# Patient Record
Sex: FEMALE | Race: BLACK OR AFRICAN AMERICAN | NOT HISPANIC OR LATINO | URBAN - METROPOLITAN AREA
[De-identification: names, ages, dates, MRNs, and addresses within clinical notes are randomized per-mention and may not be internally consistent; named-entity substitution may affect disease eponyms.]

---

## 2019-04-29 ENCOUNTER — INPATIENT HOSPITAL (OUTPATIENT)
Dept: URBAN - METROPOLITAN AREA HOSPITAL 107 | Facility: HOSPITAL | Age: 69
End: 2019-04-29
Payer: COMMERCIAL

## 2019-04-29 DIAGNOSIS — N18.6 END STAGE RENAL DISEASE: ICD-10-CM

## 2019-04-29 DIAGNOSIS — D50.0 IRON DEFICIENCY ANEMIA SECONDARY TO BLOOD LOSS (CHRONIC): ICD-10-CM

## 2019-04-29 DIAGNOSIS — K92.2 GASTROINTESTINAL HEMORRHAGE, UNSPECIFIED: ICD-10-CM

## 2019-04-29 PROCEDURE — 99223 1ST HOSP IP/OBS HIGH 75: CPT

## 2019-04-30 ENCOUNTER — INPATIENT HOSPITAL (OUTPATIENT)
Dept: URBAN - METROPOLITAN AREA HOSPITAL 107 | Facility: HOSPITAL | Age: 69
End: 2019-04-30
Payer: COMMERCIAL

## 2019-04-30 DIAGNOSIS — K92.1 MELENA: ICD-10-CM

## 2019-04-30 DIAGNOSIS — K92.2 GASTROINTESTINAL HEMORRHAGE, UNSPECIFIED: ICD-10-CM

## 2019-04-30 PROCEDURE — 99232 SBSQ HOSP IP/OBS MODERATE 35: CPT | Performed by: PHYSICIAN ASSISTANT

## 2019-05-02 ENCOUNTER — INPATIENT HOSPITAL (OUTPATIENT)
Dept: URBAN - METROPOLITAN AREA HOSPITAL 107 | Facility: HOSPITAL | Age: 69
End: 2019-05-02
Payer: COMMERCIAL

## 2019-05-02 DIAGNOSIS — K57.30 DIVERTICULOSIS OF LARGE INTESTINE WITHOUT PERFORATION OR ABS: ICD-10-CM

## 2019-05-02 DIAGNOSIS — D50.0 IRON DEFICIENCY ANEMIA SECONDARY TO BLOOD LOSS (CHRONIC): ICD-10-CM

## 2019-05-02 DIAGNOSIS — K92.1 MELENA: ICD-10-CM

## 2019-05-02 PROCEDURE — 45378 DIAGNOSTIC COLONOSCOPY: CPT | Mod: 52

## 2019-05-03 ENCOUNTER — INPATIENT HOSPITAL (OUTPATIENT)
Dept: URBAN - METROPOLITAN AREA HOSPITAL 107 | Facility: HOSPITAL | Age: 69
End: 2019-05-03
Payer: COMMERCIAL

## 2019-05-03 DIAGNOSIS — K92.2 GASTROINTESTINAL HEMORRHAGE, UNSPECIFIED: ICD-10-CM

## 2019-05-03 PROCEDURE — 99232 SBSQ HOSP IP/OBS MODERATE 35: CPT | Performed by: INTERNAL MEDICINE

## 2019-05-04 ENCOUNTER — INPATIENT HOSPITAL (OUTPATIENT)
Dept: URBAN - METROPOLITAN AREA HOSPITAL 107 | Facility: HOSPITAL | Age: 69
End: 2019-05-04
Payer: COMMERCIAL

## 2019-05-04 DIAGNOSIS — D50.0 IRON DEFICIENCY ANEMIA SECONDARY TO BLOOD LOSS (CHRONIC): ICD-10-CM

## 2019-05-04 DIAGNOSIS — K92.2 GASTROINTESTINAL HEMORRHAGE, UNSPECIFIED: ICD-10-CM

## 2019-05-04 DIAGNOSIS — K92.1 MELENA: ICD-10-CM

## 2019-05-04 DIAGNOSIS — N18.6 END STAGE RENAL DISEASE: ICD-10-CM

## 2019-05-04 DIAGNOSIS — K57.30 DIVERTICULOSIS OF LARGE INTESTINE WITHOUT PERFORATION OR ABS: ICD-10-CM

## 2019-05-04 PROCEDURE — 99232 SBSQ HOSP IP/OBS MODERATE 35: CPT | Performed by: INTERNAL MEDICINE

## 2019-05-05 ENCOUNTER — INPATIENT HOSPITAL (OUTPATIENT)
Dept: URBAN - METROPOLITAN AREA HOSPITAL 107 | Facility: HOSPITAL | Age: 69
End: 2019-05-05
Payer: COMMERCIAL

## 2019-05-05 DIAGNOSIS — K92.2 GASTROINTESTINAL HEMORRHAGE, UNSPECIFIED: ICD-10-CM

## 2019-05-05 PROCEDURE — 99232 SBSQ HOSP IP/OBS MODERATE 35: CPT | Performed by: INTERNAL MEDICINE

## 2019-07-18 ENCOUNTER — OFFICE VISIT (OUTPATIENT)
Dept: SURGERY | Facility: CLINIC | Age: 69
End: 2019-07-18

## 2019-07-18 VITALS — HEIGHT: 60 IN | BODY MASS INDEX: 36.71 KG/M2 | HEART RATE: 66 BPM | OXYGEN SATURATION: 93 % | WEIGHT: 187 LBS

## 2019-07-18 DIAGNOSIS — R10.13 EPIGASTRIC PAIN: Primary | ICD-10-CM

## 2019-07-18 PROCEDURE — 99203 OFFICE O/P NEW LOW 30 MIN: CPT | Performed by: SURGERY

## 2019-07-18 RX ORDER — ACETAMINOPHEN AND CODEINE PHOSPHATE 300; 30 MG/1; MG/1
1 TABLET ORAL DAILY PRN
Status: ON HOLD | COMMUNITY
End: 2023-01-16

## 2019-07-18 RX ORDER — PANTOPRAZOLE SODIUM 40 MG/1
40 TABLET, DELAYED RELEASE ORAL 2 TIMES DAILY
COMMUNITY
End: 2021-12-13

## 2019-07-18 RX ORDER — CARVEDILOL 25 MG/1
25 TABLET ORAL 2 TIMES DAILY WITH MEALS
COMMUNITY
End: 2021-12-13

## 2019-07-18 RX ORDER — AMLODIPINE BESYLATE 10 MG/1
10 TABLET ORAL DAILY
COMMUNITY
End: 2021-12-13

## 2019-07-18 RX ORDER — ISOSORBIDE DINITRATE 30 MG/1
30 TABLET ORAL DAILY
COMMUNITY

## 2019-07-18 RX ORDER — HYDRALAZINE HYDROCHLORIDE 25 MG/1
100 TABLET, FILM COATED ORAL 3 TIMES DAILY
COMMUNITY

## 2019-07-18 NOTE — PROGRESS NOTES
SUMMARY (A/P):    69-year-old lady went to the [facility] emergency room last week due to severe midepigastric pain.  Symptoms have now basically resolved and there are no findings on physical examination of concern.  I suspect abdominal wall strain that has basically resolved.  The epigastric fatty herniation noted on CT scan is not evident on physical exam today.  No further work-up or treatment is warranted unless symptoms were to recur.      CC:    Abdominal pain    HPI:    69-year-old lady went to [facility] emergency room last week with sudden onset of severe midepigastric abdominal pain associated with bulge.  She indicates that the pain has resolved and she is unable to palpate a bulge at this point.    PSH:    Epigastric hernia 2017  Left upper extremity dialysis shunt    PMH:    Arthritis  Chronic kidney disease, on hemodialysis  Coronary artery disease  Hypertension    FAMILY HISTORY:    Negative for colon cancer    SOCIAL HISTORY:   Denies tobacco use  Denies alcohol use    ALLERGIES: reviewed, in Epic    MEDICATIONS: reviewed, in Epic    ROS:  No chest pain or shortness of air.  All other systems reviewed and negative other than presenting complaints.    RADIOLOGY/ENDOSCOPY:    CT abdomen pelvis at [facility] demonstrated mild circumferential wall thickening of the cecum (she had an unremarkable colonoscopy in May 2019).  Small fat-containing ventral hernia is described in the lower epigastrium.    PHYSICAL EXAM:   Constitutional: Well-developed well-nourished, no acute distress  Vital signs: HR 66, weight 187 pounds, height 60 inches, BMI 36.5  Discussed with patient increased perioperative risks associated with obesity including increased risks of DVT, infection, seromas, poor wound healing and hernias (with abdominal surgery).  Eyes: Conjunctiva normal, sclera nonicteric  ENMT: Hearing grossly normal, oral mucosa moist  Neck: Supple, no palpable mass, trachea midline  Respiratory: Clear to  auscultation, normal inspiratory effort  Cardiovascular: Regular rate, no murmur, no carotid bruit, no peripheral edema, no jugular venous distention  Gastrointestinal: Soft, nontender, no palpable mass, no hepatosplenomegaly, well-healed supraumbilical incision from previous epigastric hernia repair with no palpable or visible evidence of recurrence, no hernia elsewhere on the abdominal wall that is palpable  Lymphatics (palpable nodes):  cervical-negative, axillary-negative  Skin:  Warm, dry, no rash on visualized skin surfaces  Musculoskeletal: Symmetric strength, normal gait  Psychiatric: Alert and oriented ×3, normal affect     ALBERT GOMEZ M.D.

## 2021-12-13 ENCOUNTER — APPOINTMENT (OUTPATIENT)
Dept: CT IMAGING | Facility: HOSPITAL | Age: 71
End: 2021-12-13

## 2021-12-13 ENCOUNTER — HOSPITAL ENCOUNTER (OUTPATIENT)
Facility: HOSPITAL | Age: 71
Setting detail: OBSERVATION
LOS: 1 days | Discharge: HOME | End: 2021-12-14
Attending: EMERGENCY MEDICINE | Admitting: STUDENT IN AN ORGANIZED HEALTH CARE EDUCATION/TRAINING PROGRAM

## 2021-12-13 ENCOUNTER — APPOINTMENT (OUTPATIENT)
Dept: GENERAL RADIOLOGY | Facility: HOSPITAL | Age: 71
End: 2021-12-13

## 2021-12-13 DIAGNOSIS — R10.84 GENERALIZED ABDOMINAL PAIN: Primary | ICD-10-CM

## 2021-12-13 DIAGNOSIS — E87.70 HYPERVOLEMIA, UNSPECIFIED HYPERVOLEMIA TYPE: ICD-10-CM

## 2021-12-13 DIAGNOSIS — J81.0 ACUTE PULMONARY EDEMA (HCC): ICD-10-CM

## 2021-12-13 DIAGNOSIS — G57.93 NEUROPATHY INVOLVING BOTH LOWER EXTREMITIES: ICD-10-CM

## 2021-12-13 DIAGNOSIS — N18.6 ESRD (END STAGE RENAL DISEASE) ON DIALYSIS (HCC): ICD-10-CM

## 2021-12-13 DIAGNOSIS — Z99.2 ESRD (END STAGE RENAL DISEASE) ON DIALYSIS (HCC): ICD-10-CM

## 2021-12-13 PROBLEM — D63.8 ANEMIA, CHRONIC DISEASE: Status: ACTIVE | Noted: 2021-12-13

## 2021-12-13 PROBLEM — N17.9 AKI (ACUTE KIDNEY INJURY) (HCC): Status: ACTIVE | Noted: 2021-12-13

## 2021-12-13 PROBLEM — R07.89 CHEST PAIN, ATYPICAL: Status: ACTIVE | Noted: 2021-12-13

## 2021-12-13 PROBLEM — I10 HTN (HYPERTENSION): Status: ACTIVE | Noted: 2021-12-13

## 2021-12-13 PROBLEM — E87.29 METABOLIC ACIDOSIS, INCREASED ANION GAP: Status: ACTIVE | Noted: 2021-12-13

## 2021-12-13 PROBLEM — E66.9 OBESITY (BMI 30-39.9): Status: ACTIVE | Noted: 2021-12-13

## 2021-12-13 LAB
ALBUMIN SERPL-MCNC: 4.1 G/DL (ref 3.5–5.2)
ALBUMIN/GLOB SERPL: 1.6 G/DL
ALP SERPL-CCNC: 101 U/L (ref 39–117)
ALT SERPL W P-5'-P-CCNC: 6 U/L (ref 1–33)
ANION GAP SERPL CALCULATED.3IONS-SCNC: 10.9 MMOL/L (ref 5–15)
ANION GAP SERPL CALCULATED.3IONS-SCNC: 17.4 MMOL/L (ref 5–15)
APTT PPP: 31.4 SECONDS (ref 22.7–35.4)
AST SERPL-CCNC: 17 U/L (ref 1–32)
BASOPHILS # BLD AUTO: 0.03 10*3/MM3 (ref 0–0.2)
BASOPHILS NFR BLD AUTO: 0.9 % (ref 0–1.5)
BILIRUB SERPL-MCNC: 0.3 MG/DL (ref 0–1.2)
BUN SERPL-MCNC: 54 MG/DL (ref 8–23)
BUN SERPL-MCNC: 55 MG/DL (ref 8–23)
BUN/CREAT SERPL: 6.5 (ref 7–25)
BUN/CREAT SERPL: 6.8 (ref 7–25)
CALCIUM SPEC-SCNC: 8.6 MG/DL (ref 8.6–10.5)
CALCIUM SPEC-SCNC: 9.3 MG/DL (ref 8.6–10.5)
CHLORIDE SERPL-SCNC: 100 MMOL/L (ref 98–107)
CHLORIDE SERPL-SCNC: 98 MMOL/L (ref 98–107)
CO2 SERPL-SCNC: 25.6 MMOL/L (ref 22–29)
CO2 SERPL-SCNC: 30.1 MMOL/L (ref 22–29)
CREAT SERPL-MCNC: 8.11 MG/DL (ref 0.57–1)
CREAT SERPL-MCNC: 8.32 MG/DL (ref 0.57–1)
DEPRECATED RDW RBC AUTO: 45.7 FL (ref 37–54)
DEPRECATED RDW RBC AUTO: 47.3 FL (ref 37–54)
EOSINOPHIL # BLD AUTO: 0.32 10*3/MM3 (ref 0–0.4)
EOSINOPHIL NFR BLD AUTO: 9.5 % (ref 0.3–6.2)
ERYTHROCYTE [DISTWIDTH] IN BLOOD BY AUTOMATED COUNT: 14.3 % (ref 12.3–15.4)
ERYTHROCYTE [DISTWIDTH] IN BLOOD BY AUTOMATED COUNT: 14.7 % (ref 12.3–15.4)
GFR SERPL CREATININE-BSD FRML MDRD: 6 ML/MIN/1.73
GFR SERPL CREATININE-BSD FRML MDRD: 6 ML/MIN/1.73
GFR SERPL CREATININE-BSD FRML MDRD: ABNORMAL ML/MIN/{1.73_M2}
GFR SERPL CREATININE-BSD FRML MDRD: ABNORMAL ML/MIN/{1.73_M2}
GLOBULIN UR ELPH-MCNC: 2.6 GM/DL
GLUCOSE SERPL-MCNC: 72 MG/DL (ref 65–99)
GLUCOSE SERPL-MCNC: 79 MG/DL (ref 65–99)
HCT VFR BLD AUTO: 32.6 % (ref 34–46.6)
HCT VFR BLD AUTO: 36.5 % (ref 34–46.6)
HGB BLD-MCNC: 10.3 G/DL (ref 12–15.9)
HGB BLD-MCNC: 11.6 G/DL (ref 12–15.9)
IMM GRANULOCYTES # BLD AUTO: 0 10*3/MM3 (ref 0–0.05)
IMM GRANULOCYTES NFR BLD AUTO: 0 % (ref 0–0.5)
INR PPP: 1.09 (ref 0.9–1.1)
LYMPHOCYTES # BLD AUTO: 0.93 10*3/MM3 (ref 0.7–3.1)
LYMPHOCYTES NFR BLD AUTO: 27.6 % (ref 19.6–45.3)
MCH RBC QN AUTO: 28 PG (ref 26.6–33)
MCH RBC QN AUTO: 28.3 PG (ref 26.6–33)
MCHC RBC AUTO-ENTMCNC: 31.6 G/DL (ref 31.5–35.7)
MCHC RBC AUTO-ENTMCNC: 31.8 G/DL (ref 31.5–35.7)
MCV RBC AUTO: 88.2 FL (ref 79–97)
MCV RBC AUTO: 89.6 FL (ref 79–97)
MONOCYTES # BLD AUTO: 0.34 10*3/MM3 (ref 0.1–0.9)
MONOCYTES NFR BLD AUTO: 10.1 % (ref 5–12)
NEUTROPHILS NFR BLD AUTO: 1.75 10*3/MM3 (ref 1.7–7)
NEUTROPHILS NFR BLD AUTO: 51.9 % (ref 42.7–76)
NRBC BLD AUTO-RTO: 0 /100 WBC (ref 0–0.2)
NT-PROBNP SERPL-MCNC: ABNORMAL PG/ML (ref 0–900)
PLATELET # BLD AUTO: 191 10*3/MM3 (ref 140–450)
PLATELET # BLD AUTO: 225 10*3/MM3 (ref 140–450)
PMV BLD AUTO: 9.4 FL (ref 6–12)
PMV BLD AUTO: 9.7 FL (ref 6–12)
POTASSIUM SERPL-SCNC: 4.4 MMOL/L (ref 3.5–5.2)
POTASSIUM SERPL-SCNC: 4.8 MMOL/L (ref 3.5–5.2)
PROT SERPL-MCNC: 6.7 G/DL (ref 6–8.5)
PROTHROMBIN TIME: 13.9 SECONDS (ref 11.7–14.2)
QT INTERVAL: 473 MS
RBC # BLD AUTO: 3.64 10*6/MM3 (ref 3.77–5.28)
RBC # BLD AUTO: 4.14 10*6/MM3 (ref 3.77–5.28)
SARS-COV-2 RNA PNL SPEC NAA+PROBE: NOT DETECTED
SODIUM SERPL-SCNC: 141 MMOL/L (ref 136–145)
SODIUM SERPL-SCNC: 141 MMOL/L (ref 136–145)
TROPONIN T SERPL-MCNC: 0.03 NG/ML (ref 0–0.03)
TROPONIN T SERPL-MCNC: 0.04 NG/ML (ref 0–0.03)
WBC NRBC COR # BLD: 2.94 10*3/MM3 (ref 3.4–10.8)
WBC NRBC COR # BLD: 3.37 10*3/MM3 (ref 3.4–10.8)

## 2021-12-13 PROCEDURE — 74176 CT ABD & PELVIS W/O CONTRAST: CPT

## 2021-12-13 PROCEDURE — 25010000002 ONDANSETRON PER 1 MG: Performed by: NURSE PRACTITIONER

## 2021-12-13 PROCEDURE — 87635 SARS-COV-2 COVID-19 AMP PRB: CPT | Performed by: EMERGENCY MEDICINE

## 2021-12-13 PROCEDURE — 85025 COMPLETE CBC W/AUTO DIFF WBC: CPT | Performed by: EMERGENCY MEDICINE

## 2021-12-13 PROCEDURE — 71045 X-RAY EXAM CHEST 1 VIEW: CPT

## 2021-12-13 PROCEDURE — 85027 COMPLETE CBC AUTOMATED: CPT | Performed by: NURSE PRACTITIONER

## 2021-12-13 PROCEDURE — G0257 UNSCHED DIALYSIS ESRD PT HOS: HCPCS

## 2021-12-13 PROCEDURE — 83880 ASSAY OF NATRIURETIC PEPTIDE: CPT | Performed by: EMERGENCY MEDICINE

## 2021-12-13 PROCEDURE — 25010000002 MORPHINE PER 10 MG: Performed by: EMERGENCY MEDICINE

## 2021-12-13 PROCEDURE — 84484 ASSAY OF TROPONIN QUANT: CPT | Performed by: EMERGENCY MEDICINE

## 2021-12-13 PROCEDURE — C9803 HOPD COVID-19 SPEC COLLECT: HCPCS

## 2021-12-13 PROCEDURE — 80053 COMPREHEN METABOLIC PANEL: CPT | Performed by: EMERGENCY MEDICINE

## 2021-12-13 PROCEDURE — 93010 ELECTROCARDIOGRAM REPORT: CPT | Performed by: INTERNAL MEDICINE

## 2021-12-13 PROCEDURE — 96376 TX/PRO/DX INJ SAME DRUG ADON: CPT

## 2021-12-13 PROCEDURE — 99284 EMERGENCY DEPT VISIT MOD MDM: CPT

## 2021-12-13 PROCEDURE — 96375 TX/PRO/DX INJ NEW DRUG ADDON: CPT

## 2021-12-13 PROCEDURE — 36415 COLL VENOUS BLD VENIPUNCTURE: CPT | Performed by: NURSE PRACTITIONER

## 2021-12-13 PROCEDURE — 84484 ASSAY OF TROPONIN QUANT: CPT | Performed by: NURSE PRACTITIONER

## 2021-12-13 PROCEDURE — 96374 THER/PROPH/DIAG INJ IV PUSH: CPT

## 2021-12-13 PROCEDURE — 25010000002 ONDANSETRON PER 1 MG: Performed by: EMERGENCY MEDICINE

## 2021-12-13 PROCEDURE — 85730 THROMBOPLASTIN TIME PARTIAL: CPT | Performed by: EMERGENCY MEDICINE

## 2021-12-13 PROCEDURE — 85610 PROTHROMBIN TIME: CPT | Performed by: EMERGENCY MEDICINE

## 2021-12-13 PROCEDURE — 93005 ELECTROCARDIOGRAM TRACING: CPT | Performed by: EMERGENCY MEDICINE

## 2021-12-13 RX ORDER — ONDANSETRON 4 MG/1
4 TABLET, FILM COATED ORAL EVERY 6 HOURS PRN
Status: DISCONTINUED | OUTPATIENT
Start: 2021-12-13 | End: 2021-12-14 | Stop reason: HOSPADM

## 2021-12-13 RX ORDER — SODIUM CHLORIDE 0.9 % (FLUSH) 0.9 %
10 SYRINGE (ML) INJECTION AS NEEDED
Status: DISCONTINUED | OUTPATIENT
Start: 2021-12-13 | End: 2021-12-14 | Stop reason: HOSPADM

## 2021-12-13 RX ORDER — SODIUM CHLORIDE 0.9 % (FLUSH) 0.9 %
10 SYRINGE (ML) INJECTION EVERY 12 HOURS SCHEDULED
Status: DISCONTINUED | OUTPATIENT
Start: 2021-12-13 | End: 2021-12-14 | Stop reason: HOSPADM

## 2021-12-13 RX ORDER — ONDANSETRON 2 MG/ML
4 INJECTION INTRAMUSCULAR; INTRAVENOUS EVERY 6 HOURS PRN
Status: DISCONTINUED | OUTPATIENT
Start: 2021-12-13 | End: 2021-12-14 | Stop reason: HOSPADM

## 2021-12-13 RX ORDER — LOSARTAN POTASSIUM 50 MG/1
50 TABLET ORAL DAILY
Status: DISCONTINUED | OUTPATIENT
Start: 2021-12-13 | End: 2021-12-14 | Stop reason: HOSPADM

## 2021-12-13 RX ORDER — ATORVASTATIN CALCIUM 40 MG/1
40 TABLET, FILM COATED ORAL DAILY
COMMUNITY

## 2021-12-13 RX ORDER — SIMETHICONE 80 MG
80 TABLET,CHEWABLE ORAL 4 TIMES DAILY PRN
Status: DISCONTINUED | OUTPATIENT
Start: 2021-12-13 | End: 2021-12-14 | Stop reason: HOSPADM

## 2021-12-13 RX ORDER — NIFEDIPINE 90 MG/1
90 TABLET, EXTENDED RELEASE ORAL DAILY
Status: ON HOLD | COMMUNITY
End: 2023-01-16

## 2021-12-13 RX ORDER — ATORVASTATIN CALCIUM 20 MG/1
40 TABLET, FILM COATED ORAL NIGHTLY
Status: DISCONTINUED | OUTPATIENT
Start: 2021-12-13 | End: 2021-12-14 | Stop reason: HOSPADM

## 2021-12-13 RX ORDER — ONDANSETRON 2 MG/ML
4 INJECTION INTRAMUSCULAR; INTRAVENOUS ONCE
Status: COMPLETED | OUTPATIENT
Start: 2021-12-13 | End: 2021-12-13

## 2021-12-13 RX ORDER — ACETAMINOPHEN 325 MG/1
650 TABLET ORAL EVERY 4 HOURS PRN
Status: DISCONTINUED | OUTPATIENT
Start: 2021-12-13 | End: 2021-12-14 | Stop reason: HOSPADM

## 2021-12-13 RX ORDER — ACETAMINOPHEN 160 MG/5ML
650 SOLUTION ORAL EVERY 4 HOURS PRN
Status: DISCONTINUED | OUTPATIENT
Start: 2021-12-13 | End: 2021-12-14 | Stop reason: HOSPADM

## 2021-12-13 RX ORDER — ACETAMINOPHEN 650 MG/1
650 SUPPOSITORY RECTAL EVERY 4 HOURS PRN
Status: DISCONTINUED | OUTPATIENT
Start: 2021-12-13 | End: 2021-12-14 | Stop reason: HOSPADM

## 2021-12-13 RX ORDER — CALCIUM CARBONATE 200(500)MG
2 TABLET,CHEWABLE ORAL 2 TIMES DAILY PRN
Status: DISCONTINUED | OUTPATIENT
Start: 2021-12-13 | End: 2021-12-14 | Stop reason: HOSPADM

## 2021-12-13 RX ORDER — HYDRALAZINE HYDROCHLORIDE 50 MG/1
100 TABLET, FILM COATED ORAL 2 TIMES DAILY
Status: DISCONTINUED | OUTPATIENT
Start: 2021-12-13 | End: 2021-12-13

## 2021-12-13 RX ORDER — MORPHINE SULFATE 2 MG/ML
4 INJECTION, SOLUTION INTRAMUSCULAR; INTRAVENOUS ONCE
Status: COMPLETED | OUTPATIENT
Start: 2021-12-13 | End: 2021-12-13

## 2021-12-13 RX ORDER — HYDRALAZINE HYDROCHLORIDE 50 MG/1
100 TABLET, FILM COATED ORAL 3 TIMES DAILY
Status: DISCONTINUED | OUTPATIENT
Start: 2021-12-14 | End: 2021-12-14 | Stop reason: HOSPADM

## 2021-12-13 RX ORDER — POLYETHYLENE GLYCOL 3350 17 G/17G
17 POWDER, FOR SOLUTION ORAL DAILY
Status: DISCONTINUED | OUTPATIENT
Start: 2021-12-13 | End: 2021-12-14 | Stop reason: HOSPADM

## 2021-12-13 RX ORDER — NITROGLYCERIN 0.4 MG/1
0.4 TABLET SUBLINGUAL
Status: DISCONTINUED | OUTPATIENT
Start: 2021-12-13 | End: 2021-12-14 | Stop reason: HOSPADM

## 2021-12-13 RX ORDER — LOSARTAN POTASSIUM 50 MG/1
50 TABLET ORAL DAILY
COMMUNITY
End: 2023-01-19 | Stop reason: HOSPADM

## 2021-12-13 RX ORDER — NIFEDIPINE 30 MG/1
90 TABLET, EXTENDED RELEASE ORAL DAILY
Status: DISCONTINUED | OUTPATIENT
Start: 2021-12-13 | End: 2021-12-14 | Stop reason: HOSPADM

## 2021-12-13 RX ORDER — NIFEDIPINE 90 MG/1
90 TABLET, EXTENDED RELEASE ORAL DAILY
Status: DISCONTINUED | OUTPATIENT
Start: 2021-12-13 | End: 2021-12-13 | Stop reason: CLARIF

## 2021-12-13 RX ORDER — ALBUMIN (HUMAN) 12.5 G/50ML
12.5 SOLUTION INTRAVENOUS AS NEEDED
Status: CANCELLED | OUTPATIENT
Start: 2021-12-13 | End: 2021-12-14

## 2021-12-13 RX ORDER — AMOXICILLIN 250 MG
2 CAPSULE ORAL 2 TIMES DAILY PRN
Status: DISCONTINUED | OUTPATIENT
Start: 2021-12-13 | End: 2021-12-14 | Stop reason: HOSPADM

## 2021-12-13 RX ADMIN — ATORVASTATIN CALCIUM 40 MG: 20 TABLET, FILM COATED ORAL at 21:02

## 2021-12-13 RX ADMIN — HYDRALAZINE HYDROCHLORIDE 100 MG: 50 TABLET, FILM COATED ORAL at 18:38

## 2021-12-13 RX ADMIN — ACETAMINOPHEN 650 MG: 325 TABLET, FILM COATED ORAL at 20:16

## 2021-12-13 RX ADMIN — SIMETHICONE 80 MG: 80 TABLET, CHEWABLE ORAL at 18:38

## 2021-12-13 RX ADMIN — SODIUM CHLORIDE, PRESERVATIVE FREE 10 ML: 5 INJECTION INTRAVENOUS at 00:35

## 2021-12-13 RX ADMIN — NIFEDIPINE 90 MG: 30 TABLET, FILM COATED, EXTENDED RELEASE ORAL at 21:02

## 2021-12-13 RX ADMIN — ONDANSETRON 4 MG: 2 INJECTION INTRAMUSCULAR; INTRAVENOUS at 00:34

## 2021-12-13 RX ADMIN — ONDANSETRON 4 MG: 2 INJECTION INTRAMUSCULAR; INTRAVENOUS at 11:52

## 2021-12-13 RX ADMIN — ONDANSETRON 4 MG: 2 INJECTION INTRAMUSCULAR; INTRAVENOUS at 20:16

## 2021-12-13 RX ADMIN — LOSARTAN POTASSIUM 50 MG: 50 TABLET, FILM COATED ORAL at 21:02

## 2021-12-13 RX ADMIN — SODIUM CHLORIDE, PRESERVATIVE FREE 10 ML: 5 INJECTION INTRAVENOUS at 08:35

## 2021-12-13 RX ADMIN — POLYETHYLENE GLYCOL 3350 17 G: 17 POWDER, FOR SOLUTION ORAL at 18:37

## 2021-12-13 RX ADMIN — SODIUM CHLORIDE, PRESERVATIVE FREE 10 ML: 5 INJECTION INTRAVENOUS at 20:00

## 2021-12-13 RX ADMIN — MORPHINE SULFATE 4 MG: 2 INJECTION, SOLUTION INTRAMUSCULAR; INTRAVENOUS at 00:35

## 2021-12-13 RX ADMIN — ISOSORBIDE DINITRATE 30 MG: 20 TABLET ORAL at 18:39

## 2021-12-13 NOTE — H&P
"    Patient Name:  Sonia Acharya  YOB: 1950  MRN:  [id number]  Admit Date:  12/13/2021  Patient Care Team:  Rudolph Granados MD as PCP - General (Internal Medicine)      Subjective   History Present Illness     Chief Complaint   Patient presents with   • Shortness of Breath   • Abdominal Pain       Ms. Acharya is a 71 y.o. never smoker with a history of HTN, CKD, heart disease, ESRD (MWF) who presents to [facility] ER with chief complaint of shortness of breath and abdominal pain and admitted for ODALIS.    Reports intermittent left lower & left upper quadrant abdominal pain x1 month aggravated by large meals and unable to identify alleviating factors radiating to left chest without associated lightheadedness, dizziness, headache, nausea, vomiting, diarrhea, or urinary complaints.  Evaluated nonautomated few days prior with unremarkable serum lipase 12.  Previous cardiac work-up at [facility] early November (findings listed below).  Missed hemodialysis last Friday due to abdominal pain and hospital admission.  Still with intermittent complaints of abdominal pain; therefore, sought [facility] ER evaluation.    Serum creatinine 8.3 increased from 5.3 (5/2020); therefore, admitted with nephrology consultation and anticipated hemodialysis.      Recommendations pending hospital course.  Details below in assessment and plan.    History of Present Illness  Review of Systems   Constitutional: Negative for chills, diaphoresis and fever.   HENT: Negative for congestion and rhinorrhea.    Respiratory: Negative for cough and shortness of breath.    Cardiovascular: Positive for chest pain (left \"pressure\"). Negative for palpitations and leg swelling.   Gastrointestinal: Positive for abdominal pain. Negative for constipation, diarrhea, nausea and vomiting.   Endocrine: Negative for polydipsia, polyphagia and polyuria.   Genitourinary: Negative for difficulty urinating and dysuria.   Musculoskeletal: Positive for " gait problem (2/2 generalized weakness). Negative for myalgias.   Skin: Negative for rash and wound.   Neurological: Positive for weakness (generalized). Negative for dizziness and headaches.   Psychiatric/Behavioral: Negative for confusion and hallucinations.        Personal History     Past Medical History:   Diagnosis Date   • Arthritis    • Chronic kidney disease    • Heart disease    • Hypertension      Past Surgical History:   Procedure Laterality Date   • BREAST SURGERY     • DIALYSIS FISTULA CREATION     • EYE SURGERY     • HERNIA REPAIR  2017    Dr. Sung     Family History   Problem Relation Age of Onset   • Heart disease Father    • Breast cancer Sister      Social History     Tobacco Use   • Smoking status: Never Smoker   • Smokeless tobacco: Never Used   Substance Use Topics   • Alcohol use: No   • Drug use: No     No current facility-administered medications on file prior to encounter.     Current Outpatient Medications on File Prior to Encounter   Medication Sig Dispense Refill   • acetaminophen-codeine (TYLENOL #3) 300-30 MG per tablet Take 1 tablet by mouth Daily As Needed for Moderate Pain .     • hydrALAZINE (APRESOLINE) 25 MG tablet Take 100 mg by mouth 2 (Two) Times a Day.     • isosorbide dinitrate (ISORDIL) 30 MG tablet Take 30 mg by mouth Daily.     • [DISCONTINUED] pantoprazole (PROTONIX) 40 MG EC tablet Take 40 mg by mouth 2 (Two) Times a Day.     • [DISCONTINUED] amLODIPine (NORVASC) 10 MG tablet Take 10 mg by mouth Daily.     • [DISCONTINUED] carvedilol (COREG) 25 MG tablet Take 25 mg by mouth 2 (Two) Times a Day With Meals.       No Known Allergies    Objective    Objective     Vital Signs  Temp:  [97.9 °F (36.6 °C)-98.1 °F (36.7 °C)] 98.1 °F (36.7 °C)  Heart Rate:  [56-65] 56  Resp:  [16-18] 16  BP: (140-165)/(58-79) 147/61  SpO2:  [97 %-100 %] 100 %  on  Flow (L/min):  [1.5-2] 2;   Device (Oxygen Therapy): nasal cannula  Body mass index is 24.54 kg/m².    Physical  Exam  Constitutional:       General: She is not in acute distress.     Appearance: She is ill-appearing (Chronic). She is not toxic-appearing.      Comments: Generally weak, elderly   HENT:      Head: Normocephalic and atraumatic.   Eyes:      Extraocular Movements: Extraocular movements intact.      Conjunctiva/sclera: Conjunctivae normal.   Cardiovascular:      Rate and Rhythm: Bradycardia present.      Heart sounds: Normal heart sounds.   Pulmonary:      Effort: Pulmonary effort is normal.      Comments: Diminished on expiration throughout lung fields; shallow inspiratory effort  Abdominal:      General: There is no distension.      Palpations: Abdomen is soft.      Tenderness: There is abdominal tenderness (LUQ). There is no guarding.      Comments: Hypoactive   Musculoskeletal:      Cervical back: Normal range of motion and neck supple.      Right lower leg: No edema.      Left lower leg: No edema.   Skin:     General: Skin is warm and dry.   Neurological:      Mental Status: She is alert and oriented to person, place, and time.      Cranial Nerves: No cranial nerve deficit.      Motor: Weakness (generalized) present.   Psychiatric:         Behavior: Behavior normal.         Thought Content: Thought content normal.         Results Review:  I reviewed the patient's new clinical results.  I reviewed the patient's new imaging results and agree with the interpretation.  I reviewed the patient's other test results and agree with the interpretation  I personally viewed and interpreted the patient's EKG/Telemetry data  Discussed with ED provider.    Lab Results (last 24 hours)     Procedure Component Value Units Date/Time    CBC & Differential [[id number]]  (Abnormal) Collected: 12/13/21 0024    Specimen: Blood Updated: 12/13/21 0040    Narrative:      The following orders were created for panel order CBC & Differential.  Procedure                               Abnormality         Status                     ---------                                -----------         ------                     CBC Auto Differential[[id number]]        Abnormal            Final result                 Please view results for these tests on the individual orders.    Comprehensive Metabolic Panel [[id number]]  (Abnormal) Collected: 12/13/21 0024    Specimen: Blood Updated: 12/13/21 0102     Glucose 72 mg/dL      BUN 54 mg/dL      Creatinine 8.32 mg/dL      Sodium 141 mmol/L      Potassium 4.8 mmol/L      Comment: Slight hemolysis detected by analyzer. Results may be affected.        Chloride 98 mmol/L      CO2 25.6 mmol/L      Calcium 9.3 mg/dL      Total Protein 6.7 g/dL      Albumin 4.10 g/dL      ALT (SGPT) 6 U/L      AST (SGOT) 17 U/L      Comment: Slight hemolysis detected by analyzer. Results may be affected.        Alkaline Phosphatase 101 U/L      Total Bilirubin 0.3 mg/dL      eGFR Non  Amer --     Comment: <15 Indicative of kidney failure.        eGFR  African Amer 6 mL/min/1.73      Comment: <15 Indicative of kidney failure.        Globulin 2.6 gm/dL      A/G Ratio 1.6 g/dL      BUN/Creatinine Ratio 6.5     Anion Gap 17.4 mmol/L     Narrative:      GFR Normal >60  Chronic Kidney Disease <60  Kidney Failure <15      Protime-INR [[id number]]  (Normal) Collected: 12/13/21 0024    Specimen: Blood Updated: 12/13/21 0055     Protime 13.9 Seconds      INR 1.09    aPTT [[id number]]  (Normal) Collected: 12/13/21 0024    Specimen: Blood Updated: 12/13/21 0055     PTT 31.4 seconds     BNP [[id number]]  (Abnormal) Collected: 12/13/21 0024    Specimen: Blood Updated: 12/13/21 0056     proBNP 11,813.0 pg/mL     Narrative:      Among patients with dyspnea, NT-proBNP is highly sensitive for the detection of acute congestive heart failure. In addition NT-proBNP of <300 pg/ml effectively rules out acute congestive heart failure with 99% negative predictive value.    Results may be falsely decreased if patient taking Biotin.      Troponin [[id number]]   (Abnormal) Collected: 12/13/21 0024    Specimen: Blood Updated: 12/13/21 0103     Troponin T 0.045 ng/mL     Narrative:      Troponin T Reference Range:  <= 0.03 ng/mL-   Negative for AMI  >0.03 ng/mL-     Abnormal for myocardial necrosis.  Clinicians would have to utilize clinical acumen, EKG, Troponin and serial changes to determine if it is an Acute Myocardial Infarction or myocardial injury due to an underlying chronic condition.       Results may be falsely decreased if patient taking Biotin.      CBC Auto Differential [[id number]]  (Abnormal) Collected: 12/13/21 0024    Specimen: Blood Updated: 12/13/21 0040     WBC 3.37 10*3/mm3      RBC 4.14 10*6/mm3      Hemoglobin 11.6 g/dL      Hematocrit 36.5 %      MCV 88.2 fL      MCH 28.0 pg      MCHC 31.8 g/dL      RDW 14.7 %      RDW-SD 47.3 fl      MPV 9.7 fL      Platelets 225 10*3/mm3      Neutrophil % 51.9 %      Lymphocyte % 27.6 %      Monocyte % 10.1 %      Eosinophil % 9.5 %      Basophil % 0.9 %      Immature Grans % 0.0 %      Neutrophils, Absolute 1.75 10*3/mm3      Lymphocytes, Absolute 0.93 10*3/mm3      Monocytes, Absolute 0.34 10*3/mm3      Eosinophils, Absolute 0.32 10*3/mm3      Basophils, Absolute 0.03 10*3/mm3      Immature Grans, Absolute 0.00 10*3/mm3      nRBC 0.0 /100 WBC     COVID PRE-OP / PRE-PROCEDURE SCREENING ORDER (NO ISOLATION) - Swab, Nasopharynx [[id number]]  (Normal) Collected: 12/13/21 0228    Specimen: Swab from Nasopharynx Updated: 12/13/21 0259    Narrative:      The following orders were created for panel order COVID PRE-OP / PRE-PROCEDURE SCREENING ORDER (NO ISOLATION) - Swab, Nasopharynx.  Procedure                               Abnormality         Status                     ---------                               -----------         ------                     ELLEN PERKINS IN-HOUSE...[[id number]]  Normal              Final result                 Please view results for these tests on the individual orders.    COVID-19,[facility]  IN-HOUSE CEPHEID/NICOLE NP SWAB IN TRANSPORT MEDIA 8-12 HR TAT - Swab, Nasopharynx [[id number]]  (Normal) Collected: 12/13/21 0228    Specimen: Swab from Nasopharynx Updated: 12/13/21 0259     COVID19 Not Detected    Narrative:      Fact sheet for providers: https://www.fda.gov/media/395134/download    Fact sheet for patients: https://www.fda.gov/media/492803/download    Test performed by PCR.    Basic Metabolic Panel [[id number]]  (Abnormal) Collected: 12/13/21 1044    Specimen: Blood Updated: 12/13/21 1234     Glucose 79 mg/dL      BUN 55 mg/dL      Creatinine 8.11 mg/dL      Sodium 141 mmol/L      Potassium 4.4 mmol/L      Chloride 100 mmol/L      CO2 30.1 mmol/L      Calcium 8.6 mg/dL      eGFR  African Amer 6 mL/min/1.73      Comment: <15 Indicative of kidney failure.        eGFR Non  Amer --     Comment: <15 Indicative of kidney failure.        BUN/Creatinine Ratio 6.8     Anion Gap 10.9 mmol/L     Narrative:      GFR Normal >60  Chronic Kidney Disease <60  Kidney Failure <15      CBC (No Diff) [[id number]]  (Abnormal) Collected: 12/13/21 1044    Specimen: Blood Updated: 12/13/21 1139     WBC 2.94 10*3/mm3      RBC 3.64 10*6/mm3      Hemoglobin 10.3 g/dL      Hematocrit 32.6 %      MCV 89.6 fL      MCH 28.3 pg      MCHC 31.6 g/dL      RDW 14.3 %      RDW-SD 45.7 fl      MPV 9.4 fL      Platelets 191 10*3/mm3     Troponin [[id number]]  (Normal) Collected: 12/13/21 1044    Specimen: Blood Updated: 12/13/21 1224     Troponin T 0.030 ng/mL     Narrative:      Troponin T Reference Range:  <= 0.03 ng/mL-   Negative for AMI  >0.03 ng/mL-     Abnormal for myocardial necrosis.  Clinicians would have to utilize clinical acumen, EKG, Troponin and serial changes to determine if it is an Acute Myocardial Infarction or myocardial injury due to an underlying chronic condition.       Results may be falsely decreased if patient taking Biotin.            Imaging Results (Last 24 Hours)     Procedure Component Value Units  Date/Time    CT Abdomen Pelvis Without Contrast [[id number]] Collected: 12/13/21 0413     Updated: 12/13/21 0423    Narrative:      CT OF THE ABDOMEN AND PELVIS WITHOUT CONTRAST     HISTORY: Abdominal discomfort     COMPARISON: None available.     TECHNIQUE: Axial CT imaging was obtained through the abdomen and pelvis.  No IV contrast was administered.     FINDINGS:  Cardiomegaly is present. There is interlobular septal thickening,  concerning for vascular congestion. There is nonspecific bibasilar  consolidation. This may represent atelectasis and/or scarring. The  patient has a small pericardial effusion. There are extensive coronary  artery calcifications. No focal hepatic lesions are seen. Calcified  granulomata are seen within the spleen. The stomach and duodenum are  unremarkable, as are the adrenal glands. Pancreas appears mildly  atrophic. Gallbladder is distended. Stones and sludge are suspected  within the gallbladder. This could be better assessed with gallbladder  ultrasound. I don't see any definite gallbladder wall thickening or  pericholecystic fluid. Kidneys are atrophic. There is no hydronephrosis.  There are bilateral renal cysts. Dense vascular calcifications are  present. Urinary bladder appears unremarkable. Somewhat heterogeneous  appearance to the uterus may reflect fibroids. No suspicious adnexal  masses are seen. There is no evidence of bowel obstruction. There is  diverticulosis. There is no diverticulitis. The appendix is normal.  Extensive anterior osteophytes are noted involving the thoracolumbar  spine. No acute osseous abnormalities are seen.       Impression:         1. Cardiomegaly with interlobular septal thickening, suggesting  congestive heart failure.  2. Cholelithiasis. Gallbladder appears distended, although I don't see  any definite gallbladder wall thickening or pericholecystic fluid.  Ultrasound would be more sensitive for assessment.     Radiation dose reduction techniques  were utilized, including automated  exposure control and exposure modulation based on body size.     This report was finalized on 12/13/2021 4:20 AM by Dr. Evonne Obando M.D.       XR Chest 1 View [[id number]] Collected: 12/13/21 0050     Updated: 12/13/21 0054    Narrative:      SINGLE VIEW OF THE CHEST     HISTORY: Shortness of air     COMPARISON: None available.     FINDINGS:  Heart and megalies present. There is vascular congestion. Lung volumes  are diminished. No pneumothorax or pleural effusion is seen. The patient  has a left axillary vascular stent.       Impression:      Cardiomegaly with suspected vascular congestion.     This report was finalized on 12/13/2021 12:51 AM by Dr. Evonne Obando M.D.                 ECG 12 Lead   Final Result   HEART RATE= 61  bpm   RR Interval= 980  ms   SC Interval= 160  ms   P Horizontal Axis= -32  deg   P Front Axis= 64  deg   QRSD Interval= 143  ms   QT Interval= 473  ms   QRS Axis= -58  deg   T Wave Axis= 25  deg   - ABNORMAL ECG -   Sinus rhythm   Probable left atrial enlargement   Nonspecific IVCD with LAD   Left ventricular hypertrophy   Anterolateral infarct, old   NO PRIOR TRACING AVAILABLE FOR COMPARISON   Electronically Signed By: Cleveland Dickens ([facility]) 13-Dec-2021 10:49:29   Date and Time of Study: 2021-12-13 00:16:56           Assessment/Plan     Active Hospital Problems    Diagnosis  POA   • **Generalized abdominal pain [R10.84]  Yes   • ODALIS (acute kidney injury) (HCC) [N17.9]  Yes   • Anemia, chronic disease [D63.8]  Yes   • Metabolic acidosis, increased anion gap [E87.2]  Yes   • HTN (hypertension) [I10]  Yes   • Chest pain, atypical [R07.89]  Yes      Resolved Hospital Problems   No resolved problems to display.       Ms. Acharya is a 71 y.o. never smoker with a history of HTN, CKD, heart disease who presents to [facility] ER with chief complaint of shortness of breath and abdominal pain and admitted for ODALIS.      Generalized abdominal pain  Describes LUQ  pain as 'gas-like' rated by large meals  Afebrile  Unremarkable serum lipase 12 just 3 days prior  CT abdomen pelvis without contrast no evidence of bowel obstruction, adnexal masses, or diverticulitis  Ordering simethicone four times daily PRN  Consult PT to mobilize  Provide bowel regimen      Atypical chest pain  [facility] pain described as sharp sharp to pressure-like during dialysis while at rest.  Cardiac cath 11/4/2021 30-40% mid-LAD lesion, 70% ostial diagonal 1 lesion, 80-90% ostial RCA lesion which is nondominant vessel  EKG sinus rhythm, anterolateral infarct old  Serial troponin trend:  0.045-->0.030  Chest x-ray suspected vascular congestion likely contributing to symptoms vs missed hemodialysis vs both vs other   Doubtful need for cardiology consultation given proximity of recent cardiac workup & no evidence of acute coronary syndrome      ODALIS (acute kidney injury) (HCC) /   Metabolic acidosis, increased anion gap  Acute on chronic ESRD 2/2 missed HD on 12/10/2021 due to c/o abdominal pain  Nephrology following--plan HD on 12/13      Anemia, chronic disease  Serum Hgb stable with trend  No overt signs of active bleeding  A.m. labs      HTN (hypertension)  BP acceptable, hemodynamically stable  Continue hydralazine    I discussed the patient's findings and my recommendations with Dr. Rodriguez.    VTE Prophylaxis - SCD  Code Status - CPR       KANDIS Mendoza  [facility]  12/13/21  12:45 EST

## 2021-12-13 NOTE — PLAN OF CARE
Goal Outcome Evaluation:   Pt admitted tonight with c/o abdomen pain; morphine/zofran given with positive results; Fistula in the Left upper arm; Alert and oriented; Renal to see patient; Will continue to montior

## 2021-12-13 NOTE — ED PROVIDER NOTES
EMERGENCY DEPARTMENT ENCOUNTER    CHIEF COMPLAINT  Chief Complaint: Abdominal pain/shortness of breath  History given by: Patient  History limited by: None  Room Number: 16/16  PMD: Rudolph Granados MD      HPI:  Pt is a 71 y.o. female who presents complaining of intermittent episodes of abdominal discomfort that has been present for the past several months.  She states that she was just admitted to the hospital 3 days ago for similar symptoms and discharge.  She states that when this abdominal discomfort occurs that she also becomes short of breath.  She describes the abdominal discomfort as a pressure type sensation that is diffuse throughout her abdomen but worse in the left side of her abdomen.  She is a end-stage renal disease patient currently on dialysis Monday, Wednesday, and Friday.  She states that she did not go to her last dialysis as scheduled on Friday.  She currently denies chest pain, cough, back pain, or fevers and chills.    Duration:  Several months  Onset: gradual  Location: diffuse abdomen/pulmonary  Radiation: none  Quality: pressure sensation  Intensity/Severity: moderate  Progression: worsening  Aggravating Factors: none  Alleviating Factors: none  Previous Episodes: yes, recently hospitalized for same complaints  Treatment before arrival: none    PAST MEDICAL HISTORY  Active Ambulatory Problems     Diagnosis Date Noted   • No Active Ambulatory Problems     Resolved Ambulatory Problems     Diagnosis Date Noted   • No Resolved Ambulatory Problems     Past Medical History:   Diagnosis Date   • Arthritis    • Chronic kidney disease    • Heart disease    • Hypertension        PAST SURGICAL HISTORY  Past Surgical History:   Procedure Laterality Date   • BREAST SURGERY     • DIALYSIS FISTULA CREATION     • EYE SURGERY     • HERNIA REPAIR  2017    Dr. Sung       FAMILY HISTORY  Family History   Problem Relation Age of Onset   • Heart disease Father    • Breast cancer Sister        SOCIAL  HISTORY  Social History     Socioeconomic History   • Marital status: Single   Tobacco Use   • Smoking status: Never Smoker   • Smokeless tobacco: Never Used   Substance and Sexual Activity   • Alcohol use: No   • Drug use: No   • Sexual activity: Defer       ALLERGIES  Patient has no known allergies.    REVIEW OF SYSTEMS  Review of Systems   Constitutional: Negative for fever.   HENT: Negative for sore throat.    Eyes: Negative.    Respiratory: Positive for shortness of breath. Negative for cough.    Cardiovascular: Negative for chest pain.   Gastrointestinal: Positive for abdominal pain. Negative for diarrhea and vomiting.   Genitourinary: Negative for dysuria.   Musculoskeletal: Negative for neck pain.   Skin: Negative for rash.   Allergic/Immunologic: Negative.    Neurological: Negative for weakness, numbness and headaches.   Hematological: Negative.    Psychiatric/Behavioral: Negative.    All other systems reviewed and are negative.      PHYSICAL EXAM  ED Triage Vitals   Temp Heart Rate Resp BP SpO2   12/13/21 0007 12/13/21 0007 12/13/21 0009 12/13/21 0007 12/13/21 0007   98 °F (36.7 °C) 65 18 163/79 98 %      Temp src Heart Rate Source Patient Position BP Location FiO2 (%)   -- -- -- -- --              Physical Exam  Vitals and nursing note reviewed.   Constitutional:       General: She is not in acute distress.  HENT:      Head: Normocephalic and atraumatic.   Eyes:      Pupils: Pupils are equal, round, and reactive to light.   Cardiovascular:      Rate and Rhythm: Normal rate and regular rhythm.      Heart sounds: Normal heart sounds.   Pulmonary:      Effort: Pulmonary effort is normal. No respiratory distress.      Breath sounds: Normal breath sounds.   Abdominal:      Palpations: Abdomen is soft.      Tenderness: There is generalized abdominal tenderness. There is no guarding or rebound.   Musculoskeletal:         General: Normal range of motion.      Cervical back: Normal range of motion and neck supple.    Skin:     General: Skin is warm and dry.      Findings: No rash.   Neurological:      Mental Status: She is alert and oriented to person, place, and time.      Sensory: Sensation is intact.   Psychiatric:         Mood and Affect: Mood and affect normal.         LAB RESULTS  Lab Results (last 24 hours)     Procedure Component Value Units Date/Time    CBC & Differential [[id number]]  (Abnormal) Collected: 12/13/21 0024    Specimen: Blood Updated: 12/13/21 0040    Narrative:      The following orders were created for panel order CBC & Differential.  Procedure                               Abnormality         Status                     ---------                               -----------         ------                     CBC Auto Differential[[id number]]        Abnormal            Final result                 Please view results for these tests on the individual orders.    Comprehensive Metabolic Panel [[id number]]  (Abnormal) Collected: 12/13/21 0024    Specimen: Blood Updated: 12/13/21 0102     Glucose 72 mg/dL      BUN 54 mg/dL      Creatinine 8.32 mg/dL      Sodium 141 mmol/L      Potassium 4.8 mmol/L      Comment: Slight hemolysis detected by analyzer. Results may be affected.        Chloride 98 mmol/L      CO2 25.6 mmol/L      Calcium 9.3 mg/dL      Total Protein 6.7 g/dL      Albumin 4.10 g/dL      ALT (SGPT) 6 U/L      AST (SGOT) 17 U/L      Comment: Slight hemolysis detected by analyzer. Results may be affected.        Alkaline Phosphatase 101 U/L      Total Bilirubin 0.3 mg/dL      eGFR Non  Amer --     Comment: <15 Indicative of kidney failure.        eGFR  African Amer 6 mL/min/1.73      Comment: <15 Indicative of kidney failure.        Globulin 2.6 gm/dL      A/G Ratio 1.6 g/dL      BUN/Creatinine Ratio 6.5     Anion Gap 17.4 mmol/L     Narrative:      GFR Normal >60  Chronic Kidney Disease <60  Kidney Failure <15      Protime-INR [[id number]]  (Normal) Collected: 12/13/21 0024    Specimen: Blood  Updated: 12/13/21 0055     Protime 13.9 Seconds      INR 1.09    aPTT [[id number]]  (Normal) Collected: 12/13/21 0024    Specimen: Blood Updated: 12/13/21 0055     PTT 31.4 seconds     BNP [[id number]]  (Abnormal) Collected: 12/13/21 0024    Specimen: Blood Updated: 12/13/21 0056     proBNP 11,813.0 pg/mL     Narrative:      Among patients with dyspnea, NT-proBNP is highly sensitive for the detection of acute congestive heart failure. In addition NT-proBNP of <300 pg/ml effectively rules out acute congestive heart failure with 99% negative predictive value.    Results may be falsely decreased if patient taking Biotin.      Troponin [[id number]]  (Abnormal) Collected: 12/13/21 0024    Specimen: Blood Updated: 12/13/21 0103     Troponin T 0.045 ng/mL     Narrative:      Troponin T Reference Range:  <= 0.03 ng/mL-   Negative for AMI  >0.03 ng/mL-     Abnormal for myocardial necrosis.  Clinicians would have to utilize clinical acumen, EKG, Troponin and serial changes to determine if it is an Acute Myocardial Infarction or myocardial injury due to an underlying chronic condition.       Results may be falsely decreased if patient taking Biotin.      CBC Auto Differential [[id number]]  (Abnormal) Collected: 12/13/21 0024    Specimen: Blood Updated: 12/13/21 0040     WBC 3.37 10*3/mm3      RBC 4.14 10*6/mm3      Hemoglobin 11.6 g/dL      Hematocrit 36.5 %      MCV 88.2 fL      MCH 28.0 pg      MCHC 31.8 g/dL      RDW 14.7 %      RDW-SD 47.3 fl      MPV 9.7 fL      Platelets 225 10*3/mm3      Neutrophil % 51.9 %      Lymphocyte % 27.6 %      Monocyte % 10.1 %      Eosinophil % 9.5 %      Basophil % 0.9 %      Immature Grans % 0.0 %      Neutrophils, Absolute 1.75 10*3/mm3      Lymphocytes, Absolute 0.93 10*3/mm3      Monocytes, Absolute 0.34 10*3/mm3      Eosinophils, Absolute 0.32 10*3/mm3      Basophils, Absolute 0.03 10*3/mm3      Immature Grans, Absolute 0.00 10*3/mm3      nRBC 0.0 /100 WBC     COVID PRE-OP / PRE-PROCEDURE  SCREENING ORDER (NO ISOLATION) - Swab, Nasopharynx [[id number]] Collected: 12/13/21 0228    Specimen: Swab from Nasopharynx Updated: 12/13/21 0232    Narrative:      The following orders were created for panel order COVID PRE-OP / PRE-PROCEDURE SCREENING ORDER (NO ISOLATION) - Swab, Nasopharynx.  Procedure                               Abnormality         Status                     ---------                               -----------         ------                     COVID-19,[facility]...[[id number]]                      In process                   Please view results for these tests on the individual orders.    COVID-19,[facility] CEPHEID/NICOLE NP SWAB IN TRANSPORT MEDIA 8-12 HR TAT - Swab, Nasopharynx [[id number]] Collected: 12/13/21 0228    Specimen: Swab from Nasopharynx Updated: 12/13/21 0232          I ordered the above labs and reviewed the results    RADIOLOGY  XR Chest 1 View   Final Result   Cardiomegaly with suspected vascular congestion.       This report was finalized on 12/13/2021 12:51 AM by Dr. Evonne Obando M.D.               I ordered the above noted radiological studies. Interpreted by radiologist.  Reviewed by me in PACS.       PROCEDURES  Procedures  EKG          EKG time: 0016  Rhythm/Rate: NSR, 61  P waves and TN: nml  QRS, axis: nml, LAD   ST and T waves: nml     Interpreted Contemporaneously by me, independently viewed  No previous EKG available for comparison      PROGRESS AND CONSULTS     The patient was wearing a facemask upon entrance into the room and remained in such throughout their visit.  I was wearing PPE including a facemask, eye protection, as well as gloves at any point entering the room and throughout the visit    0200  On reevaluation, the patient states that her pain has significantly improved and her vital signs are currently stable.  I did inform her that her labs appear as expected for someone with end-stage renal disease with some edema seen on her chest  x-ray.  We will admit her to the hospital today secondary to the volume overload with her missing her last dialysis treatment.  The patient is in agreement with the plan and all questions have been answered.    0235  Case discussed with KANDIS Giles for [facility], who agrees to admit the patient to the hospital for Dr. Middleton    MEDICAL DECISION MAKING  Results were reviewed/discussed with the patient and they were also made aware of online access. Pt also made aware that some labs, such as cultures, will not be resulted during ER visit and follow up with PMD is necessary.     MDM  Number of Diagnoses or Management Options     Amount and/or Complexity of Data Reviewed  Clinical lab tests: ordered and reviewed  Tests in the radiology section of CPT®: ordered and reviewed  Tests in the medicine section of CPT®: ordered and reviewed  Review and summarize past medical records: yes (Upon medical records review, the patient was last seen and evaluated on 12/10/2021 secondary to chest pain)  Discuss the patient with other providers: yes (KANDIS Giles [facility], who will admit the patient for Dr. Middleton)  Independent visualization of images, tracings, or specimens: yes (Chest x-ray showing pulmonary vascular congestion)           DIAGNOSIS  Final diagnoses:   Generalized abdominal pain   Hypervolemia, unspecified hypervolemia type   Acute pulmonary edema (HCC)   ESRD (end stage renal disease) on dialysis (HCC)       DISPOSITION  ADMISSION    Discussed treatment plan and reason for admission with pt/family and admitting physician.  Pt/family voiced understanding of the plan for admission for further testing/treatment as needed.         Latest Documented Vital Signs:  As of 02:34 EST  BP- 148/60 HR- 62 Temp- 98 °F (36.7 °C) O2 sat- 98%         James Keenan MD  12/13/21 0237

## 2021-12-13 NOTE — CONSULTS
"  Referring Provider: GAVIOTA Arauz NP   Reason for Consultation: ESRD     Subjective     Chief complaint   Chief Complaint   Patient presents with   • Shortness of Breath   • Abdominal Pain       History of present illness:  72 yo AAF with h/o ESRD on HD MWF (AUD unit, Dr Moira lee) who presents with LUQ abd pain x 24h.  Radiates up to left chest, worse with eating.  Hospitalized for same at [facility] last week; CTA chest, abd/pelvis NEG then.  CT abd/pelvis here shows some vascular congestion and gallstones but no e/o acute cholecystitis.  No RUQ pain or n/v.  Reports mild dyspnea.  Due for HD today.  Access is LUE AVF.  BP a bit high 160s systolic.  No constipation or melena or dysuria.      Past Medical History:   Diagnosis Date   • Arthritis    • Chronic kidney disease    • Heart disease    • Hypertension      Past Surgical History:   Procedure Laterality Date   • BREAST SURGERY     • DIALYSIS FISTULA CREATION     • EYE SURGERY     • HERNIA REPAIR  2017    Dr. Sung     Family History   Problem Relation Age of Onset   • Heart disease Father    • Breast cancer Sister        Social History     Tobacco Use   • Smoking status: Never Smoker   • Smokeless tobacco: Never Used   Substance Use Topics   • Alcohol use: No   • Drug use: No     (Not in a hospital admission)    Allergies:  Patient has no known allergies.    Review of Systems  14 points review of system was performed and it was negative other than what noted above in the HPI    Objective     Vital Signs  Temp:  [97.9 °F (36.6 °C)-98.2 °F (36.8 °C)] 98.2 °F (36.8 °C)  Heart Rate:  [55-65] 55  Resp:  [16-18] 16  BP: (140-165)/(58-79) 156/60    Flowsheet Rows      First Filed Value   Admission Height 152.4 cm (60\") Documented at 12/13/2021 0017   Admission Weight 59 kg (130 lb) Documented at 12/13/2021 0017           No intake/output data recorded.  No intake/output data recorded.  No intake or output data in the 24 hours ending 12/13/21 1503    Physical " Exam:  General Appearance: pleasant AAF in no acute distress, comfortable, alert  Skin: warm and dry  HEENT: pupils round and reactive to light, oral mucosa normal, nonicteric sclera  Neck: supple, no JVD, trachea midline  Lungs: CTA, unlabored breathing effort  Heart: RRR, normal S1 and S2, no S3, no rub  Abdomen: minimal LLQ abd pain, no R/B, +BS  : no palpable bladder,  Extremities: no edema, cyanosis or clubbing; LUE AVF +T/B   Neuro: normal speech and mental status    Results Review:  Results for orders placed or performed during the hospital encounter of 12/13/21   COVID-19,[facility] CEPHEID/NICOLE NP SWAB IN TRANSPORT MEDIA 8-12 HR TAT - Swab, Nasopharynx    Specimen: Nasopharynx; Swab   Result Value Ref Range    COVID19 Not Detected Not Detected - Ref. Range   Comprehensive Metabolic Panel    Specimen: Blood   Result Value Ref Range    Glucose 72 65 - 99 mg/dL    BUN 54 (H) 8 - 23 mg/dL    Creatinine 8.32 (H) 0.57 - 1.00 mg/dL    Sodium 141 136 - 145 mmol/L    Potassium 4.8 3.5 - 5.2 mmol/L    Chloride 98 98 - 107 mmol/L    CO2 25.6 22.0 - 29.0 mmol/L    Calcium 9.3 8.6 - 10.5 mg/dL    Total Protein 6.7 6.0 - 8.5 g/dL    Albumin 4.10 3.50 - 5.20 g/dL    ALT (SGPT) 6 1 - 33 U/L    AST (SGOT) 17 1 - 32 U/L    Alkaline Phosphatase 101 39 - 117 U/L    Total Bilirubin 0.3 0.0 - 1.2 mg/dL    eGFR Non  Amer      eGFR  African Amer 6 (L) >60 mL/min/1.73    Globulin 2.6 gm/dL    A/G Ratio 1.6 g/dL    BUN/Creatinine Ratio 6.5 (L) 7.0 - 25.0    Anion Gap 17.4 (H) 5.0 - 15.0 mmol/L   Protime-INR    Specimen: Blood   Result Value Ref Range    Protime 13.9 11.7 - 14.2 Seconds    INR 1.09 0.90 - 1.10   aPTT    Specimen: Blood   Result Value Ref Range    PTT 31.4 22.7 - 35.4 seconds   BNP    Specimen: Blood   Result Value Ref Range    proBNP 11,813.0 (H) 0.0 - 900.0 pg/mL   Troponin    Specimen: Blood   Result Value Ref Range    Troponin T 0.045 (C) 0.000 - 0.030 ng/mL   CBC Auto Differential    Specimen: Blood    Result Value Ref Range    WBC 3.37 (L) 3.40 - 10.80 10*3/mm3    RBC 4.14 3.77 - 5.28 10*6/mm3    Hemoglobin 11.6 (L) 12.0 - 15.9 g/dL    Hematocrit 36.5 34.0 - 46.6 %    MCV 88.2 79.0 - 97.0 fL    MCH 28.0 26.6 - 33.0 pg    MCHC 31.8 31.5 - 35.7 g/dL    RDW 14.7 12.3 - 15.4 %    RDW-SD 47.3 37.0 - 54.0 fl    MPV 9.7 6.0 - 12.0 fL    Platelets 225 140 - 450 10*3/mm3    Neutrophil % 51.9 42.7 - 76.0 %    Lymphocyte % 27.6 19.6 - 45.3 %    Monocyte % 10.1 5.0 - 12.0 %    Eosinophil % 9.5 (H) 0.3 - 6.2 %    Basophil % 0.9 0.0 - 1.5 %    Immature Grans % 0.0 0.0 - 0.5 %    Neutrophils, Absolute 1.75 1.70 - 7.00 10*3/mm3    Lymphocytes, Absolute 0.93 0.70 - 3.10 10*3/mm3    Monocytes, Absolute 0.34 0.10 - 0.90 10*3/mm3    Eosinophils, Absolute 0.32 0.00 - 0.40 10*3/mm3    Basophils, Absolute 0.03 0.00 - 0.20 10*3/mm3    Immature Grans, Absolute 0.00 0.00 - 0.05 10*3/mm3    nRBC 0.0 0.0 - 0.2 /100 WBC   Basic Metabolic Panel    Specimen: Blood   Result Value Ref Range    Glucose 79 65 - 99 mg/dL    BUN 55 (H) 8 - 23 mg/dL    Creatinine 8.11 (H) 0.57 - 1.00 mg/dL    Sodium 141 136 - 145 mmol/L    Potassium 4.4 3.5 - 5.2 mmol/L    Chloride 100 98 - 107 mmol/L    CO2 30.1 (H) 22.0 - 29.0 mmol/L    Calcium 8.6 8.6 - 10.5 mg/dL    eGFR  African Amer 6 (L) >60 mL/min/1.73    eGFR Non African Amer      BUN/Creatinine Ratio 6.8 (L) 7.0 - 25.0    Anion Gap 10.9 5.0 - 15.0 mmol/L   CBC (No Diff)    Specimen: Blood   Result Value Ref Range    WBC 2.94 (L) 3.40 - 10.80 10*3/mm3    RBC 3.64 (L) 3.77 - 5.28 10*6/mm3    Hemoglobin 10.3 (L) 12.0 - 15.9 g/dL    Hematocrit 32.6 (L) 34.0 - 46.6 %    MCV 89.6 79.0 - 97.0 fL    MCH 28.3 26.6 - 33.0 pg    MCHC 31.6 31.5 - 35.7 g/dL    RDW 14.3 12.3 - 15.4 %    RDW-SD 45.7 37.0 - 54.0 fl    MPV 9.4 6.0 - 12.0 fL    Platelets 191 140 - 450 10*3/mm3   Troponin    Specimen: Blood   Result Value Ref Range    Troponin T 0.030 0.000 - 0.030 ng/mL   ECG 12 Lead   Result Value Ref Range    QT Interval  473 ms     Imaging Results (Last 72 Hours)     Procedure Component Value Units Date/Time    CT Abdomen Pelvis Without Contrast [[id number]] Collected: 12/13/21 0413     Updated: 12/13/21 0423    Narrative:      CT OF THE ABDOMEN AND PELVIS WITHOUT CONTRAST     HISTORY: Abdominal discomfort     COMPARISON: None available.     TECHNIQUE: Axial CT imaging was obtained through the abdomen and pelvis.  No IV contrast was administered.     FINDINGS:  Cardiomegaly is present. There is interlobular septal thickening,  concerning for vascular congestion. There is nonspecific bibasilar  consolidation. This may represent atelectasis and/or scarring. The  patient has a small pericardial effusion. There are extensive coronary  artery calcifications. No focal hepatic lesions are seen. Calcified  granulomata are seen within the spleen. The stomach and duodenum are  unremarkable, as are the adrenal glands. Pancreas appears mildly  atrophic. Gallbladder is distended. Stones and sludge are suspected  within the gallbladder. This could be better assessed with gallbladder  ultrasound. I don't see any definite gallbladder wall thickening or  pericholecystic fluid. Kidneys are atrophic. There is no hydronephrosis.  There are bilateral renal cysts. Dense vascular calcifications are  present. Urinary bladder appears unremarkable. Somewhat heterogeneous  appearance to the uterus may reflect fibroids. No suspicious adnexal  masses are seen. There is no evidence of bowel obstruction. There is  diverticulosis. There is no diverticulitis. The appendix is normal.  Extensive anterior osteophytes are noted involving the thoracolumbar  spine. No acute osseous abnormalities are seen.       Impression:         1. Cardiomegaly with interlobular septal thickening, suggesting  congestive heart failure.  2. Cholelithiasis. Gallbladder appears distended, although I don't see  any definite gallbladder wall thickening or pericholecystic fluid.  Ultrasound  would be more sensitive for assessment.     Radiation dose reduction techniques were utilized, including automated  exposure control and exposure modulation based on body size.     This report was finalized on 12/13/2021 4:20 AM by Dr. Evonne Obando M.D.       XR Chest 1 View [[id number]] Collected: 12/13/21 0050     Updated: 12/13/21 0054    Narrative:      SINGLE VIEW OF THE CHEST     HISTORY: Shortness of air     COMPARISON: None available.     FINDINGS:  Heart and megalies present. There is vascular congestion. Lung volumes  are diminished. No pneumothorax or pleural effusion is seen. The patient  has a left axillary vascular stent.       Impression:      Cardiomegaly with suspected vascular congestion.     This report was finalized on 12/13/2021 12:51 AM by Dr. Evonne Obando M.D.               hydrALAZINE, 100 mg, Oral, BID  isosorbide dinitrate, 30 mg, Oral, Daily  polyethylene glycol, 17 g, Oral, Daily  sodium chloride, 10 mL, Intravenous, Q12H           Assessment/Plan   1. ESRD - HD MWF, due for dialysis.  K/HCo3 normal.  Mild vol excess present with vascular slime on CT and mild dyspnea.  Has low ceiling UFG tolerance before cramps (rarely pulls > 2kg).    2. HTN - BP a bit high, should improve with ultrafiltration; hydralazine monotherapy unusual, can address this at dialysis unit (also on nitrate)   3. Abd pain - etiol unclear, CT abd unremarkable (gallstones incidental finding and has no RUQ pain); note plan for bowel regimen, simethicone; no suggestion of mesenteric ischemia   4. Chest pain - above radiates to chest; note recent cath 11/4/21 performed  5. Anemia of CKD - hgb stable 11.6    Plan  - HD today, remove 2.5L as tolerated  - note plan for possible discharge tomorrow     Thank you for involving me in pt's care.        Generalized abdominal pain    ODALIS (acute kidney injury) (HCC)    Anemia, chronic disease    Metabolic acidosis, increased anion gap    HTN (hypertension)    Chest pain,  atypical        I discussed the patient's findings and my recommendations with patient    Arjun Ramirez MD  12/13/21  15:03 EST      Much of this encounter note is an electronic transcription/translation of spoken language to printed text. The electronic translation of spoken language may permit erroneous, or at times, nonsensical words or phrases to be inadvertently transcribed; Although I have reviewed the note for such errors, some may still exist

## 2021-12-13 NOTE — PLAN OF CARE
"Pt. Hypertensive s/p HD, daily PO meds given, VS otherwise wnl.  Pt. C/o abd. Discomfort, states \"gas\" pain at times, relieved by PO gas meds, nausea at times, Zofran given, nausea relieved.  Pt. Received HD today, 1.4L removed.  Pt. Had CT abd. See results.  Pt. Resting comfortably at present, will continue to monitor closely.      Problem: Adult Inpatient Plan of Care  Goal: Plan of Care Review  Outcome: Ongoing, Progressing  Flowsheets (Taken 12/13/2021 3427)  Progress: no change  Plan of Care Reviewed With: patient     "

## 2021-12-14 VITALS
HEART RATE: 57 BPM | OXYGEN SATURATION: 100 % | TEMPERATURE: 98.6 F | BODY MASS INDEX: 24.02 KG/M2 | SYSTOLIC BLOOD PRESSURE: 167 MMHG | WEIGHT: 122.36 LBS | HEIGHT: 60 IN | RESPIRATION RATE: 16 BRPM | DIASTOLIC BLOOD PRESSURE: 66 MMHG

## 2021-12-14 LAB
ANION GAP SERPL CALCULATED.3IONS-SCNC: 10 MMOL/L (ref 5–15)
BUN SERPL-MCNC: 22 MG/DL (ref 8–23)
BUN/CREAT SERPL: 4.9 (ref 7–25)
CALCIUM SPEC-SCNC: 8.8 MG/DL (ref 8.6–10.5)
CHLORIDE SERPL-SCNC: 103 MMOL/L (ref 98–107)
CO2 SERPL-SCNC: 24 MMOL/L (ref 22–29)
CREAT SERPL-MCNC: 4.51 MG/DL (ref 0.57–1)
DEPRECATED RDW RBC AUTO: 43.3 FL (ref 37–54)
ERYTHROCYTE [DISTWIDTH] IN BLOOD BY AUTOMATED COUNT: 13.9 % (ref 12.3–15.4)
GFR SERPL CREATININE-BSD FRML MDRD: 12 ML/MIN/1.73
GFR SERPL CREATININE-BSD FRML MDRD: ABNORMAL ML/MIN/{1.73_M2}
GLUCOSE SERPL-MCNC: 70 MG/DL (ref 65–99)
HCT VFR BLD AUTO: 33.1 % (ref 34–46.6)
HGB BLD-MCNC: 10.6 G/DL (ref 12–15.9)
MCH RBC QN AUTO: 27.8 PG (ref 26.6–33)
MCHC RBC AUTO-ENTMCNC: 32 G/DL (ref 31.5–35.7)
MCV RBC AUTO: 86.9 FL (ref 79–97)
PLATELET # BLD AUTO: 174 10*3/MM3 (ref 140–450)
PMV BLD AUTO: 9.8 FL (ref 6–12)
POTASSIUM SERPL-SCNC: 4.5 MMOL/L (ref 3.5–5.2)
RBC # BLD AUTO: 3.81 10*6/MM3 (ref 3.77–5.28)
SODIUM SERPL-SCNC: 137 MMOL/L (ref 136–145)
WBC NRBC COR # BLD: 2.46 10*3/MM3 (ref 3.4–10.8)

## 2021-12-14 PROCEDURE — G0378 HOSPITAL OBSERVATION PER HR: HCPCS

## 2021-12-14 PROCEDURE — 85027 COMPLETE CBC AUTOMATED: CPT | Performed by: NURSE PRACTITIONER

## 2021-12-14 PROCEDURE — 80048 BASIC METABOLIC PNL TOTAL CA: CPT | Performed by: NURSE PRACTITIONER

## 2021-12-14 RX ORDER — GABAPENTIN 100 MG/1
100 CAPSULE ORAL 3 TIMES DAILY
Qty: 9 CAPSULE | Refills: 0 | Status: SHIPPED | OUTPATIENT
Start: 2021-12-14 | End: 2021-12-14 | Stop reason: SDUPTHER

## 2021-12-14 RX ORDER — POLYETHYLENE GLYCOL 3350 17 G/17G
17 POWDER, FOR SOLUTION ORAL DAILY
Qty: 10 EACH | Refills: 0 | Status: SHIPPED | OUTPATIENT
Start: 2021-12-15 | End: 2023-01-19 | Stop reason: HOSPADM

## 2021-12-14 RX ORDER — ONDANSETRON 4 MG/1
4 TABLET, FILM COATED ORAL EVERY 6 HOURS PRN
Qty: 12 TABLET | Refills: 0 | Status: SHIPPED | OUTPATIENT
Start: 2021-12-14 | End: 2021-12-17

## 2021-12-14 RX ORDER — SIMETHICONE 80 MG
80 TABLET,CHEWABLE ORAL 4 TIMES DAILY PRN
Qty: 40 TABLET | Refills: 0 | Status: SHIPPED | OUTPATIENT
Start: 2021-12-14 | End: 2021-12-24

## 2021-12-14 RX ORDER — GABAPENTIN 100 MG/1
100 CAPSULE ORAL 3 TIMES DAILY
Qty: 9 CAPSULE | Refills: 0
Start: 2021-12-14 | End: 2021-12-14 | Stop reason: SDUPTHER

## 2021-12-14 RX ORDER — GABAPENTIN 100 MG/1
100 CAPSULE ORAL 3 TIMES DAILY
Qty: 9 CAPSULE | Refills: 0 | Status: ON HOLD | OUTPATIENT
Start: 2021-12-14 | End: 2023-01-19

## 2021-12-14 RX ADMIN — NIFEDIPINE 90 MG: 30 TABLET, FILM COATED, EXTENDED RELEASE ORAL at 08:24

## 2021-12-14 RX ADMIN — HYDRALAZINE HYDROCHLORIDE 100 MG: 50 TABLET, FILM COATED ORAL at 08:25

## 2021-12-14 RX ADMIN — LOSARTAN POTASSIUM 50 MG: 50 TABLET, FILM COATED ORAL at 08:24

## 2021-12-14 RX ADMIN — POLYETHYLENE GLYCOL 3350 17 G: 17 POWDER, FOR SOLUTION ORAL at 08:24

## 2021-12-14 RX ADMIN — SODIUM CHLORIDE, PRESERVATIVE FREE 10 ML: 5 INJECTION INTRAVENOUS at 08:26

## 2021-12-14 RX ADMIN — ISOSORBIDE DINITRATE 30 MG: 20 TABLET ORAL at 10:36

## 2021-12-14 NOTE — PLAN OF CARE
Goal Outcome Evaluation:  Plan of Care Reviewed With: patient        Progress: no change  Outcome Summary: vitals stable.  pt expressed pain and nausea.  meds given per orders.  pt received dialysis yesterday.  will continue to monitor.

## 2021-12-14 NOTE — CASE MANAGEMENT/SOCIAL WORK
Discharge Planning Assessment  [facility]     Patient Name: Sonia Acharya  MRN: [id number]  Today's Date: 12/13/2021    Admit Date: 12/13/2021     Discharge Needs Assessment     Row Name 12/13/21 1914       Living Environment    Lives With child(giuseppe), adult; grandchild(giuseppe)    Name(s) of Who Lives With Patient Naty Acharya [phone]    Current Living Arrangements home/apartment/condo    Primary Care Provided by self    Provides Primary Care For no one    Family Caregiver if Needed child(giuseppe), adult    Family Caregiver Names Naty Acharya    Quality of Family Relationships helpful; involved; supportive    Able to Return to Prior Arrangements yes       Resource/Environmental Concerns    Resource/Environmental Concerns none    Transportation Concerns car, none       Transition Planning    Patient/Family Anticipates Transition to home    Patient/Family Anticipated Services at Transition none    Transportation Anticipated family or friend will provide       Discharge Needs Assessment    Readmission Within the Last 30 Days no previous admission in last 30 days    Equipment Currently Used at Home cane, straight; walker, rolling; oxygen; other (see comments)  Does not use the cane or walker, [employer] provides home o2    Concerns to be Addressed no discharge needs identified; denies needs/concerns at this time    Anticipated Changes Related to Illness none    Equipment Needed After Discharge none    Provided Post Acute Provider List? N/A    Provided Post Acute Provider Quality & Resource List? N/A               Discharge Plan     Row Name 12/13/21 1917       Plan    Plan Pt intends to return home upon discharge    Provided Post Acute Provider List? N/A    Provided Post Acute Provider Quality & Resource List? N/A    Plan Comments Face sheet verified, role of CCP explained. Pt is independent with ADL's and does not use assistive devices. Pt denies the need for any DME or home health, states her family can assist with any  care needs. Pt denies any difficulty paying for medicaiton              Continued Care and Services - Admitted Since 12/13/2021    Coordination has not been started for this encounter.          Demographic Summary    No documentation.                Functional Status    No documentation.                Psychosocial    No documentation.                Abuse/Neglect    No documentation.                Legal    No documentation.                Substance Abuse    No documentation.                Patient Forms    No documentation.                   Lucero Ramírez RN

## 2021-12-14 NOTE — DISCHARGE SUMMARY
Patient Name: Sonia Acharya  : 1950  MRN: [id number]    Date of Admission: 2021  Date of Discharge:  2021  Primary Care Physician: Marcy Paez PA      Chief Complaint:   Shortness of Breath and Abdominal Pain      Discharge Diagnoses     Active Hospital Problems    Diagnosis  POA   • **Generalized abdominal pain [R10.84]  Yes   • ODALIS (acute kidney injury) (HCC) [N17.9]  Yes   • Anemia, chronic disease [D63.8]  Yes   • Metabolic acidosis, increased anion gap [E87.2]  Yes   • HTN (hypertension) [I10]  Yes   • Chest pain, atypical [R07.89]  Yes      Resolved Hospital Problems   No resolved problems to display.        Hospital Course     Ms. Acharya is a 71 y.o. female with a history of ESRD on hemodialysis (MWF), hypertension, CAD who presented to [facility] initially complaining of generalized abdominal pain.  Please see the admitting history and physical for further details.  She was found to have left lower quadrant abdominal pain radiating to left chest aggravated by eating large meals most likely secondary to indigestion given description of 'gas-like pain' and also with acute on chronic ESRD given recent missed hemodialysis and was admitted to the hospital for further evaluation and treatment.      Reports intermittent abdominal pain for approximately 1 month and recent hospital evaluation with cardiac work-up at [facility] with noted findings chronic myocardial injury in the setting of ESRD and cardiac catheterization on 2021 with 30-40% mid-LAD lesion, 70% ostial diagonal 1 lesion, 80-90% ostial RCA lesion which is nondominant vessel.  Minimally elevated serial troponin improved with trend and likely affected by ESRD with recent missed hemodialysis on 12/10/2021 for complaints of abdominal pain.  Serum creatinine 8.3 increased from 5.3.  Nephrology evaluated patient and provided hemodialysis management with subsequent serum creatinine of 4.5.    Recommend  "patient avoid large meals, provide simethicone and bowel regimen at discharge given improved symptoms and dietary tolerance with the use of both agents during hospital stay, & follow-up with gastroenterology in outpatient setting.    Also with complaints of bilateral lower extremity \"numbness and pain\" without injury or concerns for DVT; therefore, provide low-dose gabapentin 100 mg p.o. 3 times daily and recommend follow-up primary care provider for continued monitoring of new medication & symptoms.    Day of Discharge     Subjective:  Patient appears chronically ill, generally weak, relatively comfortable, no apparent distress.  Tolerating p.o. and minimal physical activity.  Voices no new concerns.  Agrees to discharge home on 12/14/2021 & follow-up with providers as previously discussed.    Physical Exam:  Temp:  [97.7 °F (36.5 °C)-98.6 °F (37 °C)] 98.6 °F (37 °C)  Heart Rate:  [50-60] 57  Resp:  [16] 16  BP: (143-198)/(60-78) 167/66  Body mass index is 23.9 kg/m².     Physical Exam   Constitutional:       General: She is not in acute distress.     Appearance: She is ill-appearing (Chronic). She is not toxic-appearing.      Comments: Generally weak, elderly   HENT:      Head: Normocephalic and atraumatic.   Eyes:      Extraocular Movements: Extraocular movements intact.      Conjunctiva/sclera: Conjunctivae normal.   Cardiovascular:      Rate and Rhythm: Bradycardia present.      Heart sounds: Normal heart sounds.   Pulmonary:      Effort: Pulmonary effort is normal.      Comments: Diminished on expiration throughout lung fields; shallow inspiratory effort  Abdominal:      General: There is no distension.      Palpations: Abdomen is soft.      Tenderness: There is abdominal tenderness (LUQ). There is no guarding.      Comments: Hypoactive   Musculoskeletal:      Cervical back: Normal range of motion and neck supple.      Right lower leg: No edema.      Left lower leg: No edema.   Skin:     General: Skin is warm " and dry.   Neurological:      Mental Status: She is alert and oriented to person, place, and time.      Cranial Nerves: No cranial nerve deficit.      Motor: Weakness (generalized) present.   Psychiatric:         Behavior: Behavior normal.         Thought Content: Thought content normal.     Consultants     Consult Orders (all) (From admission, onward)     Start     Ordered    12/13/21 0240  Inpatient Nephrology Consult  Once        Specialty:  Nephrology  Provider:  Maira Pizarro MD    12/13/21 0241 12/13/21 0219  [facility] (on-call MD unless specified) Details  Once        Specialty:  Hospitalist  Provider:  (Not yet assigned)    12/13/21 0218              Procedures     * Surgery not found *      Imaging Results (All)     Procedure Component Value Units Date/Time    CT Abdomen Pelvis Without Contrast [[id number]] Collected: 12/13/21 0413     Updated: 12/13/21 0423    Narrative:      CT OF THE ABDOMEN AND PELVIS WITHOUT CONTRAST     HISTORY: Abdominal discomfort     COMPARISON: None available.     TECHNIQUE: Axial CT imaging was obtained through the abdomen and pelvis.  No IV contrast was administered.     FINDINGS:  Cardiomegaly is present. There is interlobular septal thickening,  concerning for vascular congestion. There is nonspecific bibasilar  consolidation. This may represent atelectasis and/or scarring. The  patient has a small pericardial effusion. There are extensive coronary  artery calcifications. No focal hepatic lesions are seen. Calcified  granulomata are seen within the spleen. The stomach and duodenum are  unremarkable, as are the adrenal glands. Pancreas appears mildly  atrophic. Gallbladder is distended. Stones and sludge are suspected  within the gallbladder. This could be better assessed with gallbladder  ultrasound. I don't see any definite gallbladder wall thickening or  pericholecystic fluid. Kidneys are atrophic. There is no hydronephrosis.  There are bilateral renal cysts. Dense  vascular calcifications are  present. Urinary bladder appears unremarkable. Somewhat heterogeneous  appearance to the uterus may reflect fibroids. No suspicious adnexal  masses are seen. There is no evidence of bowel obstruction. There is  diverticulosis. There is no diverticulitis. The appendix is normal.  Extensive anterior osteophytes are noted involving the thoracolumbar  spine. No acute osseous abnormalities are seen.       Impression:         1. Cardiomegaly with interlobular septal thickening, suggesting  congestive heart failure.  2. Cholelithiasis. Gallbladder appears distended, although I don't see  any definite gallbladder wall thickening or pericholecystic fluid.  Ultrasound would be more sensitive for assessment.     Radiation dose reduction techniques were utilized, including automated  exposure control and exposure modulation based on body size.     This report was finalized on 12/13/2021 4:20 AM by Dr. Evonne Obando M.D.       XR Chest 1 View [[id number]] Collected: 12/13/21 0050     Updated: 12/13/21 0054    Narrative:      SINGLE VIEW OF THE CHEST     HISTORY: Shortness of air     COMPARISON: None available.     FINDINGS:  Heart and megalies present. There is vascular congestion. Lung volumes  are diminished. No pneumothorax or pleural effusion is seen. The patient  has a left axillary vascular stent.       Impression:      Cardiomegaly with suspected vascular congestion.     This report was finalized on 12/13/2021 12:51 AM by Dr. Evonne Obando M.D.               Pertinent Labs     Results from last 7 days   Lab Units 12/14/21  0517 12/13/21  1044 12/13/21  0024 12/09/21  2328   WBC 10*3/mm3 2.46* 2.94* 3.37* 3.54*   HEMOGLOBIN g/dL 10.6* 10.3* 11.6* 10.7*   PLATELETS 10*3/mm3 174 191 225 198     Results from last 7 days   Lab Units 12/14/21  0517 12/13/21  1044 12/13/21  0024   SODIUM mmol/L 137 141 141   POTASSIUM mmol/L 4.5 4.4 4.8   CHLORIDE mmol/L 103 100 98   CO2 mmol/L 24.0 30.1*  25.6   BUN mg/dL 22 55* 54*   CREATININE mg/dL 4.51* 8.11* 8.32*   GLUCOSE mg/dL 70 79 72   EGFR IF AFRICN AM mL/min/1.73 12* 6* 6*     Results from last 7 days   Lab Units 12/13/21  0024   ALBUMIN g/dL 4.10   BILIRUBIN mg/dL 0.3   ALK PHOS U/L 101   AST (SGOT) U/L 17   ALT (SGPT) U/L 6     Results from last 7 days   Lab Units 12/14/21  0517 12/13/21  1044 12/13/21  0024 12/09/21  2328   CALCIUM mg/dL 8.8 8.6 9.3  --    ALBUMIN g/dL  --   --  4.10  --    MAGNESIUM mg/dL  --   --   --  2.2     Results from last 7 days   Lab Units 12/10/21  0050   LIPASE U/L 12     Results from last 7 days   Lab Units 12/13/21  1044 12/13/21  0024 12/10/21  0903 12/10/21  0128 12/09/21  2328   TROPONIN I ng/mL  --   --  0.035* 0.030 0.031   TROPONIN T ng/mL 0.030 0.045*  --   --   --    PROBNP pg/mL  --  11,813.0*  --   --   --            Invalid input(s): LDLCALC      Results from last 7 days   Lab Units 12/13/21  0228   COVID19  Not Detected       Test Results Pending at Discharge       Discharge Details        Discharge Medications      New Medications      Instructions Start Date   gabapentin 100 MG capsule  Commonly known as: NEURONTIN   100 mg, Oral, 3 Times Daily      ondansetron 4 MG tablet  Commonly known as: ZOFRAN   4 mg, Oral, Every 6 Hours PRN      polyethylene glycol 17 g packet  Commonly known as: MIRALAX   17 g, Oral, Daily   Start Date: December 15, 2021     simethicone 80 MG chewable tablet  Commonly known as: MYLICON   80 mg, Oral, 4 Times Daily PRN         Continue These Medications      Instructions Start Date   acetaminophen-codeine 300-30 MG per tablet  Commonly known as: TYLENOL #3   1 tablet, Oral, Daily PRN      atorvastatin 40 MG tablet  Commonly known as: LIPITOR   40 mg, Oral, Daily      hydrALAZINE 25 MG tablet  Commonly known as: APRESOLINE   100 mg, Oral, 3 Times Daily      isosorbide dinitrate 30 MG tablet  Commonly known as: ISORDIL   30 mg, Oral, Daily      losartan 50 MG tablet  Commonly known as:  COZAAR   50 mg, Oral, Daily, Take 1 and a half pills daily.       NIFEdipine XL 90 MG 24 hr tablet  Commonly known as: PROCARDIA XL   90 mg, Oral, Daily             No Known Allergies    Discharge Disposition:  Home or Self Care      Discharge Diet:  Diet Order   Procedures   • Diet Regular; Renal       Discharge Activity:   Activity Instructions     Activity as Tolerated     Additional Activity Instructions:    Activity as tolerated.           CODE STATUS:    Code Status and Medical Interventions:   Ordered at: 12/13/21 0242     Code Status (Patient has no pulse and is not breathing):    CPR (Attempt to Resuscitate)     Medical Interventions (Patient has pulse or is breathing):    Full Support       No future appointments.  Additional Instructions for the Follow-ups that You Need to Schedule     Discharge Follow-up with PCP   As directed       Currently Documented PCP:    Marcy Paez PA    PCP Phone Number:    [phone]     Follow Up Details: Please call to schedule a one week or earliest available follow-up appointment PCP; BP monitoring; new medication gabapentin         Discharge Follow-up with Specialty: nephrology   As directed      Specialty: nephrology    Follow Up Details: Please call to schedule a one week or earliest available follow-up with nephrology as previously discussed            Follow-up Information     Marcy Paez PA .    Specialty: Physician Assistant  Why: Please call to schedule a one week or earliest available follow-up appointment PCP; BP monitoring; new medication gabapentin  Contact information:  [address]  [address]  [place] [address]  [phone]                         Additional Instructions for the Follow-ups that You Need to Schedule     Discharge Follow-up with PCP   As directed       Currently Documented PCP:    Marcy Paez PA    PCP Phone Number:    [phone]     Follow Up Details: Please call to schedule a one week or earliest available follow-up  appointment PCP; BP monitoring; new medication gabapentin         Discharge Follow-up with Specialty: nephrology   As directed      Specialty: nephrology    Follow Up Details: Please call to schedule a one week or earliest available follow-up with nephrology as previously discussed           Time Spent on Discharge:  Greater than 30 minutes      KANDIS Mendoza  [facility]  12/14/21  12:20 EST

## 2022-03-13 ENCOUNTER — HOSPITAL ENCOUNTER (EMERGENCY)
Facility: HOSPITAL | Age: 72
Discharge: HOME | End: 2022-03-13
Attending: EMERGENCY MEDICINE | Admitting: EMERGENCY MEDICINE

## 2022-03-13 VITALS
RESPIRATION RATE: 18 BRPM | OXYGEN SATURATION: 99 % | TEMPERATURE: 97.1 F | HEART RATE: 59 BPM | DIASTOLIC BLOOD PRESSURE: 78 MMHG | SYSTOLIC BLOOD PRESSURE: 185 MMHG

## 2022-03-13 DIAGNOSIS — R14.0 ABDOMINAL BLOATING: Primary | ICD-10-CM

## 2022-03-13 DIAGNOSIS — N18.6 ESRD (END STAGE RENAL DISEASE) ON DIALYSIS: ICD-10-CM

## 2022-03-13 DIAGNOSIS — Z99.2 ESRD (END STAGE RENAL DISEASE) ON DIALYSIS: ICD-10-CM

## 2022-03-13 LAB
ALBUMIN SERPL-MCNC: 3.9 G/DL (ref 3.5–5.2)
ALBUMIN/GLOB SERPL: 1.4 G/DL
ALP SERPL-CCNC: 134 U/L (ref 39–117)
ALT SERPL W P-5'-P-CCNC: <5 U/L (ref 1–33)
ANION GAP SERPL CALCULATED.3IONS-SCNC: 14.2 MMOL/L (ref 5–15)
AST SERPL-CCNC: 13 U/L (ref 1–32)
BASOPHILS # BLD AUTO: 0.05 10*3/MM3 (ref 0–0.2)
BASOPHILS NFR BLD AUTO: 1.5 % (ref 0–1.5)
BILIRUB SERPL-MCNC: 0.3 MG/DL (ref 0–1.2)
BUN SERPL-MCNC: 38 MG/DL (ref 8–23)
BUN/CREAT SERPL: 4.9 (ref 7–25)
CALCIUM SPEC-SCNC: 9.2 MG/DL (ref 8.6–10.5)
CHLORIDE SERPL-SCNC: 97 MMOL/L (ref 98–107)
CO2 SERPL-SCNC: 25.8 MMOL/L (ref 22–29)
CREAT SERPL-MCNC: 7.82 MG/DL (ref 0.57–1)
DEPRECATED RDW RBC AUTO: 45.4 FL (ref 37–54)
EGFRCR SERPLBLD CKD-EPI 2021: 5.1 ML/MIN/1.73
EOSINOPHIL # BLD AUTO: 0.35 10*3/MM3 (ref 0–0.4)
EOSINOPHIL NFR BLD AUTO: 10.5 % (ref 0.3–6.2)
ERYTHROCYTE [DISTWIDTH] IN BLOOD BY AUTOMATED COUNT: 13.7 % (ref 12.3–15.4)
GLOBULIN UR ELPH-MCNC: 2.7 GM/DL
GLUCOSE SERPL-MCNC: 88 MG/DL (ref 65–99)
HCT VFR BLD AUTO: 35.5 % (ref 34–46.6)
HGB BLD-MCNC: 11.7 G/DL (ref 12–15.9)
IMM GRANULOCYTES # BLD AUTO: 0.01 10*3/MM3 (ref 0–0.05)
IMM GRANULOCYTES NFR BLD AUTO: 0.3 % (ref 0–0.5)
LYMPHOCYTES # BLD AUTO: 0.8 10*3/MM3 (ref 0.7–3.1)
LYMPHOCYTES NFR BLD AUTO: 24 % (ref 19.6–45.3)
MCH RBC QN AUTO: 29.8 PG (ref 26.6–33)
MCHC RBC AUTO-ENTMCNC: 33 G/DL (ref 31.5–35.7)
MCV RBC AUTO: 90.6 FL (ref 79–97)
MONOCYTES # BLD AUTO: 0.54 10*3/MM3 (ref 0.1–0.9)
MONOCYTES NFR BLD AUTO: 16.2 % (ref 5–12)
NEUTROPHILS NFR BLD AUTO: 1.58 10*3/MM3 (ref 1.7–7)
NEUTROPHILS NFR BLD AUTO: 47.5 % (ref 42.7–76)
NRBC BLD AUTO-RTO: 0 /100 WBC (ref 0–0.2)
PLATELET # BLD AUTO: 187 10*3/MM3 (ref 140–450)
PMV BLD AUTO: 9.7 FL (ref 6–12)
POTASSIUM SERPL-SCNC: 4.8 MMOL/L (ref 3.5–5.2)
PROT SERPL-MCNC: 6.6 G/DL (ref 6–8.5)
RBC # BLD AUTO: 3.92 10*6/MM3 (ref 3.77–5.28)
SODIUM SERPL-SCNC: 137 MMOL/L (ref 136–145)
WBC NRBC COR # BLD: 3.33 10*3/MM3 (ref 3.4–10.8)

## 2022-03-13 PROCEDURE — 36415 COLL VENOUS BLD VENIPUNCTURE: CPT

## 2022-03-13 PROCEDURE — 99282 EMERGENCY DEPT VISIT SF MDM: CPT

## 2022-03-13 PROCEDURE — 80053 COMPREHEN METABOLIC PANEL: CPT | Performed by: EMERGENCY MEDICINE

## 2022-03-13 PROCEDURE — 85025 COMPLETE CBC W/AUTO DIFF WBC: CPT | Performed by: EMERGENCY MEDICINE

## 2022-03-13 RX ORDER — ONDANSETRON 2 MG/ML
4 INJECTION INTRAMUSCULAR; INTRAVENOUS ONCE
Status: DISCONTINUED | OUTPATIENT
Start: 2022-03-13 | End: 2022-03-13

## 2022-03-13 NOTE — ED PROVIDER NOTES
" EMERGENCY DEPARTMENT ENCOUNTER    Room Number:  24/24  Date of encounter:  3/13/2022  PCP: Marcy Paez PA  Historian: Patient      HPI:  Chief Complaint: Abdominal bloating, diarrhea, flatulence  A complete HPI/ROS/PMH/PSH/SH/FH are unobtainable due to: N/A    Context: Sonia Acharya is a 71 y.o. female who presents to the ED c/o chronic left-sided abdominal \"bloating\".  Symptoms have been present for quite some time and she has had multiple prior work-ups.  She denies pain, however she states she feels bloated.  Most of the bloating is in the left upper abdomen.  Tonight she took her Linzess as prescribed and developed some nausea and flatulence.  No vomiting.  No blood in her stool.  No fevers or chills.  She missed dialysis yesterday-she usually goes Monday Wednesday Friday.  Last full dialysis was on Wednesday.    Last admission here was in December 2021 for generalized abdominal pain.  Work-up was unremarkable.      The patient was placed in a mask in triage, hand hygiene was performed before and after my interaction with the patient.  I wore a mask, safety glasses and gloves during my entire interaction with the patient.    PAST MEDICAL HISTORY  Active Ambulatory Problems     Diagnosis Date Noted   • Generalized abdominal pain 12/13/2021   • ODALIS (acute kidney injury) (HCC) 12/13/2021   • Anemia, chronic disease 12/13/2021   • Metabolic acidosis, increased anion gap 12/13/2021   • Obesity (BMI 30-39.9) 12/13/2021   • HTN (hypertension) 12/13/2021   • Chest pain, atypical 12/13/2021     Resolved Ambulatory Problems     Diagnosis Date Noted   • No Resolved Ambulatory Problems     Past Medical History:   Diagnosis Date   • Arthritis    • Chronic kidney disease    • Heart disease    • Hypertension          PAST SURGICAL HISTORY  Past Surgical History:   Procedure Laterality Date   • BREAST SURGERY     • DIALYSIS FISTULA CREATION     • EYE SURGERY     • HERNIA REPAIR  2017    Dr. Sung         FAMILY " HISTORY  Family History   Problem Relation Age of Onset   • Heart disease Father    • Breast cancer Sister          SOCIAL HISTORY  Social History     Socioeconomic History   • Marital status: Single   Tobacco Use   • Smoking status: Never Smoker   • Smokeless tobacco: Never Used   Substance and Sexual Activity   • Alcohol use: No   • Drug use: No   • Sexual activity: Defer         ALLERGIES  Patient has no known allergies.        REVIEW OF SYSTEMS  Review of Systems   Constitutional: Negative for chills and fever.   Respiratory: Negative for chest tightness and shortness of breath.    Cardiovascular: Negative for chest pain.   Gastrointestinal: Positive for abdominal distention and diarrhea.        All systems reviewed and negative except for those discussed in HPI.       PHYSICAL EXAM    I have reviewed the triage vital signs and nursing notes.    ED Triage Vitals [03/13/22 0007]   Temp Heart Rate Resp BP SpO2   97.1 °F (36.2 °C) 69 18 -- 99 %      Temp src Heart Rate Source Patient Position BP Location FiO2 (%)   Skin Monitor -- -- --       Physical Exam   Constitutional: Pt. is oriented to person, place, and time and well-developed, well-nourished, and in no distress.   HENT: Normocephalic and atraumatic.  Neck: Normal range of motion. Neck supple. No JVD present.   Cardiovascular: Normal rate, regular rhythm and normal heart sounds. Exam reveals no gallop and no friction rub.   No murmur heard.  Pulmonary/Chest: Effort normal and breath sounds normal. No stridor. No respiratory distress. No wheezes, no rales.   Abdominal: Soft. Bowel sounds are normal. No distension. There is no tenderness. There is no rebound and no guarding.   Musculoskeletal: Normal range of motion. No edema, tenderness or deformity.   Neurological: Pt. is alert and oriented to person, place, and time.  She has no focal neurologic deficits  Skin: Skin is warm and dry. No rash noted. Pt. is not diaphoretic. No erythema.   Psychiatric: Mood,  affect and judgment normal.  She is pleasant and cooperative.  Nursing note and vitals reviewed.        LAB RESULTS  Recent Results (from the past 24 hour(s))   Comprehensive Metabolic Panel    Collection Time: 03/13/22 12:28 AM    Specimen: Blood   Result Value Ref Range    Glucose 88 65 - 99 mg/dL    BUN 38 (H) 8 - 23 mg/dL    Creatinine 7.82 (H) 0.57 - 1.00 mg/dL    Sodium 137 136 - 145 mmol/L    Potassium 4.8 3.5 - 5.2 mmol/L    Chloride 97 (L) 98 - 107 mmol/L    CO2 25.8 22.0 - 29.0 mmol/L    Calcium 9.2 8.6 - 10.5 mg/dL    Total Protein 6.6 6.0 - 8.5 g/dL    Albumin 3.90 3.50 - 5.20 g/dL    ALT (SGPT) <5 1 - 33 U/L    AST (SGOT) 13 1 - 32 U/L    Alkaline Phosphatase 134 (H) 39 - 117 U/L    Total Bilirubin 0.3 0.0 - 1.2 mg/dL    Globulin 2.7 gm/dL    A/G Ratio 1.4 g/dL    BUN/Creatinine Ratio 4.9 (L) 7.0 - 25.0    Anion Gap 14.2 5.0 - 15.0 mmol/L    eGFR 5.1 (L) >60.0 mL/min/1.73   CBC Auto Differential    Collection Time: 03/13/22 12:28 AM    Specimen: Blood   Result Value Ref Range    WBC 3.33 (L) 3.40 - 10.80 10*3/mm3    RBC 3.92 3.77 - 5.28 10*6/mm3    Hemoglobin 11.7 (L) 12.0 - 15.9 g/dL    Hematocrit 35.5 34.0 - 46.6 %    MCV 90.6 79.0 - 97.0 fL    MCH 29.8 26.6 - 33.0 pg    MCHC 33.0 31.5 - 35.7 g/dL    RDW 13.7 12.3 - 15.4 %    RDW-SD 45.4 37.0 - 54.0 fl    MPV 9.7 6.0 - 12.0 fL    Platelets 187 140 - 450 10*3/mm3    Neutrophil % 47.5 42.7 - 76.0 %    Lymphocyte % 24.0 19.6 - 45.3 %    Monocyte % 16.2 (H) 5.0 - 12.0 %    Eosinophil % 10.5 (H) 0.3 - 6.2 %    Basophil % 1.5 0.0 - 1.5 %    Immature Grans % 0.3 0.0 - 0.5 %    Neutrophils, Absolute 1.58 (L) 1.70 - 7.00 10*3/mm3    Lymphocytes, Absolute 0.80 0.70 - 3.10 10*3/mm3    Monocytes, Absolute 0.54 0.10 - 0.90 10*3/mm3    Eosinophils, Absolute 0.35 0.00 - 0.40 10*3/mm3    Basophils, Absolute 0.05 0.00 - 0.20 10*3/mm3    Immature Grans, Absolute 0.01 0.00 - 0.05 10*3/mm3    nRBC 0.0 0.0 - 0.2 /100 WBC       Ordered the above labs and independently  reviewed the results.        RADIOLOGY  No Radiology Exams Resulted Within Past 24 Hours    I ordered the above noted radiological studies. Reviewed by me and discussed with radiologist.  See dictation for official radiology interpretation.      PROCEDURES    Procedures      MEDICATIONS GIVEN IN ER    Medications   ondansetron (ZOFRAN) injection 4 mg (has no administration in time range)         PROGRESS, DATA ANALYSIS, CONSULTS, AND MEDICAL DECISION MAKING    Any/all labs have been independently reviewed by me.  Any/all radiology studies have been reviewed by me and discussed with radiologist dictating the report.   EKG's independently viewed and interpreted by me.  Discussion below represents my analysis of pertinent findings related to patient's condition, differential diagnosis, treatment plan and final disposition.    Number of Diagnoses or Management Options     Amount and/or Complexity of Data Reviewed  Clinical lab tests:  Yes  Tests in the radiology section of CPT®:  No  Tests in the medicine section of CPT®:  Yes  Review and summarize past medical records: yes (see below)  Independent visualization of images, tracings, or specimens:  N/A      ED Course as of 03/13/22 0057   Sun Mar 13, 2022   0054 BUN(!): 38 [WC]   0054 Creatinine(!): 7.82 [WC]   0054 Potassium: 4.8 [WC]   0054 Discussed all lab and/or imaging with the patient.  The patient's abdominal exam has improved.  I explained that the patient should return to the emergency department should the pain recur or for any new symptoms (fever, blood in emesis/stool).  I also advised the patient to follow up with their PMD for further evaluation.  There is nothing on workup to suggest appendicitis, diverticulitis, cholecystitis, pancreatitis, peritonitis, mesenteric ischemia, ovarian/testicular torsion, ureteral stones or any other emergent intra-abdominal process.  My clinical suspicion for serious intra-abdominal pathology is low, and the patient can be  safely discharged home for outpatient follow up.   [WC]   0054 Potassium is normal, she is not hypertensive nor short of breath.    She is safe for discharge with outpatient follow-up. [WC]      ED Course User Index  [WC] Otto Farris MD       AS OF 00:57 EST VITALS:    BP -    HR - 69  TEMP - 97.1 °F (36.2 °C) (Skin)  02 SATS - 99%        DIAGNOSIS  Final diagnoses:   Abdominal bloating   ESRD (end stage renal disease) on dialysis (HCC)         DISPOSITION  Discharged           Otto Farris MD  03/13/22 0057

## 2022-03-13 NOTE — ED NOTES
"Pt reports gas and diarrhea starting today. Pt took linzest and then experienced these symptoms. Pt reports feeling \"bloated\" on the left side. Pt denies n/v. Pt reports she has no control of  diarrhea and it leaks down her leg.     .This RN in PPE for all patient interactions including face mask and goggles.    "

## 2023-01-16 ENCOUNTER — HOSPITAL ENCOUNTER (OUTPATIENT)
Facility: HOSPITAL | Age: 73
Setting detail: OBSERVATION
LOS: 1 days | Discharge: HOME | End: 2023-01-19
Attending: EMERGENCY MEDICINE | Admitting: HOSPITALIST
Payer: MEDICARE

## 2023-01-16 ENCOUNTER — APPOINTMENT (OUTPATIENT)
Dept: CARDIOLOGY | Facility: HOSPITAL | Age: 73
End: 2023-01-16
Payer: MEDICARE

## 2023-01-16 ENCOUNTER — APPOINTMENT (OUTPATIENT)
Dept: CT IMAGING | Facility: HOSPITAL | Age: 73
End: 2023-01-16
Payer: MEDICARE

## 2023-01-16 DIAGNOSIS — D64.9 CHRONIC ANEMIA: ICD-10-CM

## 2023-01-16 DIAGNOSIS — N18.6 ESRD (END STAGE RENAL DISEASE) ON DIALYSIS: ICD-10-CM

## 2023-01-16 DIAGNOSIS — R00.1 SINUS BRADYCARDIA: ICD-10-CM

## 2023-01-16 DIAGNOSIS — M79.601 RIGHT ARM PAIN: ICD-10-CM

## 2023-01-16 DIAGNOSIS — G57.93 NEUROPATHY INVOLVING BOTH LOWER EXTREMITIES: ICD-10-CM

## 2023-01-16 DIAGNOSIS — Z09 FOLLOW-UP EXAM: ICD-10-CM

## 2023-01-16 DIAGNOSIS — M54.12 CERVICAL RADICULOPATHY: Primary | ICD-10-CM

## 2023-01-16 DIAGNOSIS — Z99.2 ESRD (END STAGE RENAL DISEASE) ON DIALYSIS: ICD-10-CM

## 2023-01-16 PROBLEM — I50.32 CHRONIC DIASTOLIC CHF (CONGESTIVE HEART FAILURE) (HCC): Status: ACTIVE | Noted: 2023-01-16

## 2023-01-16 PROBLEM — G47.33 OSA (OBSTRUCTIVE SLEEP APNEA): Status: ACTIVE | Noted: 2023-01-16

## 2023-01-16 PROBLEM — I35.0 AORTIC STENOSIS: Status: ACTIVE | Noted: 2023-01-16

## 2023-01-16 LAB
ALBUMIN SERPL-MCNC: 3.9 G/DL (ref 3.5–5.2)
ALBUMIN/GLOB SERPL: 1.7 G/DL
ALP SERPL-CCNC: 125 U/L (ref 39–117)
ALT SERPL W P-5'-P-CCNC: <5 U/L (ref 1–33)
ANION GAP SERPL CALCULATED.3IONS-SCNC: 11.8 MMOL/L (ref 5–15)
ANION GAP SERPL CALCULATED.3IONS-SCNC: 9.8 MMOL/L (ref 5–15)
AORTIC DIMENSIONLESS INDEX: 0.3 (DI)
APTT PPP: 31.7 SECONDS (ref 22.7–35.4)
AST SERPL-CCNC: 13 U/L (ref 1–32)
BASOPHILS # BLD AUTO: 0.03 10*3/MM3 (ref 0–0.2)
BASOPHILS NFR BLD AUTO: 1.5 % (ref 0–1.5)
BH CV ECHO LEFT VENTRICLE GLOBAL LONGITUDINAL STRAIN: -15.5 %
BH CV ECHO MEAS - ACS: 1.34 CM
BH CV ECHO MEAS - AO MAX PG: 54.8 MMHG
BH CV ECHO MEAS - AO MEAN PG: 27.2 MMHG
BH CV ECHO MEAS - AO ROOT DIAM: 2.34 CM
BH CV ECHO MEAS - AO V2 MAX: 370.2 CM/SEC
BH CV ECHO MEAS - AO V2 VTI: 89.1 CM
BH CV ECHO MEAS - AVA(I,D): 1.02 CM2
BH CV ECHO MEAS - EDV(CUBED): 109.8 ML
BH CV ECHO MEAS - EDV(MOD-SP2): 87 ML
BH CV ECHO MEAS - EDV(MOD-SP4): 75 ML
BH CV ECHO MEAS - EF(MOD-BP): 64.2 %
BH CV ECHO MEAS - EF(MOD-SP2): 59.8 %
BH CV ECHO MEAS - EF(MOD-SP4): 66.7 %
BH CV ECHO MEAS - ESV(CUBED): 38.8 ML
BH CV ECHO MEAS - ESV(MOD-SP2): 35 ML
BH CV ECHO MEAS - ESV(MOD-SP4): 25 ML
BH CV ECHO MEAS - FS: 29.3 %
BH CV ECHO MEAS - IVS/LVPW: 0.68 CM
BH CV ECHO MEAS - IVSD: 1.3 CM
BH CV ECHO MEAS - LA DIMENSION: 4.8 CM
BH CV ECHO MEAS - LAT PEAK E' VEL: 7.5 CM/SEC
BH CV ECHO MEAS - LV DIASTOLIC VOL/BSA (35-75): 51.4 CM2
BH CV ECHO MEAS - LV MASS(C)D: 337.4 GRAMS
BH CV ECHO MEAS - LV MAX PG: 4.9 MMHG
BH CV ECHO MEAS - LV MEAN PG: 2.7 MMHG
BH CV ECHO MEAS - LV SYSTOLIC VOL/BSA (12-30): 17.1 CM2
BH CV ECHO MEAS - LV V1 MAX: 110.5 CM/SEC
BH CV ECHO MEAS - LV V1 VTI: 29.3 CM
BH CV ECHO MEAS - LVIDD: 4.8 CM
BH CV ECHO MEAS - LVIDS: 3.4 CM
BH CV ECHO MEAS - LVOT AREA: 3.1 CM2
BH CV ECHO MEAS - LVOT DIAM: 1.99 CM
BH CV ECHO MEAS - LVPWD: 1.93 CM
BH CV ECHO MEAS - MED PEAK E' VEL: 4.9 CM/SEC
BH CV ECHO MEAS - MR MAX PG: 112.6 MMHG
BH CV ECHO MEAS - MR MAX VEL: 530.6 CM/SEC
BH CV ECHO MEAS - MV A MAX VEL: 78.2 CM/SEC
BH CV ECHO MEAS - MV DEC SLOPE: 704.6 CM/SEC2
BH CV ECHO MEAS - MV DEC TIME: 0.2 MSEC
BH CV ECHO MEAS - MV E MAX VEL: 123 CM/SEC
BH CV ECHO MEAS - MV E/A: 1.57
BH CV ECHO MEAS - MV MAX PG: 9.7 MMHG
BH CV ECHO MEAS - MV MEAN PG: 2.9 MMHG
BH CV ECHO MEAS - MV P1/2T: 63.6 MSEC
BH CV ECHO MEAS - MV V2 VTI: 53 CM
BH CV ECHO MEAS - MVA(P1/2T): 3.5 CM2
BH CV ECHO MEAS - MVA(VTI): 1.72 CM2
BH CV ECHO MEAS - PA ACC TIME: 0.16 SEC
BH CV ECHO MEAS - PA PR(ACCEL): 8.9 MMHG
BH CV ECHO MEAS - PA V2 MAX: 162.9 CM/SEC
BH CV ECHO MEAS - PI END-D VEL: 56.9 CM/SEC
BH CV ECHO MEAS - QP/QS: 1.53
BH CV ECHO MEAS - RAP SYSTOLE: 3 MMHG
BH CV ECHO MEAS - RV MAX PG: 3.1 MMHG
BH CV ECHO MEAS - RV V1 MAX: 87.4 CM/SEC
BH CV ECHO MEAS - RV V1 VTI: 26.9 CM
BH CV ECHO MEAS - RVDD: 2.22 CM
BH CV ECHO MEAS - RVOT DIAM: 2.6 CM
BH CV ECHO MEAS - RVSP: 44.8 MMHG
BH CV ECHO MEAS - SI(MOD-SP2): 35.6 ML/M2
BH CV ECHO MEAS - SI(MOD-SP4): 34.3 ML/M2
BH CV ECHO MEAS - SV(LVOT): 91.2 ML
BH CV ECHO MEAS - SV(MOD-SP2): 52 ML
BH CV ECHO MEAS - SV(MOD-SP4): 50 ML
BH CV ECHO MEAS - SV(RVOT): 139.6 ML
BH CV ECHO MEAS - TAPSE (>1.6): 2.07 CM
BH CV ECHO MEAS - TR MAX PG: 41.8 MMHG
BH CV ECHO MEAS - TR MAX VEL: 323.3 CM/SEC
BH CV ECHO MEASUREMENTS AVERAGE E/E' RATIO: 19.84
BH CV XLRA - RV BASE: 3.4 CM
BH CV XLRA - RV LENGTH: 7.3 CM
BH CV XLRA - RV MID: 2.6 CM
BH CV XLRA - TDI S': 11.4 CM/SEC
BILIRUB SERPL-MCNC: 0.2 MG/DL (ref 0–1.2)
BUN SERPL-MCNC: 48 MG/DL (ref 8–23)
BUN SERPL-MCNC: 50 MG/DL (ref 8–23)
BUN/CREAT SERPL: 7.5 (ref 7–25)
BUN/CREAT SERPL: 7.9 (ref 7–25)
CALCIUM SPEC-SCNC: 8.6 MG/DL (ref 8.6–10.5)
CALCIUM SPEC-SCNC: 9 MG/DL (ref 8.6–10.5)
CHLORIDE SERPL-SCNC: 100 MMOL/L (ref 98–107)
CHLORIDE SERPL-SCNC: 100 MMOL/L (ref 98–107)
CO2 SERPL-SCNC: 25.2 MMOL/L (ref 22–29)
CO2 SERPL-SCNC: 25.2 MMOL/L (ref 22–29)
CREAT SERPL-MCNC: 6.35 MG/DL (ref 0.57–1)
CREAT SERPL-MCNC: 6.36 MG/DL (ref 0.57–1)
D-LACTATE SERPL-SCNC: 0.8 MMOL/L (ref 0.5–2)
DEPRECATED RDW RBC AUTO: 43.6 FL (ref 37–54)
DEPRECATED RDW RBC AUTO: 47.8 FL (ref 37–54)
EGFRCR SERPLBLD CKD-EPI 2021: 6.5 ML/MIN/1.73
EGFRCR SERPLBLD CKD-EPI 2021: 6.5 ML/MIN/1.73
EOSINOPHIL # BLD AUTO: 0.12 10*3/MM3 (ref 0–0.4)
EOSINOPHIL NFR BLD AUTO: 5.9 % (ref 0.3–6.2)
ERYTHROCYTE [DISTWIDTH] IN BLOOD BY AUTOMATED COUNT: 13.9 % (ref 12.3–15.4)
ERYTHROCYTE [DISTWIDTH] IN BLOOD BY AUTOMATED COUNT: 14.3 % (ref 12.3–15.4)
GLOBULIN UR ELPH-MCNC: 2.3 GM/DL
GLUCOSE SERPL-MCNC: 110 MG/DL (ref 65–99)
GLUCOSE SERPL-MCNC: 73 MG/DL (ref 65–99)
HBV SURFACE AG SERPL QL IA: NORMAL
HCT VFR BLD AUTO: 31.3 % (ref 34–46.6)
HCT VFR BLD AUTO: 31.8 % (ref 34–46.6)
HGB BLD-MCNC: 10.2 G/DL (ref 12–15.9)
HGB BLD-MCNC: 10.5 G/DL (ref 12–15.9)
IMM GRANULOCYTES # BLD AUTO: 0 10*3/MM3 (ref 0–0.05)
IMM GRANULOCYTES NFR BLD AUTO: 0 % (ref 0–0.5)
INR PPP: 1.1 (ref 0.9–1.1)
IRON 24H UR-MRATE: 73 MCG/DL (ref 37–145)
IRON SATN MFR SERPL: 47 % (ref 20–50)
LEFT ATRIUM VOLUME INDEX: 68.2 ML/M2
LYMPHOCYTES # BLD AUTO: 0.77 10*3/MM3 (ref 0.7–3.1)
LYMPHOCYTES NFR BLD AUTO: 37.9 % (ref 19.6–45.3)
MAXIMAL PREDICTED HEART RATE: 147 BPM
MCH RBC QN AUTO: 29.2 PG (ref 26.6–33)
MCH RBC QN AUTO: 29.6 PG (ref 26.6–33)
MCHC RBC AUTO-ENTMCNC: 32.1 G/DL (ref 31.5–35.7)
MCHC RBC AUTO-ENTMCNC: 33.5 G/DL (ref 31.5–35.7)
MCV RBC AUTO: 86.9 FL (ref 79–97)
MCV RBC AUTO: 92.2 FL (ref 79–97)
MONOCYTES # BLD AUTO: 0.26 10*3/MM3 (ref 0.1–0.9)
MONOCYTES NFR BLD AUTO: 12.8 % (ref 5–12)
NEUTROPHILS NFR BLD AUTO: 0.85 10*3/MM3 (ref 1.7–7)
NEUTROPHILS NFR BLD AUTO: 41.9 % (ref 42.7–76)
NRBC BLD AUTO-RTO: 0 /100 WBC (ref 0–0.2)
PLATELET # BLD AUTO: 139 10*3/MM3 (ref 140–450)
PLATELET # BLD AUTO: 144 10*3/MM3 (ref 140–450)
PMV BLD AUTO: 9.7 FL (ref 6–12)
PMV BLD AUTO: 9.8 FL (ref 6–12)
POTASSIUM SERPL-SCNC: 4.9 MMOL/L (ref 3.5–5.2)
POTASSIUM SERPL-SCNC: 5.5 MMOL/L (ref 3.5–5.2)
PROT SERPL-MCNC: 6.2 G/DL (ref 6–8.5)
PROTHROMBIN TIME: 14.3 SECONDS (ref 11.7–14.2)
QT INTERVAL: 550 MS
QT INTERVAL: 561 MS
RBC # BLD AUTO: 3.45 10*6/MM3 (ref 3.77–5.28)
RBC # BLD AUTO: 3.6 10*6/MM3 (ref 3.77–5.28)
SINUS: 2.28 CM
SODIUM SERPL-SCNC: 135 MMOL/L (ref 136–145)
SODIUM SERPL-SCNC: 137 MMOL/L (ref 136–145)
STJ: 2.19 CM
STRESS TARGET HR: 125 BPM
TIBC SERPL-MCNC: 155 MCG/DL (ref 298–536)
TRANSFERRIN SERPL-MCNC: 104 MG/DL (ref 200–360)
WBC NRBC COR # BLD: 2.03 10*3/MM3 (ref 3.4–10.8)
WBC NRBC COR # BLD: 2.03 10*3/MM3 (ref 3.4–10.8)

## 2023-01-16 PROCEDURE — 25010000002 HEPARIN (PORCINE) PER 1000 UNITS: Performed by: INTERNAL MEDICINE

## 2023-01-16 PROCEDURE — 93010 ELECTROCARDIOGRAM REPORT: CPT | Performed by: INTERNAL MEDICINE

## 2023-01-16 PROCEDURE — 93005 ELECTROCARDIOGRAM TRACING: CPT | Performed by: NURSE PRACTITIONER

## 2023-01-16 PROCEDURE — 70450 CT HEAD/BRAIN W/O DYE: CPT

## 2023-01-16 PROCEDURE — 99204 OFFICE O/P NEW MOD 45 MIN: CPT | Performed by: STUDENT IN AN ORGANIZED HEALTH CARE EDUCATION/TRAINING PROGRAM

## 2023-01-16 PROCEDURE — 93356 MYOCRD STRAIN IMG SPCKL TRCK: CPT | Performed by: INTERNAL MEDICINE

## 2023-01-16 PROCEDURE — 84466 ASSAY OF TRANSFERRIN: CPT | Performed by: NURSE PRACTITIONER

## 2023-01-16 PROCEDURE — G0378 HOSPITAL OBSERVATION PER HR: HCPCS

## 2023-01-16 PROCEDURE — 25010000002 KETOROLAC TROMETHAMINE PER 15 MG: Performed by: EMERGENCY MEDICINE

## 2023-01-16 PROCEDURE — 85025 COMPLETE CBC W/AUTO DIFF WBC: CPT | Performed by: EMERGENCY MEDICINE

## 2023-01-16 PROCEDURE — 85610 PROTHROMBIN TIME: CPT | Performed by: EMERGENCY MEDICINE

## 2023-01-16 PROCEDURE — 85730 THROMBOPLASTIN TIME PARTIAL: CPT | Performed by: EMERGENCY MEDICINE

## 2023-01-16 PROCEDURE — 93306 TTE W/DOPPLER COMPLETE: CPT | Performed by: INTERNAL MEDICINE

## 2023-01-16 PROCEDURE — 72125 CT NECK SPINE W/O DYE: CPT

## 2023-01-16 PROCEDURE — 93306 TTE W/DOPPLER COMPLETE: CPT

## 2023-01-16 PROCEDURE — 96375 TX/PRO/DX INJ NEW DRUG ADDON: CPT

## 2023-01-16 PROCEDURE — 80053 COMPREHEN METABOLIC PANEL: CPT | Performed by: EMERGENCY MEDICINE

## 2023-01-16 PROCEDURE — 99284 EMERGENCY DEPT VISIT MOD MDM: CPT

## 2023-01-16 PROCEDURE — 96374 THER/PROPH/DIAG INJ IV PUSH: CPT

## 2023-01-16 PROCEDURE — 85027 COMPLETE CBC AUTOMATED: CPT | Performed by: NURSE PRACTITIONER

## 2023-01-16 PROCEDURE — 99203 OFFICE O/P NEW LOW 30 MIN: CPT | Performed by: PHYSICIAN ASSISTANT

## 2023-01-16 PROCEDURE — 25010000002 ONDANSETRON PER 1 MG: Performed by: EMERGENCY MEDICINE

## 2023-01-16 PROCEDURE — 25010000002 HYDROMORPHONE PER 4 MG: Performed by: EMERGENCY MEDICINE

## 2023-01-16 PROCEDURE — G0257 UNSCHED DIALYSIS ESRD PT HOS: HCPCS

## 2023-01-16 PROCEDURE — 93005 ELECTROCARDIOGRAM TRACING: CPT

## 2023-01-16 PROCEDURE — 83540 ASSAY OF IRON: CPT | Performed by: NURSE PRACTITIONER

## 2023-01-16 PROCEDURE — 83605 ASSAY OF LACTIC ACID: CPT | Performed by: EMERGENCY MEDICINE

## 2023-01-16 PROCEDURE — 93356 MYOCRD STRAIN IMG SPCKL TRCK: CPT

## 2023-01-16 PROCEDURE — 87340 HEPATITIS B SURFACE AG IA: CPT | Performed by: INTERNAL MEDICINE

## 2023-01-16 RX ORDER — HYDROMORPHONE HYDROCHLORIDE 1 MG/ML
0.5 INJECTION, SOLUTION INTRAMUSCULAR; INTRAVENOUS; SUBCUTANEOUS ONCE
Status: COMPLETED | OUTPATIENT
Start: 2023-01-16 | End: 2023-01-16

## 2023-01-16 RX ORDER — CALCIUM CARBONATE 200(500)MG
2 TABLET,CHEWABLE ORAL 2 TIMES DAILY PRN
Status: DISCONTINUED | OUTPATIENT
Start: 2023-01-16 | End: 2023-01-19 | Stop reason: HOSPADM

## 2023-01-16 RX ORDER — SODIUM CHLORIDE 9 MG/ML
40 INJECTION, SOLUTION INTRAVENOUS AS NEEDED
Status: DISCONTINUED | OUTPATIENT
Start: 2023-01-16 | End: 2023-01-19 | Stop reason: HOSPADM

## 2023-01-16 RX ORDER — ONDANSETRON 4 MG/1
4 TABLET, FILM COATED ORAL EVERY 6 HOURS PRN
Status: DISCONTINUED | OUTPATIENT
Start: 2023-01-16 | End: 2023-01-19 | Stop reason: HOSPADM

## 2023-01-16 RX ORDER — AMLODIPINE BESYLATE 5 MG/1
5 TABLET ORAL
Status: DISCONTINUED | OUTPATIENT
Start: 2023-01-16 | End: 2023-01-19 | Stop reason: HOSPADM

## 2023-01-16 RX ORDER — ONDANSETRON 2 MG/ML
4 INJECTION INTRAMUSCULAR; INTRAVENOUS ONCE
Status: COMPLETED | OUTPATIENT
Start: 2023-01-16 | End: 2023-01-16

## 2023-01-16 RX ORDER — CLOPIDOGREL BISULFATE 75 MG/1
75 TABLET ORAL DAILY
Status: DISCONTINUED | OUTPATIENT
Start: 2023-01-16 | End: 2023-01-19 | Stop reason: HOSPADM

## 2023-01-16 RX ORDER — ONDANSETRON 2 MG/ML
4 INJECTION INTRAMUSCULAR; INTRAVENOUS EVERY 6 HOURS PRN
Status: DISCONTINUED | OUTPATIENT
Start: 2023-01-16 | End: 2023-01-19 | Stop reason: HOSPADM

## 2023-01-16 RX ORDER — ACETAMINOPHEN 650 MG/1
650 SUPPOSITORY RECTAL EVERY 4 HOURS PRN
Status: DISCONTINUED | OUTPATIENT
Start: 2023-01-16 | End: 2023-01-19 | Stop reason: HOSPADM

## 2023-01-16 RX ORDER — HYDROCODONE BITARTRATE AND ACETAMINOPHEN 5; 325 MG/1; MG/1
1 TABLET ORAL EVERY 6 HOURS PRN
Status: DISCONTINUED | OUTPATIENT
Start: 2023-01-16 | End: 2023-01-19 | Stop reason: HOSPADM

## 2023-01-16 RX ORDER — ATORVASTATIN CALCIUM 20 MG/1
40 TABLET, FILM COATED ORAL DAILY
Status: DISCONTINUED | OUTPATIENT
Start: 2023-01-16 | End: 2023-01-19 | Stop reason: HOSPADM

## 2023-01-16 RX ORDER — LOSARTAN POTASSIUM 50 MG/1
50 TABLET ORAL DAILY
Status: DISCONTINUED | OUTPATIENT
Start: 2023-01-16 | End: 2023-01-18

## 2023-01-16 RX ORDER — POLYETHYLENE GLYCOL 3350 17 G/17G
17 POWDER, FOR SOLUTION ORAL DAILY
Status: DISCONTINUED | OUTPATIENT
Start: 2023-01-16 | End: 2023-01-17

## 2023-01-16 RX ORDER — SODIUM CHLORIDE 0.9 % (FLUSH) 0.9 %
10 SYRINGE (ML) INJECTION AS NEEDED
Status: DISCONTINUED | OUTPATIENT
Start: 2023-01-16 | End: 2023-01-19 | Stop reason: HOSPADM

## 2023-01-16 RX ORDER — GABAPENTIN 100 MG/1
100 CAPSULE ORAL 3 TIMES DAILY PRN
Status: DISCONTINUED | OUTPATIENT
Start: 2023-01-16 | End: 2023-01-19 | Stop reason: HOSPADM

## 2023-01-16 RX ORDER — AMLODIPINE BESYLATE 5 MG/1
5 TABLET ORAL DAILY
COMMUNITY

## 2023-01-16 RX ORDER — CLONIDINE HYDROCHLORIDE 0.1 MG/1
0.1 TABLET ORAL 2 TIMES DAILY
COMMUNITY

## 2023-01-16 RX ORDER — CLOPIDOGREL BISULFATE 75 MG/1
75 TABLET ORAL DAILY
COMMUNITY

## 2023-01-16 RX ORDER — PANTOPRAZOLE SODIUM 40 MG/1
40 TABLET, DELAYED RELEASE ORAL DAILY
COMMUNITY

## 2023-01-16 RX ORDER — HYDRALAZINE HYDROCHLORIDE 50 MG/1
100 TABLET, FILM COATED ORAL 3 TIMES DAILY
Status: DISCONTINUED | OUTPATIENT
Start: 2023-01-16 | End: 2023-01-19 | Stop reason: HOSPADM

## 2023-01-16 RX ORDER — NITROGLYCERIN 0.4 MG/1
0.4 TABLET SUBLINGUAL
Status: DISCONTINUED | OUTPATIENT
Start: 2023-01-16 | End: 2023-01-19 | Stop reason: HOSPADM

## 2023-01-16 RX ORDER — ACETAMINOPHEN 160 MG/5ML
650 SOLUTION ORAL EVERY 4 HOURS PRN
Status: DISCONTINUED | OUTPATIENT
Start: 2023-01-16 | End: 2023-01-19 | Stop reason: HOSPADM

## 2023-01-16 RX ORDER — KETOROLAC TROMETHAMINE 15 MG/ML
15 INJECTION, SOLUTION INTRAMUSCULAR; INTRAVENOUS ONCE
Status: COMPLETED | OUTPATIENT
Start: 2023-01-16 | End: 2023-01-16

## 2023-01-16 RX ORDER — HEPARIN SODIUM 1000 [USP'U]/ML
4000 INJECTION, SOLUTION INTRAVENOUS; SUBCUTANEOUS ONCE
Status: COMPLETED | OUTPATIENT
Start: 2023-01-16 | End: 2023-01-16

## 2023-01-16 RX ORDER — ACETAMINOPHEN 325 MG/1
650 TABLET ORAL EVERY 4 HOURS PRN
Status: DISCONTINUED | OUTPATIENT
Start: 2023-01-16 | End: 2023-01-19 | Stop reason: HOSPADM

## 2023-01-16 RX ORDER — CLONIDINE HYDROCHLORIDE 0.1 MG/1
0.1 TABLET ORAL EVERY 12 HOURS SCHEDULED
Status: DISCONTINUED | OUTPATIENT
Start: 2023-01-16 | End: 2023-01-19 | Stop reason: HOSPADM

## 2023-01-16 RX ORDER — PANTOPRAZOLE SODIUM 40 MG/1
40 TABLET, DELAYED RELEASE ORAL
Status: DISCONTINUED | OUTPATIENT
Start: 2023-01-16 | End: 2023-01-19 | Stop reason: HOSPADM

## 2023-01-16 RX ORDER — SODIUM CHLORIDE 0.9 % (FLUSH) 0.9 %
10 SYRINGE (ML) INJECTION EVERY 12 HOURS SCHEDULED
Status: DISCONTINUED | OUTPATIENT
Start: 2023-01-16 | End: 2023-01-19 | Stop reason: HOSPADM

## 2023-01-16 RX ADMIN — CLOPIDOGREL 75 MG: 75 TABLET, FILM COATED ORAL at 16:31

## 2023-01-16 RX ADMIN — AMLODIPINE BESYLATE 5 MG: 5 TABLET ORAL at 10:34

## 2023-01-16 RX ADMIN — HYDROMORPHONE HYDROCHLORIDE 0.5 MG: 1 INJECTION, SOLUTION INTRAMUSCULAR; INTRAVENOUS; SUBCUTANEOUS at 01:07

## 2023-01-16 RX ADMIN — Medication 10 ML: at 21:45

## 2023-01-16 RX ADMIN — ISOSORBIDE DINITRATE 30 MG: 20 TABLET ORAL at 10:34

## 2023-01-16 RX ADMIN — HYDRALAZINE HYDROCHLORIDE 100 MG: 50 TABLET, FILM COATED ORAL at 16:31

## 2023-01-16 RX ADMIN — CLONIDINE HYDROCHLORIDE 0.1 MG: 0.1 TABLET ORAL at 16:31

## 2023-01-16 RX ADMIN — HYDRALAZINE HYDROCHLORIDE 100 MG: 50 TABLET, FILM COATED ORAL at 10:34

## 2023-01-16 RX ADMIN — HYDROCODONE BITARTRATE AND ACETAMINOPHEN 1 TABLET: 5; 325 TABLET ORAL at 16:31

## 2023-01-16 RX ADMIN — LOSARTAN POTASSIUM 50 MG: 50 TABLET, FILM COATED ORAL at 10:34

## 2023-01-16 RX ADMIN — GABAPENTIN 100 MG: 100 CAPSULE ORAL at 08:52

## 2023-01-16 RX ADMIN — ONDANSETRON 4 MG: 2 INJECTION INTRAMUSCULAR; INTRAVENOUS at 01:02

## 2023-01-16 RX ADMIN — GABAPENTIN 100 MG: 100 CAPSULE ORAL at 16:31

## 2023-01-16 RX ADMIN — HYDRALAZINE HYDROCHLORIDE 100 MG: 50 TABLET, FILM COATED ORAL at 21:44

## 2023-01-16 RX ADMIN — KETOROLAC TROMETHAMINE 15 MG: 15 INJECTION, SOLUTION INTRAMUSCULAR; INTRAVENOUS at 01:05

## 2023-01-16 RX ADMIN — ATORVASTATIN CALCIUM 40 MG: 20 TABLET, FILM COATED ORAL at 08:52

## 2023-01-16 RX ADMIN — HEPARIN SODIUM 4000 UNITS: 1000 INJECTION INTRAVENOUS; SUBCUTANEOUS at 15:10

## 2023-01-16 RX ADMIN — HYDROCODONE BITARTRATE AND ACETAMINOPHEN 1 TABLET: 5; 325 TABLET ORAL at 08:52

## 2023-01-16 NOTE — ED NOTES
"Nursing report ED to floor  Sonia Acharya  72 y.o.  female    HPI :   Chief Complaint   Patient presents with    Arm Pain       Admitting doctor:   Ashok De Los Santos MD    Admitting diagnosis:   The primary encounter diagnosis was Cervical radiculopathy. Diagnoses of Right arm pain, ESRD (end stage renal disease) on dialysis (HCC), Chronic anemia, and Sinus bradycardia were also pertinent to this visit.    Code status:   Current Code Status       Date Active Code Status Order ID Comments User Context       Prior            Allergies:   Patient has no known allergies.    Isolation:   No active isolations    Intake and Output  No intake or output data in the 24 hours ending 01/16/23 0308    Weight:   There were no vitals filed for this visit.    Most recent vitals:   Vitals:    01/16/23 0006 01/16/23 0101 01/16/23 0231   BP: 173/69 (!) 191/69 169/63   Pulse: (!) 40 (!) 42 (!) 42   Resp: 16  16   Temp: 97.4 °F (36.3 °C)     SpO2: 100% 95% 92%   Height: 152.4 cm (60\")         Active LDAs/IV Access:   Lines, Drains & Airways       Active LDAs       Name Placement date Placement time Site Days    Peripheral IV 01/16/23 0058 Left Antecubital 01/16/23  0058  Antecubital  less than 1                    Labs (abnormal labs have a star):   Labs Reviewed   COMPREHENSIVE METABOLIC PANEL - Abnormal; Notable for the following components:       Result Value    Glucose 110 (*)     BUN 48 (*)     Creatinine 6.36 (*)     Alkaline Phosphatase 125 (*)     eGFR 6.5 (*)     All other components within normal limits    Narrative:     GFR Normal >60  Chronic Kidney Disease <60  Kidney Failure <15    The GFR formula is only valid for adults with stable renal function between ages 18 and 70.   PROTIME-INR - Abnormal; Notable for the following components:    Protime 14.3 (*)     All other components within normal limits   CBC WITH AUTO DIFFERENTIAL - Abnormal; Notable for the following components:    WBC 2.03 (*)     RBC 3.45 (*)     " Hemoglobin 10.2 (*)     Hematocrit 31.8 (*)     Platelets 139 (*)     Neutrophil % 41.9 (*)     Monocyte % 12.8 (*)     Neutrophils, Absolute 0.85 (*)     All other components within normal limits   APTT - Normal   LACTIC ACID, PLASMA - Normal   CBC AND DIFFERENTIAL    Narrative:     The following orders were created for panel order CBC & Differential.  Procedure                               Abnormality         Status                     ---------                               -----------         ------                     CBC Auto Differential[[id number]]        Abnormal            Final result                 Please view results for these tests on the individual orders.       EKG:   ECG 12 Lead Bradycardia   Preliminary Result   HEART RATE= 42  bpm   RR Interval= 1429  ms   NH Interval= 176  ms   P Horizontal Axis=   deg   P Front Axis= 46  deg   QRSD Interval= 141  ms   QT Interval= 550  ms   QRS Axis= -57  deg   T Wave Axis= 53  deg   - ABNORMAL ECG -   Sinus bradycardia   Probable left atrial enlargement   Nonspecific IVCD with LAD   Left ventricular hypertrophy   Anterolateral infarct, age indeterminate   Electronically Signed By:    Date and Time of Study: 2023-01-16 00:19:48          Meds given in ED:   Medications   sodium chloride 0.9 % flush 10 mL (has no administration in time range)   ketorolac (TORADOL) injection 15 mg (15 mg Intravenous Given 1/16/23 0105)   HYDROmorphone (DILAUDID) injection 0.5 mg (0.5 mg Intravenous Given 1/16/23 0107)   ondansetron (ZOFRAN) injection 4 mg (4 mg Intravenous Given 1/16/23 0102)       Imaging results:  CT Head Without Contrast    Result Date: 1/16/2023  1. Negative head CT. 2. Extensive degenerative change in the cervical spine with moderate to severe multilevel spinal canal stenosis as discussed level by level above.  This report was finalized on 1/16/2023 1:44 AM by Dr. Shaan Rivera M.D.      CT Cervical Spine Without Contrast    Result Date: 1/16/2023  1.  Negative head CT. 2. Extensive degenerative change in the cervical spine with moderate to severe multilevel spinal canal stenosis as discussed level by level above.  This report was finalized on 1/16/2023 1:44 AM by Dr. Shaan Rivera M.D.       Ambulatory status:   - bed rest    Social issues:   Social History     Socioeconomic History    Marital status: Single   Tobacco Use    Smoking status: Never    Smokeless tobacco: Never   Substance and Sexual Activity    Alcohol use: No    Drug use: No    Sexual activity: Defer       NIH Stroke Scale:         Yesi Hyatt RN  01/16/23 03:08 EST

## 2023-01-16 NOTE — ED NOTES
Patient presents to ED from home with report of shooting pain in right side of neck radiating into her head and right arm. She reports that she has a new fistula in her right arm that was placed 3-4 months ago and the pain started when the fistula was placed.    Patient placed in mask upon arrival, triage staff wearing appropriate PPE.

## 2023-01-16 NOTE — CONSULTS
Patient Name: Sonia Acharya Account #: [id number]    MRN: [id number] Admission Date: 2023      Consulting Service: Vascular Surgery Date of Evaluation: 2023    Requesting Provider: KANDIS Giles      BILLIN      CHIEF COMPLAINT: Right basilic vein transposition AV fistula with arm pain and neck pain  HPI: Sonia Acharya is a 72 y.o. female is being seen for a consultation and evaluation/management of right basilic vein transposition AV fistula which is placed as a tube stage fistula with the second stage and October by Dr. Jaimes at [place].  He is cared for her over 8 years with their group and is done a great job maintaining dialysis access.  Unfortunately the patient has been having pain and numbness in her forearm radiating up past the mid arm upper arm and into the shoulder and indeed into the jaw and up the back of the head.  This is really not typical pain from any type of steal syndrome or any type of neuropathy because locally from surgery.  I really do not have an answer for why this pain would be related to her current access and neither does Dr. Jaimes.  She has had testing at [facility] that shows good flow in the fistula and they have started using it.  She does not have any evidence of steal with easily palpable +2 pulse at the wrist.    PAST MEDICAL HISTORY:   Past Medical History:   Diagnosis Date   • Arthritis    • Chronic kidney disease    • Heart disease    • Hypertension       PAST SURGICAL HISTORY:   Past Surgical History:   Procedure Laterality Date   • BREAST SURGERY     • DIALYSIS FISTULA CREATION     • EYE SURGERY     • HERNIA REPAIR      Dr. Sung      FAMILY HISTORY:   Family History   Problem Relation Age of Onset   • Heart disease Father    • Breast cancer Sister       SOCIAL HISTORY:   Social History     Tobacco Use   • Smoking status: Never   • Smokeless tobacco: Never   Substance Use Topics   • Alcohol use: No   • Drug use: No      MEDICATIONS:    No current facility-administered medications on file prior to encounter.     Current Outpatient Medications on File Prior to Encounter   Medication Sig Dispense Refill   • atorvastatin (LIPITOR) 40 MG tablet Take 40 mg by mouth Daily.     • gabapentin (NEURONTIN) 100 MG capsule Take 1 capsule by mouth 3 (Three) Times a Day for 3 days. (Patient taking differently: Take 100 mg by mouth 3 (Three) Times a Day As Needed.) 9 capsule 0   • hydrALAZINE (APRESOLINE) 25 MG tablet Take 100 mg by mouth 3 (Three) Times a Day.     • isosorbide dinitrate (ISORDIL) 30 MG tablet Take 30 mg by mouth Daily.     • losartan (COZAAR) 50 MG tablet Take 50 mg by mouth Daily. Take 1 and a half pills daily.     • polyethylene glycol (polyethylene glycol) 17 g packet Take 17 g by mouth Daily. 10 each 0   • amLODIPine (NORVASC) 5 MG tablet Take 5 mg by mouth Daily. Take if SBP >160     • cloNIDine (CATAPRES) 0.1 MG tablet Take 0.1 mg by mouth 2 (Two) Times a Day. Do not take if SBP <130     • clopidogrel (PLAVIX) 75 MG tablet Take 75 mg by mouth Daily.     • pantoprazole (PROTONIX) 40 MG EC tablet Take 40 mg by mouth Daily.     • [DISCONTINUED] acetaminophen-codeine (TYLENOL #3) 300-30 MG per tablet Take 1 tablet by mouth Daily As Needed for Moderate Pain .     • [DISCONTINUED] NIFEdipine XL (PROCARDIA XL) 90 MG 24 hr tablet Take 90 mg by mouth Daily.               ALLERGIES: Patient has no known allergies.   COMPLETE REVIEW OF SYSTEMS:     ENT ROS: negative  Cardiovascular ROS: no chest pain or dyspnea on exertion  Respiratory ROS: no cough, shortness of breath, or wheezing  Gastrointestinal ROS: no abdominal pain, change in bowel habits, or black or bloody stools  Neurological ROS: no TIA or stroke symptoms  Genito-Urinary ROS: no dysuria, or hematuria  Musculoskeletal ROS: positive for -arm pain and numbness right side more than left but both sides are symptomatic  Dermatological ROS: negative  Psychological ROS: negative      PHYSICAL  "EXAM:   Patient Vitals for the past 24 hrs:   BP Temp Temp src Pulse Resp SpO2 Height Weight   01/16/23 0900 (!) 191/71 -- -- (!) 40 16 97 % -- --   01/16/23 0415 (!) 183/68 97.8 °F (36.6 °C) Oral 60 16 92 % 152.4 cm (60\") 52.6 kg (115 lb 15.4 oz)   01/16/23 0335 -- -- -- -- -- 93 % -- --   01/16/23 0331 175/67 -- -- (!) 42 -- 92 % -- --   01/16/23 0231 169/63 -- -- (!) 42 16 92 % -- --   01/16/23 0101 (!) 191/69 -- -- (!) 42 -- 95 % -- --   01/16/23 0006 173/69 97.4 °F (36.3 °C) -- (!) 40 16 100 % 152.4 cm (60\") --        General appearance: oriented to person, place, and time, anxious, in mild to moderate distress and chronically ill appearing.  Neurological exam reveals alert, oriented, normal speech, no focal findings or movement disorder noted.  ENT exam reveals - ENT exam normal, no neck nodes or sinus tenderness.  CVS exam: normal rate, regular rhythm, normal S1, S2, no murmurs, rubs, clicks or gallops.  Chest: clear to auscultation, no wheezes, rales or rhonchi, symmetric air entry.  Abdominal exam: soft, nontender, nondistended, no masses or organomegaly.  Examination of the feet reveals warm, good capillary refill   Left arm viable with +2 pulses clotted AV access noted  Right arm with functional AV fistula with no evidence of steal.  +2 radial pulse.  Neurologically nonfocal to my exam.      LABS:      Results Review:       I reviewed the patient's new clinical results.  Results from last 7 days   Lab Units 01/16/23  0758 01/16/23  0042   WBC 10*3/mm3 2.03* 2.03*   HEMOGLOBIN g/dL 10.5* 10.2*   PLATELETS 10*3/mm3 144 139*     Results from last 7 days   Lab Units 01/16/23  0758 01/16/23  0042   SODIUM mmol/L 135* 137   POTASSIUM mmol/L 5.5* 4.9   CHLORIDE mmol/L 100 100   CO2 mmol/L 25.2 25.2   BUN mg/dL 50* 48*   CREATININE mg/dL 6.35* 6.36*   GLUCOSE mg/dL 73 110*   Estimated Creatinine Clearance: 6.6 mL/min (A) (by C-G formula based on SCr of 6.35 mg/dL (H)).  Results from last 7 days   Lab Units " 01/16/23  0758 01/16/23  0042   CALCIUM mg/dL 8.6 9.0   ALBUMIN g/dL  --  3.9     Results from last 7 days   Lab Units 01/16/23  0042   PROTIME Seconds 14.3*   INR  1.10       The following radiologic or non-invasive studies have been reviewed by me: I reviewed the records with DrChetan From Dr. Jaimes's office including her prior fistula duplex    Active Hospital Problems    Diagnosis  POA   • **Right arm pain [M79.601]  Unknown   • Cervical radiculopathy [M54.12]  Yes   • ESRD on dialysis (HCC) [N18.6, Z99.2]  Not Applicable   • Bradycardia [R00.1]  Unknown   • ANGIE (obstructive sleep apnea) [G47.33]  Unknown   • Chronic diastolic CHF (congestive heart failure) (HCC) [I50.32]  Unknown   • Aortic stenosis [I35.0]  Unknown   • HTN (hypertension) [I10]  Yes   • Anemia, chronic disease [D63.8]  Yes      Resolved Hospital Problems   No resolved problems to display.         ASSESSMENT/PLAN: 72 y.o. female with no evidence of AV fistula access site issues.  At this point in time I would recommend continuing to try to use this in an effort to get her catheter out.  I would recommend follow-up with Dr. Jaimes her current dialysis access provider who she has a good relationship with.  I do not believe that AV fistula is related to her current pain syndrome and I suspect cervical myelopathy.  Given these findings we will sign off.  Please call for issues.      I discussed the plan with the patient and she is agreeable to the plan of care at this point. Thank you for this consult.     Jorje Seth MD   01/16/23

## 2023-01-16 NOTE — PLAN OF CARE
Problem: Adult Inpatient Plan of Care  Goal: Plan of Care Review  Outcome: Ongoing, Progressing  Flowsheets (Taken 1/16/2023 1824)  Progress: improving  Plan of Care Reviewed With: patient  Goal: Patient-Specific Goal (Individualized)  Outcome: Ongoing, Progressing  Goal: Absence of Hospital-Acquired Illness or Injury  Outcome: Ongoing, Progressing  Intervention: Identify and Manage Fall Risk  Recent Flowsheet Documentation  Taken 1/16/2023 1800 by Jose Sahu RN  Safety Promotion/Fall Prevention:   activity supervised   assistive device/personal items within reach   clutter free environment maintained   room organization consistent   safety round/check completed   toileting scheduled   nonskid shoes/slippers when out of bed   muscle strengthening facilitated   fall prevention program maintained  Taken 1/16/2023 1650 by Jose Sahu RN  Safety Promotion/Fall Prevention:   activity supervised   assistive device/personal items within reach   clutter free environment maintained   toileting scheduled   safety round/check completed   room organization consistent   nonskid shoes/slippers when out of bed   muscle strengthening facilitated   fall prevention program maintained  Taken 1/16/2023 1400 by Jose Sahu RN  Safety Promotion/Fall Prevention: patient off unit  Taken 1/16/2023 1200 by Jose Sahu RN  Safety Promotion/Fall Prevention: patient off unit  Taken 1/16/2023 1000 by Jose Sahu RN  Safety Promotion/Fall Prevention:   assistive device/personal items within reach   activity supervised   clutter free environment maintained   toileting scheduled   safety round/check completed   room organization consistent   nonskid shoes/slippers when out of bed   muscle strengthening facilitated   fall prevention program maintained  Taken 1/16/2023 0809 by Jose Sahu RN  Safety Promotion/Fall Prevention:   activity supervised   assistive device/personal items within reach   clutter free  environment maintained   toileting scheduled   safety round/check completed   room organization consistent   nonskid shoes/slippers when out of bed   muscle strengthening facilitated   fall prevention program maintained  Intervention: Prevent Skin Injury  Recent Flowsheet Documentation  Taken 1/16/2023 1800 by Jose Sahu RN  Body Position:   position changed independently   sitting up in bed  Taken 1/16/2023 1650 by Jose Sahu RN  Body Position:   position changed independently   sitting up in bed  Taken 1/16/2023 1000 by Jose Sahu RN  Body Position:   position changed independently   sitting up in bed  Taken 1/16/2023 0809 by Jose Sahu RN  Body Position:   position changed independently   sitting up in bed  Intervention: Prevent and Manage VTE (Venous Thromboembolism) Risk  Recent Flowsheet Documentation  Taken 1/16/2023 0809 by Jose Sahu RN  VTE Prevention/Management:   bilateral   sequential compression devices on  Intervention: Prevent Infection  Recent Flowsheet Documentation  Taken 1/16/2023 1800 by Jose Sahu RN  Infection Prevention:   hand hygiene promoted   rest/sleep promoted  Taken 1/16/2023 1650 by Jose Sahu RN  Infection Prevention:   hand hygiene promoted   rest/sleep promoted  Taken 1/16/2023 1000 by Jose Sahu RN  Infection Prevention:   hand hygiene promoted   rest/sleep promoted  Goal: Optimal Comfort and Wellbeing  Outcome: Ongoing, Progressing  Goal: Readiness for Transition of Care  Outcome: Ongoing, Progressing     Problem: Fall Injury Risk  Goal: Absence of Fall and Fall-Related Injury  Outcome: Ongoing, Progressing  Intervention: Identify and Manage Contributors  Recent Flowsheet Documentation  Taken 1/16/2023 1800 by Jose Sahu RN  Medication Review/Management: medications reviewed  Taken 1/16/2023 1650 by Jose Sahu RN  Medication Review/Management: medications reviewed  Taken 1/16/2023 1000 by Jose Sahu RN  Medication  Review/Management: medications reviewed  Taken 1/16/2023 0809 by Jose Sahu RN  Medication Review/Management: medications reviewed  Intervention: Promote Injury-Free Environment  Recent Flowsheet Documentation  Taken 1/16/2023 1800 by Jose Sahu RN  Safety Promotion/Fall Prevention:   activity supervised   assistive device/personal items within reach   clutter free environment maintained   room organization consistent   safety round/check completed   toileting scheduled   nonskid shoes/slippers when out of bed   muscle strengthening facilitated   fall prevention program maintained  Taken 1/16/2023 1650 by Jose Sahu RN  Safety Promotion/Fall Prevention:   activity supervised   assistive device/personal items within reach   clutter free environment maintained   toileting scheduled   safety round/check completed   room organization consistent   nonskid shoes/slippers when out of bed   muscle strengthening facilitated   fall prevention program maintained  Taken 1/16/2023 1400 by Jose Sahu RN  Safety Promotion/Fall Prevention: patient off unit  Taken 1/16/2023 1200 by Jose Sahu RN  Safety Promotion/Fall Prevention: patient off unit  Taken 1/16/2023 1000 by Jose Sahu RN  Safety Promotion/Fall Prevention:   assistive device/personal items within reach   activity supervised   clutter free environment maintained   toileting scheduled   safety round/check completed   room organization consistent   nonskid shoes/slippers when out of bed   muscle strengthening facilitated   fall prevention program maintained  Taken 1/16/2023 0809 by Jose Sahu RN  Safety Promotion/Fall Prevention:   activity supervised   assistive device/personal items within reach   clutter free environment maintained   toileting scheduled   safety round/check completed   room organization consistent   nonskid shoes/slippers when out of bed   muscle strengthening facilitated   fall prevention program maintained      Problem: Hypertension Comorbidity  Goal: Blood Pressure in Desired Range  Outcome: Ongoing, Progressing  Intervention: Maintain Blood Pressure Management  Recent Flowsheet Documentation  Taken 1/16/2023 1800 by Jose Sahu, RN  Medication Review/Management: medications reviewed  Taken 1/16/2023 1650 by Jose Sahu, RN  Medication Review/Management: medications reviewed  Taken 1/16/2023 1000 by Jose Sahu, RN  Medication Review/Management: medications reviewed  Taken 1/16/2023 0809 by Jose Sahu, RN  Medication Review/Management: medications reviewed   Goal Outcome Evaluation:  Plan of Care Reviewed With: patient        Progress: improving

## 2023-01-16 NOTE — CONSULTS
Patient Name: Sonia Acharya  :1950  72 y.o.    Date of Admission: 2023  Date of Consultation:  23  Encounter Provider: Kobe Escalante MD  Place of Service: [facility] CARDIOLOGY  Referring Provider: Ashok De Los Santos MD  Patient Care Team:  Marcy Paez PA as PCP - General (Physician Assistant)      Chief complaint: arm pain     History of Present Illness:Sonia Acharya is a 72 year old pt of [facility] with a history of ESRD on dialysis, chronic diastolic CHF, ANGIE, HTN and CAD s/p PCI,and DM. A stress test on 20 showed evidence of infarction, no ischemia. Echo from 2022 revealed EF 58%, moderate to severe LVH, impaired relaxation, mild to moderate AS, and trivial pericardial effusion. She underwent cardiac catheterization on 2021 with PCI to RCA. Holter monitor from 3/29/2022 revealed underlying sinus rhythm with min 50/max 118/AVG 60 bpm, rare PACs/PVCs, and 6 runs of paroxysmal atrial tachycardia.    Pt was seen in 2022 in office by Dr. Emil Odom and no changes were made.     Pt was last seen by [facility] on 22 for follow up. Pt was ok for a cardiovascular standpoint. Pt was reported she had been recently diagnosed with cancer. Pt also recently diagnosed with DVT in her left arm. Pt was started on Eliquis. No changes were made.     Pt presented to ER on 23 with complaints of right arm pain that has gotten progressively worse over the past few days. Pt reported the pain started when  she had an arteriovenous fistula placed in her right upper extremity for dialysis in 2022.  She states the fistula was used for the first time last week and the pain has occurred intermittently since that time.  She reported the pain starts in her right hand and radiates up into the right side of her neck and head. Pt also reported numbness and tingling in her right hand. Pt also noticed her right hand is darker in color at times. She denied any chest  pain, shortness of breath, weakness, fever, chills, dizziness or diaphoresis. A CT of head and cervical spine showed extensive degenerative change in the cervical spine with moderate to severe multilevel spinal canal stenosis.  On arrival to ER , pt HR was in 40s and EKG showed SB.     I have been asked to see for sinus bradycardia.  Patient reports that recently she had a clonidine patch that was turned to p.o. clonidine.  She takes it 0.1 twice daily.  She states that she takes this due to refractory hypertension.  Otherwise, she denies lightheadedness or syncopal episodes.  She is largely asymptomatic from the low heart rate.    ECHO 7/12/22    Physician Conclusions   Any valve disease noted in the report is non-rheumatic unless otherwise specifically noted.   Summary:                 The ejection fraction biplane was calculated at 58%.                            Overall left ventricular function is normal.                            Left ventricular size is mildly enlarged.                            Moderate to severe concentric left ventricular hypertrophy.                            No regional wall motion abnormalities noted.                            Abnormal left ventricular diastolic filling consistent with impaired relaxation.                            The right ventricular chamber size and systolic function are within normal limits.                            Mild-to-moderate aortic stenosis.                            There is inadequate tricuspid insufficiency to calculate accurate right ventricular systolic pressure.                            There is a small (trivial) pericardial effusion.                            No echo findings consistent with cardiac tamponade.   Findings   Left Ventricle:           The ejection fraction biplane was calculated at 58%.                             Overall left ventricular function is normal.                             Left ventricular size is mildly  enlarged.                             Moderate to severe concentric left ventricular hypertrophy.                             No regional wall motion abnormalities noted.                             Abnormal left ventricular diastolic filling consistent with impaired relaxation.           Stress test 7/11/22   Summary of LV Function    Gated SPECT analysis demonstrates a post stress ejection fraction of 68 %.    Global LV systolic function is normal.    Mildly decreased wall thickening in the mid inferolateral, mid anterolateral    wall segments without hypokinesis. Otherwise, gated wall motion shows normal    left ventricular wall motion and systolic wall thickening in all the    remaining left ventricular segments.      Risk Stratification    This is an intermediate risk study.                   Past Medical History:   Diagnosis Date   • Arthritis    • Chronic kidney disease    • Heart disease    • Hypertension        Past Surgical History:   Procedure Laterality Date   • BREAST SURGERY     • DIALYSIS FISTULA CREATION     • EYE SURGERY     • HERNIA REPAIR  2017    Dr. Sung         Prior to Admission medications    Medication Sig Start Date End Date Taking? Authorizing Provider   atorvastatin (LIPITOR) 40 MG tablet Take 40 mg by mouth Daily.   Yes Manish Tracy MD   gabapentin (NEURONTIN) 100 MG capsule Take 1 capsule by mouth 3 (Three) Times a Day for 3 days.  Patient taking differently: Take 100 mg by mouth 3 (Three) Times a Day As Needed. 12/14/21 1/16/23 Yes Rene Beatty APRN   hydrALAZINE (APRESOLINE) 25 MG tablet Take 100 mg by mouth 3 (Three) Times a Day.   Yes Manish Tracy MD   isosorbide dinitrate (ISORDIL) 30 MG tablet Take 30 mg by mouth Daily.   Yes Manish Tracy MD   losartan (COZAAR) 50 MG tablet Take 50 mg by mouth Daily. Take 1 and a half pills daily.   Yes Manish Tracy MD   polyethylene glycol (polyethylene glycol) 17 g packet Take 17 g by mouth Daily.  "12/15/21  Yes Rene Beatty APRN   acetaminophen-codeine (TYLENOL #3) 300-30 MG per tablet Take 1 tablet by mouth Daily As Needed for Moderate Pain .  1/16/23  Provider, MD Manish   NIFEdipine XL (PROCARDIA XL) 90 MG 24 hr tablet Take 90 mg by mouth Daily.  1/16/23  Provider, MD Manish       No Known Allergies    Social History     Socioeconomic History   • Marital status: Single   Tobacco Use   • Smoking status: Never   • Smokeless tobacco: Never   Substance and Sexual Activity   • Alcohol use: No   • Drug use: No   • Sexual activity: Defer       Family History   Problem Relation Age of Onset   • Heart disease Father    • Breast cancer Sister        REVIEW OF SYSTEMS:   All systems reviewed.  Pertinent positives identified in HPI.  All other systems are negative.      Objective:     Vitals:    01/16/23 0331 01/16/23 0335 01/16/23 0415 01/16/23 0900   BP: 175/67  (!) 183/68 (!) 191/71   BP Location:   Left arm Left arm   Patient Position:   Lying Lying   Pulse: (!) 42  60 (!) 40   Resp:   16 16   Temp:   97.8 °F (36.6 °C)    TempSrc:   Oral    SpO2: 92% 93% 92% 97%   Weight:   52.6 kg (115 lb 15.4 oz)    Height:   152.4 cm (60\")      Body mass index is 22.65 kg/m².    General Appearance:    Alert, cooperative, in no acute distress, resting in dialysis chair   Head:    Normocephalic, without obvious abnormality, atraumatic   Eyes:            Lids and lashes normal, conjunctivae and sclerae normal, no icterus, no pallor, corneas clear, PERRLA   Ears:    Ears appear intact with no abnormalities noted   Throat:   No oral lesions, no thrush, oral mucosa moist   Neck:   No adenopathy, supple, trachea midline, no thyromegaly, no carotid bruit, no JVD   Back:     No kyphosis present, no scoliosis present, no skin lesions, erythema or scars, no tenderness to percussion or palpation, range of motion normal   Lungs:     Clear to auscultation, respirations regular, even and unlabored    Heart:    Regular rhythm and " normal rate, normal S1 and S2, faint systolic murmur at left upper sternal border, no gallop, no rub, no click   Chest Wall:    No abnormalities observed   Abdomen:     Normal bowel sounds, no masses, no organomegaly, soft, nontender, nondistended, no guarding, no rebound  tenderness   Extremities:   Moves all extremities well, no edema, no cyanosis, no redness   Pulses:   Pulses palpable and equal bilaterally. Normal radial, carotid, femoral, dorsalis pedis and posterior tibial pulses bilaterally. Normal abdominal aorta   Skin:  Psychiatric:   No bleeding, bruising or rash    Alert and oriented x 3, normal mood and affect   Lab Review:     Results from last 7 days   Lab Units 01/16/23  0758 01/16/23  0042   SODIUM mmol/L 135* 137   POTASSIUM mmol/L 5.5* 4.9   CHLORIDE mmol/L 100 100   CO2 mmol/L 25.2 25.2   BUN mg/dL 50* 48*   CREATININE mg/dL 6.35* 6.36*   CALCIUM mg/dL 8.6 9.0   BILIRUBIN mg/dL  --  0.2   ALK PHOS U/L  --  125*   ALT (SGPT) U/L  --  <5   AST (SGOT) U/L  --  13   GLUCOSE mg/dL 73 110*         Results from last 7 days   Lab Units 01/16/23  0758   WBC 10*3/mm3 2.03*   HEMOGLOBIN g/dL 10.5*   HEMATOCRIT % 31.3*   PLATELETS 10*3/mm3 144     Results from last 7 days   Lab Units 01/16/23  0042   INR  1.10   APTT seconds 31.7                                     I personally viewed and interpreted the patient's EKG/Telemetry data.    Reviewed her prior cardiac testing from July 2022 at [place].  Lexiscan SPECT July 11, 2022:  LVEF 68%  Normal LV systolic function  Mildly decreased wall thickening in the mid inferolateral, mid anterolateral wall segments without hypokinesis.  This is likely due to her interventricular conduction delay.   There is a medium sized, mild severity defect on the basal anterolateral, mid inferolateral, mid anterolateral wall consistent with infarction.  This may also be consistent with her conduction delay, borderline left bundle   Otherwise no ischemia.    TTE July 12,  2022:  LVEF 58%.  Moderate to severe concentric LVH  Mild to moderate aortic stenosis, mean gradient 17  Trivial pericardial effusion      Assessment and Plan:       Patient Active Problem List   Diagnosis   • Generalized abdominal pain   • ODALIS (acute kidney injury) ([facility])   • Anemia, chronic disease   • Metabolic acidosis, increased anion gap   • Obesity (BMI 30-39.9)   • HTN (hypertension)   • Chest pain, atypical   • Cervical radiculopathy   • ESRD on dialysis ([facility])   • Bradycardia   • Right arm pain   • ANGIE (obstructive sleep apnea)   • Chronic diastolic CHF (congestive heart failure) ([facility])   • Aortic stenosis     1.  Bradycardia, asymptomatic  - She was recently changed to oral clonidine twice daily for hypertension.  This may be contributing, however per report it seems that she needs the clonidine for blood pressure management.  - She has not needed eating while inpatient.  I would recommend holding off on it.  Should she have escalating hypertension, it can be cautiously resumed, but noting that it might worsen bradycardia.  - She is giving no symptoms of symptomatic bradycardia.  Pressures controlled, she has no unexplained syncopal spells.  No further work-up required.  Should she develop symptoms of lightheadedness, or syncope, a Holter can be pursued.  Otherwise she can follow-up with her primary cardiologist regarding this.    2.  History of coronary artery disease status post PCI to RCA  - Recent SPECT obtained July 2022, no ischemia.  Did have decreased counts in the septal region which may be consistent with her intraventricular conduction delay.  But no ischemia.  -Continue Plavix 75  -Continue her home cardiac medications aside from clonidine above.  - She does have murmur on exam, her last echo was 6 months ago, but if she is being considered for cervical spinal surgery with her numbness, repeating echo is reasonable.  -If she does require surgery, no further cardiac work-up is required prior to  it.  She had a recent SPECT without ischemia, and would continue her home cardiac medications as above.    Thank you for the consult.  Her bradycardia has remained asymptomatic.  Should she develop any symptoms of significant bradycardia including lightheadedness, presyncope, please reach out to cardiology, however clinically she does not give any history of this.  She can follow-up with her outpatient cardiologist for further evaluation in the next few weeks.  Cardiology will follow TTE results peripherally.    Kobe Escalante MD  01/16/23  13:19 EST

## 2023-01-16 NOTE — CONSULTS
[facility] Consult Note      Patient Name: Sonia Acharya  : 1950  MRN: [id number]  Primary Care Physician:  Marcy Paez PA  Referring Physician: Ashok De Los Santos MD  Date of admission: 2023    Subjective     Reason for Consult: End-stage renal disease    HPI:   Sonia Acharya is a 72 y.o. female was admitted on 2023 early a.m., because of severe right arm pain radiating from the elbow all the way to the neck  She had recent AV fistula creation in the right upper arm she had tunneled dialysis catheter.  She has end-stage renal disease and on maintenance hemodialysis for the past 9 years  She has coronary artery disease, hypertension and degenerative joint disease.,  History of CHF which is diastolic dysfunction, obstructive sleep apnea  She normally dialyzes every Monday, Wednesday and Friday.  Under the care of Dr. Maira Pizarro.  She has the right arm pain from the right elbow radiating to the neck, no chest pain, no nausea or vomiting, no abdominal pain, no significant urine output.  And patient is weak and her appetite is borderline      Review of Systems:   14 point review of systems is otherwise negative except for mentioned above on HPI    Personal History     Past Medical History:   Diagnosis Date   • Arthritis    • Chronic kidney disease    • Heart disease    • Hypertension        Past Surgical History:   Procedure Laterality Date   • BREAST SURGERY     • DIALYSIS FISTULA CREATION     • EYE SURGERY     • HERNIA REPAIR      Dr. Sung       Family History: family history includes Breast cancer in her sister; Heart disease in her father.    Social History:  reports that she has never smoked. She has never used smokeless tobacco. She reports that she does not drink alcohol and does not use drugs.    Home Medications:  Prior to Admission medications    Medication Sig Start Date End Date Taking? Authorizing Provider   atorvastatin (LIPITOR) 40 MG  tablet Take 40 mg by mouth Daily.   Yes Manish Tracy MD   gabapentin (NEURONTIN) 100 MG capsule Take 1 capsule by mouth 3 (Three) Times a Day for 3 days.  Patient taking differently: Take 100 mg by mouth 3 (Three) Times a Day As Needed. 12/14/21 1/16/23 Yes Rene Beatty APRN   hydrALAZINE (APRESOLINE) 25 MG tablet Take 100 mg by mouth 3 (Three) Times a Day.   Yes Manish Tracy MD   isosorbide dinitrate (ISORDIL) 30 MG tablet Take 30 mg by mouth Daily.   Yes Manish Tracy MD   losartan (COZAAR) 50 MG tablet Take 50 mg by mouth Daily. Take 1 and a half pills daily.   Yes Manish Tracy MD   polyethylene glycol (polyethylene glycol) 17 g packet Take 17 g by mouth Daily. 12/15/21  Yes Rene Beatty APRN   acetaminophen-codeine (TYLENOL #3) 300-30 MG per tablet Take 1 tablet by mouth Daily As Needed for Moderate Pain .  1/16/23  Manish Tracy MD   NIFEdipine XL (PROCARDIA XL) 90 MG 24 hr tablet Take 90 mg by mouth Daily.  1/16/23  Manish Tracy MD       Allergies:  No Known Allergies    Objective     Vitals:   Temp:  [97.4 °F (36.3 °C)-97.8 °F (36.6 °C)] 97.8 °F (36.6 °C)  Heart Rate:  [40-60] 40  Resp:  [16] 16  BP: (169-191)/(63-71) 191/71  No intake or output data in the 24 hours ending 01/16/23 0909    Physical Exam:   Constitutional: Awake, alert, no acute distress.  Chronically ill and  HEENT: Sclera anicteric, no conjunctival injection  Neck: Supple, no thyromegaly, no lymphadenopathy, trachea at midline, no JVD, tunneled dialysis catheter in the right IJ with exit site below the right clavicle  Respiratory: Clear to auscultation bilaterally, nonlabored respiration  Cardiovascular: RRR, no murmurs, no rubs or gallops, no carotid bruit  Gastrointestinal: Positive bowel sounds, abdomen is soft, nontender and nondistended  : No palpable bladder  Musculoskeletal: No edema, no clubbing or cyanosis, she has functional AV fistula in the right upper arm with good  thrill and bruit  Psychiatric: Appropriate affect, cooperative  Neurologic: Oriented x3, moving all extremities, normal speech and mental status  Skin: Warm and dry       Scheduled Meds:     atorvastatin, 40 mg, Oral, Daily  hydrALAZINE, 100 mg, Oral, TID  isosorbide dinitrate, 30 mg, Oral, Daily  losartan, 50 mg, Oral, Daily  polyethylene glycol, 17 g, Oral, Daily  sodium chloride, 10 mL, Intravenous, Q12H      IV Meds:        Results Reviewed:   I have personally reviewed the results from the time of this admission to 1/16/2023 09:09 EST     Lab Results   Component Value Date    GLUCOSE 73 01/16/2023    CALCIUM 8.6 01/16/2023     (L) 01/16/2023    K 5.5 (H) 01/16/2023    CO2 25.2 01/16/2023     01/16/2023    BUN 50 (H) 01/16/2023    CREATININE 6.35 (H) 01/16/2023    EGFRIFAFRI 12 (L) 12/14/2021    EGFRIFNONA  12/14/2021      Comment:      <15 Indicative of kidney failure.    BCR 7.9 01/16/2023    ANIONGAP 9.8 01/16/2023      Lab Results   Component Value Date    MG 2.0 12/09/2022    ALBUMIN 3.9 01/16/2023           Assessment / Plan     ASSESSMENT:  -End-stage renal disease on maintenance hemodialysis every Monday, Wednesday and Friday under the care of Dr. Maira Pizarro, appears euvolemic, potassium today 5.5  -Anemia of chronic kidney disease, hemoglobin 10.5, treated with long-acting ZULAY  -Severe right upper extremity pain could be related to radiculopathy and spinal stenosis.  -Diastolic dysfunction but no dorothy evidence of CHF  -Coronary artery disease    PLAN:  -Hemodialysis today  -Will be evaluated by neurosurgery and vascular surgery  -Surveillance labs    Thank you for involving us in the care of Sonia Acharya.  Please feel free to call with any questions.    Nathan Adkins MD  01/16/23  09:09 EST    [facility]  [phone]      Please note that portions of this note were completed with a voice recognition program.

## 2023-01-16 NOTE — H&P
Patient Name:  Sonia Acharya  YOB: 1950  MRN:  [id number]  Admit Date:  1/16/2023  Patient Care Team:  Marcy Paez PA as PCP - General (Physician Assistant)      Subjective   History Present Illness     Chief Complaint   Patient presents with   • Arm Pain       Ms. Acharya is a 72 y.o. non-smoker with a history of ESRD on dialysis, chronic diastolic CHF, ANGIE, hypertension, and CAD that presents to [facility] complaining of right arm pain.  She reports right arm pain that has been progressively worsening over the past few days.  She states the pain began when she had an arteriovenous fistula placed in her right upper extremity for dialysis in October 2022.  She states the fistula was used for the first time last week and the pain has occurred intermittently since that time.  She states the pain initiates in the right hand and radiates up into the right side of her neck and head .  She reports numbness and tingling in the right hand.  She also states she notices her right hand becomes darker in color at times.  She denies chest pain, shortness of breath, weakness, fever, chills, dizziness, and diaphoresis.  A CT of the head and cervical spine performed in the ED revealed extensive degenerative change in the cervical spine with moderate to severe multilevel spinal canal stenosis.  On arrival to the ED, she was also found to have a heart rate in the low 40s.  An EKG showed sinus bradycardia.  She is being admitted for further evaluation.      History of Present Illness  Review of Systems   Constitutional: Negative for chills, diaphoresis and fever.   HENT: Negative for congestion and sore throat.    Eyes: Negative for photophobia and visual disturbance.   Respiratory: Negative for cough, chest tightness and shortness of breath.    Cardiovascular: Negative for chest pain, palpitations and leg swelling.   Gastrointestinal: Negative for abdominal pain, nausea and vomiting.    Musculoskeletal: Positive for arthralgias, myalgias and neck pain.   Skin: Negative for rash and wound.   Neurological: Positive for numbness and headaches. Negative for dizziness, weakness and light-headedness.        Personal History     Past Medical History:   Diagnosis Date   • Arthritis    • Chronic kidney disease    • Heart disease    • Hypertension      Past Surgical History:   Procedure Laterality Date   • BREAST SURGERY     • DIALYSIS FISTULA CREATION     • EYE SURGERY     • HERNIA REPAIR  2017    Dr. Sung     Family History   Problem Relation Age of Onset   • Heart disease Father    • Breast cancer Sister      Social History     Tobacco Use   • Smoking status: Never   • Smokeless tobacco: Never   Substance Use Topics   • Alcohol use: No   • Drug use: No     Medications Prior to Admission   Medication Sig Dispense Refill Last Dose   • atorvastatin (LIPITOR) 40 MG tablet Take 40 mg by mouth Daily.      • gabapentin (NEURONTIN) 100 MG capsule Take 1 capsule by mouth 3 (Three) Times a Day for 3 days. (Patient taking differently: Take 100 mg by mouth 3 (Three) Times a Day As Needed.) 9 capsule 0    • hydrALAZINE (APRESOLINE) 25 MG tablet Take 100 mg by mouth 3 (Three) Times a Day.      • isosorbide dinitrate (ISORDIL) 30 MG tablet Take 30 mg by mouth Daily.      • losartan (COZAAR) 50 MG tablet Take 50 mg by mouth Daily. Take 1 and a half pills daily.      • polyethylene glycol (polyethylene glycol) 17 g packet Take 17 g by mouth Daily. 10 each 0      Allergies:  No Known Allergies    Objective    Objective     Vital Signs  Temp:  [97.4 °F (36.3 °C)-97.8 °F (36.6 °C)] 97.8 °F (36.6 °C)  Heart Rate:  [40-60] 60  Resp:  [16] 16  BP: (169-191)/(63-69) 183/68  SpO2:  [92 %-100 %] 92 %  on   ;   Device (Oxygen Therapy): room air  Body mass index is 22.65 kg/m².    Physical Exam  Vitals and nursing note reviewed.   Constitutional:       Appearance: Normal appearance.   HENT:      Head: Normocephalic and  atraumatic.      Nose: Nose normal.      Mouth/Throat:      Mouth: Mucous membranes are moist.      Pharynx: Oropharynx is clear.   Eyes:      Extraocular Movements: Extraocular movements intact.      Conjunctiva/sclera: Conjunctivae normal.   Cardiovascular:      Rate and Rhythm: Bradycardia present. Rhythm irregular.      Pulses: Normal pulses.           Radial pulses are 2+ on the right side and 2+ on the left side.      Heart sounds: Murmur heard.      Arteriovenous access: right arteriovenous access is present.     Comments: Fistula to RUE; positive thrill, positive bruit  Pulmonary:      Effort: Pulmonary effort is normal.      Breath sounds: Normal breath sounds.      Comments: Dialysis catheter to right upper chest  Abdominal:      General: Bowel sounds are normal.      Palpations: Abdomen is soft.   Musculoskeletal:         General: No swelling or tenderness.      Cervical back: Normal range of motion and neck supple.   Skin:     General: Skin is warm and dry.      Capillary Refill: Capillary refill takes less than 2 seconds.   Neurological:      General: No focal deficit present.      Mental Status: She is alert and oriented to person, place, and time.   Psychiatric:         Mood and Affect: Mood normal.         Behavior: Behavior normal.         Results Review:  I reviewed the patient's new clinical results.  I reviewed the patient's new imaging results and agree with the interpretation.  I reviewed the patient's other test results and agree with the interpretation  I personally viewed and interpreted the patient's EKG/Telemetry data  Discussed with ED provider.    Lab Results (last 24 hours)     Procedure Component Value Units Date/Time    CBC & Differential [[id number]]  (Abnormal) Collected: 01/16/23 0042    Specimen: Blood Updated: 01/16/23 0057    Narrative:      The following orders were created for panel order CBC & Differential.  Procedure                               Abnormality         Status                      ---------                               -----------         ------                     CBC Auto Differential[[id number]]        Abnormal            Final result                 Please view results for these tests on the individual orders.    Comprehensive Metabolic Panel [[id number]]  (Abnormal) Collected: 01/16/23 0042    Specimen: Blood Updated: 01/16/23 0117     Glucose 110 mg/dL      BUN 48 mg/dL      Creatinine 6.36 mg/dL      Sodium 137 mmol/L      Potassium 4.9 mmol/L      Chloride 100 mmol/L      CO2 25.2 mmol/L      Calcium 9.0 mg/dL      Total Protein 6.2 g/dL      Albumin 3.9 g/dL      ALT (SGPT) <5 U/L      AST (SGOT) 13 U/L      Alkaline Phosphatase 125 U/L      Total Bilirubin 0.2 mg/dL      Globulin 2.3 gm/dL      A/G Ratio 1.7 g/dL      BUN/Creatinine Ratio 7.5     Anion Gap 11.8 mmol/L      eGFR 6.5 mL/min/1.73      Comment: <15 Indicative of kidney failure       Narrative:      GFR Normal >60  Chronic Kidney Disease <60  Kidney Failure <15    The GFR formula is only valid for adults with stable renal function between ages 18 and 70.    Protime-INR [[id number]]  (Abnormal) Collected: 01/16/23 0042    Specimen: Blood Updated: 01/16/23 0113     Protime 14.3 Seconds      INR 1.10    aPTT [[id number]]  (Normal) Collected: 01/16/23 0042    Specimen: Blood Updated: 01/16/23 0113     PTT 31.7 seconds     CBC Auto Differential [[id number]]  (Abnormal) Collected: 01/16/23 0042    Specimen: Blood Updated: 01/16/23 0057     WBC 2.03 10*3/mm3      RBC 3.45 10*6/mm3      Hemoglobin 10.2 g/dL      Hematocrit 31.8 %      MCV 92.2 fL      MCH 29.6 pg      MCHC 32.1 g/dL      RDW 14.3 %      RDW-SD 47.8 fl      MPV 9.8 fL      Platelets 139 10*3/mm3      Neutrophil % 41.9 %      Lymphocyte % 37.9 %      Monocyte % 12.8 %      Eosinophil % 5.9 %      Basophil % 1.5 %      Immature Grans % 0.0 %      Neutrophils, Absolute 0.85 10*3/mm3      Lymphocytes, Absolute 0.77 10*3/mm3      Monocytes, Absolute 0.26  10*3/mm3      Eosinophils, Absolute 0.12 10*3/mm3      Basophils, Absolute 0.03 10*3/mm3      Immature Grans, Absolute 0.00 10*3/mm3      nRBC 0.0 /100 WBC     Lactic Acid, Plasma [[id number]]  (Normal) Collected: 01/16/23 0042    Specimen: Blood Updated: 01/16/23 0116     Lactate 0.8 mmol/L           Imaging Results (Last 24 Hours)     Procedure Component Value Units Date/Time    CT Head Without Contrast [[id number]] Collected: 01/16/23 0116     Updated: 01/16/23 0148    Narrative:      CT HEAD AND CERVICAL SPINE     HISTORY: Head neck and arm pain. Pain started following installation of  the new dialysis fistula in the right arm three or four months ago.     TECHNIQUE: Noncontrast CT scans of the head and cervical spine with  multiplanar reformatted images are provided. No previous imaging of  these areas for correlation.     Radiation dose reduction techniques were utilized, including automated  exposure control and exposure modulation based on body size.     FINDINGS: Ventricles normal in caliber. Brain parenchyma, extra axial  spaces, and bones of the skull appear normal. Paranasal sinuses are  clear. Orbital structures appear normal.     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 Mild kyphosis  across the cervical spine. Nonsurgical osseous ankylosis from T2 through  T4, the caudad edge of the field-of-view. Tubal body height and  alignment are normal.     At C3-4, there is loss of disc height with facet arthropathy and  posterior disc bulge which indents the thecal sac and creates mild canal  stenosis. Mild osseous foraminal stenosis on the right.     At C4-5, there is loss of disc height with a posterior midline disc  protrusion indenting the thecal sac and causing moderate to severe  spinal canal stenosis. Mild to moderate osseous foraminal stenosis  bilaterally.     At C5-6, there is advanced degenerative change with bulky  enthesophyte  anteriorly. Posterior midline disc protrusion protrusion indents the  thecal sac and causes severe spinal canal stenosis. Moderate to severe  right osseous foraminal stenosis. Mild left osseous foraminal stenosis.     At C6-7, there is disc and osteophyte mildly indenting the thecal sac  and causing mild canal stenosis. The foramina are mildly stenotic on the  left and moderately stenotic on the right due to osteophyte.     At C7-T1, the disc is narrowed. The canal and foramina are patent.     At T1-2, there is disc narrowing but the canal and foramina are patent.     Visualized upper thoracic canal and foramina are patent.     Visualized lung apices are clear. No mass lesion or adenopathy  identified in the neck.       Impression:      1. Negative head CT.  2. Extensive degenerative change in the cervical spine with moderate to  severe multilevel spinal canal stenosis as discussed level by level  above.     This report was finalized on 1/16/2023 1:44 AM by Dr. Shaan Rivera M.D.       CT Cervical Spine Without Contrast [[id number]] Collected: 01/16/23 0116     Updated: 01/16/23 0148    Narrative:      CT HEAD AND CERVICAL SPINE     HISTORY: Head neck and arm pain. Pain started following installation of  the new dialysis fistula in the right arm three or four months ago.     TECHNIQUE: Noncontrast CT scans of the head and cervical spine with  multiplanar reformatted images are provided. No previous imaging of  these areas for correlation.     Radiation dose reduction techniques were utilized, including automated  exposure control and exposure modulation based on body size.     FINDINGS: Ventricles normal in caliber. Brain parenchyma, extra axial  spaces, and bones of the skull appear normal. Paranasal sinuses are  clear. Orbital structures appear normal.     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 Mild kyphosis  across  the cervical spine. Nonsurgical osseous ankylosis from T2 through  T4, the caudad edge of the field-of-view. Tubal body height and  alignment are normal.     At C3-4, there is loss of disc height with facet arthropathy and  posterior disc bulge which indents the thecal sac and creates mild canal  stenosis. Mild osseous foraminal stenosis on the right.     At C4-5, there is loss of disc height with a posterior midline disc  protrusion indenting the thecal sac and causing moderate to severe  spinal canal stenosis. Mild to moderate osseous foraminal stenosis  bilaterally.     At C5-6, there is advanced degenerative change with bulky enthesophyte  anteriorly. Posterior midline disc protrusion protrusion indents the  thecal sac and causes severe spinal canal stenosis. Moderate to severe  right osseous foraminal stenosis. Mild left osseous foraminal stenosis.     At C6-7, there is disc and osteophyte mildly indenting the thecal sac  and causing mild canal stenosis. The foramina are mildly stenotic on the  left and moderately stenotic on the right due to osteophyte.     At C7-T1, the disc is narrowed. The canal and foramina are patent.     At T1-2, there is disc narrowing but the canal and foramina are patent.     Visualized upper thoracic canal and foramina are patent.     Visualized lung apices are clear. No mass lesion or adenopathy  identified in the neck.       Impression:      1. Negative head CT.  2. Extensive degenerative change in the cervical spine with moderate to  severe multilevel spinal canal stenosis as discussed level by level  above.     This report was finalized on 1/16/2023 1:44 AM by Dr. Shaan Rivera M.D.                 ECG 12 Lead Bradycardia   Preliminary Result   HEART RATE= 42  bpm   RR Interval= 1429  ms   DE Interval= 178  ms   P Horizontal Axis= 39  deg   P Front Axis= 56  deg   QRSD Interval= 141  ms   QT Interval= 561  ms   QRS Axis= -64  deg   T Wave Axis= 50  deg   - ABNORMAL ECG -    Sinus bradycardia   Nonspecific IVCD with LAD   Left ventricular hypertrophy   Anterolateral infarct, age indeterminate   Electronically Signed By:    Date and Time of Study: 2023-01-16 05:02:55      ECG 12 Lead Bradycardia   Preliminary Result   HEART RATE= 42  bpm   RR Interval= 1429  ms   MS Interval= 176  ms   P Horizontal Axis=   deg   P Front Axis= 46  deg   QRSD Interval= 141  ms   QT Interval= 550  ms   QRS Axis= -57  deg   T Wave Axis= 53  deg   - ABNORMAL ECG -   Sinus bradycardia   Probable left atrial enlargement   Nonspecific IVCD with LAD   Left ventricular hypertrophy   Anterolateral infarct, age indeterminate   Electronically Signed By:    Date and Time of Study: 2023-01-16 00:19:48           Assessment/Plan     Active Hospital Problems    Diagnosis  POA   • **Right arm pain [M79.601]  Unknown   • Cervical radiculopathy [M54.12]  Yes   • ESRD on dialysis ([facility]) [N18.6, Z99.2]  Not Applicable   • Bradycardia [R00.1]  Unknown   • ANGIE (obstructive sleep apnea) [G47.33]  Unknown   • Chronic diastolic CHF (congestive heart failure) ([facility]) [I50.32]  Unknown   • Aortic stenosis [I35.0]  Unknown   • HTN (hypertension) [I10]  Yes   • Anemia, chronic disease [D63.8]  Yes       Right Arm Pain  -Arm pain related to cervical radiculopathy versus vascular cause  -Moderate to severe cervical canal stenosis on CT  -Neurosurgery consult  -S/p fistula placement, consult vascular surgery  -PRN analgesia  -PT consult    Bradycardia  -Asymptomatic  -Cardiology consult  -Repeat EKG in AM    ESRD on Dialysis  -Dialysis M, W, F  -Creatinine at baseline, around 6  -Nephrology consult  -Monitor renal function daily    Chronic Diastolic CHF  -She does not appear volume overloaded on exam  -Continue home diuretic  -Daily weightS  -Strict I&O  -2g sodium diet    Hypertension  -She has been hypertensive, most likely secondary to pain. Continue home regimen  -Monitor    Anemia  -Most likely secondary to CKD  -Hgb lower than  baseline, around 11  -Check iron panel  -Repeat CBC in AM    ANGIE  -She did not bring a home CPAP  -Continuous pulse oximetry  -Supplemental oxygen as needed to keep sats greater than or equal to 92%    -I discussed the patients findings and my recommendations with patient.    VTE Prophylaxis - SCDs.  Code Status - Full code.       KANDIS Alanis  [facility]  01/16/23  05:13 EST

## 2023-01-16 NOTE — CONSULTS
[facility], operated by [facility] NEUROSURGERY CONSULT NOTE    Patient Name: Sonia Acharya  Referring Provider: KANDIS Giles  Reason for Consultation: pain in right upper extremity    Patient Care Team:  Marcy Paez PA as PCP - General (Physician Assistant)    Chief Complaint: pain in right arm    History of Present Illness:  Patient is a 72 y.o. right handed female with PMH of ESRD on HD and HTN who presented to the emergency department complaining of right arm pain.  The history is being obtained by the patient and by review of the medical chart.  The patient states that this arm pain began after she had a right AV fistula placed in October 2022.  She denies any history of neck pain or right upper extremity pain prior to having her fistula placed.  She states that she has had intermittent pain and numbness in her arm since that time.  The numbness begins at the site of the fistula and radiates into the right upper extremity into the dorsal aspect of the right hand.  It affects all fingers except for the little digit.  She also complains of pain in the right trapezius muscle that radiates to the base of her skull.  She denies any midline cervical pain or pain/numbness in the left upper extremity.  Her symptoms are increased after her AV fistula was used.    Medical Record Review:  Reviewed in EPIC.     Review of Systems:  14 point review of systems is negative except for those listed in the HPI    Past Medical History:  Past Medical History:   Diagnosis Date   • Arthritis    • Chronic kidney disease    • Heart disease    • Hypertension        Past Surgical History:  Past Surgical History:   Procedure Laterality Date   • BREAST SURGERY     • DIALYSIS FISTULA CREATION     • EYE SURGERY     • HERNIA REPAIR  2017    Dr. Sung     Family History:  Family History   Problem Relation Age of Onset   • Heart disease Father    • Breast cancer Sister      Social History:  Social History     Tobacco Use   • Smoking status: Never   •  Smokeless tobacco: Never   Substance Use Topics   • Alcohol use: No   • Drug use: No     Allergies:  Patient has no known allergies.    Home Medications:  Prior to Admission medications    Medication Sig Start Date End Date Taking? Authorizing Provider   atorvastatin (LIPITOR) 40 MG tablet Take 40 mg by mouth Daily.   Yes Manish Tracy MD   gabapentin (NEURONTIN) 100 MG capsule Take 1 capsule by mouth 3 (Three) Times a Day for 3 days.  Patient taking differently: Take 100 mg by mouth 3 (Three) Times a Day As Needed. 12/14/21 1/16/23 Yes Rene Beatty APRN   hydrALAZINE (APRESOLINE) 25 MG tablet Take 100 mg by mouth 3 (Three) Times a Day.   Yes Manish Tracy MD   isosorbide dinitrate (ISORDIL) 30 MG tablet Take 30 mg by mouth Daily.   Yes Manish Tracy MD   losartan (COZAAR) 50 MG tablet Take 50 mg by mouth Daily. Take 1 and a half pills daily.   Yes Manish Tracy MD   polyethylene glycol (polyethylene glycol) 17 g packet Take 17 g by mouth Daily. 12/15/21  Yes Rene Beatty APRN   acetaminophen-codeine (TYLENOL #3) 300-30 MG per tablet Take 1 tablet by mouth Daily As Needed for Moderate Pain .  1/16/23  Manish Tracy MD   NIFEdipine XL (PROCARDIA XL) 90 MG 24 hr tablet Take 90 mg by mouth Daily.  1/16/23  Manish Tracy MD       Results Review:  Labs:  Recent Results (from the past 24 hour(s))   ECG 12 Lead Bradycardia    Collection Time: 01/16/23 12:19 AM   Result Value Ref Range    QT Interval 550 ms   Comprehensive Metabolic Panel    Collection Time: 01/16/23 12:42 AM    Specimen: Blood   Result Value Ref Range    Glucose 110 (H) 65 - 99 mg/dL    BUN 48 (H) 8 - 23 mg/dL    Creatinine 6.36 (H) 0.57 - 1.00 mg/dL    Sodium 137 136 - 145 mmol/L    Potassium 4.9 3.5 - 5.2 mmol/L    Chloride 100 98 - 107 mmol/L    CO2 25.2 22.0 - 29.0 mmol/L    Calcium 9.0 8.6 - 10.5 mg/dL    Total Protein 6.2 6.0 - 8.5 g/dL    Albumin 3.9 3.5 - 5.2 g/dL    ALT (SGPT) <5 1 - 33 U/L     AST (SGOT) 13 1 - 32 U/L    Alkaline Phosphatase 125 (H) 39 - 117 U/L    Total Bilirubin 0.2 0.0 - 1.2 mg/dL    Globulin 2.3 gm/dL    A/G Ratio 1.7 g/dL    BUN/Creatinine Ratio 7.5 7.0 - 25.0    Anion Gap 11.8 5.0 - 15.0 mmol/L    eGFR 6.5 (L) >60.0 mL/min/1.73   Protime-INR    Collection Time: 01/16/23 12:42 AM    Specimen: Blood   Result Value Ref Range    Protime 14.3 (H) 11.7 - 14.2 Seconds    INR 1.10 0.90 - 1.10   aPTT    Collection Time: 01/16/23 12:42 AM    Specimen: Blood   Result Value Ref Range    PTT 31.7 22.7 - 35.4 seconds   CBC Auto Differential    Collection Time: 01/16/23 12:42 AM    Specimen: Blood   Result Value Ref Range    WBC 2.03 (L) 3.40 - 10.80 10*3/mm3    RBC 3.45 (L) 3.77 - 5.28 10*6/mm3    Hemoglobin 10.2 (L) 12.0 - 15.9 g/dL    Hematocrit 31.8 (L) 34.0 - 46.6 %    MCV 92.2 79.0 - 97.0 fL    MCH 29.6 26.6 - 33.0 pg    MCHC 32.1 31.5 - 35.7 g/dL    RDW 14.3 12.3 - 15.4 %    RDW-SD 47.8 37.0 - 54.0 fl    MPV 9.8 6.0 - 12.0 fL    Platelets 139 (L) 140 - 450 10*3/mm3    Neutrophil % 41.9 (L) 42.7 - 76.0 %    Lymphocyte % 37.9 19.6 - 45.3 %    Monocyte % 12.8 (H) 5.0 - 12.0 %    Eosinophil % 5.9 0.3 - 6.2 %    Basophil % 1.5 0.0 - 1.5 %    Immature Grans % 0.0 0.0 - 0.5 %    Neutrophils, Absolute 0.85 (L) 1.70 - 7.00 10*3/mm3    Lymphocytes, Absolute 0.77 0.70 - 3.10 10*3/mm3    Monocytes, Absolute 0.26 0.10 - 0.90 10*3/mm3    Eosinophils, Absolute 0.12 0.00 - 0.40 10*3/mm3    Basophils, Absolute 0.03 0.00 - 0.20 10*3/mm3    Immature Grans, Absolute 0.00 0.00 - 0.05 10*3/mm3    nRBC 0.0 0.0 - 0.2 /100 WBC   Lactic Acid, Plasma    Collection Time: 01/16/23 12:42 AM    Specimen: Blood   Result Value Ref Range    Lactate 0.8 0.5 - 2.0 mmol/L   ECG 12 Lead Bradycardia    Collection Time: 01/16/23  5:02 AM   Result Value Ref Range    QT Interval 561 ms   Basic Metabolic Panel    Collection Time: 01/16/23  7:58 AM    Specimen: Blood   Result Value Ref Range    Glucose 73 65 - 99 mg/dL    BUN 50  (H) 8 - 23 mg/dL    Creatinine 6.35 (H) 0.57 - 1.00 mg/dL    Sodium 135 (L) 136 - 145 mmol/L    Potassium 5.5 (H) 3.5 - 5.2 mmol/L    Chloride 100 98 - 107 mmol/L    CO2 25.2 22.0 - 29.0 mmol/L    Calcium 8.6 8.6 - 10.5 mg/dL    BUN/Creatinine Ratio 7.9 7.0 - 25.0    Anion Gap 9.8 5.0 - 15.0 mmol/L    eGFR 6.5 (L) >60.0 mL/min/1.73   CBC (No Diff)    Collection Time: 01/16/23  7:58 AM    Specimen: Blood   Result Value Ref Range    WBC 2.03 (L) 3.40 - 10.80 10*3/mm3    RBC 3.60 (L) 3.77 - 5.28 10*6/mm3    Hemoglobin 10.5 (L) 12.0 - 15.9 g/dL    Hematocrit 31.3 (L) 34.0 - 46.6 %    MCV 86.9 79.0 - 97.0 fL    MCH 29.2 26.6 - 33.0 pg    MCHC 33.5 31.5 - 35.7 g/dL    RDW 13.9 12.3 - 15.4 %    RDW-SD 43.6 37.0 - 54.0 fl    MPV 9.7 6.0 - 12.0 fL    Platelets 144 140 - 450 10*3/mm3   Iron Profile    Collection Time: 01/16/23  7:58 AM    Specimen: Blood   Result Value Ref Range    Iron 73 37 - 145 mcg/dL    Iron Saturation 47 20 - 50 %    Transferrin 104 (L) 200 - 360 mg/dL    TIBC 155 (L) 298 - 536 mcg/dL       Radiology:  CT Head Without Contrast    Result Date: 1/16/2023  1. Negative head CT. 2. Extensive degenerative change in the cervical spine with moderate to severe multilevel spinal canal stenosis as discussed level by level above.  This report was finalized on 1/16/2023 1:44 AM by Dr. Shaan Rivera M.D.      CT Cervical Spine Without Contrast    Result Date: 1/16/2023  1. Negative head CT. 2. Extensive degenerative change in the cervical spine with moderate to severe multilevel spinal canal stenosis as discussed level by level above.  This report was finalized on 1/16/2023 1:44 AM by Dr. Shaan Rivera M.D.      Results Review:   I reviewed the patient's new clinical results.  I personally viewed and interpreted the patient's cervical CT.    Vital Signs:   Temp:  [97.4 °F (36.3 °C)-97.8 °F (36.6 °C)] 97.8 °F (36.6 °C)  Heart Rate:  [40-60] 40  Resp:  [16] 16  BP: (169-191)/(63-71) 191/71    Physical Exam:  Mental  Status: The patient is awake, alert and oriented x 3. Recent and remote memory functions are normal. The patient is attentive with normal concentration. Language is fluent. Speech is clear. The speech is nondysarthric. Fund of knowledge is normal.  Cranial Nerve I: Not tested.  Cranial Nerve II: The pupils are 3 mm, equally round and reactive to light.   Cranial Nerves III, IV, VI: The extraocular movements are full in all directions of gaze without nystagmus.  Cranial Nerve VII: Facial movements are symmetric  Motor: Tone is normal in all four extremities without fasciculations, atrophy, or myoclonus. There are no involuntary movements.   Muscle Strength: 5/5 muscle strength in bilateral upper and bilateral lower extremities.   Sensory: The sensory examination is normal for light touch bilaterally and symmetrically.  Reflexes: 2+left and 1+ right biceps DTRs. 1+ bilateral brachioradialis DTRs.  Negative beth's.  No clonus.      Right arm pain    Anemia, chronic disease    HTN (hypertension)    Cervical radiculopathy    ESRD on dialysis ([facility])    Bradycardia    ANGIE (obstructive sleep apnea)    Chronic diastolic CHF (congestive heart failure) ([facility])    Aortic stenosis    The patient is a 72-year-old female complaining of pain and numbness in the right arm, both of which started after having an AV fistula placed for hemodialysis.  The patient describes the numbness in the right upper extremity which is in the radial nerve distribution.  She has no weakness from the radial nerve on examination.  She has significant muscle spasms throughout the right trapezius muscle, which I believe is the main source of her neck pain.  She does not have any pain or numbness in a cervical dermatomal distribution, which would be consistent with cervical radiculopathy.  She has no midline cervical pain.    PLAN:   -No neurosurgical intervention is recommended  -Recommend trying muscle relaxer for the pain throughout the trapezius  "muscle  -If the patient's right upper extremity numbness persists, then she should be considered for an outpatient nerve conduction velocity study and neurology follow-up    I discussed the patient's findings and my recommendations with patient and KELLY Gandara  01/16/23  09:06 EST    \"Dictated utilizing Dragon dictation\".      "

## 2023-01-16 NOTE — ED PROVIDER NOTES
EMERGENCY DEPARTMENT ENCOUNTER    Room Number:  P576/1  Date seen:  1/16/2023  PCP: Marcy Paez PA  Historian: Patient      HPI:  Chief Complaint: Right arm pain  A complete HPI/ROS/PMH/PSH/SH/FH are unobtainable due to: None  Context: Sonia Acharya is a 72 y.o. female who presents to the ED c/o right arm pain that was gradual in onset but has essentially been present for the past 4 to 5 months.  She reports no real changes tonight but she does not feel like she can tolerate the pain any longer.  She has seen her vascular surgeon who put in a fistula of the right upper extremity.  She reports that they think that it is neuropathic pain.  She does take gabapentin which does somewhat ease the pain but only briefly.  She describes the pain as a sharp pain radiating from the right shoulder down her right arm.  She denies fever/chills, chest pain, shortness of breath, heart palpitations, or nausea and vomiting.  She does report that the pain is made significantly worse by movement of that right upper extremity.          PAST MEDICAL HISTORY  Active Ambulatory Problems     Diagnosis Date Noted   • Generalized abdominal pain 12/13/2021   • ODALIS (acute kidney injury) (HCC) 12/13/2021   • Anemia, chronic disease 12/13/2021   • Metabolic acidosis, increased anion gap 12/13/2021   • Obesity (BMI 30-39.9) 12/13/2021   • HTN (hypertension) 12/13/2021   • Chest pain, atypical 12/13/2021     Resolved Ambulatory Problems     Diagnosis Date Noted   • No Resolved Ambulatory Problems     Past Medical History:   Diagnosis Date   • Arthritis    • Chronic kidney disease    • Heart disease    • Hypertension          PAST SURGICAL HISTORY  Past Surgical History:   Procedure Laterality Date   • BREAST SURGERY     • DIALYSIS FISTULA CREATION     • EYE SURGERY     • HERNIA REPAIR  2017    Dr. Snug         FAMILY HISTORY  Family History   Problem Relation Age of Onset   • Heart disease Father    • Breast cancer Sister          SOCIAL  HISTORY  Social History     Socioeconomic History   • Marital status: Single   Tobacco Use   • Smoking status: Never   • Smokeless tobacco: Never   Substance and Sexual Activity   • Alcohol use: No   • Drug use: No   • Sexual activity: Defer         ALLERGIES  Patient has no known allergies.        REVIEW OF SYSTEMS  Review of Systems   Constitutional: Negative for fever.   HENT: Negative for sore throat.    Eyes: Negative.    Respiratory: Negative for cough and shortness of breath.    Cardiovascular: Negative for chest pain.   Gastrointestinal: Negative for abdominal pain, diarrhea and vomiting.   Genitourinary: Negative for dysuria.   Musculoskeletal: Negative for neck pain.        Right arm pain   Skin: Negative for rash.   Allergic/Immunologic: Negative.    Neurological: Negative for weakness, numbness and headaches.   Hematological: Negative.    Psychiatric/Behavioral: Negative.    All other systems reviewed and are negative.         PHYSICAL EXAM  ED Triage Vitals [01/16/23 0006]   Temp Heart Rate Resp BP SpO2   97.4 °F (36.3 °C) (!) 40 16 173/69 100 %      Temp src Heart Rate Source Patient Position BP Location FiO2 (%)   -- -- -- -- --       Physical Exam  Vitals and nursing note reviewed.   Constitutional:       General: She is not in acute distress.  HENT:      Head: Normocephalic and atraumatic.   Eyes:      Pupils: Pupils are equal, round, and reactive to light.   Cardiovascular:      Rate and Rhythm: Normal rate and regular rhythm.      Heart sounds: Normal heart sounds.   Pulmonary:      Effort: Pulmonary effort is normal. No respiratory distress.      Breath sounds: Normal breath sounds.   Abdominal:      Palpations: Abdomen is soft.      Tenderness: There is no abdominal tenderness. There is no guarding or rebound.   Musculoskeletal:         General: Tenderness present. No swelling, deformity or signs of injury.      Cervical back: Normal range of motion and neck supple.      Comments: Increased pain  with abduction of the right upper extremity.  Compartments soft and pulses palpable   Skin:     General: Skin is warm and dry.      Findings: No rash.   Neurological:      Mental Status: She is alert and oriented to person, place, and time.      Sensory: Sensation is intact.   Psychiatric:         Mood and Affect: Mood and affect normal.         Vital signs and nursing notes reviewed.          LAB RESULTS  Recent Results (from the past 24 hour(s))   ECG 12 Lead Bradycardia    Collection Time: 01/16/23 12:19 AM   Result Value Ref Range    QT Interval 550 ms   Comprehensive Metabolic Panel    Collection Time: 01/16/23 12:42 AM    Specimen: Blood   Result Value Ref Range    Glucose 110 (H) 65 - 99 mg/dL    BUN 48 (H) 8 - 23 mg/dL    Creatinine 6.36 (H) 0.57 - 1.00 mg/dL    Sodium 137 136 - 145 mmol/L    Potassium 4.9 3.5 - 5.2 mmol/L    Chloride 100 98 - 107 mmol/L    CO2 25.2 22.0 - 29.0 mmol/L    Calcium 9.0 8.6 - 10.5 mg/dL    Total Protein 6.2 6.0 - 8.5 g/dL    Albumin 3.9 3.5 - 5.2 g/dL    ALT (SGPT) <5 1 - 33 U/L    AST (SGOT) 13 1 - 32 U/L    Alkaline Phosphatase 125 (H) 39 - 117 U/L    Total Bilirubin 0.2 0.0 - 1.2 mg/dL    Globulin 2.3 gm/dL    A/G Ratio 1.7 g/dL    BUN/Creatinine Ratio 7.5 7.0 - 25.0    Anion Gap 11.8 5.0 - 15.0 mmol/L    eGFR 6.5 (L) >60.0 mL/min/1.73   Protime-INR    Collection Time: 01/16/23 12:42 AM    Specimen: Blood   Result Value Ref Range    Protime 14.3 (H) 11.7 - 14.2 Seconds    INR 1.10 0.90 - 1.10   aPTT    Collection Time: 01/16/23 12:42 AM    Specimen: Blood   Result Value Ref Range    PTT 31.7 22.7 - 35.4 seconds   CBC Auto Differential    Collection Time: 01/16/23 12:42 AM    Specimen: Blood   Result Value Ref Range    WBC 2.03 (L) 3.40 - 10.80 10*3/mm3    RBC 3.45 (L) 3.77 - 5.28 10*6/mm3    Hemoglobin 10.2 (L) 12.0 - 15.9 g/dL    Hematocrit 31.8 (L) 34.0 - 46.6 %    MCV 92.2 79.0 - 97.0 fL    MCH 29.6 26.6 - 33.0 pg    MCHC 32.1 31.5 - 35.7 g/dL    RDW 14.3 12.3 - 15.4 %     RDW-SD 47.8 37.0 - 54.0 fl    MPV 9.8 6.0 - 12.0 fL    Platelets 139 (L) 140 - 450 10*3/mm3    Neutrophil % 41.9 (L) 42.7 - 76.0 %    Lymphocyte % 37.9 19.6 - 45.3 %    Monocyte % 12.8 (H) 5.0 - 12.0 %    Eosinophil % 5.9 0.3 - 6.2 %    Basophil % 1.5 0.0 - 1.5 %    Immature Grans % 0.0 0.0 - 0.5 %    Neutrophils, Absolute 0.85 (L) 1.70 - 7.00 10*3/mm3    Lymphocytes, Absolute 0.77 0.70 - 3.10 10*3/mm3    Monocytes, Absolute 0.26 0.10 - 0.90 10*3/mm3    Eosinophils, Absolute 0.12 0.00 - 0.40 10*3/mm3    Basophils, Absolute 0.03 0.00 - 0.20 10*3/mm3    Immature Grans, Absolute 0.00 0.00 - 0.05 10*3/mm3    nRBC 0.0 0.0 - 0.2 /100 WBC   Lactic Acid, Plasma    Collection Time: 01/16/23 12:42 AM    Specimen: Blood   Result Value Ref Range    Lactate 0.8 0.5 - 2.0 mmol/L   ECG 12 Lead Bradycardia    Collection Time: 01/16/23  5:02 AM   Result Value Ref Range    QT Interval 561 ms       Ordered the above labs and reviewed the results.        RADIOLOGY  CT Head Without Contrast, CT Cervical Spine Without Contrast    Result Date: 1/16/2023  CT HEAD AND CERVICAL SPINE  HISTORY: Head neck and arm pain. Pain started following installation of the new dialysis fistula in the right arm three or four months ago.  TECHNIQUE: Noncontrast CT scans of the head and cervical spine with multiplanar reformatted images are provided. No previous imaging of these areas for correlation.  Radiation dose reduction techniques were utilized, including automated exposure control and exposure modulation based on body size.  FINDINGS: Ventricles normal in caliber. Brain parenchyma, extra axial spaces, and bones of the skull appear normal. Paranasal sinuses are clear. Orbital structures appear normal.  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 Mild kyphosis across the cervical spine. Nonsurgical osseous ankylosis from T2 through T4, the caudad edge of the field-of-view.  Tubal body height and alignment are normal.  At C3-4, there is loss of disc height with facet arthropathy and posterior disc bulge which indents the thecal sac and creates mild canal stenosis. Mild osseous foraminal stenosis on the right.  At C4-5, there is loss of disc height with a posterior midline disc protrusion indenting the thecal sac and causing moderate to severe spinal canal stenosis. Mild to moderate osseous foraminal stenosis bilaterally.  At C5-6, there is advanced degenerative change with bulky enthesophyte anteriorly. Posterior midline disc protrusion protrusion indents the thecal sac and causes severe spinal canal stenosis. Moderate to severe right osseous foraminal stenosis. Mild left osseous foraminal stenosis.  At C6-7, there is disc and osteophyte mildly indenting the thecal sac and causing mild canal stenosis. The foramina are mildly stenotic on the left and moderately stenotic on the right due to osteophyte.  At C7-T1, the disc is narrowed. The canal and foramina are patent.  At T1-2, there is disc narrowing but the canal and foramina are patent.  Visualized upper thoracic canal and foramina are patent.  Visualized lung apices are clear. No mass lesion or adenopathy identified in the neck.      1. Negative head CT. 2. Extensive degenerative change in the cervical spine with moderate to severe multilevel spinal canal stenosis as discussed level by level above.  This report was finalized on 1/16/2023 1:44 AM by Dr. Shaan Rivera M.D.        Ordered the above noted radiological studies. Reviewed by me in PACS.            PROCEDURES  Procedures  EKG          EKG time: 0019  Rhythm/Rate: sinus bradycardia, 42  P waves and NJ: nml  QRS, axis: widened, LAD   ST and T waves: Normal ST segments, nonspecific T wave abnormalities    Interpreted Contemporaneously by me, independently viewed  changed compared to prior 12/13/21            MEDICATIONS GIVEN IN ER  Medications   sodium chloride 0.9 % flush  10 mL (has no administration in time range)   sodium chloride 0.9 % flush 10 mL (has no administration in time range)   sodium chloride 0.9 % flush 10 mL (has no administration in time range)   sodium chloride 0.9 % infusion 40 mL (has no administration in time range)   nitroglycerin (NITROSTAT) SL tablet 0.4 mg (has no administration in time range)   acetaminophen (TYLENOL) tablet 650 mg (has no administration in time range)     Or   acetaminophen (TYLENOL) 160 MG/5ML solution 650 mg (has no administration in time range)     Or   acetaminophen (TYLENOL) suppository 650 mg (has no administration in time range)   ondansetron (ZOFRAN) tablet 4 mg (has no administration in time range)     Or   ondansetron (ZOFRAN) injection 4 mg (has no administration in time range)   calcium carbonate (TUMS) chewable tablet 500 mg (200 mg elemental) (has no administration in time range)   HYDROcodone-acetaminophen (NORCO) 5-325 MG per tablet 1 tablet (has no administration in time range)   atorvastatin (LIPITOR) tablet 40 mg (has no administration in time range)   gabapentin (NEURONTIN) capsule 100 mg (has no administration in time range)   hydrALAZINE (APRESOLINE) tablet 100 mg (has no administration in time range)   isosorbide dinitrate (ISORDIL) tablet 30 mg (has no administration in time range)   losartan (COZAAR) tablet 50 mg (has no administration in time range)   polyethylene glycol (MIRALAX) packet 17 g (has no administration in time range)   ketorolac (TORADOL) injection 15 mg (15 mg Intravenous Given 1/16/23 0105)   HYDROmorphone (DILAUDID) injection 0.5 mg (0.5 mg Intravenous Given 1/16/23 0107)   ondansetron (ZOFRAN) injection 4 mg (4 mg Intravenous Given 1/16/23 0102)                 MEDICAL DECISION MAKING, PROGRESS, and CONSULTS    All labs have been independently reviewed by me.  All radiology studies have been reviewed by me and I have also reviewed the radiology report.   EKG's independently viewed and interpreted by  me.  Discussion below represents my analysis of pertinent findings related to patient's condition, differential diagnosis, treatment plan and final disposition.      Additional sources:  - Discussed/ obtained information from independent historians: Patient only at bedside    - External (non-ED) record review: Upon medical records review, the patient was last seen and evaluated by her vascular surgeon on 1/9/2023 where she did complain of right arm and shoulder pain that they felt was most likely neuropathic in nature.    - Chronic or social conditions impacting care: Dialysis dependent end-stage renal disease    - Shared decision making: Decision for admission based on conversations between myself as well as the patient at bedside.  Case was discussed with KANDIS Giles for [facility], who does agree to admit the patient to the hospital for Dr. De Los Santos.      Orders placed during this visit:  Orders Placed This Encounter   Procedures   • CT Head Without Contrast   • CT Cervical Spine Without Contrast   • Comprehensive Metabolic Panel   • Protime-INR   • aPTT   • CBC Auto Differential   • Lactic Acid, Plasma   • Potassium   • Magnesium   • Troponin   • Blood Gas, Arterial -   • Basic Metabolic Panel   • CBC (No Diff)   • Iron Profile   • Diet: Renal Diets; Low Sodium (2-3g), Low Potassium, Low Phosphorus; Texture: Regular Texture (IDDSI 7); Fluid Consistency: Thin (IDDSI 0)   • Vital Signs   • Intake & Output   • Weigh Patient   • Oral Care   • Place Sequential Compression Device   • Maintain Sequential Compression Device   • Telemetry - Maintain IV Access   • Continuous Cardiac Monitoring   • Telemetry - Pulse Oximetry   • May Be Off Telemetry for Tests   • Notify Provider if ACLS Protocol Activated   • Daily Weights   • Strict Intake & Output   • Code Status and Medical Interventions:   • [facility] (on-call MD unless specified) Details   • Inpatient Neurosurgery Consult   • Inpatient Cardiology Consult   • Inpatient  Nephrology Consult   • Inpatient Vascular Surgery Consult   • Oxygen Therapy- Nasal Cannula; Titrate for SPO2: 90% - 95%   • Oxygen Therapy- Nasal Cannula; Titrate for SPO2: 90% - 95%   • ECG 12 Lead Bradycardia   • ECG 12 Lead Other; Acute Chest Pain or Dysrhythmia   • ECG 12 Lead Bradycardia   • Insert Peripheral IV   • Insert Peripheral IV   • Inpatient Admission   • CBC & Differential           Differential diagnosis:    Differential diagnosis includes but is not limited to cervical radiculopathy, neuropathic type pain, musculoskeletal right arm pain, sepsis, shunt thrombosis or infection, or pulseless limb.      Independent interpretation of labs, radiology studies, and discussions with consultants:    CT scan of the head/cervical spine both independently viewed by myself.  CT scan of the head shows no acute findings.  CT scan of the cervical spine does show spinal stenosis with multilevel degenerative changes.  EKG also viewed independently by myself showing a sinus bradycardia.      ED Course as of 01/16/23 0557   Mon Jan 16, 2023   0155 On reevaluation, the patient does report that her pain has significantly improved however her heart rate remains significantly low at 42.  Informed her that we would admit her to the hospital for monitoring and management of her bradycardia as well as treatment plan for her spinal stenosis seen on CT scan.  The patient is in agreement with the plan and all questions have been answered. [BM]   0221 The patient's presentation, ED work-up findings, as well as treatment plan were discussed with KANDIS Giles for [facility], who agrees to admit the patient to the hospital today for Dr. De Los Santos. [BM]      ED Course User Index  [BM] James Keenan MD         The patient was wearing a facemask upon entrance into the room and remained in such throughout their visit.  I was wearing PPE including a facemask, eye protection, as well as gloves at any point entering the room and  throughout the visit      DIAGNOSIS  Final diagnoses:   Cervical radiculopathy   Right arm pain   ESRD (end stage renal disease) on dialysis (HCC)   Chronic anemia   Sinus bradycardia         DISPOSITION  ADMISSION    Discussed treatment plan and reason for admission with pt/family and admitting physician.  Pt/family voiced understanding of the plan for admission for further testing/treatment as needed.                 Latest Documented Vital Signs:  As of 05:57 EST  BP- (!) 183/68 HR- 60 Temp- 97.8 °F (36.6 °C) (Oral) O2 sat- 92%              --    Please note that portions of this were completed with a voice recognition program.       Note Disclaimer: At [facility], we believe that sharing information builds trust and better relationships. You are receiving this note because you are receiving care at [facility] or recently visited. It is possible you will see health information before a provider has talked with you about it. This kind of information can be easy to misunderstand. To help you fully understand what it means for your health, we urge you to discuss this note with your provider.           James Keenan MD  01/16/23 9101

## 2023-01-16 NOTE — PLAN OF CARE
Goal Outcome Evaluation:  Plan of Care Reviewed With: patient        Progress: no change  Outcome Evaluation: New admit from ER with pain in Right arm since AVF placed, HR 40s, cardio/nephro/neurosurg/vascular consulted, Norco PRN, right AVF, left tunnel cath, head CT neg, dayshift RN to call pharmacy to verify home meds, will continue to monitor

## 2023-01-17 ENCOUNTER — APPOINTMENT (OUTPATIENT)
Dept: OTHER | Facility: HOSPITAL | Age: 73
End: 2023-01-17
Payer: MEDICARE

## 2023-01-17 ENCOUNTER — APPOINTMENT (OUTPATIENT)
Dept: MRI IMAGING | Facility: HOSPITAL | Age: 73
End: 2023-01-17
Payer: MEDICARE

## 2023-01-17 LAB
ALBUMIN SERPL-MCNC: 3.8 G/DL (ref 3.5–5.2)
ANION GAP SERPL CALCULATED.3IONS-SCNC: 8.1 MMOL/L (ref 5–15)
BASOPHILS # BLD AUTO: 0.02 10*3/MM3 (ref 0–0.2)
BASOPHILS NFR BLD AUTO: 1 % (ref 0–1.5)
BUN SERPL-MCNC: 29 MG/DL (ref 8–23)
BUN/CREAT SERPL: 7.3 (ref 7–25)
CALCIUM SPEC-SCNC: 8.8 MG/DL (ref 8.6–10.5)
CHLORIDE SERPL-SCNC: 98 MMOL/L (ref 98–107)
CO2 SERPL-SCNC: 27.9 MMOL/L (ref 22–29)
CREAT SERPL-MCNC: 3.97 MG/DL (ref 0.57–1)
DEPRECATED RDW RBC AUTO: 44.9 FL (ref 37–54)
EGFRCR SERPLBLD CKD-EPI 2021: 11.4 ML/MIN/1.73
EOSINOPHIL # BLD AUTO: 0.13 10*3/MM3 (ref 0–0.4)
EOSINOPHIL NFR BLD AUTO: 6.4 % (ref 0.3–6.2)
ERYTHROCYTE [DISTWIDTH] IN BLOOD BY AUTOMATED COUNT: 13.5 % (ref 12.3–15.4)
GLUCOSE BLDC GLUCOMTR-MCNC: 117 MG/DL (ref 70–130)
GLUCOSE SERPL-MCNC: 112 MG/DL (ref 65–99)
HCT VFR BLD AUTO: 33.8 % (ref 34–46.6)
HGB BLD-MCNC: 10.7 G/DL (ref 12–15.9)
IMM GRANULOCYTES # BLD AUTO: 0 10*3/MM3 (ref 0–0.05)
IMM GRANULOCYTES NFR BLD AUTO: 0 % (ref 0–0.5)
LYMPHOCYTES # BLD AUTO: 0.8 10*3/MM3 (ref 0.7–3.1)
LYMPHOCYTES NFR BLD AUTO: 39.4 % (ref 19.6–45.3)
MCH RBC QN AUTO: 29 PG (ref 26.6–33)
MCHC RBC AUTO-ENTMCNC: 31.7 G/DL (ref 31.5–35.7)
MCV RBC AUTO: 91.6 FL (ref 79–97)
MONOCYTES # BLD AUTO: 0.31 10*3/MM3 (ref 0.1–0.9)
MONOCYTES NFR BLD AUTO: 15.3 % (ref 5–12)
NEUTROPHILS NFR BLD AUTO: 0.77 10*3/MM3 (ref 1.7–7)
NEUTROPHILS NFR BLD AUTO: 37.9 % (ref 42.7–76)
NRBC BLD AUTO-RTO: 0 /100 WBC (ref 0–0.2)
PHOSPHATE SERPL-MCNC: 4.2 MG/DL (ref 2.5–4.5)
PLATELET # BLD AUTO: 123 10*3/MM3 (ref 140–450)
PMV BLD AUTO: 10.1 FL (ref 6–12)
POTASSIUM SERPL-SCNC: 4.5 MMOL/L (ref 3.5–5.2)
RBC # BLD AUTO: 3.69 10*6/MM3 (ref 3.77–5.28)
SODIUM SERPL-SCNC: 134 MMOL/L (ref 136–145)
WBC NRBC COR # BLD: 2.03 10*3/MM3 (ref 3.4–10.8)

## 2023-01-17 PROCEDURE — 85025 COMPLETE CBC W/AUTO DIFF WBC: CPT | Performed by: INTERNAL MEDICINE

## 2023-01-17 PROCEDURE — 99214 OFFICE O/P EST MOD 30 MIN: CPT | Performed by: STUDENT IN AN ORGANIZED HEALTH CARE EDUCATION/TRAINING PROGRAM

## 2023-01-17 PROCEDURE — 99204 OFFICE O/P NEW MOD 45 MIN: CPT | Performed by: PSYCHIATRY & NEUROLOGY

## 2023-01-17 PROCEDURE — 80069 RENAL FUNCTION PANEL: CPT | Performed by: STUDENT IN AN ORGANIZED HEALTH CARE EDUCATION/TRAINING PROGRAM

## 2023-01-17 PROCEDURE — 82962 GLUCOSE BLOOD TEST: CPT

## 2023-01-17 PROCEDURE — G0378 HOSPITAL OBSERVATION PER HR: HCPCS

## 2023-01-17 PROCEDURE — 72141 MRI NECK SPINE W/O DYE: CPT

## 2023-01-17 RX ORDER — LACTULOSE 10 G/15ML
10 SOLUTION ORAL DAILY
Status: DISCONTINUED | OUTPATIENT
Start: 2023-01-17 | End: 2023-01-19 | Stop reason: HOSPADM

## 2023-01-17 RX ADMIN — LACTULOSE 10 G: 10 SOLUTION ORAL at 17:33

## 2023-01-17 RX ADMIN — Medication 10 ML: at 21:54

## 2023-01-17 RX ADMIN — CLONIDINE HYDROCHLORIDE 0.1 MG: 0.1 TABLET ORAL at 09:22

## 2023-01-17 RX ADMIN — HYDRALAZINE HYDROCHLORIDE 100 MG: 50 TABLET, FILM COATED ORAL at 17:33

## 2023-01-17 RX ADMIN — HYDRALAZINE HYDROCHLORIDE 100 MG: 50 TABLET, FILM COATED ORAL at 09:21

## 2023-01-17 RX ADMIN — Medication 10 ML: at 10:48

## 2023-01-17 RX ADMIN — HYDROCODONE BITARTRATE AND ACETAMINOPHEN 1 TABLET: 5; 325 TABLET ORAL at 09:22

## 2023-01-17 RX ADMIN — CLOPIDOGREL 75 MG: 75 TABLET, FILM COATED ORAL at 09:22

## 2023-01-17 RX ADMIN — LOSARTAN POTASSIUM 50 MG: 50 TABLET, FILM COATED ORAL at 09:22

## 2023-01-17 RX ADMIN — ISOSORBIDE DINITRATE 30 MG: 20 TABLET ORAL at 09:22

## 2023-01-17 RX ADMIN — AMLODIPINE BESYLATE 5 MG: 5 TABLET ORAL at 09:22

## 2023-01-17 RX ADMIN — PANTOPRAZOLE SODIUM 40 MG: 40 TABLET, DELAYED RELEASE ORAL at 07:00

## 2023-01-17 RX ADMIN — HYDRALAZINE HYDROCHLORIDE 100 MG: 50 TABLET, FILM COATED ORAL at 21:54

## 2023-01-17 RX ADMIN — CLONIDINE HYDROCHLORIDE 0.1 MG: 0.1 TABLET ORAL at 21:56

## 2023-01-17 RX ADMIN — ATORVASTATIN CALCIUM 40 MG: 20 TABLET, FILM COATED ORAL at 09:22

## 2023-01-17 NOTE — PLAN OF CARE
Problem: Adult Inpatient Plan of Care  Goal: Plan of Care Review  Outcome: Ongoing, Progressing  Flowsheets (Taken 1/17/2023 1716)  Progress: improving  Plan of Care Reviewed With: patient  Goal: Patient-Specific Goal (Individualized)  Outcome: Ongoing, Progressing  Goal: Absence of Hospital-Acquired Illness or Injury  Outcome: Ongoing, Progressing  Intervention: Identify and Manage Fall Risk  Recent Flowsheet Documentation  Taken 1/17/2023 1600 by Jose Sahu RN  Safety Promotion/Fall Prevention: patient off unit  Taken 1/17/2023 1402 by Jose Sahu RN  Safety Promotion/Fall Prevention:   activity supervised   assistive device/personal items within reach   clutter free environment maintained   toileting scheduled   safety round/check completed   room organization consistent   nonskid shoes/slippers when out of bed   muscle strengthening facilitated   fall prevention program maintained  Taken 1/17/2023 1224 by Jose Sahu RN  Safety Promotion/Fall Prevention:   activity supervised   assistive device/personal items within reach   clutter free environment maintained   safety round/check completed   toileting scheduled   room organization consistent   nonskid shoes/slippers when out of bed   fall prevention program maintained  Taken 1/17/2023 1025 by Jose Sahu RN  Safety Promotion/Fall Prevention:   activity supervised   assistive device/personal items within reach   clutter free environment maintained   toileting scheduled   room organization consistent   safety round/check completed   nonskid shoes/slippers when out of bed   muscle strengthening facilitated   fall prevention program maintained  Taken 1/17/2023 0811 by Jose Sahu RN  Safety Promotion/Fall Prevention:   activity supervised   assistive device/personal items within reach   clutter free environment maintained   toileting scheduled   safety round/check completed   room organization consistent   nonskid shoes/slippers when out  of bed   muscle strengthening facilitated   fall prevention program maintained  Intervention: Prevent Skin Injury  Recent Flowsheet Documentation  Taken 1/17/2023 1402 by Jose Sahu RN  Body Position:   position changed independently   sitting up in bed  Taken 1/17/2023 1224 by Jose Sahu RN  Body Position:   position changed independently   sitting up in bed  Taken 1/17/2023 1025 by Jose Sahu RN  Body Position:   position changed independently   sitting up in bed  Taken 1/17/2023 0811 by Jose Sahu RN  Body Position:   position changed independently   sitting up in bed  Intervention: Prevent and Manage VTE (Venous Thromboembolism) Risk  Recent Flowsheet Documentation  Taken 1/17/2023 0811 by Jose Sahu RN  VTE Prevention/Management:   bilateral   sequential compression devices on  Intervention: Prevent Infection  Recent Flowsheet Documentation  Taken 1/17/2023 1402 by Jose Sahu RN  Infection Prevention:   hand hygiene promoted   rest/sleep promoted  Taken 1/17/2023 1224 by Jose Sahu RN  Infection Prevention:   hand hygiene promoted   rest/sleep promoted  Taken 1/17/2023 1025 by Jose Sahu RN  Infection Prevention:   hand hygiene promoted   rest/sleep promoted  Taken 1/17/2023 0811 by Jose Sahu RN  Infection Prevention:   hand hygiene promoted   rest/sleep promoted  Goal: Optimal Comfort and Wellbeing  Outcome: Ongoing, Progressing  Goal: Readiness for Transition of Care  Outcome: Ongoing, Progressing     Problem: Fall Injury Risk  Goal: Absence of Fall and Fall-Related Injury  Outcome: Ongoing, Progressing  Intervention: Identify and Manage Contributors  Recent Flowsheet Documentation  Taken 1/17/2023 1402 by Jose Sahu RN  Medication Review/Management: medications reviewed  Taken 1/17/2023 1224 by Jose Sahu RN  Medication Review/Management: medications reviewed  Taken 1/17/2023 1025 by Jose Sahu RN  Medication Review/Management:  medications reviewed  Taken 1/17/2023 0811 by Jose Sahu RN  Medication Review/Management: medications reviewed  Intervention: Promote Injury-Free Environment  Recent Flowsheet Documentation  Taken 1/17/2023 1600 by Jose Sahu RN  Safety Promotion/Fall Prevention: patient off unit  Taken 1/17/2023 1402 by Jose Sahu RN  Safety Promotion/Fall Prevention:   activity supervised   assistive device/personal items within reach   clutter free environment maintained   toileting scheduled   safety round/check completed   room organization consistent   nonskid shoes/slippers when out of bed   muscle strengthening facilitated   fall prevention program maintained  Taken 1/17/2023 1224 by Jose Sahu RN  Safety Promotion/Fall Prevention:   activity supervised   assistive device/personal items within reach   clutter free environment maintained   safety round/check completed   toileting scheduled   room organization consistent   nonskid shoes/slippers when out of bed   fall prevention program maintained  Taken 1/17/2023 1025 by Jose Sahu RN  Safety Promotion/Fall Prevention:   activity supervised   assistive device/personal items within reach   clutter free environment maintained   toileting scheduled   room organization consistent   safety round/check completed   nonskid shoes/slippers when out of bed   muscle strengthening facilitated   fall prevention program maintained  Taken 1/17/2023 0811 by Jose Sahu RN  Safety Promotion/Fall Prevention:   activity supervised   assistive device/personal items within reach   clutter free environment maintained   toileting scheduled   safety round/check completed   room organization consistent   nonskid shoes/slippers when out of bed   muscle strengthening facilitated   fall prevention program maintained     Problem: Hypertension Comorbidity  Goal: Blood Pressure in Desired Range  Outcome: Ongoing, Progressing  Intervention: Maintain Blood Pressure  Management  Recent Flowsheet Documentation  Taken 1/17/2023 1402 by Jose Sahu, RN  Medication Review/Management: medications reviewed  Taken 1/17/2023 1224 by Jose Sahu, RN  Medication Review/Management: medications reviewed  Taken 1/17/2023 1025 by Jose Sahu, RN  Medication Review/Management: medications reviewed  Taken 1/17/2023 0811 by Jose Sahu, RN  Medication Review/Management: medications reviewed   Goal Outcome Evaluation:  Plan of Care Reviewed With: patient        Progress: improving

## 2023-01-17 NOTE — PROGRESS NOTES
[facility] Progress Note      Patient Name: Sonia Acharya  : 1950  MRN: [id number]  Primary Care Physician:  Marcy Paez PA  Date of admission: 2023    Subjective     Interval History:   Follow up ESRD.  Had dialysis yesterday via [facility]. She tells me they used AVF last Monday for dialysis with 2 needles, but only one needle Wednesday with return through [facility] due to bleeding from AVF when stuck. Numbness from right hand to elbow, pain from elbow to shoulder.  ZENA eval noted with rec for NCV studies .    Review of Systems:   As noted above    Objective     Vitals:   Temp:  [98 °F (36.7 °C)-99.2 °F (37.3 °C)] 98.2 °F (36.8 °C)  Heart Rate:  [45-53] 45  Resp:  [14-20] 20  BP: (144-207)/(59-71) 184/67  Flow (L/min):  [2] 2    Intake/Output Summary (Last 24 hours) at 2023 1029  Last data filed at 2023 0600  Gross per 24 hour   Intake 240 ml   Output 1100 ml   Net -860 ml       Physical Exam:    General Appearance: alert, oriented x 3, no acute distress   Skin: warm and dry  HEENT: oral mucosa normal, nonicteric sclera  Neck: supple, no JVD  Lungs: Clear to auscultation .  Heart: RRR, normal S1 and S2  Abdomen: soft, nontender, nondistended. + bs  Extremities: no edema. RUE AVF thrill, bruit.  Discoloration along scar. Not new per Pt.   Neuro: normal speech and mental status     Scheduled Meds:     amLODIPine, 5 mg, Oral, Q24H  atorvastatin, 40 mg, Oral, Daily  cloNIDine, 0.1 mg, Oral, Q12H  clopidogrel, 75 mg, Oral, Daily  hydrALAZINE, 100 mg, Oral, TID  isosorbide dinitrate, 30 mg, Oral, Daily  losartan, 50 mg, Oral, Daily  pantoprazole, 40 mg, Oral, Q AM  polyethylene glycol, 17 g, Oral, Daily  sodium chloride, 10 mL, Intravenous, Q12H      IV Meds:        Results Reviewed:   I have personally reviewed the results from the time of this admission to 2023 10:29 EST     Results from last 7 days   Lab Units 23  0532 23  0758 23  0042   SODIUM  mmol/L 134* 135* 137   POTASSIUM mmol/L 4.5 5.5* 4.9   CHLORIDE mmol/L 98 100 100   CO2 mmol/L 27.9 25.2 25.2   BUN mg/dL 29* 50* 48*   CREATININE mg/dL 3.97* 6.35* 6.36*   CALCIUM mg/dL 8.8 8.6 9.0   BILIRUBIN mg/dL  --   --  0.2   ALK PHOS U/L  --   --  125*   ALT (SGPT) U/L  --   --  <5   AST (SGOT) U/L  --   --  13   GLUCOSE mg/dL 112* 73 110*       Estimated Creatinine Clearance: 9.8 mL/min (A) (by C-G formula based on SCr of 3.97 mg/dL (H)).    Results from last 7 days   Lab Units 01/17/23  0532   PHOSPHORUS mg/dL 4.2             Results from last 7 days   Lab Units 01/17/23  0532 01/16/23  0758 01/16/23  0042   WBC 10*3/mm3 2.03* 2.03* 2.03*   HEMOGLOBIN g/dL 10.7* 10.5* 10.2*   PLATELETS 10*3/mm3 123* 144 139*       Results from last 7 days   Lab Units 01/16/23  0042   INR  1.10       Assessment / Plan     ASSESSMENT:  1. ESRD.  Dialysis tomorrow. Use AVF tomorrow to make sure we can use it as outpt.    2. Right arm pain, numbness. ZENA gaxiola noted. Ask neuro if she can get NCV while here or outpt and if they think any further testing needed.  3. Anemia CKD  4. Pancytopenia .    PLAN:  1. Neurology consult.  2. Use AVF tomorrow for dialysis .  Nidhi Turner MD  01/17/23  10:29 EST    [facility]  [phone]

## 2023-01-17 NOTE — SIGNIFICANT NOTE
01/17/23 1525   OTHER   Discipline physical therapist   Therapy Assessment/Plan (PT)   Criteria for Skilled Interventions Met (PT) no problems identified which require skilled intervention  (Patient up standing in room upon PT arrival and reported she has been up ad david. Denies any mobility issues or concerns. Not appropriate for PT evaluation- will sign off.)

## 2023-01-17 NOTE — PROGRESS NOTES
LOS: 1 day   Patient Care Team:  Marcy Paez PA as PCP - General (Physician Assistant)    Chief Complaint:   Abnormal echo, bradycardia     Interval History:     She was previously evaluated for monoclonal gammopathy in August 10, 2022 at .  There is no amyloid deposition on the bone marrow biopsy    She has had sinus bradycardia 7 occasions, including on a cardiology evaluation inpatient on August 16, 2022.  At that time she had sinus bradycardia with a rate of 46.    Objective   Vital Signs  Temp:  [98 °F (36.7 °C)-99.2 °F (37.3 °C)] 98.2 °F (36.8 °C)  Heart Rate:  [45-53] 45  Resp:  [14-20] 20  BP: (144-207)/(59-71) 184/67    Intake/Output Summary (Last 24 hours) at 1/17/2023 1011  Last data filed at 1/17/2023 0600  Gross per 24 hour   Intake 240 ml   Output 1100 ml   Net -860 ml       Comfortable NAD, resting in bed  PERRL, conjunctivae clear  Neck supple, no JVD or thyromegaly appreciated  S1/S2 regular, bradycardic, 3-6 systolic murmur at apex  Lungs CTA B, normal effort  Abdomen S/NT/ND (+) BS, no HSM appreciated  Extremities warm, no clubbing, cyanosis, lower extremity edema, trace  Normal gait  No visible or palpable skin lesions  A/Ox4, mood and affect appropriate    Results Review:      Results from last 7 days   Lab Units 01/17/23  0532 01/16/23  0758 01/16/23 0042   SODIUM mmol/L 134* 135* 137   POTASSIUM mmol/L 4.5 5.5* 4.9   CHLORIDE mmol/L 98 100 100   CO2 mmol/L 27.9 25.2 25.2   BUN mg/dL 29* 50* 48*   CREATININE mg/dL 3.97* 6.35* 6.36*   GLUCOSE mg/dL 112* 73 110*   CALCIUM mg/dL 8.8 8.6 9.0         Results from last 7 days   Lab Units 01/17/23  0532 01/16/23  0758 01/16/23 0042   WBC 10*3/mm3 2.03* 2.03* 2.03*   HEMOGLOBIN g/dL 10.7* 10.5* 10.2*   HEMATOCRIT % 33.8* 31.3* 31.8*   PLATELETS 10*3/mm3 123* 144 139*     Results from last 7 days   Lab Units 01/16/23  0042   INR  1.10   APTT seconds 31.7                   I reviewed the patient's new clinical results.  I  personally viewed and interpreted the patient's EKG/Telemetry data    Telemetry review shows continued asymptomatic sinus bradycardia        Medication Review:   amLODIPine, 5 mg, Oral, Q24H  atorvastatin, 40 mg, Oral, Daily  cloNIDine, 0.1 mg, Oral, Q12H  clopidogrel, 75 mg, Oral, Daily  hydrALAZINE, 100 mg, Oral, TID  isosorbide dinitrate, 30 mg, Oral, Daily  losartan, 50 mg, Oral, Daily  pantoprazole, 40 mg, Oral, Q AM  polyethylene glycol, 17 g, Oral, Daily  sodium chloride, 10 mL, Intravenous, Q12H      TTE 1/16/23       Interpretation Summary       •  Left ventricular systolic function is normal. Calculated left ventricular EF = 64.2% Abnormal global longitudinal LV strain (GLS) = -15.5%. Left ventricle strain data was reviewed by the physician and found to be accurate. Normal left ventricular cavity size noted. Left ventricular wall thickness is consistent with moderate to severe concentric hypertrophy. All left ventricular wall segments contract normally. 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 There is an echo bright appearance of the left and right ventricular myocardium suggestive of amyloid heart disease Left ventricular diastolic function is consistent with (grade II w/high LAP) pseudonormalization.  •  Right ventricular wall thickness is consistent with moderate hypertrophy.  •  Left atrial volume is severely increased.  The right atrial cavity is moderate to severely dilated.  •  There is moderate calcification of the aortic valve. The aortic valve appears trileaflet. Trace aortic valve regurgitation is present. Moderate aortic valve stenosis is present. Aortic valve area is 1.02 cm2. Aortic valve mean pressure gradient is 27.2 mmHg. Aortic valve dimensionless index is 0.30 .  •  Mild mitral annular calcification is present. Mild to moderate mitral valve regurgitation is present. No significant mitral valve stenosis is present.  •  Mild tricuspid valve regurgitation is present.  Estimated right ventricular systolic pressure from tricuspid regurgitation is moderately elevated (45-55 mmHg).  •  There is a moderate (1-2cm) pericardial effusion adjacent to the right atrium. There is no evidence of cardiac tamponade.            Assessment & Plan       Right arm pain    Anemia, chronic disease    HTN (hypertension)    Cervical radiculopathy    ESRD on dialysis (HCC)    Bradycardia    ANGIE (obstructive sleep apnea)    Chronic diastolic CHF (congestive heart failure) ([facility])    Aortic stenosis    1.  Bradycardia, asymptomatic  - Would hold off on clonidine unless absolutely necessary per last note.  -Remains asymptomatic, requires no further work-up for now.  Has been noted chronically, dating back to a previous evaluation at [facility] in August 2022 where she had a similar heart rate without complaint.     2.  History of coronary artery disease status post PCI to RCA  - Recent SPECT obtained July 2022, no ischemia.  Did have decreased counts in the septal region which may be consistent with her intraventricular conduction delay.  But no ischemia.  -Continue Plavix 75  -Continue her home cardiac medications aside from clonidine above.  - She does have murmur on exam, her last echo was 6 months ago, but if she is being considered for cervical spinal surgery with her numbness, repeating echo is reasonable.  -If she does require surgery, no further cardiac work-up is required prior to it.  She had a recent SPECT without ischemia, and would continue her home cardiac medications as above.    3.  Enlarged LV myocardium with concerns for infiltrative cardiomyopathy like amyloidosis  -LV strain pattern not definitively showing amyloidosis, but there is marked diastolic dysfunction and LV myocardial thickness with echo prior pattern.  In the right side and this might reflect amyloidosis.  I reviewed her chart extensively including prior bone marrow biopsy that was done at [facility] in August 2022.  At that time  there was no overt signs of myeloma or [place] red staining positivity.  -She is well-established with [facility] cardiology Dr. Wali Odom.  There are no acute indications for inpatient work-up of this at this time.  She can follow-up with her primary cardiologist for consideration of further testing, comparison of her prior echocardiograms to determine the amyloidosis on the differential.  Chronic, uncontrolled hypertension and dialysis dependence could also explain some of the findings on the echo.    4.  Pericardial effusion.  No signs of tamponade  -Has been noted on prior echocardiograms dating back to December 2021.  It was mild at that sign, trivial previously.  Volume removal with dialysis per nephrology.    She continues to not have any symptoms of symptomatic bradycardia.  I does not require further monitoring or testing of this at this time.  Follow-up with her outpatient cardiologist per above.    Thank you for the consult, cardiology will sign off at this time.  Please reach cardiology with any questions or concerns.  Would recommend close outpatient follow-up with her primary cardiologist per above    Kobe Escalante MD  01/17/23  10:11 EST      **Ana Disclaimer:   Much of this encounter note is an electronic transcription/translation of spoken language to printed text. The electronic translation of spoken language may permit erroneous, or at times, nonsensical words or phrases to be inadvertently transcribed. Although I have reviewed the note for such errors, some may still exist.

## 2023-01-17 NOTE — PROGRESS NOTES
Discharge Planning Assessment  [facility]     Patient Name: Sonia Acharya  MRN: [id number]  Today's Date: 1/17/2023    Admit Date: 1/16/2023    Plan: Home with daughter   Discharge Needs Assessment     Row Name 01/17/23 1537       Living Environment    People in Home alone    Unique Family Situation Pt has her own home (address on file) but is currently staying at her daughter's home    Current Living Arrangements home    Provides Primary Care For no one    Family Caregiver if Needed child(giuseppe), adult    Family Caregiver Names Dtr Naty [phone]    Quality of Family Relationships helpful;involved;supportive    Able to Return to Prior Arrangements yes       Resource/Environmental Concerns    Resource/Environmental Concerns none       Transition Planning    Patient/Family Anticipates Transition to home with family    Patient/Family Anticipated Services at Transition none    Transportation Anticipated family or friend will provide       Discharge Needs Assessment    Current Outpatient/Agency/Support Group outpatient hemodialysis    Equipment Currently Used at Home oxygen;walker, rolling    Concerns to be Addressed no discharge needs identified;denies needs/concerns at this time    Outpatient/Agency/Support Group Needs outpatient hemodialysis    Discharge Coordination/Progress Home               Discharge Plan     Row Name 01/17/23 7125       Plan    Plan Home with daughter    Patient/Family in Agreement with Plan yes    Plan Comments CCP met with pt at bedside to discuss d/c planning. Pt resides in her own home (address on file) but has been staying with her daughter where she plans to return at d/c. Pt uses walker outside the home and nocturnal O2 via [employer]. Pt denies past HH/SNF and denies need at this time, PT has signed off. [facility] Pt's daughter transports pt to HD at [facility], and [facility] transports her home. Pt denies needs at this time. Ailyn Joe,  MELANIE              Continued Care and Services - Admitted Since 1/16/2023     Dialysis/Infusion Coordination complete.    Service Provider Request Status Selected Services Address Phone Fax Patient Preferred    [facility]  Selected Dialysis [address], [place], operated by [facility], [address] [address] [phone] -- --              Expected Discharge Date and Time     Expected Discharge Date Expected Discharge Time    Jan 18, 2023          Demographic Summary     Row Name 01/17/23 1537       General Information    Admission Type inpatient    Arrived From home    Required Notices Provided Important Message from Medicare    Referral Source admission list    Reason for Consult discharge planning    Preferred Language English               Functional Status     Row Name 01/17/23 1537       Functional Status    Usual Activity Tolerance good    Current Activity Tolerance good       Functional Status, IADL    Medications independent    Meal Preparation independent    Housekeeping independent    Laundry independent    Shopping independent       Mental Status Summary    Recent Changes in Mental Status/Cognitive Functioning no changes               Psychosocial    No documentation.                Abuse/Neglect    No documentation.                Legal    No documentation.                Substance Abuse    No documentation.                Patient Forms    No documentation.                   Bharati Joe LCSW

## 2023-01-17 NOTE — PROGRESS NOTES
Name: Sonia Acharya ADMIT: 2023   : 1950  PCP: Marcy Paez PA    MRN: [id number] LOS: 1 days   AGE/SEX: 72 y.o. female  ROOM: [facility]     Subjective   Subjective   Patient seen and examined this morning. Hospital day 1. At time of my examination, patient is awake, alert, resting comfortably in bed.  No evidence of acute distress.  Discussed with nursing, no reported acute events overnight, however, patient continues to be hypertensive.    Review of Systems   Constitutional: Positive for fatigue. Negative for chills and fever.   Respiratory: Negative for shortness of breath.    Cardiovascular: Negative for chest pain.   Gastrointestinal: Negative for abdominal pain.   Musculoskeletal: Positive for arthralgias.   Neurological: Positive for weakness.        Objective   Objective   Vital Signs  Temp:  [98 °F (36.7 °C)-99.2 °F (37.3 °C)] 98 °F (36.7 °C)  Heart Rate:  [45-53] 45  Resp:  [14-20] 20  BP: (144-207)/(59-71) 156/62  SpO2:  [93 %-99 %] 95 %  on  Flow (L/min):  [2] 2;   Device (Oxygen Therapy): room air  Body mass index is 21.27 kg/m².  Physical Exam  Vitals and nursing note reviewed.   Constitutional:       General: She is not in acute distress.     Appearance: She is ill-appearing.      Comments: Thin/frail   Eyes:      General: No scleral icterus.     Conjunctiva/sclera: Conjunctivae normal.   Cardiovascular:      Rate and Rhythm: Regular rhythm. Bradycardia present.      Pulses: Normal pulses.      Heart sounds: Normal heart sounds.   Pulmonary:      Effort: Pulmonary effort is normal. No respiratory distress.      Breath sounds: Normal breath sounds.   Abdominal:      General: Bowel sounds are normal.      Palpations: Abdomen is soft.      Tenderness: There is no abdominal tenderness.   Musculoskeletal:         General: Tenderness (trapezius, upper posterior cervical muscles) present.      Comments: Decreased ROM   Skin:     General: Skin is warm and dry.   Neurological:      General:  No focal deficit present.      Mental Status: She is alert and oriented to person, place, and time.       Results Review:  I reviewed the patient's new clinical results.  I personally viewed and interpreted the patient's EKG/Telemetry data    Results from last 7 days   Lab Units 01/17/23  0532 01/16/23  0758 01/16/23  0042   WBC 10*3/mm3 2.03* 2.03* 2.03*   HEMOGLOBIN g/dL 10.7* 10.5* 10.2*   PLATELETS 10*3/mm3 123* 144 139*     Results from last 7 days   Lab Units 01/17/23  0532 01/16/23  0758 01/16/23  0042   SODIUM mmol/L 134* 135* 137   POTASSIUM mmol/L 4.5 5.5* 4.9   CHLORIDE mmol/L 98 100 100   CO2 mmol/L 27.9 25.2 25.2   BUN mg/dL 29* 50* 48*   CREATININE mg/dL 3.97* 6.35* 6.36*   GLUCOSE mg/dL 112* 73 110*   EGFR mL/min/1.73 11.4* 6.5* 6.5*     Results from last 7 days   Lab Units 01/17/23  0532 01/16/23  0042   ALBUMIN g/dL 3.8 3.9   BILIRUBIN mg/dL  --  0.2   ALK PHOS U/L  --  125*   AST (SGOT) U/L  --  13   ALT (SGPT) U/L  --  <5     Results from last 7 days   Lab Units 01/17/23  0532 01/16/23  0758 01/16/23  0042   CALCIUM mg/dL 8.8 8.6 9.0   ALBUMIN g/dL 3.8  --  3.9   PHOSPHORUS mg/dL 4.2  --   --      Results from last 7 days   Lab Units 01/16/23  0042   LACTATE mmol/L 0.8     Glucose   Date/Time Value Ref Range Status   01/17/2023 0404 117 70 - 130 mg/dL Final     Comment:     Meter: [id number] : kvng Braga RN       CT Head Without Contrast    Result Date: 1/16/2023  1. Negative head CT. 2. Extensive degenerative change in the cervical spine with moderate to severe multilevel spinal canal stenosis as discussed level by level above.  This report was finalized on 1/16/2023 1:44 AM by Dr. Shaan Rivera M.D.      CT Cervical Spine Without Contrast    Result Date: 1/16/2023  1. Negative head CT. 2. Extensive degenerative change in the cervical spine with moderate to severe multilevel spinal canal stenosis as discussed level by level above.  This report was finalized on 1/16/2023  1:44 AM by Dr. Shaan Rivera M.D.      Scheduled Medications  amLODIPine, 5 mg, Oral, Q24H  atorvastatin, 40 mg, Oral, Daily  cloNIDine, 0.1 mg, Oral, Q12H  clopidogrel, 75 mg, Oral, Daily  hydrALAZINE, 100 mg, Oral, TID  isosorbide dinitrate, 30 mg, Oral, Daily  lactulose, 10 g, Oral, Daily  losartan, 50 mg, Oral, Daily  pantoprazole, 40 mg, Oral, Q AM  sodium chloride, 10 mL, Intravenous, Q12H    Infusions   Diet  Diet: Renal Diets; Low Sodium (2-3g), Low Potassium, Low Phosphorus; Texture: Regular Texture (IDDSI 7); Fluid Consistency: Thin (IDDSI 0)       Assessment/Plan     Active Hospital Problems    Diagnosis  POA   • **Right arm pain [M79.601]  Unknown   • Cervical radiculopathy [M54.12]  Yes   • ESRD on dialysis ([facility]) [N18.6, Z99.2]  Not Applicable   • Bradycardia [R00.1]  Unknown   • ANGIE (obstructive sleep apnea) [G47.33]  Unknown   • Chronic diastolic CHF (congestive heart failure) ([facility]) [I50.32]  Unknown   • Aortic stenosis [I35.0]  Unknown   • HTN (hypertension) [I10]  Yes   • Anemia, chronic disease [D63.8]  Yes      Resolved Hospital Problems   No resolved problems to display.       72 y.o. female with history of ESRD on HD, HFpEF, CAD, HTN, ANGIE who presents to [facility] with complaints of worsening right upper extremity pain/discomfort, admitted with Right arm pain.    Right upper extremity pain  - Etiology likely multifactorial in nature, with multiple possible contributing factors. She does endorse symptoms of associated intermittent numbness and tingling, which raises concern for possible nerve root impingement. However, on physical exam, there is appreciable cervical musculature/trapezius hypertonicity without notable reproduction of neuropathic symptoms, which is more suggestive of muscle spasm/strain.   - CT cervical spine showing multiple levels of cervical stenosis, which, with specific head maneuvers, such as cervical extension, could explain a possible intermittent  impingement leading to radiculopathy symptoms.   - However, ZENA was consulted and evaluated her and felt that no neurosurgical intervention warranted at present, however, stated that consideration of nerve conduction study in outpatient setting could be performed if symptoms persist.   - Neurology consult placed today. Will continue to follow their plans/recommendations. Greatly appreciate their help.  - Will closely monitor for now and see how her symptoms/physical exam are while she is here to help guide future management decisions. Consulted PT.    ESRD on HD  - Nephrology following, patient underwent HD 01/16, she states that she tolerated it well. Will continue to follow their plans/recommendations. Greatly appreciate their help.     Hypertension  - Her blood pressure remains elevated with SBP >180 on multiple occasions. States that she has long-standing history of this.   - Orthostatic vital signs + today, per discussion with nursing. Nephrology assisting with management, recommending no changes to medication regimen at this time, as she is orthostatic and does not have any signs or symptoms consistent with hypertensive emergency at present.    - Continue medications as currently prescribed (see orders). Closely monitor BP to guide further management. Can add additional PRN if needed, normally, Labetalol PRN would be a good choice here, however, with her concomitant bradycardia, this is not an option at present, so would have to use alternative option, like Hydralazine.    HFpEF  CAD s/p PCI to RCA  Bradycardia  - Cardiology consulted due to history of HFpEF, CAD s/p PCI to RCA and bradycardia. Noted their note, stating that bradycardia is asymptomatic at present, no acute intervention warranted, felt that her clonidine could be contributing to this, which was recently started as outpatient, so recommended holding it or resuming if needed due to uncontrolled BP and concerns regarding rebound HTN with the  understanding that she will need close monitoring, as this could make bradycardia worse. Otherwise, recommended continuing current cardiac medications, including Plavix, as prescribed. Continue to closely monitor on telemetry.    Pancytopenia  - WBC low at 2.03, hemoglobin low at 10.7, platelets low at 123, although stable from prior. Etiology likely multifactorial. Will monitor for now, if no improvement and/or worsens in AM, will pursue further workup. Repeat CBC in AM.      · SCDs for DVT prophylaxis.  · Full code.  · Discussed with patient and nursing staff.  · Anticipate discharge TBD timing yet to be determined.      Layo Davis DO  [facility]  01/17/23

## 2023-01-17 NOTE — CONSULTS
This consult is the request of Dr. Layo PETIT DO.  Thank you for involving me in the care of this patient.  As you well know this is a very pleasant 72-year-old right-handed female with end-stage renal disease on hemodialysis who presented with right arm pain.  She states that since her AV fistula was placed in October she is developed pain in the medial part of her arm she is also reporting numbness and paresthesias.  She states the pain is better with Neurontin.  She denies any major weakness.        On examination today she is alert and oriented x3.  Her speech is fluent there is no dysarthria no aphasia.  The content of her speech is appropriate and goal-directed.  Cranial nerves II through XII are intact.  She has 5 out of 5 strength in bilateral upper and lower extremities with normal tone and bulk.  Her reflexes were 2+ throughout but toes are downgoing there is no Owens's no clonus.  There is no ataxia finger-nose or heel-to-shin.  Rapid alternating movements including hand fisting and finger tapping showed normal speed and amplitude.  She has decreased sensation on the medial aspect of her right arm and right forearm this is not always consistent with light touch.  She seems to have decreased sensation in the medial anterior brachial cutaneous nerve distribution as well as in the lateral anteriorbrachial cutaneous distribution although this is not always consistent.        I reviewed the CT of the head which shows no major intercranial abnormality she does have some age-related atrophy.  I reviewed the CT of the spine which shows extensive degenerative changes in the cervical spine with moderate to severe multilevel spinal canal stenosis.        In summary this is a very pleasant 72-year-old female.  I do not think this is a cervical myelopathy I think this is most likely injury from her fistula.  Given the degree of cervical disease we see on the CT I am going to order an MRI of the neck.  We will plan on  obtaining an EMG nerve conduction study as an outpatient.  She can continue Neurontin 100 mg p.o. 3 times daily for neuropathic pain as long as her nephrologist are okay with this.  Thank you for involving me in the care of this patient.  Please do not hesitate to contact me with additional questions.

## 2023-01-18 PROBLEM — D61.818 PANCYTOPENIA: Status: ACTIVE | Noted: 2023-01-18

## 2023-01-18 PROBLEM — E83.39 HYPERPHOSPHATEMIA: Status: ACTIVE | Noted: 2023-01-18

## 2023-01-18 LAB
ALBUMIN SERPL-MCNC: 3.6 G/DL (ref 3.5–5.2)
ANION GAP SERPL CALCULATED.3IONS-SCNC: 10 MMOL/L (ref 5–15)
BASOPHILS # BLD AUTO: 0.03 10*3/MM3 (ref 0–0.2)
BASOPHILS NFR BLD AUTO: 1.4 % (ref 0–1.5)
BUN SERPL-MCNC: 46 MG/DL (ref 8–23)
BUN/CREAT SERPL: 8.1 (ref 7–25)
CALCIUM SPEC-SCNC: 8.5 MG/DL (ref 8.6–10.5)
CHLORIDE SERPL-SCNC: 102 MMOL/L (ref 98–107)
CO2 SERPL-SCNC: 23 MMOL/L (ref 22–29)
CREAT SERPL-MCNC: 5.67 MG/DL (ref 0.57–1)
DEPRECATED RDW RBC AUTO: 46.2 FL (ref 37–54)
EGFRCR SERPLBLD CKD-EPI 2021: 7.4 ML/MIN/1.73
EOSINOPHIL # BLD AUTO: 0.16 10*3/MM3 (ref 0–0.4)
EOSINOPHIL NFR BLD AUTO: 7.4 % (ref 0.3–6.2)
ERYTHROCYTE [DISTWIDTH] IN BLOOD BY AUTOMATED COUNT: 13.9 % (ref 12.3–15.4)
GLUCOSE SERPL-MCNC: 79 MG/DL (ref 65–99)
HCT VFR BLD AUTO: 32.2 % (ref 34–46.6)
HGB BLD-MCNC: 10.5 G/DL (ref 12–15.9)
IMM GRANULOCYTES # BLD AUTO: 0 10*3/MM3 (ref 0–0.05)
IMM GRANULOCYTES NFR BLD AUTO: 0 % (ref 0–0.5)
LYMPHOCYTES # BLD AUTO: 0.79 10*3/MM3 (ref 0.7–3.1)
LYMPHOCYTES NFR BLD AUTO: 36.6 % (ref 19.6–45.3)
MCH RBC QN AUTO: 29.8 PG (ref 26.6–33)
MCHC RBC AUTO-ENTMCNC: 32.6 G/DL (ref 31.5–35.7)
MCV RBC AUTO: 91.5 FL (ref 79–97)
MONOCYTES # BLD AUTO: 0.34 10*3/MM3 (ref 0.1–0.9)
MONOCYTES NFR BLD AUTO: 15.7 % (ref 5–12)
NEUTROPHILS NFR BLD AUTO: 0.84 10*3/MM3 (ref 1.7–7)
NEUTROPHILS NFR BLD AUTO: 38.9 % (ref 42.7–76)
NRBC BLD AUTO-RTO: 0 /100 WBC (ref 0–0.2)
PHOSPHATE SERPL-MCNC: 5 MG/DL (ref 2.5–4.5)
PLATELET # BLD AUTO: 126 10*3/MM3 (ref 140–450)
PMV BLD AUTO: 10.4 FL (ref 6–12)
POTASSIUM SERPL-SCNC: 4.9 MMOL/L (ref 3.5–5.2)
RBC # BLD AUTO: 3.52 10*6/MM3 (ref 3.77–5.28)
SODIUM SERPL-SCNC: 135 MMOL/L (ref 136–145)
WBC NRBC COR # BLD: 2.16 10*3/MM3 (ref 3.4–10.8)

## 2023-01-18 PROCEDURE — G0257 UNSCHED DIALYSIS ESRD PT HOS: HCPCS

## 2023-01-18 PROCEDURE — 80069 RENAL FUNCTION PANEL: CPT | Performed by: STUDENT IN AN ORGANIZED HEALTH CARE EDUCATION/TRAINING PROGRAM

## 2023-01-18 PROCEDURE — G0378 HOSPITAL OBSERVATION PER HR: HCPCS

## 2023-01-18 PROCEDURE — 99214 OFFICE O/P EST MOD 30 MIN: CPT | Performed by: NURSE PRACTITIONER

## 2023-01-18 PROCEDURE — 85025 COMPLETE CBC W/AUTO DIFF WBC: CPT | Performed by: STUDENT IN AN ORGANIZED HEALTH CARE EDUCATION/TRAINING PROGRAM

## 2023-01-18 PROCEDURE — 99223 1ST HOSP IP/OBS HIGH 75: CPT | Performed by: INTERNAL MEDICINE

## 2023-01-18 RX ORDER — LOSARTAN POTASSIUM 100 MG/1
100 TABLET ORAL DAILY
Status: DISCONTINUED | OUTPATIENT
Start: 2023-01-19 | End: 2023-01-19 | Stop reason: HOSPADM

## 2023-01-18 RX ORDER — MANNITOL 250 MG/ML
12.5 INJECTION, SOLUTION INTRAVENOUS AS NEEDED
Status: DISCONTINUED | OUTPATIENT
Start: 2023-01-18 | End: 2023-01-19 | Stop reason: HOSPADM

## 2023-01-18 RX ADMIN — HYDRALAZINE HYDROCHLORIDE 100 MG: 50 TABLET, FILM COATED ORAL at 13:19

## 2023-01-18 RX ADMIN — CLOPIDOGREL 75 MG: 75 TABLET, FILM COATED ORAL at 13:19

## 2023-01-18 RX ADMIN — HYDRALAZINE HYDROCHLORIDE 100 MG: 50 TABLET, FILM COATED ORAL at 18:28

## 2023-01-18 RX ADMIN — ATORVASTATIN CALCIUM 40 MG: 20 TABLET, FILM COATED ORAL at 13:19

## 2023-01-18 RX ADMIN — PANTOPRAZOLE SODIUM 40 MG: 40 TABLET, DELAYED RELEASE ORAL at 06:31

## 2023-01-18 RX ADMIN — CLONIDINE HYDROCHLORIDE 0.1 MG: 0.1 TABLET ORAL at 21:16

## 2023-01-18 RX ADMIN — GABAPENTIN 100 MG: 100 CAPSULE ORAL at 13:19

## 2023-01-18 RX ADMIN — AMLODIPINE BESYLATE 5 MG: 5 TABLET ORAL at 13:20

## 2023-01-18 RX ADMIN — HYDRALAZINE HYDROCHLORIDE 100 MG: 50 TABLET, FILM COATED ORAL at 21:16

## 2023-01-18 RX ADMIN — ISOSORBIDE DINITRATE 30 MG: 20 TABLET ORAL at 13:20

## 2023-01-18 RX ADMIN — HYDROCODONE BITARTRATE AND ACETAMINOPHEN 1 TABLET: 5; 325 TABLET ORAL at 15:36

## 2023-01-18 RX ADMIN — CLONIDINE HYDROCHLORIDE 0.1 MG: 0.1 TABLET ORAL at 13:19

## 2023-01-18 NOTE — CONSULTS
[facility] GROUP INITIAL INPATIENT CONSULTATION NOTE    REASON FOR CONSULTATION:    Smoldering multiple myeloma, pancytopenia    HISTORY OF PRESENT ILLNESS:  Sonia Acharya is a 72 y.o. female who we are asked to see today in consultation for the above issues.    Patient has past medical history significant for hypertension, hyperlipidemia, diabetes mellitus type 2, coronary artery disease, obstructive sleep apnea, ESRD on HD for 9 years.  Patient has had significant difficulty with AV fistula.    Patient has had left upper extremity DVT 8/10/2022, received anticoagulation with Eliquis for a few months by her report but was subsequently discontinued.    Patient does have history of coronary artery disease with previous PCI to RCA.  She does continue on Plavix 75 mg daily.    Patient has smoldering multiple myeloma followed by Dr. Aparicio in the [place] system.  She was found to have pancytopenia with WBC in the 2-3000 range, hemoglobin in the 8-9 range, platelet count in the low 100,000 range.  Labs on 8/11/2022 with B12 919, folate low at 5.9, , fibrinogen low 181, iron 101, TIBC 135, iron saturation 75%.  PF4 heparin dependent platelet antibody negative.  Serum protein electrophoresis on 8/12/2022 showed band of restricted mobility in the gamma region.  Immunofixation showed monoclonal free lambda light chains, could not rule out IgD lambda or IgE lambda.  She underwent bone marrow biopsy on 8/19/2022 which showed on aspirate plasma cell neoplasm 10-20% plasma cells, lambda restricted by flow cytometry.  By flow cytometry, plasma cells represented 10%.  There was normocellular marrow with maturing trilineage hematopoiesis with no dysplasia.  Normal karyotype. PET scan 11/2/2022 showed no evidence for multiple myeloma bone lesions.  After review by Dr. Kaur 11/9/2022, patient felt to have smoldering lambda light chain multiple myeloma.  ESRD was felt to be unrelated and predated diagnosis by many years.   Anemia felt to be more related to ESRD.  Therefore, no treatment recommended and patient has continued on a course of observation.    Patient has been experiencing significant difficulty with pain in her right upper extremity since October 2022 after placement of AV fistula.  Doppler evaluation of right upper extremity AV fistula 12/9/2022 showed mature appearing fistula.,  Patent with elevated velocities suggestive of stenosis.  Previous evaluation with right upper extremity Doppler 12/9/2022 showed no evidence of DVT.  She was admitted on 1/16/2023 for further evaluation of this issue.  CT head and cervical spine 1/16/2023 showed degenerative change in the cervical spine with moderate to severe stenosis.  MRI cervical spine 1/17/2023 showed degenerative change with severe stenosis.  Pain is felt to be multifactorial.  Neurosurgery recommended consideration of outpatient nerve conduction study.  Neurology did not feel that this was a cervical myelopathy but was likely injury from her fistula.  Initiated gabapentin 100 mg 3 times daily with plans for EMG.  Physical therapy consulted.        Patient today has no new complaints, reports that her right upper extremity pain does wax and wane.  She does have some pain intermittently in the left upper extremity as well.  She does note difficulty with hot flashes.        Past Medical History:   Diagnosis Date   • Arthritis    • Chronic kidney disease    • Heart disease    • Hypertension        Past Surgical History:   Procedure Laterality Date   • BREAST SURGERY     • DIALYSIS FISTULA CREATION     • EYE SURGERY     • HERNIA REPAIR  2017    Dr. Sung       SOCIAL HISTORY:   reports that she has never smoked. She has never used smokeless tobacco. She reports that she does not drink alcohol and does not use drugs.    FAMILY HISTORY:  family history includes Breast cancer in her sister; Heart disease in her father.    ALLERGIES:  No Known Allergies    MEDICATIONS:  As listed  in the electronic medical record.    Review of Systems   A comprehensive review of systems was obtained with pertinent positive findings as noted in the interval history above.  All other systems negative.    Vitals:    01/17/23 2154 01/18/23 0205 01/18/23 0535 01/18/23 0632   BP: 152/56 172/65 178/84    BP Location: Left arm Left arm Left arm    Patient Position: Lying Lying Lying    Pulse: 98 51 (!) 47    Resp: 20 18 16    Temp: 98.3 °F (36.8 °C) 97.9 °F (36.6 °C) 98.1 °F (36.7 °C)    TempSrc: Oral Oral Oral    SpO2: 96% 97% 98%    Weight:    50.5 kg (111 lb 5.3 oz)   Height:           Physical Exam  Eyes:      Conjunctiva/sclera: Conjunctivae normal.   Neck:      Thyroid: No thyromegaly.   Cardiovascular:      Rate and Rhythm: Normal rate and regular rhythm.      Heart sounds: No murmur heard.    No friction rub. No gallop.   Pulmonary:      Effort: No respiratory distress.      Breath sounds: Normal breath sounds.   Abdominal:      General: Bowel sounds are normal. There is no distension.      Palpations: Abdomen is soft.      Tenderness: There is no abdominal tenderness.   Musculoskeletal:      Comments: Right upper extremity AV fistula.   Lymphadenopathy:      Head:      Right side of head: No submandibular adenopathy.      Cervical: No cervical adenopathy.      Upper Body:      Right upper body: No supraclavicular adenopathy.      Left upper body: No supraclavicular adenopathy.   Skin:     General: Skin is warm and dry.      Findings: No rash.   Neurological:      Mental Status: She is alert and oriented to person, place, and time.      Cranial Nerves: No cranial nerve deficit.      Motor: No abnormal muscle tone.      Deep Tendon Reflexes: Reflexes normal.   Psychiatric:         Behavior: Behavior normal.         DIAGNOSTIC DATA:    Results from last 7 days   Lab Units 01/17/23  0532 01/16/23  0758 01/16/23  0042   WBC 10*3/mm3 2.03* 2.03* 2.03*   HEMOGLOBIN g/dL 10.7* 10.5* 10.2*   HEMATOCRIT % 33.8* 31.3*  31.8*   PLATELETS 10*3/mm3 123* 144 139*      Results from last 7 days   Lab Units 01/17/23  0532 01/16/23  0758 01/16/23  0042   SODIUM mmol/L 134* 135* 137   POTASSIUM mmol/L 4.5 5.5* 4.9   CHLORIDE mmol/L 98 100 100   CO2 mmol/L 27.9 25.2 25.2   BUN mg/dL 29* 50* 48*   CREATININE mg/dL 3.97* 6.35* 6.36*   CALCIUM mg/dL 8.8 8.6 9.0   BILIRUBIN mg/dL  --   --  0.2   ALK PHOS U/L  --   --  125*   ALT (SGPT) U/L  --   --  <5   AST (SGOT) U/L  --   --  13   GLUCOSE mg/dL 112* 73 110*          Assessment & Plan   ASSESSMENT:  This is a 72 y.o. female with:    *Lambda light chain smoldering multiple myeloma  · Patient has smoldering multiple myeloma followed by Dr. Aparicio in the [facility] system.    · Chronic pancytopenia with WBC in the 2-3000 range, hemoglobin in the 8-9 range, platelet count in the low 100,000 range.    · Labs on 8/11/2022 with B12 919, folate low at 5.9, , fibrinogen low 181, iron 101, TIBC 135, iron saturation 75%.  PF4 heparin dependent platelet antibody negative.    · Serum protein electrophoresis on 8/12/2022 showed band of restricted mobility in the gamma region.  Immunofixation showed monoclonal free lambda light chains, could not rule out IgD lambda or IgE lambda.    · Bone marrow biopsy on 8/19/2022 which showed on aspirate plasma cell neoplasm 10-20% plasma cells, lambda restricted by flow cytometry.  By flow cytometry, plasma cells represented 10%.  There was normocellular marrow with maturing trilineage hematopoiesis with no dysplasia.  Normal karyotype.   · PET scan 11/2/2022 showed no evidence for multiple myeloma bone lesions.    · After review by Dr. Kaur 11/9/2022, patient felt to have smoldering lambda light chain multiple myeloma.  ESRD was felt to be unrelated and predated diagnosis by many years.  Anemia felt to be more related to ESRD.  Therefore, no treatment recommended and patient has continued on a course of observation.    *Chronic pancytopenia  · Chronic pancytopenia  with WBC in the 2-3000 range, hemoglobin in the 8-9 range, platelet count in the low 100,000 range.    · Labs on 8/11/2022 with B12 919, folate low at 5.9, , fibrinogen low 181, iron 101, TIBC 135, iron saturation 75%.  PF4 heparin dependent platelet antibody negative.  · Unclear to what extent pancytopenia is related to underlying multiple myeloma  · Labs currently with stable values from 1/18/2023 with WBC 2.16, ANC 0.84, hemoglobin 10.5, platelet count 126,000.  Differential abnormal with 39% neutrophils, 37% lymphocytes, 16% monocytes, 7% eosinophils.  · We will send off peripheral blood flow cytometry, repeat B12 and folate studies and check IPF.  Question whether leukopenia could be autoimmune in nature, send off GUEVARA panel.    *Anemia  · Primarily felt to be related to underlying ESRD  · Patient does have underlying multiple myeloma however is felt to be smoldering and not necessarily the main cause of her anemia.  · Managed by nephrology on dialysis  · Labs on 1/16/2023 with iron 73, iron saturation 47%, TIBC 155.  Ferritin not performed.  Labs consistent with anemia secondary to chronic disease/ESRD  · Hemoglobin has been stable in the 10-11 range    *ESRD on HD  • Likely related to underlying diabetes mellitus and hypertension  • Patient has been on dialysis for 9 years  • Patient has right upper extremity AV fistula  • HD on MWF    *Right upper extremity pain  · Patient has been experiencing significant difficulty with pain in her right upper extremity since October 2022 after placement of AV fistula.    · Doppler evaluation of right upper extremity AV fistula 12/9/2022 showed mature appearing fistula, patent with elevated velocities suggestive of stenosis.    · Right upper extremity Doppler 12/9/2022 showed no evidence of DVT.    · She was admitted on 1/16/2023 for further evaluation of this issue.    · CT head and cervical spine 1/16/2023 showed degenerative change in the cervical spine with  moderate to severe stenosis.    · MRI cervical spine 1/17/2023 showed degenerative change with severe stenosis.    · Pain is felt to be multifactorial.  Neurosurgery recommended consideration of outpatient nerve conduction study.    · Neurology did not feel that this was a cervical myelopathy but was likely injury from her fistula.    · Initiated gabapentin 100 mg 3 times daily with plans for EMG.  Physical therapy consulted.    *History of left upper extremity DVT  · Patient has had left upper extremity DVT 8/10/2022, received anticoagulation with Eliquis for a few months by her report but was subsequently discontinued.    *Coronary artery disease  · Patient does have history of coronary artery disease with previous PCI to RCA.    · She does continue on Plavix 75 mg daily.      PLAN:   1. Patient has recent diagnosis of smoldering lambda light chain multiple myeloma followed by Dr. Kaur in the [place] system and continuing on a course of observation/surveillance  2. Chronic anemia appears to be related to ESRD, managed by nephrology on dialysis  3. Pancytopenia is chronic in nature, labs relatively unchanged from prior values.  Unclear if related to underlying multiple myeloma.  4. We will send off additional labs with ferritin, B12, folate, IPF, GUEVARA panel, peripheral blood flow cytometry.  5. Daily CBC/differential    Discussed with patient at bedside.        Shaan Taylor MD

## 2023-01-18 NOTE — PROGRESS NOTES
"DOS: 2023  NAME: Sonia Acharya   : 1950  PCP: Marcy Paez PA    Chief Complaint   Patient presents with   • Arm Pain        Subjective: Pt seen in follow up, however the problem is new to me.  Patient currently lying in her bed in the dialysis unit receiving her dialysis.  She states she feels \"okay\".  Denies any new weakness, numbness, speech or visual disturbances, or headaches.  No family at bedside    Objective:  Vital signs:      Vitals:    23 0850 23 1205 23 1326 23 1354   BP: (!) 203/77 (!) 224/68 (!) 206/77 139/88   BP Location: Left arm Left arm Left arm Left arm   Patient Position: Lying Lying Sitting Sitting   Pulse: (!) 46 (!) 48 50 51   Resp: 16 16  20   Temp: 97.3 °F (36.3 °C) 98.4 °F (36.9 °C)  98.4 °F (36.9 °C)   TempSrc: Temporal Temporal  Oral   SpO2:    97%   Weight:       Height:           Current Facility-Administered Medications:   •  acetaminophen (TYLENOL) tablet 650 mg, 650 mg, Oral, Q4H PRN **OR** acetaminophen (TYLENOL) 160 MG/5ML solution 650 mg, 650 mg, Oral, Q4H PRN **OR** acetaminophen (TYLENOL) suppository 650 mg, 650 mg, Rectal, Q4H PRN, Maira Arauz APRN  •  amLODIPine (NORVASC) tablet 5 mg, 5 mg, Oral, Q24H, Layo Davis DO, 5 mg at 23 1320  •  atorvastatin (LIPITOR) tablet 40 mg, 40 mg, Oral, Daily, Maira Arauz APRN, 40 mg at 23 1319  •  calcium carbonate (TUMS) chewable tablet 500 mg (200 mg elemental), 2 tablet, Oral, BID PRN, Maira Arauz APRN  •  cloNIDine (CATAPRES) tablet 0.1 mg, 0.1 mg, Oral, Q12H, Layo Davis DO, 0.1 mg at 23 1319  •  clopidogrel (PLAVIX) tablet 75 mg, 75 mg, Oral, Daily, Layo Davis DO, 75 mg at 23 131  •  gabapentin (NEURONTIN) capsule 100 mg, 100 mg, Oral, TID PRN, Maira Arauz APRN, 100 mg at 23 131  •  hydrALAZINE (APRESOLINE) tablet 100 mg, 100 mg, Oral, TID, Maira Arauz APRN, 100 mg at 23 131  •  " HYDROcodone-acetaminophen (NORCO) 5-325 MG per tablet 1 tablet, 1 tablet, Oral, Q6H PRN, Maira Arauz APRN, 1 tablet at 01/17/23 0922  •  isosorbide dinitrate (ISORDIL) tablet 30 mg, 30 mg, Oral, Daily, Maira Arauz APRN, 30 mg at 01/18/23 1320  •  lactulose (CHRONULAC) 10 GM/15ML solution 10 g, 10 g, Oral, Daily, Nidhi Turner MD, 10 g at 01/17/23 1733  •  [START ON 1/19/2023] losartan (COZAAR) tablet 100 mg, 100 mg, Oral, Daily, Logan Person MD  •  mannitol 25% (RENAL) injection, 12.5 g, Intravenous, PRN, Nathan Adkins MD  •  nitroglycerin (NITROSTAT) SL tablet 0.4 mg, 0.4 mg, Sublingual, Q5 Min PRN, Maira Arauz APRN  •  ondansetron (ZOFRAN) tablet 4 mg, 4 mg, Oral, Q6H PRN **OR** ondansetron (ZOFRAN) injection 4 mg, 4 mg, Intravenous, Q6H PRN, Maira Arauz APRN  •  pantoprazole (PROTONIX) EC tablet 40 mg, 40 mg, Oral, Q AM, Layo Davis, DO, 40 mg at 01/18/23 0631  •  [COMPLETED] Insert Peripheral IV, , , Once **AND** sodium chloride 0.9 % flush 10 mL, 10 mL, Intravenous, PRN, James Keenan MD  •  sodium chloride 0.9 % flush 10 mL, 10 mL, Intravenous, Q12H, Maira Arauz APRN, 10 mL at 01/17/23 2154  •  sodium chloride 0.9 % flush 10 mL, 10 mL, Intravenous, PRN, Maira Arauz APRN  •  sodium chloride 0.9 % infusion 40 mL, 40 mL, Intravenous, PRN, Maira Arauz APRN    PRN meds  •  acetaminophen **OR** acetaminophen **OR** acetaminophen  •  calcium carbonate  •  gabapentin  •  HYDROcodone-acetaminophen  •  mannitol  •  nitroglycerin  •  ondansetron **OR** ondansetron  •  [COMPLETED] Insert Peripheral IV **AND** sodium chloride  •  sodium chloride  •  sodium chloride    No current facility-administered medications on file prior to encounter.     Current Outpatient Medications on File Prior to Encounter   Medication Sig   • atorvastatin (LIPITOR) 40 MG tablet Take 40 mg by mouth Daily.   • gabapentin (NEURONTIN) 100 MG  capsule Take 1 capsule by mouth 3 (Three) Times a Day for 3 days. (Patient taking differently: Take 100 mg by mouth 3 (Three) Times a Day As Needed.)   • hydrALAZINE (APRESOLINE) 25 MG tablet Take 100 mg by mouth 3 (Three) Times a Day.   • isosorbide dinitrate (ISORDIL) 30 MG tablet Take 30 mg by mouth Daily.   • losartan (COZAAR) 50 MG tablet Take 50 mg by mouth Daily. Take 1 and a half pills daily.   • polyethylene glycol (polyethylene glycol) 17 g packet Take 17 g by mouth Daily.   • amLODIPine (NORVASC) 5 MG tablet Take 5 mg by mouth Daily. Take if SBP >160   • cloNIDine (CATAPRES) 0.1 MG tablet Take 0.1 mg by mouth 2 (Two) Times a Day. Do not take if SBP <130   • clopidogrel (PLAVIX) 75 MG tablet Take 75 mg by mouth Daily.   • pantoprazole (PROTONIX) 40 MG EC tablet Take 40 mg by mouth Daily.       General appearance: Chronically ill-appearing elderly female, flat affect, NAD, alert and cooperative, well groomed  HEENT: Normocephalic, atraumatic, no masses or tenderness  Resp: Even and unlabored  Extremities: No obvious edema, dialysis needles and tubings in place in right upper extremity  Skin: warm, dry    Neurological:   MS: oriented x3, recent/remote memory intact, normal attention/concentration, language intact, no neglect, normal fund of knowledge  CN: visual acuity grossly normal, visual fields full, EOMI, facial sensation equal, no facial droop, tongue midline  Motor: 5/5 in all 4 ext.  Did not perform much lifting with the right secondary to her being actively hooked up to the dialysis tubing however push and pull were 5/5  Sensory: light touch, cold, and vibratory sensation intact in all 4 ext.,  Subjectively describes numbness in both feet  Coordination: Normal finger to nose test on the left, did not perform on the right secondary to the patient actively receiving dialysis with dialysis tubing  Gait and station: Deferred  Rapid alternating movements: normal finger to thumb tap    Laboratory  results:  Lab Results   Component Value Date    TSH 2.920 05/05/2019     Lab Results   Component Value Date    HGBA1C 4.4 06/08/2022     Lab Results   Component Value Date    SQBMTAXW61 919 08/11/2022     No results found for: CHOL, CHLPL  No results found for: TRIG  No results found for: HDL  No results found for: LDL, LDLDIRECT  Lab Results   Component Value Date    WBC 2.16 (L) 01/18/2023    HGB 10.5 (L) 01/18/2023    HCT 32.2 (L) 01/18/2023    MCV 91.5 01/18/2023     (L) 01/18/2023     Lab Results   Component Value Date    GLUCOSE 79 01/18/2023    BUN 46 (H) 01/18/2023    CREATININE 5.67 (H) 01/18/2023    EGFRIFNONA  12/14/2021      Comment:      <15 Indicative of kidney failure.    EGFRIFAFRI 12 (L) 12/14/2021    BCR 8.1 01/18/2023    K 4.9 01/18/2023    CO2 23.0 01/18/2023    CALCIUM 8.5 (L) 01/18/2023    ALBUMIN 3.6 01/18/2023    LABIL2 1.4 08/11/2022    AST 13 01/16/2023    ALT <5 01/16/2023     Lab Results   Component Value Date    PTT 31.7 01/16/2023     Lab Results   Component Value Date    INR 1.10 01/16/2023    INR 1.0 12/09/2022    INR 1.0 08/12/2022    PROTIME 14.3 (H) 01/16/2023    PROTIME 11.3 12/09/2022    PROTIME 11.6 08/12/2022     Brief Urine Lab Results     None          Review and interpretation of imaging:  CT Head Without Contrast    Result Date: 1/16/2023  1. Negative head CT. 2. Extensive degenerative change in the cervical spine with moderate to severe multilevel spinal canal stenosis as discussed level by level above.  This report was finalized on 1/16/2023 1:44 AM by Dr. Shaan Rivera M.D.      CT Cervical Spine Without Contrast    Result Date: 1/16/2023  1. Negative head CT. 2. Extensive degenerative change in the cervical spine with moderate to severe multilevel spinal canal stenosis as discussed level by level above.  This report was finalized on 1/16/2023 1:44 AM by Dr. Shaan Rivera M.D.      MRI Cervical Spine Without Contrast    Result Date: 1/17/2023  Degenerative  change in the cervical spine with severe spinal canal stenosis at C4-5 and C5-6. There is also severe multilevel foraminal stenosis. However, there is no spinal cord signal abnormality.  This report was finalized on 1/17/2023 6:41 PM by Dr. Shaan Rivera M.D.      Results for orders placed during the hospital encounter of 01/16/23    Adult Transthoracic Echo Complete W/ Cont if Necessary Per Protocol    Interpretation Summary  •  Left ventricular systolic function is normal. Calculated left ventricular EF = 64.2% Abnormal global longitudinal LV strain (GLS) = -15.5%. Left ventricle strain data was reviewed by the physician and found to be accurate. Normal left ventricular cavity size noted. Left ventricular wall thickness is consistent with moderate to severe concentric hypertrophy. All left ventricular wall segments contract normally. 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 There is an echo bright appearance of the left and right ventricular myocardium suggestive of amyloid heart disease Left ventricular diastolic function is consistent with (grade II w/high LAP) pseudonormalization.  •  Right ventricular wall thickness is consistent with moderate hypertrophy.  •  Left atrial volume is severely increased.  The right atrial cavity is moderate to severely dilated.  •  There is moderate calcification of the aortic valve. The aortic valve appears trileaflet. Trace aortic valve regurgitation is present. Moderate aortic valve stenosis is present. Aortic valve area is 1.02 cm2. Aortic valve mean pressure gradient is 27.2 mmHg. Aortic valve dimensionless index is 0.30 .  •  Mild mitral annular calcification is present. Mild to moderate mitral valve regurgitation is present. No significant mitral valve stenosis is present.  •  Mild tricuspid valve regurgitation is present. Estimated right ventricular systolic pressure from tricuspid regurgitation is moderately elevated (45-55 mmHg).  •  There is a  moderate (1-2cm) pericardial effusion adjacent to the right atrium. There is no evidence of cardiac tamponade.      Impression/Assessment:  This is a 72-year-old female with past medical history of ESRD on dialysis Mondays Wednesdays and Fridays, hypertension, heart disease who presented to the hospital on 1/16/2023 with complaints of shooting pain in the right side of her neck radiating to her head and right arm.  She had recently had a new fistula placed in her right arm about 3 to 4 months ago.  She reported that she felt that that is when the pain actually started.  She also reports some intermittent numbness as well of her right arm and right hand including mostly all of her fingers except her pinky finger.  She underwent a CT head and CT C-spine.  Her CT head looked okay with nothing acute.  Her CT C-spine revealed severe multilevel spinal canal stenosis.  MRI C-spine was then obtained and revealed degenerative changes in cervical spine with severe spinal canal stenosis at C4-5 and 5-6 as well as severe multilevel foraminal stenosis, no spinal cord signal abnormality noted.  She has been evaluated by neurosurgery who feels there is no neurosurgical intervention at this time needed.  They recommended muscle relaxers for pain throughout the trapezius muscle that the patient was complaining of and outpatient nerve conduction study.    Dr. Srinivasan with our service evaluated her and did not feel that this was a cervical myelopathy but likely an injury from her fistula.  He agreed with the outpatient EMG nerve conduction study which I will have arranged of both upper extremities.  If she continues to have pain of her right arm would recommend starting gabapentin 100 mg p.o. 3 times daily if okayed by her nephrology team.  Currently she is not complaining of any pain.  She states that she does intermittently have numbness of the right arm but does not feel that at present.  She was able to detect cold and vibratory  sensation equally of both upper extremities on exam today.    Diagnosis:  Right upper extremity pain and intermittent numbness  Cervical spinal stenosis  ESRD on HD    Plan:  I will arrange follow-up with Dr. Karlo Toro to perform an EMG/NCS of both upper extremities in the outpatient setting.  Reviewed neurosurgery's note who feels there is no surgical intervention recommended at this time.  If okay with nephrology, if patient continues to complain of pain of her right upper extremity could try gabapentin 100 mg p.o. 3 times daily.  We will sign off, will see again per request.    Case discussed with patient and Dr. Srinivasan, and he agrees with plan above.     KANDIS Rooney

## 2023-01-18 NOTE — PROGRESS NOTES
Name: Sonia Acharya ADMIT: 2023   : 1950  PCP: Marcy Paez PA    MRN: [id number] LOS: 1 days   AGE/SEX: 72 y.o. female  ROOM: [facility]     Subjective   Subjective   Patient seen and examined after HD. Hospital day 2. At time of my examination, patient is awake, alert, resting comfortably in bed, no acute distress.  Tolerated HD well, per patient. Discussed with nursing, no reported acute events overnight.  At present, patient continues to endorse ongoing intermittent upper extremity pain, most notably in the distal regions, is being followed by neurology for this.  Also, remains hypertensive, nephrology adjusted her BP medications today.    Review of Systems   Constitutional: Positive for fatigue. Negative for chills and fever.   Respiratory: Negative for cough and shortness of breath.    Cardiovascular: Negative for chest pain.   Gastrointestinal: Negative for abdominal pain.   Musculoskeletal: Positive for arthralgias.        Objective   Objective   Vital Signs  Temp:  [97.3 °F (36.3 °C)-98.4 °F (36.9 °C)] 98.4 °F (36.9 °C)  Heart Rate:  [46-98] 51  Resp:  [16-20] 20  BP: (139-224)/(56-88) 139/88  SpO2:  [96 %-98 %] 97 %  on   ;   Device (Oxygen Therapy): room air  Body mass index is 21.74 kg/m².  Physical Exam  Vitals and nursing note reviewed.   Constitutional:       General: She is not in acute distress.     Appearance: She is ill-appearing.      Comments: Thin/frail   Eyes:      General: No scleral icterus.     Conjunctiva/sclera: Conjunctivae normal.   Cardiovascular:      Rate and Rhythm: Regular rhythm. Bradycardia present.      Pulses: Normal pulses.      Heart sounds: Normal heart sounds.   Pulmonary:      Effort: Pulmonary effort is normal. No respiratory distress.      Breath sounds: Normal breath sounds.   Abdominal:      General: Bowel sounds are normal. There is no distension.      Palpations: Abdomen is soft.      Tenderness: There is no abdominal tenderness.   Musculoskeletal:          General: Tenderness (with hypertonicity of trapezius, upper posterior cervical muscles) present.      Right lower leg: No edema.      Left lower leg: No edema.      Comments: Decreased ROM   Skin:     General: Skin is warm and dry.      Comments: RADHA DIAZ   Neurological:      General: No focal deficit present.      Mental Status: She is alert and oriented to person, place, and time.     Results Review:  I reviewed the patient's new clinical results.  I reviewed the patient's new imaging results and agree with the interpretation.  I reviewed the patient's other test results and agree with the interpretation  I personally viewed and interpreted the patient's EKG/Telemetry data      Results from last 7 days   Lab Units 01/18/23 0658 01/17/23 0532 01/16/23 0758 01/16/23 0042   WBC 10*3/mm3 2.16* 2.03* 2.03* 2.03*   HEMOGLOBIN g/dL 10.5* 10.7* 10.5* 10.2*   PLATELETS 10*3/mm3 126* 123* 144 139*     Results from last 7 days   Lab Units 01/18/23 0658 01/17/23 0532 01/16/23 0758 01/16/23 0042   SODIUM mmol/L 135* 134* 135* 137   POTASSIUM mmol/L 4.9 4.5 5.5* 4.9   CHLORIDE mmol/L 102 98 100 100   CO2 mmol/L 23.0 27.9 25.2 25.2   BUN mg/dL 46* 29* 50* 48*   CREATININE mg/dL 5.67* 3.97* 6.35* 6.36*   GLUCOSE mg/dL 79 112* 73 110*   EGFR mL/min/1.73 7.4* 11.4* 6.5* 6.5*     Results from last 7 days   Lab Units 01/18/23 0658 01/17/23 0532 01/16/23 0042   ALBUMIN g/dL 3.6 3.8 3.9   BILIRUBIN mg/dL  --   --  0.2   ALK PHOS U/L  --   --  125*   AST (SGOT) U/L  --   --  13   ALT (SGPT) U/L  --   --  <5     Results from last 7 days   Lab Units 01/18/23 0658 01/17/23 0532 01/16/23  0758 01/16/23  0042   CALCIUM mg/dL 8.5* 8.8 8.6 9.0   ALBUMIN g/dL 3.6 3.8  --  3.9   PHOSPHORUS mg/dL 5.0* 4.2  --   --      Results from last 7 days   Lab Units 01/16/23 0042   LACTATE mmol/L 0.8     Glucose   Date/Time Value Ref Range Status   01/17/2023 0404 117 70 - 130 mg/dL Final     Comment:     Meter: [id number] :  [id number] Bryant Braga RN       MRI Cervical Spine Without Contrast    Result Date: 1/17/2023  Degenerative change in the cervical spine with severe spinal canal stenosis at C4-5 and C5-6. There is also severe multilevel foraminal stenosis. However, there is no spinal cord signal abnormality.  This report was finalized on 1/17/2023 6:41 PM by Dr. Shaan Rivera M.D.      Scheduled Medications  amLODIPine, 5 mg, Oral, Q24H  atorvastatin, 40 mg, Oral, Daily  cloNIDine, 0.1 mg, Oral, Q12H  clopidogrel, 75 mg, Oral, Daily  hydrALAZINE, 100 mg, Oral, TID  isosorbide dinitrate, 30 mg, Oral, Daily  lactulose, 10 g, Oral, Daily  [START ON 1/19/2023] losartan, 100 mg, Oral, Daily  pantoprazole, 40 mg, Oral, Q AM  sodium chloride, 10 mL, Intravenous, Q12H    Infusions   Diet  Diet: Renal Diets; Low Sodium (2-3g), Low Potassium, Low Phosphorus; Texture: Regular Texture (IDDSI 7); Fluid Consistency: Thin (IDDSI 0)       Assessment/Plan     Active Hospital Problems    Diagnosis  POA   • **Right arm pain [M79.601]  Yes   • Pancytopenia (HCC) [D61.818]  Yes   • Hyperphosphatemia [E83.39]  No   • Cervical radiculopathy [M54.12]  Yes   • ESRD on dialysis (HCC) [N18.6, Z99.2]  Not Applicable   • Bradycardia [R00.1]  Yes   • ANGIE (obstructive sleep apnea) [G47.33]  Yes   • Chronic diastolic CHF (congestive heart failure) ([facility]) [I50.32]  Yes   • Aortic stenosis [I35.0]  Yes   • HTN (hypertension) [I10]  Yes   • Anemia, chronic disease [D63.8]  Yes      Resolved Hospital Problems   No resolved problems to display.       72 y.o. female with history of ESRD on HD, HFpEF, CAD, HTN, ANGIE who presents to [facility] with complaints of worsening right upper extremity pain/discomfort, admitted with Right arm pain.    Right upper extremity pain  - Etiology likely multifactorial in nature, with multiple possible contributing factors. She does endorse symptoms of associated intermittent numbness and tingling, which raises concern  "for possible nerve root impingement. However, on physical exam, there is appreciable cervical musculature/trapezius hypertonicity without notable reproduction of neuropathic symptoms, which is more suggestive of muscle spasm/strain.   - CT cervical spine showing multiple levels of cervical stenosis, which, with specific head maneuvers, such as cervical extension, could explain a possible intermittent impingement leading to radiculopathy symptoms.   - However, [facility] was consulted and evaluated her and felt that no neurosurgical intervention warranted at present, however, stated that consideration of nerve conduction study in outpatient setting could be performed if symptoms persist.   - Neurology consult placed 01/17, they evaluated today, per note: \"Dr. Srinivasan with our service evaluated her and did not feel that this was a cervical myelopathy but likely an injury from her fistula.  He agreed with the outpatient EMG nerve conduction study which I will have arranged of both upper extremities.  If she continues to have pain of her right arm would recommend starting gabapentin 100 mg p.o. 3 times daily if okayed by her nephrology team.\" Gabapentin subsequently ordered PRN as above. Greatly appreciate help.  - Consulted PT, no discharge needs at this time.  - Will closely monitor for now and see how her symptoms/physical exam are while she is here to help guide future management decisions.   - Outpatient follow ups as above.    Hypertension  -Throughout her hospitalization, patient has had multiple episodes with significant elevations in her blood pressure, elevated to SBP >180 on multiple occasions.  She tells me that she has long-standing history of this.   - Orthostatic vital signs + on 01/17, per discussion with nursing. Nephrology assisting with management.  - Today, 01/18, they are increasing her losartan to 100 mg daily, in addition to continuing her home clonidine and amlodipine as previously prescribed.  - " Continue medications as currently prescribed (see orders). Closely monitor BP to guide further management. Can add additional PRN if needed, normally, Labetalol PRN would be a good choice here, however, with her concomitant bradycardia, this is not an option at present, so would have to use alternative option, like Hydralazine.    ESRD on HD complicated by hyperphosphatemia  - Nephrology following, patient underwent HD 01/18, she states that she tolerated it well. Will continue to follow their plans/recommendations. Greatly appreciate their help. Phos elevated at 5.0, management per Nephrology.    HFpEF  CAD s/p PCI to RCA  Bradycardia  - Cardiology consulted due to history of HFpEF, CAD s/p PCI to RCA and bradycardia. Noted their note, stating that bradycardia is asymptomatic at present, no acute intervention warranted, felt that her clonidine could be contributing to this, which was recently started as outpatient, so recommended holding it or resuming if needed due to uncontrolled BP and concerns regarding rebound HTN with the understanding that she will need close monitoring, as this could make bradycardia worse. Otherwise, recommended continuing current cardiac medications, including Plavix, as prescribed. Continue to closely monitor on telemetry.  - Cardiology subsequently signed off, patient does have ongoing bradycardia, however, stable from prior. Just need to continue to closely monitor.    Pancytopenia  - WBC low at 2.16, hemoglobin low at 10.5, platelets low at 126, all stable and relatively unchanged from prior. Etiology likely multifactorial. Will monitor for now, if no improvement and/or worsens in AM, will pursue further workup. Repeat CBC in AM.      · SCDs for DVT prophylaxis.  · Full code.  · Discussed with patient and nursing staff.  · Anticipate discharge home when cleared by consultants.      Layo Davis DO  [facility]  01/18/23

## 2023-01-18 NOTE — NURSING NOTE
Dialysis complete, removed 1.7 liters with this treatment and no complications noted.  1st time use of av fistula and worked very well. Vital signs are in epic.

## 2023-01-18 NOTE — PROGRESS NOTES
[facility] Progress Note      Patient Name: Sonia Acharya  : 1950  MRN: [id number]  Primary Care Physician:  Marcy Paez PA  Date of admission: 2023    Subjective     Interval History:   Follow up ESRD  Seen and examined on HD; right arm aVF accessed without difficulty  Right arm pain unchanged  Breathing is comfortable on room air    Review of Systems:   As noted above    Objective     Vitals:   Temp:  [97.3 °F (36.3 °C)-98.3 °F (36.8 °C)] 97.3 °F (36.3 °C)  Heart Rate:  [45-98] 46  Resp:  [16-20] 16  BP: (152-203)/(56-84) 203/77  No intake or output data in the 24 hours ending 23 1127    Physical Exam:    General Appearance: alert, appropriate, NAD  Qb 300, 174/63, HR 48, 2.3 L UF goal  Skin: warm and dry  HEENT: oral mucosa normal, nonicteric sclera  Neck: supple, no JVD  Lungs: Clear anteriorly; not labored on room air  Heart: RR, bradycardic, normal S1 and S2  Abdomen: soft, nontender, nondistended. + bs  Extremities: no edema   Vascular: RUE AVF thrill, bruit.  Chronic discoloration along scar.   Neuro: normal speech and mental status     Scheduled Meds:     amLODIPine, 5 mg, Oral, Q24H  atorvastatin, 40 mg, Oral, Daily  cloNIDine, 0.1 mg, Oral, Q12H  clopidogrel, 75 mg, Oral, Daily  hydrALAZINE, 100 mg, Oral, TID  isosorbide dinitrate, 30 mg, Oral, Daily  lactulose, 10 g, Oral, Daily  losartan, 50 mg, Oral, Daily  pantoprazole, 40 mg, Oral, Q AM  sodium chloride, 10 mL, Intravenous, Q12H      IV Meds:        Results Reviewed:   I have personally reviewed the results from the time of this admission to 2023 11:27 EST     Results from last 7 days   Lab Units 23  0658 23  0532 23  0758 23  0042   SODIUM mmol/L 135* 134* 135* 137   POTASSIUM mmol/L 4.9 4.5 5.5* 4.9   CHLORIDE mmol/L 102 98 100 100   CO2 mmol/L 23.0 27.9 25.2 25.2   BUN mg/dL 46* 29* 50* 48*   CREATININE mg/dL 5.67* 3.97* 6.35* 6.36*   CALCIUM mg/dL 8.5* 8.8 8.6 9.0    BILIRUBIN mg/dL  --   --   --  0.2   ALK PHOS U/L  --   --   --  125*   ALT (SGPT) U/L  --   --   --  <5   AST (SGOT) U/L  --   --   --  13   GLUCOSE mg/dL 79 112* 73 110*       Estimated Creatinine Clearance: 7 mL/min (A) (by C-G formula based on SCr of 5.67 mg/dL (H)).    Results from last 7 days   Lab Units 01/18/23  0658 01/17/23  0532   PHOSPHORUS mg/dL 5.0* 4.2             Results from last 7 days   Lab Units 01/18/23  0658 01/17/23  0532 01/16/23  0758 01/16/23  0042   WBC 10*3/mm3 2.16* 2.03* 2.03* 2.03*   HEMOGLOBIN g/dL 10.5* 10.7* 10.5* 10.2*   PLATELETS 10*3/mm3 126* 123* 144 139*       Results from last 7 days   Lab Units 01/16/23  0042   INR  1.10       Assessment / Plan     ASSESSMENT:  1.  ESRD: Volume stable by exam; electrolytes compensated.  HD underway now  2.  Right arm pain and numbness of unclear etiology  3.  Anemia CKD  4.  Pancytopenia   5.  Hypertension of ESRD: Poorly controlled  6.  Bradycardia     PLAN:  1.  HD today (MW) via AV fistula  2.  Increase losartan to 100 mg daily    Logan Person MD  01/18/23  11:27 [facility]    [facility]  [phone]

## 2023-01-19 ENCOUNTER — READMISSION MANAGEMENT (OUTPATIENT)
Dept: CALL CENTER | Facility: HOSPITAL | Age: 73
End: 2023-01-19
Payer: MEDICARE

## 2023-01-19 VITALS
HEART RATE: 45 BPM | HEIGHT: 60 IN | RESPIRATION RATE: 20 BRPM | OXYGEN SATURATION: 100 % | BODY MASS INDEX: 22.38 KG/M2 | DIASTOLIC BLOOD PRESSURE: 64 MMHG | WEIGHT: 113.98 LBS | SYSTOLIC BLOOD PRESSURE: 161 MMHG | TEMPERATURE: 98.4 F

## 2023-01-19 LAB
ALBUMIN SERPL-MCNC: 3.4 G/DL (ref 3.5–5.2)
ANION GAP SERPL CALCULATED.3IONS-SCNC: 10.2 MMOL/L (ref 5–15)
BASOPHILS # BLD AUTO: 0.03 10*3/MM3 (ref 0–0.2)
BASOPHILS NFR BLD AUTO: 1.3 % (ref 0–1.5)
BUN SERPL-MCNC: 36 MG/DL (ref 8–23)
BUN/CREAT SERPL: 7.8 (ref 7–25)
CALCIUM SPEC-SCNC: 8.8 MG/DL (ref 8.6–10.5)
CHLORIDE SERPL-SCNC: 101 MMOL/L (ref 98–107)
CO2 SERPL-SCNC: 23.8 MMOL/L (ref 22–29)
CREAT SERPL-MCNC: 4.59 MG/DL (ref 0.57–1)
DEPRECATED RDW RBC AUTO: 47 FL (ref 37–54)
EGFRCR SERPLBLD CKD-EPI 2021: 9.6 ML/MIN/1.73
EOSINOPHIL # BLD AUTO: 0.2 10*3/MM3 (ref 0–0.4)
EOSINOPHIL NFR BLD AUTO: 8.9 % (ref 0.3–6.2)
ERYTHROCYTE [DISTWIDTH] IN BLOOD BY AUTOMATED COUNT: 13.9 % (ref 12.3–15.4)
FERRITIN SERPL-MCNC: 1275 NG/ML (ref 13–150)
FOLATE SERPL-MCNC: 4.93 NG/ML (ref 4.78–24.2)
GLUCOSE SERPL-MCNC: 99 MG/DL (ref 65–99)
HCT VFR BLD AUTO: 34.1 % (ref 34–46.6)
HGB BLD-MCNC: 11 G/DL (ref 12–15.9)
IMM GRANULOCYTES # BLD AUTO: 0 10*3/MM3 (ref 0–0.05)
IMM GRANULOCYTES NFR BLD AUTO: 0 % (ref 0–0.5)
LYMPHOCYTES # BLD AUTO: 0.85 10*3/MM3 (ref 0.7–3.1)
LYMPHOCYTES NFR BLD AUTO: 37.8 % (ref 19.6–45.3)
MCH RBC QN AUTO: 30.1 PG (ref 26.6–33)
MCHC RBC AUTO-ENTMCNC: 32.3 G/DL (ref 31.5–35.7)
MCV RBC AUTO: 93.2 FL (ref 79–97)
MONOCYTES # BLD AUTO: 0.32 10*3/MM3 (ref 0.1–0.9)
MONOCYTES NFR BLD AUTO: 14.2 % (ref 5–12)
NEUTROPHILS NFR BLD AUTO: 0.85 10*3/MM3 (ref 1.7–7)
NEUTROPHILS NFR BLD AUTO: 37.8 % (ref 42.7–76)
NRBC BLD AUTO-RTO: 0.4 /100 WBC (ref 0–0.2)
PHOSPHATE SERPL-MCNC: 4.2 MG/DL (ref 2.5–4.5)
PLATELET # BLD AUTO: 112 10*3/MM3 (ref 140–450)
PLATELET # BLD AUTO: 112 10*3/MM3 (ref 140–450)
PLATELETS.RETICULATED NFR BLD AUTO: 4.8 % (ref 0.9–6.5)
PMV BLD AUTO: 11.3 FL (ref 6–12)
POTASSIUM SERPL-SCNC: 4.7 MMOL/L (ref 3.5–5.2)
RBC # BLD AUTO: 3.66 10*6/MM3 (ref 3.77–5.28)
SODIUM SERPL-SCNC: 135 MMOL/L (ref 136–145)
VIT B12 BLD-MCNC: 757 PG/ML (ref 211–946)
WBC NRBC COR # BLD: 2.25 10*3/MM3 (ref 3.4–10.8)

## 2023-01-19 PROCEDURE — 86225 DNA ANTIBODY NATIVE: CPT | Performed by: INTERNAL MEDICINE

## 2023-01-19 PROCEDURE — 99232 SBSQ HOSP IP/OBS MODERATE 35: CPT | Performed by: INTERNAL MEDICINE

## 2023-01-19 PROCEDURE — G0378 HOSPITAL OBSERVATION PER HR: HCPCS

## 2023-01-19 PROCEDURE — 86235 NUCLEAR ANTIGEN ANTIBODY: CPT | Performed by: INTERNAL MEDICINE

## 2023-01-19 PROCEDURE — 88185 FLOWCYTOMETRY/TC ADD-ON: CPT | Performed by: INTERNAL MEDICINE

## 2023-01-19 PROCEDURE — 88184 FLOWCYTOMETRY/ TC 1 MARKER: CPT | Performed by: INTERNAL MEDICINE

## 2023-01-19 PROCEDURE — 85055 RETICULATED PLATELET ASSAY: CPT | Performed by: INTERNAL MEDICINE

## 2023-01-19 PROCEDURE — 85025 COMPLETE CBC W/AUTO DIFF WBC: CPT | Performed by: STUDENT IN AN ORGANIZED HEALTH CARE EDUCATION/TRAINING PROGRAM

## 2023-01-19 PROCEDURE — 80069 RENAL FUNCTION PANEL: CPT | Performed by: STUDENT IN AN ORGANIZED HEALTH CARE EDUCATION/TRAINING PROGRAM

## 2023-01-19 PROCEDURE — 82607 VITAMIN B-12: CPT | Performed by: INTERNAL MEDICINE

## 2023-01-19 PROCEDURE — 88182 CELL MARKER STUDY: CPT | Performed by: INTERNAL MEDICINE

## 2023-01-19 PROCEDURE — 82746 ASSAY OF FOLIC ACID SERUM: CPT | Performed by: INTERNAL MEDICINE

## 2023-01-19 PROCEDURE — 82728 ASSAY OF FERRITIN: CPT | Performed by: INTERNAL MEDICINE

## 2023-01-19 RX ORDER — HYDROCODONE BITARTRATE AND ACETAMINOPHEN 5; 325 MG/1; MG/1
1 TABLET ORAL EVERY 6 HOURS PRN
Qty: 10 TABLET | Refills: 0 | Status: SHIPPED | OUTPATIENT
Start: 2023-01-19 | End: 2023-01-23

## 2023-01-19 RX ORDER — GABAPENTIN 100 MG/1
100 CAPSULE ORAL 3 TIMES DAILY PRN
Start: 2023-01-19 | End: 2023-01-22

## 2023-01-19 RX ORDER — LACTULOSE 10 G/15ML
10 SOLUTION ORAL DAILY
Qty: 30 ML | Refills: 0 | Status: SHIPPED | OUTPATIENT
Start: 2023-01-19

## 2023-01-19 RX ORDER — LOSARTAN POTASSIUM 100 MG/1
100 TABLET ORAL DAILY
Qty: 30 TABLET | Refills: 0 | Status: SHIPPED | OUTPATIENT
Start: 2023-01-19

## 2023-01-19 RX ADMIN — ISOSORBIDE DINITRATE 30 MG: 20 TABLET ORAL at 09:23

## 2023-01-19 RX ADMIN — ATORVASTATIN CALCIUM 40 MG: 20 TABLET, FILM COATED ORAL at 09:23

## 2023-01-19 RX ADMIN — CLOPIDOGREL 75 MG: 75 TABLET, FILM COATED ORAL at 09:23

## 2023-01-19 RX ADMIN — PANTOPRAZOLE SODIUM 40 MG: 40 TABLET, DELAYED RELEASE ORAL at 06:11

## 2023-01-19 RX ADMIN — CLONIDINE HYDROCHLORIDE 0.1 MG: 0.1 TABLET ORAL at 09:23

## 2023-01-19 RX ADMIN — AMLODIPINE BESYLATE 5 MG: 5 TABLET ORAL at 09:23

## 2023-01-19 RX ADMIN — LACTULOSE 10 G: 10 SOLUTION ORAL at 09:23

## 2023-01-19 RX ADMIN — LOSARTAN POTASSIUM 100 MG: 100 TABLET, FILM COATED ORAL at 09:23

## 2023-01-19 RX ADMIN — HYDROCODONE BITARTRATE AND ACETAMINOPHEN 1 TABLET: 5; 325 TABLET ORAL at 01:52

## 2023-01-19 RX ADMIN — GABAPENTIN 100 MG: 100 CAPSULE ORAL at 13:40

## 2023-01-19 RX ADMIN — HYDROCODONE BITARTRATE AND ACETAMINOPHEN 1 TABLET: 5; 325 TABLET ORAL at 10:36

## 2023-01-19 RX ADMIN — HYDRALAZINE HYDROCHLORIDE 100 MG: 50 TABLET, FILM COATED ORAL at 09:23

## 2023-01-19 NOTE — DISCHARGE SUMMARY
[facility]    Date of Discharge:  1/19/2023    PCP: Marcy Paez PA    Discharge Diagnosis:   Active Hospital Problems    Diagnosis  POA   • **Right arm pain [M79.601]  Yes   • Pancytopenia (HCC) [D61.818]  Yes   • Hyperphosphatemia [E83.39]  No   • Cervical radiculopathy [M54.12]  Yes   • ESRD on dialysis (HCC) [N18.6, Z99.2]  Not Applicable   • Bradycardia [R00.1]  Yes   • ANGIE (obstructive sleep apnea) [G47.33]  Yes   • Chronic diastolic CHF (congestive heart failure) (HCC) [I50.32]  Yes   • Aortic stenosis [I35.0]  Yes   • HTN (hypertension) [I10]  Yes   • Anemia, chronic disease [D63.8]  Yes      Resolved Hospital Problems   No resolved problems to display.          Consults     Date and Time Order Name Status Description    1/17/2023  3:34 PM Hematology & Oncology Inpatient Consult Completed     1/17/2023 10:29 AM Inpatient Neurology Consult General Completed     1/16/2023  4:25 AM Inpatient Vascular Surgery Consult Completed     1/16/2023  4:25 AM Inpatient Nephrology Consult Completed     1/16/2023  4:25 AM Inpatient Cardiology Consult Completed     1/16/2023  4:25 AM Inpatient Neurosurgery Consult Completed     1/16/2023  1:54 AM [facility] (on-call MD unless specified) Details          Hospital Course  72 y.o. female with history of ESRD on HD, HFpEF, CAD, HTN, ANGIE who presented to [facility] with complaints of right upper extremity arm pain. She was evaluated by neurology and neurosurgery. No neurosurgical intervention was warranted. Neurology thought she possibly had injury from her fistula and ordered outpatient EMG of bilateral upper extremities.    She was also seen by cardiology and nephrology for heart failure, aortic stenosis, and bradycardia. Bradycardia has been stable and represented asymptomatic bradycardia and cardiology did not recommend any intervention. There is some concern for amyloidosis and cardiology recommended that  she follow-up with her primary cardiologist (Dr. Wali Oodm for consideration of any further testing). Nephrology managed her dialysis adjusted her blood pressure medications.    Results for orders placed during the hospital encounter of 01/16/23    Adult Transthoracic Echo Complete W/ Cont if Necessary Per Protocol    Interpretation Summary  •  Left ventricular systolic function is normal. Calculated left ventricular EF = 64.2% Abnormal global longitudinal LV strain (GLS) = -15.5%. Left ventricle strain data was reviewed by the physician and found to be accurate. Normal left ventricular cavity size noted. Left ventricular wall thickness is consistent with moderate to severe concentric hypertrophy. All left ventricular wall segments contract normally. 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 There is an echo bright appearance of the left and right ventricular myocardium suggestive of amyloid heart disease Left ventricular diastolic function is consistent with (grade II w/high LAP) pseudonormalization.  •  Right ventricular wall thickness is consistent with moderate hypertrophy.  •  Left atrial volume is severely increased.  The right atrial cavity is moderate to severely dilated.  •  There is moderate calcification of the aortic valve. The aortic valve appears trileaflet. Trace aortic valve regurgitation is present. Moderate aortic valve stenosis is present. Aortic valve area is 1.02 cm2. Aortic valve mean pressure gradient is 27.2 mmHg. Aortic valve dimensionless index is 0.30 .  •  Mild mitral annular calcification is present. Mild to moderate mitral valve regurgitation is present. No significant mitral valve stenosis is present.  •  Mild tricuspid valve regurgitation is present. Estimated right ventricular systolic pressure from tricuspid regurgitation is moderately elevated (45-55 mmHg).  •  There is a moderate (1-2cm) pericardial effusion adjacent to the right atrium. There is no evidence of  cardiac tamponade.     She had mild thrombocytopenia would recheck CBC with PCP within a week    I discussed the patient's findings and my recommendations with patient and nursing staff.    Condition on Discharge: Improved. She would like to go home today. She lives with family.    Temp:  [97.3 °F (36.3 °C)-98.7 °F (37.1 °C)] 98.4 °F (36.9 °C)  Heart Rate:  [41-51] 45  Resp:  [16-20] 20  BP: (139-224)/(58-88) 161/64  Body mass index is 22.26 kg/m².    Physical Exam  Constitutional:       General: She is not in acute distress.     Appearance: Normal appearance. She is not toxic-appearing.   HENT:      Head: Normocephalic and atraumatic.   Cardiovascular:      Rate and Rhythm: Normal rate and regular rhythm.   Pulmonary:      Effort: Pulmonary effort is normal. No respiratory distress.      Breath sounds: Normal breath sounds. No wheezing or rhonchi.   Abdominal:      General: Bowel sounds are normal.      Palpations: Abdomen is soft.      Tenderness: There is no abdominal tenderness. There is no guarding or rebound.   Musculoskeletal:         General: No swelling.      Cervical back: Normal range of motion.      Right lower leg: No edema.      Left lower leg: No edema.   Skin:     General: Skin is warm and dry.   Neurological:      General: No focal deficit present.      Mental Status: She is alert and oriented to person, place, and time.   Psychiatric:         Mood and Affect: Mood normal.         Behavior: Behavior normal.         Thought Content: Thought content normal.       Disposition: Home or Self Care       Discharge Medications      New Medications      Instructions Start Date   HYDROcodone-acetaminophen 5-325 MG per tablet  Commonly known as: NORCO   1 tablet, Oral, Every 6 Hours PRN      lactulose 10 GM/15ML solution  Commonly known as: CHRONULAC   10 g, Oral, Daily         Changes to Medications      Instructions Start Date   gabapentin 100 MG capsule  Commonly known as: NEURONTIN  What changed:   · when  to take this  · reasons to take this   100 mg, Oral, 3 Times Daily PRN      losartan 100 MG tablet  Commonly known as: COZAAR  What changed:   · medication strength  · how much to take  · additional instructions   100 mg, Oral, Daily         Continue These Medications      Instructions Start Date   amLODIPine 5 MG tablet  Commonly known as: NORVASC   5 mg, Oral, Daily, Take if SBP >160      atorvastatin 40 MG tablet  Commonly known as: LIPITOR   40 mg, Oral, Daily      cloNIDine 0.1 MG tablet  Commonly known as: CATAPRES   0.1 mg, Oral, 2 Times Daily, Do not take if SBP <130      clopidogrel 75 MG tablet  Commonly known as: PLAVIX   75 mg, Oral, Daily      hydrALAZINE 25 MG tablet  Commonly known as: APRESOLINE   100 mg, Oral, 3 Times Daily      isosorbide dinitrate 30 MG tablet  Commonly known as: ISORDIL   30 mg, Oral, Daily      pantoprazole 40 MG EC tablet  Commonly known as: PROTONIX   40 mg, Oral, Daily         Stop These Medications    polyethylene glycol 17 g packet  Commonly known as: MIRALAX           Diet Instructions     Diet: Regular, Renal, Specialty Diet; Thin Liquids, No Restrictions; Low Sodium, Low Potassium, Low Phosphorus; 3,000 mg Na      Discharge Diet:  Regular  Renal  Specialty Diet       Fluid Consistency: Thin Liquids, No Restrictions    Specialty Diets:  Low Sodium  Low Potassium  Low Phosphorus       Low Sodium Restriction: 3,000 mg Na         Activity Instructions     Activity as Tolerated           Additional Instructions for the Follow-ups that You Need to Schedule     Call MD for problems / concerns.   As directed         Contact information for follow-up providers     Marcy Paez PA Follow up in 1 week(s).    Specialty: Physician Assistant  Why: post hospital follow up, Lab check follow-up CBC  Contact information:  [address]  [address] [address] [phone]             Emil Odom MD Follow up.    Specialty: Cardiology  Contact information:  [address] [facility]  [address]  [place] [address]  [phone]                   Contact information for after-discharge care     Dialysis/Infusion     ILDA HERRERA .    Service: Dialysis  Contact information:  [address], [address]  [place] [id number] [phone]                            Pending Labs     Order Current Status    GUEVARA Comprehensive Panel In process    Flow Cytometry, Blood In process         aHyden Granda MD  [facility]  01/19/23    Discharge time spent greater than 30 minutes.

## 2023-01-19 NOTE — PROGRESS NOTES
[facility] Progress Note      Patient Name: Sonia Acharya  : 1950  MRN: [id number]  Primary Care Physician:  Marcy Paez PA  Date of admission: 2023    Subjective     Interval History:   Follow up ESRD  Arm pain is gone, though does report right-sided neck pain  Appetite is good; no N/V  Breathing is comfortable  Last HD was yesterday    Review of Systems:   As noted above    Objective     Vitals:   Temp:  [98.1 °F (36.7 °C)-98.7 °F (37.1 °C)] 98.4 °F (36.9 °C)  Heart Rate:  [41-51] 45  Resp:  [16-20] 20  BP: (139-224)/(58-88) 161/64  No intake or output data in the 24 hours ending 23    Physical Exam:    General Appearance: alert, appropriate, NAD, smiling  Skin: warm and dry  HEENT: oral mucosa normal, nonicteric sclera  Neck: supple, no JVD  Lungs: Clear anteriorly; not labored on room air  Heart: RR, bradycardic, normal S1 and S2  Abdomen: soft, nontender, nondistended. + bs  Extremities: no edema   Vascular: RUE AVF thrill, bruit.  Chronic discoloration along scar.   Neuro: normal speech and mental status     Scheduled Meds:     amLODIPine, 5 mg, Oral, Q24H  atorvastatin, 40 mg, Oral, Daily  cloNIDine, 0.1 mg, Oral, Q12H  clopidogrel, 75 mg, Oral, Daily  hydrALAZINE, 100 mg, Oral, TID  isosorbide dinitrate, 30 mg, Oral, Daily  lactulose, 10 g, Oral, Daily  losartan, 100 mg, Oral, Daily  pantoprazole, 40 mg, Oral, Q AM  sodium chloride, 10 mL, Intravenous, Q12H      IV Meds:        Results Reviewed:   I have personally reviewed the results from the time of this admission to 2023 09:22 EST     Results from last 7 days   Lab Units 23  0750 23  0658 23  0532 23  0758 23  0042   SODIUM mmol/L 135* 135* 134*   < > 137   POTASSIUM mmol/L 4.7 4.9 4.5   < > 4.9   CHLORIDE mmol/L 101 102 98   < > 100   CO2 mmol/L 23.8 23.0 27.9   < > 25.2   BUN mg/dL 36* 46* 29*   < > 48*   CREATININE mg/dL 4.59* 5.67* 3.97*   < > 6.36*    CALCIUM mg/dL 8.8 8.5* 8.8   < > 9.0   BILIRUBIN mg/dL  --   --   --   --  0.2   ALK PHOS U/L  --   --   --   --  125*   ALT (SGPT) U/L  --   --   --   --  <5   AST (SGOT) U/L  --   --   --   --  13   GLUCOSE mg/dL 99 79 112*   < > 110*    < > = values in this interval not displayed.       Estimated Creatinine Clearance: 8.9 mL/min (A) (by C-G formula based on SCr of 4.59 mg/dL (H)).    Results from last 7 days   Lab Units 01/19/23  0750 01/18/23  0658 01/17/23  0532   PHOSPHORUS mg/dL 4.2 5.0* 4.2             Results from last 7 days   Lab Units 01/19/23  0752 01/18/23  0658 01/17/23  0532 01/16/23  0758 01/16/23  0042   WBC 10*3/mm3 2.25* 2.16* 2.03* 2.03* 2.03*   HEMOGLOBIN g/dL 11.0* 10.5* 10.7* 10.5* 10.2*   PLATELETS 10*3/mm3 112*  112* 126* 123* 144 139*       Results from last 7 days   Lab Units 01/16/23  0042   INR  1.10       Assessment / Plan     ASSESSMENT:  1.  ESRD: Volume stable by exam; electrolytes compensated.  HD performed yesterday without difficulty via right arm fistula  2.  Right arm pain and numbness, resolved  3.  Anemia CKD  4.  Pancytopenia   5.  Hypertension of ESRD: Improved control  6.  Bradycardia     PLAN:  1.  HD MWF via AV fistula  2.  Hopefully home today    Logan Person MD  01/19/23  09:22 [facility]    [facility]  [phone]

## 2023-01-19 NOTE — PROGRESS NOTES
[facility] INPATIENT PROGRESS NOTE    Length of Stay:  1 days    CHIEF COMPLAINT:  ESRD on HD, anemia secondary to ESRD, smoldering multiple myeloma, pancytopenia    SUBJECTIVE:   Patient reports that her right upper extremity pain has improved however she is now having some pain in her right neck.  She is preparing to be discharged home this afternoon.    ROS:  Review of Systems   Comprehensive review of systems was obtained with pertinent positive findings as noted in the interval history above.  All other systems negative.      OBJECTIVE:  Vitals:    01/18/23 1632 01/18/23 2111 01/19/23 0300 01/19/23 0500   BP: 143/73 146/62  154/58   BP Location: Left arm Left arm  Left arm   Patient Position: Lying Lying  Lying   Pulse: (!) 47 (!) 44  (!) 41   Resp: 20 20 18   Temp: 98.7 °F (37.1 °C) 98.6 °F (37 °C)  98.1 °F (36.7 °C)   TempSrc: Oral Oral  Oral   SpO2: 97% 99%  96%   Weight:   51.7 kg (113 lb 15.7 oz)    Height:             PHYSICAL EXAMINATION:  General: Alert and orient x3 no distress  Chest/Lungs: Clear to auscultation bilaterally  Heart: Regular rate and rhythm  Abdomen/GI: Soft nontender nondistended bowel sounds present  Extremities: Trace bilateral lower extremity edema    DIAGNOSTIC DATA:  Results Review:     I reviewed the patient's new clinical results.    Results from last 7 days   Lab Units 01/18/23  0658 01/17/23  0532 01/16/23  0758   WBC 10*3/mm3 2.16* 2.03* 2.03*   HEMOGLOBIN g/dL 10.5* 10.7* 10.5*   HEMATOCRIT % 32.2* 33.8* 31.3*   PLATELETS 10*3/mm3 126* 123* 144      Results from last 7 days   Lab Units 01/18/23  0658 01/17/23  0532 01/16/23  0758 01/16/23  0042   SODIUM mmol/L 135* 134* 135* 137   POTASSIUM mmol/L 4.9 4.5 5.5* 4.9   CHLORIDE mmol/L 102 98 100 100   CO2 mmol/L 23.0 27.9 25.2 25.2   BUN mg/dL 46* 29* 50* 48*   CREATININE mg/dL 5.67* 3.97* 6.35* 6.36*   CALCIUM mg/dL 8.5* 8.8 8.6 9.0   BILIRUBIN mg/dL  --   --   --  0.2   ALK PHOS U/L  --   --   --  125*   ALT  (SGPT) U/L  --   --   --  <5   AST (SGOT) U/L  --   --   --  13   GLUCOSE mg/dL 79 112* 73 110*      Lab Results   Component Value Date    NEUTROABS 0.84 (L) 01/18/2023     Results from last 7 days   Lab Units 01/16/23  0042   INR  1.10   APTT seconds 31.7               Assessment & Plan   ASSESSMENT/PLAN:  This is a 72 y.o. female with:     *Lambda light chain smoldering multiple myeloma  • Patient has smoldering multiple myeloma followed by Dr. Aparicio in the [facility].    • Chronic pancytopenia with WBC in the 2-3000 range, hemoglobin in the 8-9 range, platelet count in the low 100,000 range.    • Labs on 8/11/2022 with B12 919, folate low at 5.9, , fibrinogen low 181, iron 101, TIBC 135, iron saturation 75%.  PF4 heparin dependent platelet antibody negative.    • Serum protein electrophoresis on 8/12/2022 showed band of restricted mobility in the gamma region.  Immunofixation showed monoclonal free lambda light chains, could not rule out IgD lambda or IgE lambda.    • Bone marrow biopsy on 8/19/2022 which showed on aspirate plasma cell neoplasm 10-20% plasma cells, lambda restricted by flow cytometry.  By flow cytometry, plasma cells represented 10%.  There was normocellular marrow with maturing trilineage hematopoiesis with no dysplasia.  Normal karyotype.   • PET scan 11/2/2022 showed no evidence for multiple myeloma bone lesions.    • After review by Dr. Kaur 11/9/2022, patient felt to have smoldering lambda light chain multiple myeloma.  ESRD was felt to be unrelated and predated diagnosis by many years.  Anemia felt to be more related to ESRD.  Therefore, no treatment recommended and patient has continued on a course of observation.     *Chronic pancytopenia  • Chronic pancytopenia with WBC in the 2-3000 range, hemoglobin in the 8-9 range, platelet count in the low 100,000 range.    • Labs on 8/11/2022 with B12 919, folate low at 5.9, , fibrinogen low 181, iron 101, TIBC 135, iron saturation  75%.  PF4 heparin dependent platelet antibody negative.  • Unclear to what extent pancytopenia is related to underlying multiple myeloma  • Labs currently with stable values from 1/18/2023 with WBC 2.16, ANC 0.84, hemoglobin 10.5, platelet count 126,000.  Differential abnormal with 39% neutrophils, 37% lymphocytes, 16% monocytes, 7% eosinophils.  • Labs on 1/19/2023 with folate 4.93, B12 757, IPF normal at 4.8%.  Peripheral blood flow cytometry pending.  • Question whether leukopenia could be autoimmune in nature, GUEVARA panel pending.     *Anemia  • Primarily felt to be related to underlying ESRD  • Patient does have underlying multiple myeloma however is felt to be smoldering and not necessarily the main cause of her anemia.  • Managed by nephrology on dialysis  • Labs on 1/16/2023 with iron 73, iron saturation 47%, TIBC 155.  Ferritin not performed.  Labs consistent with anemia secondary to chronic disease/ESRD  • Hemoglobin has been stable in the 10-11 range     *ESRD on HD  • Likely related to underlying diabetes mellitus and hypertension  • Patient has been on dialysis for 9 years  • Patient has right upper extremity AV fistula  • HD on MWF     *Right upper extremity pain  • Patient has been experiencing significant difficulty with pain in her right upper extremity since October 2022 after placement of AV fistula.    • Doppler evaluation of right upper extremity AV fistula 12/9/2022 showed mature appearing fistula, patent with elevated velocities suggestive of stenosis.    • Right upper extremity Doppler 12/9/2022 showed no evidence of DVT.    • She was admitted on 1/16/2023 for further evaluation of this issue.    • CT head and cervical spine 1/16/2023 showed degenerative change in the cervical spine with moderate to severe stenosis.    • MRI cervical spine 1/17/2023 showed degenerative change with severe stenosis.    • Pain is felt to be multifactorial.  Neurosurgery recommended consideration of outpatient  nerve conduction study.    • Neurology did not feel that this was a cervical myelopathy but was likely injury from her fistula.    • Initiated gabapentin 100 mg 3 times daily with plans for EMG.  Physical therapy consulted.     *History of left upper extremity DVT  • Patient has had left upper extremity DVT 8/10/2022, received anticoagulation with Eliquis for a few months by her report but was subsequently discontinued.     *Coronary artery disease  • Patient does have history of coronary artery disease with previous PCI to RCA.    • She does continue on Plavix 75 mg daily.        PLAN:   1. Patient has recent diagnosis of smoldering lambda light chain multiple myeloma followed by Dr. Kaur in the [facility] system and continuing on a course of observation/surveillance  2. Chronic anemia appears to be related to ESRD, managed by nephrology on dialysis  3. Pancytopenia is chronic in nature, labs relatively unchanged from prior values.  Unclear if related to underlying multiple myeloma.  4. Patient advised to begin OTC folic acid supplement with borderline low level  5. Labs pending from 1/19/2023 with peripheral blood flow cytometry and GUEVARA panel  6. Patient advised to follow-up with Dr. Kaur in hematology at [facility] for CBC in the next few weeks.  She is due back there in May for repeat evaluation of her multiple myeloma.    Discussed with patient at bedside.           Shaan Taylor MD

## 2023-01-19 NOTE — PROGRESS NOTES
Case Management Discharge Note      Final Note: Pt discharging home with assist from daughter and no needs noted. Ailyn Joe LCSW         Selected Continued Care - Admitted Since 1/16/2023     Destination    No services have been selected for the patient.              Durable Medical Equipment    No services have been selected for the patient.              Dialysis/Infusion Coordination complete.    Service Provider Selected Services Address Phone Fax Patient Preferred    [facility] [address], [address], [place] [address] [phone] -- --          Home Medical Care    No services have been selected for the patient.              Therapy    No services have been selected for the patient.              Community Resources    No services have been selected for the patient.              Community & DME    No services have been selected for the patient.                  Transportation Services  Private: Car    Final Discharge Disposition Code: 01 - home or self-care

## 2023-01-19 NOTE — PLAN OF CARE
Problem: Adult Inpatient Plan of Care  Goal: Plan of Care Review  Outcome: Met  Goal: Patient-Specific Goal (Individualized)  Outcome: Met  Goal: Absence of Hospital-Acquired Illness or Injury  Outcome: Met  Intervention: Identify and Manage Fall Risk  Recent Flowsheet Documentation  Taken 1/19/2023 0851 by Arjun Castro RN  Safety Promotion/Fall Prevention:   assistive device/personal items within reach   activity supervised   clutter free environment maintained   fall prevention program maintained   elopement precautions   gait belt   lighting adjusted   mobility aid in reach   nonskid shoes/slippers when out of bed   muscle strengthening facilitated   room organization consistent   safety round/check completed   toileting scheduled  Intervention: Prevent Skin Injury  Recent Flowsheet Documentation  Taken 1/19/2023 0851 by Arjun Castro RN  Body Position: position changed independently  Intervention: Prevent and Manage VTE (Venous Thromboembolism) Risk  Recent Flowsheet Documentation  Taken 1/19/2023 0851 by Arjun Castro RN  Activity Management: activity adjusted per tolerance  VTE Prevention/Management:   bilateral   sequential compression devices on  Intervention: Prevent Infection  Recent Flowsheet Documentation  Taken 1/19/2023 0851 by Arjun Castro RN  Infection Prevention:   cohorting utilized   environmental surveillance performed   hand hygiene promoted   equipment surfaces disinfected   personal protective equipment utilized   rest/sleep promoted   visitors restricted/screened   single patient room provided  Goal: Optimal Comfort and Wellbeing  Outcome: Met  Goal: Readiness for Transition of Care  Outcome: Met     Problem: Fall Injury Risk  Goal: Absence of Fall and Fall-Related Injury  Outcome: Met  Intervention: Identify and Manage Contributors  Recent Flowsheet Documentation  Taken 1/19/2023 0851 by Arjun Castro RN  Medication Review/Management: medications reviewed  Intervention: Promote  Injury-Free Environment  Recent Flowsheet Documentation  Taken 1/19/2023 0851 by rAjun Castro, RN  Safety Promotion/Fall Prevention:   assistive device/personal items within reach   activity supervised   clutter free environment maintained   fall prevention program maintained   elopement precautions   gait belt   lighting adjusted   mobility aid in reach   nonskid shoes/slippers when out of bed   muscle strengthening facilitated   room organization consistent   safety round/check completed   toileting scheduled     Problem: Hypertension Comorbidity  Goal: Blood Pressure in Desired Range  Outcome: Met  Intervention: Maintain Blood Pressure Management  Recent Flowsheet Documentation  Taken 1/19/2023 0851 by Arjun Castro, RN  Medication Review/Management: medications reviewed   Goal Outcome Evaluation:

## 2023-01-20 LAB
CENTROMERE B AB SER-ACNC: <0.2 AI (ref 0–0.9)
CHROMATIN AB SERPL-ACNC: <0.2 AI (ref 0–0.9)
DSDNA AB SER-ACNC: <1 IU/ML (ref 0–9)
ENA JO1 AB SER-ACNC: <0.2 AI (ref 0–0.9)
ENA RNP AB SER-ACNC: 1.4 AI (ref 0–0.9)
ENA SCL70 AB SER-ACNC: <0.2 AI (ref 0–0.9)
ENA SM AB SER-ACNC: <0.2 AI (ref 0–0.9)
ENA SS-A AB SER-ACNC: <0.2 AI (ref 0–0.9)
ENA SS-B AB SER-ACNC: <0.2 AI (ref 0–0.9)
Lab: ABNORMAL

## 2023-01-20 NOTE — OUTREACH NOTE
Prep Survey    Flowsheet Row Responses   [facility] [place]   Is LACE score < 7 ? No   Eligibility Readm Mgmt   Discharge diagnosis right arm pain   Does the patient have one of the following disease processes/diagnoses(primary or secondary)? Other   Does the patient have Home health ordered? No   Is there a DME ordered? No   Comments regarding appointments ILDA HERRERA   Prep survey completed? Yes          LISA PEDROZA - Registered Nurse

## 2023-01-23 ENCOUNTER — READMISSION MANAGEMENT (OUTPATIENT)
Dept: CALL CENTER | Facility: HOSPITAL | Age: 73
End: 2023-01-23
Payer: MEDICARE

## 2023-01-23 LAB — REF LAB TEST METHOD: NORMAL

## 2023-01-23 NOTE — OUTREACH NOTE
Medical Week 1 Survey    Flowsheet Row Responses   [facility] [place] patient discharged from? [place]   Does the patient have one of the following disease processes/diagnoses(primary or secondary)? Other   Week 1 attempt successful? No   Unsuccessful attempts Attempt 1  [All numbers attemtped-no answer]          DASHAWN RAJPUT - Registered Nurse

## 2023-01-25 ENCOUNTER — READMISSION MANAGEMENT (OUTPATIENT)
Dept: CALL CENTER | Facility: HOSPITAL | Age: 73
End: 2023-01-25
Payer: MEDICARE

## 2023-01-25 NOTE — OUTREACH NOTE
Medical Week 1 Survey    Flowsheet Row Responses   [facility] patient discharged from? [place]   Does the patient have one of the following disease processes/diagnoses(primary or secondary)? Other   Week 1 attempt successful? No   Unsuccessful attempts Attempt 2          SIA CHAN - Registered Nurse

## 2023-01-28 ENCOUNTER — TELEPHONE (OUTPATIENT)
Dept: NEUROLOGY | Facility: CLINIC | Age: 73
End: 2023-01-28
Payer: MEDICARE

## 2023-01-28 NOTE — TELEPHONE ENCOUNTER
Lu put in a order for patient to have a EMG. Do you know if Lu is wanting patient EMG / NCS, or just EMG. If a patient is needing EMG / NCS can you correct order for EMG/NCV. I closed other order     Please advise.

## 2023-01-30 ENCOUNTER — READMISSION MANAGEMENT (OUTPATIENT)
Dept: CALL CENTER | Facility: HOSPITAL | Age: 73
End: 2023-01-30
Payer: MEDICARE

## 2023-01-30 NOTE — OUTREACH NOTE
Medical Week 1 Survey    Flowsheet Row Responses   [facility] patient discharged from? [place]   Does the patient have one of the following disease processes/diagnoses(primary or secondary)? Other   Week 1 attempt successful? Yes   Call start time 1100   Call end time 1102   Discharge diagnosis right arm pain   Person spoke with today (if not patient) and relationship amelia Alfonso   Meds reviewed with patient/caregiver? Yes   Is the patient having any side effects they believe may be caused by any medication additions or changes? No   Does the patient have all medications ordered at discharge? Yes   Is the patient taking all medications as directed (includes completed medication regime)? Yes   Comments regarding appointments ILDA HERRERA   Does the patient have a primary care provider?  Yes   Does the patient have an appointment with their PCP within 7 days of discharge? No   Has the patient kept scheduled appointments due by today? Yes   Comments dtr reports that pt was at a f/u with Dr. Serra at present time   Has home health visited the patient within 72 hours of discharge? N/A   Psychosocial issues? No   Did the patient receive a copy of their discharge instructions? Yes   Nursing interventions Reviewed instructions with patient   What is the patient's perception of their health status since discharge? Improving  [Call brief with dtr as reports they wre at a f/u appt at time of call---reports pt was better.  Unable to go into great detail or education at time of call]   Week 1 call completed? Yes          VA YADAV - Registered Nurse

## 2023-02-09 ENCOUNTER — READMISSION MANAGEMENT (OUTPATIENT)
Dept: CALL CENTER | Facility: HOSPITAL | Age: 73
End: 2023-02-09
Payer: MEDICARE

## 2023-02-09 NOTE — OUTREACH NOTE
Medical Week 2 Survey    Flowsheet Row Responses   [facility] patient discharged from? [place]   Does the patient have one of the following disease processes/diagnoses(primary or secondary)? Other   Week 2 attempt successful? Yes   Call start time 1619   Discharge diagnosis right arm pain   Call end time 1622   Person spoke with today (if not patient) and relationship amelia Alfonso   Meds reviewed with patient/caregiver? Yes   Does the patient have all medications ordered at discharge? Yes   Is the patient taking all medications as directed (includes completed medication regime)? Yes   Comments regarding appointments ILDA HERRERA   Does the patient have a primary care provider?  Yes   Has the patient kept scheduled appointments due by today? Yes   Psychosocial issues? No   Did the patient receive a copy of their discharge instructions? Yes   Nursing interventions Reviewed instructions with patient   What is the patient's perception of their health status since discharge? Improving   Is the patient/caregiver able to teach back the hierarchy of who to call/visit for symptoms/problems? PCP, Specialist, Home health nurse, Urgent Care, ED, 911 Yes   If the patient is a current smoker, are they able to teach back resources for cessation? Not a smoker   Week 2 Call Completed? Yes   Is the patient interested in additional calls from an ambulatory ?  NOTE:  applies to high risk patients requiring additional follow-up. No   Wrap up additional comments Dtr reports patient is doning well at this time.  She denies needs.          TREVOR LOMELI - Registered Nurse

## 2023-12-20 ENCOUNTER — HOSPITAL ENCOUNTER (INPATIENT)
Facility: HOSPITAL | Age: 73
LOS: 2 days | Discharge: HOME | End: 2023-12-24
Attending: EMERGENCY MEDICINE | Admitting: INTERNAL MEDICINE
Payer: MEDICARE

## 2023-12-20 ENCOUNTER — APPOINTMENT (OUTPATIENT)
Dept: GENERAL RADIOLOGY | Facility: HOSPITAL | Age: 73
End: 2023-12-20
Payer: MEDICARE

## 2023-12-20 DIAGNOSIS — J18.9 PNEUMONIA OF RIGHT LOWER LOBE DUE TO INFECTIOUS ORGANISM: ICD-10-CM

## 2023-12-20 DIAGNOSIS — Z99.2 ESRD ON HEMODIALYSIS: Primary | ICD-10-CM

## 2023-12-20 DIAGNOSIS — E87.5 HYPERKALEMIA: ICD-10-CM

## 2023-12-20 DIAGNOSIS — N18.6 ESRD ON HEMODIALYSIS: Primary | ICD-10-CM

## 2023-12-20 LAB
ALBUMIN SERPL-MCNC: 4.1 G/DL (ref 3.5–5.2)
ALBUMIN/GLOB SERPL: 1.5 G/DL
ALP SERPL-CCNC: 115 U/L (ref 39–117)
ALT SERPL W P-5'-P-CCNC: 9 U/L (ref 1–33)
ANION GAP SERPL CALCULATED.3IONS-SCNC: 13.9 MMOL/L (ref 5–15)
AST SERPL-CCNC: 21 U/L (ref 1–32)
B PARAPERT DNA SPEC QL NAA+PROBE: NOT DETECTED
B PERT DNA SPEC QL NAA+PROBE: NOT DETECTED
BASOPHILS # BLD AUTO: 0.03 10*3/MM3 (ref 0–0.2)
BASOPHILS NFR BLD AUTO: 0.6 % (ref 0–1.5)
BILIRUB SERPL-MCNC: 0.4 MG/DL (ref 0–1.2)
BUN SERPL-MCNC: 47 MG/DL (ref 8–23)
BUN/CREAT SERPL: 6.6 (ref 7–25)
C PNEUM DNA NPH QL NAA+NON-PROBE: NOT DETECTED
CALCIUM SPEC-SCNC: 9.5 MG/DL (ref 8.6–10.5)
CHLORIDE SERPL-SCNC: 99 MMOL/L (ref 98–107)
CO2 SERPL-SCNC: 26.1 MMOL/L (ref 22–29)
CREAT SERPL-MCNC: 7.17 MG/DL (ref 0.57–1)
D-LACTATE SERPL-SCNC: 0.9 MMOL/L (ref 0.5–2)
DEPRECATED RDW RBC AUTO: 47.1 FL (ref 37–54)
EGFRCR SERPLBLD CKD-EPI 2021: 5.6 ML/MIN/1.73
EOSINOPHIL # BLD AUTO: 0.15 10*3/MM3 (ref 0–0.4)
EOSINOPHIL NFR BLD AUTO: 3 % (ref 0.3–6.2)
ERYTHROCYTE [DISTWIDTH] IN BLOOD BY AUTOMATED COUNT: 14.6 % (ref 12.3–15.4)
FLUAV H1 2009 PAND RNA NPH QL NAA+PROBE: NOT DETECTED
FLUAV H1 HA GENE NPH QL NAA+PROBE: NOT DETECTED
FLUAV H3 RNA NPH QL NAA+PROBE: NOT DETECTED
FLUAV SUBTYP SPEC NAA+PROBE: NOT DETECTED
FLUBV RNA ISLT QL NAA+PROBE: NOT DETECTED
GLOBULIN UR ELPH-MCNC: 2.7 GM/DL
GLUCOSE SERPL-MCNC: 74 MG/DL (ref 65–99)
HADV DNA SPEC NAA+PROBE: NOT DETECTED
HCOV 229E RNA SPEC QL NAA+PROBE: NOT DETECTED
HCOV HKU1 RNA SPEC QL NAA+PROBE: NOT DETECTED
HCOV NL63 RNA SPEC QL NAA+PROBE: NOT DETECTED
HCOV OC43 RNA SPEC QL NAA+PROBE: NOT DETECTED
HCT VFR BLD AUTO: 32.9 % (ref 34–46.6)
HGB BLD-MCNC: 10.5 G/DL (ref 12–15.9)
HMPV RNA NPH QL NAA+NON-PROBE: NOT DETECTED
HOLD SPECIMEN: NORMAL
HOLD SPECIMEN: NORMAL
HPIV1 RNA ISLT QL NAA+PROBE: NOT DETECTED
HPIV2 RNA SPEC QL NAA+PROBE: NOT DETECTED
HPIV3 RNA NPH QL NAA+PROBE: NOT DETECTED
HPIV4 P GENE NPH QL NAA+PROBE: NOT DETECTED
IMM GRANULOCYTES # BLD AUTO: 0.02 10*3/MM3 (ref 0–0.05)
IMM GRANULOCYTES NFR BLD AUTO: 0.4 % (ref 0–0.5)
INR PPP: 1.09 (ref 0.9–1.1)
LYMPHOCYTES # BLD AUTO: 0.6 10*3/MM3 (ref 0.7–3.1)
LYMPHOCYTES NFR BLD AUTO: 11.9 % (ref 19.6–45.3)
M PNEUMO IGG SER IA-ACNC: NOT DETECTED
MAGNESIUM SERPL-MCNC: 2.5 MG/DL (ref 1.6–2.4)
MCH RBC QN AUTO: 28.5 PG (ref 26.6–33)
MCHC RBC AUTO-ENTMCNC: 31.9 G/DL (ref 31.5–35.7)
MCV RBC AUTO: 89.4 FL (ref 79–97)
MONOCYTES # BLD AUTO: 0.47 10*3/MM3 (ref 0.1–0.9)
MONOCYTES NFR BLD AUTO: 9.3 % (ref 5–12)
MRSA DNA SPEC QL NAA+PROBE: NORMAL
NEUTROPHILS NFR BLD AUTO: 3.78 10*3/MM3 (ref 1.7–7)
NEUTROPHILS NFR BLD AUTO: 74.8 % (ref 42.7–76)
NRBC BLD AUTO-RTO: 0 /100 WBC (ref 0–0.2)
PLATELET # BLD AUTO: 228 10*3/MM3 (ref 140–450)
PMV BLD AUTO: 8.7 FL (ref 6–12)
POTASSIUM SERPL-SCNC: 6.6 MMOL/L (ref 3.5–5.2)
PROCALCITONIN SERPL-MCNC: 0.24 NG/ML (ref 0–0.25)
PROT SERPL-MCNC: 6.8 G/DL (ref 6–8.5)
PROTHROMBIN TIME: 14.2 SECONDS (ref 11.7–14.2)
RBC # BLD AUTO: 3.68 10*6/MM3 (ref 3.77–5.28)
RHINOVIRUS RNA SPEC NAA+PROBE: NOT DETECTED
RSV RNA NPH QL NAA+NON-PROBE: NOT DETECTED
SARS-COV-2 RNA NPH QL NAA+NON-PROBE: NOT DETECTED
SODIUM SERPL-SCNC: 139 MMOL/L (ref 136–145)
WBC NRBC COR # BLD AUTO: 5.05 10*3/MM3 (ref 3.4–10.8)
WHOLE BLOOD HOLD COAG: NORMAL
WHOLE BLOOD HOLD SPECIMEN: NORMAL

## 2023-12-20 PROCEDURE — 5A1D70Z PERFORMANCE OF URINARY FILTRATION, INTERMITTENT, LESS THAN 6 HOURS PER DAY: ICD-10-PCS | Performed by: HOSPITALIST

## 2023-12-20 PROCEDURE — 93005 ELECTROCARDIOGRAM TRACING: CPT | Performed by: NURSE PRACTITIONER

## 2023-12-20 PROCEDURE — 80053 COMPREHEN METABOLIC PANEL: CPT | Performed by: NURSE PRACTITIONER

## 2023-12-20 PROCEDURE — G0378 HOSPITAL OBSERVATION PER HR: HCPCS

## 2023-12-20 PROCEDURE — 71045 X-RAY EXAM CHEST 1 VIEW: CPT

## 2023-12-20 PROCEDURE — 36415 COLL VENOUS BLD VENIPUNCTURE: CPT

## 2023-12-20 PROCEDURE — 83735 ASSAY OF MAGNESIUM: CPT | Performed by: INTERNAL MEDICINE

## 2023-12-20 PROCEDURE — 85610 PROTHROMBIN TIME: CPT | Performed by: NURSE PRACTITIONER

## 2023-12-20 PROCEDURE — 84145 PROCALCITONIN (PCT): CPT | Performed by: NURSE PRACTITIONER

## 2023-12-20 PROCEDURE — 94640 AIRWAY INHALATION TREATMENT: CPT

## 2023-12-20 PROCEDURE — 0202U NFCT DS 22 TRGT SARS-COV-2: CPT | Performed by: INTERNAL MEDICINE

## 2023-12-20 PROCEDURE — 25010000002 CEFTRIAXONE PER 250 MG: Performed by: NURSE PRACTITIONER

## 2023-12-20 PROCEDURE — 99291 CRITICAL CARE FIRST HOUR: CPT

## 2023-12-20 PROCEDURE — 87040 BLOOD CULTURE FOR BACTERIA: CPT | Performed by: NURSE PRACTITIONER

## 2023-12-20 PROCEDURE — 94799 UNLISTED PULMONARY SVC/PX: CPT

## 2023-12-20 PROCEDURE — 93010 ELECTROCARDIOGRAM REPORT: CPT | Performed by: INTERNAL MEDICINE

## 2023-12-20 PROCEDURE — 85025 COMPLETE CBC W/AUTO DIFF WBC: CPT | Performed by: NURSE PRACTITIONER

## 2023-12-20 PROCEDURE — 83605 ASSAY OF LACTIC ACID: CPT | Performed by: NURSE PRACTITIONER

## 2023-12-20 PROCEDURE — 94664 DEMO&/EVAL PT USE INHALER: CPT

## 2023-12-20 PROCEDURE — 87641 MR-STAPH DNA AMP PROBE: CPT | Performed by: NURSE PRACTITIONER

## 2023-12-20 RX ORDER — DEXTROSE MONOHYDRATE 25 G/50ML
25 INJECTION, SOLUTION INTRAVENOUS ONCE
Status: DISCONTINUED | OUTPATIENT
Start: 2023-12-20 | End: 2023-12-20

## 2023-12-20 RX ORDER — PANTOPRAZOLE SODIUM 40 MG/1
40 TABLET, DELAYED RELEASE ORAL DAILY
Status: DISCONTINUED | OUTPATIENT
Start: 2023-12-20 | End: 2023-12-24

## 2023-12-20 RX ORDER — AMLODIPINE BESYLATE 10 MG/1
10 TABLET ORAL DAILY
Status: DISCONTINUED | OUTPATIENT
Start: 2023-12-21 | End: 2023-12-24 | Stop reason: HOSPADM

## 2023-12-20 RX ORDER — ONDANSETRON 4 MG/1
4 TABLET, ORALLY DISINTEGRATING ORAL EVERY 6 HOURS PRN
Status: DISCONTINUED | OUTPATIENT
Start: 2023-12-20 | End: 2023-12-24 | Stop reason: HOSPADM

## 2023-12-20 RX ORDER — POLYETHYLENE GLYCOL 3350 17 G/17G
17 POWDER, FOR SOLUTION ORAL DAILY PRN
Status: DISCONTINUED | OUTPATIENT
Start: 2023-12-20 | End: 2023-12-22

## 2023-12-20 RX ORDER — CLOPIDOGREL BISULFATE 75 MG/1
75 TABLET ORAL DAILY
Status: DISCONTINUED | OUTPATIENT
Start: 2023-12-20 | End: 2023-12-20

## 2023-12-20 RX ORDER — SODIUM CHLORIDE 0.9 % (FLUSH) 0.9 %
10 SYRINGE (ML) INJECTION AS NEEDED
Status: DISCONTINUED | OUTPATIENT
Start: 2023-12-20 | End: 2023-12-24 | Stop reason: HOSPADM

## 2023-12-20 RX ORDER — AMLODIPINE BESYLATE 5 MG/1
5 TABLET ORAL ONCE
Status: COMPLETED | OUTPATIENT
Start: 2023-12-20 | End: 2023-12-20

## 2023-12-20 RX ORDER — CLONIDINE HYDROCHLORIDE 0.1 MG/1
0.1 TABLET ORAL 2 TIMES DAILY
Status: DISCONTINUED | OUTPATIENT
Start: 2023-12-20 | End: 2023-12-24 | Stop reason: HOSPADM

## 2023-12-20 RX ORDER — ONDANSETRON 2 MG/ML
4 INJECTION INTRAMUSCULAR; INTRAVENOUS EVERY 6 HOURS PRN
Status: DISCONTINUED | OUTPATIENT
Start: 2023-12-20 | End: 2023-12-24 | Stop reason: HOSPADM

## 2023-12-20 RX ORDER — IPRATROPIUM BROMIDE AND ALBUTEROL SULFATE 2.5; .5 MG/3ML; MG/3ML
3 SOLUTION RESPIRATORY (INHALATION)
Status: DISCONTINUED | OUTPATIENT
Start: 2023-12-20 | End: 2023-12-24 | Stop reason: HOSPADM

## 2023-12-20 RX ORDER — ACETAMINOPHEN 650 MG/1
650 SUPPOSITORY RECTAL EVERY 4 HOURS PRN
Status: DISCONTINUED | OUTPATIENT
Start: 2023-12-20 | End: 2023-12-24 | Stop reason: HOSPADM

## 2023-12-20 RX ORDER — AMLODIPINE BESYLATE 5 MG/1
5 TABLET ORAL DAILY
Status: DISCONTINUED | OUTPATIENT
Start: 2023-12-20 | End: 2023-12-20

## 2023-12-20 RX ORDER — GUAIFENESIN 600 MG/1
1200 TABLET, EXTENDED RELEASE ORAL EVERY 12 HOURS SCHEDULED
Status: DISCONTINUED | OUTPATIENT
Start: 2023-12-20 | End: 2023-12-22

## 2023-12-20 RX ORDER — MANNITOL 250 MG/ML
12.5 INJECTION, SOLUTION INTRAVENOUS AS NEEDED
Status: CANCELLED | OUTPATIENT
Start: 2023-12-20 | End: 2023-12-21

## 2023-12-20 RX ORDER — ACETAMINOPHEN 160 MG/5ML
650 SOLUTION ORAL EVERY 4 HOURS PRN
Status: DISCONTINUED | OUTPATIENT
Start: 2023-12-20 | End: 2023-12-24 | Stop reason: HOSPADM

## 2023-12-20 RX ORDER — GABAPENTIN 100 MG/1
100 CAPSULE ORAL 3 TIMES DAILY PRN
Status: DISCONTINUED | OUTPATIENT
Start: 2023-12-20 | End: 2023-12-24 | Stop reason: HOSPADM

## 2023-12-20 RX ORDER — SODIUM CHLORIDE 0.9 % (FLUSH) 0.9 %
10 SYRINGE (ML) INJECTION EVERY 12 HOURS SCHEDULED
Status: DISCONTINUED | OUTPATIENT
Start: 2023-12-20 | End: 2023-12-24 | Stop reason: HOSPADM

## 2023-12-20 RX ORDER — AMOXICILLIN 250 MG
2 CAPSULE ORAL 2 TIMES DAILY
Status: DISCONTINUED | OUTPATIENT
Start: 2023-12-20 | End: 2023-12-22

## 2023-12-20 RX ORDER — HYDRALAZINE HYDROCHLORIDE 50 MG/1
100 TABLET, FILM COATED ORAL 3 TIMES DAILY
Status: DISCONTINUED | OUTPATIENT
Start: 2023-12-20 | End: 2023-12-24 | Stop reason: HOSPADM

## 2023-12-20 RX ORDER — BISACODYL 10 MG
10 SUPPOSITORY, RECTAL RECTAL DAILY PRN
Status: DISCONTINUED | OUTPATIENT
Start: 2023-12-20 | End: 2023-12-22

## 2023-12-20 RX ORDER — SODIUM CHLORIDE 9 MG/ML
40 INJECTION, SOLUTION INTRAVENOUS AS NEEDED
Status: DISCONTINUED | OUTPATIENT
Start: 2023-12-20 | End: 2023-12-24 | Stop reason: HOSPADM

## 2023-12-20 RX ORDER — LACTULOSE 10 G/15ML
10 SOLUTION ORAL DAILY
Status: DISCONTINUED | OUTPATIENT
Start: 2023-12-20 | End: 2023-12-20

## 2023-12-20 RX ORDER — ATORVASTATIN CALCIUM 20 MG/1
40 TABLET, FILM COATED ORAL DAILY
Status: DISCONTINUED | OUTPATIENT
Start: 2023-12-20 | End: 2023-12-24 | Stop reason: HOSPADM

## 2023-12-20 RX ORDER — ACETAMINOPHEN 325 MG/1
650 TABLET ORAL EVERY 4 HOURS PRN
Status: DISCONTINUED | OUTPATIENT
Start: 2023-12-20 | End: 2023-12-24 | Stop reason: HOSPADM

## 2023-12-20 RX ORDER — LOSARTAN POTASSIUM 100 MG/1
100 TABLET ORAL DAILY
Status: DISCONTINUED | OUTPATIENT
Start: 2023-12-20 | End: 2023-12-24 | Stop reason: HOSPADM

## 2023-12-20 RX ORDER — BISACODYL 5 MG/1
5 TABLET, DELAYED RELEASE ORAL DAILY PRN
Status: DISCONTINUED | OUTPATIENT
Start: 2023-12-20 | End: 2023-12-22

## 2023-12-20 RX ORDER — NITROGLYCERIN 0.4 MG/1
0.4 TABLET SUBLINGUAL
Status: DISCONTINUED | OUTPATIENT
Start: 2023-12-20 | End: 2023-12-24 | Stop reason: HOSPADM

## 2023-12-20 RX ORDER — CALCIUM GLUCONATE 20 MG/ML
1000 INJECTION, SOLUTION INTRAVENOUS ONCE
Status: DISCONTINUED | OUTPATIENT
Start: 2023-12-20 | End: 2023-12-20

## 2023-12-20 RX ADMIN — HYDRALAZINE HYDROCHLORIDE 100 MG: 50 TABLET, FILM COATED ORAL at 18:31

## 2023-12-20 RX ADMIN — Medication 10 ML: at 15:08

## 2023-12-20 RX ADMIN — CLONIDINE HYDROCHLORIDE 0.1 MG: 0.1 TABLET ORAL at 20:19

## 2023-12-20 RX ADMIN — AMLODIPINE BESYLATE 5 MG: 5 TABLET ORAL at 17:42

## 2023-12-20 RX ADMIN — ACETAMINOPHEN 650 MG: 325 TABLET, FILM COATED ORAL at 20:23

## 2023-12-20 RX ADMIN — ISOSORBIDE DINITRATE 30 MG: 20 TABLET ORAL at 18:31

## 2023-12-20 RX ADMIN — CEFTRIAXONE SODIUM 1000 MG: 1 INJECTION, POWDER, FOR SOLUTION INTRAMUSCULAR; INTRAVENOUS at 15:07

## 2023-12-20 RX ADMIN — Medication 10 ML: at 20:28

## 2023-12-20 RX ADMIN — LOSARTAN POTASSIUM 100 MG: 100 TABLET, FILM COATED ORAL at 18:31

## 2023-12-20 RX ADMIN — PANTOPRAZOLE SODIUM 40 MG: 40 TABLET, DELAYED RELEASE ORAL at 16:47

## 2023-12-20 RX ADMIN — GUAIFENESIN 1200 MG: 600 TABLET, EXTENDED RELEASE ORAL at 20:19

## 2023-12-20 RX ADMIN — ATORVASTATIN CALCIUM 40 MG: 20 TABLET, FILM COATED ORAL at 16:47

## 2023-12-20 RX ADMIN — IPRATROPIUM BROMIDE AND ALBUTEROL SULFATE 3 ML: 2.5; .5 SOLUTION RESPIRATORY (INHALATION) at 20:52

## 2023-12-20 RX ADMIN — AMLODIPINE BESYLATE 5 MG: 5 TABLET ORAL at 16:47

## 2023-12-20 NOTE — NURSING NOTE
Dialysis complete, removed 3 liters with this treatment and no complications noted.  Pre and post vitals are in epic.

## 2023-12-20 NOTE — PLAN OF CARE
Goal Outcome Evaluation:  Plan of Care Reviewed With: patient        Progress: improving  Outcome Evaluation: Patient received HD prior to arriving to unit (right AV fistula--no sticks, BP in right arm).  Patient hypertensive and home meds restarted.  Rocephin administered per IV.  Respiratory panel completed see results. VS and labs monitored.

## 2023-12-20 NOTE — ED PROVIDER NOTES
EMERGENCY DEPARTMENT ENCOUNTER    Room Number:  03/03  PCP: Marcy Paez PA  Historian: patient      HPI:  Chief Complaint: cough/soa  A complete HPI/ROS/PMH/PSH/SH/FH are unobtainable due to: nothing  Context: Sonia Acharya is a pleasant afebrile ambulatory 73 y.o. black female who presents to the ED c/o shortness of breath      Has dialysis fistula to right upper extremity, states her last treatment was last Wednesday  Follows with Dr. Pizarro as her nephrologist  complaining of peripheral edema  Diagnosed with RSV recently, states cough is worsening  Oxygen saturation 92% on room air, states she does have supplemental O2 at home she uses as needed  Does not follow with a pulmonologist      PAST MEDICAL HISTORY  Active Ambulatory Problems     Diagnosis Date Noted    Generalized abdominal pain 12/13/2021    ODALIS (acute kidney injury) 12/13/2021    Anemia, chronic disease 12/13/2021    Metabolic acidosis, increased anion gap 12/13/2021    Obesity (BMI 30-39.9) 12/13/2021    HTN (hypertension) 12/13/2021    Chest pain, atypical 12/13/2021    Cervical radiculopathy 01/16/2023    ESRD on dialysis 01/16/2023    Bradycardia 01/16/2023    Right arm pain 01/16/2023    ANGIE (obstructive sleep apnea) 01/16/2023    Chronic diastolic CHF (congestive heart failure) 01/16/2023    Aortic stenosis 01/16/2023    Pancytopenia 01/18/2023    Hyperphosphatemia 01/18/2023     Resolved Ambulatory Problems     Diagnosis Date Noted    No Resolved Ambulatory Problems     Past Medical History:   Diagnosis Date    Arthritis     Chronic kidney disease     Heart disease     Hypertension          PAST SURGICAL HISTORY  Past Surgical History:   Procedure Laterality Date    BREAST SURGERY      DIALYSIS FISTULA CREATION      EYE SURGERY      HERNIA REPAIR  2017    Dr. Sung         FAMILY HISTORY  Family History   Problem Relation Age of Onset    Heart disease Father     Breast cancer Sister          SOCIAL HISTORY  Social History      Socioeconomic History    Marital status: Single   Tobacco Use    Smoking status: Never    Smokeless tobacco: Never   Substance and Sexual Activity    Alcohol use: No    Drug use: No    Sexual activity: Defer         ALLERGIES  Patient has no known allergies.        REVIEW OF SYSTEMS  Review of Systems   Constitutional:  Positive for fatigue.   Respiratory:  Positive for cough and shortness of breath.          PHYSICAL EXAM  ED Triage Vitals   Temp Heart Rate Resp BP SpO2   12/20/23 0730 12/20/23 0730 12/20/23 0741 12/20/23 0735 12/20/23 0730   97.3 °F (36.3 °C) 59 16 (!) 188/81 90 %      Temp src Heart Rate Source Patient Position BP Location FiO2 (%)   -- -- -- -- --              Physical Exam      GENERAL: alert & oriented x 4, mild tachypnea/respiratory distress but able to speak in sentences and phrases  HENT: nares patent, mucous membranes are moist and intact  EYES: no scleral icterus, no injection, bilateral mild upper lid edema  CV: regular rhythm, normal rate, 2+ bilateral nonpitting lower extremity edema, dialysis fistula to right upper arm with palpable thrill present  RESPIRATORY: Mild tachypnea, able to speak in full sentences, moderate crackles present to right mid and lower lung field, dry hacking cough that does not clear crackles  ABDOMEN: soft and nontender diffusely, no rebound or guarding  MUSCULOSKELETAL: no deformity, normal active range of motion to all extremities  NEURO: alert, moves all extremities, follows commands  PSYCH:  calm, cooperative  SKIN: warm, dry and intact    Vital signs and nursing notes reviewed.          LAB RESULTS  Recent Results (from the past 24 hour(s))   Comprehensive Metabolic Panel    Collection Time: 12/20/23  8:06 AM    Specimen: Blood   Result Value Ref Range    Glucose 74 65 - 99 mg/dL    BUN 47 (H) 8 - 23 mg/dL    Creatinine 7.17 (H) 0.57 - 1.00 mg/dL    Sodium 139 136 - 145 mmol/L    Potassium 6.6 (C) 3.5 - 5.2 mmol/L    Chloride 99 98 - 107 mmol/L    CO2  26.1 22.0 - 29.0 mmol/L    Calcium 9.5 8.6 - 10.5 mg/dL    Total Protein 6.8 6.0 - 8.5 g/dL    Albumin 4.1 3.5 - 5.2 g/dL    ALT (SGPT) 9 1 - 33 U/L    AST (SGOT) 21 1 - 32 U/L    Alkaline Phosphatase 115 39 - 117 U/L    Total Bilirubin 0.4 0.0 - 1.2 mg/dL    Globulin 2.7 gm/dL    A/G Ratio 1.5 g/dL    BUN/Creatinine Ratio 6.6 (L) 7.0 - 25.0    Anion Gap 13.9 5.0 - 15.0 mmol/L    eGFR 5.6 (L) >60.0 mL/min/1.73   Lactic Acid, Plasma    Collection Time: 12/20/23  8:06 AM    Specimen: Blood   Result Value Ref Range    Lactate 0.9 0.5 - 2.0 mmol/L   Protime-INR    Collection Time: 12/20/23  8:06 AM    Specimen: Blood   Result Value Ref Range    Protime 14.2 11.7 - 14.2 Seconds    INR 1.09 0.90 - 1.10   Procalcitonin    Collection Time: 12/20/23  8:06 AM    Specimen: Blood   Result Value Ref Range    Procalcitonin 0.24 0.00 - 0.25 ng/mL   CBC Auto Differential    Collection Time: 12/20/23  8:06 AM    Specimen: Blood   Result Value Ref Range    WBC 5.05 3.40 - 10.80 10*3/mm3    RBC 3.68 (L) 3.77 - 5.28 10*6/mm3    Hemoglobin 10.5 (L) 12.0 - 15.9 g/dL    Hematocrit 32.9 (L) 34.0 - 46.6 %    MCV 89.4 79.0 - 97.0 fL    MCH 28.5 26.6 - 33.0 pg    MCHC 31.9 31.5 - 35.7 g/dL    RDW 14.6 12.3 - 15.4 %    RDW-SD 47.1 37.0 - 54.0 fl    MPV 8.7 6.0 - 12.0 fL    Platelets 228 140 - 450 10*3/mm3    Neutrophil % 74.8 42.7 - 76.0 %    Lymphocyte % 11.9 (L) 19.6 - 45.3 %    Monocyte % 9.3 5.0 - 12.0 %    Eosinophil % 3.0 0.3 - 6.2 %    Basophil % 0.6 0.0 - 1.5 %    Immature Grans % 0.4 0.0 - 0.5 %    Neutrophils, Absolute 3.78 1.70 - 7.00 10*3/mm3    Lymphocytes, Absolute 0.60 (L) 0.70 - 3.10 10*3/mm3    Monocytes, Absolute 0.47 0.10 - 0.90 10*3/mm3    Eosinophils, Absolute 0.15 0.00 - 0.40 10*3/mm3    Basophils, Absolute 0.03 0.00 - 0.20 10*3/mm3    Immature Grans, Absolute 0.02 0.00 - 0.05 10*3/mm3    nRBC 0.0 0.0 - 0.2 /100 WBC       Ordered the above labs and reviewed the results.        RADIOLOGY  XR Chest 1 View    Result Date:  12/20/2023  XR CHEST 1 VW-12/20/2023  HISTORY: Sepsis protocol.  There is mild cardiomegaly. There is a linear bandlike area of probable atelectasis in the left midlung. Portion of the left hemidiaphragm is obscured and this may be from left pleural effusion with some mild left basilar atelectasis or infiltrate. Mild atelectasis or inflammatory changes seen in the right lung base. Vascular stent overlies the left axilla and proximal left upper extremity.  No pneumothorax is seen.      1. Cardiomegaly. 2. Increased density in the left mid to lower lung and right lung base as discussed above.   This report was finalized on 12/20/2023 8:31 AM by Dr. Homero Kumar M.D on Workstation: [employer]       Ordered the above noted radiological studies. Reviewed by me in PACS.            PROCEDURES  Critical Care    Performed by: Emily Mcgregor APRN  Authorized by: Emil Carrillo MD    Critical care provider statement:     Critical care time (minutes):  50    Critical care was necessary to treat or prevent imminent or life-threatening deterioration of the following conditions:  Cardiac failure, circulatory failure, renal failure, shock, sepsis and metabolic crisis    Critical care was time spent personally by me on the following activities:  Discussions with consultants, blood draw for specimens, development of treatment plan with patient or surrogate, evaluation of patient's response to treatment, ordering and review of laboratory studies, ordering and review of radiographic studies, pulse oximetry, re-evaluation of patient's condition, ordering and performing treatments and interventions, obtaining history from patient or surrogate and review of old charts    Care discussed with: admitting provider    Comments:      Patient here with shortness of breath.  Diagnosed with RSV 8 days ago and noncompliant with hemodialysis since 7 days ago meaning that she has missed 3 treatments.  Initial plan was to treat her  "hyperkalemia with insulin/dextrose/calcium but after discussion with on-call for patient's nephrologist they will get her to emergent hemodialysis today which I think will alleviate her fluid overload status causing peripheral edema and dyspnea but I do have suspicion that she has concurrent superimposed bacterial pneumonia given that I would suspect that her viral respiratory infection would have run its course by now patient symptoms seem to be worsening.  She does have some borderline hypoxia and was started on 2 L nasal cannula that again I am not sure if this is related to pneumonia versus fluid overload from her chronic kidney disease.  Will start her on Rocephin and draw blood cultures and admit her to medicine.                MEDICATIONS GIVEN IN ER  Medications   sodium chloride 0.9 % flush 10 mL (has no administration in time range)                   MEDICAL DECISION MAKING, PROGRESS, and CONSULTS    All labs have been independently reviewed by me.  All radiology studies have been reviewed by me and I have also reviewed the radiology report.   EKG's independently viewed and interpreted by me.  Discussion below represents my analysis of pertinent findings related to patient's condition, differential diagnosis, treatment plan and final disposition.      Additional sources:  - Discussed/ obtained information from independent historians: History obtained from patient    - External (non-ED) record review: 12/11/2023 patient admitted to [facility]. MD note:\"Hospital Course: Sonia is a 73-year-old female with a past medical history of hypertension, anemia of chronic disease, chronic respiratory failure on 2 L, asthma, coronary artery disease who was admitted to the hospital for acute on chronic diastolic heart failure and end-stage renal disease needing dialysis. Upon hospitalization, nephrology was consulted. Patient underwent dialysis. Her shortness of breath improved. As a result, patient was " "medically stable for discharge. Patient was also found to be positive for RSV. She was encouraged supportive care. Patient was discharged home in stable condition. She will continue on her dialysis every Monday, Wednesday and Friday.\"    - Chronic or social conditions impacting care: Patient states she has 2 grandsons who help provide her with cooking and transportation if needed and reports good social support    - Shared decision making: Discussed test results and treatment options, agreeable to admission to the hospital for hemodialysis and further evaluation      Orders placed during this visit:  Orders Placed This Encounter   Procedures    Blood Culture - Blood,    Blood Culture - Blood,    XR Chest 1 View    [facility] Draw    Comprehensive Metabolic Panel    Lactic Acid, Plasma    Protime-INR    Procalcitonin    CBC Auto Differential    Undress & Gown    Continuous Pulse Oximetry    Vital Signs    Oxygen Therapy- Nasal Cannula; Titrate 1-6 LPM Per SpO2; 90 - 95%    Insert Peripheral IV    CBC & Differential    Green Top (Gel)    Lavender Top    Gold Top - SST    Light Blue Top         Additional orders considered but not ordered:  I considered the CTA patient's chest my suspicion for pulmonary embolism is low and my suspicion for pneumonia given right lower lobe dense crackles and recent upper respiratory viral infection with additional fluid overload to likely be more of etiology than clot.  She does not have any hemoptysis.  She denies unilateral leg swelling.        Differential diagnosis includes but is not limited to:    Pneumonia, COPD, RSV, fluid overload      Independent interpretation of labs, radiology studies, and discussions with consultants:  ED Course as of 12/20/23 0909   Wed Dec 20, 2023   0842 XR Chest 1 View  Per my independent interpretation is no pneumothorax. Cardiomegaly appreciated [AH]   0844 Potassium(!!): 6.6  Calcium, glucose and insulin ordered [AH]   0845 I do not feel patient is " stable for transfer and needs emergent management of her electrolyte derangement [AH]   0855 Phone call with jyothi underwood.  Discussed the patient, relevant history, exam, diagnostics, ED findings/progress, and concerns. They agree to admit   []   0856 Phone call with emmie pharmacist.  Discussed he recommends rocephin and MRSA nasal swab   []   0858 Phone call with dr lester.  Discussed the patient, relevant history, exam, diagnostics, ED findings/progress, and concerns.  They agree to consult, MD recommends against insulin, dextrose and calcium and states will get pt stat dialysis   []   0901 Prior record review shows pt RSV+ 12/12/23 at [facility] [AH]   0909 Pt's oxygen sat currently at 100% on 2lnc []      ED Course User Index  [] Emily Mcgregor APRN             I have worn appropriate PPE during this patient encounter, sanitized my hands both with entering and exiting patient's room.      DIAGNOSIS  Final diagnoses:   ESRD on hemodialysis   Hyperkalemia   Pneumonia of right lower lobe due to infectious organism         DISPOSITION  Admitted to telemetry Obst[facility] Dr. Abel            Latest Documented Vital Signs:  As of 08:01 EST  BP- (!) 188/81 HR- 59 Temp- 97.3 °F (36.3 °C) O2 sat- 92%              --    Please note that portions of this were completed with a voice recognition program.       Note Disclaimer: At [facility], we believe that sharing information builds trust and better relationships. You are receiving this note because you are receiving care at [facility] or recently visited. It is possible you will see health information before a provider has talked with you about it. This kind of information can be easy to misunderstand. To help you fully understand what it means for your health, we urge you to discuss this note with your provider.             Emily Mcgregor APRN  12/20/23 0935

## 2023-12-20 NOTE — H&P
Patient Name:  Sonia Acharya  YOB: 1950  MRN:  [id number]  Admit Date:  12/20/2023  Patient Care Team:  Marcy Paez PA as PCP - General (Physician Assistant)      Subjective   History Present Illness     Chief Complaint   Patient presents with    Leg Swelling    Cough       Ms. Acharya is a 73 y.o. non-smoker with a history of ESRD requiring hemodialysis, hypertension, arthritis that presents to [facility] complaining of leg swelling and cough.    Patient reportedly lives at home with family and answering orientation questions appropriately.  Recently missed multiple hemodialysis treatments and now with cough and bilateral lower extremity swelling; therefore, went to [facility] ER for further evaluation.    Serum potassium 6.6, serum creatinine 7.1, x-ray concerning for pneumonia on bilateral lower lobes.  Patient received empiric IV ceftriaxone in ER.    Recommendation pending hospital course.  Details below.    History of Present Illness    Review of Systems   Constitutional:  Negative for chills and fever.   HENT:  Positive for congestion. Negative for rhinorrhea.    Respiratory:  Positive for cough and shortness of breath.    Cardiovascular:  Negative for chest pain and leg swelling.   Gastrointestinal:  Negative for abdominal pain, constipation, diarrhea, nausea and vomiting.   Endocrine: Negative for polydipsia, polyphagia and polyuria.   Genitourinary:  Negative for difficulty urinating and dysuria.   Musculoskeletal:  Positive for gait problem (due to generalized weakness). Negative for myalgias.   Skin:  Negative for rash and wound.   Neurological:  Negative for syncope and light-headedness.   Psychiatric/Behavioral:  Negative for confusion and hallucinations.         Personal History     Past Medical History:   Diagnosis Date    Arthritis     Chronic kidney disease     Heart disease     Hypertension      Past Surgical History:   Procedure Laterality Date    BREAST  SURGERY      DIALYSIS FISTULA CREATION      EYE SURGERY      HERNIA REPAIR  2017    Dr. Sung     Family History   Problem Relation Age of Onset    Heart disease Father     Breast cancer Sister      Social History     Tobacco Use    Smoking status: Never     Passive exposure: Never    Smokeless tobacco: Never   Vaping Use    Vaping Use: Never used   Substance Use Topics    Alcohol use: No    Drug use: No     No current facility-administered medications on file prior to encounter.     Current Outpatient Medications on File Prior to Encounter   Medication Sig Dispense Refill    amLODIPine (NORVASC) 5 MG tablet Take 1 tablet by mouth Daily. Take if SBP >160      atorvastatin (LIPITOR) 40 MG tablet Take 1 tablet by mouth Daily.      cloNIDine (CATAPRES) 0.1 MG tablet Take 1 tablet by mouth 2 (Two) Times a Day. Do not take if SBP <130      gabapentin (NEURONTIN) 100 MG capsule Take 1 capsule by mouth 3 (Three) Times a Day As Needed (pain) for up to 3 days.      hydrALAZINE (APRESOLINE) 25 MG tablet Take 4 tablets by mouth 3 (Three) Times a Day.      isosorbide dinitrate (ISORDIL) 30 MG tablet Take 1 tablet by mouth Daily.      losartan (COZAAR) 100 MG tablet Take 1 tablet by mouth Daily. 30 tablet 0    pantoprazole (PROTONIX) 40 MG EC tablet Take 1 tablet by mouth Daily.      clopidogrel (PLAVIX) 75 MG tablet Take 75 mg by mouth Daily.      lactulose (CHRONULAC) 10 GM/15ML solution Take 15 mL by mouth Daily. 30 mL 0     No Known Allergies    Objective    Objective     Vital Signs  Temp:  [97.3 °F (36.3 °C)-98.4 °F (36.9 °C)] 98.4 °F (36.9 °C)  Heart Rate:  [54-66] 60  Resp:  [16-18] 18  BP: (170-190)/(64-85) 181/73  SpO2:  [89 %-100 %] 100 %  on  Flow (L/min):  [2] 2;   Device (Oxygen Therapy): nasal cannula  Body mass index is 21.29 kg/m².    Physical Exam  Constitutional:       General: She is not in acute distress.     Appearance: She is not toxic-appearing.   HENT:      Head: Normocephalic and atraumatic.   Eyes:       Extraocular Movements: Extraocular movements intact.      Conjunctiva/sclera: Conjunctivae normal.   Cardiovascular:      Rate and Rhythm: Normal rate.      Heart sounds: Murmur heard.   Pulmonary:      Effort: Pulmonary effort is normal.      Breath sounds: Rhonchi present.   Abdominal:      General: Bowel sounds are normal.      Palpations: Abdomen is soft.   Musculoskeletal:      Cervical back: Normal range of motion and neck supple.      Right lower leg: Edema (trace pitting edema, BLE) present.      Left lower leg: Edema present.   Skin:     General: Skin is warm and dry.   Neurological:      Mental Status: She is alert and oriented to person, place, and time.      Cranial Nerves: No cranial nerve deficit.   Psychiatric:         Behavior: Behavior normal.         Thought Content: Thought content normal.         Results Review:  I reviewed the patient's new clinical results.  I reviewed the patient's new imaging results and agree with the interpretation.  I reviewed the patient's other test results and agree with the interpretation  I personally viewed and interpreted the patient's EKG/Telemetry data  Discussed with ED provider.    Lab Results (last 24 hours)       Procedure Component Value Units Date/Time    CBC & Differential [[id number]]  (Abnormal) Collected: 12/20/23 0806    Specimen: Blood Updated: 12/20/23 0817    Narrative:      The following orders were created for panel order CBC & Differential.  Procedure                               Abnormality         Status                     ---------                               -----------         ------                     CBC Auto Differential[[id number]]        Abnormal            Final result                 Please view results for these tests on the individual orders.    Comprehensive Metabolic Panel [[id number]]  (Abnormal) Collected: 12/20/23 0806    Specimen: Blood Updated: 12/20/23 0843     Glucose 74 mg/dL      BUN 47 mg/dL      Creatinine 7.17 mg/dL  "     Sodium 139 mmol/L      Potassium 6.6 mmol/L      Chloride 99 mmol/L      CO2 26.1 mmol/L      Calcium 9.5 mg/dL      Total Protein 6.8 g/dL      Albumin 4.1 g/dL      ALT (SGPT) 9 U/L      AST (SGOT) 21 U/L      Alkaline Phosphatase 115 U/L      Total Bilirubin 0.4 mg/dL      Globulin 2.7 gm/dL      A/G Ratio 1.5 g/dL      BUN/Creatinine Ratio 6.6     Anion Gap 13.9 mmol/L      eGFR 5.6 mL/min/1.73      Comment: <15 Indicative of kidney failure       Narrative:      GFR Normal >60  Chronic Kidney Disease <60  Kidney Failure <15    The GFR formula is only valid for adults with stable renal function between ages 18 and 70.    Lactic Acid, Plasma [[id number]]  (Normal) Collected: 12/20/23 0806    Specimen: Blood Updated: 12/20/23 0834     Lactate 0.9 mmol/L     Blood Culture - Blood, Arm, Left [[id number]] Collected: 12/20/23 0806    Specimen: Blood from Arm, Left Updated: 12/20/23 0814    Protime-INR [[id number]]  (Normal) Collected: 12/20/23 0806    Specimen: Blood Updated: 12/20/23 0908     Protime 14.2 Seconds      INR 1.09    Procalcitonin [[id number]]  (Normal) Collected: 12/20/23 0806    Specimen: Blood Updated: 12/20/23 0843     Procalcitonin 0.24 ng/mL     Narrative:      As a Marker for Sepsis (Non-Neonates):    1. <0.5 ng/mL represents a low risk of severe sepsis and/or septic shock.  2. >2 ng/mL represents a high risk of severe sepsis and/or septic shock.    As a Marker for Lower Respiratory Tract Infections that require antibiotic therapy:    PCT on Admission    Antibiotic Therapy       6-12 Hrs later    >0.5                Strongly Recommended  >0.25 - <0.5        Recommended   0.1 - 0.25          Discouraged              Remeasure/reassess PCT  <0.1                Strongly Discouraged     Remeasure/reassess PCT    As 28 day mortality risk marker: \"Change in Procalcitonin Result\" (>80% or <=80%) if Day 0 (or Day 1) and Day 4 values are available. Refer to http://www.[facility]-pct-calculator.com    Change in " PCT <=80%  A decrease of PCT levels below or equal to 80% defines a positive change in PCT test result representing a higher risk for 28-day all-cause mortality of patients diagnosed with severe sepsis for septic shock.    Change in PCT >80%  A decrease of PCT levels of more than 80% defines a negative change in PCT result representing a lower risk for 28-day all-cause mortality of patients diagnosed with severe sepsis or septic shock.       CBC Auto Differential [[id number]]  (Abnormal) Collected: 12/20/23 0806    Specimen: Blood Updated: 12/20/23 0817     WBC 5.05 10*3/mm3      RBC 3.68 10*6/mm3      Hemoglobin 10.5 g/dL      Hematocrit 32.9 %      MCV 89.4 fL      MCH 28.5 pg      MCHC 31.9 g/dL      RDW 14.6 %      RDW-SD 47.1 fl      MPV 8.7 fL      Platelets 228 10*3/mm3      Neutrophil % 74.8 %      Lymphocyte % 11.9 %      Monocyte % 9.3 %      Eosinophil % 3.0 %      Basophil % 0.6 %      Immature Grans % 0.4 %      Neutrophils, Absolute 3.78 10*3/mm3      Lymphocytes, Absolute 0.60 10*3/mm3      Monocytes, Absolute 0.47 10*3/mm3      Eosinophils, Absolute 0.15 10*3/mm3      Basophils, Absolute 0.03 10*3/mm3      Immature Grans, Absolute 0.02 10*3/mm3      nRBC 0.0 /100 WBC     MRSA Screen, PCR (Inpatient) - Swab, Nares [[id number]]  (Normal) Collected: 12/20/23 0922    Specimen: Swab from Nares Updated: 12/20/23 1115     MRSA PCR No MRSA Detected    Narrative:      The negative predictive value of this diagnostic test is high and should only be used to consider de-escalating anti-MRSA therapy. A positive result may indicate colonization with MRSA and must be correlated clinically.    Blood Culture - Blood, Arm, Right [[id number]] Collected: 12/20/23 1019    Specimen: Blood from Arm, Right Updated: 12/20/23 1026    Respiratory Panel PCR w/COVID-19(SARS-CoV-2) DEINSE/JOSEPHINE/AVI/PAD/COR/JAMEY In-House, NP Swab in UTM/VTM, 2 HR TAT - Swab, Nasopharynx [[id number]] Collected: 12/20/23 1429    Specimen: Swab from  Nasopharynx Updated: 12/20/23 1439            Imaging Results (Last 24 Hours)       Procedure Component Value Units Date/Time    XR Chest 1 View [[id number]] Collected: 12/20/23 0827     Updated: 12/20/23 0834    Narrative:      XR CHEST 1 VW-12/20/2023     HISTORY: Sepsis protocol.     There is mild cardiomegaly. There is a linear bandlike area of probable  atelectasis in the left midlung. Portion of the left hemidiaphragm is  obscured and this may be from left pleural effusion with some mild left  basilar atelectasis or infiltrate. Mild atelectasis or inflammatory  changes seen in the right lung base. Vascular stent overlies the left  axilla and proximal left upper extremity.     No pneumothorax is seen.       Impression:      1. Cardiomegaly.  2. Increased density in the left mid to lower lung and right lung base  as discussed above.        This report was finalized on 12/20/2023 8:31 AM by Dr. Homero Kumar M.D on Workstation: [id number]               Results for orders placed during the hospital encounter of 01/16/23    Adult Transthoracic Echo Complete W/ Cont if Necessary Per Protocol    Interpretation Summary    Left ventricular systolic function is normal. Calculated left ventricular EF = 64.2% Abnormal global longitudinal LV strain (GLS) = -15.5%. Left ventricle strain data was reviewed by the physician and found to be accurate. Normal left ventricular cavity size noted. Left ventricular wall thickness is consistent with moderate to severe concentric hypertrophy. All left ventricular wall segments contract normally. 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 There is an echo bright appearance of the left and right ventricular myocardium suggestive of amyloid heart disease Left ventricular diastolic function is consistent with (grade II w/high LAP) pseudonormalization.    Right ventricular wall thickness is consistent with moderate hypertrophy.    Left atrial volume is severely  increased.  The right atrial cavity is moderate to severely dilated.    There is moderate calcification of the aortic valve. The aortic valve appears trileaflet. Trace aortic valve regurgitation is present. Moderate aortic valve stenosis is present. Aortic valve area is 1.02 cm2. Aortic valve mean pressure gradient is 27.2 mmHg. Aortic valve dimensionless index is 0.30 .    Mild mitral annular calcification is present. Mild to moderate mitral valve regurgitation is present. No significant mitral valve stenosis is present.    Mild tricuspid valve regurgitation is present. Estimated right ventricular systolic pressure from tricuspid regurgitation is moderately elevated (45-55 mmHg).    There is a moderate (1-2cm) pericardial effusion adjacent to the right atrium. There is no evidence of cardiac tamponade.      ECG 12 Lead Electrolyte Imbalance   Preliminary Result   HEART RATE= 52  bpm   RR Interval= 1154  ms   MO Interval=   ms   P Horizontal Axis=   deg   P Front Axis=   deg   QRSD Interval= 153  ms   QT Interval= 515  ms   QTcB= 479  ms   QRS Axis= 264  deg   T Wave Axis= 64  deg   - ABNORMAL ECG -   Atrial fibrillation   Nonspecific IVCD with LAD   Probable inferior infarct, acute   Probable anterolateral infarct, recent   Electronically Signed By:    Date and Time of Study: 2023-12-20 09:29:19           Assessment/Plan     Active Hospital Problems    Diagnosis  POA    **Hyperkalemia [E87.5]  Yes    Pneumonia [J18.9]  Yes    ESRD on dialysis [N18.6, Z99.2]  Not Applicable    Chronic diastolic CHF (congestive heart failure) [I50.32]  Yes    HTN (hypertension) [I10]  Yes      Resolved Hospital Problems   No resolved problems to display.       Ms. Acharya is a 73 y.o. non-smoker with a history of ESRD requiring hemodialysis, hypertension, arthritis that presents to [facility] complaining of leg swelling and cough.      Hyperkalemia: Nephrology managing with plans for hemodialysis today.  Repeat labs in  AM.      ESRD on dialysis: Nephrology consulted for hemodialysis planned on 12/20/2023.      Acute on chronic chronic diastolic CHF (congestive heart failure): Left pleural effusion on chest x-ray.  Most likely due to missed hemodialysis treatment.  See plan above.      HTN: BP greater than optimal.  Most likely due to above.  Monitor BP trends with plans to continue amlodipine, clonidine, hydralazine, losartan per home dose regimen.      Pneumonia: Suspected on chest x-ray.  Continue empiric IV ceftriaxone for now.  Respiratory viral panel, blood cultures x 2 pending results.  MRSA screen negative.      Chronic respiratory failure with hypoxia requiring 2 L via nasal cannula at baseline continuously.  Tolerated today.      I discussed the patient's findings and my recommendations with patient, RN, & Dr. Marie.    VTE Prophylaxis - SCDs.  Code Status - Full code.       KANDIS Mendoza  [facility]  12/20/23  16:15 EST

## 2023-12-20 NOTE — ED NOTES
"Nursing report ED to floor  Sonia Acharya  73 y.o.  female    HPI :   Chief Complaint   Patient presents with    Leg Swelling    Cough       Admitting doctor:   Kathy Marie MD    Admitting diagnosis:   The primary encounter diagnosis was ESRD on hemodialysis. Diagnoses of Hyperkalemia and Pneumonia of right lower lobe due to infectious organism were also pertinent to this visit.    Code status:   Current Code Status       Date Active Code Status Order ID Comments User Context       Prior            Allergies:   Patient has no known allergies.    Isolation:   No active isolations    Intake and Output  No intake or output data in the 24 hours ending 12/20/23 0953    Weight:       12/20/23  0743   Weight: 49.4 kg (109 lb)       Most recent vitals:   Vitals:    12/20/23 0740 12/20/23 0741 12/20/23 0742 12/20/23 0743   BP:  (!) 188/81     Pulse: 60 60 59    Resp:  16     Temp:       SpO2: 90% 91% 92%    Weight:    49.4 kg (109 lb)   Height:    152.4 cm (60\")       Active LDAs/IV Access:   Lines, Drains & Airways       Active LDAs       Name Placement date Placement time Site Days    Hemodialysis Cath Double --  --  Subclavian  --                    Labs (abnormal labs have a star):   Labs Reviewed   COMPREHENSIVE METABOLIC PANEL - Abnormal; Notable for the following components:       Result Value    BUN 47 (*)     Creatinine 7.17 (*)     Potassium 6.6 (*)     BUN/Creatinine Ratio 6.6 (*)     eGFR 5.6 (*)     All other components within normal limits    Narrative:     GFR Normal >60  Chronic Kidney Disease <60  Kidney Failure <15    The GFR formula is only valid for adults with stable renal function between ages 18 and 70.   CBC WITH AUTO DIFFERENTIAL - Abnormal; Notable for the following components:    RBC 3.68 (*)     Hemoglobin 10.5 (*)     Hematocrit 32.9 (*)     Lymphocyte % 11.9 (*)     Lymphocytes, Absolute 0.60 (*)     All other components within normal limits   LACTIC ACID, PLASMA - Normal   PROTIME-INR - " "Normal   PROCALCITONIN - Normal    Narrative:     As a Marker for Sepsis (Non-Neonates):    1. <0.5 ng/mL represents a low risk of severe sepsis and/or septic shock.  2. >2 ng/mL represents a high risk of severe sepsis and/or septic shock.    As a Marker for Lower Respiratory Tract Infections that require antibiotic therapy:    PCT on Admission    Antibiotic Therapy       6-12 Hrs later    >0.5                Strongly Recommended  >0.25 - <0.5        Recommended   0.1 - 0.25          Discouraged              Remeasure/reassess PCT  <0.1                Strongly Discouraged     Remeasure/reassess PCT    As 28 day mortality risk marker: \"Change in Procalcitonin Result\" (>80% or <=80%) if Day 0 (or Day 1) and Day 4 values are available. Refer to http://www.[facility]ct-calculator.com    Change in PCT <=80%  A decrease of PCT levels below or equal to 80% defines a positive change in PCT test result representing a higher risk for 28-day all-cause mortality of patients diagnosed with severe sepsis for septic shock.    Change in PCT >80%  A decrease of PCT levels of more than 80% defines a negative change in PCT result representing a lower risk for 28-day all-cause mortality of patients diagnosed with severe sepsis or septic shock.      BLOOD CULTURE   BLOOD CULTURE   MRSA SCREEN, PCR   RAINBOW DRAW    Narrative:     The following orders were created for panel order [place] Draw.  Procedure                               Abnormality         Status                     ---------                               -----------         ------                     Green Top (Gel)[[id number]]                                  Final result               Lavender Top[[id number]]                                     Final result               Gold Top - SST[[id number]]                                   Final result               Light Blue Top[[id number]]                                   Final result                 Please view results for these " tests on the individual orders.   CBC AND DIFFERENTIAL    Narrative:     The following orders were created for panel order CBC & Differential.  Procedure                               Abnormality         Status                     ---------                               -----------         ------                     CBC Auto Differential[[id number]]        Abnormal            Final result                 Please view results for these tests on the individual orders.   GREEN TOP   LAVENDER TOP   GOLD TOP - SST   LIGHT BLUE TOP       EKG:   ECG 12 Lead Electrolyte Imbalance   Preliminary Result   HEART RATE= 52  bpm   RR Interval= 1154  ms   VA Interval=   ms   P Horizontal Axis=   deg   P Front Axis=   deg   QRSD Interval= 153  ms   QT Interval= 515  ms   QTcB= 479  ms   QRS Axis= 264  deg   T Wave Axis= 64  deg   - ABNORMAL ECG -   Atrial fibrillation   Nonspecific IVCD with LAD   Probable inferior infarct, acute   Probable anterolateral infarct, recent   Electronically Signed By:    Date and Time of Study: 2023-12-20 09:29:19          Meds given in ED:   Medications   sodium chloride 0.9 % flush 10 mL (has no administration in time range)   cefTRIAXone (ROCEPHIN) 1,000 mg in sodium chloride 0.9 % 100 mL IVPB-VTB (has no administration in time range)       Imaging results:  XR Chest 1 View    Result Date: 12/20/2023  1. Cardiomegaly. 2. Increased density in the left mid to lower lung and right lung base as discussed above.   This report was finalized on 12/20/2023 8:31 AM by Dr. Homero Kumar M.D on Workstation: [id number]       Ambulatory status:   - assistx1    Social issues:   Social History     Socioeconomic History    Marital status: Single   Tobacco Use    Smoking status: Never    Smokeless tobacco: Never   Substance and Sexual Activity    Alcohol use: No    Drug use: No    Sexual activity: Defer       NIH Stroke Scale:       Cookie Lui RN  12/20/23 09:53 EST

## 2023-12-21 LAB
ALBUMIN SERPL-MCNC: 3.3 G/DL (ref 3.5–5.2)
ANION GAP SERPL CALCULATED.3IONS-SCNC: 10.2 MMOL/L (ref 5–15)
BACTERIA SPEC RESP CULT: NORMAL
BASOPHILS # BLD AUTO: 0.04 10*3/MM3 (ref 0–0.2)
BASOPHILS NFR BLD AUTO: 1.3 % (ref 0–1.5)
BUN SERPL-MCNC: 25 MG/DL (ref 8–23)
BUN/CREAT SERPL: 5.2 (ref 7–25)
CALCIUM SPEC-SCNC: 8.5 MG/DL (ref 8.6–10.5)
CHLORIDE SERPL-SCNC: 101 MMOL/L (ref 98–107)
CO2 SERPL-SCNC: 24.8 MMOL/L (ref 22–29)
CREAT SERPL-MCNC: 4.85 MG/DL (ref 0.57–1)
DEPRECATED RDW RBC AUTO: 46.2 FL (ref 37–54)
EGFRCR SERPLBLD CKD-EPI 2021: 8.9 ML/MIN/1.73
EOSINOPHIL # BLD AUTO: 0.14 10*3/MM3 (ref 0–0.4)
EOSINOPHIL NFR BLD AUTO: 4.4 % (ref 0.3–6.2)
ERYTHROCYTE [DISTWIDTH] IN BLOOD BY AUTOMATED COUNT: 14.4 % (ref 12.3–15.4)
GLUCOSE SERPL-MCNC: 99 MG/DL (ref 65–99)
GRAM STN SPEC: NORMAL
HCT VFR BLD AUTO: 29.3 % (ref 34–46.6)
HGB BLD-MCNC: 9.2 G/DL (ref 12–15.9)
IMM GRANULOCYTES # BLD AUTO: 0.01 10*3/MM3 (ref 0–0.05)
IMM GRANULOCYTES NFR BLD AUTO: 0.3 % (ref 0–0.5)
LYMPHOCYTES # BLD AUTO: 0.65 10*3/MM3 (ref 0.7–3.1)
LYMPHOCYTES NFR BLD AUTO: 20.3 % (ref 19.6–45.3)
MAGNESIUM SERPL-MCNC: 2.6 MG/DL (ref 1.6–2.4)
MCH RBC QN AUTO: 28.2 PG (ref 26.6–33)
MCHC RBC AUTO-ENTMCNC: 31.4 G/DL (ref 31.5–35.7)
MCV RBC AUTO: 89.9 FL (ref 79–97)
MONOCYTES # BLD AUTO: 0.33 10*3/MM3 (ref 0.1–0.9)
MONOCYTES NFR BLD AUTO: 10.3 % (ref 5–12)
NEUTROPHILS NFR BLD AUTO: 2.03 10*3/MM3 (ref 1.7–7)
NEUTROPHILS NFR BLD AUTO: 63.4 % (ref 42.7–76)
NRBC BLD AUTO-RTO: 0 /100 WBC (ref 0–0.2)
PHOSPHATE SERPL-MCNC: 5.5 MG/DL (ref 2.5–4.5)
PLATELET # BLD AUTO: 198 10*3/MM3 (ref 140–450)
PMV BLD AUTO: 8.8 FL (ref 6–12)
POTASSIUM SERPL-SCNC: 5.1 MMOL/L (ref 3.5–5.2)
QT INTERVAL: 515 MS
QTC INTERVAL: 479 MS
RBC # BLD AUTO: 3.26 10*6/MM3 (ref 3.77–5.28)
SODIUM SERPL-SCNC: 136 MMOL/L (ref 136–145)
WBC NRBC COR # BLD AUTO: 3.2 10*3/MM3 (ref 3.4–10.8)

## 2023-12-21 PROCEDURE — 87205 SMEAR GRAM STAIN: CPT | Performed by: INTERNAL MEDICINE

## 2023-12-21 PROCEDURE — 94799 UNLISTED PULMONARY SVC/PX: CPT

## 2023-12-21 PROCEDURE — 94761 N-INVAS EAR/PLS OXIMETRY MLT: CPT

## 2023-12-21 PROCEDURE — 92610 EVALUATE SWALLOWING FUNCTION: CPT

## 2023-12-21 PROCEDURE — 85025 COMPLETE CBC W/AUTO DIFF WBC: CPT | Performed by: INTERNAL MEDICINE

## 2023-12-21 PROCEDURE — 99214 OFFICE O/P EST MOD 30 MIN: CPT | Performed by: INTERNAL MEDICINE

## 2023-12-21 PROCEDURE — 25010000002 CEFTRIAXONE PER 250 MG: Performed by: INTERNAL MEDICINE

## 2023-12-21 PROCEDURE — 93005 ELECTROCARDIOGRAM TRACING: CPT | Performed by: INTERNAL MEDICINE

## 2023-12-21 PROCEDURE — 83735 ASSAY OF MAGNESIUM: CPT | Performed by: INTERNAL MEDICINE

## 2023-12-21 PROCEDURE — 94664 DEMO&/EVAL PT USE INHALER: CPT

## 2023-12-21 PROCEDURE — G0378 HOSPITAL OBSERVATION PER HR: HCPCS

## 2023-12-21 PROCEDURE — 93010 ELECTROCARDIOGRAM REPORT: CPT | Performed by: INTERNAL MEDICINE

## 2023-12-21 PROCEDURE — 80069 RENAL FUNCTION PANEL: CPT | Performed by: INTERNAL MEDICINE

## 2023-12-21 PROCEDURE — 94762 N-INVAS EAR/PLS OXIMTRY CONT: CPT

## 2023-12-21 RX ORDER — BENZONATATE 100 MG/1
200 CAPSULE ORAL 3 TIMES DAILY PRN
Status: DISCONTINUED | OUTPATIENT
Start: 2023-12-21 | End: 2023-12-24 | Stop reason: HOSPADM

## 2023-12-21 RX ORDER — CALCIUM CARBONATE 500 MG/1
1 TABLET, CHEWABLE ORAL EVERY 6 HOURS PRN
Status: DISCONTINUED | OUTPATIENT
Start: 2023-12-21 | End: 2023-12-24 | Stop reason: HOSPADM

## 2023-12-21 RX ORDER — MANNITOL 250 MG/ML
12.5 INJECTION, SOLUTION INTRAVENOUS AS NEEDED
Status: CANCELLED | OUTPATIENT
Start: 2023-12-21 | End: 2023-12-22

## 2023-12-21 RX ADMIN — Medication 10 ML: at 21:10

## 2023-12-21 RX ADMIN — IPRATROPIUM BROMIDE AND ALBUTEROL SULFATE 3 ML: 2.5; .5 SOLUTION RESPIRATORY (INHALATION) at 06:40

## 2023-12-21 RX ADMIN — SENNOSIDES AND DOCUSATE SODIUM 2 TABLET: 50; 8.6 TABLET ORAL at 19:09

## 2023-12-21 RX ADMIN — CALCIUM CARBONATE 1 TABLET: 500 TABLET, CHEWABLE ORAL at 17:42

## 2023-12-21 RX ADMIN — ATORVASTATIN CALCIUM 40 MG: 20 TABLET, FILM COATED ORAL at 15:22

## 2023-12-21 RX ADMIN — CEFTRIAXONE SODIUM 1000 MG: 1 INJECTION, POWDER, FOR SOLUTION INTRAMUSCULAR; INTRAVENOUS at 14:37

## 2023-12-21 RX ADMIN — IPRATROPIUM BROMIDE AND ALBUTEROL SULFATE 3 ML: 2.5; .5 SOLUTION RESPIRATORY (INHALATION) at 21:58

## 2023-12-21 RX ADMIN — HYDRALAZINE HYDROCHLORIDE 100 MG: 50 TABLET, FILM COATED ORAL at 14:37

## 2023-12-21 RX ADMIN — CLONIDINE HYDROCHLORIDE 0.1 MG: 0.1 TABLET ORAL at 21:10

## 2023-12-21 RX ADMIN — LOSARTAN POTASSIUM 100 MG: 100 TABLET, FILM COATED ORAL at 15:23

## 2023-12-21 RX ADMIN — ACETAMINOPHEN 650 MG: 325 TABLET, FILM COATED ORAL at 21:10

## 2023-12-21 RX ADMIN — IPRATROPIUM BROMIDE AND ALBUTEROL SULFATE 3 ML: 2.5; .5 SOLUTION RESPIRATORY (INHALATION) at 14:50

## 2023-12-21 RX ADMIN — HYDRALAZINE HYDROCHLORIDE 100 MG: 50 TABLET, FILM COATED ORAL at 21:11

## 2023-12-21 RX ADMIN — GUAIFENESIN 1200 MG: 600 TABLET, EXTENDED RELEASE ORAL at 21:10

## 2023-12-21 RX ADMIN — AMLODIPINE BESYLATE 10 MG: 10 TABLET ORAL at 15:23

## 2023-12-21 RX ADMIN — BENZONATATE 200 MG: 100 CAPSULE ORAL at 23:17

## 2023-12-21 NOTE — PROGRESS NOTES
Discharge Planning Assessment  [facility]     Patient Name: Sonia Acharya  MRN: [id number]  Today's Date: 12/21/2023    Admit Date: 12/20/2023    Plan: Reutrn home with family   Discharge Needs Assessment       Row Name 12/21/23 1516       Living Environment    People in Home child(giuseppe), adult    Name(s) of People in Home Yoli Alfonso    Current Living Arrangements home    Potentially Unsafe Housing Conditions none    Primary Care Provided by self    Provides Primary Care For no one    Family Caregiver if Needed child(giuseppe), adult    Family Caregiver Names Yoli Acharya [phone]    Quality of Family Relationships helpful    Able to Return to Prior Arrangements yes       Resource/Environmental Concerns    Resource/Environmental Concerns none    Transportation Concerns none       Food Insecurity    Within the past 12 months, you worried that your food would run out before you got the money to buy more. Never true    Within the past 12 months, the food you bought just didn't last and you didn't have money to get more. Never true       Transition Planning    Patient/Family Anticipates Transition to home with family    Patient/Family Anticipated Services at Transition none    Transportation Anticipated family or friend will provide       Discharge Needs Assessment    Readmission Within the Last 30 Days no previous admission in last 30 days    Equipment Currently Used at Home bp cuff;scales;cpap;oxygen;nebulizer;walker, rolling    Concerns to be Addressed denies needs/concerns at this time    Anticipated Changes Related to Illness none    Equipment Needed After Discharge none                   Discharge Plan       Row Name 12/21/23 5570       Plan    Plan Reutrn home with family    Patient/Family in Agreement with Plan yes    Plan Comments Spoke with patient at bedside.  She lives with yoli Acharya [phone], is IADl, uses a walker, Has O2 from [place], C-pap, nebulizer.  She has never used HH or  been to SNF.  Patient gets HD at [facility] on MWF, she states she drives if she feels well enough, or daughter drives if she does not feel well.  She plans to return home at DC and does not anticipate any DC needs.  CCP will follow as needed.  Vishnu BRANTLEY                  Continued Care and Services - Admitted Since 12/20/2023    Coordination has not been started for this encounter.       Expected Discharge Date and Time       Expected Discharge Date Expected Discharge Time    Dec 22, 2023            Demographic Summary       Row Name 12/21/23 1515       General Information    Admission Type observation    Arrived From home    Referral Source admission list    Reason for Consult discharge planning    Preferred Language English                   Functional Status       Row Name 12/21/23 1515       Functional Status    Usual Activity Tolerance moderate    Current Activity Tolerance moderate       Functional Status, IADL    Medications independent    Meal Preparation independent;assistive person    Housekeeping independent;assistive person  Lives with daughter    Laundry assistive person;independent    Shopping assistive person;independent       Mental Status    General Appearance WDL WDL       Mental Status Summary    Recent Changes in Mental Status/Cognitive Functioning no changes                          Becky S. [place], RN

## 2023-12-21 NOTE — CONSULTS
Cardiology History & Physical / Consultation      Patient Name: Sonia Acharya  Age/Sex: 73 y.o. female  : 1950  MRN: [id number]    Date of Admission: 2023  Date of Encounter Visit: 23  Encounter Provider: Rudi Hassan MD  Referring Provider: Kathy Marie MD  Place of Service: [facility] CARDIOLOGY  Patient Care Team:  Marcy Paez PA as PCP - General (Physician Assistant)          Subjective:     Chief Complaint: Shortness of breath    Reason for consultation: Abnormal EKG    History of Present Illness:  Sonia Acharya is a 73 y.o. female who presents to [facility] complaining of shortness of breath.  Patient has been on dialysis for about 10 years.  Unfortunately she has missed about a week of dialysis.  She missed her dialysis because she had an upper respiratory infection and she said she could not get a ride to be transported to dialysis.  Patient came in volume overload high blood pressure and potential atrial fibrillation on her EKG versus junctional rhythm.  Patient underwent dialysis yesterday she looked much better today but clearly had a cough she still clinical rhonchi on physical exam.  She was feeling much better.      Past Medical History:  Past Medical History:   Diagnosis Date    Arthritis     Chronic kidney disease     Heart disease     Hypertension        Past Surgical History:   Procedure Laterality Date    BREAST SURGERY      DIALYSIS FISTULA CREATION      EYE SURGERY      HERNIA REPAIR      Dr. Sung       Home Medications:   Medications Prior to Admission   Medication Sig Dispense Refill Last Dose    amLODIPine (NORVASC) 5 MG tablet Take 1 tablet by mouth Daily. Take if SBP >160       atorvastatin (LIPITOR) 40 MG tablet Take 1 tablet by mouth Daily.       cloNIDine (CATAPRES) 0.1 MG tablet Take 1 tablet by mouth 2 (Two) Times a Day. Do not take if SBP <130       gabapentin (NEURONTIN) 100 MG capsule Take 1 capsule by  mouth 3 (Three) Times a Day As Needed (pain) for up to 3 days.       hydrALAZINE (APRESOLINE) 25 MG tablet Take 4 tablets by mouth 3 (Three) Times a Day.       isosorbide dinitrate (ISORDIL) 30 MG tablet Take 1 tablet by mouth Daily.       losartan (COZAAR) 100 MG tablet Take 1 tablet by mouth Daily. 30 tablet 0     pantoprazole (PROTONIX) 40 MG EC tablet Take 1 tablet by mouth Daily.       clopidogrel (PLAVIX) 75 MG tablet Take 75 mg by mouth Daily.       lactulose (CHRONULAC) 10 GM/15ML solution Take 15 mL by mouth Daily. 30 mL 0        Allergies:  No Known Allergies    Past Social History:  Social History     Socioeconomic History    Marital status: Single   Tobacco Use    Smoking status: Never     Passive exposure: Never    Smokeless tobacco: Never   Vaping Use    Vaping Use: Never used   Substance and Sexual Activity    Alcohol use: No    Drug use: No    Sexual activity: Defer       Past Family History: History reviewed. No pertinent family history.   Family History   Problem Relation Age of Onset    Heart disease Father     Breast cancer Sister        Review of Systems        Objective:     Objective:  Temp:  [97.4 °F (36.3 °C)-98.4 °F (36.9 °C)] 98.3 °F (36.8 °C)  Heart Rate:  [54-66] 55  Resp:  [16-20] 16  BP: (144-195)/(63-85) 176/69    Intake/Output Summary (Last 24 hours) at 12/21/2023 0919  Last data filed at 12/21/2023 0905  Gross per 24 hour   Intake 360 ml   Output 3000 ml   Net -2640 ml     Body mass index is 21.93 kg/m².      12/20/23  0743 12/21/23  0540   Weight: 49.4 kg (109 lb) 50.9 kg (112 lb 4.8 oz)           Physical Exam:   Vitals reviewed.   Constitutional:       Appearance: Healthy appearance.   Pulmonary:      Effort: Pulmonary effort is normal.   Cardiovascular:      Normal rate. Regular rhythm. Normal S1. Normal S2.       Murmurs: There is no murmur.      No gallop.  No click. No rub.   Pulses:     Intact distal pulses.   Edema:     Peripheral edema absent.   Neurological:      Mental  Status: Alert.          Labs:   Lab Review:     Results from last 7 days   Lab Units 12/21/23  0547 12/20/23  0806   SODIUM mmol/L 136 139   POTASSIUM mmol/L 5.1 6.6*   CHLORIDE mmol/L 101 99   CO2 mmol/L 24.8 26.1   BUN mg/dL 25* 47*   CREATININE mg/dL 4.85* 7.17*   GLUCOSE mg/dL 99 74   CALCIUM mg/dL 8.5* 9.5   AST (SGOT) U/L  --  21   ALT (SGPT) U/L  --  9         Results from last 7 days   Lab Units 12/21/23  0547   WBC 10*3/mm3 3.20*   HEMOGLOBIN g/dL 9.2*   HEMATOCRIT % 29.3*   PLATELETS 10*3/mm3 198     Results from last 7 days   Lab Units 12/20/23  0806   INR  1.09         Results from last 7 days   Lab Units 12/21/23  0547   MAGNESIUM mg/dL 2.6*                           Assessment:       Hyperkalemia    HTN (hypertension)    ESRD on dialysis    Chronic diastolic CHF (congestive heart failure)    Pneumonia        Plan:     1.  EKG was read as atrial fibrillation.  I unfortunately disagree with the EKG read and I spoke with Dr. Garcia.  I do see P waves but the NH interval is short.  When compared to the prior ECG the NH interval is about the same as I do think it had some sinus beats I think other beats were junctional.  This is all secondary to hyperkalemia.  She had a left bundle branch block at baseline so I do not think the QRS was widened out significantly from the hyperkalemia.  Again with dialysis this should all correct.  2.  Hypertension due to volume overload.  3.  Pneumonia patient had a terrible cough that was quite deep and rattling.  4.  At this point I would just monitor dialysis should correct most of her issues we will repeat an EKG here in the next day or 2.    Thank you for allowing me to participate in the care of Sonia GAVIOTA Acharya. Feel free to contact me directly with any further questions or concerns.    Rudi Hassan MD  [facility]  12/21/23  09:19 EST

## 2023-12-21 NOTE — PLAN OF CARE
Goal Outcome Evaluation:      A/O, VSS, 1L NC, Tylenol given for headache. All needs met.

## 2023-12-21 NOTE — CONSULTS
"Nutrition Services    Patient Name:  Sonia Acharya  YOB: 1950  MRN: [id number]  Admit Date:  12/20/2023    Assessment Date:  12/21/23    Summary: Consulted per RN Admit Screen:  Consulted per RN Admit Screen for unsure weight loss and JERZY. Pt with h/o ESRD on HD, HTN here with leg swelling and cough. Pt with acute on chronic CHF with L pleural effusion, PNA and hyperkalemia. Attempted to visit pt but pt off of floor in HD. Pt tolerated renal diet with 75% po intake at breakfast today. Pt's weight stable. HD 12/20- 3L removed. Last BM 12/20. SLP following to evaluate swallow. Will continue to follow for adequate po intake per protocol.    CLINICAL NUTRITION ASSESSMENT      Reason for Assessment MST score 2+, Nurse Admission Screen     Diagnosis/Problem   CC: cough, leg swelling  Dx: hyperkalemia, ESRD on HD, PNA, acute on chronic CHF, HTN   Medical/Surgical History Past Medical History:   Diagnosis Date    Arthritis     Chronic kidney disease     Heart disease     Hypertension        Past Surgical History:   Procedure Laterality Date    BREAST SURGERY      DIALYSIS FISTULA CREATION      EYE SURGERY      HERNIA REPAIR  2017    Dr. Sung        Anthropometrics        Current Height  Current Weight  BMI kg/m2 Height: 152.4 cm (60\")  Weight: 50.9 kg (112 lb 4.8 oz) (12/21/23 0540)  Body mass index is 21.93 kg/m².   Adjusted BMI (if applicable)    BMI Category Normal/Healthy (18.4 - 24.9)   Ideal Body Weight (IBW) 100 lb   Usual Body Weight (UBW) 108-112 lb   Weight Trend Stable   Weight History Wt Readings from Last 30 Encounters:   12/21/23 0540 50.9 kg (112 lb 4.8 oz)   12/20/23 0743 49.4 kg (109 lb)   01/19/23 0300 51.7 kg (113 lb 15.7 oz)   01/18/23 0632 50.5 kg (111 lb 5.3 oz)   01/17/23 0702 49.4 kg (108 lb 14.5 oz)   01/16/23 1500 50.9 kg (112 lb 3.4 oz)   01/16/23 0415 52.6 kg (115 lb 15.4 oz)   01/17/23 1446 49 kg (108 lb)   12/14/21 0639 55.5 kg (122 lb 5.7 oz)   12/13/21 0536 57 kg (125 lb " 10.6 oz)   12/13/21 0017 59 kg (130 lb)   07/18/19 1042 84.8 kg (187 lb)      --  Labs       Pertinent Labs    Results from last 7 days   Lab Units 12/21/23  0547 12/20/23  0806   SODIUM mmol/L 136 139   POTASSIUM mmol/L 5.1 6.6*   CHLORIDE mmol/L 101 99   CO2 mmol/L 24.8 26.1   BUN mg/dL 25* 47*   CREATININE mg/dL 4.85* 7.17*   CALCIUM mg/dL 8.5* 9.5   BILIRUBIN mg/dL  --  0.4   ALK PHOS U/L  --  115   ALT (SGPT) U/L  --  9   AST (SGOT) U/L  --  21   GLUCOSE mg/dL 99 74     Results from last 7 days   Lab Units 12/21/23  0547 12/20/23  1759   MAGNESIUM mg/dL 2.6* 2.5*   PHOSPHORUS mg/dL 5.5*  --    HEMOGLOBIN g/dL 9.2*  --    HEMATOCRIT % 29.3*  --    WBC 10*3/mm3 3.20*  --    ALBUMIN g/dL 3.3*  --      Results from last 7 days   Lab Units 12/21/23  0547 12/20/23  0806   INR   --  1.09   PLATELETS 10*3/mm3 198 228     COVID19   Date Value Ref Range Status   12/20/2023 Not Detected Not Detected - Ref. Range Final     Lab Results   Component Value Date    HGBA1C 4.4 06/08/2022          Medications           Scheduled Medications amLODIPine, 10 mg, Oral, Daily  atorvastatin, 40 mg, Oral, Daily  cefTRIAXone, 1,000 mg, Intravenous, Q24H  cloNIDine, 0.1 mg, Oral, BID  guaiFENesin, 1,200 mg, Oral, Q12H  hydrALAZINE, 100 mg, Oral, TID  ipratropium-albuterol, 3 mL, Nebulization, 4x Daily - RT  isosorbide dinitrate, 30 mg, Oral, Daily  losartan, 100 mg, Oral, Daily  pantoprazole, 40 mg, Oral, Daily  senna-docusate sodium, 2 tablet, Oral, BID  sodium chloride, 10 mL, Intravenous, Q12H       Infusions     PRN Medications   acetaminophen **OR** acetaminophen **OR** acetaminophen    senna-docusate sodium **AND** polyethylene glycol **AND** bisacodyl **AND** bisacodyl    gabapentin    nitroglycerin    ondansetron ODT **OR** ondansetron    sodium chloride    sodium chloride    sodium chloride     Physical Findings          General Findings alert, oriented   Oral/Mouth Cavity WDL   Edema  lower extremity , 2+ (mild)    Gastrointestinal non-distended , normoactive, last bowel movement: 12/20   Skin  skin intact   Tubes/Drains/Lines dialysis catheter   NFPE Unable to perform due to: pt off the floor in HD   --  Current Nutrition Orders & Evaluation of Intake       Oral Nutrition     Food Allergies NKFA   Current PO Diet Diet: Renal Diets; Low Sodium (2-3g), Low Potassium, Low Phosphorus; Texture: Regular Texture (IDDSI 7); Fluid Consistency: Thin (IDDSI 0)   Supplement n/a   PO Evaluation     % PO Intake 75%    Factors Affecting Intake: decreased appetite   --  PES STATEMENT / NUTRITION DIAGNOSIS      Nutrition Dx Problem  Problem: Nutrition Appropriate for Condition at this Time  Etiology: MNT for Treatment/Condition   Signs/Symptoms: PO intake     NUTRITION INTERVENTION / PLAN OF CARE      Intervention Goal(s) Maintain nutrition status, Reduce/improve symptoms, Meet estimated needs, Disease management/therapy, Maintain intake, Maintain weight, and PO intake goal %: 75%         RD Intervention/Action Continue to monitor and Care plan reviewed   --      Prescription/Orders:       PO Diet       Supplements       Enteral Nutrition       Parenteral Nutrition    New Prescription Ordered? No changes at this time   --      Monitor/Evaluation Per protocol   Discharge Plan/Needs Pending clinical course   --    RD to follow per protocol.      Electronically signed by:  Karley Hall RD  12/21/23 14:33 EST

## 2023-12-21 NOTE — THERAPY EVALUATION
Acute Care - Speech Language Pathology   Swallow Initial Evaluation [facility]     Patient Name: Sonia Acharya  : 1950  MRN: [id number]  Today's Date: 2023               Admit Date: 2023    Visit Dx:     ICD-10-CM ICD-9-CM   1. ESRD on hemodialysis  N18.6 585.6    Z99.2 V45.11   2. Hyperkalemia  E87.5 276.7   3. Pneumonia of right lower lobe due to infectious organism  J18.9 486     Patient Active Problem List   Diagnosis    Generalized abdominal pain    ODALIS (acute kidney injury)    Anemia, chronic disease    Metabolic acidosis, increased anion gap    Obesity (BMI 30-39.9)    HTN (hypertension)    Chest pain, atypical    Cervical radiculopathy    ESRD on dialysis    Bradycardia    Right arm pain    ANGIE (obstructive sleep apnea)    Chronic diastolic CHF (congestive heart failure)    Aortic stenosis    Pancytopenia    Hyperphosphatemia    Hyperkalemia    Pneumonia     Past Medical History:   Diagnosis Date    Arthritis     Chronic kidney disease     Heart disease     Hypertension      Past Surgical History:   Procedure Laterality Date    BREAST SURGERY      DIALYSIS FISTULA CREATION      EYE SURGERY      HERNIA REPAIR      Dr. Sung       SLP Recommendation and Plan  SLP Swallowing Diagnosis: R/O pharyngeal dysphagia (23 1400)  SLP Diet Recommendation:  (await VFSS results) (23 1400)  Recommended Precautions and Strategies: upright posture during/after eating, small bites of food and sips of liquid, general aspiration precautions (23 1400)  SLP Rec. for Method of Medication Administration: as tolerated (23 1400)     Monitor for Signs of Aspiration: yes, notify SLP if any concerns (23 1400)  Recommended Diagnostics: reassess via VFSS ([facility]) (23 1400)           Therapy Frequency (Swallow): PRN (23 1400)  Predicted Duration Therapy Intervention (Days): until discharge (23 1400)  Oral Care Recommendations: Oral Care BID/PRN (23 1400)                                       Oral Care Recommendations: Oral Care BID/PRN (12/21/23 1400)    Outcome Evaluation: Clinical swallow evaluation completed. Patient reports no swallow difficulties. No overt s/s of aspiration with thin liquid by spoon/cup/straw, puree, soft/chopped solid, mixed consistency, or regular solid trial. Slightly prolonged mastication suspect due to missing dentition. Delayed productive cough noted following ice chip and thin liquid wash by straw following regular solid trial. Speech to follow with VFSS to further assess swallow function. Plan to await VFSS results for further recommendations and continue current diet at this time with meds as tolerated. Sitting upright, slow rate, small bites/sips.      SWALLOW EVALUATION (last 72 hours)       SLP Adult Swallow Evaluation       Row Name 12/21/23 1400                   Rehab Evaluation    Document Type evaluation  -CR        Subjective Information no complaints  -CR        Patient Observations alert;cooperative  -CR        Patient Effort excellent  -CR        Symptoms Noted During/After Treatment none  -CR           General Information    Patient Profile Reviewed yes  -CR        Pertinent History Of Current Problem 74 y/o female admitted with bilateral pneumonia.  -CR        Current Method of Nutrition regular textures;thin liquids  -CR        Precautions/Limitations, Vision WFL;for purposes of eval  -CR        Precautions/Limitations, Hearing WFL;for purposes of eval  -CR        Prior Level of Function-Swallowing no diet consistency restrictions  -CR        Plans/Goals Discussed with patient;agreed upon  -CR        Barriers to Rehab none identified  -CR           Pain    Additional Documentation Pain Scale: Numbers Pre/Post-Treatment (Group)  -CR           Pain Scale: Numbers Pre/Post-Treatment    Pretreatment Pain Rating 0/10 - no pain  -CR        Posttreatment Pain Rating 0/10 - no pain  -CR           Oral Motor Structure and Function     Dentition Assessment missing teeth  -CR        Secretion Management WNL/WFL  -CR        Mucosal Quality moist, healthy  -CR           Oral Musculature and Cranial Nerve Assessment    Oral Motor General Assessment WFL  -CR           Clinical Swallow Eval    Clinical Swallow Evaluation Summary Clinical swallow evaluation completed. Patient reports no swallow difficulties. No overt s/s of aspiration with thin liquid by spoon/cup/straw, puree, soft/chopped solid, mixed consistency, or regular solid trial. Slightly prolonged mastication suspect due to missing dentition. Delayed productive cough noted following ice chip and thin liquid wash by straw following regular solid trial. Speech to follow with VFSS to further assess swallow function. Plan to await VFSS results for further recommendations and continue current diet at this time with meds as tolerated. Sitting upright, slow rate, small bites/sips.  -CR           SLP Evaluation Clinical Impression    SLP Swallowing Diagnosis R/O pharyngeal dysphagia  -CR           Recommendations    Therapy Frequency (Swallow) PRN  -CR        Predicted Duration Therapy Intervention (Days) until discharge  -CR        SLP Diet Recommendation --  await VFSS results  -CR        Recommended Diagnostics reassess via VFSS ([facility])  -CR        Recommended Precautions and Strategies upright posture during/after eating;small bites of food and sips of liquid;general aspiration precautions  -CR        Oral Care Recommendations Oral Care BID/PRN  -CR        SLP Rec. for Method of Medication Administration as tolerated  -CR        Monitor for Signs of Aspiration yes;notify SLP if any concerns  -CR           Swallow Goals (SLP)    Swallow LTGs Patient will demonstrate functional swallow for  -CR           (LTG) Patient will demonstrate functional swallow for    Diet Texture (Demonstrate functional swallow) regular textures  -CR        Liquid viscosity (Demonstrate functional swallow) thin liquids  -CR         [place] (Demonstrate functional swallow) independently (over 90% accuracy)  -CR        Time Frame (Demonstrate functional swallow) by discharge  -CR        Progress/Outcomes (Demonstrate functional swallow) new goal  -CR                  User Key  (r) = Recorded By, (t) = Taken By, (c) = Cosigned By      Initials Name Effective Dates    Jen Mendez SLP 08/28/23 -                     EDUCATION  The patient has been educated in the following areas:   Dysphagia (Swallowing Impairment).        SLP GOALS       Row Name 12/21/23 1400             (LTG) Patient will demonstrate functional swallow for    Diet Texture (Demonstrate functional swallow) regular textures  -CR      Liquid viscosity (Demonstrate functional swallow) thin liquids  -CR      [place] (Demonstrate functional swallow) independently (over 90% accuracy)  -CR      Time Frame (Demonstrate functional swallow) by discharge  -CR      Progress/Outcomes (Demonstrate functional swallow) new goal  -CR                User Key  (r) = Recorded By, (t) = Taken By, (c) = Cosigned By      Initials Name Provider Type    Jen Mendez SLP Speech and Language Pathologist                       Time Calculation:    Time Calculation- SLP       Row Name 12/21/23 1505             Time Calculation- SLP    SLP Start Time 1300  -CR      SLP Received On 12/21/23  -CR         Untimed Charges    [id number] Eval Oral Pharyng Swallow Minutes 45  -CR         Total Minutes    Untimed Charges Total Minutes 45  -CR       Total Minutes 45  -CR                User Key  (r) = Recorded By, (t) = Taken By, (c) = Cosigned By      Initials Name Provider Type    Jen Mendez SLP Speech and Language Pathologist                    Therapy Charges for Today       Code Description Service Date Service Provider Modifiers Qty    [id number]  ST EVAL ORAL PHARYNG SWALLOW 3 12/21/2023 Jen Gill SLP GN 1                 Jen Gill  SLP  12/21/2023

## 2023-12-21 NOTE — PLAN OF CARE
Goal Outcome Evaluation:    SLP orders received and appreciated, note concern for pneumonia, r/o aspiration. Attempted bedside swallow evaluation, pt NEIL, dialysis. Will f/u this PM or next date as time allows.

## 2023-12-21 NOTE — NURSING NOTE
HD WITHOUT INCIDENT OR C/O. TOLERATED WELL. REMOVED 3 L. NO MEDS ADMINISTERED. AVF NEEDLES REMOVED X 2. HEMOSTASIS ACHIEVED. STABLE POST COMPLETION OF HD.

## 2023-12-21 NOTE — PROGRESS NOTES
[facility] Progress Note      Patient Name: Sonia Acharya  : 1950  MRN: [id number]  Primary Care Physician:  Marcy Paez PA  Date of admission: 2023    Subjective     Interval History:   Follow-up end-stage renal disease    Patient had dialysis yesterday with 3 L of fluid removed, continued to feel weak and have recurrent cough and edema, no nausea or vomiting, no chest pain, no abdominal pain.    Review of Systems:   As noted above    Objective     Vitals:   Temp:  [97.4 °F (36.3 °C)-98.4 °F (36.9 °C)] 98.3 °F (36.8 °C)  Heart Rate:  [54-66] 55  Resp:  [16-20] 16  BP: (144-195)/(63-85) 176/69  Flow (L/min):  [2] 2    Intake/Output Summary (Last 24 hours) at 2023 0853  Last data filed at 2023 1650  Gross per 24 hour   Intake 240 ml   Output 3000 ml   Net -2760 ml       Physical Exam:    General Appearance: alert, awake, chronically ill and, no acute distress   Skin: warm and dry  HEENT: oral mucosa normal, nonicteric sclera  Neck: Mild JVD  Lungs: Bilateral rhonchi, breathing effort not clear  Heart: Irregularly irregular  Abdomen: soft, nontender, nondistended  : no palpable bladder  Extremities: 2+ lower extremity edema, he had functional AV fistula in the left upper arm with good thrill and bruit  Neuro: normal speech and mental status     Scheduled Meds:     amLODIPine, 10 mg, Oral, Daily  atorvastatin, 40 mg, Oral, Daily  cloNIDine, 0.1 mg, Oral, BID  guaiFENesin, 1,200 mg, Oral, Q12H  hydrALAZINE, 100 mg, Oral, TID  ipratropium-albuterol, 3 mL, Nebulization, 4x Daily - RT  isosorbide dinitrate, 30 mg, Oral, Daily  losartan, 100 mg, Oral, Daily  pantoprazole, 40 mg, Oral, Daily  senna-docusate sodium, 2 tablet, Oral, BID  sodium chloride, 10 mL, Intravenous, Q12H      IV Meds:        Results Reviewed:   I have personally reviewed the results from the time of this admission to 2023 08:53 EST     Results from last 7 days   Lab Units  12/21/23  0547 12/20/23  0806   SODIUM mmol/L 136 139   POTASSIUM mmol/L 5.1 6.6*   CHLORIDE mmol/L 101 99   CO2 mmol/L 24.8 26.1   BUN mg/dL 25* 47*   CREATININE mg/dL 4.85* 7.17*   CALCIUM mg/dL 8.5* 9.5   BILIRUBIN mg/dL  --  0.4   ALK PHOS U/L  --  115   ALT (SGPT) U/L  --  9   AST (SGOT) U/L  --  21   GLUCOSE mg/dL 99 74       Estimated Creatinine Clearance: 8.2 mL/min (A) (by C-G formula based on SCr of 4.85 mg/dL (H)).    Results from last 7 days   Lab Units 12/21/23  0547 12/20/23  1759   MAGNESIUM mg/dL 2.6* 2.5*   PHOSPHORUS mg/dL 5.5*  --              Results from last 7 days   Lab Units 12/21/23  0547 12/20/23  0806   WBC 10*3/mm3 3.20* 5.05   HEMOGLOBIN g/dL 9.2* 10.5*   PLATELETS 10*3/mm3 198 228       Results from last 7 days   Lab Units 12/20/23  0806   INR  1.09       Assessment / Plan     ASSESSMENT:  End-stage renal disease on maintenance hemodialysis 3 times a week she missed her dialysis for over a week presented with shortness of breath and hyperkalemia and volume excess, she had dialysis yesterday with 3 L of fluid removed her potassium today is 5.1 and continue to have fluid  Systolic hypertension associate with volume excess, not controlled  Acute on chronic diastolic congestive heart failure.  Anemia of chronic kidney disease hemoglobin is 9.2 no ZULAY is needed  Medical noncomplianc    PLAN:  Hemodialysis again today and challenge fluid removal  Surveillance labs    Chart was reviewed and other providers notes, and labs reviewed.  I discussed the case with the patient  Copied text in this note has been reviewed and is accurate as of 12/21/23.       Thank you for involving us in the care of Sonia Acharya.  Please feel free to call with any questions.    Nathan Adkins MD  12/21/23  08:53 [facility]    [facility]  [phone]    Please note that portions of this note were completed with a voice recognition program.

## 2023-12-21 NOTE — PROGRESS NOTES
Name: Sonia Acharya ADMIT: 2023   : 1950  PCP: Marcy Paez PA    MRN: [id number] LOS: 0 days   AGE/SEX: 73 y.o. female  ROOM: [facility]     Subjective   Subjective   Patient feels better with decreased shortness of breath and cough that is now dry.  No more choking with the food or the liquids.  Decreased wheezing.  No fever or chills.  No chest pain.  No palpitation.  No hemoptysis.    Review of Systems  .  Decreased urine output but no dysuria or hematuria  GI.  No abdominal pain.  No nausea or vomiting.  No bowel movement since admission yesterday       Objective   Objective   Vital Signs  Temp:  [97.4 °F (36.3 °C)-98.4 °F (36.9 °C)] 98.3 °F (36.8 °C)  Heart Rate:  [54-66] 55  Resp:  [16-20] 16  BP: (144-195)/(63-85) 176/69  SpO2:  [96 %-100 %] 96 %  on  Flow (L/min):  [2] 2;   Device (Oxygen Therapy): nasal cannula    Intake/Output Summary (Last 24 hours) at 2023 0903  Last data filed at 2023 1650  Gross per 24 hour   Intake 240 ml   Output 3000 ml   Net -2760 ml     Body mass index is 21.93 kg/m².      23  0743 23  0540   Weight: 49.4 kg (109 lb) 50.9 kg (112 lb 4.8 oz)     Physical Exam  General.  Elderly female.  Appears chronically ill.  Appears older than stated age.  In no apparent pain/distress/diaphoresis.  Eyes.  Pupils equal round and reactive.  Intact extraocular musculature.  No pallor or jaundice.  Oral cavity.  Moist mucous membrane.  Neck.  Supple.  No JVD.  No lymphadenopathy or thyromegaly.  Cardiovascular.  Regular rate and rhythm and grade 3 systolic murmur.  Bradycardia  Chest.  Poor bilateral air entry with bilateral crackles and significant decrease in the wheezes with improvement of the air entry.  Abdomen.  Soft lax.  No tenderness.  No organomegaly.  No guarding or rebound.  Extremities.  No clubbing/cyanosis.  No edema.    CNS.  No acute focal neurological deficits.    Results Review:      Results from last 7 days   Lab Units 12/21/23  3486  "12/20/23  0806   SODIUM mmol/L 136 139   POTASSIUM mmol/L 5.1 6.6*   CHLORIDE mmol/L 101 99   CO2 mmol/L 24.8 26.1   BUN mg/dL 25* 47*   CREATININE mg/dL 4.85* 7.17*   GLUCOSE mg/dL 99 74   CALCIUM mg/dL 8.5* 9.5   AST (SGOT) U/L  --  21   ALT (SGPT) U/L  --  9     Estimated Creatinine Clearance: 8.2 mL/min (A) (by C-G formula based on SCr of 4.85 mg/dL (H)).                      Results from last 7 days   Lab Units 12/21/23  0547 12/20/23  1759   MAGNESIUM mg/dL 2.6* 2.5*   PHOSPHORUS mg/dL 5.5*  --            Invalid input(s): \"LDLCALC\"  Results from last 7 days   Lab Units 12/21/23  0547 12/20/23  0806   WBC 10*3/mm3 3.20* 5.05   HEMOGLOBIN g/dL 9.2* 10.5*   HEMATOCRIT % 29.3* 32.9*   PLATELETS 10*3/mm3 198 228   MCV fL 89.9 89.4   MCH pg 28.2 28.5   MCHC g/dL 31.4* 31.9   RDW % 14.4 14.6   RDW-SD fl 46.2 47.1   MPV fL 8.8 8.7   NEUTROPHIL % % 63.4 74.8   LYMPHOCYTE % % 20.3 11.9*   MONOCYTES % % 10.3 9.3   EOSINOPHIL % % 4.4 3.0   BASOPHIL % % 1.3 0.6   IMM GRAN % % 0.3 0.4   NEUTROS ABS 10*3/mm3 2.03 3.78   LYMPHS ABS 10*3/mm3 0.65* 0.60*   MONOS ABS 10*3/mm3 0.33 0.47   EOS ABS 10*3/mm3 0.14 0.15   BASOS ABS 10*3/mm3 0.04 0.03   IMMATURE GRANS (ABS) 10*3/mm3 0.01 0.02   NRBC /100 WBC 0.0 0.0     Results from last 7 days   Lab Units 12/20/23  0806   INR  1.09         Results from last 7 days   Lab Units 12/20/23  0806   PROCALCITONIN ng/mL 0.24   LACTATE mmol/L 0.9             Results from last 7 days   Lab Units 12/21/23  0404 12/20/23  0806   BLOODCX   --  No growth at 24 hours   RESPCX  Rejected  --      Results from last 7 days   Lab Units 12/20/23  1429   ADENOVIRUS DETECTION BY PCR  Not Detected   CORONAVIRUS 229E  Not Detected   CORONAVIRUS HKU1  Not Detected   CORONAVIRUS NL63  Not Detected   CORONAVIRUS OC43  Not Detected   HUMAN METAPNEUMOVIRUS  Not Detected   HUMAN RHINOVIRUS/ENTEROVIRUS  Not Detected   INFLUENZA B PCR  Not Detected   PARAINFLUENZA 1  Not Detected   PARAINFLUENZA VIRUS 2  Not " Detected   PARAINFLUENZA VIRUS 3  Not Detected   PARAINFLUENZA VIRUS 4  Not Detected   BORDETELLA PERTUSSIS PCR  Not Detected   CHLAMYDOPHILA PNEUMONIAE PCR  Not Detected   MYCOPLAMA PNEUMO PCR  Not Detected   INFLUENZA A PCR  Not Detected   INFLUENZA A H3  Not Detected   INFLUENZA A H1  Not Detected   RSV, PCR  Not Detected               Imaging:  Imaging Results (Last 24 Hours)       ** No results found for the last 24 hours. **               I reviewed the patient's new clinical results / labs / tests / procedures      Assessment/Plan     Active Hospital Problems    Diagnosis  POA    **Hyperkalemia [E87.5]  Yes    Pneumonia [J18.9]  Yes    ESRD on dialysis [N18.6, Z99.2]  Not Applicable    Chronic diastolic CHF (congestive heart failure) [I50.32]  Yes    HTN (hypertension) [I10]  Yes      Resolved Hospital Problems   No resolved problems to display.         1.  Bilateral pneumonia and reactive airway disease..  Respiratory PCR panel is negative.  MRSA screen is negative.  Sputum rejected.  Blood cultures negative till now..  Awaiting urine Legionella and streptococcal antigen.  Continue Mucinex/DuoNeb/Rocephin/I-S.  Rule out aspiration.  Normal procalcitonin and lactic acid.  2.  End-stage renal disease on hemodialysis/hyperkalemia.  Patient has missed 3 dialysis session.  She is status post calcium and insulin treatment in the emergency department.  Volume status is generous status post emergency dialysis yesterday.  Nephrology dialyzing again today with fluid removal..  Resolved hyperkalemia.  Will monitor renal function and electrolytes.  Appreciate nephrology help.  3.  Hypertension/coronary artery disease/aortic stenosis/abnormal echo with infiltrative cardiac disease/new onset atrial fibrillation versus junctional rhythm.  The arrhythmia could be related to the hyperkalemia.  Her last echo was on 1/23 revealing a normal ejection fraction, left ventricular hypertrophy, amyloid infiltrative disease, right  ventricular enlargement, moderate AS.  Patient is volume overloaded and undergoing dialysis for that.  There is no clinical evidence of angina.  Patient troponin and proBNP are elevated but this also could be secondary to the renal failure and volume overload.  Improving blood pressure after dialysis and with increasing Norvasc.  Continue Norvasc/hydralazine/losartan/clonidine and monitor.  Dialysis initiated and will help with volume overload  4.  Anemia of chronic disease.  Baseline hemoglobin between 9.8 and 11.  Hemoglobin is stable at baseline.  Will monitor.  5.  Dyslipidemia.  Continue Lipitor.  6.  Leukopenia.  Old by review of the records.  Will monitor.  7.  VTE prophylaxis.  Subcu heparin.     Discussed my findings and plan of treatment with the patient/ER provider.  Disposition.  To be determined based on clinical course.        Kathy Marie MD  [facility]  12/21/23  09:03 EST

## 2023-12-21 NOTE — SIGNIFICANT NOTE
12/21/23 1129   OTHER   Discipline physical therapist   Rehab Time/Intention   Session Not Performed other (see comments)  (spoke with nsg, pt leaving floor for dialysis, PT will f/u tomorrow)   Recommendation   PT - Next Appointment 12/22/23

## 2023-12-21 NOTE — PLAN OF CARE
Goal Outcome Evaluation:              Outcome Evaluation: Clinical swallow evaluation completed. Patient reports no swallow difficulties. No overt s/s of aspiration with thin liquid by spoon/cup/straw, puree, soft/chopped solid, mixed consistency, or regular solid trial. Slightly prolonged mastication suspect due to missing dentition. Delayed productive cough noted following ice chip and thin liquid wash by straw following regular solid trial. Speech to follow with VFSS to further assess swallow function. Plan to await VFSS results for further recommendations and continue current diet at this time with meds as tolerated. Sitting upright, slow rate, small bites/sips.

## 2023-12-21 NOTE — PLAN OF CARE
Goal Outcome Evaluation:  Plan of Care Reviewed With: patient        Progress: improving  Outcome Evaluation: Patient received dialysis this shift.  Rocephin administered per order.  ST evaluated and plans for VFSS.  VS and labs monitored.  Right arm restricted.

## 2023-12-22 ENCOUNTER — APPOINTMENT (OUTPATIENT)
Dept: GENERAL RADIOLOGY | Facility: HOSPITAL | Age: 73
End: 2023-12-22
Payer: MEDICARE

## 2023-12-22 PROBLEM — D63.8 ANEMIA, CHRONIC DISEASE: Status: ACTIVE | Noted: 2023-12-22

## 2023-12-22 PROBLEM — D72.819 LEUKOCYTOPENIA: Status: ACTIVE | Noted: 2023-12-22

## 2023-12-22 LAB
ALBUMIN SERPL-MCNC: 3 G/DL (ref 3.5–5.2)
ANION GAP SERPL CALCULATED.3IONS-SCNC: 10.6 MMOL/L (ref 5–15)
BASOPHILS # BLD AUTO: 0.03 10*3/MM3 (ref 0–0.2)
BASOPHILS NFR BLD AUTO: 1.1 % (ref 0–1.5)
BUN SERPL-MCNC: 18 MG/DL (ref 8–23)
BUN/CREAT SERPL: 5 (ref 7–25)
CALCIUM SPEC-SCNC: 8.7 MG/DL (ref 8.6–10.5)
CHLORIDE SERPL-SCNC: 99 MMOL/L (ref 98–107)
CO2 SERPL-SCNC: 22.4 MMOL/L (ref 22–29)
CREAT SERPL-MCNC: 3.6 MG/DL (ref 0.57–1)
DEPRECATED RDW RBC AUTO: 43.9 FL (ref 37–54)
EGFRCR SERPLBLD CKD-EPI 2021: 12.7 ML/MIN/1.73
EOSINOPHIL # BLD AUTO: 0.19 10*3/MM3 (ref 0–0.4)
EOSINOPHIL NFR BLD AUTO: 6.8 % (ref 0.3–6.2)
ERYTHROCYTE [DISTWIDTH] IN BLOOD BY AUTOMATED COUNT: 14.2 % (ref 12.3–15.4)
GLUCOSE SERPL-MCNC: 61 MG/DL (ref 65–99)
HCT VFR BLD AUTO: 30.9 % (ref 34–46.6)
HGB BLD-MCNC: 9.7 G/DL (ref 12–15.9)
IMM GRANULOCYTES # BLD AUTO: 0.01 10*3/MM3 (ref 0–0.05)
IMM GRANULOCYTES NFR BLD AUTO: 0.4 % (ref 0–0.5)
LYMPHOCYTES # BLD AUTO: 0.7 10*3/MM3 (ref 0.7–3.1)
LYMPHOCYTES NFR BLD AUTO: 25.1 % (ref 19.6–45.3)
MAGNESIUM SERPL-MCNC: 2.5 MG/DL (ref 1.6–2.4)
MCH RBC QN AUTO: 27.6 PG (ref 26.6–33)
MCHC RBC AUTO-ENTMCNC: 31.4 G/DL (ref 31.5–35.7)
MCV RBC AUTO: 88 FL (ref 79–97)
MONOCYTES # BLD AUTO: 0.45 10*3/MM3 (ref 0.1–0.9)
MONOCYTES NFR BLD AUTO: 16.1 % (ref 5–12)
NEUTROPHILS NFR BLD AUTO: 1.41 10*3/MM3 (ref 1.7–7)
NEUTROPHILS NFR BLD AUTO: 50.5 % (ref 42.7–76)
NRBC BLD AUTO-RTO: 0 /100 WBC (ref 0–0.2)
PHOSPHATE SERPL-MCNC: 4 MG/DL (ref 2.5–4.5)
PLATELET # BLD AUTO: 200 10*3/MM3 (ref 140–450)
PMV BLD AUTO: 9.3 FL (ref 6–12)
POTASSIUM SERPL-SCNC: 4.7 MMOL/L (ref 3.5–5.2)
QT INTERVAL: 457 MS
QTC INTERVAL: 453 MS
RBC # BLD AUTO: 3.51 10*6/MM3 (ref 3.77–5.28)
SODIUM SERPL-SCNC: 132 MMOL/L (ref 136–145)
WBC NRBC COR # BLD AUTO: 2.79 10*3/MM3 (ref 3.4–10.8)

## 2023-12-22 PROCEDURE — 97530 THERAPEUTIC ACTIVITIES: CPT

## 2023-12-22 PROCEDURE — 94799 UNLISTED PULMONARY SVC/PX: CPT

## 2023-12-22 PROCEDURE — 92611 MOTION FLUOROSCOPY/SWALLOW: CPT

## 2023-12-22 PROCEDURE — 74230 X-RAY XM SWLNG FUNCJ C+: CPT

## 2023-12-22 PROCEDURE — 97162 PT EVAL MOD COMPLEX 30 MIN: CPT

## 2023-12-22 PROCEDURE — 83735 ASSAY OF MAGNESIUM: CPT | Performed by: INTERNAL MEDICINE

## 2023-12-22 PROCEDURE — 80069 RENAL FUNCTION PANEL: CPT | Performed by: INTERNAL MEDICINE

## 2023-12-22 PROCEDURE — 85025 COMPLETE CBC W/AUTO DIFF WBC: CPT | Performed by: INTERNAL MEDICINE

## 2023-12-22 PROCEDURE — 99232 SBSQ HOSP IP/OBS MODERATE 35: CPT

## 2023-12-22 RX ORDER — MANNITOL 250 MG/ML
12.5 INJECTION, SOLUTION INTRAVENOUS AS NEEDED
Status: CANCELLED | OUTPATIENT
Start: 2023-12-23 | End: 2023-12-23

## 2023-12-22 RX ADMIN — IPRATROPIUM BROMIDE AND ALBUTEROL SULFATE 3 ML: 2.5; .5 SOLUTION RESPIRATORY (INHALATION) at 15:40

## 2023-12-22 RX ADMIN — BARIUM SULFATE 4 ML: 980 POWDER, FOR SUSPENSION ORAL at 09:43

## 2023-12-22 RX ADMIN — BARIUM SULFATE 55 ML: 0.81 POWDER, FOR SUSPENSION ORAL at 09:43

## 2023-12-22 RX ADMIN — AMLODIPINE BESYLATE 10 MG: 10 TABLET ORAL at 10:37

## 2023-12-22 RX ADMIN — Medication 10 ML: at 10:38

## 2023-12-22 RX ADMIN — Medication 10 ML: at 21:17

## 2023-12-22 RX ADMIN — HYDRALAZINE HYDROCHLORIDE 100 MG: 50 TABLET, FILM COATED ORAL at 21:17

## 2023-12-22 RX ADMIN — GUAIFENESIN 1200 MG: 600 TABLET, EXTENDED RELEASE ORAL at 10:37

## 2023-12-22 RX ADMIN — BARIUM SULFATE 50 ML: 400 SUSPENSION ORAL at 09:43

## 2023-12-22 RX ADMIN — IPRATROPIUM BROMIDE AND ALBUTEROL SULFATE 3 ML: 2.5; .5 SOLUTION RESPIRATORY (INHALATION) at 11:53

## 2023-12-22 RX ADMIN — IPRATROPIUM BROMIDE AND ALBUTEROL SULFATE 3 ML: 2.5; .5 SOLUTION RESPIRATORY (INHALATION) at 09:06

## 2023-12-22 RX ADMIN — BENZONATATE 200 MG: 100 CAPSULE ORAL at 19:58

## 2023-12-22 RX ADMIN — HYDRALAZINE HYDROCHLORIDE 100 MG: 50 TABLET, FILM COATED ORAL at 10:37

## 2023-12-22 RX ADMIN — ATORVASTATIN CALCIUM 40 MG: 20 TABLET, FILM COATED ORAL at 10:37

## 2023-12-22 RX ADMIN — IPRATROPIUM BROMIDE AND ALBUTEROL SULFATE 3 ML: 2.5; .5 SOLUTION RESPIRATORY (INHALATION) at 20:17

## 2023-12-22 RX ADMIN — GABAPENTIN 100 MG: 100 CAPSULE ORAL at 15:26

## 2023-12-22 RX ADMIN — LOSARTAN POTASSIUM 100 MG: 100 TABLET, FILM COATED ORAL at 10:37

## 2023-12-22 RX ADMIN — HYDRALAZINE HYDROCHLORIDE 100 MG: 50 TABLET, FILM COATED ORAL at 13:19

## 2023-12-22 RX ADMIN — PANTOPRAZOLE SODIUM 40 MG: 40 TABLET, DELAYED RELEASE ORAL at 10:37

## 2023-12-22 RX ADMIN — ISOSORBIDE DINITRATE 30 MG: 20 TABLET ORAL at 10:37

## 2023-12-22 RX ADMIN — CLONIDINE HYDROCHLORIDE 0.1 MG: 0.1 TABLET ORAL at 10:37

## 2023-12-22 RX ADMIN — CLONIDINE HYDROCHLORIDE 0.1 MG: 0.1 TABLET ORAL at 21:17

## 2023-12-22 NOTE — THERAPY DISCHARGE NOTE
Patient Name: Sonia Acharya  : 1950    MRN: [id number]                              Today's Date: 2023       Admit Date: 2023    Visit Dx:     ICD-10-CM ICD-9-CM   1. ESRD on hemodialysis  N18.6 585.6    Z99.2 V45.11   2. Hyperkalemia  E87.5 276.7   3. Pneumonia of right lower lobe due to infectious organism  J18.9 486     Patient Active Problem List   Diagnosis    Generalized abdominal pain    ODALIS (acute kidney injury)    Anemia, chronic disease    Metabolic acidosis, increased anion gap    Obesity (BMI 30-39.9)    HTN (hypertension)    Chest pain, atypical    Cervical radiculopathy    ESRD on dialysis    Bradycardia    Right arm pain    ANGIE (obstructive sleep apnea)    Chronic diastolic CHF (congestive heart failure)    Aortic stenosis    Pancytopenia    Hyperphosphatemia    Hyperkalemia    Pneumonia     Past Medical History:   Diagnosis Date    Arthritis     Chronic kidney disease     Heart disease     Hypertension      Past Surgical History:   Procedure Laterality Date    BREAST SURGERY      DIALYSIS FISTULA CREATION      EYE SURGERY      HERNIA REPAIR      Dr. Sung      General Information       Row Name 23 1116          Physical Therapy Time and Intention    Document Type discharge evaluation/summary  -DJ     Mode of Treatment individual therapy;group therapy  -DJ       Row Name 23 1116          General Information    Patient Profile Reviewed yes  -DJ     Prior Level of Function independent:;ADL's;community mobility  -DJ     Existing Precautions/Restrictions no known precautions/restrictions  -DJ     Barriers to Rehab none identified  -DJ       Row Name 23 1116          Living Environment    People in Home child(giuseppe), adult  -DJ       Row Name 23 1116          Home Main Entrance    Number of Stairs, Main Entrance none  -DJ       Row Name 23 1116          Stairs Within Home, Primary    Number of Stairs, Within Home, Primary none  -DJ       Row Name  12/22/23 1116          Cognition    Orientation Status (Cognition) oriented x 4  -DJ       Row Name 12/22/23 1116          Safety Issues, Functional Mobility    Safety Issues Affecting Function (Mobility) safety precaution awareness  -DJ     Comment, Safety Issues/Impairments (Mobility) gt belt, nonskid socks  -DJ               User Key  (r) = Recorded By, (t) = Taken By, (c) = Cosigned By      Initials Name Provider Type    Lien Chicas, RAFAEL Physical Therapist                   Mobility       Row Name 12/22/23 1117          Bed Mobility    Bed Mobility supine-sit;sit-supine  -DJ     Supine-Sit [facility] (Bed Mobility) standby assist;verbal cues  -DJ     Sit-Supine [facility] (Bed Mobility) standby assist;verbal cues  -DJ     Assistive Device (Bed Mobility) bed rails;head of bed elevated  -DJ       Row Name 12/22/23 1117          Transfers    Comment, (Transfers) sit/stand from EOB  -DJ       Row Name 12/22/23 1117          Bed-Chair Transfer    Bed-Chair [facility] (Transfers) not tested  -DJ       Row Name 12/22/23 1117          Sit-Stand Transfer    Sit-Stand [facility] (Transfers) standby assist;verbal cues  -DJ     Assistive Device (Sit-Stand Transfers) walker, front-wheeled  -DJ       Row Name 12/22/23 1117          Gait/Stairs (Locomotion)    [facility] Level (Gait) standby assist;verbal cues  -DJ     Assistive Device (Gait) walker, front-wheeled  -DJ     Distance in Feet (Gait) 200'  -DJ     Deviations/Abnormal Patterns (Gait) gait speed decreased  -DJ     [facility] Level (Stairs) not tested  -DJ     Comment, (Gait/Stairs) Pt amb 200' with r wx and SBA with good balance and fair endurance. O2 100% pre amb, 93% post amb  -DJ               User Key  (r) = Recorded By, (t) = Taken By, (c) = Cosigned By      Initials Name Provider Type    Lien Chicas, PT Physical Therapist                   Obj/Interventions       Row Name 12/22/23 1120          Range of Motion Comprehensive    General Range  of Motion no range of motion deficits identified  -DJ       Row Name 12/22/23 1120          Strength Comprehensive (MMT)    Comment, General Manual Muscle Testing (MMT) Assessment good extremity strength  -DJ       Row Name 12/22/23 1120          Motor Skills    Motor Skills functional endurance  -DJ     Functional Endurance fair  -DJ       Row Name 12/22/23 1120          Balance    Balance Assessment standing static balance;standing dynamic balance  -DJ     Static Standing Balance standby assist;verbal cues  -DJ     Dynamic Standing Balance standby assist;verbal cues  -DJ     Position/Device Used, Standing Balance walker, front-wheeled;supported  -DJ     Balance Interventions sitting;standing;weight shifting activity  -DJ     Comment, Balance good with r wx  -DJ       Row Name 12/22/23 1120          Sensory Assessment (Somatosensory)    Sensory Assessment (Somatosensory) not tested  -DJ               User Key  (r) = Recorded By, (t) = Taken By, (c) = Cosigned By      Initials Name Provider Type    Lien Chicas, RAFAEL Physical Therapist                   Goals/Plan    No documentation.                  Clinical Impression       Row Name 12/22/23 1121          Pain    Pretreatment Pain Rating 0/10 - no pain  -DJ     Pre/Posttreatment Pain Comment c/c is cough  -DJ       Row Name 12/22/23 1121          Plan of Care Review    Plan of Care Reviewed With patient  -DJ     Outcome Evaluation 72yo pleasant black female admitted 12/20/23 due to hyperkalemia. PMH includes hbp, CKD, heart failure, pna, anemia. PLOF: Pt lives at home with adult children and uses a r wx for mobility. She reports independence with ADLs. Today, she was finishing breakfast and speaking on phone when PT entered room. Pt essentially independent [facility] bed mobility and standing from EOB using r wx. Pt amb 200' with r wx and SBA without O2 with good balance and fair endurance. O2 100% pre amb, 93% post amb. Pt returned supine in bed with all needs met.  Pt politely declined home PT services. Pt is safe to amb with [facility] staff and safe to return home upon d/c. WIll sign off.  -DJ       Row Name 12/22/23 1121          Therapy Assessment/Plan (PT)    Patient/Family Therapy Goals Statement (PT) home  -DJ     Criteria for Skilled Interventions Met (PT) no problems identified which require skilled intervention  -DJ     Therapy Frequency (PT) evaluation only  -DJ       Row Name 12/22/23 1121          Vital Signs    O2 Delivery Pre Treatment nasal cannula  0.5L  -DJ     O2 Delivery Intra Treatment room air  -DJ     O2 Delivery Post Treatment nasal cannula  0.5L  -DJ     Pre Patient Position Supine  -DJ     Intra Patient Position Standing  -DJ     Post Patient Position Supine  -DJ       Row Name 12/22/23 1121          Positioning and Restraints    Pre-Treatment Position in bed  -DJ     Post Treatment Position bed  -DJ     In Bed notified nsg;supine;call light within reach;encouraged to call for assist  no bed alarm on when PT entered room  -DJ               User Key  (r) = Recorded By, (t) = Taken By, (c) = Cosigned By      Initials Name Provider Type    Lien Chicas, PT Physical Therapist                   Outcome Measures       Row Name 12/22/23 1126          How much help from another person do you currently need...    Turning from your back to your side while in flat bed without using bedrails? 4  -DJ     Moving from lying on back to sitting on the side of a flat bed without bedrails? 4  -DJ     Moving to and from a bed to a chair (including a wheelchair)? 4  -DJ     Standing up from a chair using your arms (e.g., wheelchair, bedside chair)? 4  -DJ     Climbing 3-5 steps with a railing? 3  -DJ     To walk in hospital room? 3  -DJ     AM-PAC 6 Clicks Score (PT) 22  -DJ     Highest Level of Mobility Goal 7 --> Walk 25 feet or more  -DJ       Row Name 12/22/23 1126          Functional Assessment    Outcome Measure Options AM-PAC 6 Clicks Basic Mobility (PT)  -DJ                User Key  (r) = Recorded By, (t) = Taken By, (c) = Cosigned By      Initials Name Provider Type    Lien Chicas PT Physical Therapist                  Physical Therapy Education       Title: PT OT SLP Therapies (In Progress)       Topic: Physical Therapy (In Progress)       Point: Mobility training (Done)       Learning Progress Summary             Patient Acceptance, E, VU by JUDSON at 12/22/2023 1126                         Point: Home exercise program (Not Started)       Learner Progress:  Not documented in this visit.              Point: Body mechanics (Done)       Learning Progress Summary             Patient Acceptance, E, VU by JUDSON at 12/22/2023 1126                         Point: Precautions (Done)       Learning Progress Summary             Patient Acceptance, E, VU by JUDSON at 12/22/2023 1126                                         User Key       Initials Effective Dates Name Provider Type Discipline    JUDSON 10/25/19 -  Lien Santillan PT Physical Therapist PT                  PT Recommendation and Plan     Plan of Care Reviewed With: patient  Outcome Evaluation: 74yo pleasant black female admitted 12/20/23 due to hyperkalemia. PMH includes hbp, CKD, heart failure, pna, anemia. PLOF: Pt lives at home with adult children and uses a r wx for mobility. She reports independence with ADLs. Today, she was finishing breakfast and speaking on phone when PT entered room. Pt essentially independent [facility] bed mobility and standing from EOB using r wx. Pt amb 200' with r wx and SBA without O2 with good balance and fair endurance. O2 100% pre amb, 93% post amb. Pt returned supine in bed with all needs met. Pt politely declined home PT services. Pt is safe to amb with [facility] staff and safe to return home upon d/c. WIll sign off.     Time Calculation:   PT Evaluation Complexity  History, PT Evaluation Complexity: 3 or more personal factors and/or comorbidities  Examination of Body Systems (PT Eval Complexity): total of 4 or  more elements  Clinical Presentation (PT Evaluation Complexity): evolving  Clinical Decision Making (PT Evaluation Complexity): moderate complexity  Overall Complexity (PT Evaluation Complexity): moderate complexity     PT Charges       Row Name 12/22/23 1127             Time Calculation    Start Time 0824  -DJ      Stop Time 0845  -DJ      Time Calculation (min) 21 min  -DJ      PT Non-Billable Time (min) 10 min  -DJ      PT Received On 12/22/23  -DJ                User Key  (r) = Recorded By, (t) = Taken By, (c) = Cosigned By      Initials Name Provider Type    Lien Chicas, PT Physical Therapist                  Therapy Charges for Today       Code Description Service Date Service Provider Modifiers Qty    76933978347 HC PT EVAL MOD COMPLEXITY 2 12/22/2023 Lien Santillan, PT GP 1    98731665819 HC PT THERAPEUTIC ACT EA 15 MIN 12/22/2023 Lien Santillan, PT GP 1            PT G-Codes  Outcome Measure Options: AM-PAC 6 Clicks Basic Mobility (PT)  AM-PAC 6 Clicks Score (PT): 22    PT Discharge Summary  Anticipated Discharge Disposition (PT): home with assist    Lien Santillan PT  12/22/2023

## 2023-12-22 NOTE — PROGRESS NOTES
LOS: 0 days   Patient Care Team:  Marcy Paez PA as PCP - General (Physician Assistant)    Chief Complaint: Follow up abnormal EKG     Interval History: She reports feeling much better today. Her breathing has improved, she denies chest pain, palpitations, or shortness of breath.     Vital Signs:  Temp:  [97.7 °F (36.5 °C)-98.4 °F (36.9 °C)] 98.4 °F (36.9 °C)  Heart Rate:  [53-72] 61  Resp:  [16-20] 20  BP: (154-170)/(61-89) 164/62    Intake/Output Summary (Last 24 hours) at 12/22/2023 0946  Last data filed at 12/22/2023 0700  Gross per 24 hour   Intake 900 ml   Output 3000 ml   Net -2100 ml       Physical Exam:   Physical Exam  Constitutional:       General: She is not in acute distress.  HENT:      Head: Normocephalic.      Nose: Nose normal.   Eyes:      Extraocular Movements: Extraocular movements intact.      Pupils: Pupils are equal, round, and reactive to light.   Neck:      Vascular: No carotid bruit.   Cardiovascular:      Rate and Rhythm: Normal rate and regular rhythm.      Pulses: Normal pulses.      Heart sounds: Normal heart sounds. Heart sounds not distant. No murmur heard.     No friction rub. No gallop. No S3 or S4 sounds.   Pulmonary:      Effort: Pulmonary effort is normal.      Breath sounds: Rhonchi present.   Abdominal:      General: Abdomen is flat. Bowel sounds are normal.      Palpations: Abdomen is soft.   Musculoskeletal:      Cervical back: Normal range of motion.      Right lower leg: No edema.      Left lower leg: No edema.   Skin:     General: Skin is warm and dry.   Neurological:      General: No focal deficit present.      Mental Status: She is alert and oriented to person, place, and time. Mental status is at baseline.   Psychiatric:         Mood and Affect: Mood normal.         Behavior: Behavior normal.         Thought Content: Thought content normal.         Judgment: Judgment normal.         Results Review:    Results from last 7 days   Lab Units 12/22/23  0749   SODIUM  mmol/L 132*   POTASSIUM mmol/L 4.7   CHLORIDE mmol/L 99   CO2 mmol/L 22.4   BUN mg/dL 18   CREATININE mg/dL 3.60*   GLUCOSE mg/dL 61*   CALCIUM mg/dL 8.7         Results from last 7 days   Lab Units 12/22/23  0749   WBC 10*3/mm3 2.79*   HEMOGLOBIN g/dL 9.7*   HEMATOCRIT % 30.9*   PLATELETS 10*3/mm3 200     Results from last 7 days   Lab Units 12/20/23  0806   INR  1.09         Results from last 7 days   Lab Units 12/22/23  0749   MAGNESIUM mg/dL 2.5*           I reviewed the patient's new clinical results.        Assessment & Plan:  Abnormal EKG - junctional rhythm at times on initial EKG. This is secondary to hyperkalemia which has improved with hemodialysis.   ESRD - nephrology is managing dialysis   Hypertension - due to volume overload related to missed dialysis, this has improved with HD.   Chronic diastolic CHF  Dyslipidemia - on atorvastatin   Pneumonia - respiratory PCR was negative. She still has some rhonchi but her cough is much better and she reports her breathing has improved significantly.     She looks much better today after her dialysis sessions. She is hopeful to discharge tomorrow, no objection to this from a cardiology standpoint. Cardiology will sign off, please call with any questions or concerns.     Zayra Monterroso, KANDIS  12/22/23  09:46 EST

## 2023-12-22 NOTE — PROGRESS NOTES
[facility] Progress Note      Patient Name: Sonia Acharya  : 1950  MRN: [id number]  Primary Care Physician:  Marcy Paez PA  Date of admission: 2023    Subjective     Interval History:   Follow-up end-stage renal disease    Patient had dialysis again yesterday and she had 3 L of fluid removed, she is less short of breath but continues to have cough and feeling weak, her edema improved significantly, denies any nausea or vomiting, no chest pain or shortness of breath.    Review of Systems:   As noted above    Objective     Vitals:   Temp:  [97.7 °F (36.5 °C)-98.4 °F (36.9 °C)] 98.4 °F (36.9 °C)  Heart Rate:  [53-72] 61  Resp:  [16-20] 20  BP: (154-168)/(61-89) 164/62  Flow (L/min):  [0.5-2] 2    Intake/Output Summary (Last 24 hours) at 2023 1027  Last data filed at 2023 0700  Gross per 24 hour   Intake 900 ml   Output 3000 ml   Net -2100 ml       Physical Exam:    General Appearance: alert, awake, chronically ill and, no acute distress   Skin: warm and dry  HEENT: oral mucosa normal, nonicteric sclera  Neck: Mild JVD  Lungs: Bilateral rhonchi, breathing effort not clear  Heart: Irregularly irregular  Abdomen: soft, nontender, nondistended  : no palpable bladder  Extremities: trace lower extremity edema, he had functional AV fistula in the right upper arm with good thrill and bruit  Neuro: normal speech and mental status     Scheduled Meds:     amLODIPine, 10 mg, Oral, Daily  atorvastatin, 40 mg, Oral, Daily  cefTRIAXone, 1,000 mg, Intravenous, Q24H  cloNIDine, 0.1 mg, Oral, BID  guaiFENesin, 1,200 mg, Oral, Q12H  hydrALAZINE, 100 mg, Oral, TID  ipratropium-albuterol, 3 mL, Nebulization, 4x Daily - RT  isosorbide dinitrate, 30 mg, Oral, Daily  losartan, 100 mg, Oral, Daily  pantoprazole, 40 mg, Oral, Daily  senna-docusate sodium, 2 tablet, Oral, BID  sodium chloride, 10 mL, Intravenous, Q12H      IV Meds:        Results Reviewed:   I have personally  reviewed the results from the time of this admission to 12/22/2023 10:27 EST     Results from last 7 days   Lab Units 12/22/23  0749 12/21/23  0547 12/20/23  0806   SODIUM mmol/L 132* 136 139   POTASSIUM mmol/L 4.7 5.1 6.6*   CHLORIDE mmol/L 99 101 99   CO2 mmol/L 22.4 24.8 26.1   BUN mg/dL 18 25* 47*   CREATININE mg/dL 3.60* 4.85* 7.17*   CALCIUM mg/dL 8.7 8.5* 9.5   BILIRUBIN mg/dL  --   --  0.4   ALK PHOS U/L  --   --  115   ALT (SGPT) U/L  --   --  9   AST (SGOT) U/L  --   --  21   GLUCOSE mg/dL 61* 99 74       Estimated Creatinine Clearance: 11 mL/min (A) (by C-G formula based on SCr of 3.6 mg/dL (H)).    Results from last 7 days   Lab Units 12/22/23  0749 12/21/23  0547 12/20/23  1759   MAGNESIUM mg/dL 2.5* 2.6* 2.5*   PHOSPHORUS mg/dL 4.0 5.5*  --              Results from last 7 days   Lab Units 12/22/23  0749 12/21/23  0547 12/20/23  0806   WBC 10*3/mm3 2.79* 3.20* 5.05   HEMOGLOBIN g/dL 9.7* 9.2* 10.5*   PLATELETS 10*3/mm3 200 198 228       Results from last 7 days   Lab Units 12/20/23  0806   INR  1.09       Assessment / Plan     ASSESSMENT:  End-stage renal disease on maintenance hemodialysis 3 times a week she missed her dialysis for over a week presented with shortness of breath and hyperkalemia and volume excess, she had another dialysis yesterday with 3 L removed, potassium down to 4.7 and sodium 132  Systolic hypertension associate with volume excess, improved with improvement of the hypovolemia  Acute on chronic diastolic congestive heart failure.  Her symptoms of congestive heart failure has improved significant  Anemia of chronic kidney disease hemoglobin is 9 point no ZULAY is needed  Leukopenia, WBCs 2.79  Medical noncomplianc    PLAN:  Hemodialysis tomorrow and challenge fluid removal again  Surveillance labs    Chart was reviewed and other providers notes, and labs reviewed.  I discussed the case with the patient  Copied text in this note has been reviewed and is accurate as of 12/22/23.        Thank you for involving us in the care of Sonia Acharya.  Please feel free to call with any questions.    Nathan Adkins MD  12/22/23  10:27 [facility]    [facility]  [phone]    Please note that portions of this note were completed with a voice recognition program.

## 2023-12-22 NOTE — PROGRESS NOTES
"    DAILY PROGRESS NOTE  [facility]    Patient Identification:  Name: Sonia Acharya  Age: 73 y.o.  Sex: female  :  1950  MRN: [id number]         Primary Care Physician: Marcy Paez PA    Subjective:  Interval History: Still feels short of breath but better.  Denies any fevers.  Denies any nausea or vomiting or confusion.    Objective: Rolled her eyes at me as soon as I entered room before we even met.  Otherwise was conversational and pleasant afterwards.  No family present.  Patient nontoxic though chronically ill in appearance    Scheduled Meds:amLODIPine, 10 mg, Oral, Daily  atorvastatin, 40 mg, Oral, Daily  cloNIDine, 0.1 mg, Oral, BID  hydrALAZINE, 100 mg, Oral, TID  ipratropium-albuterol, 3 mL, Nebulization, 4x Daily - RT  isosorbide dinitrate, 30 mg, Oral, Daily  losartan, 100 mg, Oral, Daily  pantoprazole, 40 mg, Oral, Daily  sodium chloride, 10 mL, Intravenous, Q12H      Continuous Infusions:     Vital signs in last 24 hours:  Temp:  [97.7 °F (36.5 °C)-98.4 °F (36.9 °C)] 98.4 °F (36.9 °C)  Heart Rate:  [53-72] 57  Resp:  [16-20] 20  BP: (154-168)/(61-89) 164/62    Intake/Output:    Intake/Output Summary (Last 24 hours) at 2023 1341  Last data filed at 2023 1300  Gross per 24 hour   Intake 1140 ml   Output --   Net 1140 ml       Exam:  /62 (BP Location: Left arm, Patient Position: Lying)   Pulse 57   Temp 98.4 °F (36.9 °C) (Oral)   Resp 20   Ht 152.4 cm (60\")   Wt 50.9 kg (112 lb 4.8 oz)   SpO2 100%   BMI 21.93 kg/m²     General Appearance:    Alert, cooperative, nontoxic, AAOx3                         Throat: Very poor dentition, oral mucosa pink and moist                           Neck:   Supple, symmetrical, trachea midline, no JVD                         Lungs:    Decreased/diminished with rhonchi to auscultation bilaterally, respirations unlabored with conversation and dry cough noted                 Chest Wall:    No tenderness or deformity          "                 Heart:    Regular rate and rhythm, S1 and S2 normal                  Abdomen:     Soft, nontender, bowel sounds active                 Extremities: AVF with bruit, volume status surprisingly better than anticipated/trace                        Pulses:   Pulses palpable in all extremities                  Neurologic:   CNII-XII intact     Data Review:  Labs in chart were reviewed.    Assessment:  Active Hospital Problems    Diagnosis  POA    **Hyperkalemia [E87.5]  Yes    Leukocytopenia [D72.819]  Unknown    Anemia, chronic disease [D63.8]  Unknown    ESRD on dialysis [N18.6, Z99.2]  Not Applicable    Chronic diastolic CHF (congestive heart failure) [I50.32]  Yes    HTN (hypertension) [I10]  Yes      Resolved Hospital Problems   No resolved problems to display.       Plan:    I do not feel there is a bacterial pneumonia present and will discontinue antibiotics and observe patient overnight to ensure no clinical digression or fever    Dyspnea secondary to volume overload made worse by noncompliance with HD and renal and is utilizing for load challenge with next one in a.m.  3 L removed on recent HD session    Currently on 2 L -patient states this is home level    Acute on chronic diastolic CHF    Leukocytopenia due to recent viral illness/RSV -recheck CBC in a.m.    Dysphagia -speech following and will follow-up on video swallow study results in a.m.    Previous thoughts of paroxysmal atrial fibrillation discredited per cardiology      Disposition -hopefully home tomorrow after HD    Monroe Chin MD  12/22/2023  13:41 EST

## 2023-12-22 NOTE — PLAN OF CARE
Goal Outcome Evaluation:  Plan of Care Reviewed With: patient           Outcome Evaluation: 74yo pleasant black female admitted 12/20/23 due to hyperkalemia. PMH includes hbp, CKD, heart failure, pna, anemia. PLOF: Pt lives at home with adult children and uses a r wx for mobility. She reports independence with ADLs. Today, she was finishing breakfast and speaking on phone when PT entered room. Pt essentially independent iwht bed mobility and standing from EOB using r wx. Pt amb 200' with r wx and SBA without O2 with good balance and fair endurance. O2 100% pre amb, 93% post amb. Pt returned supine in bed with all needs met. Pt politely declined home PT services. Pt is safe to amb with [facility] staff and safe to return home upon d/c. WIll sign off.      Anticipated Discharge Disposition (PT): home with assist

## 2023-12-22 NOTE — PLAN OF CARE
Goal Outcome Evaluation:              Outcome Evaluation: VFSS complete. Radiologist, Dr. Carter, present during the study. Patient presents with moderate oropharyngeal dysphagia. Aspiration of thin liquids and question penetration/aspiration of thin portion of mixed consistency with spontaneous coughing. Recommend soft/chopped solid diet with nectar thick liquids. No mixed consistencies. Sitting upright, slow rate, small bites/sips. Speech to follow. Anticipate therapy at next level of care.

## 2023-12-22 NOTE — MBS/VFSS/FEES
Acute Care - Speech Language Pathology   Swallow Initial Evaluation [facility]     Patient Name: Sonia Acharya  : 1950  MRN: [id number]  Today's Date: 2023               Admit Date: 2023    Visit Dx:     ICD-10-CM ICD-9-CM   1. ESRD on hemodialysis  N18.6 585.6    Z99.2 V45.11   2. Hyperkalemia  E87.5 276.7   3. Pneumonia of right lower lobe due to infectious organism  J18.9 486     Patient Active Problem List   Diagnosis    Generalized abdominal pain    ODALIS (acute kidney injury)    Anemia, chronic disease    Metabolic acidosis, increased anion gap    Obesity (BMI 30-39.9)    HTN (hypertension)    Chest pain, atypical    Cervical radiculopathy    ESRD on dialysis    Bradycardia    Right arm pain    ANGIE (obstructive sleep apnea)    Chronic diastolic CHF (congestive heart failure)    Aortic stenosis    Pancytopenia    Hyperphosphatemia    Hyperkalemia    Pneumonia     Past Medical History:   Diagnosis Date    Arthritis     Chronic kidney disease     Heart disease     Hypertension      Past Surgical History:   Procedure Laterality Date    BREAST SURGERY      DIALYSIS FISTULA CREATION      EYE SURGERY      HERNIA REPAIR      Dr. Sung       SLP Recommendation and Plan  SLP Swallowing Diagnosis: moderate, oral dysphagia, pharyngeal dysphagia (23)  SLP Diet Recommendation: soft to chew textures, chopped, nectar thick liquids, no mixed consistencies (23)  Recommended Precautions and Strategies: upright posture during/after eating, small bites of food and sips of liquid, general aspiration precautions (23)  SLP Rec. for Method of Medication Administration: meds whole, with puree, as tolerated (23)     Monitor for Signs of Aspiration: yes, notify SLP if any concerns (23)  Recommended Diagnostics: reassess via clinical swallow evaluation (23)  Swallow Criteria for Skilled Therapeutic Interventions Met: demonstrates skilled criteria  (12/22/23 0900)     Rehab Potential/Prognosis, Swallowing: good, to achieve stated therapy goals (12/22/23 0900)  Therapy Frequency (Swallow): PRN (12/22/23 0900)  Predicted Duration Therapy Intervention (Days): until discharge (12/22/23 0900)  Oral Care Recommendations: Oral Care BID/PRN (12/22/23 0900)                                      Oral Care Recommendations: Oral Care BID/PRN (12/22/23 0900)    Outcome Evaluation: VFSS complete. Radiologist, Dr. Carter, present during the study. Aspiration of thin liquids and thin portion of mixed consistency with spontaneous coughing. Recommend soft/chopped solid diet with nectar thick liquids. No mixed consistencies. Sitting upright, slow rate, small bites/sips. Speech to follow. Anticipate therapy at next level of care.      SWALLOW EVALUATION (last 72 hours)       SLP Adult Swallow Evaluation       Row Name 12/22/23 0900 12/21/23 1400                Rehab Evaluation    Document Type evaluation  -CR evaluation  -CR       Subjective Information no complaints  -CR no complaints  -CR       Patient Observations alert;cooperative  -CR alert;cooperative  -CR       Patient Effort excellent  -CR excellent  -CR       Symptoms Noted During/After Treatment none  -CR none  -CR          General Information    Patient Profile Reviewed yes  -CR yes  -CR       Pertinent History Of Current Problem 72 y/o female admitted with bilateral pneumonia.  -CR 72 y/o female admitted with bilateral pneumonia.  -CR       Current Method of Nutrition regular textures;thin liquids  -CR regular textures;thin liquids  -CR       Precautions/Limitations, Vision WFL;for purposes of eval  -CR WFL;for purposes of eval  -CR       Precautions/Limitations, Hearing WFL;for purposes of eval  -CR WFL;for purposes of eval  -CR       Prior Level of Function-Swallowing no diet consistency restrictions  -CR no diet consistency restrictions  -CR       Plans/Goals Discussed with patient;agreed upon  -CR patient;agreed  upon  -CR       Barriers to Rehab none identified  -CR none identified  -CR          Pain    Additional Documentation -- Pain Scale: Numbers Pre/Post-Treatment (Group)  -CR          Pain Scale: Numbers Pre/Post-Treatment    Pretreatment Pain Rating 0/10 - no pain  -CR 0/10 - no pain  -CR       Posttreatment Pain Rating 0/10 - no pain  -CR 0/10 - no pain  -CR          Oral Motor Structure and Function    Dentition Assessment missing teeth  -CR missing teeth  -CR       Secretion Management WNL/WFL  -CR WNL/WFL  -CR       Mucosal Quality moist, healthy  -CR moist, healthy  -CR          Oral Musculature and Cranial Nerve Assessment    Oral Motor General Assessment WFL  -CR WFL  -CR          Clinical Swallow Eval    Clinical Swallow Evaluation Summary -- Clinical swallow evaluation completed. Patient reports no swallow difficulties. No overt s/s of aspiration with thin liquid by spoon/cup/straw, puree, soft/chopped solid, mixed consistency, or regular solid trial. Slightly prolonged mastication suspect due to missing dentition. Delayed productive cough noted following ice chip and thin liquid wash by straw following regular solid trial. Speech to follow with VFSS to further assess swallow function. Plan to await VFSS results for further recommendations and continue current diet at this time with meds as tolerated. Sitting upright, slow rate, small bites/sips.  -CR          MBS/VFSS Interpretation    VFSS Summary VFSS complete. Radiologist, Dr. Carter, present during the study. Patient presents with moderate oropharyngeal dysphagia characterized by swallow mistiming and reduced hyolaryngeal elevation/excursion. Initial swallow of thin liquid by cup resulted in dorothy aspiration before the swallow due to mistiming. Excessive spontaneous coughing noted. Upon second trial of thin liquid by cup, swallow elicited at the valleculae with no penetration/aspiration. Dorothy aspiration of thin liquid by straw during the swallow. More  timely swallow with nectar thick liquid by cup/straw with no penetration/aspiration observed. Timely swallow with puree. Swallow elicited at the pyriforms with mixed consistency. Question posterior penetration/aspiration of thin portion of mixed consistency. Recommend soft/chopped solid diet with nectar thick liquids. No mixed consistencies. Sitting upright, slow rate, small bites/sips. Speech to follow. Anticipate therapy at next level of care.  -CR --          SLP Communication to Radiology    Summary Statement VFSS complete. Radiologist, Dr. Carter, present during the study. Patient presents with moderate oropharyngeal dysphagia. Initial swallow of thin liquid by cup resulted in dorothy aspiration before the swallow with excessive spontaneous coughing. Aspiration of thin liquid by straw during the swallow. No aspiration/penetration with nectar thick liquid or puree. Question posterior penetration/aspiration of thin portion of mixed consistency.  -CR --          SLP Evaluation Clinical Impression    SLP Swallowing Diagnosis moderate;oral dysphagia;pharyngeal dysphagia  -CR R/O pharyngeal dysphagia  -CR       Functional Impact risk of aspiration/pneumonia  -CR --       Rehab Potential/Prognosis, Swallowing good, to achieve stated therapy goals  -CR --       Swallow Criteria for Skilled Therapeutic Interventions Met demonstrates skilled criteria  -CR --          Recommendations    Therapy Frequency (Swallow) PRN  -CR PRN  -CR       Predicted Duration Therapy Intervention (Days) until discharge  -CR until discharge  -CR       SLP Diet Recommendation soft to chew textures;chopped;nectar thick liquids;no mixed consistencies  -CR --  await VFSS results  -CR       Recommended Diagnostics reassess via clinical swallow evaluation  -CR reassess via VFSS (MBS)  -CR       Recommended Precautions and Strategies upright posture during/after eating;small bites of food and sips of liquid;general aspiration precautions  -CR upright  posture during/after eating;small bites of food and sips of liquid;general aspiration precautions  -CR       Oral Care Recommendations Oral Care BID/PRN  -CR Oral Care BID/PRN  -CR       SLP Rec. for Method of Medication Administration meds whole;with puree;as tolerated  -CR as tolerated  -CR       Monitor for Signs of Aspiration yes;notify SLP if any concerns  -CR yes;notify SLP if any concerns  -CR          Swallow Goals (SLP)    Swallow LTGs -- Patient will demonstrate functional swallow for  -CR       Swallow STGs pharyngeal strengthening exercise goal selection (SLP)  -CR --       Pharyngeal Strengthening Exercise Goal Selection (SLP) pharyngeal strengthening exercise, SLP goal 1  -CR --          (LTG) Patient will demonstrate functional swallow for    Diet Texture (Demonstrate functional swallow) soft to chew (chopped) textures  -CR regular textures  -CR       Liquid viscosity (Demonstrate functional swallow) thin liquids  -CR thin liquids  -CR       [place] (Demonstrate functional swallow) independently (over 90% accuracy)  -CR independently (over 90% accuracy)  -CR       Time Frame (Demonstrate functional swallow) by discharge  -CR by discharge  -CR       Progress/Outcomes (Demonstrate functional swallow) new goal  -CR new goal  -CR          (STG) Pharyngeal Strengthening Exercise Goal 1 (SLP)    Activity (Pharyngeal Strengthening Goal 1, SLP) increase timing  -CR --       Increase Timing EMST;chin tuck against resistance (CTAR);supraglottic swallow;hard effortful swallow;effortful pitch glide (falsetto + pharyngeal squeeze)  -CR --       [place]/Accuracy (Pharyngeal Strengthening Goal 1, SLP) with minimal cues (75-90% accuracy)  -CR --       Time Frame (Pharyngeal Strengthening Goal 1, SLP) by discharge  -CR --       Progress/Outcomes (Pharyngeal Strengthening Goal 1, SLP) new goal  -CR --                 User Key  (r) = Recorded By, (t) = Taken By, (c) = Cosigned By      Initials Name Effective  Dates    Jen Mendez, SLP 08/28/23 -                     EDUCATION  The patient has been educated in the following areas:   Dysphagia (Swallowing Impairment).        SLP GOALS       Row Name 12/22/23 0900 12/21/23 1400          (LTG) Patient will demonstrate functional swallow for    Diet Texture (Demonstrate functional swallow) soft to chew (chopped) textures  -CR regular textures  -CR     Liquid viscosity (Demonstrate functional swallow) thin liquids  -CR thin liquids  -CR     [place] (Demonstrate functional swallow) independently (over 90% accuracy)  -CR independently (over 90% accuracy)  -CR     Time Frame (Demonstrate functional swallow) by discharge  -CR by discharge  -CR     Progress/Outcomes (Demonstrate functional swallow) new goal  -CR new goal  -CR        (STG) Pharyngeal Strengthening Exercise Goal 1 (SLP)    Activity (Pharyngeal Strengthening Goal 1, SLP) increase timing  -CR --     Increase Timing EMST;chin tuck against resistance (CTAR);supraglottic swallow;hard effortful swallow;effortful pitch glide (falsetto + pharyngeal squeeze)  -CR --     [place]/Accuracy (Pharyngeal Strengthening Goal 1, SLP) with minimal cues (75-90% accuracy)  -CR --     Time Frame (Pharyngeal Strengthening Goal 1, SLP) by discharge  -CR --     Progress/Outcomes (Pharyngeal Strengthening Goal 1, SLP) new goal  -CR --               User Key  (r) = Recorded By, (t) = Taken By, (c) = Cosigned By      Initials Name Provider Type    Jen Mendez SLP Speech and Language Pathologist                       Time Calculation:    Time Calculation- SLP       Row Name 12/22/23 1040             Time Calculation- SLP    SLP Start Time 0740  -CR      SLP Received On 12/22/23  -CR         Untimed Charges    07197-ZS Motion Fluoro Eval Swallow Minutes 75  -CR         Total Minutes    Untimed Charges Total Minutes 75  -CR       Total Minutes 75  -CR                User Key  (r) = Recorded By, (t) = Taken By, (c) =  Cosigned By      Initials Name Provider Type    Jen Mendez SLP Speech and Language Pathologist                    Therapy Charges for Today       Code Description Service Date Service Provider Modifiers Qty    32610112065 HC ST EVAL ORAL PHARYNG SWALLOW 3 12/21/2023 Jen Gill, SLP GN 1    [id number] HC ST MOTION FLUORO EVAL SWALLOW 5 12/22/2023 Jen Gill SLP GN 1                 GEMA Taylor  12/22/2023

## 2023-12-23 LAB
ALBUMIN SERPL-MCNC: 3.2 G/DL (ref 3.5–5.2)
ANION GAP SERPL CALCULATED.3IONS-SCNC: 12.6 MMOL/L (ref 5–15)
BASOPHILS # BLD AUTO: 0.03 10*3/MM3 (ref 0–0.2)
BASOPHILS NFR BLD AUTO: 1 % (ref 0–1.5)
BUN SERPL-MCNC: 33 MG/DL (ref 8–23)
BUN/CREAT SERPL: 6.6 (ref 7–25)
CALCIUM SPEC-SCNC: 8.9 MG/DL (ref 8.6–10.5)
CHLORIDE SERPL-SCNC: 99 MMOL/L (ref 98–107)
CO2 SERPL-SCNC: 22.4 MMOL/L (ref 22–29)
CREAT SERPL-MCNC: 4.97 MG/DL (ref 0.57–1)
DEPRECATED RDW RBC AUTO: 46.4 FL (ref 37–54)
EGFRCR SERPLBLD CKD-EPI 2021: 8.6 ML/MIN/1.73
EOSINOPHIL # BLD AUTO: 0.2 10*3/MM3 (ref 0–0.4)
EOSINOPHIL NFR BLD AUTO: 6.5 % (ref 0.3–6.2)
ERYTHROCYTE [DISTWIDTH] IN BLOOD BY AUTOMATED COUNT: 14 % (ref 12.3–15.4)
GLUCOSE SERPL-MCNC: 65 MG/DL (ref 65–99)
HCT VFR BLD AUTO: 30.9 % (ref 34–46.6)
HGB BLD-MCNC: 9.5 G/DL (ref 12–15.9)
IMM GRANULOCYTES # BLD AUTO: 0.01 10*3/MM3 (ref 0–0.05)
IMM GRANULOCYTES NFR BLD AUTO: 0.3 % (ref 0–0.5)
LYMPHOCYTES # BLD AUTO: 0.62 10*3/MM3 (ref 0.7–3.1)
LYMPHOCYTES NFR BLD AUTO: 20.3 % (ref 19.6–45.3)
MCH RBC QN AUTO: 27.9 PG (ref 26.6–33)
MCHC RBC AUTO-ENTMCNC: 30.7 G/DL (ref 31.5–35.7)
MCV RBC AUTO: 90.6 FL (ref 79–97)
MONOCYTES # BLD AUTO: 0.42 10*3/MM3 (ref 0.1–0.9)
MONOCYTES NFR BLD AUTO: 13.7 % (ref 5–12)
NEUTROPHILS NFR BLD AUTO: 1.78 10*3/MM3 (ref 1.7–7)
NEUTROPHILS NFR BLD AUTO: 58.2 % (ref 42.7–76)
NRBC BLD AUTO-RTO: 0 /100 WBC (ref 0–0.2)
PHOSPHATE SERPL-MCNC: 5.1 MG/DL (ref 2.5–4.5)
PLATELET # BLD AUTO: 200 10*3/MM3 (ref 140–450)
PMV BLD AUTO: 9.8 FL (ref 6–12)
POTASSIUM SERPL-SCNC: 6 MMOL/L (ref 3.5–5.2)
RBC # BLD AUTO: 3.41 10*6/MM3 (ref 3.77–5.28)
S PYO AG THROAT QL: NEGATIVE
SODIUM SERPL-SCNC: 134 MMOL/L (ref 136–145)
WBC NRBC COR # BLD AUTO: 3.06 10*3/MM3 (ref 3.4–10.8)

## 2023-12-23 PROCEDURE — 87880 STREP A ASSAY W/OPTIC: CPT | Performed by: HOSPITALIST

## 2023-12-23 PROCEDURE — 94664 DEMO&/EVAL PT USE INHALER: CPT

## 2023-12-23 PROCEDURE — 85025 COMPLETE CBC W/AUTO DIFF WBC: CPT | Performed by: INTERNAL MEDICINE

## 2023-12-23 PROCEDURE — 25010000002 HEPARIN (PORCINE) PER 1000 UNITS: Performed by: HOSPITALIST

## 2023-12-23 PROCEDURE — 87081 CULTURE SCREEN ONLY: CPT | Performed by: HOSPITALIST

## 2023-12-23 PROCEDURE — 94761 N-INVAS EAR/PLS OXIMETRY MLT: CPT

## 2023-12-23 PROCEDURE — 80069 RENAL FUNCTION PANEL: CPT | Performed by: INTERNAL MEDICINE

## 2023-12-23 PROCEDURE — 94799 UNLISTED PULMONARY SVC/PX: CPT

## 2023-12-23 RX ORDER — CALCIUM GLUCONATE 20 MG/ML
1000 INJECTION, SOLUTION INTRAVENOUS ONCE
Qty: 50 ML | Refills: 0 | Status: COMPLETED | OUTPATIENT
Start: 2023-12-23 | End: 2023-12-23

## 2023-12-23 RX ORDER — HEPARIN SODIUM 1000 [USP'U]/ML
2000 INJECTION, SOLUTION INTRAVENOUS; SUBCUTANEOUS ONCE
Status: COMPLETED | OUTPATIENT
Start: 2023-12-23 | End: 2023-12-23

## 2023-12-23 RX ORDER — DIPHENHYDRAMINE HYDROCHLORIDE AND LIDOCAINE HYDROCHLORIDE AND ALUMINUM HYDROXIDE AND MAGNESIUM HYDRO
5 KIT EVERY 6 HOURS
Status: DISCONTINUED | OUTPATIENT
Start: 2023-12-23 | End: 2023-12-24 | Stop reason: HOSPADM

## 2023-12-23 RX ORDER — CALCIUM GLUCONATE 94 MG/ML
1 INJECTION, SOLUTION INTRAVENOUS ONCE
Status: DISCONTINUED | OUTPATIENT
Start: 2023-12-23 | End: 2023-12-23

## 2023-12-23 RX ADMIN — LOSARTAN POTASSIUM 100 MG: 100 TABLET, FILM COATED ORAL at 08:53

## 2023-12-23 RX ADMIN — AMLODIPINE BESYLATE 10 MG: 10 TABLET ORAL at 09:00

## 2023-12-23 RX ADMIN — ATORVASTATIN CALCIUM 40 MG: 20 TABLET, FILM COATED ORAL at 08:53

## 2023-12-23 RX ADMIN — ISOSORBIDE DINITRATE 30 MG: 20 TABLET ORAL at 08:53

## 2023-12-23 RX ADMIN — IPRATROPIUM BROMIDE AND ALBUTEROL SULFATE 3 ML: 2.5; .5 SOLUTION RESPIRATORY (INHALATION) at 08:39

## 2023-12-23 RX ADMIN — DIPHENHYDRAMINE HYDROCHLORIDE AND LIDOCAINE HYDROCHLORIDE AND ALUMINUM HYDROXIDE AND MAGNESIUM HYDRO 5 ML: KIT at 18:16

## 2023-12-23 RX ADMIN — CLONIDINE HYDROCHLORIDE 0.1 MG: 0.1 TABLET ORAL at 08:53

## 2023-12-23 RX ADMIN — ACETAMINOPHEN 650 MG: 325 TABLET, FILM COATED ORAL at 11:08

## 2023-12-23 RX ADMIN — PANTOPRAZOLE SODIUM 40 MG: 40 TABLET, DELAYED RELEASE ORAL at 08:53

## 2023-12-23 RX ADMIN — CALCIUM CARBONATE 1 TABLET: 500 TABLET, CHEWABLE ORAL at 11:08

## 2023-12-23 RX ADMIN — IPRATROPIUM BROMIDE AND ALBUTEROL SULFATE 3 ML: 2.5; .5 SOLUTION RESPIRATORY (INHALATION) at 11:10

## 2023-12-23 RX ADMIN — CALCIUM GLUCONATE 1000 MG: 20 INJECTION, SOLUTION INTRAVENOUS at 11:06

## 2023-12-23 RX ADMIN — HYDRALAZINE HYDROCHLORIDE 100 MG: 50 TABLET, FILM COATED ORAL at 05:20

## 2023-12-23 RX ADMIN — HEPARIN SODIUM 2000 UNITS: 1000 INJECTION, SOLUTION INTRAVENOUS; SUBCUTANEOUS at 16:02

## 2023-12-23 RX ADMIN — HYDRALAZINE HYDROCHLORIDE 100 MG: 50 TABLET, FILM COATED ORAL at 20:12

## 2023-12-23 RX ADMIN — IPRATROPIUM BROMIDE AND ALBUTEROL SULFATE 3 ML: 2.5; .5 SOLUTION RESPIRATORY (INHALATION) at 20:35

## 2023-12-23 RX ADMIN — Medication 10 ML: at 09:30

## 2023-12-23 RX ADMIN — CLONIDINE HYDROCHLORIDE 0.1 MG: 0.1 TABLET ORAL at 20:12

## 2023-12-23 NOTE — PROGRESS NOTES
"    DAILY PROGRESS NOTE  [facility]    Patient Identification:  Name: Sonia Acharya  Age: 73 y.o.  Sex: female  :  1950  MRN: [id number]         Primary Care Physician: Marcy Paez PA    Subjective:  Interval History: Still complains of some cough and shortness of breath.  I explained the etiology/missed dialysis as well as recent viral illness.  She also complains of sore throat.  No fevers have been noted especially with discontinuation of antibiotics nor have there been any changes concerning for increasing toxicity.    Objective: Rolls eyes at me every time in her room -I questioned as to why    Scheduled Meds:amLODIPine, 10 mg, Oral, Daily  atorvastatin, 40 mg, Oral, Daily  cloNIDine, 0.1 mg, Oral, BID  First Mouthwash (Magic Mouthwash), 5 mL, Swish & Spit, Q6H  heparin (porcine), 2,000 Units, Intravenous, Once  hydrALAZINE, 100 mg, Oral, TID  ipratropium-albuterol, 3 mL, Nebulization, 4x Daily - RT  isosorbide dinitrate, 30 mg, Oral, Daily  losartan, 100 mg, Oral, Daily  pantoprazole, 40 mg, Oral, Daily  sodium chloride, 10 mL, Intravenous, Q12H      Continuous Infusions:     Vital signs in last 24 hours:  Temp:  [97.9 °F (36.6 °C)-99 °F (37.2 °C)] 97.9 °F (36.6 °C)  Heart Rate:  [50-68] 52  Resp:  [16-20] 20  BP: (122-149)/(50-76) 149/76    Intake/Output:    Intake/Output Summary (Last 24 hours) at 2023 1253  Last data filed at 2023 0839  Gross per 24 hour   Intake 1140 ml   Output 200 ml   Net 940 ml       Exam:  /76 (BP Location: Left arm, Patient Position: Lying)   Pulse 52   Temp 97.9 °F (36.6 °C) (Oral)   Resp 20   Ht 152.4 cm (60\")   Wt 50.9 kg (112 lb 4.8 oz)   SpO2 100%   BMI 21.93 kg/m²     General Appearance:    Alert, cooperative, chronically ill, AAOx3, medically nontoxic                         Throat: Horrible dentition with minimal teeth; oral mucosa pink and moist but her pharynx is a little dry but I cannot appreciate any thrush or " purulent lymphadenopathy nor can I appreciate any palpable lymphadenopathy that is clinically significant through her anterior and posterior cervical chains                         Lungs:    Decreased with Rales/rhonchi to auscultation bilaterally, respirations unlabored with conversation and no use of accessory muscles                 Chest Wall:    No tenderness or deformity                          Heart:    Regular rate and rhythm, S1 and S2 normal                  Abdomen:     Soft, nontender, bowel sounds active                  Extremities: Moving all, no cyanosis or edema                  Neurologic:   CNII-XII intact     Data Review:  Labs in chart were reviewed.    Assessment:  Active Hospital Problems    Diagnosis  POA    **Hyperkalemia [E87.5]  Yes    Leukocytopenia [D72.819]  Unknown    Anemia, chronic disease [D63.8]  Unknown    ESRD on dialysis [N18.6, Z99.2]  Not Applicable    Chronic diastolic CHF (congestive heart failure) [I50.32]  Yes    HTN (hypertension) [I10]  Yes      Resolved Hospital Problems   No resolved problems to display.       Plan:    I came by to discharge patient today but she seems very hesitant to the idea of discharge.  She has home oxygen and her dialysis schedule is noted as she will receive additional dialysis today with recent dialysis demonstrating 3 L removal.  Noncompliance and missed dialysis contributory to her shortness of breath.  No pneumonia present and antibiotics have been stopped.  She had recent RSV so I am not surprised by sore throat but will check a strep and add Chloraseptic lozenges with some Radha's Magic mouthwash.  This patient also complains of dysphagia and dietary changes noted per speech therapy with nectar thick liquids/soft and chopped solid diet but no mixed consistencies    ?SG    2 L via nasal cannula -patient has home O2    Leukocytopenia secondary to viral illness resolving    Persistent hyperkalemia will correct with dialysis as well volume  status/dyspnea      Disposition -tomorrow -becoming difficult to justify inpatient needs    Monroe Chin MD  12/23/2023  12:53 EST

## 2023-12-23 NOTE — NURSING NOTE
Pt C/o sharp chest pain that radiates to throat, she states that she has a cough and feels as if she needs an antibiotic. Provided medication as per protocol. Pt is in no acute distress or SOB, no diff in cardiac rhythm. Will cont to monitor.

## 2023-12-23 NOTE — PLAN OF CARE
Goal Outcome Evaluation:      Patient admitted tot he unit for treatment after missing a few of her Hemodialysis treatments and showing abnormal lab work. Patient is receiving dialysis treatments and is planning on discharge after today's treatment. Nephrology, LHA, and RT consulting. Will continue to monitor for any changes

## 2023-12-23 NOTE — PROGRESS NOTES
[facility] Progress Note      Patient Name: Sonia Acharya  : 1950  MRN: [id number]  Primary Care Physician:  Marcy Paez PA  Date of admission: 2023    Subjective     Interval History:   Follow-up end-stage renal disease    Feels less short of breath.  Edema of lower extremities improved.  Plan for dialysis session today.  Wants to go home.  Denies any nausea or vomiting.  No chest pain.    Review of Systems:   As noted above    Objective     Vitals:   Temp:  [97.9 °F (36.6 °C)-99 °F (37.2 °C)] 97.9 °F (36.6 °C)  Heart Rate:  [51-68] 59  Resp:  [16-20] 20  BP: (122-149)/(50-76) 149/76  Flow (L/min):  [2] 2    Intake/Output Summary (Last 24 hours) at 2023 0931  Last data filed at 2023 0839  Gross per 24 hour   Intake 1140 ml   Output 200 ml   Net 940 ml       Physical Exam:    General Appearance: alert, awake, chronically ill and, no acute distress   Skin: warm and dry  HEENT: oral mucosa normal, nonicteric sclera  Neck: Mild JVD  Lungs: Bilateral rhonchi, breathing effort not clear  Heart: Irregularly irregular  Abdomen: soft, nontender, nondistended  : no palpable bladder  Extremities: trace lower extremity edema.  Had functional AV fistula in the right upper arm with good thrill and bruit  Neuro: normal speech and mental status     Scheduled Meds:     amLODIPine, 10 mg, Oral, Daily  atorvastatin, 40 mg, Oral, Daily  cloNIDine, 0.1 mg, Oral, BID  hydrALAZINE, 100 mg, Oral, TID  ipratropium-albuterol, 3 mL, Nebulization, 4x Daily - RT  isosorbide dinitrate, 30 mg, Oral, Daily  losartan, 100 mg, Oral, Daily  pantoprazole, 40 mg, Oral, Daily  sodium chloride, 10 mL, Intravenous, Q12H      IV Meds:        Results Reviewed:   I have personally reviewed the results from the time of this admission to 2023 09:31 EST     Results from last 7 days   Lab Units 23  0733 23  0749 23  0547 23  0806   SODIUM mmol/L 134* 132* 136 139    POTASSIUM mmol/L 6.0* 4.7 5.1 6.6*   CHLORIDE mmol/L 99 99 101 99   CO2 mmol/L 22.4 22.4 24.8 26.1   BUN mg/dL 33* 18 25* 47*   CREATININE mg/dL 4.97* 3.60* 4.85* 7.17*   CALCIUM mg/dL 8.9 8.7 8.5* 9.5   BILIRUBIN mg/dL  --   --   --  0.4   ALK PHOS U/L  --   --   --  115   ALT (SGPT) U/L  --   --   --  9   AST (SGOT) U/L  --   --   --  21   GLUCOSE mg/dL 65 61* 99 74       Estimated Creatinine Clearance: 8 mL/min (A) (by C-G formula based on SCr of 4.97 mg/dL (H)).    Results from last 7 days   Lab Units 12/23/23  0733 12/22/23  0749 12/21/23  0547 12/20/23  1759   MAGNESIUM mg/dL  --  2.5* 2.6* 2.5*   PHOSPHORUS mg/dL 5.1* 4.0 5.5*  --              Results from last 7 days   Lab Units 12/23/23  0733 12/22/23  0749 12/21/23  0547 12/20/23  0806   WBC 10*3/mm3 3.06* 2.79* 3.20* 5.05   HEMOGLOBIN g/dL 9.5* 9.7* 9.2* 10.5*   PLATELETS 10*3/mm3 200 200 198 228       Results from last 7 days   Lab Units 12/20/23  0806   INR  1.09       Assessment / Plan     ASSESSMENT:  End-stage renal disease on maintenance hemodialysis 3 times a week she missed her dialysis for over a week presented with shortness of breath and hyperkalemia and volume excess.  Systolic hypertension associate with volume excess, improved with improvement of the hypovolemia  Acute on chronic diastolic congestive heart failure.  Her symptoms of congestive heart failure has improved significant  Anemia of chronic kidney disease hemoglobin is 9.5 ZULAY is needed  Leukopenia,  improving   Medical noncompliance  Hyperkalemia will correct with dialysis    PLAN:  Will dialyze today for volume removal and metabolic clearance.  Okay to discharge home from nephrology standpoint after dialysis to resume her regular dialysis treatment on Monday Wednesday Friday    Chart was reviewed and other providers notes, and labs reviewed.  I discussed the case with the patient  Copied text in this note has been reviewed and is accurate as of 12/23/23.       Thank you for  involving us in the care of Sonia Acharya.  Please feel free to call with any questions.    Micheline Torrez MD  12/23/23  09:31 [facility]    [facility]  [phone]    Please note that portions of this note were completed with a voice recognition program.

## 2023-12-24 ENCOUNTER — READMISSION MANAGEMENT (OUTPATIENT)
Dept: CALL CENTER | Facility: HOSPITAL | Age: 73
End: 2023-12-24
Payer: MEDICARE

## 2023-12-24 VITALS
OXYGEN SATURATION: 100 % | TEMPERATURE: 98.2 F | WEIGHT: 112.3 LBS | BODY MASS INDEX: 22.05 KG/M2 | SYSTOLIC BLOOD PRESSURE: 143 MMHG | RESPIRATION RATE: 16 BRPM | DIASTOLIC BLOOD PRESSURE: 51 MMHG | HEIGHT: 60 IN | HEART RATE: 68 BPM

## 2023-12-24 LAB
ANION GAP SERPL CALCULATED.3IONS-SCNC: 9.8 MMOL/L (ref 5–15)
BUN SERPL-MCNC: 23 MG/DL (ref 8–23)
BUN/CREAT SERPL: 6.1 (ref 7–25)
CALCIUM SPEC-SCNC: 8.9 MG/DL (ref 8.6–10.5)
CHLORIDE SERPL-SCNC: 100 MMOL/L (ref 98–107)
CO2 SERPL-SCNC: 23.2 MMOL/L (ref 22–29)
CREAT SERPL-MCNC: 3.78 MG/DL (ref 0.57–1)
DEPRECATED RDW RBC AUTO: 47.6 FL (ref 37–54)
EGFRCR SERPLBLD CKD-EPI 2021: 12 ML/MIN/1.73
ERYTHROCYTE [DISTWIDTH] IN BLOOD BY AUTOMATED COUNT: 14.3 % (ref 12.3–15.4)
GLUCOSE SERPL-MCNC: 76 MG/DL (ref 65–99)
HCT VFR BLD AUTO: 30.4 % (ref 34–46.6)
HGB BLD-MCNC: 9.6 G/DL (ref 12–15.9)
MCH RBC QN AUTO: 29.1 PG (ref 26.6–33)
MCHC RBC AUTO-ENTMCNC: 31.6 G/DL (ref 31.5–35.7)
MCV RBC AUTO: 92.1 FL (ref 79–97)
PLATELET # BLD AUTO: 163 10*3/MM3 (ref 140–450)
PMV BLD AUTO: 9.9 FL (ref 6–12)
POTASSIUM SERPL-SCNC: 4.9 MMOL/L (ref 3.5–5.2)
RBC # BLD AUTO: 3.3 10*6/MM3 (ref 3.77–5.28)
SODIUM SERPL-SCNC: 133 MMOL/L (ref 136–145)
WBC NRBC COR # BLD AUTO: 3 10*3/MM3 (ref 3.4–10.8)

## 2023-12-24 PROCEDURE — 94761 N-INVAS EAR/PLS OXIMETRY MLT: CPT

## 2023-12-24 PROCEDURE — 94799 UNLISTED PULMONARY SVC/PX: CPT

## 2023-12-24 PROCEDURE — 85027 COMPLETE CBC AUTOMATED: CPT | Performed by: HOSPITALIST

## 2023-12-24 PROCEDURE — 80048 BASIC METABOLIC PNL TOTAL CA: CPT | Performed by: HOSPITALIST

## 2023-12-24 PROCEDURE — 94664 DEMO&/EVAL PT USE INHALER: CPT

## 2023-12-24 RX ORDER — CALCIUM CARBONATE 500 MG/1
1 TABLET, CHEWABLE ORAL EVERY 6 HOURS PRN
Start: 2023-12-24

## 2023-12-24 RX ORDER — PANTOPRAZOLE SODIUM 40 MG/1
40 TABLET, DELAYED RELEASE ORAL
Qty: 30 TABLET | Refills: 0 | Status: SHIPPED | OUTPATIENT
Start: 2023-12-24

## 2023-12-24 RX ORDER — PANTOPRAZOLE SODIUM 40 MG/1
40 TABLET, DELAYED RELEASE ORAL
Status: DISCONTINUED | OUTPATIENT
Start: 2023-12-24 | End: 2023-12-24 | Stop reason: HOSPADM

## 2023-12-24 RX ADMIN — ATORVASTATIN CALCIUM 40 MG: 20 TABLET, FILM COATED ORAL at 09:34

## 2023-12-24 RX ADMIN — HYDRALAZINE HYDROCHLORIDE 100 MG: 50 TABLET, FILM COATED ORAL at 05:51

## 2023-12-24 RX ADMIN — IPRATROPIUM BROMIDE AND ALBUTEROL SULFATE 3 ML: 2.5; .5 SOLUTION RESPIRATORY (INHALATION) at 10:59

## 2023-12-24 RX ADMIN — ISOSORBIDE DINITRATE 30 MG: 20 TABLET ORAL at 09:34

## 2023-12-24 RX ADMIN — Medication 10 ML: at 09:35

## 2023-12-24 RX ADMIN — IPRATROPIUM BROMIDE AND ALBUTEROL SULFATE 3 ML: 2.5; .5 SOLUTION RESPIRATORY (INHALATION) at 07:25

## 2023-12-24 NOTE — PROGRESS NOTES
[facility] Progress Note      Patient Name: Sonia Acharya  : 1950  MRN: [id number]  Primary Care Physician:  Marcy Paez PA  Date of admission: 2023    Subjective     Interval History:   Follow-up end-stage renal disease  Dialyzed yesterday with 3 L of fluid removed.    Review of Systems:   As noted above    Objective     Vitals:   Temp:  [97.9 °F (36.6 °C)-98.6 °F (37 °C)] 98.2 °F (36.8 °C)  Heart Rate:  [51-68] 68  Resp:  [16-18] 16  BP: (129-179)/(50-70) 143/51  Flow (L/min):  [2-3] 2    Intake/Output Summary (Last 24 hours) at 2023 1340  Last data filed at 2023 0500  Gross per 24 hour   Intake 720 ml   Output 3000 ml   Net -2280 ml       Physical Exam:    General Appearance: alert, awake, chronically ill and, no acute distress   Skin: warm and dry  HEENT: oral mucosa normal, nonicteric sclera  Neck: Mild JVD  Lungs: Bilateral rhonchi, breathing effort not clear  Heart: Irregularly irregular  Abdomen: soft, nontender, nondistended  : no palpable bladder  Extremities: trace lower extremity edema.  Had functional AV fistula in the right upper arm with good thrill and bruit  Neuro: normal speech and mental status     Scheduled Meds:     amLODIPine, 10 mg, Oral, Daily  atorvastatin, 40 mg, Oral, Daily  cloNIDine, 0.1 mg, Oral, BID  First Mouthwash (Magic Mouthwash), 5 mL, Swish & Spit, Q6H  hydrALAZINE, 100 mg, Oral, TID  ipratropium-albuterol, 3 mL, Nebulization, 4x Daily - RT  isosorbide dinitrate, 30 mg, Oral, Daily  losartan, 100 mg, Oral, Daily  pantoprazole, 40 mg, Oral, BID AC  sodium chloride, 10 mL, Intravenous, Q12H      IV Meds:        Results Reviewed:   I have personally reviewed the results from the time of this admission to 2023 13:40 EST     Results from last 7 days   Lab Units 23  0730 23  0733 23  0749 23  0547 23  0806   SODIUM mmol/L 133* 134* 132*   < > 139   POTASSIUM mmol/L 4.9 6.0* 4.7   < >  6.6*   CHLORIDE mmol/L 100 99 99   < > 99   CO2 mmol/L 23.2 22.4 22.4   < > 26.1   BUN mg/dL 23 33* 18   < > 47*   CREATININE mg/dL 3.78* 4.97* 3.60*   < > 7.17*   CALCIUM mg/dL 8.9 8.9 8.7   < > 9.5   BILIRUBIN mg/dL  --   --   --   --  0.4   ALK PHOS U/L  --   --   --   --  115   ALT (SGPT) U/L  --   --   --   --  9   AST (SGOT) U/L  --   --   --   --  21   GLUCOSE mg/dL 76 65 61*   < > 74    < > = values in this interval not displayed.       Estimated Creatinine Clearance: 10.5 mL/min (A) (by C-G formula based on SCr of 3.78 mg/dL (H)).    Results from last 7 days   Lab Units 12/23/23  0733 12/22/23  0749 12/21/23  0547 12/20/23  1759   MAGNESIUM mg/dL  --  2.5* 2.6* 2.5*   PHOSPHORUS mg/dL 5.1* 4.0 5.5*  --              Results from last 7 days   Lab Units 12/24/23  0730 12/23/23  0733 12/22/23  0749 12/21/23  0547 12/20/23  0806   WBC 10*3/mm3 3.00* 3.06* 2.79* 3.20* 5.05   HEMOGLOBIN g/dL 9.6* 9.5* 9.7* 9.2* 10.5*   PLATELETS 10*3/mm3 163 200 200 198 228       Results from last 7 days   Lab Units 12/20/23  0806   INR  1.09       Assessment / Plan     ASSESSMENT:  End-stage renal disease on maintenance hemodialysis 3 times a week she missed her dialysis for over a week presented with shortness of breath and hyperkalemia and volume excess.  Systolic hypertension associate with volume excess, improved with improvement of the hypovolemia  Acute on chronic diastolic congestive heart failure.  Her symptoms of congestive heart failure has improved significant  Anemia of chronic kidney disease hemoglobin is 9.5 ZULAY is needed  Leukopenia,  improving   Medical noncompliance  Hyperkalemia corrected with HD    PLAN:  Patient to be discharged today.  She was started to resume her regular dialysis treatment on Monday Wednesday Friday    Chart was reviewed and other providers notes, and labs reviewed.  I discussed the case with the patient  Copied text in this note has been reviewed and is accurate as of 12/24/23.        Thank you for involving us in the care of Sonia Acharya.  Please feel free to call with any questions.    Micheline Torrez MD  12/24/23  13:40 EST    [facility]  [phone]    Please note that portions of this note were completed with a voice recognition program.

## 2023-12-24 NOTE — DISCHARGE INSTR - DIET
Diet: Renal Diets; Low Sodium (2-3g), Low Potassium, Low Phosphorus; No Mixed Consistencies; Texture: Soft to Chew (NDD 3); Soft to Chew: Chopped Meat; Fluid Consistency: Nectar Thick

## 2023-12-24 NOTE — DISCHARGE SUMMARY
[facility]    Date of Discharge:  12/24/2023    PCP: Marcy Paez PA    Discharge Diagnosis:   Active Hospital Problems    Diagnosis  POA    **Hyperkalemia [E87.5]  Yes    Leukocytopenia [D72.819]  Unknown    Anemia, chronic disease [D63.8]  Unknown    ESRD on dialysis [N18.6, Z99.2]  Not Applicable    Chronic diastolic CHF (congestive heart failure) [I50.32]  Yes    HTN (hypertension) [I10]  Yes      Resolved Hospital Problems   No resolved problems to display.          Consults       Date and Time Order Name Status Description    12/20/2023  5:15 PM Inpatient Cardiology Consult Completed     12/20/2023  8:47 AM Nephrology (on -call MD unless specified) Completed     12/20/2023  8:46 AM LHA (on-call MD unless specified) Details            Hospital Course  73 y.o. 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 Maira Pizarro.  She came to this hospital due to various complaints including cough and swelling.  She has missed multiple rounds of hemodialysis due to her complaints.  She had RSV viral infection about a week prior to admission.  There were questions about possible pneumonia on admission though changes do not seem consistent with pneumonia and antibiotics were quickly stopped after admission as the changes on chest imaging was likely due to extra volume which was managed via hemodialysis.  Nephrology was able to remove additional fluid through additional HD while here and they recommend patient to resume her normal schedule Monday Wednesday and Friday post discharge.  They have been okay with discharge from her perspective for the last couple days.  Once I stopped her antibiotics and give a couple more days to watch fever curve and she has remained afebrile.  She complains of some sore throat and further throat swabs were unremarkable.  Speech therapy saw due  to her concerns about dysphagia and they recommend a mechanical soft to chew diet with no mixed consistencies with chopped meat and nectar thick liquids.  I would not doubt that this patient is having chronic aspects of aspiration and reflux esophagitis and she does maintain on a PPI and it can be increased to twice daily with use of Tums or Maalox as needed.  This probably corresponds with her issues with chest discomfort.  And despite her complaints, it is noted that her breakfast tray was completely emptied.  Regardless on this admission she has been evaluated by cardiology who is felt she is stable from their perspective and since signed off.  I think this patient is more than medically stable to transition back to an outpatient setting.  Her vital signs are stable and again no fevers have been observed since admission.  She has supplemental oxygen at home and denies any additional O2 needs.  She has mild leukocytopenia from recent viral illness and her anemia is 1 of chronic disease and is otherwise stable as is her platelet count.  Blood cultures show no growth to date and respiratory viral panel negative as is respiratory culture and MRSA probe.  Previous hyperkalemia has resolved as renal anticipated with potassium of 4.9 by discharge and creatinine 3.78.  I think this patient is more than medically stable to transition to an outpatient setting.  Case discussed with managing RN given patient's behavior.  RN notes patient has been proactively wanting discharge this a.m.  I counseled her that she seemed otherwise hesitant with the thought of discharge when I further discussed but regardless I think she is medically stable.      Temp:  [97.9 °F (36.6 °C)-98.6 °F (37 °C)] 98.2 °F (36.8 °C)  Heart Rate:  [50-61] 55  Resp:  [16-20] 16  BP: (129-179)/(50-70) 143/51  Body mass index is 21.93 kg/m².    Physical Exam  HENT:      Head: Normocephalic.      Nose: Nose normal.      Mouth/Throat:      Comments: Poor dental  hygiene.  No thrush no lymphadenopathy or rigidity  Eyes:      Pupils: Pupils are equal, round, and reactive to light.   Cardiovascular:      Rate and Rhythm: Normal rate and regular rhythm.   Pulmonary:      Effort: Pulmonary effort is normal. No respiratory distress.      Breath sounds: Normal breath sounds. No wheezing or rales.   Abdominal:      General: Bowel sounds are normal. There is no distension.      Palpations: Abdomen is soft.      Tenderness: There is no abdominal tenderness.   Musculoskeletal:         General: No swelling.   Neurological:      Mental Status: She is alert and oriented to person, place, and time. Mental status is at baseline.       Disposition: Home or Self Care       Discharge Medications        New Medications        Instructions Start Date   benzocaine-menthol 6-10 MG lozenge  Commonly known as: CHLORASEPTIC   2 lozenges, Oral, 3 Times Daily PRN      calcium carbonate 500 MG chewable tablet  Commonly known as: TUMS   1 tablet, Oral, Every 6 Hours PRN             Changes to Medications        Instructions Start Date   pantoprazole 40 MG EC tablet  Commonly known as: PROTONIX  What changed: when to take this   40 mg, Oral, 2 Times Daily Before Meals             Continue These Medications        Instructions Start Date   amLODIPine 5 MG tablet  Commonly known as: NORVASC   5 mg, Oral, Daily, Take if SBP >160      atorvastatin 40 MG tablet  Commonly known as: LIPITOR   40 mg, Oral, Daily      cloNIDine 0.1 MG tablet  Commonly known as: CATAPRES   0.1 mg, Oral, 2 Times Daily, Do not take if SBP <130      clopidogrel 75 MG tablet  Commonly known as: PLAVIX   75 mg, Oral, Daily      gabapentin 100 MG capsule  Commonly known as: NEURONTIN   100 mg, Oral, 3 Times Daily PRN      hydrALAZINE 25 MG tablet  Commonly known as: APRESOLINE   100 mg, Oral, 3 Times Daily      isosorbide dinitrate 30 MG tablet  Commonly known as: ISORDIL   30 mg, Oral, Daily      lactulose 10 GM/15ML solution  Commonly  known as: CHRONULAC   10 g, Oral, Daily      losartan 100 MG tablet  Commonly known as: COZAAR   100 mg, Oral, Daily                Additional Instructions for the Follow-ups that You Need to Schedule       Discharge Follow-up with PCP   As directed       Currently Documented PCP:    Marcy Paez PA    PCP Phone Number:    [phone]     Follow Up Details: PCP as needed.  Renal through hemodialysis and cardiology per the recommendations               Follow-up Information       Marcy Paez PA .    Specialty: Physician Assistant  Why: PCP as needed.  Renal through hemodialysis and cardiology per the recommendations  Contact information:  [address]  [address]  [place] [address]  [phone]                          No future appointments.  Pending Labs       Order Current Status    Beta Strep Culture, Throat - Swab, Throat Preliminary result    Blood Culture - Blood, Arm, Left Preliminary result    Blood Culture - Blood, Arm, Right Preliminary result           Monroe Chin MD  [facility]  12/24/23    Discharge time spent greater than 30 minutes.

## 2023-12-24 NOTE — PLAN OF CARE
Goal Outcome Evaluation:  Plan of Care Reviewed With: patient        Progress: improving  Outcome Evaluation: Pt remains on 3L NC with scattered ronchi in lungs. Last potassium collected 12/23 with AM labs was 6.0. Pt appears normal sinus on monitor. Good oral intake noted throughout shift.

## 2023-12-24 NOTE — PLAN OF CARE
AAOX4. 2L-RA. SR- SB. Patients daughter is transporting her home. Educated on the importance to being compliant with her dialysis schedule and the diet recommendations per ST. No c/o pain this shift.       Goal Outcome Evaluation:  Plan of Care Reviewed With: patient                    Problem: Adult Inpatient Plan of Care  Goal: Plan of Care Review  Outcome: Met  Flowsheets (Taken 12/24/2023 1125)  Plan of Care Reviewed With: patient  Goal: Patient-Specific Goal (Individualized)  Outcome: Met  Goal: Absence of Hospital-Acquired Illness or Injury  Outcome: Met  Intervention: Identify and Manage Fall Risk  Recent Flowsheet Documentation  Taken 12/24/2023 1020 by Jeannine Perez RN  Safety Promotion/Fall Prevention:   safety round/check completed   room organization consistent   nonskid shoes/slippers when out of bed   lighting adjusted   clutter free environment maintained   assistive device/personal items within reach  Taken 12/24/2023 0800 by Jeannine Perez RN  Safety Promotion/Fall Prevention:   safety round/check completed   room organization consistent   nonskid shoes/slippers when out of bed   lighting adjusted   assistive device/personal items within reach   clutter free environment maintained  Intervention: Prevent Skin Injury  Recent Flowsheet Documentation  Taken 12/24/2023 1020 by Jeannine Perez RN  Body Position:   position changed independently   dangle, side of bed  Taken 12/24/2023 0800 by Jeannine Perez RN  Body Position: position changed independently  Skin Protection:   transparent dressing maintained   tubing/devices free from skin contact   skin-to-device areas padded   skin-to-skin areas padded   protective footwear used   adhesive use limited  Intervention: Prevent and Manage VTE (Venous Thromboembolism) Risk  Recent Flowsheet Documentation  Taken 12/24/2023 1020 by Jeannine Perez RN  Activity Management: sitting, edge of bed  Taken 12/24/2023 0800 by Jeannine Perez RN  VTE  Prevention/Management:   bilateral   sequential compression devices off   patient refused intervention  Range of Motion: active ROM (range of motion) encouraged  Intervention: Prevent Infection  Recent Flowsheet Documentation  Taken 12/24/2023 1020 by Jeannine Perez RN  Infection Prevention:   single patient room provided   rest/sleep promoted   personal protective equipment utilized   hand hygiene promoted  Taken 12/24/2023 0800 by Jeannine Perez RN  Infection Prevention:   single patient room provided   rest/sleep promoted   personal protective equipment utilized   hand hygiene promoted  Goal: Optimal Comfort and Wellbeing  Outcome: Met  Intervention: Provide Person-Centered Care  Recent Flowsheet Documentation  Taken 12/24/2023 0800 by Jeannine Perez RN  Trust Relationship/Rapport:   care explained   choices provided   emotional support provided   empathic listening provided   questions answered   questions encouraged   thoughts/feelings acknowledged  Goal: Readiness for Transition of Care  Outcome: Met     Problem: Device-Related Complication Risk (Hemodialysis)  Goal: Safe, Effective Therapy Delivery  Outcome: Met  Intervention: Optimize Device Care and Function  Recent Flowsheet Documentation  Taken 12/24/2023 1020 by Jeannine Perez RN  Medication Review/Management: medications reviewed  Taken 12/24/2023 0800 by Jeannine Perez RN  Medication Review/Management: medications reviewed     Problem: Hemodynamic Instability (Hemodialysis)  Goal: Effective Tissue Perfusion  Outcome: Met     Problem: Infection (Hemodialysis)  Goal: Absence of Infection Signs and Symptoms  Outcome: Met     Problem: Electrolyte Imbalance  Goal: Electrolyte Balance  Outcome: Met     Problem: Fluid Imbalance (Pneumonia)  Goal: Fluid Balance  Outcome: Met     Problem: Infection (Pneumonia)  Goal: Resolution of Infection Signs and Symptoms  Outcome: Met     Problem: Respiratory Compromise (Pneumonia)  Goal: Effective  Oxygenation and Ventilation  Outcome: Met  Intervention: Promote Airway Secretion Clearance  Recent Flowsheet Documentation  Taken 12/24/2023 0800 by Jeannine Perez RN  Cough And Deep Breathing: done independently per patient  Intervention: Optimize Oxygenation and Ventilation  Recent Flowsheet Documentation  Taken 12/24/2023 1020 by Jeannine Perez RN  Head of Bed (HOB) Positioning: HOB lowered  Taken 12/24/2023 0800 by Jeannine Perez RN  Head of Bed (HOB) Positioning: HOB elevated     Problem: Fall Injury Risk  Goal: Absence of Fall and Fall-Related Injury  Outcome: Met  Intervention: Identify and Manage Contributors  Recent Flowsheet Documentation  Taken 12/24/2023 1020 by Jeannine Perez RN  Medication Review/Management: medications reviewed  Taken 12/24/2023 0800 by Jeannine Perez RN  Medication Review/Management: medications reviewed  Intervention: Promote Injury-Free Environment  Recent Flowsheet Documentation  Taken 12/24/2023 1020 by Jeannine Perez RN  Safety Promotion/Fall Prevention:   safety round/check completed   room organization consistent   nonskid shoes/slippers when out of bed   lighting adjusted   clutter free environment maintained   assistive device/personal items within reach  Taken 12/24/2023 0800 by Jeannine Perez RN  Safety Promotion/Fall Prevention:   safety round/check completed   room organization consistent   nonskid shoes/slippers when out of bed   lighting adjusted   assistive device/personal items within reach   clutter free environment maintained     Problem: Skin Injury Risk Increased  Goal: Skin Health and Integrity  Outcome: Met  Intervention: Optimize Skin Protection  Recent Flowsheet Documentation  Taken 12/24/2023 1020 by Jeannine Perez RN  Head of Bed (HOB) Positioning: HOB lowered  Taken 12/24/2023 0800 by Jeannine Perez RN  Head of Bed (HOB) Positioning: HOB elevated  Skin Protection:   transparent dressing maintained   tubing/devices free from skin  contact   skin-to-device areas padded   skin-to-skin areas padded   protective footwear used   adhesive use limited

## 2023-12-25 LAB
BACTERIA SPEC AEROBE CULT: NORMAL

## 2023-12-25 NOTE — OUTREACH NOTE
Prep Survey      Flowsheet Row Responses   [facility] facility patient discharged from? [place]   Is LACE score < 7 ? No   Eligibility Readm Mgmt   Discharge diagnosis Acute on chronic chronic diastolic CHFESRD   Does the patient have one of the following disease processes/diagnoses(primary or secondary)? CHF   Does the patient have Home health ordered? No   Is there a DME ordered? No   Prep survey completed? Yes            LISA PEDROZA - Registered Nurse

## 2023-12-26 ENCOUNTER — READMISSION MANAGEMENT (OUTPATIENT)
Dept: CALL CENTER | Facility: HOSPITAL | Age: 73
End: 2023-12-26
Payer: MEDICARE

## 2023-12-26 NOTE — PROGRESS NOTES
Case Management Discharge Note      Final Note: DC'd home 12/24       \             Final Discharge Disposition Code: 01 - home or self-care

## 2023-12-26 NOTE — OUTREACH NOTE
CHF Week 1 Survey      Flowsheet Row Responses   [facility] patient discharged from? [place]   Does the patient have one of the following disease processes/diagnoses(primary or secondary)? CHF   CHF Week 1 attempt successful? No   Unsuccessful attempts Attempt 1            Katherine FORTUNE - Licensed Nurse

## 2023-12-27 ENCOUNTER — READMISSION MANAGEMENT (OUTPATIENT)
Dept: CALL CENTER | Facility: HOSPITAL | Age: 73
End: 2023-12-27
Payer: MEDICARE

## 2023-12-27 NOTE — OUTREACH NOTE
CHF Week 1 Survey      Flowsheet Row Responses   [facility] patient discharged from? [place]   Does the patient have one of the following disease processes/diagnoses(primary or secondary)? CHF   CHF Week 1 attempt successful? Yes   Call start time 1041   Call end time 1050   Discharge diagnosis Acute on chronic chronic diastolic CHFESRD   Meds reviewed with patient/caregiver? Yes   Is the patient having any side effects they believe may be caused by any medication additions or changes? No   Does the patient have all medications ordered at discharge? Yes   Is the patient taking all medications as directed (includes completed medication regime)? Yes   Comments regarding appointments HD on MWF.  Pt was [facility] to [facility] but declined to be transferred to [facility] [place] for appt scheduling. Pt info sent to [place] at [facility].   Does the patient have a primary care provider?  Yes   Does the patient have an appointment with their PCP within 7 days of discharge? No   What is preventing the patient from scheduling follow up appointments within 7 days of discharge? Haven't had time   Nursing Interventions Advised patient to make appointment   DME comments 2L O2 at home in place.Home Nebs at home.   Pulse Ox monitoring None   Comments Pt reports that she does not have SOA.Pt reports that she does not have issues w/cough, swallowing or with edema in throat any longer. Pt reports that she does not follow a dysphagia diet, she does not thicken her liquids as suggested.   Did the patient receive a copy of their discharge instructions? Yes   Nursing interventions Reviewed instructions with patient   What is the patient's perception of their health status since discharge? Improving   Nursing interventions Nurse provided patient education   Is the patient able to teach back signs and symptoms of worsening condition? (i.e. weight gain, shortness of air, etc.) Yes   Is the patient/caregiver able to teach back the hierarchy of who to  call/visit for symptoms/problems? PCP, Specialist, Home health nurse, Urgent Care, ED, 911 Yes   CHF Zone this Call Green Zone   Green Zone No new or worsening shortness of breath, Patient reports doing well, Physical activity level is normal for you    CHF Week 1 call completed? Yes   Revoked No further contact(revokes)-requires comment   Is the patient interested in additional calls from an ambulatory ? No   Would this patient benefit from a Referral to [facility] Social Work? No   Wrap up additional comments Pt difficult to understand r/t mumbled responses and unclear answers.   Call end time 1050            Filomena PETIT - Registered Nurse

## 2024-02-17 ENCOUNTER — HOSPITAL ENCOUNTER (EMERGENCY)
Facility: HOSPITAL | Age: 74
Discharge: HOME | End: 2024-02-17
Attending: EMERGENCY MEDICINE
Payer: MEDICARE

## 2024-02-17 ENCOUNTER — APPOINTMENT (OUTPATIENT)
Dept: CT IMAGING | Facility: HOSPITAL | Age: 74
End: 2024-02-17
Payer: MEDICARE

## 2024-02-17 VITALS
SYSTOLIC BLOOD PRESSURE: 172 MMHG | TEMPERATURE: 96.8 F | DIASTOLIC BLOOD PRESSURE: 69 MMHG | HEART RATE: 56 BPM | OXYGEN SATURATION: 96 % | RESPIRATION RATE: 18 BRPM

## 2024-02-17 DIAGNOSIS — G89.29 CHRONIC ABDOMINAL PAIN: ICD-10-CM

## 2024-02-17 DIAGNOSIS — N18.6 ESRD ON DIALYSIS: ICD-10-CM

## 2024-02-17 DIAGNOSIS — R10.9 CHRONIC ABDOMINAL PAIN: ICD-10-CM

## 2024-02-17 DIAGNOSIS — Z99.2 ESRD ON DIALYSIS: ICD-10-CM

## 2024-02-17 DIAGNOSIS — H92.01 OTALGIA, RIGHT: ICD-10-CM

## 2024-02-17 DIAGNOSIS — N39.0 ACUTE UTI: Primary | ICD-10-CM

## 2024-02-17 LAB
ALBUMIN SERPL-MCNC: 3.8 G/DL (ref 3.5–5.2)
ALBUMIN/GLOB SERPL: 1.7 G/DL
ALP SERPL-CCNC: 159 U/L (ref 39–117)
ALT SERPL W P-5'-P-CCNC: 8 U/L (ref 1–33)
ANION GAP SERPL CALCULATED.3IONS-SCNC: 12.2 MMOL/L (ref 5–15)
AST SERPL-CCNC: 20 U/L (ref 1–32)
BACTERIA UR QL AUTO: ABNORMAL /HPF
BASOPHILS # BLD AUTO: 0.02 10*3/MM3 (ref 0–0.2)
BASOPHILS NFR BLD AUTO: 0.7 % (ref 0–1.5)
BILIRUB SERPL-MCNC: 0.2 MG/DL (ref 0–1.2)
BILIRUB UR QL STRIP: NEGATIVE
BUN SERPL-MCNC: 12 MG/DL (ref 8–23)
BUN/CREAT SERPL: 3.5 (ref 7–25)
CALCIUM SPEC-SCNC: 8.9 MG/DL (ref 8.6–10.5)
CHLORIDE SERPL-SCNC: 102 MMOL/L (ref 98–107)
CLARITY UR: ABNORMAL
CO2 SERPL-SCNC: 26.8 MMOL/L (ref 22–29)
COLOR UR: YELLOW
CREAT SERPL-MCNC: 3.44 MG/DL (ref 0.57–1)
DEPRECATED RDW RBC AUTO: 40.5 FL (ref 37–54)
EGFRCR SERPLBLD CKD-EPI 2021: 13.5 ML/MIN/1.73
EOSINOPHIL # BLD AUTO: 0.2 10*3/MM3 (ref 0–0.4)
EOSINOPHIL NFR BLD AUTO: 7.2 % (ref 0.3–6.2)
ERYTHROCYTE [DISTWIDTH] IN BLOOD BY AUTOMATED COUNT: 12.7 % (ref 12.3–15.4)
GLOBULIN UR ELPH-MCNC: 2.3 GM/DL
GLUCOSE SERPL-MCNC: 75 MG/DL (ref 65–99)
GLUCOSE UR STRIP-MCNC: NEGATIVE MG/DL
HCT VFR BLD AUTO: 32.6 % (ref 34–46.6)
HGB BLD-MCNC: 10.3 G/DL (ref 12–15.9)
HGB UR QL STRIP.AUTO: ABNORMAL
HYALINE CASTS UR QL AUTO: ABNORMAL /LPF
IMM GRANULOCYTES # BLD AUTO: 0.01 10*3/MM3 (ref 0–0.05)
IMM GRANULOCYTES NFR BLD AUTO: 0.4 % (ref 0–0.5)
KETONES UR QL STRIP: NEGATIVE
LEUKOCYTE ESTERASE UR QL STRIP.AUTO: ABNORMAL
LIPASE SERPL-CCNC: 19 U/L (ref 13–60)
LYMPHOCYTES # BLD AUTO: 0.5 10*3/MM3 (ref 0.7–3.1)
LYMPHOCYTES NFR BLD AUTO: 17.9 % (ref 19.6–45.3)
MCH RBC QN AUTO: 28.1 PG (ref 26.6–33)
MCHC RBC AUTO-ENTMCNC: 31.6 G/DL (ref 31.5–35.7)
MCV RBC AUTO: 89.1 FL (ref 79–97)
MONOCYTES # BLD AUTO: 0.26 10*3/MM3 (ref 0.1–0.9)
MONOCYTES NFR BLD AUTO: 9.3 % (ref 5–12)
NEUTROPHILS NFR BLD AUTO: 1.8 10*3/MM3 (ref 1.7–7)
NEUTROPHILS NFR BLD AUTO: 64.5 % (ref 42.7–76)
NITRITE UR QL STRIP: NEGATIVE
NRBC BLD AUTO-RTO: 0 /100 WBC (ref 0–0.2)
PH UR STRIP.AUTO: 7.5 [PH] (ref 5–8)
PLATELET # BLD AUTO: 168 10*3/MM3 (ref 140–450)
PMV BLD AUTO: 10.2 FL (ref 6–12)
POTASSIUM SERPL-SCNC: 3.8 MMOL/L (ref 3.5–5.2)
PROT SERPL-MCNC: 6.1 G/DL (ref 6–8.5)
PROT UR QL STRIP: ABNORMAL
RBC # BLD AUTO: 3.66 10*6/MM3 (ref 3.77–5.28)
RBC # UR STRIP: ABNORMAL /HPF
REF LAB TEST METHOD: ABNORMAL
SODIUM SERPL-SCNC: 141 MMOL/L (ref 136–145)
SP GR UR STRIP: 1.02 (ref 1–1.03)
SQUAMOUS #/AREA URNS HPF: ABNORMAL /HPF
UROBILINOGEN UR QL STRIP: ABNORMAL
WBC # UR STRIP: ABNORMAL /HPF
WBC NRBC COR # BLD AUTO: 2.79 10*3/MM3 (ref 3.4–10.8)

## 2024-02-17 PROCEDURE — 83690 ASSAY OF LIPASE: CPT | Performed by: PHYSICIAN ASSISTANT

## 2024-02-17 PROCEDURE — 25010000002 CEFTRIAXONE PER 250 MG: Performed by: PHYSICIAN ASSISTANT

## 2024-02-17 PROCEDURE — 96365 THER/PROPH/DIAG IV INF INIT: CPT

## 2024-02-17 PROCEDURE — 74176 CT ABD & PELVIS W/O CONTRAST: CPT

## 2024-02-17 PROCEDURE — 81001 URINALYSIS AUTO W/SCOPE: CPT | Performed by: PHYSICIAN ASSISTANT

## 2024-02-17 PROCEDURE — 96375 TX/PRO/DX INJ NEW DRUG ADDON: CPT

## 2024-02-17 PROCEDURE — 87086 URINE CULTURE/COLONY COUNT: CPT | Performed by: PHYSICIAN ASSISTANT

## 2024-02-17 PROCEDURE — 80053 COMPREHEN METABOLIC PANEL: CPT | Performed by: PHYSICIAN ASSISTANT

## 2024-02-17 PROCEDURE — 99284 EMERGENCY DEPT VISIT MOD MDM: CPT

## 2024-02-17 PROCEDURE — 85025 COMPLETE CBC W/AUTO DIFF WBC: CPT | Performed by: PHYSICIAN ASSISTANT

## 2024-02-17 PROCEDURE — 25010000002 MORPHINE PER 10 MG: Performed by: EMERGENCY MEDICINE

## 2024-02-17 PROCEDURE — P9612 CATHETERIZE FOR URINE SPEC: HCPCS

## 2024-02-17 RX ORDER — CEPHALEXIN 500 MG/1
500 CAPSULE ORAL 2 TIMES DAILY
Qty: 14 CAPSULE | Refills: 0 | Status: SHIPPED | OUTPATIENT
Start: 2024-02-17 | End: 2024-02-24

## 2024-02-17 RX ORDER — SODIUM CHLORIDE 0.9 % (FLUSH) 0.9 %
10 SYRINGE (ML) INJECTION AS NEEDED
Status: DISCONTINUED | OUTPATIENT
Start: 2024-02-17 | End: 2024-02-17 | Stop reason: HOSPADM

## 2024-02-17 RX ORDER — MORPHINE SULFATE 2 MG/ML
2 INJECTION, SOLUTION INTRAMUSCULAR; INTRAVENOUS ONCE
Status: COMPLETED | OUTPATIENT
Start: 2024-02-17 | End: 2024-02-17

## 2024-02-17 RX ADMIN — CEFTRIAXONE SODIUM 1000 MG: 1 INJECTION, POWDER, FOR SOLUTION INTRAMUSCULAR; INTRAVENOUS at 13:17

## 2024-02-17 RX ADMIN — MORPHINE SULFATE 2 MG: 2 INJECTION, SOLUTION INTRAMUSCULAR; INTRAVENOUS at 11:33

## 2024-02-17 NOTE — ED PROVIDER NOTES
EMERGENCY DEPARTMENT ENCOUNTER      PCP: Marcy Paez PA  Patient Care Team:  Marcy Paez PA as PCP - General (Physician Assistant)   Independent Historians: Patient    HPI:  Chief Complaint: Otalgia, abdominal pain  A complete HPI/ROS/PMH/PSH/SH/FH are unobtainable due to: None    Chronic or social conditions impacting patient care (social determinants of health): ESRD on dialysis    Context: Sonia Acharya is a 73 y.o. female who presents to the ED c/o lower abdominal pain and right ear pain.  Both symptoms have been ongoing but worse in the past few days.  Her right ear pain she describes as a sharp pain that radiates up into her scalp.  Denies congestion, fevers, drainage or trauma to the ear.  Patient reports pain to her abdomen is mostly lower.  She reports history of constipation.  She has taken some stool softener and had a small bowel movement this morning.  She denies any vomiting.  She is on dialysis and had full session yesterday.  She does not produce much urine.    Review of prior external notes and/or external test results outside of this encounter: CT abdomen pelvis on 11/17/2023 showed cholelithiasis without acute cholecystitis, moderate stool seen throughout the colon without obstruction or acute inflammation.      PAST MEDICAL HISTORY  Active Ambulatory Problems     Diagnosis Date Noted    Generalized abdominal pain 12/13/2021    ODALIS (acute kidney injury) 12/13/2021    Anemia, chronic disease 12/13/2021    Metabolic acidosis, increased anion gap 12/13/2021    Obesity (BMI 30-39.9) 12/13/2021    HTN (hypertension) 12/13/2021    Chest pain, atypical 12/13/2021    Cervical radiculopathy 01/16/2023    ESRD on dialysis 01/16/2023    Bradycardia 01/16/2023    Right arm pain 01/16/2023    ANGIE (obstructive sleep apnea) 01/16/2023    Chronic diastolic CHF (congestive heart failure) 01/16/2023    Aortic stenosis 01/16/2023    Pancytopenia 01/18/2023    Hyperphosphatemia 01/18/2023    Hyperkalemia  12/20/2023    Pneumonia 12/20/2023    Leukocytopenia 12/22/2023    Anemia, chronic disease 12/22/2023     Resolved Ambulatory Problems     Diagnosis Date Noted    No Resolved Ambulatory Problems     Past Medical History:   Diagnosis Date    Arthritis     Chronic kidney disease     Heart disease     Hypertension        The patient qualifies to receive the vaccine, but they have not yet received it.    PAST SURGICAL HISTORY  Past Surgical History:   Procedure Laterality Date    BREAST SURGERY      DIALYSIS FISTULA CREATION      EYE SURGERY      HERNIA REPAIR  2017    Dr. Sung         FAMILY HISTORY  Family History   Problem Relation Age of Onset    Heart disease Father     Breast cancer Sister          SOCIAL HISTORY  Social History     Socioeconomic History    Marital status: Single   Tobacco Use    Smoking status: Never     Passive exposure: Never    Smokeless tobacco: Never   Vaping Use    Vaping Use: Never used   Substance and Sexual Activity    Alcohol use: No    Drug use: No    Sexual activity: Defer         ALLERGIES  Patient has no known allergies.        REVIEW OF SYSTEMS  Review of Systems   Constitutional:  Negative for fever.   HENT:  Positive for ear pain. Negative for congestion, ear discharge, facial swelling and sore throat.    Respiratory:  Negative for shortness of breath.    Gastrointestinal:  Positive for abdominal pain and constipation. Negative for blood in stool, nausea and vomiting.        All systems reviewed and negative except for those discussed in HPI.       PHYSICAL EXAM    I have reviewed the triage vital signs and nursing notes.    ED Triage Vitals [02/17/24 0920]   Temp Heart Rate Resp BP SpO2   96.8 °F (36 °C) 55 16 -- 97 %      Temp src Heart Rate Source Patient Position BP Location FiO2 (%)   -- -- -- -- --       Physical Exam  GENERAL: alert, no acute distress  SKIN: Warm, dry  HENT: Normocephalic, atraumatic, R TM dull, no swelling to ear canal or discharge, ears have normal lie  bilaterally, no erythema, L TM normal  EYES: no scleral icterus  CV: regular rhythm, regular rate  RESPIRATORY: normal effort, lungs clear  ABDOMEN: soft, lower abdominal discomfort to palpation, nondistended  MUSCULOSKELETAL: no deformity, fistula right upper arm with palpable thrill  NEURO: alert, moves all extremities, follows commands          LAB RESULTS  Labs Reviewed   COMPREHENSIVE METABOLIC PANEL - Abnormal; Notable for the following components:       Result Value    Creatinine 3.44 (*)     Alkaline Phosphatase 159 (*)     BUN/Creatinine Ratio 3.5 (*)     eGFR 13.5 (*)     All other components within normal limits    Narrative:     GFR Normal >60  Chronic Kidney Disease <60  Kidney Failure <15    The GFR formula is only valid for adults with stable renal function between ages 18 and 70.   CBC WITH AUTO DIFFERENTIAL - Abnormal; Notable for the following components:    WBC 2.79 (*)     RBC 3.66 (*)     Hemoglobin 10.3 (*)     Hematocrit 32.6 (*)     Lymphocyte % 17.9 (*)     Eosinophil % 7.2 (*)     Lymphocytes, Absolute 0.50 (*)     All other components within normal limits   URINALYSIS W/ MICROSCOPIC IF INDICATED (NO CULTURE) - Abnormal; Notable for the following components:    Appearance, UA Cloudy (*)     Blood, UA Trace (*)     Protein, UA >=300 mg/dL (3+) (*)     Leuk Esterase, UA Moderate (2+) (*)     All other components within normal limits   URINALYSIS, MICROSCOPIC ONLY - Abnormal; Notable for the following components:    RBC, UA 3-5 (*)     WBC, UA Too Numerous to Count (*)     Bacteria, UA 3+ (*)     Squamous Epithelial Cells, UA 13-20 (*)     All other components within normal limits   URINE CULTURE - Normal   LIPASE - Normal   CBC AND DIFFERENTIAL    Narrative:     The following orders were created for panel order CBC & Differential.  Procedure                               Abnormality         Status                     ---------                               -----------         ------                      CBC Auto Differential[[id number]]        Abnormal            Final result                 Please view results for these tests on the individual orders.       Ordered the above labs and independently reviewed and interpreted the results.        RADIOLOGY  CT Abdomen Pelvis Without Contrast   Final Result       1. Gas and fluid and loose stool seen in the colon. Correlate for   diarrhea. Mild colonic wall thickening that is nonspecific and may   indicate colitis in the appropriate clinical setting or be related to   third spacing.   2. Third spacing with body wall edema/anasarca, small amount of free   fluid in the pelvis and trace right pleural fluid.   3. Moderate cardiomegaly.   4. Hyperattenuating liver may be secondary to iron deposition or   amiodarone use. Recommend correlation with iron studies.   5. Small gallstones versus sludge.   6. Atrophic multicystic kidneys.   7. Colonic diverticulosis.   8. Suspect uterine leiomyomas       This report was finalized on 2/17/2024 10:59 AM by Dr. Arjun Tinajero M.D on Workstation: [id number]              I ordered the above noted radiological studies. Independently reviewed and interpreted by me.  See dictation for official radiology interpretation.      PROCEDURES    Procedures      MEDICATIONS GIVEN IN ER    Medications   morphine injection 2 mg (2 mg Intravenous Given 2/17/24 1133)   cefTRIAXone (ROCEPHIN) 1,000 mg in sodium chloride 0.9 % 100 mL IVPB-VTB (0 mg Intravenous Stopped 2/17/24 1431)         PROGRESS, DATA ANALYSIS, CONSULTS, AND MEDICAL DECISION MAKING    All labs have been independently reviewed and interpreted by me.  All radiology studies have been independently reviewed and interpreted by me and discussed with radiologist dictating the report.   EKG's independently reviewed and interpreted by me.  Discussion below represents my analysis of pertinent findings related to patient's condition, differential diagnosis, treatment plan and final  disposition.    Differential diagnosis: constipation, bowel obstruction, colitis, cystitis, pyelonephritis    ED Course as of 02/18/24 1517   Sat Feb 17, 2024   1041 WBC(!): 2.79  No significant change from baseline [DC]   1042 Hemoglobin(!): 10.3  Chronic anemia, no significant change [DC]   1050 Creatinine(!): 3.44  ESRD on dialysis, no change [DC]   1050 Potassium: 3.8 [DC]   1051 Sodium: 141 [DC]   1051 Lipase: 19 [DC]   1110 Creatinine(!): 3.44 [MM]   1111 Hemoglobin(!): 10.3  Chronic anemia [MM]   1112 I reviewed the CT scan of the abdomen pelvis report patient has gas and fluid and loose stool in the colon likely associated with some diarrhea.  Mild colonic wall thickening that is nonspecific patient has third spacing of fluid in the body wall consistent with anasarca patient has moderate cardiomegaly.  Hyperattenuation of the liver likely from secondary iron deposition.  Patient does have some small gallstones or sludge multiple cysts in the kidneys diverticulosis and concerns for uterine fibroids.  Please see complete dictated report from radiologist [MM]   1314 UA suggests possible UTI. Will give dose of IV Rocephin and discharge on antibiotics. [DC]      ED Course User Index  [DC] Kathya Gallego PA  [MM] Home Todd MD             AS OF 15:17 EST VITALS:    BP - 172/69  HR - 56  TEMP - 96.8 °F (36 °C)  O2 SATS - 96%        DIAGNOSIS  Final diagnoses:   Acute UTI   Chronic abdominal pain   Otalgia, right   ESRD on dialysis         DISPOSITION  ED Disposition       ED Disposition   Discharge    Condition   Stable    Comment   --                  Note Disclaimer: At [facility], we believe that sharing information builds trust and better relationships. You are receiving this note because you recently visited [facility]. It is possible you will see health information before a provider has talked with you about it. This kind of information can be easy to misunderstand. To help you fully understand  what it means for your health, we urge you to discuss this note with your provider.         Kathya Gallego PA  02/18/24 3804

## 2024-02-17 NOTE — ED PROVIDER NOTES
I supervised care provided by the midlevel provider.   We have discussed this patient's history, physical exam, and treatment plan.  I have reviewed the note and personally saw and examined the patient and agree with the plan of care.   I have seen and evaluated this patient.  This is an elderly female with significant comorbidities she is got chronic renal failure on dialysis.  She has been on dialysis for over 10 years who presents with some right ear pain and decreased hearing in that right ear as well as some abdominal pain in the lower abdomen.  The right ear pain has been going on for a little over a week.  She reports some nasal congestion and drainage and congestion.  She also feels as if she is like underwater with decreased hearing in that right ear.  She is never had any drainage in that ear.  She has put some drops in that right ear with no improvement.  She has no new neurologic deficit.  No new visual change, speech change or any focal weakness to arms or legs.  She reports no fevers or chills.  She does not report any pain with opening and closing her jaw or any dental pain.  She has had no trauma or injury to her right ear or the right side of her head.  She also presents with some lower abdominal pain.  This abdominal pain is something that she has had for years.  She feels as if she is because she becomes bloated because she cannot go to the bathroom.  When I say go to the bathroom have a bowel movement.  She has been generating urine despite being on dialysis for over 10 years.  She feels as if her urine output has decreased.  Denies any burning with urination or frequent urination.  Denies any vomiting.  Denies fevers or chills.  This pain is similar to what she is experienced in the past.  She has not missed any of her dialysis with her last dialysis yesterday.    GENERAL: Elderly female that is chronically ill.  Appears frail.  Patient is in no acute cardiovascular respiratory distress.  Vital  signs been reviewed  HENT: nares patent  Head/neck/ face are symmetric without gross deformity or swelling  In examining her ear I do not see any obvious swelling or rash or any erythema.  She has no tenderness in palpation to her mastoid or any movement to her auricle diffusely.  On speculum exam she does have fluid behind the right TM but I do not appreciate any discharge or drainage in the external canal and I do not appreciate any obvious erythema.  I compared this to her left TM and there seems to be some dullness in looking at the right TM compared to the left.  EYES: no scleral icterus.  Pupils equal round reactive to light.  No new visual change.  CV: regular rhythm, regular rate with intact distal pulses.  Soft systolic murmur 2 out of 6.  RESPIRATORY: normal effort and no respiratory distress  ABDOMEN: soft and mild subjective tenderness to the lower portion of her abdomen with palpation.  There is no guarding or rebound.  Normal bowel sounds.  MUSCULOSKELETAL: no deformity.  Patient has good thrill to her AV fistula.  Intact distal pulses  NEURO: alert and appropriate, moves all extremities, follows commands  SKIN: warm, dry    Vital signs and nursing notes reviewed.    Plan   ED Course as of 02/17/24 1641   Sat Feb 17, 2024   1041 WBC(!): 2.79  No significant change from baseline [DC]   1042 Hemoglobin(!): 10.3  Chronic anemia, no significant change [DC]   1050 Creatinine(!): 3.44  ESRD on dialysis, no change [DC]   1050 Potassium: 3.8 [DC]   1051 Sodium: 141 [DC]   1051 Lipase: 19 [DC]   1110 Creatinine(!): 3.44 [MM]   1111 Hemoglobin(!): 10.3  Chronic anemia [MM]   1112 I reviewed the CT scan of the abdomen pelvis report patient has gas and fluid and loose stool in the colon likely associated with some diarrhea.  Mild colonic wall thickening that is nonspecific patient has third spacing of fluid in the body wall consistent with anasarca patient has moderate cardiomegaly.  Hyperattenuation of the liver  likely from secondary iron deposition.  Patient does have some small gallstones or sludge multiple cysts in the kidneys diverticulosis and concerns for uterine fibroids.  Please see complete dictated report from radiologist [MM]   1314 UA suggests possible UTI. Will give dose of IV Rocephin and discharge on antibiotics. [DC]      ED Course User Index  [DC] Kathya Gallego PA  [MM] Home Todd MD         This is a patient with some nonspecific abdominal pain that she has had for a prolonged period of time.  Lab work and CT scan is unremarkable.  I suspect her ear discomfort is from fluid and pressure behind that right ear.  Do not believe there is any source of bacterial infection.  I do not see a reason to start her on any antibiotics at this time.  We will check a urinalysis because she is noticed some decreased urine output.  All questions answered at this time.         Home Todd MD  02/17/24 [id number]

## 2024-02-18 LAB — BACTERIA SPEC AEROBE CULT: NO GROWTH

## 2024-02-29 ENCOUNTER — HOSPITAL ENCOUNTER (EMERGENCY)
Facility: HOSPITAL | Age: 74
Discharge: HOME | End: 2024-02-29
Attending: EMERGENCY MEDICINE
Payer: MEDICARE

## 2024-02-29 VITALS
HEART RATE: 61 BPM | DIASTOLIC BLOOD PRESSURE: 67 MMHG | TEMPERATURE: 97.2 F | OXYGEN SATURATION: 97 % | SYSTOLIC BLOOD PRESSURE: 150 MMHG | RESPIRATION RATE: 16 BRPM

## 2024-02-29 DIAGNOSIS — N18.6 CKD (CHRONIC KIDNEY DISEASE) REQUIRING CHRONIC DIALYSIS: ICD-10-CM

## 2024-02-29 DIAGNOSIS — Z99.2 CKD (CHRONIC KIDNEY DISEASE) REQUIRING CHRONIC DIALYSIS: ICD-10-CM

## 2024-02-29 DIAGNOSIS — R19.7 DIARRHEA, UNSPECIFIED TYPE: Primary | ICD-10-CM

## 2024-02-29 LAB
ALBUMIN SERPL-MCNC: 3.8 G/DL (ref 3.5–5.2)
ALBUMIN/GLOB SERPL: 1.7 G/DL
ALP SERPL-CCNC: 147 U/L (ref 39–117)
ALT SERPL W P-5'-P-CCNC: <5 U/L (ref 1–33)
ANION GAP SERPL CALCULATED.3IONS-SCNC: 13.6 MMOL/L (ref 5–15)
AST SERPL-CCNC: 13 U/L (ref 1–32)
BASOPHILS # BLD AUTO: 0.03 10*3/MM3 (ref 0–0.2)
BASOPHILS NFR BLD AUTO: 0.7 % (ref 0–1.5)
BILIRUB SERPL-MCNC: 0.3 MG/DL (ref 0–1.2)
BUN SERPL-MCNC: 27 MG/DL (ref 8–23)
BUN/CREAT SERPL: 4 (ref 7–25)
CALCIUM SPEC-SCNC: 8.7 MG/DL (ref 8.6–10.5)
CHLORIDE SERPL-SCNC: 100 MMOL/L (ref 98–107)
CO2 SERPL-SCNC: 23.4 MMOL/L (ref 22–29)
CREAT SERPL-MCNC: 6.67 MG/DL (ref 0.57–1)
DEPRECATED RDW RBC AUTO: 40.4 FL (ref 37–54)
EGFRCR SERPLBLD CKD-EPI 2021: 6.1 ML/MIN/1.73
EOSINOPHIL # BLD AUTO: 0.16 10*3/MM3 (ref 0–0.4)
EOSINOPHIL NFR BLD AUTO: 3.6 % (ref 0.3–6.2)
ERYTHROCYTE [DISTWIDTH] IN BLOOD BY AUTOMATED COUNT: 12.9 % (ref 12.3–15.4)
GLOBULIN UR ELPH-MCNC: 2.3 GM/DL
GLUCOSE SERPL-MCNC: 77 MG/DL (ref 65–99)
HCT VFR BLD AUTO: 33.7 % (ref 34–46.6)
HGB BLD-MCNC: 10.7 G/DL (ref 12–15.9)
IMM GRANULOCYTES # BLD AUTO: 0.02 10*3/MM3 (ref 0–0.05)
IMM GRANULOCYTES NFR BLD AUTO: 0.5 % (ref 0–0.5)
LYMPHOCYTES # BLD AUTO: 0.57 10*3/MM3 (ref 0.7–3.1)
LYMPHOCYTES NFR BLD AUTO: 13 % (ref 19.6–45.3)
MCH RBC QN AUTO: 27.7 PG (ref 26.6–33)
MCHC RBC AUTO-ENTMCNC: 31.8 G/DL (ref 31.5–35.7)
MCV RBC AUTO: 87.3 FL (ref 79–97)
MONOCYTES # BLD AUTO: 0.39 10*3/MM3 (ref 0.1–0.9)
MONOCYTES NFR BLD AUTO: 8.9 % (ref 5–12)
NEUTROPHILS NFR BLD AUTO: 3.23 10*3/MM3 (ref 1.7–7)
NEUTROPHILS NFR BLD AUTO: 73.3 % (ref 42.7–76)
NRBC BLD AUTO-RTO: 0 /100 WBC (ref 0–0.2)
PLATELET # BLD AUTO: 197 10*3/MM3 (ref 140–450)
PMV BLD AUTO: 9.7 FL (ref 6–12)
POTASSIUM SERPL-SCNC: 2.9 MMOL/L (ref 3.5–5.2)
PROT SERPL-MCNC: 6.1 G/DL (ref 6–8.5)
RBC # BLD AUTO: 3.86 10*6/MM3 (ref 3.77–5.28)
SODIUM SERPL-SCNC: 137 MMOL/L (ref 136–145)
WBC NRBC COR # BLD AUTO: 4.4 10*3/MM3 (ref 3.4–10.8)

## 2024-02-29 PROCEDURE — 36415 COLL VENOUS BLD VENIPUNCTURE: CPT

## 2024-02-29 PROCEDURE — 80053 COMPREHEN METABOLIC PANEL: CPT | Performed by: PHYSICIAN ASSISTANT

## 2024-02-29 PROCEDURE — 99283 EMERGENCY DEPT VISIT LOW MDM: CPT

## 2024-02-29 PROCEDURE — 85025 COMPLETE CBC W/AUTO DIFF WBC: CPT | Performed by: PHYSICIAN ASSISTANT

## 2024-02-29 RX ORDER — SODIUM CHLORIDE 0.9 % (FLUSH) 0.9 %
10 SYRINGE (ML) INJECTION AS NEEDED
Status: DISCONTINUED | OUTPATIENT
Start: 2024-02-29 | End: 2024-02-29 | Stop reason: HOSPADM

## 2024-02-29 RX ORDER — ASPIRIN 325 MG
650 TABLET ORAL ONCE
Status: COMPLETED | OUTPATIENT
Start: 2024-02-29 | End: 2024-02-29

## 2024-02-29 RX ADMIN — ASPIRIN 650 MG: 325 TABLET ORAL at 14:23

## 2024-02-29 NOTE — ED PROVIDER NOTES
MD ATTESTATION NOTE    The NEVAEH and I have discussed this patient's history, physical exam, MDM, and treatment plan.  I have reviewed the documentation and personally had a face to face interaction with the patient. I affirm the documentation and agree with the treatment and plan.  The attached note describes my personal findings.      I provided a substantive portion of the medical decision making.        Brief HPI: Patient presents with complaint of severe diarrhea and nausea since Monday.  She estimates she has had more than 10 loose stools a day since Monday.  She denies black or bloody stool.  She states that she had been on antibiotics recently but she cannot recall which antibiotic she was on.  She denies known fever.  She has had nausea but no vomiting.    PHYSICAL EXAM  ED Triage Vitals   Temp Heart Rate Resp BP SpO2   02/29/24 0918 02/29/24 0918 02/29/24 0918 02/29/24 0936 02/29/24 0918   97.2 °F (36.2 °C) 61 16 153/86 98 %      Temp src Heart Rate Source Patient Position BP Location FiO2 (%)   02/29/24 0918 02/29/24 0918 -- -- --   Tympanic Monitor            GENERAL: Awake and alert, no acute distress, appears elderly and frail  HENT: nares patent  EYES: no scleral icterus  CV: regular rhythm, normal rate  RESPIRATORY: normal effort  ABDOMEN: soft, mild diffuse tenderness without focal tenderness rebound or guarding  MUSCULOSKELETAL: no deformity  NEURO: alert, moves all extremities, follows commands  PSYCH:  calm, cooperative  SKIN: warm, dry    Vital signs and nursing notes reviewed.        Medical Decision Making:  Patient with 4 days of copious watery diarrhea, recent antibiotic use, at risk for C. difficile colitis or other infectious colitis.  Will attempt to obtain stool studies today and also check labs to evaluate for possible dehydration.          SHARED VISIT: This visit was performed by BOTH a physician and an APC. The substantive portion of the medical decision making was performed by this  attesting physician who made or approved the management plan and takes responsibility for patient management. All studies in the APC note (if performed) were independently interpreted by me.      Emil Carrillo MD  02/29/24 1324

## 2024-02-29 NOTE — ED NOTES
"   02/29/24 0955   Peripheral IV 02/29/24 0955 Anterior;Left Forearm   Placement date: If unknown, DO NOT use \"Add Comment\" note/Placement time: If unknown, DO NOT use \"Add Comment\" note: 02/29/24 0955   Hand Hygiene Completed: Yes  Size (Gauge): 20 G  Orientation: Anterior;Left  Location: Forearm  Site Prep: Chlorhexidi...   Site Assessment Clean;Dry;Intact   Dressing Type Transparent   Line Status Blood return noted;Flushed;Saline locked   Dressing Status Clean;Dry;Intact   Dressing Intervention New dressing   Phlebitis 0-->no symptoms     Ultrasound Inserted IV Site: L FA    Catheter Length: 1.88 in     Diameter:0.30 cm     Depth:0.50 cm       Vascular Access Score=5  1) Palpable / Visible / Dis  2) Palpable / Viasible / Not Distended  3) Easily Palpable / Not Visibile  4) Poorly Palpable / Visible  5) Poorly / Nonpalpable / NV   "

## 2024-02-29 NOTE — ED PROVIDER NOTES
EMERGENCY DEPARTMENT ENCOUNTER    Room Number:  NEIL/NEIL  Date of encounter:  2/29/2024  PCP: Marcy Paez PA  Historian: Patient  Chronic or social conditions impacting care (social determinants of health): Full code from home, lives with daughter    HPI:  Chief Complaint: Diarrhea  A complete HPI/ROS/PMH/PSH/SH/FH are unobtainable due to: Nothing    Context: Sonia Acharya is a 73 y.o. female with a history of chronic kidney disease, CHF.  She presents to the ED c/o acute diarrhea x 4 days.  Patient reports having approximately 10 loose stools per day.  She denies any fever, abdominal pain, blood in her stools.  Patient was recently seen in the ER on 2/17/2024 and placed on Keflex for 1 week.  She denies any prior history of C. difficile.  She states she felt so poorly yesterday she did not go to dialysis.    Review of prior external notes (non-ED):   I reviewed admission from 12/20/2023.  Patient admitted for hyperkalemia.    Review of prior external test results outside of this encounter:  I reviewed a CMP from 2/17/2024.  Potassium 3.8, creatinine 3.44    PAST MEDICAL HISTORY  Active Ambulatory Problems     Diagnosis Date Noted    Generalized abdominal pain 12/13/2021    ODALIS (acute kidney injury) 12/13/2021    Anemia, chronic disease 12/13/2021    Metabolic acidosis, increased anion gap 12/13/2021    Obesity (BMI 30-39.9) 12/13/2021    HTN (hypertension) 12/13/2021    Chest pain, atypical 12/13/2021    Cervical radiculopathy 01/16/2023    ESRD on dialysis 01/16/2023    Bradycardia 01/16/2023    Right arm pain 01/16/2023    ANGIE (obstructive sleep apnea) 01/16/2023    Chronic diastolic CHF (congestive heart failure) 01/16/2023    Aortic stenosis 01/16/2023    Pancytopenia 01/18/2023    Hyperphosphatemia 01/18/2023    Hyperkalemia 12/20/2023    Pneumonia 12/20/2023    Leukocytopenia 12/22/2023    Anemia, chronic disease 12/22/2023     Resolved Ambulatory Problems     Diagnosis Date Noted    No Resolved  Ambulatory Problems     Past Medical History:   Diagnosis Date    Arthritis     Chronic kidney disease     Heart disease     Hypertension          PAST SURGICAL HISTORY  Past Surgical History:   Procedure Laterality Date    BREAST SURGERY      DIALYSIS FISTULA CREATION      EYE SURGERY      HERNIA REPAIR  2017    Dr. Sung         FAMILY HISTORY  Family History   Problem Relation Age of Onset    Heart disease Father     Breast cancer Sister          SOCIAL HISTORY  Social History     Socioeconomic History    Marital status: Single   Tobacco Use    Smoking status: Never     Passive exposure: Never    Smokeless tobacco: Never   Vaping Use    Vaping Use: Never used   Substance and Sexual Activity    Alcohol use: No    Drug use: No    Sexual activity: Defer         ALLERGIES  Patient has no known allergies.        REVIEW OF SYSTEMS  All systems reviewed and negative except for those discussed in HPI.       PHYSICAL EXAM    I have reviewed the triage vital signs and nursing notes.    ED Triage Vitals   Temp Heart Rate Resp BP SpO2   02/29/24 0918 02/29/24 0918 02/29/24 0918 02/29/24 0936 02/29/24 0918   97.2 °F (36.2 °C) 61 16 153/86 98 %      Temp src Heart Rate Source Patient Position BP Location FiO2 (%)   02/29/24 0918 02/29/24 0918 -- -- --   Tympanic Monitor          Physical Exam  GENERAL: Alert, oriented, nontoxic-appearing, not distressed  HENT: head atraumatic, no nuchal rigidity  EYES: no scleral icterus, EOMI  CV: regular rhythm, regular rate, 3/6 systolic murmur  RESPIRATORY: normal effort, CTA  ABDOMEN: soft, nontender  MUSCULOSKELETAL: no deformity, FROM, no calf swelling or tenderness.  Dialysis fistula in right upper extremity with palpable thrill.  NEURO: alert, moves all extremities, follows commands  SKIN: warm, dry        LAB RESULTS  Recent Results (from the past 24 hour(s))   Comprehensive Metabolic Panel    Collection Time: 02/29/24 10:12 AM    Specimen: Blood   Result Value Ref Range    Glucose  77 65 - 99 mg/dL    BUN 27 (H) 8 - 23 mg/dL    Creatinine 6.67 (H) 0.57 - 1.00 mg/dL    Sodium 137 136 - 145 mmol/L    Potassium 2.9 (L) 3.5 - 5.2 mmol/L    Chloride 100 98 - 107 mmol/L    CO2 23.4 22.0 - 29.0 mmol/L    Calcium 8.7 8.6 - 10.5 mg/dL    Total Protein 6.1 6.0 - 8.5 g/dL    Albumin 3.8 3.5 - 5.2 g/dL    ALT (SGPT) <5 1 - 33 U/L    AST (SGOT) 13 1 - 32 U/L    Alkaline Phosphatase 147 (H) 39 - 117 U/L    Total Bilirubin 0.3 0.0 - 1.2 mg/dL    Globulin 2.3 gm/dL    A/G Ratio 1.7 g/dL    BUN/Creatinine Ratio 4.0 (L) 7.0 - 25.0    Anion Gap 13.6 5.0 - 15.0 mmol/L    eGFR 6.1 (L) >60.0 mL/min/1.73   CBC Auto Differential    Collection Time: 02/29/24 10:12 AM    Specimen: Blood   Result Value Ref Range    WBC 4.40 3.40 - 10.80 10*3/mm3    RBC 3.86 3.77 - 5.28 10*6/mm3    Hemoglobin 10.7 (L) 12.0 - 15.9 g/dL    Hematocrit 33.7 (L) 34.0 - 46.6 %    MCV 87.3 79.0 - 97.0 fL    MCH 27.7 26.6 - 33.0 pg    MCHC 31.8 31.5 - 35.7 g/dL    RDW 12.9 12.3 - 15.4 %    RDW-SD 40.4 37.0 - 54.0 fl    MPV 9.7 6.0 - 12.0 fL    Platelets 197 140 - 450 10*3/mm3    Neutrophil % 73.3 42.7 - 76.0 %    Lymphocyte % 13.0 (L) 19.6 - 45.3 %    Monocyte % 8.9 5.0 - 12.0 %    Eosinophil % 3.6 0.3 - 6.2 %    Basophil % 0.7 0.0 - 1.5 %    Immature Grans % 0.5 0.0 - 0.5 %    Neutrophils, Absolute 3.23 1.70 - 7.00 10*3/mm3    Lymphocytes, Absolute 0.57 (L) 0.70 - 3.10 10*3/mm3    Monocytes, Absolute 0.39 0.10 - 0.90 10*3/mm3    Eosinophils, Absolute 0.16 0.00 - 0.40 10*3/mm3    Basophils, Absolute 0.03 0.00 - 0.20 10*3/mm3    Immature Grans, Absolute 0.02 0.00 - 0.05 10*3/mm3    nRBC 0.0 0.0 - 0.2 /100 WBC       Ordered the above labs and independently reviewed the results.    MEDICATIONS GIVEN IN ER    Medications   sodium chloride 0.9 % flush 10 mL (has no administration in time range)   aspirin tablet 650 mg (650 mg Oral Given 2/29/24 1423)         ADDITIONAL ORDERS CONSIDERED BUT NOT ORDERED:  Considered admission/abdominal imaging however  patient has a normal workup here with stable vitals.  She has had no further vomiting.  She denies any abdominal pain or fever.  Low suspicion of C. difficile colitis.  She does not appear fluid overloaded.  I believe she can be dialyzed tomorrow.      PROGRESS, DATA ANALYSIS, CONSULTS, AND MEDICAL DECISION MAKING    All labs have been independently interpreted by myself.  All radiology studies have been independently interpreted by myself and discussed with radiologist dictating the report.   EKG's independently interpreted by myself.  Discussion below represents my analysis of pertinent findings related to patient's condition, differential diagnosis, treatment plan and final disposition.    I have discussed case with Dr. Carrillo, emergency room physician.  He has performed his own bedside examination and agrees with treatment plan.    ED Course as of 02/29/24 1543   Thu Feb 29, 2024   1112 Patient presents with 4-day history of diarrhea.  She denies any fever, vomiting, abdominal pain.  She is a dialysis patient.  Vitals stable.  Plan to check basic labs, stool panels. [EE]   1119 Hemoglobin(!): 10.7 [EE]   1119 WBC: 4.40 [EE]   1119 Potassium(!): 2.9 [EE]   1359 Recheck of patient.  Patient has stable vitals and a fairly benign workup here.  She does not appear to be fluid overloaded.  I believe she can wait until tomorrow to be dialyzed.  She has been unable to give us a stool sample here.  This is reassuring for low suspicion of C. difficile colitis.  She again denies any abdominal pain, fever.    Patient states she is chronically fatigued.  She is asking for her regularly scheduled aspirin which she did not take this morning.  I believe she is safe for discharge. [EE]      ED Course User Index  [EE] Kobe Thompson PA       AS OF 15:43 EST VITALS:    BP - 150/67  HR - 61  TEMP - 97.2 °F (36.2 °C) (Tympanic)  O2 SATS - 97%        DIAGNOSIS  Final diagnoses:   Diarrhea, unspecified type   CKD (chronic kidney  disease) requiring chronic dialysis         DISPOSITION  Discharged    Admission was considered but after careful review of the patient's presentation, physical examination, diagnostic results, and response to treatment the patient may be safely discharged with outpatient follow-up.         Dictated utilizing Dragon dictation     Kobe Thompson PA  02/29/24 1543

## 2024-02-29 NOTE — DISCHARGE INSTRUCTIONS
Take Immodium AD for your diarrhea    Go to Dialysis tomorrow    Return for any fever or worsening symptoms

## 2024-02-29 NOTE — Clinical Note
[facility] EMERGENCY DEPARTMENT  [address] [facility] [address]  Phone: [phone]    Naty Acharya accompanied Sonai Acharya to the emergency department on 2/29/2024. They may return to work on 03/01/2024.        Thank you for choosing [facility].    Emil Carrillo MD

## 2024-08-14 ENCOUNTER — INPATIENT HOSPITAL (OUTPATIENT)
Dept: URBAN - METROPOLITAN AREA HOSPITAL 107 | Facility: HOSPITAL | Age: 74
End: 2024-08-14
Payer: MEDICARE

## 2024-08-14 ENCOUNTER — INPATIENT HOSPITAL (OUTPATIENT)
Age: 74
End: 2024-08-14
Payer: MEDICARE

## 2024-08-14 DIAGNOSIS — R07.89 OTHER CHEST PAIN: ICD-10-CM

## 2024-08-14 DIAGNOSIS — R10.13 EPIGASTRIC PAIN: ICD-10-CM

## 2024-08-14 DIAGNOSIS — R13.10 DYSPHAGIA, UNSPECIFIED: ICD-10-CM

## 2024-08-14 PROCEDURE — 99222 1ST HOSP IP/OBS MODERATE 55: CPT | Performed by: PHYSICIAN ASSISTANT

## 2024-08-15 ENCOUNTER — INPATIENT HOSPITAL (OUTPATIENT)
Age: 74
End: 2024-08-15
Payer: MEDICARE

## 2024-08-15 ENCOUNTER — INPATIENT HOSPITAL (OUTPATIENT)
Dept: URBAN - METROPOLITAN AREA HOSPITAL 107 | Facility: HOSPITAL | Age: 74
End: 2024-08-15
Payer: MEDICARE

## 2024-08-15 DIAGNOSIS — R10.13 EPIGASTRIC PAIN: ICD-10-CM

## 2024-08-15 DIAGNOSIS — R13.10 DYSPHAGIA, UNSPECIFIED: ICD-10-CM

## 2024-08-15 DIAGNOSIS — R07.89 OTHER CHEST PAIN: ICD-10-CM

## 2024-08-15 PROCEDURE — 99233 SBSQ HOSP IP/OBS HIGH 50: CPT | Performed by: PHYSICIAN ASSISTANT

## 2024-08-16 ENCOUNTER — INPATIENT HOSPITAL (OUTPATIENT)
Age: 74
End: 2024-08-16
Payer: MEDICARE

## 2024-08-16 ENCOUNTER — INPATIENT HOSPITAL (OUTPATIENT)
Dept: URBAN - METROPOLITAN AREA HOSPITAL 107 | Facility: HOSPITAL | Age: 74
End: 2024-08-16
Payer: MEDICARE

## 2024-08-16 DIAGNOSIS — R13.10 DYSPHAGIA, UNSPECIFIED: ICD-10-CM

## 2024-08-16 DIAGNOSIS — R10.13 EPIGASTRIC PAIN: ICD-10-CM

## 2024-08-16 DIAGNOSIS — R07.89 OTHER CHEST PAIN: ICD-10-CM

## 2024-08-16 PROCEDURE — 99233 SBSQ HOSP IP/OBS HIGH 50: CPT | Performed by: PHYSICIAN ASSISTANT

## 2025-01-06 ENCOUNTER — HOSPITAL ENCOUNTER (INPATIENT)
Facility: HOSPITAL | Age: 75
LOS: 11 days | Discharge: HOME HEALTH | DRG: 177 | End: 2025-01-17
Attending: EMERGENCY MEDICINE | Admitting: INTERNAL MEDICINE
Payer: MEDICARE

## 2025-01-06 ENCOUNTER — APPOINTMENT (OUTPATIENT)
Dept: CT IMAGING | Facility: HOSPITAL | Age: 75
DRG: 177 | End: 2025-01-06
Payer: MEDICARE

## 2025-01-06 ENCOUNTER — APPOINTMENT (OUTPATIENT)
Dept: GENERAL RADIOLOGY | Facility: HOSPITAL | Age: 75
DRG: 177 | End: 2025-01-06
Payer: MEDICARE

## 2025-01-06 DIAGNOSIS — I50.32 CHRONIC DIASTOLIC CHF (CONGESTIVE HEART FAILURE): ICD-10-CM

## 2025-01-06 DIAGNOSIS — J15.9 BACTERIAL PNEUMONIA: ICD-10-CM

## 2025-01-06 DIAGNOSIS — E43 SEVERE PROTEIN-CALORIE MALNUTRITION: ICD-10-CM

## 2025-01-06 DIAGNOSIS — R11.2 NAUSEA AND VOMITING, UNSPECIFIED VOMITING TYPE: Primary | ICD-10-CM

## 2025-01-06 DIAGNOSIS — I10 PRIMARY HYPERTENSION: ICD-10-CM

## 2025-01-06 DIAGNOSIS — Z99.2 ESRD ON DIALYSIS: ICD-10-CM

## 2025-01-06 DIAGNOSIS — Z99.2 ESRD (END STAGE RENAL DISEASE) ON DIALYSIS: ICD-10-CM

## 2025-01-06 DIAGNOSIS — J96.11 CHRONIC RESPIRATORY FAILURE WITH HYPOXIA: ICD-10-CM

## 2025-01-06 DIAGNOSIS — N18.6 ESRD ON DIALYSIS: ICD-10-CM

## 2025-01-06 DIAGNOSIS — R79.89 ELEVATED PROCALCITONIN: ICD-10-CM

## 2025-01-06 DIAGNOSIS — R41.82 ALTERED MENTAL STATUS, UNSPECIFIED ALTERED MENTAL STATUS TYPE: ICD-10-CM

## 2025-01-06 DIAGNOSIS — G57.93 NEUROPATHY INVOLVING BOTH LOWER EXTREMITIES: ICD-10-CM

## 2025-01-06 DIAGNOSIS — J18.9 COMMUNITY ACQUIRED PNEUMONIA, UNSPECIFIED LATERALITY: ICD-10-CM

## 2025-01-06 DIAGNOSIS — N18.6 ESRD (END STAGE RENAL DISEASE) ON DIALYSIS: ICD-10-CM

## 2025-01-06 LAB
ALBUMIN SERPL-MCNC: 3.4 G/DL (ref 3.5–5.2)
ALBUMIN/GLOB SERPL: 0.9 G/DL
ALP SERPL-CCNC: 96 U/L (ref 39–117)
ALT SERPL W P-5'-P-CCNC: 7 U/L (ref 1–33)
AMMONIA BLD-SCNC: 13 UMOL/L (ref 11–51)
ANION GAP SERPL CALCULATED.3IONS-SCNC: 18.1 MMOL/L (ref 5–15)
AST SERPL-CCNC: 15 U/L (ref 1–32)
B PARAPERT DNA SPEC QL NAA+PROBE: NOT DETECTED
B PERT DNA SPEC QL NAA+PROBE: NOT DETECTED
BASOPHILS # BLD AUTO: 0.04 10*3/MM3 (ref 0–0.2)
BASOPHILS NFR BLD AUTO: 0.4 % (ref 0–1.5)
BILIRUB SERPL-MCNC: 0.3 MG/DL (ref 0–1.2)
BUN SERPL-MCNC: 29 MG/DL (ref 8–23)
BUN/CREAT SERPL: 5.2 (ref 7–25)
C PNEUM DNA NPH QL NAA+NON-PROBE: NOT DETECTED
CALCIUM SPEC-SCNC: 9.7 MG/DL (ref 8.6–10.5)
CHLORIDE SERPL-SCNC: 94 MMOL/L (ref 98–107)
CO2 SERPL-SCNC: 21.9 MMOL/L (ref 22–29)
CREAT SERPL-MCNC: 5.54 MG/DL (ref 0.57–1)
D-LACTATE SERPL-SCNC: 1.1 MMOL/L (ref 0.5–2)
DEPRECATED RDW RBC AUTO: 54.8 FL (ref 37–54)
EGFRCR SERPLBLD CKD-EPI 2021: 7.5 ML/MIN/1.73
EOSINOPHIL # BLD AUTO: 0.36 10*3/MM3 (ref 0–0.4)
EOSINOPHIL NFR BLD AUTO: 3.2 % (ref 0.3–6.2)
ERYTHROCYTE [DISTWIDTH] IN BLOOD BY AUTOMATED COUNT: 17.1 % (ref 12.3–15.4)
FLUAV SUBTYP SPEC NAA+PROBE: NOT DETECTED
FLUBV RNA ISLT QL NAA+PROBE: NOT DETECTED
GEN 5 1HR TROPONIN T REFLEX: 113 NG/L
GLOBULIN UR ELPH-MCNC: 3.7 GM/DL
GLUCOSE SERPL-MCNC: 104 MG/DL (ref 65–99)
HADV DNA SPEC NAA+PROBE: NOT DETECTED
HCOV 229E RNA SPEC QL NAA+PROBE: NOT DETECTED
HCOV HKU1 RNA SPEC QL NAA+PROBE: NOT DETECTED
HCOV NL63 RNA SPEC QL NAA+PROBE: NOT DETECTED
HCOV OC43 RNA SPEC QL NAA+PROBE: NOT DETECTED
HCT VFR BLD AUTO: 31.6 % (ref 34–46.6)
HGB BLD-MCNC: 9.3 G/DL (ref 12–15.9)
HMPV RNA NPH QL NAA+NON-PROBE: NOT DETECTED
HPIV1 RNA ISLT QL NAA+PROBE: NOT DETECTED
HPIV2 RNA SPEC QL NAA+PROBE: NOT DETECTED
HPIV3 RNA NPH QL NAA+PROBE: NOT DETECTED
HPIV4 P GENE NPH QL NAA+PROBE: NOT DETECTED
IMM GRANULOCYTES # BLD AUTO: 0.1 10*3/MM3 (ref 0–0.05)
IMM GRANULOCYTES NFR BLD AUTO: 0.9 % (ref 0–0.5)
LIPASE SERPL-CCNC: 10 U/L (ref 13–60)
LYMPHOCYTES # BLD AUTO: 1.12 10*3/MM3 (ref 0.7–3.1)
LYMPHOCYTES NFR BLD AUTO: 10 % (ref 19.6–45.3)
M PNEUMO IGG SER IA-ACNC: NOT DETECTED
MCH RBC QN AUTO: 26 PG (ref 26.6–33)
MCHC RBC AUTO-ENTMCNC: 29.4 G/DL (ref 31.5–35.7)
MCV RBC AUTO: 88.3 FL (ref 79–97)
MONOCYTES # BLD AUTO: 0.82 10*3/MM3 (ref 0.1–0.9)
MONOCYTES NFR BLD AUTO: 7.3 % (ref 5–12)
NEUTROPHILS NFR BLD AUTO: 78.2 % (ref 42.7–76)
NEUTROPHILS NFR BLD AUTO: 8.75 10*3/MM3 (ref 1.7–7)
NRBC BLD AUTO-RTO: 0 /100 WBC (ref 0–0.2)
PLATELET # BLD AUTO: 416 10*3/MM3 (ref 140–450)
PMV BLD AUTO: 9.5 FL (ref 6–12)
POTASSIUM SERPL-SCNC: 4 MMOL/L (ref 3.5–5.2)
PROCALCITONIN SERPL-MCNC: 0.81 NG/ML (ref 0–0.25)
PROT SERPL-MCNC: 7.1 G/DL (ref 6–8.5)
RBC # BLD AUTO: 3.58 10*6/MM3 (ref 3.77–5.28)
RHINOVIRUS RNA SPEC NAA+PROBE: NOT DETECTED
RSV RNA NPH QL NAA+NON-PROBE: NOT DETECTED
SARS-COV-2 RNA NPH QL NAA+NON-PROBE: NOT DETECTED
SODIUM SERPL-SCNC: 134 MMOL/L (ref 136–145)
TROPONIN T % DELTA: 2 %
TROPONIN T NUMERIC DELTA: 2 NG/L
TROPONIN T SERPL HS-MCNC: 111 NG/L
WBC NRBC COR # BLD AUTO: 11.19 10*3/MM3 (ref 3.4–10.8)

## 2025-01-06 PROCEDURE — 25010000002 PIPERACILLIN SOD-TAZOBACTAM PER 1 G: Performed by: PHYSICIAN ASSISTANT

## 2025-01-06 PROCEDURE — 80053 COMPREHEN METABOLIC PANEL: CPT | Performed by: EMERGENCY MEDICINE

## 2025-01-06 PROCEDURE — 84145 PROCALCITONIN (PCT): CPT | Performed by: EMERGENCY MEDICINE

## 2025-01-06 PROCEDURE — 70450 CT HEAD/BRAIN W/O DYE: CPT

## 2025-01-06 PROCEDURE — 99291 CRITICAL CARE FIRST HOUR: CPT

## 2025-01-06 PROCEDURE — 83605 ASSAY OF LACTIC ACID: CPT | Performed by: PHYSICIAN ASSISTANT

## 2025-01-06 PROCEDURE — 93010 ELECTROCARDIOGRAM REPORT: CPT | Performed by: INTERNAL MEDICINE

## 2025-01-06 PROCEDURE — 71045 X-RAY EXAM CHEST 1 VIEW: CPT

## 2025-01-06 PROCEDURE — 85025 COMPLETE CBC W/AUTO DIFF WBC: CPT | Performed by: EMERGENCY MEDICINE

## 2025-01-06 PROCEDURE — 36415 COLL VENOUS BLD VENIPUNCTURE: CPT | Performed by: PHYSICIAN ASSISTANT

## 2025-01-06 PROCEDURE — 87040 BLOOD CULTURE FOR BACTERIA: CPT | Performed by: PHYSICIAN ASSISTANT

## 2025-01-06 PROCEDURE — 82140 ASSAY OF AMMONIA: CPT | Performed by: EMERGENCY MEDICINE

## 2025-01-06 PROCEDURE — 0202U NFCT DS 22 TRGT SARS-COV-2: CPT | Performed by: EMERGENCY MEDICINE

## 2025-01-06 PROCEDURE — 84484 ASSAY OF TROPONIN QUANT: CPT | Performed by: EMERGENCY MEDICINE

## 2025-01-06 PROCEDURE — 93005 ELECTROCARDIOGRAM TRACING: CPT | Performed by: EMERGENCY MEDICINE

## 2025-01-06 PROCEDURE — 83690 ASSAY OF LIPASE: CPT | Performed by: EMERGENCY MEDICINE

## 2025-01-06 PROCEDURE — 74176 CT ABD & PELVIS W/O CONTRAST: CPT

## 2025-01-06 RX ORDER — ACETAMINOPHEN 650 MG/1
650 SUPPOSITORY RECTAL EVERY 4 HOURS PRN
Status: DISCONTINUED | OUTPATIENT
Start: 2025-01-06 | End: 2025-01-17 | Stop reason: HOSPADM

## 2025-01-06 RX ORDER — ACETAMINOPHEN 160 MG/5ML
650 SOLUTION ORAL EVERY 4 HOURS PRN
Status: DISCONTINUED | OUTPATIENT
Start: 2025-01-06 | End: 2025-01-17 | Stop reason: HOSPADM

## 2025-01-06 RX ORDER — BISACODYL 10 MG
10 SUPPOSITORY, RECTAL RECTAL DAILY PRN
Status: DISCONTINUED | OUTPATIENT
Start: 2025-01-06 | End: 2025-01-11

## 2025-01-06 RX ORDER — ONDANSETRON 4 MG/1
4 TABLET, ORALLY DISINTEGRATING ORAL EVERY 6 HOURS PRN
Status: DISCONTINUED | OUTPATIENT
Start: 2025-01-06 | End: 2025-01-17 | Stop reason: HOSPADM

## 2025-01-06 RX ORDER — AMOXICILLIN 250 MG
2 CAPSULE ORAL 2 TIMES DAILY PRN
Status: DISCONTINUED | OUTPATIENT
Start: 2025-01-06 | End: 2025-01-11

## 2025-01-06 RX ORDER — ONDANSETRON 2 MG/ML
4 INJECTION INTRAMUSCULAR; INTRAVENOUS EVERY 6 HOURS PRN
Status: DISCONTINUED | OUTPATIENT
Start: 2025-01-06 | End: 2025-01-17 | Stop reason: HOSPADM

## 2025-01-06 RX ORDER — POLYETHYLENE GLYCOL 3350 17 G/17G
17 POWDER, FOR SOLUTION ORAL DAILY PRN
Status: DISCONTINUED | OUTPATIENT
Start: 2025-01-06 | End: 2025-01-11

## 2025-01-06 RX ORDER — BISACODYL 5 MG/1
5 TABLET, DELAYED RELEASE ORAL DAILY PRN
Status: DISCONTINUED | OUTPATIENT
Start: 2025-01-06 | End: 2025-01-11

## 2025-01-06 RX ORDER — GABAPENTIN 100 MG/1
100 CAPSULE ORAL ONCE
Status: COMPLETED | OUTPATIENT
Start: 2025-01-06 | End: 2025-01-06

## 2025-01-06 RX ORDER — ACETAMINOPHEN 325 MG/1
650 TABLET ORAL EVERY 4 HOURS PRN
Status: DISCONTINUED | OUTPATIENT
Start: 2025-01-06 | End: 2025-01-17 | Stop reason: HOSPADM

## 2025-01-06 RX ADMIN — PIPERACILLIN AND TAZOBACTAM 4.5 G: 4; .5 INJECTION, POWDER, FOR SOLUTION INTRAVENOUS at 18:22

## 2025-01-06 RX ADMIN — GABAPENTIN 100 MG: 100 CAPSULE ORAL at 17:00

## 2025-01-06 NOTE — ED NOTES
Nursing report ED to floor  Sonia Acharya  74 y.o.  female    HPI :  HPI  Stated Reason for Visit: multiple complaints  History Obtained From: EMS    Chief Complaint  Chief Complaint   Patient presents with    Vomiting    Abdominal Pain    Cough       Admitting doctor:   Cherie Reich MD    Admitting diagnosis:   The primary encounter diagnosis was Nausea and vomiting, unspecified vomiting type. Diagnoses of ESRD on dialysis, Community acquired pneumonia, unspecified laterality, and Elevated procalcitonin were also pertinent to this visit.    Code status:   Current Code Status       Date Active Code Status Order ID Comments User Context       Prior            Allergies:   Patient has no known allergies.    Isolation:   No active isolations    Intake and Output  No intake or output data in the 24 hours ending 01/06/25 1755    Weight:   There were no vitals filed for this visit.    Most recent vitals:   Vitals:    01/06/25 1546 01/06/25 1601 01/06/25 1616 01/06/25 1729   BP: 176/73 177/80 178/78 171/74   Pulse: 70 76 70 69   Resp:       Temp:       SpO2: 100% 100% 100% 100%       Active LDAs/IV Access:   Lines, Drains & Airways       Active LDAs       Name Placement date Placement time Site Days    Peripheral IV 01/06/25 1326 Anterior;Left Forearm 01/06/25  1326  Forearm  less than 1                    Labs (abnormal labs have a star):   Labs Reviewed   COMPREHENSIVE METABOLIC PANEL - Abnormal; Notable for the following components:       Result Value    Glucose 104 (*)     BUN 29 (*)     Creatinine 5.54 (*)     Sodium 134 (*)     Chloride 94 (*)     CO2 21.9 (*)     Albumin 3.4 (*)     BUN/Creatinine Ratio 5.2 (*)     Anion Gap 18.1 (*)     eGFR 7.5 (*)     All other components within normal limits    Narrative:     GFR Categories in Chronic Kidney Disease (CKD)      GFR Category          GFR (mL/min/1.73)    Interpretation  G1                     90 or greater         Normal or high (1)  G2                    "   60-89                Mild decrease (1)  G3a                   45-59                Mild to moderate decrease  G3b                   30-44                Moderate to severe decrease  G4                    15-29                Severe decrease  G5                    14 or less           Kidney failure          (1)In the absence of evidence of kidney disease, neither GFR category G1 or G2 fulfill the criteria for CKD.    eGFR calculation 2021 CKD-EPI creatinine equation, which does not include race as a factor   LIPASE - Abnormal; Notable for the following components:    Lipase 10 (*)     All other components within normal limits   PROCALCITONIN - Abnormal; Notable for the following components:    Procalcitonin 0.81 (*)     All other components within normal limits    Narrative:     As a Marker for Sepsis (Non-Neonates):    1. <0.5 ng/mL represents a low risk of severe sepsis and/or septic shock.  2. >2 ng/mL represents a high risk of severe sepsis and/or septic shock.    As a Marker for Lower Respiratory Tract Infections that require antibiotic therapy:    PCT on Admission    Antibiotic Therapy       6-12 Hrs later    >0.5                Strongly Recommended  >0.25 - <0.5        Recommended   0.1 - 0.25          Discouraged              Remeasure/reassess PCT  <0.1                Strongly Discouraged     Remeasure/reassess PCT    As 28 day mortality risk marker: \"Change in Procalcitonin Result\" (>80% or <=80%) if Day 0 (or Day 1) and Day 4 values are available. Refer to http://www.[employer]-pct-calculator.com    Change in PCT <=80%  A decrease of PCT levels below or equal to 80% defines a positive change in PCT test result representing a higher risk for 28-day all-cause mortality of patients diagnosed with severe sepsis for septic shock.    Change in PCT >80%  A decrease of PCT levels of more than 80% defines a negative change in PCT result representing a lower risk for 28-day all-cause mortality of patients diagnosed " with severe sepsis or septic shock.      TROPONIN - Abnormal; Notable for the following components:    HS Troponin T 111 (*)     All other components within normal limits    Narrative:     High Sensitive Troponin T Reference Range:  <14.0 ng/L- Negative Female for AMI  <22.0 ng/L- Negative Male for AMI  >=14 - Abnormal Female indicating possible myocardial injury.  >=22 - Abnormal Male indicating possible myocardial injury.   Clinicians would have to utilize clinical acumen, EKG, Troponin, and serial changes to determine if it is an Acute Myocardial Infarction or myocardial injury due to an underlying chronic condition.        CBC WITH AUTO DIFFERENTIAL - Abnormal; Notable for the following components:    WBC 11.19 (*)     RBC 3.58 (*)     Hemoglobin 9.3 (*)     Hematocrit 31.6 (*)     MCH 26.0 (*)     MCHC 29.4 (*)     RDW 17.1 (*)     RDW-SD 54.8 (*)     Neutrophil % 78.2 (*)     Lymphocyte % 10.0 (*)     Immature Grans % 0.9 (*)     Neutrophils, Absolute 8.75 (*)     Immature Grans, Absolute 0.10 (*)     All other components within normal limits   HIGH SENSITIVITIY TROPONIN T 1HR - Abnormal; Notable for the following components:    HS Troponin T 113 (*)     All other components within normal limits    Narrative:     High Sensitive Troponin T Reference Range:  <14.0 ng/L- Negative Female for AMI  <22.0 ng/L- Negative Male for AMI  >=14 - Abnormal Female indicating possible myocardial injury.  >=22 - Abnormal Male indicating possible myocardial injury.   Clinicians would have to utilize clinical acumen, EKG, Troponin, and serial changes to determine if it is an Acute Myocardial Infarction or myocardial injury due to an underlying chronic condition.        RESPIRATORY PANEL PCR W/ COVID-19 (SARS-COV-2), NP SWAB IN UTM/VTP, 2 HR TAT - Normal    Narrative:     In the setting of a positive respiratory panel with a viral infection PLUS a negative procalcitonin without other underlying concern for bacterial infection,  consider observing off antibiotics or discontinuation of antibiotics and continue supportive care. If the respiratory panel is positive for atypical bacterial infection (Bordetella pertussis, Chlamydophila pneumoniae, or Mycoplasma pneumoniae), consider antibiotic de-escalation to target atypical bacterial infection.   AMMONIA - Normal   BLOOD CULTURE   BLOOD CULTURE   LACTIC ACID, PLASMA   CBC AND DIFFERENTIAL    Narrative:     The following orders were created for panel order CBC & Differential.  Procedure                               Abnormality         Status                     ---------                               -----------         ------                     CBC Auto Differential[[id number]]        Abnormal            Final result                 Please view results for these tests on the individual orders.       EKG:   ECG 12 Lead Altered Mental Status   Preliminary Result   HEART RATE=72  bpm   RR Ikwddzoc=292  ms   OR Ceeutwue=339  ms   P Horizontal Axis=-28  deg   P Front Axis=69  deg   QRSD Pzuuvkvc=608  ms   QT Vxlyuqwu=285  ms   YNdM=324  ms   QRS Axis=-72  deg   T Wave Axis=119  deg   - ABNORMAL ECG -   Sinus rhythm   Probable left atrial enlargement   IVCD, consider atypical RBBB   LVH with secondary repolarization abnormality   Inferior infarct, acute   Anterior infarct, acute (LAD)   Lateral leads are also involved   Baseline wander in lead(s) V1,V2,V4,V5,V6   Date and Time of Study:2025-01-06 13:15:42          Meds given in ED:   Medications   piperacillin-tazobactam (ZOSYN) 4.5 g IVPB in 100 mL NS MBP (CD) (has no administration in time range)   gabapentin (NEURONTIN) capsule 100 mg (100 mg Oral Given 1/6/25 1700)       Imaging results:  CT Abdomen Pelvis Without Contrast    Result Date: 1/6/2025  1. No evidence for acute intra-abdominal process on CT abdomen and pelvis without contrast. 2. Mild left lower lobe bronchial wall thickening with potential mild adjacent infiltrate. Cardiomegaly. 3.  Bilateral renal atrophy. Right upper pole renal cyst. 4. Diffuse atherosclerotic calcifications.  Radiation dose reduction techniques were utilized, including automated exposure control and exposure modulation based on body size.   This report was finalized on 1/6/2025 4:08 PM by Abdulaziz Boyce M.D on Workstation: [id number]      CT Head Without Contrast    Result Date: 1/6/2025   No acute intracranial hemorrhage or hydrocephalus. If there is further clinical concern, MRI could be considered for further evaluation.  This report was finalized on 1/6/2025 3:17 PM by Dr. Michael Lauren M.D on Workstation: [id number]      XR Chest 1 View    Result Date: 1/6/2025  1. Mild cardiomegaly. 2. Underinflation of the lungs with no acute infiltrates.   This report was finalized on 1/6/2025 1:57 PM by Dr. Homero Kumar M.D on Workstation: [id number]       Ambulatory status:   - assist    Social issues:   Social History     Socioeconomic History    Marital status: Single   Tobacco Use    Smoking status: Never     Passive exposure: Never    Smokeless tobacco: Never   Vaping Use    Vaping status: Never Used   Substance and Sexual Activity    Alcohol use: No    Drug use: No    Sexual activity: Defer       Peripheral Neurovascular  Peripheral Neurovascular (Adult)  Peripheral Neurovascular WDL: WDL    Neuro Cognitive  Neuro Cognitive (Adult)  Cognitive/Neuro/Behavioral WDL: WDL, orientation  Orientation: oriented x 4    Learning  Learning Assessment  Learning Readiness and Ability: no barriers identified    Respiratory  Respiratory WDL  Respiratory WDL: .WDL except, cough  Cough Frequency: infrequent  Cough Type: dry    Abdominal Pain       Pain Assessments  Pain (Adult)  Preferred Pain Scale: FACES (Moran-Baker FACES Pain Rating Scale)  FACES Pain Rating: Rest: 6-->hurts even more    NIH Stroke Scale       Andreia Sweet RN  01/06/25 17:55 EST

## 2025-01-06 NOTE — ED NOTES
Nursing report ED to floor  Sonia Acharya  74 y.o.  female    HPI :  HPI  Stated Reason for Visit: multiple complaints  History Obtained From: EMS    Chief Complaint  Chief Complaint   Patient presents with    Vomiting    Abdominal Pain    Cough       Admitting doctor:   Cherie Reich MD    Admitting diagnosis:   The primary encounter diagnosis was Nausea and vomiting, unspecified vomiting type. Diagnoses of ESRD on dialysis, Community acquired pneumonia, unspecified laterality, and Elevated procalcitonin were also pertinent to this visit.    Code status:   Current Code Status       Date Active Code Status Order ID Comments User Context       Prior            Allergies:   Patient has no known allergies.    Isolation:   No active isolations    Intake and Output  No intake or output data in the 24 hours ending 01/06/25 1744    Weight:   There were no vitals filed for this visit.    Most recent vitals:   Vitals:    01/06/25 1516 01/06/25 1546 01/06/25 1601 01/06/25 1616   BP: 178/81 176/73 177/80 178/78   Pulse: 72 70 76 70   Resp:       Temp:       SpO2: 100% 100% 100% 100%       Active LDAs/IV Access:   Lines, Drains & Airways       Active LDAs       Name Placement date Placement time Site Days    Peripheral IV 01/06/25 1326 Anterior;Left Forearm 01/06/25  1326  Forearm  less than 1                    Labs (abnormal labs have a star):   Labs Reviewed   COMPREHENSIVE METABOLIC PANEL - Abnormal; Notable for the following components:       Result Value    Glucose 104 (*)     BUN 29 (*)     Creatinine 5.54 (*)     Sodium 134 (*)     Chloride 94 (*)     CO2 21.9 (*)     Albumin 3.4 (*)     BUN/Creatinine Ratio 5.2 (*)     Anion Gap 18.1 (*)     eGFR 7.5 (*)     All other components within normal limits    Narrative:     GFR Categories in Chronic Kidney Disease (CKD)      GFR Category          GFR (mL/min/1.73)    Interpretation  G1                     90 or greater         Normal or high (1)  G2                    "   60-89                Mild decrease (1)  G3a                   45-59                Mild to moderate decrease  G3b                   30-44                Moderate to severe decrease  G4                    15-29                Severe decrease  G5                    14 or less           Kidney failure          (1)In the absence of evidence of kidney disease, neither GFR category G1 or G2 fulfill the criteria for CKD.    eGFR calculation 2021 CKD-EPI creatinine equation, which does not include race as a factor   LIPASE - Abnormal; Notable for the following components:    Lipase 10 (*)     All other components within normal limits   PROCALCITONIN - Abnormal; Notable for the following components:    Procalcitonin 0.81 (*)     All other components within normal limits    Narrative:     As a Marker for Sepsis (Non-Neonates):    1. <0.5 ng/mL represents a low risk of severe sepsis and/or septic shock.  2. >2 ng/mL represents a high risk of severe sepsis and/or septic shock.    As a Marker for Lower Respiratory Tract Infections that require antibiotic therapy:    PCT on Admission    Antibiotic Therapy       6-12 Hrs later    >0.5                Strongly Recommended  >0.25 - <0.5        Recommended   0.1 - 0.25          Discouraged              Remeasure/reassess PCT  <0.1                Strongly Discouraged     Remeasure/reassess PCT    As 28 day mortality risk marker: \"Change in Procalcitonin Result\" (>80% or <=80%) if Day 0 (or Day 1) and Day 4 values are available. Refer to http://www.[employer]-pct-calculator.com    Change in PCT <=80%  A decrease of PCT levels below or equal to 80% defines a positive change in PCT test result representing a higher risk for 28-day all-cause mortality of patients diagnosed with severe sepsis for septic shock.    Change in PCT >80%  A decrease of PCT levels of more than 80% defines a negative change in PCT result representing a lower risk for 28-day all-cause mortality of patients diagnosed " with severe sepsis or septic shock.      TROPONIN - Abnormal; Notable for the following components:    HS Troponin T 111 (*)     All other components within normal limits    Narrative:     High Sensitive Troponin T Reference Range:  <14.0 ng/L- Negative Female for AMI  <22.0 ng/L- Negative Male for AMI  >=14 - Abnormal Female indicating possible myocardial injury.  >=22 - Abnormal Male indicating possible myocardial injury.   Clinicians would have to utilize clinical acumen, EKG, Troponin, and serial changes to determine if it is an Acute Myocardial Infarction or myocardial injury due to an underlying chronic condition.        CBC WITH AUTO DIFFERENTIAL - Abnormal; Notable for the following components:    WBC 11.19 (*)     RBC 3.58 (*)     Hemoglobin 9.3 (*)     Hematocrit 31.6 (*)     MCH 26.0 (*)     MCHC 29.4 (*)     RDW 17.1 (*)     RDW-SD 54.8 (*)     Neutrophil % 78.2 (*)     Lymphocyte % 10.0 (*)     Immature Grans % 0.9 (*)     Neutrophils, Absolute 8.75 (*)     Immature Grans, Absolute 0.10 (*)     All other components within normal limits   HIGH SENSITIVITIY TROPONIN T 1HR - Abnormal; Notable for the following components:    HS Troponin T 113 (*)     All other components within normal limits    Narrative:     High Sensitive Troponin T Reference Range:  <14.0 ng/L- Negative Female for AMI  <22.0 ng/L- Negative Male for AMI  >=14 - Abnormal Female indicating possible myocardial injury.  >=22 - Abnormal Male indicating possible myocardial injury.   Clinicians would have to utilize clinical acumen, EKG, Troponin, and serial changes to determine if it is an Acute Myocardial Infarction or myocardial injury due to an underlying chronic condition.        RESPIRATORY PANEL PCR W/ COVID-19 (SARS-COV-2), NP SWAB IN UTM/VTP, 2 HR TAT - Normal    Narrative:     In the setting of a positive respiratory panel with a viral infection PLUS a negative procalcitonin without other underlying concern for bacterial infection,  consider observing off antibiotics or discontinuation of antibiotics and continue supportive care. If the respiratory panel is positive for atypical bacterial infection (Bordetella pertussis, Chlamydophila pneumoniae, or Mycoplasma pneumoniae), consider antibiotic de-escalation to target atypical bacterial infection.   AMMONIA - Normal   BLOOD CULTURE   BLOOD CULTURE   LACTIC ACID, PLASMA   CBC AND DIFFERENTIAL    Narrative:     The following orders were created for panel order CBC & Differential.  Procedure                               Abnormality         Status                     ---------                               -----------         ------                     CBC Auto Differential[[id number]]        Abnormal            Final result                 Please view results for these tests on the individual orders.       EKG:   ECG 12 Lead Altered Mental Status   Preliminary Result   HEART RATE=72  bpm   RR Cjpjvbqk=425  ms   OH Fbegsmgz=813  ms   P Horizontal Axis=-28  deg   P Front Axis=69  deg   QRSD Rgrqtiom=151  ms   QT Rsadvorm=447  ms   GPzE=466  ms   QRS Axis=-72  deg   T Wave Axis=119  deg   - ABNORMAL ECG -   Sinus rhythm   Probable left atrial enlargement   IVCD, consider atypical RBBB   LVH with secondary repolarization abnormality   Inferior infarct, acute   Anterior infarct, acute (LAD)   Lateral leads are also involved   Baseline wander in lead(s) V1,V2,V4,V5,V6   Date and Time of Study:2025-01-06 13:15:42          Meds given in ED:   Medications   piperacillin-tazobactam (ZOSYN) 4.5 g IVPB in 100 mL NS MBP (CD) (has no administration in time range)   gabapentin (NEURONTIN) capsule 100 mg (100 mg Oral Given 1/6/25 1700)       Imaging results:  CT Abdomen Pelvis Without Contrast    Result Date: 1/6/2025  1. No evidence for acute intra-abdominal process on CT abdomen and pelvis without contrast. 2. Mild left lower lobe bronchial wall thickening with potential mild adjacent infiltrate. Cardiomegaly. 3.  Bilateral renal atrophy. Right upper pole renal cyst. 4. Diffuse atherosclerotic calcifications.  Radiation dose reduction techniques were utilized, including automated exposure control and exposure modulation based on body size.   This report was finalized on 1/6/2025 4:08 PM by Abdulaziz Boyce M.D on Workstation: [id number]      CT Head Without Contrast    Result Date: 1/6/2025   No acute intracranial hemorrhage or hydrocephalus. If there is further clinical concern, MRI could be considered for further evaluation.  This report was finalized on 1/6/2025 3:17 PM by Dr. Michael Lauren M.D on Workstation: [id number]      XR Chest 1 View    Result Date: 1/6/2025  1. Mild cardiomegaly. 2. Underinflation of the lungs with no acute infiltrates.   This report was finalized on 1/6/2025 1:57 PM by Dr. Homero Kumar M.D on Workstation: [id number]       Ambulatory status:   - bedrest    Social issues:   Social History     Socioeconomic History    Marital status: Single   Tobacco Use    Smoking status: Never     Passive exposure: Never    Smokeless tobacco: Never   Vaping Use    Vaping status: Never Used   Substance and Sexual Activity    Alcohol use: No    Drug use: No    Sexual activity: Defer       Peripheral Neurovascular  Peripheral Neurovascular (Adult)  Peripheral Neurovascular WDL: WDL    Neuro Cognitive  Neuro Cognitive (Adult)  Cognitive/Neuro/Behavioral WDL: WDL, orientation  Orientation: oriented x 4    Learning  Learning Assessment  Learning Readiness and Ability: no barriers identified    Respiratory  Respiratory WDL  Respiratory WDL: .WDL except, cough  Cough Frequency: infrequent  Cough Type: dry    Abdominal Pain       Pain Assessments  Pain (Adult)  Preferred Pain Scale: FACES (Moran-Baker FACES Pain Rating Scale)  FACES Pain Rating: Rest: 6-->hurts even more    NIH Stroke Scale       Andreia Sweet RN  01/06/25 17:44 EST

## 2025-01-06 NOTE — ED PROVIDER NOTES
ED Course as of 01/06/25 1732   Mon Jan 06, 2025   1306 My differential diagnosis for altered mental status includes but is not limited to:  Hypoglycemia, hyperglycemia, DKA, overdose, ethanol intoxication, thiamine deficiency, niacin deficiency, hypothymia, hyperviscosity, Kartik's disease, hyponatremia, hypernatremia, liver failure, kidney failure, hyper or hypothyroid, no insufficiency, hypoxia, hypercarbia, carbon monoxide poisoning, postanoxic encephalopathy, ischemic stroke, intracranial bleed, subarachnoid hemorrhage, brain tumor, closed head injury, epidural hematoma, epidural hematoma, seizure activity, postictal state, syncopal episode, disseminated encephalomyelitis, central pontine myelinolysis, post cardiac arrest, bacterial meningitis, viral meningitis, fungal meningitis, encephalitis, brain abscess, subdural empyema, hysteria, catatonic state, malingering, hypertensive encephalopathy, vasculitis, TTP, DIC     [JG]   1306 My differential diagnosis for abdominal pain includes but is not limited to:  Gastritis, gastroenteritis, peptic ulcer disease, GERD, esophageal perforation, acute appendicitis, mesenteric adenitis, Meckel's diverticulum, epiploic appendagitis, diverticulitis, colon cancer, ulcerative colitis, Crohn's disease, intussusception, small bowel obstruction, adhesions, ischemic bowel, perforated viscus, ileus, obstipation, biliary colic, cholecystitis, cholelithiasis, Logan-Edmond Dago, hepatitis, pancreatitis, common bile duct obstruction, cholangitis, bile leak, splenic trauma, splenic rupture, splenic infarction, splenic abscess, abdominal abscess, ascites, spontaneous bacterial peritonitis, hernia, UTI, cystitis, prostatitis, ureterolithiasis, urinary obstruction, ovarian cyst, torsion, pregnancy, ectopic pregnancy, PID, pelvic abscess, mittelschmerz, endometriosis, AAA, myocardial infarction, pneumonia, cancer, porphyria, DKA, medications, sickle cell, viral syndrome, zoster   [JG]    1319 EKG independently viewed and contemporaneously interpreted by ED physician. Time: 1315.  Rate 72.  Interpretation: Normal sinus rhythm, left axis deviation, left atrial enlargement, Q waves in V1 and V2 as well as inferior leads, poor R wave progression, LVH, slight ST elevation in lead III and aVF, slight ST elevation in V3 and V4, no ST depression, T wave inversion in leads I and aVL as well as V6. [JG]   1320 When compared with prior EKG on 12/21/2023, no acute changes are present.  Slight ST elevation in inferior leads as well as anterior lateral leads was present on prior EKG. [JG]   1334 Reviewed chest x-ray in PACS, patient has right lower lobe pulmonary infiltrates per my read. [JG]   1447 High-sensitivity troponin elevated at 111, no prior for comparison.  Patient is end-stage renal disease on dialysis.  Obtaining repeat troponin to evaluate trend. [JG]   1500 Repeat troponin flat, reassuring. [JG]   1524 I reviewed CT head imaging in PACS, no intracranial hemorrhage per my read. [JG]   1713 Hemoglobin(!): 9.3  At baseline [DC]   1713 HS Troponin T(!!): 113  No active chest pain, likely related to renal function [DC]   1723 I discussed the case with MD Dorcas with Nephrology at this time regarding the patient.  I discussed work-up, results, concerns.  I discussed the consulting provider's desire for admitting the patient to the hospital for dialysis tomorrow.   [DC]   1723 Patient presentation and evaluation consistent with ESRD needing dialysis.  She has a mild increase in confusion as well as a mild infiltrate on CT imaging and an elevated procalcitonin.  Plan for antibiotic coverage and continued monitoring overnight with nephrology consult and dialysis tomorrow.  Patient agreeable. [DC]      ED Course User Index  [DC] Marcio Aponte PA  [JG] Riaz Davalos MD Creason, David J, PA  01/07/25 144

## 2025-01-06 NOTE — ED TRIAGE NOTES
Pt from home via ems, called for n/v/d/abd pain for 1.5 weeks, cough, daughter reports confusion and some hallucinations, pt was admitted to [facility] last week, due for dialysis today

## 2025-01-06 NOTE — ED PROVIDER NOTES
EMERGENCY DEPARTMENT ENCOUNTER  Room Number:  N636/1  Date of encounter:  1/8/2025  PCP: Marcy Paez PA  Patient Care Team:  Marcy Paez PA as PCP - General (Physician Assistant)     HPI:  Context: Sonia Acharya is a 74 y.o. female who presents to the ED c/o chief complaint of nausea vomiting.  History supplied by patient and by EMS.  EMS reports patient from home, family reported patient had had confusion as well as hallucinations, had recently been related to [facility].  Family reported patient had been having nausea vomiting as well as diarrhea and abdominal pain for the last week and a half as well as coughing.  Patient reports nausea vomiting that began today, multiple episodes of emesis, emesis nonbloody nonbilious.  Patient reports that she does throw up occasionally, is unable to say if she has been throwing up last several days.  Patient admits to occasional diarrhea, no blood or mucus, not occurring multiple times a day.  Patient has had cough, cough is nonproductive in nature.  Patient denies any fevers.  Patient reports chronic difficulty breathing.  Patient is on 2 L oxygen all the time.  End-stage renal disease, patient reports last dialysis on Sunday, was due for dialysis today.    MEDICAL HISTORY REVIEW  Reviewed in EPIC    PAST MEDICAL HISTORY  Active Ambulatory Problems     Diagnosis Date Noted    Generalized abdominal pain 12/13/2021    ODALIS (acute kidney injury) 12/13/2021    Anemia, chronic disease 12/13/2021    Metabolic acidosis, increased anion gap 12/13/2021    Obesity (BMI 30-39.9) 12/13/2021    HTN (hypertension) 12/13/2021    Chest pain, atypical 12/13/2021    Cervical radiculopathy 01/16/2023    ESRD on dialysis 01/16/2023    Bradycardia 01/16/2023    Right arm pain 01/16/2023    ANGIE (obstructive sleep apnea) 01/16/2023    Chronic diastolic CHF (congestive heart failure) 01/16/2023    Aortic stenosis 01/16/2023    Pancytopenia 01/18/2023    Hyperphosphatemia 01/18/2023     Hyperkalemia 12/20/2023    Pneumonia 12/20/2023    Leukocytopenia 12/22/2023    Anemia, chronic disease 12/22/2023    ESRD (end stage renal disease) on dialysis 01/06/2025     Resolved Ambulatory Problems     Diagnosis Date Noted    No Resolved Ambulatory Problems     Past Medical History:   Diagnosis Date    Arthritis     Chronic kidney disease     Heart disease     Hypertension        PAST SURGICAL HISTORY  Past Surgical History:   Procedure Laterality Date    BREAST SURGERY      DIALYSIS FISTULA CREATION      EYE SURGERY      HERNIA REPAIR  2017    Dr. Sung       FAMILY HISTORY  Family History   Problem Relation Age of Onset    Heart disease Father     Breast cancer Sister        SOCIAL HISTORY  Social History     Socioeconomic History    Marital status: Single   Tobacco Use    Smoking status: Never     Passive exposure: Never    Smokeless tobacco: Never   Vaping Use    Vaping status: Never Used   Substance and Sexual Activity    Alcohol use: No    Drug use: No    Sexual activity: Defer       ALLERGIES  Patient has no known allergies.    The patient's allergies have been reviewed    REVIEW OF SYSTEMS  All systems reviewed and negative except for those discussed in HPI.     PHYSICAL EXAM  I have reviewed the triage vital signs and nursing notes.  ED Triage Vitals [01/06/25 1259]   Temp Heart Rate Resp BP SpO2   97.8 °F (36.6 °C) (!) 40 18 140/86 98 %      Temp src Heart Rate Source Patient Position BP Location FiO2 (%)   -- -- -- -- --       General: No acute distress.  HENT: NCAT, PERRL, Nares patent.  Eyes: no scleral icterus.  Neck: trachea midline, no ROM limitations.  CV: regular rhythm, regular rate.  Respiratory: normal effort, CTAB.  Abdomen: soft, nondistended, NTTP, no rebound tenderness, no guarding or rigidity.  Musculoskeletal: no deformity.  Neuro: alert, moves all extremities, follows commands.  Skin: warm, dry.  Fistula right upper extremity, no signs of infection, palpable thrill    LAB  RESULTS  Recent Results (from the past 24 hours)   Respiratory Culture - Sputum, Cough    Collection Time: 01/07/25 10:23 PM    Specimen: Cough; Sputum   Result Value Ref Range    Respiratory Culture No growth     Gram Stain Moderate (3+) WBCs seen     Gram Stain No organisms seen     Gram Stain No epithelial cells seen    CBC Auto Differential    Collection Time: 01/08/25  6:02 AM    Specimen: Blood   Result Value Ref Range    WBC 8.27 3.40 - 10.80 10*3/mm3    RBC 3.51 (L) 3.77 - 5.28 10*6/mm3    Hemoglobin 9.5 (L) 12.0 - 15.9 g/dL    Hematocrit 29.7 (L) 34.0 - 46.6 %    MCV 84.6 79.0 - 97.0 fL    MCH 27.1 26.6 - 33.0 pg    MCHC 32.0 31.5 - 35.7 g/dL    RDW 16.5 (H) 12.3 - 15.4 %    RDW-SD 50.7 37.0 - 54.0 fl    MPV 8.9 6.0 - 12.0 fL    Platelets 348 140 - 450 10*3/mm3    Neutrophil % 69.0 42.7 - 76.0 %    Lymphocyte % 14.5 (L) 19.6 - 45.3 %    Monocyte % 7.9 5.0 - 12.0 %    Eosinophil % 3.9 0.3 - 6.2 %    Basophil % 0.7 0.0 - 1.5 %    Immature Grans % 4.0 (H) 0.0 - 0.5 %    Neutrophils, Absolute 5.71 1.70 - 7.00 10*3/mm3    Lymphocytes, Absolute 1.20 0.70 - 3.10 10*3/mm3    Monocytes, Absolute 0.65 0.10 - 0.90 10*3/mm3    Eosinophils, Absolute 0.32 0.00 - 0.40 10*3/mm3    Basophils, Absolute 0.06 0.00 - 0.20 10*3/mm3    Immature Grans, Absolute 0.33 (H) 0.00 - 0.05 10*3/mm3    nRBC 0.0 0.0 - 0.2 /100 WBC   Comprehensive Metabolic Panel    Collection Time: 01/08/25  6:02 AM    Specimen: Blood   Result Value Ref Range    Glucose 91 65 - 99 mg/dL    BUN 24 (H) 8 - 23 mg/dL    Creatinine 3.99 (H) 0.57 - 1.00 mg/dL    Sodium 138 136 - 145 mmol/L    Potassium 4.1 3.5 - 5.2 mmol/L    Chloride 103 98 - 107 mmol/L    CO2 23.0 22.0 - 29.0 mmol/L    Calcium 9.2 8.6 - 10.5 mg/dL    Total Protein 6.3 6.0 - 8.5 g/dL    Albumin 3.0 (L) 3.5 - 5.2 g/dL    ALT (SGPT) 8 1 - 33 U/L    AST (SGOT) 12 1 - 32 U/L    Alkaline Phosphatase 93 39 - 117 U/L    Total Bilirubin 0.2 0.0 - 1.2 mg/dL    Globulin 3.3 gm/dL    A/G Ratio 0.9 g/dL     BUN/Creatinine Ratio 6.0 (L) 7.0 - 25.0    Anion Gap 12.0 5.0 - 15.0 mmol/L    eGFR 11.2 (L) >60.0 mL/min/1.73   Phosphorus    Collection Time: 01/08/25  6:02 AM    Specimen: Blood   Result Value Ref Range    Phosphorus 3.7 2.5 - 4.5 mg/dL   Magnesium    Collection Time: 01/08/25  6:02 AM    Specimen: Blood   Result Value Ref Range    Magnesium 2.2 1.6 - 2.4 mg/dL   Hemoglobin A1c    Collection Time: 01/08/25  6:02 AM    Specimen: Blood   Result Value Ref Range    Hemoglobin A1C 4.90 4.80 - 5.60 %       I ordered the above labs and reviewed the results.    RADIOLOGY  No Radiology Exams Resulted Within Past 24 Hours    I ordered the above noted radiological studies. I reviewed the images and results. I agree with the radiologist interpretation.    PROCEDURES  Procedures    MEDICATIONS GIVEN IN ER  Medications   acetaminophen (TYLENOL) tablet 650 mg (650 mg Oral Given 1/8/25 1001)     Or   acetaminophen (TYLENOL) 160 MG/5ML oral solution 650 mg ( Oral Not Given:  See Alt 1/8/25 1001)     Or   acetaminophen (TYLENOL) suppository 650 mg ( Rectal Not Given:  See Alt 1/8/25 1001)   ondansetron ODT (ZOFRAN-ODT) disintegrating tablet 4 mg (has no administration in time range)     Or   ondansetron (ZOFRAN) injection 4 mg (has no administration in time range)   melatonin tablet 2.5 mg (has no administration in time range)   sennosides-docusate (PERICOLACE) 8.6-50 MG per tablet 2 tablet (has no administration in time range)     And   polyethylene glycol (MIRALAX) packet 17 g (has no administration in time range)     And   bisacodyl (DULCOLAX) EC tablet 5 mg (has no administration in time range)     And   bisacodyl (DULCOLAX) suppository 10 mg (has no administration in time range)   amLODIPine (NORVASC) tablet 5 mg (5 mg Oral Given 1/8/25 0910)   atorvastatin (LIPITOR) tablet 40 mg (40 mg Oral Given 1/8/25 0910)   benzocaine-menthol (CHLORASEPTIC) lozenge 1 each (has no administration in time range)   cloNIDine (CATAPRES)  tablet 0.1 mg (0.1 mg Oral Given 1/8/25 0908)   clopidogrel (PLAVIX) tablet 75 mg (75 mg Oral Given 1/8/25 0908)   gabapentin (NEURONTIN) capsule 100 mg (has no administration in time range)   hydrALAZINE (APRESOLINE) tablet 100 mg (100 mg Oral Given 1/8/25 0910)   isosorbide dinitrate (ISORDIL) tablet 30 mg (30 mg Oral Given 1/8/25 0908)   losartan (COZAAR) tablet 100 mg (100 mg Oral Given 1/8/25 0910)   piperacillin-tazobactam (ZOSYN) 3.375 g IVPB in 100 mL NS MBP (CD) (3.375 g Intravenous New Bag 1/8/25 0907)   guaiFENesin (MUCINEX) 12 hr tablet 1,200 mg (1,200 mg Oral Given 1/8/25 0907)   ipratropium-albuterol (DUO-NEB) nebulizer solution 3 mL (3 mL Nebulization Given 1/8/25 1143)   pantoprazole (PROTONIX) EC tablet 40 mg (40 mg Oral Given 1/8/25 0908)   aspirin EC tablet 81 mg (81 mg Oral Given 1/8/25 0908)   Polyvinyl Alcohol-Povidone PF (ARTIFICIAL TEARS) 1.4-0.6 % ophthalmic solution 2 drop (has no administration in time range)   gabapentin (NEURONTIN) capsule 100 mg (100 mg Oral Given 1/6/25 1700)   piperacillin-tazobactam (ZOSYN) 4.5 g IVPB in 100 mL NS MBP (CD) (0 g Intravenous Stopped 1/6/25 2020)       PROGRESS, DATA ANALYSIS, CONSULTS, AND MEDICAL DECISION MAKING  A complete history and physical exam have been performed.  All available laboratory and imaging results have been reviewed by myself prior to disposition.    MDM    After the initial H&P, I discussed pertinent information from history and physical exam with patient/family.  Discussed differential diagnosis.  Discussed plan for ED evaluation/workup/treatment.  All questions answered.  Patient/family is agreeable with plan.  ED Course as of 01/08/25 1455   Mon Jan 06, 2025   1306 My differential diagnosis for altered mental status includes but is not limited to:  Hypoglycemia, hyperglycemia, DKA, overdose, ethanol intoxication, thiamine deficiency, niacin deficiency, hypothymia, hyperviscosity, Kartik's disease, hyponatremia, hypernatremia,  liver failure, kidney failure, hyper or hypothyroid, no insufficiency, hypoxia, hypercarbia, carbon monoxide poisoning, postanoxic encephalopathy, ischemic stroke, intracranial bleed, subarachnoid hemorrhage, brain tumor, closed head injury, epidural hematoma, epidural hematoma, seizure activity, postictal state, syncopal episode, disseminated encephalomyelitis, central pontine myelinolysis, post cardiac arrest, bacterial meningitis, viral meningitis, fungal meningitis, encephalitis, brain abscess, subdural empyema, hysteria, catatonic state, malingering, hypertensive encephalopathy, vasculitis, TTP, DIC     [JG]   1306 My differential diagnosis for abdominal pain includes but is not limited to:  Gastritis, gastroenteritis, peptic ulcer disease, GERD, esophageal perforation, acute appendicitis, mesenteric adenitis, Meckel's diverticulum, epiploic appendagitis, diverticulitis, colon cancer, ulcerative colitis, Crohn's disease, intussusception, small bowel obstruction, adhesions, ischemic bowel, perforated viscus, ileus, obstipation, biliary colic, cholecystitis, cholelithiasis, Logan-Edmond Dago, hepatitis, pancreatitis, common bile duct obstruction, cholangitis, bile leak, splenic trauma, splenic rupture, splenic infarction, splenic abscess, abdominal abscess, ascites, spontaneous bacterial peritonitis, hernia, UTI, cystitis, prostatitis, ureterolithiasis, urinary obstruction, ovarian cyst, torsion, pregnancy, ectopic pregnancy, PID, pelvic abscess, mittelschmerz, endometriosis, AAA, myocardial infarction, pneumonia, cancer, porphyria, DKA, medications, sickle cell, viral syndrome, zoster   [JG]   1319 EKG independently viewed and contemporaneously interpreted by ED physician. Time: 1315.  Rate 72.  Interpretation: Normal sinus rhythm, left axis deviation, left atrial enlargement, Q waves in V1 and V2 as well as inferior leads, poor R wave progression, LVH, slight ST elevation in lead III and aVF, slight ST elevation  in V3 and V4, no ST depression, T wave inversion in leads I and aVL as well as V6. [JG]   1320 When compared with prior EKG on 12/21/2023, no acute changes are present.  Slight ST elevation in inferior leads as well as anterior lateral leads was present on prior EKG. [JG]   1334 Reviewed chest x-ray in PACS, patient has right lower lobe pulmonary infiltrates per my read. [JG]   1447 High-sensitivity troponin elevated at 111, no prior for comparison.  Patient is end-stage renal disease on dialysis.  Obtaining repeat troponin to evaluate trend. [JG]   1500 Repeat troponin flat, reassuring. [JG]   1524 I reviewed CT head imaging in PACS, no intracranial hemorrhage per my read. [JG]   1713 Hemoglobin(!): 9.3  At baseline [DC]   1713 HS Troponin T(!!): 113  No active chest pain, likely related to renal function [DC]   1723 I discussed the case with MD Dorcas with Nephrology at this time regarding the patient.  I discussed work-up, results, concerns.  I discussed the consulting provider's desire for admitting the patient to the hospital for dialysis tomorrow.   [DC]   1723 Patient presentation and evaluation consistent with ESRD needing dialysis.  She has a mild increase in confusion as well as a mild infiltrate on CT imaging and an elevated procalcitonin.  Plan for antibiotic coverage and continued monitoring overnight with nephrology consult and dialysis tomorrow.  Patient agreeable. [DC]      ED Course User Index  [DC] Marcoi Aponte PA  [JG] Riaz Davalos MD       AS OF 14:55 EST VITALS:    BP - 100/45  HR - 73  TEMP - 98.1 °F (36.7 °C) (Oral)  O2 SATS - 98%    DIAGNOSIS  Final diagnoses:   Nausea and vomiting, unspecified vomiting type   ESRD on dialysis   Community acquired pneumonia, unspecified laterality   Elevated procalcitonin   Altered mental status, unspecified altered mental status type     Critical care:  Total critical care time of 50 minutes is exclusive of any other billable procedures and  includes time spent with direct patient care and observation, retrospective chart review, management of acute condition, and consultation with other physicians.      DISPOSITION  ADMISSION    Discussed treatment plan and reason for admission with pt/family and admitting physician.  Pt/family voiced understanding of the plan for admission for further testing/treatment as needed.        Riaz Davalos MD  01/08/25 7030

## 2025-01-07 PROBLEM — J96.11 CHRONIC RESPIRATORY FAILURE WITH HYPOXIA: Status: ACTIVE | Noted: 2025-01-07

## 2025-01-07 PROBLEM — J15.9 BACTERIAL PNEUMONIA: Status: ACTIVE | Noted: 2025-01-07

## 2025-01-07 PROBLEM — I25.10 CAD (CORONARY ARTERY DISEASE): Status: ACTIVE | Noted: 2025-01-07

## 2025-01-07 PROBLEM — R19.7 NAUSEA, VOMITING, AND DIARRHEA: Status: ACTIVE | Noted: 2025-01-07

## 2025-01-07 PROBLEM — R11.2 NAUSEA, VOMITING, AND DIARRHEA: Status: ACTIVE | Noted: 2025-01-07

## 2025-01-07 LAB
ANION GAP SERPL CALCULATED.3IONS-SCNC: 20 MMOL/L (ref 5–15)
BUN SERPL-MCNC: 41 MG/DL (ref 8–23)
BUN/CREAT SERPL: 6.6 (ref 7–25)
CALCIUM SPEC-SCNC: 9.6 MG/DL (ref 8.6–10.5)
CHLORIDE SERPL-SCNC: 97 MMOL/L (ref 98–107)
CO2 SERPL-SCNC: 20 MMOL/L (ref 22–29)
CREAT SERPL-MCNC: 6.24 MG/DL (ref 0.57–1)
DEPRECATED RDW RBC AUTO: 52.1 FL (ref 37–54)
EGFRCR SERPLBLD CKD-EPI 2021: 6.5 ML/MIN/1.73
ERYTHROCYTE [DISTWIDTH] IN BLOOD BY AUTOMATED COUNT: 16.7 % (ref 12.3–15.4)
GLUCOSE SERPL-MCNC: 81 MG/DL (ref 65–99)
HCT VFR BLD AUTO: 32.6 % (ref 34–46.6)
HGB BLD-MCNC: 10.1 G/DL (ref 12–15.9)
MCH RBC QN AUTO: 26.8 PG (ref 26.6–33)
MCHC RBC AUTO-ENTMCNC: 31 G/DL (ref 31.5–35.7)
MCV RBC AUTO: 86.5 FL (ref 79–97)
PLATELET # BLD AUTO: 429 10*3/MM3 (ref 140–450)
PMV BLD AUTO: 9.2 FL (ref 6–12)
POTASSIUM SERPL-SCNC: 4.8 MMOL/L (ref 3.5–5.2)
QT INTERVAL: 443 MS
QTC INTERVAL: 485 MS
RBC # BLD AUTO: 3.77 10*6/MM3 (ref 3.77–5.28)
SODIUM SERPL-SCNC: 137 MMOL/L (ref 136–145)
WBC NRBC COR # BLD AUTO: 10.51 10*3/MM3 (ref 3.4–10.8)

## 2025-01-07 PROCEDURE — 25010000002 PIPERACILLIN SOD-TAZOBACTAM PER 1 G: Performed by: HOSPITALIST

## 2025-01-07 PROCEDURE — 87205 SMEAR GRAM STAIN: CPT | Performed by: HOSPITALIST

## 2025-01-07 PROCEDURE — 92610 EVALUATE SWALLOWING FUNCTION: CPT

## 2025-01-07 PROCEDURE — 85027 COMPLETE CBC AUTOMATED: CPT | Performed by: INTERNAL MEDICINE

## 2025-01-07 PROCEDURE — 80048 BASIC METABOLIC PNL TOTAL CA: CPT | Performed by: INTERNAL MEDICINE

## 2025-01-07 PROCEDURE — 87070 CULTURE OTHR SPECIMN AEROBIC: CPT | Performed by: HOSPITALIST

## 2025-01-07 PROCEDURE — 97162 PT EVAL MOD COMPLEX 30 MIN: CPT | Performed by: PHYSICAL THERAPIST

## 2025-01-07 PROCEDURE — 5A1D70Z PERFORMANCE OF URINARY FILTRATION, INTERMITTENT, LESS THAN 6 HOURS PER DAY: ICD-10-PCS | Performed by: INTERNAL MEDICINE

## 2025-01-07 PROCEDURE — 94761 N-INVAS EAR/PLS OXIMETRY MLT: CPT

## 2025-01-07 PROCEDURE — 94799 UNLISTED PULMONARY SVC/PX: CPT

## 2025-01-07 RX ORDER — IPRATROPIUM BROMIDE AND ALBUTEROL SULFATE 2.5; .5 MG/3ML; MG/3ML
3 SOLUTION RESPIRATORY (INHALATION)
Status: DISCONTINUED | OUTPATIENT
Start: 2025-01-07 | End: 2025-01-17 | Stop reason: HOSPADM

## 2025-01-07 RX ORDER — GABAPENTIN 100 MG/1
100 CAPSULE ORAL 3 TIMES DAILY PRN
Status: DISCONTINUED | OUTPATIENT
Start: 2025-01-07 | End: 2025-01-17 | Stop reason: HOSPADM

## 2025-01-07 RX ORDER — HEPARIN SODIUM 5000 [USP'U]/ML
5000 INJECTION, SOLUTION INTRAVENOUS; SUBCUTANEOUS EVERY 8 HOURS SCHEDULED
Status: DISCONTINUED | OUTPATIENT
Start: 2025-01-07 | End: 2025-01-07

## 2025-01-07 RX ORDER — HYDRALAZINE HYDROCHLORIDE 50 MG/1
100 TABLET, FILM COATED ORAL 3 TIMES DAILY
Status: DISCONTINUED | OUTPATIENT
Start: 2025-01-07 | End: 2025-01-09

## 2025-01-07 RX ORDER — CLOPIDOGREL BISULFATE 75 MG/1
75 TABLET ORAL DAILY
Status: DISCONTINUED | OUTPATIENT
Start: 2025-01-07 | End: 2025-01-17 | Stop reason: HOSPADM

## 2025-01-07 RX ORDER — ATORVASTATIN CALCIUM 20 MG/1
40 TABLET, FILM COATED ORAL DAILY
Status: DISCONTINUED | OUTPATIENT
Start: 2025-01-07 | End: 2025-01-17 | Stop reason: HOSPADM

## 2025-01-07 RX ORDER — LACTULOSE 10 G/15ML
10 SOLUTION ORAL DAILY
Status: DISCONTINUED | OUTPATIENT
Start: 2025-01-07 | End: 2025-01-07

## 2025-01-07 RX ORDER — ASPIRIN 81 MG/1
81 TABLET ORAL DAILY
COMMUNITY

## 2025-01-07 RX ORDER — AMLODIPINE BESYLATE 5 MG/1
5 TABLET ORAL DAILY
Status: DISCONTINUED | OUTPATIENT
Start: 2025-01-07 | End: 2025-01-17 | Stop reason: HOSPADM

## 2025-01-07 RX ORDER — ASPIRIN 81 MG/1
81 TABLET ORAL DAILY
Status: DISCONTINUED | OUTPATIENT
Start: 2025-01-07 | End: 2025-01-17 | Stop reason: HOSPADM

## 2025-01-07 RX ORDER — GUAIFENESIN 600 MG/1
1200 TABLET, EXTENDED RELEASE ORAL EVERY 12 HOURS SCHEDULED
Status: DISCONTINUED | OUTPATIENT
Start: 2025-01-07 | End: 2025-01-17 | Stop reason: HOSPADM

## 2025-01-07 RX ORDER — PANTOPRAZOLE SODIUM 40 MG/1
40 TABLET, DELAYED RELEASE ORAL
Status: DISCONTINUED | OUTPATIENT
Start: 2025-01-08 | End: 2025-01-17 | Stop reason: HOSPADM

## 2025-01-07 RX ORDER — LOSARTAN POTASSIUM 100 MG/1
100 TABLET ORAL DAILY
Status: DISCONTINUED | OUTPATIENT
Start: 2025-01-07 | End: 2025-01-09

## 2025-01-07 RX ORDER — CLONIDINE HYDROCHLORIDE 0.1 MG/1
0.1 TABLET ORAL 2 TIMES DAILY
Status: DISCONTINUED | OUTPATIENT
Start: 2025-01-07 | End: 2025-01-09

## 2025-01-07 RX ADMIN — ATORVASTATIN CALCIUM 40 MG: 20 TABLET, FILM COATED ORAL at 09:27

## 2025-01-07 RX ADMIN — ISOSORBIDE DINITRATE 30 MG: 20 TABLET ORAL at 09:28

## 2025-01-07 RX ADMIN — HYDRALAZINE HYDROCHLORIDE 100 MG: 50 TABLET ORAL at 17:31

## 2025-01-07 RX ADMIN — GUAIFENESIN 1200 MG: 600 TABLET, EXTENDED RELEASE ORAL at 17:31

## 2025-01-07 RX ADMIN — HYDRALAZINE HYDROCHLORIDE 100 MG: 50 TABLET ORAL at 22:52

## 2025-01-07 RX ADMIN — GUAIFENESIN 1200 MG: 600 TABLET, EXTENDED RELEASE ORAL at 22:51

## 2025-01-07 RX ADMIN — ACETAMINOPHEN 650 MG: 325 TABLET ORAL at 21:11

## 2025-01-07 RX ADMIN — ACETAMINOPHEN 650 MG: 325 TABLET ORAL at 11:40

## 2025-01-07 RX ADMIN — ACETAMINOPHEN 650 MG: 325 TABLET ORAL at 04:23

## 2025-01-07 RX ADMIN — HYDRALAZINE HYDROCHLORIDE 100 MG: 50 TABLET ORAL at 09:28

## 2025-01-07 RX ADMIN — PIPERACILLIN AND TAZOBACTAM 3.38 G: 3; .375 INJECTION, POWDER, FOR SOLUTION INTRAVENOUS at 11:40

## 2025-01-07 RX ADMIN — LOSARTAN POTASSIUM 100 MG: 100 TABLET, FILM COATED ORAL at 09:27

## 2025-01-07 RX ADMIN — ASPIRIN 81 MG: 81 TABLET, COATED ORAL at 17:31

## 2025-01-07 RX ADMIN — LACTULOSE 10 G: 10 SOLUTION ORAL at 09:27

## 2025-01-07 RX ADMIN — PIPERACILLIN AND TAZOBACTAM 3.38 G: 3; .375 INJECTION, POWDER, FOR SOLUTION INTRAVENOUS at 21:11

## 2025-01-07 RX ADMIN — AMLODIPINE BESYLATE 5 MG: 5 TABLET ORAL at 09:27

## 2025-01-07 RX ADMIN — IPRATROPIUM BROMIDE AND ALBUTEROL SULFATE 3 ML: 2.5; .5 SOLUTION RESPIRATORY (INHALATION) at 19:53

## 2025-01-07 RX ADMIN — CLONIDINE HYDROCHLORIDE 0.1 MG: 0.1 TABLET ORAL at 22:52

## 2025-01-07 RX ADMIN — CLOPIDOGREL BISULFATE 75 MG: 75 TABLET ORAL at 09:27

## 2025-01-07 NOTE — PLAN OF CARE
Goal Outcome Evaluation:   Patient was cold on admit and didn't want to take clothing off and wanted to have a bite to eat and sleep. No Nausea, vomiting, diarrhea, hallucinations this shift. She stated being hungry when she came to floor and ate a sandwich. Patient came from ED on room air and was reported to not need oxygen. Patient states she wears oxygen at home, so 2L NC was placed on patient. Alert to person, place and knew month and year. States she has a pacemaker when asked. Her eyes are draining yellow thin drainage. Has a mild cough (infrequent) diminished in left base only, all other fields are clear. Fistula to right upper arm positive thrill/bruit. Per report, Renal Dr. Was consulted in ED and Med list was reviewed. Only complaints are of left knee pain.

## 2025-01-07 NOTE — PLAN OF CARE
Goal Outcome Evaluation:              Outcome Evaluation: Tolerated HD today.  No further nausea/vomiting.  Medicated x1 with Tylenol for right knee pain.

## 2025-01-07 NOTE — THERAPY EVALUATION
Acute Care - Speech Language Pathology   Swallow Initial Evaluation [facility]     Patient Name: Sonia Acharya  : 1950  MRN: [id number]  Today's Date: 2025               Admit Date: 2025    Visit Dx:     ICD-10-CM ICD-9-CM   1. Nausea and vomiting, unspecified vomiting type  R11.2 787.01   2. ESRD on dialysis  N18.6 585.6    Z99.2 V45.11   3. Community acquired pneumonia, unspecified laterality  J18.9 486   4. Elevated procalcitonin  R79.89 790.99     Patient Active Problem List   Diagnosis    Generalized abdominal pain    ODALIS (acute kidney injury)    Anemia, chronic disease    Metabolic acidosis, increased anion gap    Obesity (BMI 30-39.9)    HTN (hypertension)    Chest pain, atypical    Cervical radiculopathy    ESRD on dialysis    Bradycardia    Right arm pain    ANGIE (obstructive sleep apnea)    Chronic diastolic CHF (congestive heart failure)    Aortic stenosis    Pancytopenia    Hyperphosphatemia    Hyperkalemia    Pneumonia    Leukocytopenia    Anemia, chronic disease    ESRD (end stage renal disease) on dialysis    Bacterial pneumonia, treating as gram negative     Past Medical History:   Diagnosis Date    Arthritis     Chronic kidney disease     Heart disease     Hypertension      Past Surgical History:   Procedure Laterality Date    BREAST SURGERY      DIALYSIS FISTULA CREATION      EYE SURGERY      HERNIA REPAIR      Dr. Sung       SLP Recommendation and Plan  SLP Swallowing Diagnosis: mild, oral dysphagia (25)  SLP Diet Recommendation: regular textures, thin liquids (25)  Recommended Precautions and Strategies: general aspiration precautions, upright posture during/after eating, small bites of food and sips of liquid (25)  SLP Rec. for Method of Medication Administration: meds whole, as tolerated (25)     Monitor for Signs of Aspiration: yes, notify SLP if any concerns (25)        Anticipated Discharge Disposition (SLP): No  "further SLP services warranted (01/07/25 1100)     Therapy Frequency (Swallow): evaluation only (01/07/25 1100)     Oral Care Recommendations: Oral Care BID/PRN (01/07/25 1100)  Demonstrates Need for Referral to Another Service: gastroenterology (01/07/25 1100)                  Outcome Evaluation: Clinical swallow evaluation completed this date. No overt s/s of aspiration such as cough, throat clear, or wet vocal quality appreciated with ice chips, thin liquids by tsp/cup/straw, regular solids, or med pass. Recommend regular solids and thin liquids. Meds whole as able. ST to sign off. Please re consult as indicated. If there is further clinical concern for aspiration, recommend MD place order for VFSS, or consider esophagram 2/2 pt's history of \"moderate esophageal dysmotility\".      SWALLOW EVALUATION (Last 72 Hours)       SLP Adult Swallow Evaluation       Row Name 01/07/25 1100       Rehab Evaluation    Document Type evaluation  -HF    Subjective Information no complaints  -HF    Patient Observations alert;cooperative;agree to therapy  -HF    Patient Effort good  -HF    Symptoms Noted During/After Treatment none  -HF       General Information    Patient Profile Reviewed yes  -HF    Pertinent History Of Current Problem Pt is a 73 yo female who [facility] to [facility] with c/o N/V, abdominal pain, and AMS. PMH includes ESRD on HD, esophageal dysmotility, and HTN. Esophagram August 2024 at [facility]: \"moderate esophageal dysmotility\". VFSS at  in December 2023 showed instances of aspiration with thin liquids which resulted in pt coughing. Per pt, no diet consistency restrictions since then.  -HF    Current Method of Nutrition regular textures;thin liquids  -HF    Precautions/Limitations, Vision WFL;for purposes of eval  -HF    Precautions/Limitations, Hearing WFL;for purposes of eval  -HF    Prior Level of Function-Communication WFL  -HF    Prior Level of Function-Swallowing no diet consistency restrictions  -HF    " "Plans/Goals Discussed with patient;agreed upon;other (see comments)  RN  -HF    Barriers to Rehab none identified  -HF       Oral Motor Structure and Function    Dentition Assessment missing teeth  -HF    Secretion Management WNL/WFL  -HF    Mucosal Quality moist, healthy  -HF       Oral Musculature and Cranial Nerve Assessment    Oral Motor General Assessment WFL  -HF       General Eating/Swallowing Observations    Respiratory Support Currently in Use nasal cannula  -HF    O2 Liters 2L  -HF    Eating/Swallowing Skills self-fed;appropriate self-feeding skills observed  -HF    Positioning During Eating upright 90 degree;upright in bed  -HF    Utensils Used spoon;cup;straw  -HF    Consistencies Trialed regular textures;pureed;ice chips;thin liquids  -HF       Respiratory    Respiratory Status WFL  -HF       Clinical Swallow Eval    Clinical Swallow Evaluation Summary Clinical swallow evaluation completed this date. Pt lethargic initially, however alertness significantly improves as session progresses. Pt's meal tray is at bedside, utilized food items from tray. Pt able to self feed PO trials of ice and thin liquid by tsp/cup/straw, puree, and regular solid. At end of session, pt then took cup of meds (~4-5 pills) whole all at once with drinks of water by straw. Prolonged and decreased mastication likely d/t missing dentition, however pt able to independently clear with liquid wash. Suspect timely oral transit, good bolus control, and timel swallow onset. Subjectivley functional laryngeal elevation. No overt s/s of aspiration such as cough, throat clear, or wet vocal quality appreciated. Recommend regular solids and thin liquids. Meds whole as able. ST to sign off. Please re consult as indicated. If there is further clinical concern for aspiration, recommend MD place order for VFSS, or consider esophagram 2/2 pt's history of \"moderate esophageal dysmotility\".  -       SLP Evaluation Clinical Impression    SLP " Swallowing Diagnosis mild;oral dysphagia  -HF       SLP Treatment Clinical Impressions    Care Plan Review evaluation/treatment results reviewed;care plan/treatment goals reviewed;patient/other agree to care plan  -HF    Care Plan Review, Other Participant(s) other (see comments)  RN  -HF       Recommendations    Therapy Frequency (Swallow) evaluation only  -HF    SLP Diet Recommendation regular textures;thin liquids  -HF    Recommended Precautions and Strategies general aspiration precautions;upright posture during/after eating;small bites of food and sips of liquid  -HF    Oral Care Recommendations Oral Care BID/PRN  -HF    SLP Rec. for Method of Medication Administration meds whole;as tolerated  -HF    Monitor for Signs of Aspiration yes;notify SLP if any concerns  -HF    Anticipated Discharge Disposition (SLP) No further SLP services warranted  -HF    Demonstrates Need for Referral to Another Service gastroenterology  -HF              User Key  (r) = Recorded By, (t) = Taken By, (c) = Cosigned By      Initials Name Effective Dates    Sunshine Moss SLP 08/01/23 -                     EDUCATION  The patient has been educated in the following areas:   Dysphagia (Swallowing Impairment).                Time Calculation:    Time Calculation- SLP       Row Name 01/07/25 1138             Time Calculation- SLP    SLP Start Time 0815  -HF      SLP Received On 01/07/25  -HF                User Key  (r) = Recorded By, (t) = Taken By, (c) = Cosigned By      Initials Name Provider Type    HF Sunshine Ford SLP Speech and Language Pathologist                    Therapy Charges for Today       Code Description Service Date Service Provider Modifiers Qty    [id number] HC ST EVAL ORAL PHARYNG SWALLOW 4 1/7/2025 Sunshine Ford SLP GN 1                 GEMA Funes  1/7/2025

## 2025-01-07 NOTE — PROGRESS NOTES
[facility]: Piperacillin-Tazobactam Consult    Pt Name: Sonia Acharya   : 1950    Indication: Pneumonia    Relevant clinical data and objective history reviewed:    Past Medical History:   Diagnosis Date    Arthritis     Chronic kidney disease     Heart disease     Hypertension      Creatinine   Date Value Ref Range Status   2025 6.24 (H) 0.57 - 1.00 mg/dL Final   2025 5.54 (H) 0.57 - 1.00 mg/dL Final   2024 6.67 (H) 0.57 - 1.00 mg/dL Final   2023 5.15 (H) 0.55 - 1.02 mg/dL Final   2023 3.49 (H) 0.55 - 1.02 mg/dL Final   2023 10.44 (H) 0.55 - 1.02 mg/dL Final     BUN   Date Value Ref Range Status   2025 41 (H) 8 - 23 mg/dL Final   2024 63 (H) 6 - 19 mg/dL Final     Estimated Creatinine Clearance: 3.6 mL/min (A) (by C-G formula based on SCr of 6.24 mg/dL (H)).    Lab Results   Component Value Date    WBC 10.51 2025     Temp Readings from Last 3 Encounters:   25 98.6 °F (37 °C) (Oral)   24 97.2 °F (36.2 °C) (Tympanic)   24 96.8 °F (36 °C)      Assessment/Plan  Estimated CrCl <20 mL/min at this time; BMI 13 kg/m2  Will start piperacillin-tazobactam 3.375 g IV every 12 hours via extended infusion protocol (received a loading dose of 4.5 gm yesterday in ER at ~).    Pharmacy will continue to follow daily while on piperacillin-tazobactam and adjust as needed. Thank you for this consult.    Noah Hill, [facility]  Clinical Pharmacist

## 2025-01-07 NOTE — PROGRESS NOTES
Name: Sonia Acharya ADMIT: 2025   : 1950  PCP: Marcy Paez PA    MRN: [id number] LOS: 1 days   AGE/SEX: 74 y.o. female  ROOM: [facility]     Subjective   Subjective   Feeling better today. Still c/o productive cough and mild SOA. No CP or palp. N/V/D and abd pain have resolved. She is eating very well. No longer confused or hallucinating. No F/C/NS.        Objective   Objective   Vital Signs  Temp:  [97.8 °F (36.6 °C)-98.6 °F (37 °C)] 98.6 °F (37 °C)  Heart Rate:  [40-76] 63  Resp:  [16-18] 16  BP: (140-187)/(67-96) 155/67  SpO2:  [91 %-100 %] 100 %  on  Flow (L/min) (Oxygen Therapy):  [2] 2;   Device (Oxygen Therapy): nasal cannula  Body mass index is 12.62 kg/m².  Physical Exam  Vitals and nursing note reviewed. Exam conducted with a chaperone present (RN).   Constitutional:       General: She is not in acute distress.     Appearance: She is ill-appearing (chronically). She is not toxic-appearing or diaphoretic.   HENT:      Head: Normocephalic.      Nose: Nose normal.      Mouth/Throat:      Mouth: Mucous membranes are moist.      Pharynx: Oropharynx is clear.   Eyes:      General: No scleral icterus.     Extraocular Movements: Extraocular movements intact.   Cardiovascular:      Rate and Rhythm: Normal rate and regular rhythm.      Pulses: Normal pulses.      Comments: AVF RUE  Pulmonary:      Effort: Pulmonary effort is normal. No respiratory distress.      Breath sounds: Rales (left base) present. No wheezing.      Comments: Coarse upper airway noise that clears with cough  Abdominal:      General: Bowel sounds are normal. There is no distension.      Palpations: Abdomen is soft.      Tenderness: There is no abdominal tenderness.   Musculoskeletal:         General: No swelling. Normal range of motion.      Cervical back: Neck supple.      Comments: Left knee with chronic bony changes, no acute effusion or erythema   Skin:     General: Skin is warm and dry.      Capillary Refill: Capillary  "refill takes less than 2 seconds.      Coloration: Skin is not jaundiced.   Neurological:      General: No focal deficit present.      Mental Status: She is alert and oriented to person, place, and time. Mental status is at baseline.      Cranial Nerves: No cranial nerve deficit.      Coordination: Coordination normal.   Psychiatric:         Mood and Affect: Mood normal.         Behavior: Behavior normal.         Thought Content: Thought content normal.       Results Review     I reviewed the patient's new clinical results.  Results from last 7 days   Lab Units 01/07/25  0703 01/06/25  1321   WBC 10*3/mm3 10.51 11.19*   HEMOGLOBIN g/dL 10.1* 9.3*   PLATELETS 10*3/mm3 429 416     Results from last 7 days   Lab Units 01/07/25  0703 01/06/25  1321   SODIUM mmol/L 137 134*   POTASSIUM mmol/L 4.8 4.0   CHLORIDE mmol/L 97* 94*   CO2 mmol/L 20.0* 21.9*   BUN mg/dL 41* 29*   CREATININE mg/dL 6.24* 5.54*   GLUCOSE mg/dL 81 104*   EGFR mL/min/1.73 6.5* 7.5*     Results from last 7 days   Lab Units 01/06/25  1321   ALBUMIN g/dL 3.4*   BILIRUBIN mg/dL 0.3   ALK PHOS U/L 96   AST (SGOT) U/L 15   ALT (SGPT) U/L 7     Results from last 7 days   Lab Units 01/07/25  0703 01/06/25  1321   CALCIUM mg/dL 9.6 9.7   ALBUMIN g/dL  --  3.4*     Results from last 7 days   Lab Units 01/06/25  1801 01/06/25  1321   PROCALCITONIN ng/mL  --  0.81*   LACTATE mmol/L 1.1  --      No results found for: \"HGBA1C\", \"POCGLU\"    CT Abdomen Pelvis Without Contrast    Result Date: 1/6/2025  1. No evidence for acute intra-abdominal process on CT abdomen and pelvis without contrast. 2. Mild left lower lobe bronchial wall thickening with potential mild adjacent infiltrate. Cardiomegaly. 3. Bilateral renal atrophy. Right upper pole renal cyst. 4. Diffuse atherosclerotic calcifications.  Radiation dose reduction techniques were utilized, including automated exposure control and exposure modulation based on body size.   This report was finalized on 1/6/2025 " 4:08 PM by Abdulaziz Boyce M.D on Workstation: [id number]      CT Head Without Contrast    Result Date: 1/6/2025   No acute intracranial hemorrhage or hydrocephalus. If there is further clinical concern, MRI could be considered for further evaluation.  This report was finalized on 1/6/2025 3:17 PM by Dr. Michael Lauren M.D on Workstation: [id number]      XR Chest 1 View    Result Date: 1/6/2025  1. Mild cardiomegaly. 2. Underinflation of the lungs with no acute infiltrates.   This report was finalized on 1/6/2025 1:57 PM by Dr. Homero Kumar M.D on Workstation: [id number]       I have personally reviewed all medications:  Scheduled Medications  amLODIPine, 5 mg, Oral, Daily  aspirin, 81 mg, Oral, Daily  atorvastatin, 40 mg, Oral, Daily  cloNIDine, 0.1 mg, Oral, BID  clopidogrel, 75 mg, Oral, Daily  guaiFENesin, 1,200 mg, Oral, Q12H  hydrALAZINE, 100 mg, Oral, TID  ipratropium-albuterol, 3 mL, Nebulization, 4x Daily - RT  isosorbide dinitrate, 30 mg, Oral, Daily  losartan, 100 mg, Oral, Daily  [START ON 1/8/2025] pantoprazole, 40 mg, Oral, Q AM  piperacillin-tazobactam, 3.375 g, Intravenous, Q12H    Infusions  Pharmacy to Dose Zosyn,     Diet  Diet: Cardiac; Healthy Heart (2-3 Na+); Fluid Consistency: Thin (IDDSI 0)    I have personally reviewed:  [x]  Laboratory   [x]  Microbiology   [x]  Radiology   [x]  EKG/Telemetry  []  Cardiology/Vascular   []  Pathology    [x]  Records       Assessment/Plan     Active Hospital Problems    Diagnosis  POA    **Bacterial pneumonia, treating as gram negative [J15.9]  Yes    Chronic respiratory failure with hypoxia [J96.11]  Yes    CAD (coronary artery disease) [I25.10]  Yes    Nausea, vomiting, and diarrhea [R11.2, R19.7]  Yes    ESRD on dialysis [N18.6, Z99.2]  Not Applicable    ANGIE (obstructive sleep apnea) [G47.33]  Yes    Chronic diastolic CHF (congestive heart failure) [I50.32]  Yes    Anemia, chronic disease [D63.8]  Yes    HTN (hypertension) [I10]  Yes     Generalized abdominal pain [R10.84]  Yes      Resolved Hospital Problems   No resolved problems to display.     75yo woman with ESRD, ACD, HTN, chronic diastolic CHF, CHRF (2L/min), ANGIE, and GERD , who presented to ER from home with N/V/D/abd pain, cough, confusion, and hallucinations. She was admitted with LLL pneumonia--concern for aspiration.    LLL PNA  Concern for aspiration  CHRF (2L/min at home)  SLP eval good  Continue IV Zosyn  Blood cultures NGTD, check urinary Ag's and sputum culture  RVP neg  WBC normalized  Afebrile  Stable on 2L/min  Start OPEP and IS  Start Mucinex  Start DuoNebs    N/V/D  Abd pain  Uncertain etiology  CT A/P unrevealing  GI PCR ordered  Symptoms improved  Hold Lactulose    Encephalopathy NOS  Hallucinations  Suspect due to acute illness  CT Head showed NAD  Much improved    ESRD on dialysis  Appreciate Renal attention to pt  Continue HD here MWF    Chronic diastolic CHF  Volume mgmt per Renal  Not on diuretics PTA    HTN  BPs acceptable on current regimen  Continue to monitor on telemetry    GERD  PPI    Anemia of CKD  Hgb stable    CAD  ARIANA placed in August  Continue DAPT and statin    H/o DVT  DVT ppx with DAPT and SCDs    Chronic left knee pain  This is a long term issue for her  No acute changes    DM2?  Mentioned in chart but on no meds  Check A1c  Sugars fine here      DAPT and SCDs should suffice for DVT prophylaxis.  Full code.  Discussed with patient and RN, no family present.  Anticipate discharge  TBD    Expected Discharge Date: 1/9/2025; Expected Discharge Time:       Shaan Baltazar MD  [facility]  01/07/25  12:04 EST

## 2025-01-07 NOTE — PLAN OF CARE
"Goal Outcome Evaluation:  Plan of Care Reviewed With: patient           Outcome Evaluation: Clinical swallow evaluation completed this date. No overt s/s of aspiration such as cough, throat clear, or wet vocal quality appreciated with ice chips, thin liquids by tsp/cup/straw, regular solids, or med pass. Recommend the following:  -Regular solids and thin liquids  -Meds whole as able.  -If there is further clinical concern for aspiration, recommend MD place order for VFSS, or consider esophagram 2/2 pt's history of \"moderate esophageal dysmotility\".    ST to sign off.     Anticipated Discharge Disposition (SLP): No further SLP services warranted          SLP Swallowing Diagnosis: mild, oral dysphagia (01/07/25 1100)             "

## 2025-01-07 NOTE — THERAPY EVALUATION
Patient Name: Sonia Acharya  : 1950    MRN: [id number]                              Today's Date: 2025       Admit Date: 2025    Visit Dx:     ICD-10-CM ICD-9-CM   1. Nausea and vomiting, unspecified vomiting type  R11.2 787.01   2. ESRD on dialysis  N18.6 585.6    Z99.2 V45.11   3. Community acquired pneumonia, unspecified laterality  J18.9 486   4. Elevated procalcitonin  R79.89 790.99     Patient Active Problem List   Diagnosis    Generalized abdominal pain    ODALIS (acute kidney injury)    Anemia, chronic disease    Metabolic acidosis, increased anion gap    Obesity (BMI 30-39.9)    HTN (hypertension)    Chest pain, atypical    Cervical radiculopathy    ESRD on dialysis    Bradycardia    Right arm pain    ANGIE (obstructive sleep apnea)    Chronic diastolic CHF (congestive heart failure)    Aortic stenosis    Pancytopenia    Hyperphosphatemia    Hyperkalemia    Pneumonia    Leukocytopenia    Anemia, chronic disease    ESRD (end stage renal disease) on dialysis    Bacterial pneumonia, treating as gram negative    Chronic respiratory failure with hypoxia    CAD (coronary artery disease)    Nausea, vomiting, and diarrhea     Past Medical History:   Diagnosis Date    Arthritis     Chronic kidney disease     Heart disease     Hypertension      Past Surgical History:   Procedure Laterality Date    BREAST SURGERY      DIALYSIS FISTULA CREATION      EYE SURGERY      HERNIA REPAIR      Dr. Sung      General Information       Row Name 25 1246          Physical Therapy Time and Intention    Document Type evaluation  -     Mode of Treatment individual therapy;physical therapy  -       Row Name 25 1246          General Information    Patient Profile Reviewed yes  -EDVIN     Prior Level of Function independent:  uses Rwx or cane  -     Existing Precautions/Restrictions fall  -     Barriers to Rehab none identified  -       Row Name 25 1246          Living Environment    People in  Home child(giuseppe), adult  -       Row Name 01/07/25 1246          Home Main Entrance    Number of Stairs, Main Entrance one  -     Stair Railings, Main Entrance railings safe and in good condition  -       Row Name 01/07/25 1246          Cognition    Orientation Status (Cognition) oriented x 3  -       Row Name 01/07/25 1246          Safety Issues/Impairments Affecting Functional Mobility    Impairments Affecting Function (Mobility) endurance/activity tolerance;pain;strength;range of motion (ROM);balance  -               User Key  (r) = Recorded By, (t) = Taken By, (c) = Cosigned By      Initials Name Provider Type    Monique Causey PT Physical Therapist                   Mobility       Row Name 01/07/25 1248          Bed Mobility    Bed Mobility supine-sit;sit-supine  -     Supine-Sit [place] (Bed Mobility) standby assist  -     Sit-Supine [place] (Bed Mobility) standby assist  -     Assistive Device (Bed Mobility) head of bed elevated;bed rails  -       Row Name 01/07/25 1248          Sit-Stand Transfer    Sit-Stand [place] (Transfers) standby assist  -     Assistive Device (Sit-Stand Transfers) walker, front-wheeled  -       Row Name 01/07/25 1248          Gait/Stairs (Locomotion)    [place] Level (Gait) contact guard  -     Assistive Device (Gait) walker, front-wheeled  -     Distance in Feet (Gait) --  sidesteps to HOB  -     Comment, (Gait/Stairs) limited by L knee pain  -               User Key  (r) = Recorded By, (t) = Taken By, (c) = Cosigned By      Initials Name Provider Type    Monique Causey PT Physical Therapist                   Obj/Interventions       Row Name 01/07/25 1324          Range of Motion Comprehensive    Comment, General Range of Motion WFL except left knee ROM limited 15 to 90 degrees  -       Row Name 01/07/25 1324          Strength Comprehensive (MMT)    Comment, General Manual Muscle Testing (MMT) Assessment  Generalized weakness  -       Row Name 01/07/25 1324          Balance    Balance Assessment sitting static balance;sitting dynamic balance;standing static balance;standing dynamic balance  -KH     Static Sitting Balance independent  -KH     Dynamic Sitting Balance standby assist  -KH     Position, Sitting Balance sitting edge of bed  -KH     Static Standing Balance contact guard  -KH     Dynamic Standing Balance contact guard  -KH     Position/Device Used, Standing Balance walker, rolling  -KH               User Key  (r) = Recorded By, (t) = Taken By, (c) = Cosigned By      Initials Name Provider Type    Monique Causey, PT Physical Therapist                   Goals/Plan       [facility] Name 01/07/25 1321          Transfer Goal 1 (PT)    Activity/Assistive Device (Transfer Goal 1, PT) transfers, all;walker, rolling  -KH     [place] Level/Cues Needed (Transfer Goal 1, PT) standby assist  -KH     Time Frame (Transfer Goal 1, PT) 1 week  -[facility] Name 01/07/25 1321          Gait Training Goal 1 (PT)    Activity/Assistive Device (Gait Training Goal 1, PT) gait (walking locomotion);walker, rolling  -KH     [place] Level (Gait Training Goal 1, PT) standby assist  -KH     Distance (Gait Training Goal 1, PT) 50ft  -KH     Time Frame (Gait Training Goal 1, PT) 1 week  -[facility] Name 01/07/25 1321          Stairs Goal 1 (PT)    Activity/Assistive Device (Stairs Goal 1, PT) stairs, all skills  -KH     [place] Level/Cues Needed (Stairs Goal 1, PT) contact guard required  -KH     Number of Stairs (Stairs Goal 1, PT) 1  -KH     Time Frame (Stairs Goal 1, PT) 1 week  -[facility] Name 01/07/25 1321          Patient Education Goal (PT)    Activity (Patient Education Goal, PT) HEP  -KH     [place]/Cues/Accuracy (Memory Goal 2, PT) demonstrates adequately  -KH     Time Frame (Patient Education Goal, PT) 1 week  -[facility] Name 01/07/25 1321          Therapy Assessment/Plan (PT)    Planned  Therapy Interventions (PT) balance training;gait training;home exercise program;strengthening;transfer training;ROM (range of motion);patient/family education  -               User Key  (r) = Recorded By, (t) = Taken By, (c) = Cosigned By      Initials Name Provider Type    Monique Causey, PT Physical Therapist                   Clinical Impression       Row Name 01/07/25 1317          Pain    Pretreatment Pain Rating 0/10 - no pain  -     Posttreatment Pain Rating 6/10  -     Pain Location knee  -     Pain Side/Orientation left  -       Row Name 01/07/25 1317          Plan of Care Review    Plan of Care Reviewed With patient  -     Outcome Evaluation Patient was admitted from home with complaints of nausea vomiting diarrhea and cough.  She was diagnosed with bacterial pneumonia.  Patient also has complaints of left knee pain which she reports is chronic.  Patient was in bed and alert and oriented x 3 when PT arrived.  She reports she is independently mobile with cane or walker at home, but mobility is limited by left knee pain at baseline.  Patient lives with her daughter and has 1 step to enter.  Patient demonstrated independent bed mobility with use of bedrail.  She stood with standby assist and rolling walker and was able to take a few sidesteps towards head of bed.  Further ambulation was limited by left knee pain.  Patient demonstrates limited weightbearing tolerance on left lower extremity and prefers to weight-bear only through her toes which she reports limits her knee pain.  Patient presents with generalized weakness, decreased endurance, left knee pain and limited range of motion and decreased functional mobility.  Patient would benefit from PT to address these impairments while admitted patient reports she plans to DC home with her daughter and is open to home health PT if needed.  -       Row Name 01/07/25 1317          Therapy Assessment/Plan (PT)    Patient/Family Therapy  Goals Statement (PT) Return home to prior level of function  -     Rehab Potential (PT) good  -KH     Criteria for Skilled Interventions Met (PT) yes  -     Therapy Frequency (PT) 5 times/wk  -       Row Name 01/07/25 1317          Positioning and Restraints    Pre-Treatment Position in bed  -KH     Post Treatment Position bed  -KH     In Bed fowlers;call light within reach;encouraged to call for assist;exit alarm on;side rails up x3  -               User Key  (r) = Recorded By, (t) = Taken By, (c) = Cosigned By      Initials Name Provider Type    Monique Causey, PT Physical Therapist                   Outcome Measures       Row Name 01/07/25 1322 01/07/25 0936       How much help from another person do you currently need...    Turning from your back to your side while in flat bed without using bedrails? 4  -KH 4  -SM    Moving from lying on back to sitting on the side of a flat bed without bedrails? 4  -KH 4  -SM    Moving to and from a bed to a chair (including a wheelchair)? 3  -KH 3  -SM    Standing up from a chair using your arms (e.g., wheelchair, bedside chair)? 3  -KH 3  -SM    Climbing 3-5 steps with a railing? 2  -KH 2  -SM    To walk in hospital room? 3  -KH 3  -SM    AM-PAC 6 Clicks Score (PT) 19  -KH 19  -SM    Highest Level of Mobility Goal 6 --> Walk 10 steps or more  - 6 --> Walk 10 steps or more  -      Row Name 01/07/25 1322          Functional Assessment    Outcome Measure Options AM-PAC 6 Clicks Basic Mobility (PT)  -               User Key  (r) = Recorded By, (t) = Taken By, (c) = Cosigned By      Initials Name Provider Type    Monique Causey, PT Physical Therapist    Nasra Wu RN Registered Nurse                                 Physical Therapy Education       Title: PT OT SLP Therapies (Not Started)       Topic: Physical Therapy (Not Started)       Point: Mobility training (Not Started)       Learner Progress:  Not documented in this visit.               Point: Home exercise program (Not Started)       Learner Progress:  Not documented in this visit.              Point: Body mechanics (Not Started)       Learner Progress:  Not documented in this visit.              Point: Precautions (Not Started)       Learner Progress:  Not documented in this visit.                                  PT Recommendation and Plan  Planned Therapy Interventions (PT): balance training, gait training, home exercise program, strengthening, transfer training, ROM (range of motion), patient/family education  Outcome Evaluation: Patient was admitted from home with complaints of nausea vomiting diarrhea and cough.  She was diagnosed with bacterial pneumonia.  Patient also has complaints of left knee pain which she reports is chronic.  Patient was in bed and alert and oriented x 3 when PT arrived.  She reports she is independently mobile with cane or walker at home, but mobility is limited by left knee pain at baseline.  Patient lives with her daughter and has 1 step to enter.  Patient demonstrated independent bed mobility with use of bedrail.  She stood with standby assist and rolling walker and was able to take a few sidesteps towards head of bed.  Further ambulation was limited by left knee pain.  Patient demonstrates limited weightbearing tolerance on left lower extremity and prefers to weight-bear only through her toes which she reports limits her knee pain.  Patient presents with generalized weakness, decreased endurance, left knee pain and limited range of motion and decreased functional mobility.  Patient would benefit from PT to address these impairments while admitted patient reports she plans to DC home with her daughter and is open to home health PT if needed.     Time Calculation:         PT Charges       Row Name 01/07/25 3109             Time Calculation    Start Time 1143  -KH      Stop Time 1205  -KH      Time Calculation (min) 22 min  -KH      PT Received On  01/07/25  -EDVIN      PT - Next Appointment 01/08/25  -EDVIN      PT Goal Re-Cert Due Date 01/14/25  -EDVIN                User Key  (r) = Recorded By, (t) = Taken By, (c) = Cosigned By      Initials Name Provider Type    Monique Causey, PT Physical Therapist                  Therapy Charges for Today       Code Description Service Date Service Provider Modifiers Qty    96604739788 HC PT EVAL MOD COMPLEXITY 3 1/7/2025 Monique Torres, PT GP 1            PT G-Codes  Outcome Measure Options: AM-PAC 6 Clicks Basic Mobility (PT)  AM-PAC 6 Clicks Score (PT): 19  PT Discharge Summary  Anticipated Discharge Disposition (PT): home with assist, home with home health    Monique Torres, PT  1/7/2025

## 2025-01-07 NOTE — PROGRESS NOTES
"Assessment Date:  01/07/25    NUTRITION SCREENING      Reason for Encounter BMI   Diagnosis/Problem Pneumonia, chronic respiratory failure with hypoxia, PHMx: HTN, ESRD on dialysis, ANGIE, chronic diastolic CHF, Anemia.     Patient was not in the room when RD attempted to visit, will need to follow for NFPE given BMI. EMR reviewed, will follow-up for interview with patient        PO Diet Diet: Cardiac; Healthy Heart (2-3 Na+); Fluid Consistency: Thin (IDDSI 0)   Supplements    PO Intake % Not documented        Medications MAR reviewed by RD   Labs  Listed below, reviewed   Physical Findings Unable to assess   GI Function Vomiting noted    Skin Status Unable to assess        Height  Weight  BMI  Weight Trend     Height: 152.4 cm (60\")  Weight: 29.3 kg (64 lb 9.5 oz) (01/07/25 0500)  Body mass index is 12.62 kg/m².  Unknown       Nutrition Problem (PES) Inadequate oral intake related to PMH as evidence by BMI <18.9.       Intervention/Plan RD will follow for NFPE and interview     RD to follow up per protocol.     Results from last 7 days   Lab Units 01/07/25  0703 01/06/25  1321   SODIUM mmol/L 137 134*   POTASSIUM mmol/L 4.8 4.0   CHLORIDE mmol/L 97* 94*   CO2 mmol/L 20.0* 21.9*   BUN mg/dL 41* 29*   CREATININE mg/dL 6.24* 5.54*   CALCIUM mg/dL 9.6 9.7   BILIRUBIN mg/dL  --  0.3   ALK PHOS U/L  --  96   ALT (SGPT) U/L  --  7   AST (SGOT) U/L  --  15   GLUCOSE mg/dL 81 104*     Results from last 7 days   Lab Units 01/07/25  0703   HEMOGLOBIN g/dL 10.1*   HEMATOCRIT % 32.6*     Lab Results   Component Value Date    HGBA1C 5.1 08/11/2024         Electronically signed by:  Naty Lauren RD  01/07/25 14:37 EST    "

## 2025-01-07 NOTE — PLAN OF CARE
Goal Outcome Evaluation:  Plan of Care Reviewed With: patient           Outcome Evaluation: Patient was admitted from home with complaints of nausea vomiting diarrhea and cough.  She was diagnosed with bacterial pneumonia.  Patient also has complaints of left knee pain which she reports is chronic.  Patient was in bed and alert and oriented x 3 when PT arrived.  She reports she is independently mobile with cane or walker at home, but mobility is limited by left knee pain at baseline.  Patient lives with her daughter and has 1 step to enter.  Patient demonstrated independent bed mobility with use of bedrail.  She stood with standby assist and rolling walker and was able to take a few sidesteps towards head of bed.  Further ambulation was limited by left knee pain.  Patient demonstrates limited weightbearing tolerance on left lower extremity and prefers to weight-bear only through her toes which she reports limits her knee pain.  Patient presents with generalized weakness, decreased endurance, left knee pain and limited range of motion and decreased functional mobility.  Patient would benefit from PT to address these impairments while admitted patient reports she plans to DC home with her daughter and is open to home health PT if needed.    Anticipated Discharge Disposition (PT): home with assist, home with home health

## 2025-01-07 NOTE — CONSULTS
[facility] Consult Note      Patient Name: Sonia Acharya  : 1950  MRN: [id number]  Primary Care Physician:  Marcy Paez PA  Referring Physician: Riaz Davalos MD  Date of admission: 2025    Subjective     Reason for Consult: ESRD     HPI:   Sonia Acharya is a 74 y.o. female with ESRD on HD (MWF, via R AVF, at [facility], followed by my partner Dr. Lazo), hypertension, type II DM, and history of DVT, presented to the hospital yesterday for nausea, vomiting, diarrhea, abdominal pain, and altered mental status.  Last dialysis was 1/3/2025, missed treatment on Monday (2025).    On admission, creatinine 5.54, sodium 134, CO2 21.9, patient was started on IV Zosyn for possible pneumonia.  Today, creatinine 6.24, electrolytes within acceptable range.  From 2024 to 1/3/2025, patient was hospitalized for CHF exacerbation.    Patient seems to be alert and oriented on exam today, denies further nausea, vomiting, diarrhea, or abdominal pain.  Currently on O2 supplementation, denies shortness of breath.    Review of Systems:   14 point review of systems is otherwise negative except for mentioned above on HPI    Personal History     Past Medical History:   Diagnosis Date    Arthritis     Chronic kidney disease     Heart disease     Hypertension        Past Surgical History:   Procedure Laterality Date    BREAST SURGERY      DIALYSIS FISTULA CREATION      EYE SURGERY      HERNIA REPAIR      Dr. Sung       Family History: family history includes Breast cancer in her sister; Heart disease in her father.    Social History:  reports that she has never smoked. She has never been exposed to tobacco smoke. She has never used smokeless tobacco. She reports that she does not drink alcohol and does not use drugs.    Home Medications:  Prior to Admission medications    Medication Sig Start Date End Date Taking? Authorizing Provider   amLODIPine (NORVASC) 5 MG tablet Take 1  tablet by mouth Daily. Take if SBP >160    Manish Tracy MD   atorvastatin (LIPITOR) 40 MG tablet Take 1 tablet by mouth Daily.    Manish Tracy MD   benzocaine-menthol (CHLORASEPTIC) 6-10 MG lozenge Take 2 lozenges by mouth 3 (Three) Times a Day As Needed for Sore Throat. 12/24/23   Monroe Chin MD   calcium carbonate (TUMS) 500 MG chewable tablet Chew 1 tablet Every 6 (Six) Hours As Needed for Indigestion or Heartburn. 12/24/23   Monroe Chin MD   cloNIDine (CATAPRES) 0.1 MG tablet Take 1 tablet by mouth 2 (Two) Times a Day. Do not take if SBP <130    Manish Tracy MD   clopidogrel (PLAVIX) 75 MG tablet Take 75 mg by mouth Daily.    Manish Tracy MD   gabapentin (NEURONTIN) 100 MG capsule Take 1 capsule by mouth 3 (Three) Times a Day As Needed (pain) for up to 3 days. 1/19/23 12/20/23  Hayden Granda MD   hydrALAZINE (APRESOLINE) 25 MG tablet Take 4 tablets by mouth 3 (Three) Times a Day.    Manish Tracy MD   isosorbide dinitrate (ISORDIL) 30 MG tablet Take 1 tablet by mouth Daily.    Manish Tracy MD   lactulose (CHRONULAC) 10 GM/15ML solution Take 15 mL by mouth Daily. 1/19/23   Hayden Granda MD   losartan (COZAAR) 100 MG tablet Take 1 tablet by mouth Daily. 1/19/23   Hayden Granda MD   pantoprazole (PROTONIX) 40 MG EC tablet Take 1 tablet by mouth 2 (Two) Times a Day Before Meals. 12/24/23   Monroe Chin MD       Allergies:  No Known Allergies    Objective     Vitals:   Temp:  [97.8 °F (36.6 °C)-98.6 °F (37 °C)] 98.6 °F (37 °C)  Heart Rate:  [40-76] 63  Resp:  [16-18] 16  BP: (140-187)/(67-96) 155/67  Flow (L/min) (Oxygen Therapy):  [2] 2    Intake/Output Summary (Last 24 hours) at 1/7/2025 1013  Last data filed at 1/6/2025 2357  Gross per 24 hour   Intake 240 ml   Output --   Net 240 ml       Physical Exam:   Constitutional: Awake, lethargic, chronically ill, no acute distress.  HEENT: Sclera anicteric, no conjunctival  injection  Neck: No JVD  Respiratory: Clear to auscultation bilaterally, breathing effort nonlabored  Cardiovascular: RRR, 2/6 murmurs, no rubs  Gastrointestinal: Normoactive bowels, abdomen is soft, nontender and nondistended  : No palpable bladder  Musculoskeletal: No edema, no clubbing or cyanosis  Psychiatric: Flat affect, cooperative  Neurologic: Moving all extremities, no asterixis  Skin: Warm and dry       Scheduled Meds:     amLODIPine, 5 mg, Oral, Daily  atorvastatin, 40 mg, Oral, Daily  cloNIDine, 0.1 mg, Oral, BID  clopidogrel, 75 mg, Oral, Daily  hydrALAZINE, 100 mg, Oral, TID  isosorbide dinitrate, 30 mg, Oral, Daily  lactulose, 10 g, Oral, Daily  losartan, 100 mg, Oral, Daily  piperacillin-tazobactam, 3.375 g, Intravenous, Q12H      IV Meds:   Pharmacy to Dose Zosyn,         Results Reviewed:   I have personally reviewed the results from the time of this admission to 1/7/2025 10:13 EST     Lab Results   Component Value Date    GLUCOSE 81 01/07/2025    CALCIUM 9.6 01/07/2025     01/07/2025    K 4.8 01/07/2025    CO2 20.0 (L) 01/07/2025    CL 97 (L) 01/07/2025    BUN 41 (H) 01/07/2025    CREATININE 6.24 (H) 01/07/2025    EGFRIFAFRI 10 (L) 03/04/2023    EGFRIFNONA  12/14/2021      Comment:      <15 Indicative of kidney failure.    BCR 6.6 (L) 01/07/2025    ANIONGAP 20.0 (H) 01/07/2025      Lab Results   Component Value Date    MG 2.1 09/14/2024    PHOS 5.1 (H) 12/23/2023    ALBUMIN 3.4 (L) 01/06/2025           Assessment / Plan       ESRD (end stage renal disease) on dialysis      ASSESSMENT:  ESRD on HD, MWF, via R AVF, last HD was on last Friday.  Her electrolytes within acceptable range and volume status within acceptable range.  Nausea/vomiting/diarrhea/abdominal pain, CT abdomen/pelvis negative for acute findings, followed by primary team  Altered mental status/hallucination, CT head negative for acute findings.  Improved, followed by primary team  Pneumonia, on IV Zosyn and oxygen  supplementation, managed by primary team  Hypertension with ESRD, blood pressure now well-controlled, home regimen resumed  HFpEF, no evidence of acute pulmonary edema.    PLAN:  Hemodialysis today  Continue the same treatment  Surveillance labs    I reviewed the chart and other providers notes and I reviewed labs.  I discussed the case with the patient and nursing staff.    Thank you for involving us in the care of Sonia Acharya.  Please feel free to call with any questions.    Nathan Adkins MD  01/07/25  10:13 [facility]    [facility]  [phone]      Please note that portions of this note were completed with a voice recognition program.

## 2025-01-07 NOTE — H&P
HISTORY AND PHYSICAL   [facility]        Date of Admission: 2025  Patient Identification:  Name: Sonia Acharay  Age: 74 y.o.  Sex: female  :  1950  MRN: [id number]                     Primary Care Physician: Marcy Paez PA    Chief Complaint:  74 year old female was brought in due to nausea, vomiting as well as abdominal pain which started 10 days ago; she has a history of esrd; per familiy she has been confused; she has had a cough; she has had some diarrhea; she is on oxygen at 2 liters    History of Present Illness:   As above    Past Medical History:  Past Medical History:   Diagnosis Date    Arthritis     Chronic kidney disease     Heart disease     Hypertension      Past Surgical History:  Past Surgical History:   Procedure Laterality Date    BREAST SURGERY      DIALYSIS FISTULA CREATION      EYE SURGERY      HERNIA REPAIR      Dr. Sung      [place] Meds:  (Not in a hospital admission)      Allergies:  No Known Allergies  Immunizations:    There is no immunization history on file for this patient.  Social History:   Social History     Social History Narrative    Not on file     Social History     Socioeconomic History    Marital status: Single   Tobacco Use    Smoking status: Never     Passive exposure: Never    Smokeless tobacco: Never   Vaping Use    Vaping status: Never Used   Substance and Sexual Activity    Alcohol use: No    Drug use: No    Sexual activity: Defer       Family History:  Family History   Problem Relation Age of Onset    Heart disease Father     Breast cancer Sister         Review of Systems  See history of present illness and past medical history.  Patient denies headache, dizziness, syncope, falls, trauma, change in vision, change in hearing, change in taste, changes in weight, changes in appetite, focal weakness, numbness, or paresthesia.  Patient denies chest pain, palpitations, dyspnea, orthopnea, PND, cough, sinus pressure, rhinorrhea, epistaxis,  hemoptysis,  ,hematemesis,  constipation or hematochezia.  Denies cold or heat intolerance, polydipsia, polyuria, polyphagia. Denies hematuria, pyuria, dysuria, hesitancy, frequency or urgency. Denies consumption of raw and under cooked meats foods or change in water source.  Denies fever, chills, sweats, night sweats.      Objective:  T Max 24 hrs: Temp (24hrs), Av.8 °F (36.6 °C), Min:97.8 °F (36.6 °C), Max:97.8 °F (36.6 °C)    Vitals Ranges:   Temp:  [97.8 °F (36.6 °C)] 97.8 °F (36.6 °C)  Heart Rate:  [40-76] 66  Resp:  [16-18] 16  BP: (140-187)/(68-96) 159/68      Exam:  /68   Pulse 66   Temp 97.8 °F (36.6 °C)   Resp 16   SpO2 100%     General Appearance:    Alert, cooperative, no distress, appears stated age; chronically ill appearing   Head:    Normocephalic, without obvious abnormality, atraumatic   Eyes:    PERRL, conjunctivae/corneas clear, EOM's intact, both eyes   Ears:    Normal external ear canals, both ears   Nose:   Nares normal, septum midline, mucosa normal, no drainage    or sinus tenderness   Throat:   Lips, mucosa, and tongue normal   Neck:   Supple, symmetrical, trachea midline, no adenopathy;     thyroid:  no enlargement/tenderness/nodules; no carotid    bruit or JVD   Back:     Symmetric, no curvature, ROM normal, no CVA tenderness   Lungs:     Clear to auscultation bilaterally, respirations unlabored   Chest Wall:    No tenderness or deformity    Heart:    Regular rate and rhythm, S1 and S2 normal, no murmur, rub   or gallop   Abdomen:     Soft, nontender, bowel sounds active all four quadrants,     no masses, no hepatomegaly, no splenomegaly   Extremities:   Extremities normal, atraumatic, no cyanosis or edema                       .    Data Review:  Labs in chart were reviewed.  WBC   Date Value Ref Range Status   2025 11.19 (H) 3.40 - 10.80 10*3/mm3 Final     Hemoglobin   Date Value Ref Range Status   2025 9.3 (L) 12.0 - 15.9 g/dL Final     Hematocrit   Date Value  Ref Range Status   01/06/2025 31.6 (L) 34.0 - 46.6 % Final     Platelets   Date Value Ref Range Status   01/06/2025 416 140 - 450 10*3/mm3 Final     Sodium   Date Value Ref Range Status   01/06/2025 134 (L) 136 - 145 mmol/L Final     Potassium   Date Value Ref Range Status   01/06/2025 4.0 3.5 - 5.2 mmol/L Final     Comment:     Slight hemolysis detected by analyzer. Result may be falsely elevated.     Chloride   Date Value Ref Range Status   01/06/2025 94 (L) 98 - 107 mmol/L Final     CO2   Date Value Ref Range Status   01/06/2025 21.9 (L) 22.0 - 29.0 mmol/L Final     BUN   Date Value Ref Range Status   01/06/2025 29 (H) 8 - 23 mg/dL Final     Creatinine   Date Value Ref Range Status   01/06/2025 5.54 (H) 0.57 - 1.00 mg/dL Final     Glucose   Date Value Ref Range Status   01/06/2025 104 (H) 65 - 99 mg/dL Final     Calcium   Date Value Ref Range Status   01/06/2025 9.7 8.6 - 10.5 mg/dL Final     AST (SGOT)   Date Value Ref Range Status   01/06/2025 15 1 - 32 U/L Final     ALT (SGPT)   Date Value Ref Range Status   01/06/2025 7 1 - 33 U/L Final     Alkaline Phosphatase   Date Value Ref Range Status   01/06/2025 96 39 - 117 U/L Final                Imaging Results (All)       Procedure Component Value Units Date/Time    CT Abdomen Pelvis Without Contrast [[id number]] Collected: 01/06/25 1546     Updated: 01/06/25 1611    Narrative:      CT ABDOMEN AND PELVIS WITHOUT CONTRAST     HISTORY: 74 years of age, female. Abdominal pain with nausea and  vomiting.     TECHNIQUE:  CT includes axial imaging from the lung bases to the  trochanters without intravenous contrast and without use of oral  contrast. Data reconstructed in coronal and sagittal planes. Radiation  dose reduction techniques were utilized, including automated exposure  control and exposure modulation based on body size.     COMPARISON: CT abdomen and pelvis without contrast 02/17/2024.     FINDINGS: Cardiomegaly. Mild left lower lobe bronchial wall  thickening,  potential mild adjacent small infiltrate.     The liver, gallbladder, and spleen appear within normal limits. There is  thickening of the left adrenal gland. Right adrenal gland appears  normal. Chronic bilateral renal atrophy. Right upper pole renal cyst  measures 2.8 cm.     No bowel dilatation or evidence for bowel obstruction. Evaluation is  limited due to lack of IV or oral contrast.  No evidence for ascites or  abscess formation. No finding to suggest appendicitis. Diffuse  atherosclerotic calcifications are present. No evidence for reena  enlargement. Multilevel bridging endplate osteophyte formation within  the thoracic and upper lumbar spine.       Impression:      1. No evidence for acute intra-abdominal process on CT abdomen and  pelvis without contrast.  2. Mild left lower lobe bronchial wall thickening with potential mild  adjacent infiltrate. Cardiomegaly.  3. Bilateral renal atrophy. Right upper pole renal cyst.  4. Diffuse atherosclerotic calcifications.     Radiation dose reduction techniques were utilized, including automated  exposure control and exposure modulation based on body size.        This report was finalized on 1/6/2025 4:08 PM by Abdulaziz Boyce M.D on  Workstation: [id number]       CT Head Without Contrast [[id number]] Collected: 01/06/25 1515     Updated: 01/06/25 1520    Narrative:      CT HEAD WO CONTRAST-     INDICATIONS: Altered mental status     TECHNIQUE: Radiation dose reduction techniques were utilized, including  automated exposure control and exposure modulation based on body size.  Noncontrast head CT     COMPARISON: None available     FINDINGS:     No acute intracranial hemorrhage, midline shift or mass effect. No acute  territorial infarct is identified.     Mild periventricular hypodensities suggest chronic small vessel ischemic  change in a patient this age.     Arterial calcifications are seen at the base of the brain.     Ventricles, cisterns, cerebral  sulci are unremarkable for patient age.     Right mastoid air cells are hypopneumatized. Mild paranasal sinus  mucosal thickening is present. The visualized paranasal sinuses, orbits,  mastoid air cells are otherwise unremarkable.          Impression:         No acute intracranial hemorrhage or hydrocephalus. If there is further  clinical concern, MRI could be considered for further evaluation.     This report was finalized on 1/6/2025 3:17 PM by Dr. Michael Lauren M.D on Workstation: [id number]       XR Chest 1 View [[id number]] Collected: 01/06/25 1356     Updated: 01/06/25 1400    Narrative:      XR CHEST 1 VW-1/6/2025     HISTORY: Altered mental status.     Heart size is mildly enlarged. Lungs are underinflated with some  vascular crowding. No focal infiltrates are seen. There is some aortic  calcification. Vascular stent is seen in the left axilla and left upper  arm.       Impression:      1. Mild cardiomegaly.  2. Underinflation of the lungs with no acute infiltrates.        This report was finalized on 1/6/2025 1:57 PM by Dr. Homero Kumar M.D on Workstation: [id number]                 Assessment:  Active Hospital Problems    Diagnosis  POA    **ESRD (end stage renal disease) on dialysis [N18.6, Z99.2]  Not Applicable      Resolved Hospital Problems   No resolved problems to display.   Anemia  Hypertension  Cad  Chronic diastolic chf  Sleep apnea  pneumonia    Plan:  Continue antibiotics  Monitor on telemetry  Dialysis to be done  Trend labs  Dw patient, ed provider  Patient is full code; she remains a little confused so unclear who the health care surrogate is; daughter is listed as nok/contact    Cherie Reich MD  1/6/2025  21:21 EST

## 2025-01-07 NOTE — NURSING NOTE
Unable to do complete skin assessment as patient not willing to take off clothing and wishes to sleep.

## 2025-01-08 LAB
ALBUMIN SERPL-MCNC: 3 G/DL (ref 3.5–5.2)
ALBUMIN/GLOB SERPL: 0.9 G/DL
ALP SERPL-CCNC: 93 U/L (ref 39–117)
ALT SERPL W P-5'-P-CCNC: 8 U/L (ref 1–33)
ANION GAP SERPL CALCULATED.3IONS-SCNC: 12 MMOL/L (ref 5–15)
AST SERPL-CCNC: 12 U/L (ref 1–32)
BASOPHILS # BLD AUTO: 0.06 10*3/MM3 (ref 0–0.2)
BASOPHILS NFR BLD AUTO: 0.7 % (ref 0–1.5)
BILIRUB SERPL-MCNC: 0.2 MG/DL (ref 0–1.2)
BUN SERPL-MCNC: 24 MG/DL (ref 8–23)
BUN/CREAT SERPL: 6 (ref 7–25)
CALCIUM SPEC-SCNC: 9.2 MG/DL (ref 8.6–10.5)
CHLORIDE SERPL-SCNC: 103 MMOL/L (ref 98–107)
CO2 SERPL-SCNC: 23 MMOL/L (ref 22–29)
CREAT SERPL-MCNC: 3.99 MG/DL (ref 0.57–1)
DEPRECATED RDW RBC AUTO: 50.7 FL (ref 37–54)
EGFRCR SERPLBLD CKD-EPI 2021: 11.2 ML/MIN/1.73
EOSINOPHIL # BLD AUTO: 0.32 10*3/MM3 (ref 0–0.4)
EOSINOPHIL NFR BLD AUTO: 3.9 % (ref 0.3–6.2)
ERYTHROCYTE [DISTWIDTH] IN BLOOD BY AUTOMATED COUNT: 16.5 % (ref 12.3–15.4)
GLOBULIN UR ELPH-MCNC: 3.3 GM/DL
GLUCOSE SERPL-MCNC: 91 MG/DL (ref 65–99)
HBA1C MFR BLD: 4.9 % (ref 4.8–5.6)
HCT VFR BLD AUTO: 29.7 % (ref 34–46.6)
HGB BLD-MCNC: 9.5 G/DL (ref 12–15.9)
IMM GRANULOCYTES # BLD AUTO: 0.33 10*3/MM3 (ref 0–0.05)
IMM GRANULOCYTES NFR BLD AUTO: 4 % (ref 0–0.5)
LYMPHOCYTES # BLD AUTO: 1.2 10*3/MM3 (ref 0.7–3.1)
LYMPHOCYTES NFR BLD AUTO: 14.5 % (ref 19.6–45.3)
MAGNESIUM SERPL-MCNC: 2.2 MG/DL (ref 1.6–2.4)
MCH RBC QN AUTO: 27.1 PG (ref 26.6–33)
MCHC RBC AUTO-ENTMCNC: 32 G/DL (ref 31.5–35.7)
MCV RBC AUTO: 84.6 FL (ref 79–97)
MONOCYTES # BLD AUTO: 0.65 10*3/MM3 (ref 0.1–0.9)
MONOCYTES NFR BLD AUTO: 7.9 % (ref 5–12)
NEUTROPHILS NFR BLD AUTO: 5.71 10*3/MM3 (ref 1.7–7)
NEUTROPHILS NFR BLD AUTO: 69 % (ref 42.7–76)
NRBC BLD AUTO-RTO: 0 /100 WBC (ref 0–0.2)
PHOSPHATE SERPL-MCNC: 3.7 MG/DL (ref 2.5–4.5)
PLATELET # BLD AUTO: 348 10*3/MM3 (ref 140–450)
PMV BLD AUTO: 8.9 FL (ref 6–12)
POTASSIUM SERPL-SCNC: 4.1 MMOL/L (ref 3.5–5.2)
PROT SERPL-MCNC: 6.3 G/DL (ref 6–8.5)
RBC # BLD AUTO: 3.51 10*6/MM3 (ref 3.77–5.28)
SODIUM SERPL-SCNC: 138 MMOL/L (ref 136–145)
WBC NRBC COR # BLD AUTO: 8.27 10*3/MM3 (ref 3.4–10.8)

## 2025-01-08 PROCEDURE — 94761 N-INVAS EAR/PLS OXIMETRY MLT: CPT

## 2025-01-08 PROCEDURE — 94664 DEMO&/EVAL PT USE INHALER: CPT

## 2025-01-08 PROCEDURE — 83036 HEMOGLOBIN GLYCOSYLATED A1C: CPT | Performed by: HOSPITALIST

## 2025-01-08 PROCEDURE — 94799 UNLISTED PULMONARY SVC/PX: CPT

## 2025-01-08 PROCEDURE — 85025 COMPLETE CBC W/AUTO DIFF WBC: CPT

## 2025-01-08 PROCEDURE — 25010000002 PIPERACILLIN SOD-TAZOBACTAM PER 1 G: Performed by: HOSPITALIST

## 2025-01-08 PROCEDURE — 83735 ASSAY OF MAGNESIUM: CPT | Performed by: HOSPITALIST

## 2025-01-08 PROCEDURE — 80053 COMPREHEN METABOLIC PANEL: CPT | Performed by: HOSPITALIST

## 2025-01-08 PROCEDURE — 84100 ASSAY OF PHOSPHORUS: CPT | Performed by: HOSPITALIST

## 2025-01-08 RX ADMIN — GUAIFENESIN 1200 MG: 600 TABLET, EXTENDED RELEASE ORAL at 09:07

## 2025-01-08 RX ADMIN — CLONIDINE HYDROCHLORIDE 0.1 MG: 0.1 TABLET ORAL at 09:08

## 2025-01-08 RX ADMIN — IPRATROPIUM BROMIDE AND ALBUTEROL SULFATE 3 ML: 2.5; .5 SOLUTION RESPIRATORY (INHALATION) at 11:43

## 2025-01-08 RX ADMIN — PIPERACILLIN AND TAZOBACTAM 3.38 G: 3; .375 INJECTION, POWDER, FOR SOLUTION INTRAVENOUS at 09:07

## 2025-01-08 RX ADMIN — ACETAMINOPHEN 650 MG: 325 TABLET ORAL at 10:01

## 2025-01-08 RX ADMIN — GABAPENTIN 100 MG: 100 CAPSULE ORAL at 22:52

## 2025-01-08 RX ADMIN — ISOSORBIDE DINITRATE 30 MG: 20 TABLET ORAL at 09:08

## 2025-01-08 RX ADMIN — HYDRALAZINE HYDROCHLORIDE 100 MG: 50 TABLET ORAL at 22:52

## 2025-01-08 RX ADMIN — LOSARTAN POTASSIUM 100 MG: 100 TABLET, FILM COATED ORAL at 09:10

## 2025-01-08 RX ADMIN — CLOPIDOGREL BISULFATE 75 MG: 75 TABLET ORAL at 09:08

## 2025-01-08 RX ADMIN — ASPIRIN 81 MG: 81 TABLET, COATED ORAL at 09:08

## 2025-01-08 RX ADMIN — PIPERACILLIN AND TAZOBACTAM 3.38 G: 3; .375 INJECTION, POWDER, FOR SOLUTION INTRAVENOUS at 23:27

## 2025-01-08 RX ADMIN — AMLODIPINE BESYLATE 5 MG: 5 TABLET ORAL at 09:10

## 2025-01-08 RX ADMIN — IPRATROPIUM BROMIDE AND ALBUTEROL SULFATE 3 ML: 2.5; .5 SOLUTION RESPIRATORY (INHALATION) at 07:58

## 2025-01-08 RX ADMIN — GUAIFENESIN 1200 MG: 600 TABLET, EXTENDED RELEASE ORAL at 22:52

## 2025-01-08 RX ADMIN — PANTOPRAZOLE SODIUM 40 MG: 40 TABLET, DELAYED RELEASE ORAL at 09:08

## 2025-01-08 RX ADMIN — HYDRALAZINE HYDROCHLORIDE 100 MG: 50 TABLET ORAL at 09:10

## 2025-01-08 RX ADMIN — ATORVASTATIN CALCIUM 40 MG: 20 TABLET, FILM COATED ORAL at 09:10

## 2025-01-08 RX ADMIN — CLONIDINE HYDROCHLORIDE 0.1 MG: 0.1 TABLET ORAL at 22:52

## 2025-01-08 RX ADMIN — ACETAMINOPHEN 650 MG: 325 TABLET ORAL at 16:47

## 2025-01-08 NOTE — PROGRESS NOTES
Name: Sonia Acharya ADMIT: 2025   : 1950  PCP: Marcy Paez PA    MRN: [id number] LOS: 2 days   AGE/SEX: 74 y.o. female  ROOM: [facility]     Subjective   Subjective   Feeling better again today. Cough improved. No SOA. No CP or palp. N/V/D and abd pain have resolved. She is eating very well. No longer confused or hallucinating. No F/C/NS. Her only complaint is of chronic left knee pain. Better with APAP.       Objective   Objective   Vital Signs  Temp:  [98.2 °F (36.8 °C)-98.8 °F (37.1 °C)] 98.4 °F (36.9 °C)  Heart Rate:  [64-80] 74  Resp:  [16-18] 18  BP: (121-146)/(54-93) 126/58  SpO2:  [99 %-100 %] 100 %  on  Flow (L/min) (Oxygen Therapy):  [2] 2;   Device (Oxygen Therapy): nasal cannula  Body mass index is 19.76 kg/m².  Physical Exam  Vitals and nursing note reviewed.   Constitutional:       General: She is not in acute distress.     Appearance: She is ill-appearing (chronically). She is not toxic-appearing or diaphoretic.   HENT:      Head: Normocephalic.      Nose: Nose normal.      Mouth/Throat:      Mouth: Mucous membranes are moist.      Pharynx: Oropharynx is clear.   Eyes:      General: No scleral icterus.     Extraocular Movements: Extraocular movements intact.   Cardiovascular:      Rate and Rhythm: Normal rate and regular rhythm.      Pulses: Normal pulses.      Comments: AVF RUE  Pulmonary:      Effort: Pulmonary effort is normal. No respiratory distress.      Breath sounds: Rales (left base) present. No wheezing.   Abdominal:      General: Bowel sounds are normal. There is no distension.      Palpations: Abdomen is soft.      Tenderness: There is no abdominal tenderness.   Musculoskeletal:         General: No swelling. Normal range of motion.      Cervical back: Neck supple.      Comments: Left knee with chronic bony changes, no acute effusion or erythema   Skin:     General: Skin is warm and dry.      Capillary Refill: Capillary refill takes less than 2 seconds.      Coloration:  Skin is not jaundiced.   Neurological:      General: No focal deficit present.      Mental Status: She is alert and oriented to person, place, and time. Mental status is at baseline.      Cranial Nerves: No cranial nerve deficit.      Coordination: Coordination normal.   Psychiatric:         Mood and Affect: Mood normal.         Behavior: Behavior normal.         Thought Content: Thought content normal.       Results Review     I reviewed the patient's new clinical results.  Results from last 7 days   Lab Units 01/08/25  0602 01/07/25  0703 01/06/25  1321   WBC 10*3/mm3 8.27 10.51 11.19*   HEMOGLOBIN g/dL 9.5* 10.1* 9.3*   PLATELETS 10*3/mm3 348 429 416     Results from last 7 days   Lab Units 01/08/25  0602 01/07/25  0703 01/06/25  1321   SODIUM mmol/L 138 137 134*   POTASSIUM mmol/L 4.1 4.8 4.0   CHLORIDE mmol/L 103 97* 94*   CO2 mmol/L 23.0 20.0* 21.9*   BUN mg/dL 24* 41* 29*   CREATININE mg/dL 3.99* 6.24* 5.54*   GLUCOSE mg/dL 91 81 104*   EGFR mL/min/1.73 11.2* 6.5* 7.5*     Results from last 7 days   Lab Units 01/08/25  0602 01/06/25  1321   ALBUMIN g/dL 3.0* 3.4*   BILIRUBIN mg/dL 0.2 0.3   ALK PHOS U/L 93 96   AST (SGOT) U/L 12 15   ALT (SGPT) U/L 8 7     Results from last 7 days   Lab Units 01/08/25  0602 01/07/25  0703 01/06/25  1321   CALCIUM mg/dL 9.2 9.6 9.7   ALBUMIN g/dL 3.0*  --  3.4*   MAGNESIUM mg/dL 2.2  --   --    PHOSPHORUS mg/dL 3.7  --   --      Results from last 7 days   Lab Units 01/06/25  1801 01/06/25  1321   PROCALCITONIN ng/mL  --  0.81*   LACTATE mmol/L 1.1  --      Hemoglobin A1C   Date/Time Value Ref Range Status   01/08/2025 0602 4.90 4.80 - 5.60 % Final       CT Abdomen Pelvis Without Contrast    Result Date: 1/6/2025  1. No evidence for acute intra-abdominal process on CT abdomen and pelvis without contrast. 2. Mild left lower lobe bronchial wall thickening with potential mild adjacent infiltrate. Cardiomegaly. 3. Bilateral renal atrophy. Right upper pole renal cyst. 4. Diffuse  atherosclerotic calcifications.  Radiation dose reduction techniques were utilized, including automated exposure control and exposure modulation based on body size.   This report was finalized on 1/6/2025 4:08 PM by Abdulaziz Boyce M.D on Workstation: [id number]      CT Head Without Contrast    Result Date: 1/6/2025   No acute intracranial hemorrhage or hydrocephalus. If there is further clinical concern, MRI could be considered for further evaluation.  This report was finalized on 1/6/2025 3:17 PM by Dr. Michael Lauren M.D on Workstation: [id number]      XR Chest 1 View    Result Date: 1/6/2025  1. Mild cardiomegaly. 2. Underinflation of the lungs with no acute infiltrates.   This report was finalized on 1/6/2025 1:57 PM by Dr. Homero Kumar M.D on Workstation: [id number]       I have personally reviewed all medications:  Scheduled Medications  amLODIPine, 5 mg, Oral, Daily  aspirin, 81 mg, Oral, Daily  atorvastatin, 40 mg, Oral, Daily  cloNIDine, 0.1 mg, Oral, BID  clopidogrel, 75 mg, Oral, Daily  guaiFENesin, 1,200 mg, Oral, Q12H  hydrALAZINE, 100 mg, Oral, TID  ipratropium-albuterol, 3 mL, Nebulization, 4x Daily - RT  isosorbide dinitrate, 30 mg, Oral, Daily  losartan, 100 mg, Oral, Daily  pantoprazole, 40 mg, Oral, Q AM  piperacillin-tazobactam, 3.375 g, Intravenous, Q12H    Infusions     Diet  Diet: Cardiac; Healthy Heart (2-3 Na+); Fluid Consistency: Thin (IDDSI 0)    I have personally reviewed:  [x]  Laboratory   [x]  Microbiology   []  Radiology   [x]  EKG/Telemetry  []  Cardiology/Vascular   []  Pathology    []  Records       Assessment/Plan     Active Hospital Problems    Diagnosis  POA    **Bacterial pneumonia, treating as gram negative [J15.9]  Yes    Chronic respiratory failure with hypoxia [J96.11]  Yes    CAD (coronary artery disease) [I25.10]  Yes    Nausea, vomiting, and diarrhea [R11.2, R19.7]  Yes    ESRD on dialysis [N18.6, Z99.2]  Not Applicable    ANGIE (obstructive sleep apnea)  [G47.33]  Yes    Chronic diastolic CHF (congestive heart failure) [I50.32]  Yes    Anemia, chronic disease [D63.8]  Yes    HTN (hypertension) [I10]  Yes    Generalized abdominal pain [R10.84]  Yes      Resolved Hospital Problems   No resolved problems to display.     75yo woman with ESRD, ACD, HTN, chronic diastolic CHF, CHRF (2L/min), ANGIE, and GERD , who presented to ER from home with N/V/D/abd pain, cough, confusion, and hallucinations. She was admitted with LLL pneumonia--concern for aspiration.    LLL PNA  Concern for aspiration  CHRF (2L/min at home)  SLP eval good  Continue IV Zosyn  Blood cultures NGTD  Urinary Ag's ordered but pt does not make urine  Sputum culture in progress  RVP neg  WBC normalized  Afebrile  Stable on 2L/min  Continue OPEP and IS  Continue Mucinex  Continue DuoNebs    N/V/D  Abd pain  Uncertain etiology  CT A/P unrevealing  GI PCR ordered  Symptoms improved  Holding Lactulose    Encephalopathy NOS  Hallucinations  Suspect due to acute illness  CT Head showed NAD  Much improved    ESRD on dialysis  Appreciate Renal attention to pt  Continue HD here MWF    Chronic diastolic CHF  Volume mgmt per Renal  Not on diuretics PTA    HTN  BPs acceptable on current regimen  Continue to monitor on telemetry    GERD  PPI    Anemia of CKD  Hgb stable    CAD  ARIANA placed in August  Continue DAPT and statin    H/o DVT  DVT ppx with DAPT and SCDs    Chronic left knee pain  This is a long term issue for her  No acute changes    DM2?  Mentioned in chart but on no meds  A1c only 4.9  Sugars fine here  I do not think that she has diabetes      DAPT and SCDs should suffice for DVT prophylaxis.  Full code.  Discussed with patient. No family present.  Anticipate discharge home with HH (per PT recs) in 2-3 days.  Expected Discharge Date: 1/10/2025; Expected Discharge Time:       Shaan Baltazar MD  [facility]  01/08/25  12:57 EST

## 2025-01-08 NOTE — CASE MANAGEMENT/SOCIAL WORK
Case Management/Social Work    Patient Name:  Sonia Acharya  YOB: 1950  MRN: [id number]  Admit Date:  1/6/2025        Patient requires transport wheelchair for mobility outside her home. Pt has caregiver/family who can push wheelchair and patient.       Electronically signed by:  Marcy Taylor RN  01/08/25 14:10 EST

## 2025-01-08 NOTE — PROGRESS NOTES
"Assessment Date:  01/08/25    NUTRITION SCREENING      Reason for Encounter BMI   Diagnosis/Problem Pneumonia, chronic respiratory failure with hypoxia, PHMx: HTN, ESRD on dialysis, ANGIE, chronic diastolic CHF, Anemia.        PO Diet Diet: Cardiac; Healthy Heart (2-3 Na+); Fluid Consistency: Thin (IDDSI 0)   Supplements -   PO Intake % Pt states she is eating almost all her meals and will drink boost        Medications MAR reviewed by RD   Labs  Listed below, reviewed   Physical Findings Alert, oriented, nasal cannula. 3+ edema    GI Function Abdominal pain    Skin Status Sacral spine PI st 2       Height  Weight  BMI  Weight Trend     Height: 152.4 cm (60\")  Weight: 45.9 kg (101 lb 3.1 oz) (01/08/25 0500)  Body mass index is 19.76 kg/m².  Stable, Loss, weight loss of 9.8% in the last year        Nutrition Problem (PES) Inadequate oral intake related to PMH as evidence by BMI <18.9.       Intervention/Plan Boost TID    RD to follow up per protocol.     Patient meets ASPEN/AND criteria for nutrition diagnosis of severe malnutrition of chronic illness based on: NFPE results     Malnutrition Severity Assessment      Patient meets criteria for : Severe Malnutrition  Malnutrition Type (Last 8 Hours)       Malnutrition Severity Assessment       Row Name 01/08/25 1647       Malnutrition Severity Assessment    Malnutrition Type Chronic Disease - Related Malnutrition      Row Name 01/08/25 1647       Muscle Loss    Loss of Muscle Mass Findings Severe    [place] Region Severe - deep hollowing/scooping, lack of muscle to touch, facial bones well defined    Clavicle Bone Region Severe - protruding prominent bone    Acromion Bone Region Severe - squared shoulders, bones, and acromion process protrusion prominent    Dorsal Hand Region Severe - prominent depression    Patellar Region Severe - prominent bone, square looking, very little muscle definition    Anterior Thigh Region Severe - line/depression along thigh, obviously thin    " Posterior Calf Region Severe - thin with very little definition/firmness      Row Name 01/08/25 1647       Fat Loss    Subcutaneous Fat Loss Findings Severe    Orbital Region  Severe - pronounced hollowness/depression, dark circles, loose saggy skin    Upper Arm Region Severe - mostly skin, very little space between folds, fingers touch      Row Name 01/08/25 1647       Fluid Accumulation (Edema)    Fluid Accumulation  Severe equals 3+ or 4+ pitting edema      Row Name 01/08/25 1647       Criteria Met (Must meet criteria for severity in at least 2 of these categories: M Wasting, Fat Loss, Fluid, Secondary Signs, Wt. Status, Intake)    Patient meets criteria for  Severe Malnutrition                       Estimated/Assessed Needs       Energy Requirements    Weight for Calculation 45.9kg   Method for Estimation  30-35 kcal/kg   EST Needs (kcal/day) 8282-3807       Protein Requirements    Weight for Calculation 45.9kg   EST Protein Needs (g/kg) 1.2 - 1.5 gm/kg   EST Daily Needs (g/day) 55-69       Fluid Requirements     Method for Estimation 1 mL/kcal    Estimated Needs (mL/day)       Results from last 7 days   Lab Units 01/08/25  0602 01/07/25  0703 01/06/25  1321   SODIUM mmol/L 138 137 134*   POTASSIUM mmol/L 4.1 4.8 4.0   CHLORIDE mmol/L 103 97* 94*   CO2 mmol/L 23.0 20.0* 21.9*   BUN mg/dL 24* 41* 29*   CREATININE mg/dL 3.99* 6.24* 5.54*   CALCIUM mg/dL 9.2 9.6 9.7   BILIRUBIN mg/dL 0.2  --  0.3   ALK PHOS U/L 93  --  96   ALT (SGPT) U/L 8  --  7   AST (SGOT) U/L 12  --  15   GLUCOSE mg/dL 91 81 104*     Results from last 7 days   Lab Units 01/08/25  0602   MAGNESIUM mg/dL 2.2   PHOSPHORUS mg/dL 3.7   HEMOGLOBIN g/dL 9.5*   HEMATOCRIT % 29.7*     Lab Results   Component Value Date    HGBA1C 4.90 01/08/2025         Electronically signed by:  Kandice Suarez RD  01/08/25 16:48 EST

## 2025-01-08 NOTE — PLAN OF CARE
Goal Outcome Evaluation:  Plan of Care Reviewed With: patient        Progress: no change     Remains on O2 @ 2 L n/c. No respiratory distress noted. Occasional productive cough yellow tinged mucous. Sputum specimen sent to lab. Tylenol given once for left knee pain with good results. Bed alarm in place. Safety maintained. Will continue to monitor.

## 2025-01-08 NOTE — DISCHARGE PLACEMENT REQUEST
"Sonia Lara (74 y.o. Female)       Date of Birth   1950    Social Security Number       Address   [address] [place] [address]    Home Phone   [phone]    MRN   [id number]       Synagogue   None    Marital Status   Single                            Admission Date   1/6/25    Admission Type   Emergency    Admitting Provider   Cherie Reich MD    Attending Provider   Shaan Baltazar MD    Department, Room/Bed   [address], [address]       Discharge Date       Discharge Disposition       Discharge Destination                                 Attending Provider: Shaan Baltazar MD    Allergies: No Known Allergies    Isolation: None   Infection: None   Code Status: CPR    Ht: 152.4 cm (60\")   Wt: 45.9 kg (101 lb 3.1 oz)    Admission Cmt: None   Principal Problem: Bacterial pneumonia, treating as gram negative [J15.9]                   Active Insurance as of 1/6/2025       Primary Coverage       Payor Plan Insurance Group Employer/Plan Group    HUMANA MEDICARE REPLACEMENT HUMANA MED ADV HMO [id number]       Payor Plan Address Payor Plan Phone Number Payor Plan Fax Number Effective Dates    [address] [address] [phone]  1/1/2023 - None Entered    [facility] [id number]         Subscriber Name Subscriber Birth Date Member ID       SONIA LARA 1950 [id number]                     Emergency Contacts        (Rel.) Home Phone Work Phone Mobile Phone    Naty Lara (Daughter) -- -- [phone]                "

## 2025-01-08 NOTE — CASE MANAGEMENT/SOCIAL WORK
Discharge Planning Assessment  [facility]     Patient Name: Sonia Acharya  MRN: [id number]  Today's Date: 1/8/2025    Admit Date: 1/6/2025    Plan: Home with HH and DME pending acceptance   Discharge Needs Assessment       Row Name 01/08/25 1403       Living Environment    People in Home child(giuseppe), adult    Name(s) of People in Home Naty    Current Living Arrangements home    Potentially Unsafe Housing Conditions none    Primary Care Provided by self;child(giuseppe)    Family Caregiver if Needed child(giuseppe), adult    Quality of Family Relationships involved;helpful    Able to Return to Prior Arrangements yes       Resource/Environmental Concerns    Resource/Environmental Concerns none    Transportation Concerns none       Transition Planning    Patient/Family Anticipates Transition to home with family;home with help/services    Patient/Family Anticipated Services at Transition home health care;durable medical equipment    Transportation Anticipated family or friend will provide       Discharge Needs Assessment    Readmission Within the Last 30 Days no previous admission in last 30 days    Equipment Currently Used at Home cane, straight;oxygen;rollator    Concerns to be Addressed adjustment to diagnosis/illness    Anticipated Changes Related to Illness none    Equipment Needed After Discharge oxygen;wheelchair, manual                   Discharge Plan       Row Name 01/08/25 1407       Plan    Plan Home with HH and DME pending acceptance    Patient/Family in Agreement with Plan yes    Provided Post Acute Provider List? Yes    Post Acute Provider List Home Health    Plan Comments Spoke with patient daughter Naty by phone, introduced self, explained CCP role and verified face sheet and pharmacy information. Pt lives with Naty in 1 level home with 1 HIRA, pt is mostly independent for self-care tasks, Naty helps with bathing, meds and meals. She has rollator and oxygen from [employer], dtr states [employer] out of network for  her ins and needs to change to someone in network, she has no preference for DME company. She also needs transport w/c for going to HD which is MWF at [facility], she uses [employer] and dtr transports at times. She has no HH or SNF history, she would like HH services at dc and has no preference for agency, referral to [facility] pending.  Spoke with Marian/Jenn since pt insurance contracted with [employer], they have been trying to reach patient to deliver their equipment and get patient established already, also discussed need for w/c, they can deliver at time of dc to pt home. Orders received for oxygen continuously and transport wc, verbiage entered as well. Pt plans home with dtr and she will transport, CCP will follow for other needs - Marcy ROMERO                  Continued Care and Services - Admitted Since 1/6/2025       Durable Medical Equipment       Service Provider Request Status Services Address Phone Fax Patient Preferred    [facility] Accepted -- [address] [facility] [id number] [phone] --              Home Medical Care       Service Provider Request Status Services Address Phone Fax Patient Preferred     [facility] Pending - Request Sent -- [address] [facility], [place] [address] [phone] --                  Expected Discharge Date and Time       Expected Discharge Date Expected Discharge Time    Jeramie 10, 2025            Demographic Summary       Row Name 01/08/25 1403       General Information    Admission Type inpatient                   Functional Status       Row Name 01/08/25 1403       Functional Status    Usual Activity Tolerance good    Current Activity Tolerance moderate       Assessment of Health Literacy    Health Literacy Good       Functional Status, IADL    Medications assistive person    Meal Preparation assistive person    Housekeeping assistive person    Laundry assistive person    Shopping assistive  person    IADL Comments independent for self care tasks except bathing       Mental Status    General Appearance WDL WDL       Mental Status Summary    Recent Changes in Mental Status/Cognitive Functioning no changes                   Psychosocial    No documentation.                  Abuse/Neglect    No documentation.                  Legal       Row Name 01/08/25 1403       Financial/Legal    Who Manages Finances if Patient Unable dtr                   Substance Abuse    No documentation.                  Patient Forms    No documentation.                     Marcy Taylor RN

## 2025-01-08 NOTE — PROGRESS NOTES
[facility] Progress Note      Patient Name: Sonia Acharya  : 1950  MRN: [id number]  Primary Care Physician:  Marcy Paez PA  Date of admission: 2025    Subjective     Interval History:   Follow-up end-stage renal disease  Patient had dialysis yesterday without any difficulties no fluid were removed with dialysis yesterday, denies any chest pain or shortness of air, no orthopnea or PND, no nausea or vomiting, she is feeling somewhat better.  Review of Systems:   As noted above    Objective     Vitals:   Temp:  [98.2 °F (36.8 °C)-98.8 °F (37.1 °C)] 98.4 °F (36.9 °C)  Heart Rate:  [64-80] 64  Resp:  [16-18] 18  BP: (121-146)/(54-93) 126/58  Flow (L/min) (Oxygen Therapy):  [2] 2    Intake/Output Summary (Last 24 hours) at 2025 1052  Last data filed at 2025 0500  Gross per 24 hour   Intake --   Output 1 ml   Net -1 ml       Physical Exam:    General Appearance: alert, awake, chronically ill and frail, no acute distress   Skin: warm and dry  HEENT: oral mucosa normal, nonicteric sclera  Neck: No JVD  Lungs: CTA, unlabored breathing effort  Heart: RRR, grade 2/6 systolic murmur, no rub  Abdomen: soft, nontender, nondistended  : no palpable bladder  Extremities: no edema, cyanosis or clubbing, right upper extremity AV fistula with good thrill and bruit.  Neuro: normal speech and mental status     Scheduled Meds:     amLODIPine, 5 mg, Oral, Daily  aspirin, 81 mg, Oral, Daily  atorvastatin, 40 mg, Oral, Daily  cloNIDine, 0.1 mg, Oral, BID  clopidogrel, 75 mg, Oral, Daily  guaiFENesin, 1,200 mg, Oral, Q12H  hydrALAZINE, 100 mg, Oral, TID  ipratropium-albuterol, 3 mL, Nebulization, 4x Daily - RT  isosorbide dinitrate, 30 mg, Oral, Daily  losartan, 100 mg, Oral, Daily  pantoprazole, 40 mg, Oral, Q AM  piperacillin-tazobactam, 3.375 g, Intravenous, Q12H      IV Meds:        Results Reviewed:   I have personally reviewed the results from the time of this admission to  1/8/2025 10:52 EST     Results from last 7 days   Lab Units 01/08/25  0602 01/07/25  0703 01/06/25  1321   SODIUM mmol/L 138 137 134*   POTASSIUM mmol/L 4.1 4.8 4.0   CHLORIDE mmol/L 103 97* 94*   CO2 mmol/L 23.0 20.0* 21.9*   BUN mg/dL 24* 41* 29*   CREATININE mg/dL 3.99* 6.24* 5.54*   CALCIUM mg/dL 9.2 9.6 9.7   BILIRUBIN mg/dL 0.2  --  0.3   ALK PHOS U/L 93  --  96   ALT (SGPT) U/L 8  --  7   AST (SGOT) U/L 12  --  15   GLUCOSE mg/dL 91 81 104*       Estimated Creatinine Clearance: 8.8 mL/min (A) (by C-G formula based on SCr of 3.99 mg/dL (H)).    Results from last 7 days   Lab Units 01/08/25  0602   MAGNESIUM mg/dL 2.2   PHOSPHORUS mg/dL 3.7             Results from last 7 days   Lab Units 01/08/25  0602 01/07/25  0703 01/06/25  1321   WBC 10*3/mm3 8.27 10.51 11.19*   HEMOGLOBIN g/dL 9.5* 10.1* 9.3*   PLATELETS 10*3/mm3 348 429 416             Assessment / Plan     ASSESSMENT:  ESRD on HD, MWF, via R AVF, last HD was on last Friday.  She had dialysis yesterday without any difficulties appears to be euvolemic and her electrolyte within acceptable range..  Nausea/vomiting/diarrhea/abdominal pain, CT abdomen/pelvis negative for acute findings, followed by primary team, symptoms has improved significant  Altered mental status/hallucination, CT head negative for acute findings.  Improved, followed by primary team  Pneumonia, on IV Zosyn and oxygen supplementation, managed by primary team  Hypertension with ESRD, blood pressure now well-controlled, home regimen resumed  HFpEF, no evidence of acute pulmonary edema.    PLAN:  Continue the same treatment  Surveillance labs  Probably next dialysis will be on Friday to get her back on her schedule.    I reviewed the chart and other providers notes, I reviewed labs  I discussed the case with the patient she was good understanding.  Copied text in this note has been reviewed and is accurate as of 01/08/25.       Thank you for involving us in the care of Sonia Acharya.   Please feel free to call with any questions.    Nathan Adkins MD  01/08/25  10:52 [facility]    [facility]  [phone]    Please note that portions of this note were completed with a voice recognition program.

## 2025-01-08 NOTE — SIGNIFICANT NOTE
01/08/25 1509   OTHER   Discipline physical therapy assistant   Rehab Time/Intention   Session Not Performed patient/family declined, not feeling well  (Checked on pt twice today and both times pt reports she is feeling poorly and wants to rest. Encouragement provided pt still declined. Will follow up with pt tomorrow.)   Recommendation   PT - Next Appointment 01/09/25

## 2025-01-08 NOTE — PLAN OF CARE
Goal Outcome Evaluation:              Outcome Evaluation: Tylenol prn for left knee pain.  No SOA today.  Alert and oriented.  BP's low.  On 2LNC

## 2025-01-09 PROBLEM — E43 SEVERE PROTEIN-CALORIE MALNUTRITION: Status: ACTIVE | Noted: 2025-01-09

## 2025-01-09 LAB
ALBUMIN SERPL-MCNC: 2.9 G/DL (ref 3.5–5.2)
ALP SERPL-CCNC: 82 U/L (ref 39–117)
ALT SERPL W P-5'-P-CCNC: 7 U/L (ref 1–33)
ANION GAP SERPL CALCULATED.3IONS-SCNC: 18 MMOL/L (ref 5–15)
AST SERPL-CCNC: 10 U/L (ref 1–32)
BACTERIA SPEC RESP CULT: NORMAL
BASOPHILS # BLD AUTO: 0.05 10*3/MM3 (ref 0–0.2)
BASOPHILS NFR BLD AUTO: 0.5 % (ref 0–1.5)
BILIRUB CONJ SERPL-MCNC: <0.2 MG/DL (ref 0–0.3)
BILIRUB INDIRECT SERPL-MCNC: ABNORMAL MG/DL
BILIRUB SERPL-MCNC: 0.2 MG/DL (ref 0–1.2)
BUN SERPL-MCNC: 38 MG/DL (ref 8–23)
BUN/CREAT SERPL: 7 (ref 7–25)
CALCIUM SPEC-SCNC: 9 MG/DL (ref 8.6–10.5)
CHLORIDE SERPL-SCNC: 99 MMOL/L (ref 98–107)
CO2 SERPL-SCNC: 21 MMOL/L (ref 22–29)
CREAT SERPL-MCNC: 5.44 MG/DL (ref 0.57–1)
DEPRECATED RDW RBC AUTO: 51.3 FL (ref 37–54)
EGFRCR SERPLBLD CKD-EPI 2021: 7.7 ML/MIN/1.73
EOSINOPHIL # BLD AUTO: 0.22 10*3/MM3 (ref 0–0.4)
EOSINOPHIL NFR BLD AUTO: 2.2 % (ref 0.3–6.2)
ERYTHROCYTE [DISTWIDTH] IN BLOOD BY AUTOMATED COUNT: 16.4 % (ref 12.3–15.4)
GLUCOSE SERPL-MCNC: 81 MG/DL (ref 65–99)
GRAM STN SPEC: NORMAL
HCT VFR BLD AUTO: 28.3 % (ref 34–46.6)
HGB BLD-MCNC: 8.8 G/DL (ref 12–15.9)
IMM GRANULOCYTES # BLD AUTO: 0.46 10*3/MM3 (ref 0–0.05)
IMM GRANULOCYTES NFR BLD AUTO: 4.6 % (ref 0–0.5)
LYMPHOCYTES # BLD AUTO: 1.19 10*3/MM3 (ref 0.7–3.1)
LYMPHOCYTES NFR BLD AUTO: 11.8 % (ref 19.6–45.3)
MAGNESIUM SERPL-MCNC: 2.2 MG/DL (ref 1.6–2.4)
MCH RBC QN AUTO: 27.2 PG (ref 26.6–33)
MCHC RBC AUTO-ENTMCNC: 31.1 G/DL (ref 31.5–35.7)
MCV RBC AUTO: 87.3 FL (ref 79–97)
MONOCYTES # BLD AUTO: 0.7 10*3/MM3 (ref 0.1–0.9)
MONOCYTES NFR BLD AUTO: 7 % (ref 5–12)
NEUTROPHILS NFR BLD AUTO: 7.43 10*3/MM3 (ref 1.7–7)
NEUTROPHILS NFR BLD AUTO: 73.9 % (ref 42.7–76)
NRBC BLD AUTO-RTO: 0 /100 WBC (ref 0–0.2)
PHOSPHATE SERPL-MCNC: 4.6 MG/DL (ref 2.5–4.5)
PLATELET # BLD AUTO: 357 10*3/MM3 (ref 140–450)
PMV BLD AUTO: 9 FL (ref 6–12)
POTASSIUM SERPL-SCNC: 4.4 MMOL/L (ref 3.5–5.2)
PROT SERPL-MCNC: 6.2 G/DL (ref 6–8.5)
RBC # BLD AUTO: 3.24 10*6/MM3 (ref 3.77–5.28)
SODIUM SERPL-SCNC: 138 MMOL/L (ref 136–145)
WBC NRBC COR # BLD AUTO: 10.05 10*3/MM3 (ref 3.4–10.8)

## 2025-01-09 PROCEDURE — 94799 UNLISTED PULMONARY SVC/PX: CPT

## 2025-01-09 PROCEDURE — 85025 COMPLETE CBC W/AUTO DIFF WBC: CPT | Performed by: INTERNAL MEDICINE

## 2025-01-09 PROCEDURE — 84100 ASSAY OF PHOSPHORUS: CPT | Performed by: HOSPITALIST

## 2025-01-09 PROCEDURE — 94664 DEMO&/EVAL PT USE INHALER: CPT

## 2025-01-09 PROCEDURE — 25010000002 PIPERACILLIN SOD-TAZOBACTAM PER 1 G: Performed by: HOSPITALIST

## 2025-01-09 PROCEDURE — 80048 BASIC METABOLIC PNL TOTAL CA: CPT | Performed by: INTERNAL MEDICINE

## 2025-01-09 PROCEDURE — 94761 N-INVAS EAR/PLS OXIMETRY MLT: CPT

## 2025-01-09 PROCEDURE — 80076 HEPATIC FUNCTION PANEL: CPT | Performed by: HOSPITALIST

## 2025-01-09 PROCEDURE — 83735 ASSAY OF MAGNESIUM: CPT | Performed by: HOSPITALIST

## 2025-01-09 RX ORDER — HYDRALAZINE HYDROCHLORIDE 50 MG/1
100 TABLET, FILM COATED ORAL EVERY 12 HOURS SCHEDULED
Status: DISCONTINUED | OUTPATIENT
Start: 2025-01-09 | End: 2025-01-09

## 2025-01-09 RX ORDER — LACTULOSE 10 G/15ML
15 SOLUTION ORAL DAILY
Status: DISCONTINUED | OUTPATIENT
Start: 2025-01-09 | End: 2025-01-17 | Stop reason: HOSPADM

## 2025-01-09 RX ORDER — LOSARTAN POTASSIUM 100 MG/1
100 TABLET ORAL NIGHTLY
Status: DISCONTINUED | OUTPATIENT
Start: 2025-01-09 | End: 2025-01-17 | Stop reason: HOSPADM

## 2025-01-09 RX ORDER — HYDRALAZINE HYDROCHLORIDE 50 MG/1
50 TABLET, FILM COATED ORAL EVERY 12 HOURS SCHEDULED
Status: DISCONTINUED | OUTPATIENT
Start: 2025-01-09 | End: 2025-01-09

## 2025-01-09 RX ORDER — HYDRALAZINE HYDROCHLORIDE 50 MG/1
100 TABLET, FILM COATED ORAL EVERY 8 HOURS SCHEDULED
Status: DISCONTINUED | OUTPATIENT
Start: 2025-01-09 | End: 2025-01-17 | Stop reason: HOSPADM

## 2025-01-09 RX ADMIN — HYDRALAZINE HYDROCHLORIDE 50 MG: 50 TABLET ORAL at 09:40

## 2025-01-09 RX ADMIN — IPRATROPIUM BROMIDE AND ALBUTEROL SULFATE 3 ML: 2.5; .5 SOLUTION RESPIRATORY (INHALATION) at 14:44

## 2025-01-09 RX ADMIN — IPRATROPIUM BROMIDE AND ALBUTEROL SULFATE 3 ML: 2.5; .5 SOLUTION RESPIRATORY (INHALATION) at 06:45

## 2025-01-09 RX ADMIN — PIPERACILLIN AND TAZOBACTAM 3.38 G: 3; .375 INJECTION, POWDER, FOR SOLUTION INTRAVENOUS at 09:40

## 2025-01-09 RX ADMIN — CLOPIDOGREL BISULFATE 75 MG: 75 TABLET ORAL at 09:40

## 2025-01-09 RX ADMIN — PIPERACILLIN AND TAZOBACTAM 3.38 G: 3; .375 INJECTION, POWDER, FOR SOLUTION INTRAVENOUS at 21:02

## 2025-01-09 RX ADMIN — ACETAMINOPHEN 650 MG: 325 TABLET ORAL at 16:05

## 2025-01-09 RX ADMIN — HYDRALAZINE HYDROCHLORIDE 100 MG: 50 TABLET ORAL at 14:34

## 2025-01-09 RX ADMIN — IPRATROPIUM BROMIDE AND ALBUTEROL SULFATE 3 ML: 2.5; .5 SOLUTION RESPIRATORY (INHALATION) at 20:20

## 2025-01-09 RX ADMIN — LACTULOSE 15 ML: 10 SOLUTION ORAL at 14:35

## 2025-01-09 RX ADMIN — ATORVASTATIN CALCIUM 40 MG: 20 TABLET, FILM COATED ORAL at 09:40

## 2025-01-09 RX ADMIN — ASPIRIN 81 MG: 81 TABLET, COATED ORAL at 09:39

## 2025-01-09 RX ADMIN — CLONIDINE HYDROCHLORIDE 0.1 MG: 0.1 TABLET ORAL at 09:40

## 2025-01-09 RX ADMIN — GUAIFENESIN 1200 MG: 600 TABLET, EXTENDED RELEASE ORAL at 21:01

## 2025-01-09 RX ADMIN — HYDRALAZINE HYDROCHLORIDE 100 MG: 50 TABLET ORAL at 21:01

## 2025-01-09 RX ADMIN — AMLODIPINE BESYLATE 5 MG: 5 TABLET ORAL at 09:39

## 2025-01-09 RX ADMIN — PANTOPRAZOLE SODIUM 40 MG: 40 TABLET, DELAYED RELEASE ORAL at 08:05

## 2025-01-09 RX ADMIN — IPRATROPIUM BROMIDE AND ALBUTEROL SULFATE 3 ML: 2.5; .5 SOLUTION RESPIRATORY (INHALATION) at 10:44

## 2025-01-09 RX ADMIN — ISOSORBIDE DINITRATE 30 MG: 20 TABLET ORAL at 09:40

## 2025-01-09 RX ADMIN — IPRATROPIUM BROMIDE AND ALBUTEROL SULFATE 3 ML: 2.5; .5 SOLUTION RESPIRATORY (INHALATION) at 04:11

## 2025-01-09 RX ADMIN — GUAIFENESIN 1200 MG: 600 TABLET, EXTENDED RELEASE ORAL at 09:40

## 2025-01-09 RX ADMIN — LOSARTAN POTASSIUM 100 MG: 100 TABLET, FILM COATED ORAL at 21:02

## 2025-01-09 NOTE — PROGRESS NOTES
Name: Sonia Acharya ADMIT: 2025   : 1950  PCP: Marcy Paez PA    MRN: [id number] LOS: 3 days   AGE/SEX: 74 y.o. female  ROOM: [facility] [place]     Subjective   Subjective   Feeling better each day. Cough improved. No SOA. No CP or palp. N/V/D and abd pain have resolved. She is eating very well. No longer confused or hallucinating. Sleeping well. No F/C/NS. Her only complaint is of chronic left knee pain. Better with APAP.       Objective   Objective   Vital Signs  Temp:  [97.3 °F (36.3 °C)-98.6 °F (37 °C)] 98.6 °F (37 °C)  Heart Rate:  [67-81] 73  Resp:  [16-18] 16  BP: (100-137)/(45-70) 137/59  SpO2:  [95 %-100 %] 97 %  on  Flow (L/min) (Oxygen Therapy):  [2] 2;   Device (Oxygen Therapy): room air  Body mass index is 18.77 kg/m².    (No change in exam today)    Physical Exam  Vitals and nursing note reviewed.   Constitutional:       General: She is not in acute distress.     Appearance: She is ill-appearing (chronically). She is not toxic-appearing or diaphoretic.   HENT:      Head: Normocephalic.      Nose: Nose normal.      Mouth/Throat:      Mouth: Mucous membranes are moist.      Pharynx: Oropharynx is clear.   Eyes:      General: No scleral icterus.     Extraocular Movements: Extraocular movements intact.   Cardiovascular:      Rate and Rhythm: Normal rate and regular rhythm.      Pulses: Normal pulses.      Comments: AVF RUE  Pulmonary:      Effort: Pulmonary effort is normal. No respiratory distress.      Breath sounds: Rales (left base) present. No wheezing.   Abdominal:      General: Bowel sounds are normal. There is no distension.      Palpations: Abdomen is soft.      Tenderness: There is no abdominal tenderness.   Musculoskeletal:         General: No swelling. Normal range of motion.      Cervical back: Neck supple.      Comments: Left knee with chronic bony changes, no acute effusion or erythema   Skin:     General: Skin is warm and dry.      Capillary Refill: Capillary refill takes less  than 2 seconds.      Coloration: Skin is not jaundiced.   Neurological:      General: No focal deficit present.      Mental Status: She is alert and oriented to person, place, and time. Mental status is at baseline.      Cranial Nerves: No cranial nerve deficit.      Coordination: Coordination normal.   Psychiatric:         Mood and Affect: Mood normal.         Behavior: Behavior normal.         Thought Content: Thought content normal.       Results Review     I reviewed the patient's new clinical results.  Results from last 7 days   Lab Units 01/09/25 0558 01/08/25 0602 01/07/25  0703 01/06/25  1321   WBC 10*3/mm3 10.05 8.27 10.51 11.19*   HEMOGLOBIN g/dL 8.8* 9.5* 10.1* 9.3*   PLATELETS 10*3/mm3 357 348 429 416     Results from last 7 days   Lab Units 01/09/25 0558 01/08/25  0602 01/07/25 0703 01/06/25  1321   SODIUM mmol/L 138 138 137 134*   POTASSIUM mmol/L 4.4 4.1 4.8 4.0   CHLORIDE mmol/L 99 103 97* 94*   CO2 mmol/L 21.0* 23.0 20.0* 21.9*   BUN mg/dL 38* 24* 41* 29*   CREATININE mg/dL 5.44* 3.99* 6.24* 5.54*   GLUCOSE mg/dL 81 91 81 104*   EGFR mL/min/1.73 7.7* 11.2* 6.5* 7.5*     Results from last 7 days   Lab Units 01/09/25 0558 01/08/25  0602 01/06/25  1321   ALBUMIN g/dL 2.9* 3.0* 3.4*   BILIRUBIN mg/dL 0.2 0.2 0.3   ALK PHOS U/L 82 93 96   AST (SGOT) U/L 10 12 15   ALT (SGPT) U/L 7 8 7     Results from last 7 days   Lab Units 01/09/25 0558 01/08/25  0602 01/07/25  0703 01/06/25  1321   CALCIUM mg/dL 9.0 9.2 9.6 9.7   ALBUMIN g/dL 2.9* 3.0*  --  3.4*   MAGNESIUM mg/dL 2.2 2.2  --   --    PHOSPHORUS mg/dL 4.6* 3.7  --   --      Results from last 7 days   Lab Units 01/06/25  1801 01/06/25  1321   PROCALCITONIN ng/mL  --  0.81*   LACTATE mmol/L 1.1  --      Hemoglobin A1C   Date/Time Value Ref Range Status   01/08/2025 0602 4.90 4.80 - 5.60 % Final       No radiology results for the last day    I have personally reviewed all medications:  Scheduled Medications  amLODIPine, 5 mg, Oral, Daily  aspirin, 81  mg, Oral, Daily  atorvastatin, 40 mg, Oral, Daily  clopidogrel, 75 mg, Oral, Daily  guaiFENesin, 1,200 mg, Oral, Q12H  hydrALAZINE, 100 mg, Oral, Q8H  ipratropium-albuterol, 3 mL, Nebulization, 4x Daily - RT  isosorbide dinitrate, 30 mg, Oral, Daily  losartan, 100 mg, Oral, Nightly  pantoprazole, 40 mg, Oral, Q AM  piperacillin-tazobactam, 3.375 g, Intravenous, Q12H    Infusions     Diet  Diet: Cardiac; Healthy Heart (2-3 Na+); Fluid Consistency: Thin (IDDSI 0)    I have personally reviewed:  [x]  Laboratory   [x]  Microbiology   []  Radiology   [x]  EKG/Telemetry  []  Cardiology/Vascular   []  Pathology    []  Records       Assessment/Plan     Active Hospital Problems    Diagnosis  POA    **Bacterial pneumonia, treating as gram negative [J15.9]  Yes    Chronic respiratory failure with hypoxia [J96.11]  Yes    CAD (coronary artery disease) [I25.10]  Yes    Nausea, vomiting, and diarrhea [R11.2, R19.7]  Yes    ESRD on dialysis [N18.6, Z99.2]  Not Applicable    ANGIE (obstructive sleep apnea) [G47.33]  Yes    Chronic diastolic CHF (congestive heart failure) [I50.32]  Yes    Anemia, chronic disease [D63.8]  Yes    HTN (hypertension) [I10]  Yes    Generalized abdominal pain [R10.84]  Yes      Resolved Hospital Problems   No resolved problems to display.     73yo woman with ESRD, ACD, HTN, chronic diastolic CHF, CHRF (2L/min), ANGIE, and GERD , who presented to ER from home with N/V/D/abd pain, cough, confusion, and hallucinations. She was admitted with LLL pneumonia--concern for aspiration.    LLL PNA  Concern for aspiration  CHRF (2L/min at home)  SLP eval good  Continue IV Zosyn  Blood cultures NGTD  Urinary Ag's ordered but pt does not make urine  Sputum culture neg  RVP neg  WBC normalized  Afebrile  Stable on 2L/min  Continue OPEP and IS  Continue Mucinex  Continue DuoNebs    N/V/D  Abd pain  Uncertain etiology  CT A/P unrevealing  GI PCR ordered but symptoms improved  Holding Lactulose but will restart now to avoid  constipation    Encephalopathy NOS  Hallucinations  Suspect due to acute illness  CT Head showed NAD  Much improved    ESRD on dialysis  Appreciate Renal attention to pt  Continue HD here MWF    Chronic diastolic CHF  Volume mgmt per Renal  Not on diuretics PTA    HTN  BPs acceptable on current regimen  Renal has dc'd Clonidine  Continue to monitor on telemetry    GERD  PPI    Anemia of CKD  Hgb trending down some  No evidence of bleeding  Continue to monitor closely    CAD  ARIANA placed in August  Continue DAPT and statin    H/o DVT  DVT ppx with DAPT and SCDs    Chronic left knee pain  This is a long term issue for her  No acute changes    DM2?  Mentioned in chart but on no meds  A1c only 4.9  Sugars fine here  I do not think that she has diabetes      DAPT and SCDs should suffice for DVT prophylaxis.  Full code.  Discussed with patient. No family present.  Anticipate discharge home with HH (per PT recs) tomorrow if continues to do this well.  Expected Discharge Date: 1/10/2025; Expected Discharge Time:       Shaan Baltazar MD  [facility]  01/09/25  12:03 EST

## 2025-01-09 NOTE — SIGNIFICANT NOTE
01/09/25 1537   OTHER   Discipline physical therapy assistant   Rehab Time/Intention   Session Not Performed patient/family declined treatment  (Checked on pt this PM. Pt declined PT tx this PM despite encouragement. Will follow up with pt tomorrow.)   Recommendation   PT - Next Appointment 01/10/25

## 2025-01-09 NOTE — PROGRESS NOTES
[facility] Progress Note      Patient Name: Sonia Acharya  : 1950  MRN: [id number]  Primary Care Physician:  Marcy Paez PA  Date of admission: 2025    Subjective     Interval History:   F/u ESRD     Review of Systems:   Denies dyspnea or nausea     Objective     Vitals:   Temp:  [97.3 °F (36.3 °C)-98.6 °F (37 °C)] 98.6 °F (37 °C)  Heart Rate:  [67-81] 73  Resp:  [16-18] 16  BP: (100-137)/(45-70) 137/59  Flow (L/min) (Oxygen Therapy):  [2] 2    Intake/Output Summary (Last 24 hours) at 2025 1036  Last data filed at 2025 1325  Gross per 24 hour   Intake 360 ml   Output --   Net 360 ml       Physical Exam:    General Appearance: frail pleasant AAF no distress on RA  Neck: supple, no JVD  Lungs: CTA bilat no rales  Heart: RRR, normal S1 and S2  Abdomen: soft, nontender, nondistended  Extremities: no edema, cyanosis or clubbing       Scheduled Meds:     amLODIPine, 5 mg, Oral, Daily  aspirin, 81 mg, Oral, Daily  atorvastatin, 40 mg, Oral, Daily  cloNIDine, 0.1 mg, Oral, BID  clopidogrel, 75 mg, Oral, Daily  guaiFENesin, 1,200 mg, Oral, Q12H  hydrALAZINE, 50 mg, Oral, Q12H  ipratropium-albuterol, 3 mL, Nebulization, 4x Daily - RT  isosorbide dinitrate, 30 mg, Oral, Daily  losartan, 100 mg, Oral, Nightly  pantoprazole, 40 mg, Oral, Q AM  piperacillin-tazobactam, 3.375 g, Intravenous, Q12H      IV Meds:        Results Reviewed:   I have personally reviewed the results from the time of this admission to 2025 10:36 EST     Results from last 7 days   Lab Units 25  0558 25  0602 25  0703 25  1321   SODIUM mmol/L 138 138 137 134*   POTASSIUM mmol/L 4.4 4.1 4.8 4.0   CHLORIDE mmol/L 99 103 97* 94*   CO2 mmol/L 21.0* 23.0 20.0* 21.9*   BUN mg/dL 38* 24* 41* 29*   CREATININE mg/dL 5.44* 3.99* 6.24* 5.54*   CALCIUM mg/dL 9.0 9.2 9.6 9.7   BILIRUBIN mg/dL 0.2 0.2  --  0.3   ALK PHOS U/L 82 93  --  96   ALT (SGPT) U/L 7 8  --  7   AST (SGOT) U/L 10 12   --  15   GLUCOSE mg/dL 81 91 81 104*     Estimated Creatinine Clearance: 6.2 mL/min (A) (by C-G formula based on SCr of 5.44 mg/dL (H)).  Results from last 7 days   Lab Units 01/09/25  0558 01/08/25  0602   MAGNESIUM mg/dL 2.2 2.2   PHOSPHORUS mg/dL 4.6* 3.7         Results from last 7 days   Lab Units 01/09/25  0558 01/08/25  0602 01/07/25  0703 01/06/25  1321   WBC 10*3/mm3 10.05 8.27 10.51 11.19*   HEMOGLOBIN g/dL 8.8* 9.5* 10.1* 9.3*   PLATELETS 10*3/mm3 357 348 429 416           Assessment / Plan     ASSESSMENT:  ESRD on HD, MWF, via R AVF, last HD was on last TUES (off cycle).  Volume/lytes stable.  Dialyze tomorrow to get back in sync with regular schedule   Nausea/vomiting/diarrhea/abdominal pain, CT abdomen/pelvis negative for acute findings, followed by primary team, symptoms have improved   Altered mental status/hallucination, CT head negative for acute findings.  Improved, followed by primary team.  I would like to wean clonidine however   Pneumonia, on IV Zosyn and oxygen supplementation, managed by primary team  Hypertension with ESRD, BP bit over controlled, down to 100/45 yesterday and I'd like to stop clonidine if possible given age, AMS etc   HFpEF, no evidence of acute pulmonary edema.    PLAN:  HD tomorrow  Stop clonidine and watch for rebound in BP (room to inc CCB if needed)  Note plan for discharge home with KLEBER Ramirez MD  01/09/25  10:36 [facility]    [facility]  [phone]

## 2025-01-10 ENCOUNTER — APPOINTMENT (OUTPATIENT)
Dept: GENERAL RADIOLOGY | Facility: HOSPITAL | Age: 75
DRG: 177 | End: 2025-01-10
Payer: MEDICARE

## 2025-01-10 LAB
ANION GAP SERPL CALCULATED.3IONS-SCNC: 19 MMOL/L (ref 5–15)
BASOPHILS # BLD AUTO: 0.04 10*3/MM3 (ref 0–0.2)
BASOPHILS NFR BLD AUTO: 0.3 % (ref 0–1.5)
BUN SERPL-MCNC: 49 MG/DL (ref 8–23)
BUN/CREAT SERPL: 7.2 (ref 7–25)
CALCIUM SPEC-SCNC: 9.3 MG/DL (ref 8.6–10.5)
CHLORIDE SERPL-SCNC: 99 MMOL/L (ref 98–107)
CO2 SERPL-SCNC: 20 MMOL/L (ref 22–29)
CREAT SERPL-MCNC: 6.78 MG/DL (ref 0.57–1)
DEPRECATED RDW RBC AUTO: 50.5 FL (ref 37–54)
EGFRCR SERPLBLD CKD-EPI 2021: 5.9 ML/MIN/1.73
EOSINOPHIL # BLD AUTO: 0.28 10*3/MM3 (ref 0–0.4)
EOSINOPHIL NFR BLD AUTO: 2.4 % (ref 0.3–6.2)
ERYTHROCYTE [DISTWIDTH] IN BLOOD BY AUTOMATED COUNT: 16.5 % (ref 12.3–15.4)
GEN 5 1HR TROPONIN T REFLEX: 124 NG/L
GLUCOSE SERPL-MCNC: 77 MG/DL (ref 65–99)
HCT VFR BLD AUTO: 28.7 % (ref 34–46.6)
HGB BLD-MCNC: 9.1 G/DL (ref 12–15.9)
IMM GRANULOCYTES # BLD AUTO: 0.52 10*3/MM3 (ref 0–0.05)
IMM GRANULOCYTES NFR BLD AUTO: 4.4 % (ref 0–0.5)
LYMPHOCYTES # BLD AUTO: 1.08 10*3/MM3 (ref 0.7–3.1)
LYMPHOCYTES NFR BLD AUTO: 9.2 % (ref 19.6–45.3)
MAGNESIUM SERPL-MCNC: 2.2 MG/DL (ref 1.6–2.4)
MCH RBC QN AUTO: 27 PG (ref 26.6–33)
MCHC RBC AUTO-ENTMCNC: 31.7 G/DL (ref 31.5–35.7)
MCV RBC AUTO: 85.2 FL (ref 79–97)
MONOCYTES # BLD AUTO: 0.7 10*3/MM3 (ref 0.1–0.9)
MONOCYTES NFR BLD AUTO: 6 % (ref 5–12)
NEUTROPHILS NFR BLD AUTO: 77.7 % (ref 42.7–76)
NEUTROPHILS NFR BLD AUTO: 9.12 10*3/MM3 (ref 1.7–7)
NRBC BLD AUTO-RTO: 0 /100 WBC (ref 0–0.2)
PHOSPHATE SERPL-MCNC: 5.8 MG/DL (ref 2.5–4.5)
PLATELET # BLD AUTO: 406 10*3/MM3 (ref 140–450)
PMV BLD AUTO: 9.2 FL (ref 6–12)
POTASSIUM SERPL-SCNC: 5.2 MMOL/L (ref 3.5–5.2)
QT INTERVAL: 387 MS
QTC INTERVAL: 502 MS
RBC # BLD AUTO: 3.37 10*6/MM3 (ref 3.77–5.28)
SODIUM SERPL-SCNC: 138 MMOL/L (ref 136–145)
TROPONIN T % DELTA: 6 %
TROPONIN T NUMERIC DELTA: 7 NG/L
TROPONIN T SERPL HS-MCNC: 117 NG/L
WBC NRBC COR # BLD AUTO: 11.74 10*3/MM3 (ref 3.4–10.8)

## 2025-01-10 PROCEDURE — 80048 BASIC METABOLIC PNL TOTAL CA: CPT | Performed by: INTERNAL MEDICINE

## 2025-01-10 PROCEDURE — 25810000003 SODIUM CHLORIDE 0.9 % SOLUTION: Performed by: NURSE PRACTITIONER

## 2025-01-10 PROCEDURE — 94761 N-INVAS EAR/PLS OXIMETRY MLT: CPT

## 2025-01-10 PROCEDURE — 25010000002 PIPERACILLIN SOD-TAZOBACTAM PER 1 G: Performed by: HOSPITALIST

## 2025-01-10 PROCEDURE — 94799 UNLISTED PULMONARY SVC/PX: CPT

## 2025-01-10 PROCEDURE — 94664 DEMO&/EVAL PT USE INHALER: CPT

## 2025-01-10 PROCEDURE — 83735 ASSAY OF MAGNESIUM: CPT | Performed by: HOSPITALIST

## 2025-01-10 PROCEDURE — 84100 ASSAY OF PHOSPHORUS: CPT | Performed by: HOSPITALIST

## 2025-01-10 PROCEDURE — 25010000002 ONDANSETRON PER 1 MG: Performed by: INTERNAL MEDICINE

## 2025-01-10 PROCEDURE — 84484 ASSAY OF TROPONIN QUANT: CPT | Performed by: HOSPITALIST

## 2025-01-10 PROCEDURE — 93010 ELECTROCARDIOGRAM REPORT: CPT | Performed by: INTERNAL MEDICINE

## 2025-01-10 PROCEDURE — 99222 1ST HOSP IP/OBS MODERATE 55: CPT | Performed by: INTERNAL MEDICINE

## 2025-01-10 PROCEDURE — 71046 X-RAY EXAM CHEST 2 VIEWS: CPT

## 2025-01-10 PROCEDURE — 93005 ELECTROCARDIOGRAM TRACING: CPT | Performed by: HOSPITALIST

## 2025-01-10 PROCEDURE — 85025 COMPLETE CBC W/AUTO DIFF WBC: CPT | Performed by: INTERNAL MEDICINE

## 2025-01-10 RX ORDER — IPRATROPIUM BROMIDE AND ALBUTEROL SULFATE 2.5; .5 MG/3ML; MG/3ML
3 SOLUTION RESPIRATORY (INHALATION) EVERY 6 HOURS PRN
Status: DISCONTINUED | OUTPATIENT
Start: 2025-01-10 | End: 2025-01-17 | Stop reason: HOSPADM

## 2025-01-10 RX ORDER — NITROGLYCERIN 0.4 MG/1
0.4 TABLET SUBLINGUAL
Status: DISCONTINUED | OUTPATIENT
Start: 2025-01-10 | End: 2025-01-15

## 2025-01-10 RX ORDER — CLONIDINE HYDROCHLORIDE 0.1 MG/1
0.1 TABLET ORAL EVERY 12 HOURS SCHEDULED
Status: DISCONTINUED | OUTPATIENT
Start: 2025-01-10 | End: 2025-01-11

## 2025-01-10 RX ADMIN — HYDRALAZINE HYDROCHLORIDE 100 MG: 50 TABLET ORAL at 13:27

## 2025-01-10 RX ADMIN — PIPERACILLIN AND TAZOBACTAM 3.38 G: 3; .375 INJECTION, POWDER, FOR SOLUTION INTRAVENOUS at 13:25

## 2025-01-10 RX ADMIN — PANTOPRAZOLE SODIUM 40 MG: 40 TABLET, DELAYED RELEASE ORAL at 06:05

## 2025-01-10 RX ADMIN — CLONIDINE HYDROCHLORIDE 0.1 MG: 0.1 TABLET ORAL at 13:29

## 2025-01-10 RX ADMIN — IPRATROPIUM BROMIDE AND ALBUTEROL SULFATE 3 ML: 2.5; .5 SOLUTION RESPIRATORY (INHALATION) at 15:49

## 2025-01-10 RX ADMIN — ATORVASTATIN CALCIUM 40 MG: 20 TABLET, FILM COATED ORAL at 13:27

## 2025-01-10 RX ADMIN — CLOPIDOGREL BISULFATE 75 MG: 75 TABLET ORAL at 13:27

## 2025-01-10 RX ADMIN — GUAIFENESIN 1200 MG: 600 TABLET, EXTENDED RELEASE ORAL at 20:20

## 2025-01-10 RX ADMIN — GUAIFENESIN 1200 MG: 600 TABLET, EXTENDED RELEASE ORAL at 13:27

## 2025-01-10 RX ADMIN — ASPIRIN 81 MG: 81 TABLET, COATED ORAL at 13:27

## 2025-01-10 RX ADMIN — IPRATROPIUM BROMIDE AND ALBUTEROL SULFATE 3 ML: 2.5; .5 SOLUTION RESPIRATORY (INHALATION) at 22:08

## 2025-01-10 RX ADMIN — ONDANSETRON 4 MG: 2 INJECTION, SOLUTION INTRAMUSCULAR; INTRAVENOUS at 20:20

## 2025-01-10 RX ADMIN — HYDRALAZINE HYDROCHLORIDE 100 MG: 50 TABLET ORAL at 06:16

## 2025-01-10 RX ADMIN — SODIUM CHLORIDE 250 ML: 9 INJECTION, SOLUTION INTRAVENOUS at 20:32

## 2025-01-10 RX ADMIN — LACTULOSE 15 ML: 10 SOLUTION ORAL at 13:28

## 2025-01-10 RX ADMIN — ISOSORBIDE DINITRATE 30 MG: 20 TABLET ORAL at 13:27

## 2025-01-10 RX ADMIN — AMLODIPINE BESYLATE 5 MG: 5 TABLET ORAL at 13:29

## 2025-01-10 RX ADMIN — ACETAMINOPHEN 650 MG: 325 TABLET ORAL at 20:20

## 2025-01-10 NOTE — PLAN OF CARE
Problem: Adult Inpatient Plan of Care  Goal: Plan of Care Review  Outcome: Progressing  Flowsheets (Taken 1/10/2025 0509)  Progress: no change  Outcome Evaluation: Pt A&Ox4. pt on 2l NC for SOA. Notified on call APRN that pt was c/o SOA, PRN breathing tx ordered.  Plan of Care Reviewed With: patient  Goal: Patient-Specific Goal (Individualized)  Outcome: Progressing  Goal: Absence of Hospital-Acquired Illness or Injury  Outcome: Progressing  Intervention: Identify and Manage Fall Risk  Recent Flowsheet Documentation  Taken 1/10/2025 0405 by Verónica Costa RN  Safety Promotion/Fall Prevention:   clutter free environment maintained   fall prevention program maintained   lighting adjusted   room organization consistent   safety round/check completed  Taken 1/10/2025 0245 by Verónica Costa RN  Safety Promotion/Fall Prevention:   clutter free environment maintained   fall prevention program maintained   lighting adjusted   room organization consistent   safety round/check completed  Taken 1/10/2025 0040 by Verónica Costa RN  Safety Promotion/Fall Prevention:   clutter free environment maintained   fall prevention program maintained   lighting adjusted   room organization consistent   safety round/check completed  Taken 1/9/2025 2230 by Verónica Costa RN  Safety Promotion/Fall Prevention:   clutter free environment maintained   fall prevention program maintained   lighting adjusted   room organization consistent   safety round/check completed  Taken 1/9/2025 2000 by Verónica Costa RN  Safety Promotion/Fall Prevention:   clutter free environment maintained   fall prevention program maintained   lighting adjusted   room organization consistent   safety round/check completed  Intervention: Prevent Skin Injury  Recent Flowsheet Documentation  Taken 1/10/2025 0405 by Verónica Costa RN  Body Position:   side-lying   left  Taken 1/10/2025 0245 by Veróniac Costa RN  Body Position:   side-lying   right  Taken 1/10/2025 0040 by  Verónica Costa RN  Body Position:   side-lying   right  Taken 1/9/2025 2230 by Verónica Costa RN  Body Position: side-lying  Taken 1/9/2025 2105 by Verónica Costa RN  Skin Protection: skin sealant/moisture barrier applied  Taken 1/9/2025 2000 by Verónica Costa RN  Body Position:   side-lying   left  Intervention: Prevent Infection  Recent Flowsheet Documentation  Taken 1/10/2025 0405 by Verónica Costa RN  Infection Prevention:   environmental surveillance performed   hand hygiene promoted   rest/sleep promoted   single patient room provided   equipment surfaces disinfected  Taken 1/10/2025 0245 by Verónica Costa RN  Infection Prevention:   environmental surveillance performed   hand hygiene promoted   rest/sleep promoted   single patient room provided   equipment surfaces disinfected  Taken 1/10/2025 0040 by Verónica Costa RN  Infection Prevention:   environmental surveillance performed   hand hygiene promoted   rest/sleep promoted   single patient room provided   equipment surfaces disinfected  Taken 1/9/2025 2230 by Verónica Costa RN  Infection Prevention:   environmental surveillance performed   hand hygiene promoted   rest/sleep promoted   single patient room provided   equipment surfaces disinfected  Taken 1/9/2025 2000 by Verónica Costa RN  Infection Prevention:   environmental surveillance performed   hand hygiene promoted   rest/sleep promoted   single patient room provided   equipment surfaces disinfected  Goal: Optimal Comfort and Wellbeing  Outcome: Progressing  Intervention: Provide Person-Centered Care  Recent Flowsheet Documentation  Taken 1/9/2025 2105 by Verónica Costa RN  Trust Relationship/Rapport:   care explained   choices provided   emotional support provided   empathic listening provided   questions answered   questions encouraged   reassurance provided   thoughts/feelings acknowledged  Goal: Readiness for Transition of Care  Outcome: Progressing   Goal Outcome Evaluation:  Plan of Care  Reviewed With: patient        Progress: no change  Outcome Evaluation: Pt A&Ox4. pt on 2l NC for SOA. Notified on call APRN that pt was c/o SOA, PRN breathing tx ordered.

## 2025-01-10 NOTE — CASE MANAGEMENT/SOCIAL WORK
Continued Stay Note  [facility]     Patient Name: Sonia Acharya  MRN: [id number]  Today's Date: 1/10/2025    Admit Date: 1/6/2025    Plan: Home with [facility] and Oxygen/WC through [employer]   Discharge Plan       Row Name 01/10/25 1401       Plan    Plan Home with [facility] and Oxygen/WC through [employer]    Patient/Family in Agreement with Plan yes    Plan Comments Spoke with [facility], they have accepted and are aware of potential dc over weekend. Spoke with Marian[facility], they will deliver oxygen tank to bedside today and w/c to home with concentrator set up at dc. Nursing to coordinate with weekend CCP at [id number] to alert all parties to dc. Orders for HH and DME have already been entered. Family will transport, CCP will follow - Marcy ROMERO                   Discharge Codes    No documentation.                 Expected Discharge Date and Time       Expected Discharge Date Expected Discharge Time    Jan 11, 2025               Marcy Taylor RN

## 2025-01-10 NOTE — PROGRESS NOTES
Name: Sonia Acharya ADMIT: 2025   : 1950  PCP: Marcy Paez PA    MRN: [id number] LOS: 4 days   AGE/SEX: 74 y.o. female  ROOM: [facility]     Subjective   Subjective   Cough improved. No SOA. No palp. C/o right sided chest pain when returned from HD. Radiates into right shoulder and neck. No SOA or diaphoresis. Not worse with deep breath. No N/V/D/abd pain. Says this has been going on for weeks. She is eating very well. No longer confused or hallucinating. Sleeping well. No F/C/NS.        Objective   Objective   Vital Signs  Temp:  [97.3 °F (36.3 °C)-98.4 °F (36.9 °C)] 97.3 °F (36.3 °C)  Heart Rate:  [68-80] 71  Resp:  [16-18] 18  BP: (118-165)/() 157/73  SpO2:  [95 %-100 %] 100 %  on  Flow (L/min) (Oxygen Therapy):  [2] 2;   Device (Oxygen Therapy): nasal cannula  Body mass index is 16.83 kg/m².    (No change in exam today)    Physical Exam  Vitals and nursing note reviewed. Exam conducted with a chaperone present (RN).   Constitutional:       General: She is not in acute distress.     Appearance: She is ill-appearing (chronically). She is not toxic-appearing or diaphoretic.   HENT:      Head: Normocephalic.      Nose: Nose normal.      Mouth/Throat:      Mouth: Mucous membranes are moist.      Pharynx: Oropharynx is clear.   Eyes:      General: No scleral icterus.     Extraocular Movements: Extraocular movements intact.   Cardiovascular:      Rate and Rhythm: Normal rate and regular rhythm.      Pulses: Normal pulses.      Comments: AVF RUE  Pulmonary:      Effort: Pulmonary effort is normal. No respiratory distress.      Breath sounds: Rales (left base) present. No wheezing.   Chest:      Chest wall: Tenderness (right anterior, no crepitus) present.   Abdominal:      General: Bowel sounds are normal. There is no distension.      Palpations: Abdomen is soft.      Tenderness: There is no abdominal tenderness.   Musculoskeletal:         General: No swelling. Normal range of motion.       Cervical back: Neck supple.      Comments: Left knee with chronic bony changes, no acute effusion or erythema   Skin:     General: Skin is warm and dry.      Capillary Refill: Capillary refill takes less than 2 seconds.      Coloration: Skin is not jaundiced.   Neurological:      General: No focal deficit present.      Mental Status: She is alert and oriented to person, place, and time. Mental status is at baseline.      Cranial Nerves: No cranial nerve deficit.      Coordination: Coordination normal.   Psychiatric:         Mood and Affect: Mood normal.         Behavior: Behavior normal.         Thought Content: Thought content normal.       Results Review     I reviewed the patient's new clinical results.  Results from last 7 days   Lab Units 01/10/25  0624 01/09/25  0558 01/08/25  0602 01/07/25  0703   WBC 10*3/mm3 11.74* 10.05 8.27 10.51   HEMOGLOBIN g/dL 9.1* 8.8* 9.5* 10.1*   PLATELETS 10*3/mm3 406 357 348 429     Results from last 7 days   Lab Units 01/10/25  0624 01/09/25  0558 01/08/25  0602 01/07/25  0703   SODIUM mmol/L 138 138 138 137   POTASSIUM mmol/L 5.2 4.4 4.1 4.8   CHLORIDE mmol/L 99 99 103 97*   CO2 mmol/L 20.0* 21.0* 23.0 20.0*   BUN mg/dL 49* 38* 24* 41*   CREATININE mg/dL 6.78* 5.44* 3.99* 6.24*   GLUCOSE mg/dL 77 81 91 81   EGFR mL/min/1.73 5.9* 7.7* 11.2* 6.5*     Results from last 7 days   Lab Units 01/09/25 0558 01/08/25  0602 01/06/25  1321   ALBUMIN g/dL 2.9* 3.0* 3.4*   BILIRUBIN mg/dL 0.2 0.2 0.3   ALK PHOS U/L 82 93 96   AST (SGOT) U/L 10 12 15   ALT (SGPT) U/L 7 8 7     Results from last 7 days   Lab Units 01/10/25  0624 01/09/25  0558 01/08/25  0602 01/07/25  0703 01/06/25  1321   CALCIUM mg/dL 9.3 9.0 9.2 9.6 9.7   ALBUMIN g/dL  --  2.9* 3.0*  --  3.4*   MAGNESIUM mg/dL 2.2 2.2 2.2  --   --    PHOSPHORUS mg/dL 5.8* 4.6* 3.7  --   --      Results from last 7 days   Lab Units 01/06/25  1801 01/06/25  1321   PROCALCITONIN ng/mL  --  0.81*   LACTATE mmol/L 1.1  --      Hemoglobin A1C    Date/Time Value Ref Range Status   01/08/2025 0602 4.90 4.80 - 5.60 % Final       No radiology results for the last day    I have personally reviewed all medications:  Scheduled Medications  amLODIPine, 5 mg, Oral, Daily  aspirin, 81 mg, Oral, Daily  atorvastatin, 40 mg, Oral, Daily  cloNIDine, 0.1 mg, Oral, Q12H  clopidogrel, 75 mg, Oral, Daily  guaiFENesin, 1,200 mg, Oral, Q12H  hydrALAZINE, 100 mg, Oral, Q8H  ipratropium-albuterol, 3 mL, Nebulization, 4x Daily - RT  isosorbide dinitrate, 30 mg, Oral, Daily  lactulose, 15 mL, Oral, Daily  losartan, 100 mg, Oral, Nightly  pantoprazole, 40 mg, Oral, Q AM  piperacillin-tazobactam, 3.375 g, Intravenous, Q12H    Infusions     Diet  Diet: Cardiac; Healthy Heart (2-3 Na+); Fluid Consistency: Thin (IDDSI 0)    I have personally reviewed:  [x]  Laboratory   [x]  Microbiology   [x]  Radiology   [x]  EKG/Telemetry  []  Cardiology/Vascular   []  Pathology    [x]  Records       Assessment/Plan     Active Hospital Problems    Diagnosis  POA    **Bacterial pneumonia, treating as gram negative [J15.9]  Yes    Severe protein-calorie malnutrition [E43]  Yes    Chronic respiratory failure with hypoxia [J96.11]  Yes    CAD (coronary artery disease) [I25.10]  Yes    Nausea, vomiting, and diarrhea [R11.2, R19.7]  Yes    ESRD on dialysis [N18.6, Z99.2]  Not Applicable    ANGIE (obstructive sleep apnea) [G47.33]  Yes    Chronic diastolic CHF (congestive heart failure) [I50.32]  Yes    Anemia, chronic disease [D63.8]  Yes    HTN (hypertension) [I10]  Yes    Generalized abdominal pain [R10.84]  Yes      Resolved Hospital Problems   No resolved problems to display.     73yo woman with ESRD, ACD, HTN, chronic diastolic CHF, CHRF (2L/min), ANGIE, and GERD , who presented to ER from home with N/V/D/abd pain, cough, confusion, and hallucinations. She was admitted with LLL pneumonia--concern for aspiration.    LLL PNA  Concern for aspiration  CHRF (2L/min at home)  SLP eval good  Completing 5d of  "IV Zosyn  Blood cultures NGTD  Urinary Ag's ordered but pt does not make urine  Sputum culture neg  RVP neg  WBC normalized  Afebrile  Stable on 2L/min (her baseline)  Continue OPEP and IS  Continue Mucinex  Continue DuoNebs    N/V/D  Abd pain  Uncertain etiology  CT A/P unrevealing  GI PCR ordered but symptoms improved  Holding Lactulose but have restarted now to avoid constipation    Encephalopathy NOS  Hallucinations  Suspect due to acute illness  CT Head showed NAD  Much improved    ESRD on dialysis  Appreciate Renal attention to pt  Continue HD here MWF    Chronic diastolic CHF  Volume mgmt per Renal  Not on diuretics PTA    HTN  BPs a bit robust on current regimen  Renal has restarted Clonidine today  Continue to monitor on telemetry    GERD  PPI    Anemia of CKD  Hgb stable  No evidence of bleeding  Continue to monitor closely    CAD  ARIANA placed in August  Continue DAPT and statin  C/o chest pain today--EKG, CXR, and Trop ordered  SL NTG ordered--no relief with one dose so far  Reports this has been intermittent for \"weeks now\" and has not been addressed by any of her providers  Had CTA Chest on 12/31 that did not show any rib fractures or PE  Will ask Card to see--she is at high risk for ACS    H/o DVT  DVT ppx with DAPT and SCDs    Chronic left knee pain  This is a long term issue for her  No acute changes    DM2?  Mentioned in chart but on no meds  A1c only 4.9  Sugars fine here  I do not think that she has diabetes      DAPT and SCDs should suffice for DVT prophylaxis.  Full code.  Discussed with patient, Pharm, and RN. No family present.  Anticipate discharge home with HH (per PT recs) tomorrow if continues to do well and chest pain does not recur.  Expected Discharge Date: 1/11/2025; Expected Discharge Time:       Shaan Baltazar MD  [facility]  01/10/25  13:36 EST    "

## 2025-01-10 NOTE — NURSING NOTE
Dialysis RN called and report was given. RN reported will put in transport for pt at approximately 0730.

## 2025-01-10 NOTE — CONSULTS
Cardiology History & Physical / Consultation      Patient Name: Sonia Acharya  Age/Sex: 74 y.o. female  : 1950  MRN: [id number]    Date of Admission: 2025  Date of Encounter Visit: 01/10/25  Encounter Provider: Rudi Hassan MD  Referring Provider: Riaz Davalos MD  [facility] of Service: [facility]  Patient Care Team:  Marcy Paez PA as PCP - General (Physician Assistant)          Subjective:     Chief Complaint: Chest discomfort/shortness of breath    Reason for consultation: Chest discomfort/shortness of breath    History of Present Illness:  Sonia Acharya is a 74 y.o. female who I saw back in 2023.  Patient presented to the hospital with nausea vomiting diarrhea abdominal discomfort confusion and hallucinations.  There was a concern for pneumonia.  She underwent dialysis today.  When she got back from dialysis she was having chest discomfort and shortness of breath.  Patient had 3 L of fluid removed.  Patient currently says she just does not feel good.  She complains of chest discomfort that is on the right side.  She says this is worse when she takes a deep breath.      Past Medical History:  Past Medical History:   Diagnosis Date    Arthritis     Chronic kidney disease     Heart disease     Hypertension        Past Surgical History:   Procedure Laterality Date    BREAST SURGERY      DIALYSIS FISTULA CREATION      EYE SURGERY      HERNIA REPAIR      Dr. Sung       Home Medications:   Medications Prior to Admission   Medication Sig Dispense Refill Last Dose/Taking    amLODIPine (NORVASC) 5 MG tablet Take 1 tablet by mouth Daily. Take if SBP >160   Past Week    aspirin 81 MG EC tablet Take 1 tablet by mouth Daily.   Taking    atorvastatin (LIPITOR) 40 MG tablet Take 1 tablet by mouth Daily.   Past Week    calcium carbonate (TUMS) 500 MG chewable tablet Chew 1 tablet Every 6 (Six) Hours As Needed for Indigestion or Heartburn.    Past Week    cloNIDine (CATAPRES) 0.1 MG tablet Take 1 tablet by mouth 2 (Two) Times a Day. Do not take if SBP <130   Past Week    clopidogrel (PLAVIX) 75 MG tablet Take 1 tablet by mouth Daily.   Past Week    hydrALAZINE (APRESOLINE) 25 MG tablet Take 4 tablets by mouth 3 (Three) Times a Day.   Past Week    isosorbide dinitrate (ISORDIL) 30 MG tablet Take 1 tablet by mouth Daily.   Past Week    lactulose (CHRONULAC) 10 GM/15ML solution Take 15 mL by mouth Daily. (Patient taking differently: Take 15 mL by mouth Daily As Needed.) 30 mL 0 Past Week    losartan (COZAAR) 100 MG tablet Take 1 tablet by mouth Daily. 30 tablet 0 Past Week    pantoprazole (PROTONIX) 40 MG EC tablet Take 1 tablet by mouth 2 (Two) Times a Day Before Meals. (Patient taking differently: Take 1 tablet by mouth 2 (Two) Times a Day As Needed.) 30 tablet 0        Allergies:  No Known Allergies    Past Social History:  Social History     Socioeconomic History    Marital status: Single   Tobacco Use    Smoking status: Never     Passive exposure: Never    Smokeless tobacco: Never   Vaping Use    Vaping status: Never Used   Substance and Sexual Activity    Alcohol use: No    Drug use: No    Sexual activity: Defer       Past Family History: History reviewed. No pertinent family history.   Family History   Problem Relation Age of Onset    Heart disease Father     Breast cancer Sister        Review of Systems        Objective:     Objective:  Temp:  [97.3 °F (36.3 °C)-98.4 °F (36.9 °C)] 98.4 °F (36.9 °C)  Heart Rate:  [68-84] 84  Resp:  [16-18] 18  BP: (125-165)/() 125/57    Intake/Output Summary (Last 24 hours) at 1/10/2025 1524  Last data filed at 1/10/2025 1227  Gross per 24 hour   Intake 650 ml   Output 3000 ml   Net -2350 ml     Body mass index is 16.83 kg/m².      01/09/25  0641 01/10/25  0358 01/10/25  1227   Weight: 43.6 kg (96 lb 1.9 oz) 45.7 kg (100 lb 12 oz) 39.1 kg (86 lb 3.2 oz)           Physical Exam:   Vitals reviewed.    Constitutional:       Appearance: Acutely ill-appearing.   Pulmonary:      Effort: Pulmonary effort is normal.   Cardiovascular:      Normal rate. Regular rhythm. Normal S1. Normal S2.       Murmurs: There is no murmur.      No gallop.  No click. Biphasic rub.   Pulses:     Intact distal pulses.   Edema:     Peripheral edema absent.   Neurological:      Mental Status: Alert.          Labs:   Lab Review:     Results from last 7 days   Lab Units 01/10/25  0624 01/09/25  0558 01/08/25  0602 01/07/25  0703 01/06/25  1321 01/04/25  0000   SODIUM mmol/L 138 138 138 137 134*  --    POTASSIUM mmol/L 5.2 4.4 4.1 4.8 4.0  --    CHLORIDE mmol/L 99 99 103 97* 94*  --    CO2 mmol/L 20.0* 21.0* 23.0 20.0* 21.9*  --    BUN mg/dL 49* 38* 24* 41* 29* 57*   CREATININE mg/dL 6.78* 5.44* 3.99* 6.24* 5.54*  --    GLUCOSE mg/dL 77 81 91 81 104*  --    CALCIUM mg/dL 9.3 9.0 9.2 9.6 9.7  --    AST (SGOT) U/L  --  10 12  --  15  --    ALT (SGPT) U/L  --  7 8  --  7  --      Results from last 7 days   Lab Units 01/10/25  1352 01/06/25  1425 01/06/25  1321   HSTROP T ng/L 117* 113* 111*     Results from last 7 days   Lab Units 01/10/25  0624   WBC 10*3/mm3 11.74*   HEMOGLOBIN g/dL 9.1*   HEMATOCRIT % 28.7*   PLATELETS 10*3/mm3 406             Results from last 7 days   Lab Units 01/10/25  0624   MAGNESIUM mg/dL 2.2                               Assessment:       Bacterial pneumonia, treating as gram negative    Generalized abdominal pain    Anemia, chronic disease    HTN (hypertension)    ESRD on dialysis    ANGIE (obstructive sleep apnea)    Chronic diastolic CHF (congestive heart failure)    Chronic respiratory failure with hypoxia    CAD (coronary artery disease)    Nausea, vomiting, and diarrhea    Severe protein-calorie malnutrition        Plan:     1.  Right sided chest discomfort.  This is consistent with a pulmonary etiology.  Patient with a chest x-ray that shows some patchy atelectasis or infiltrate on the right infrahilar region.   This would be consistent with a pneumonia and pain secondary to that.  2.  Patient has a classic pericardial rub that is very loud.  Check an echocardiogram.  3.  History of coronary artery disease.  ECG shows left bundle branch block.  Troponins are elevated but are not changing this is frequently found in patients with end-stage renal disease.  Not consistent with ischemic event.  4.  End-stage renal disease patient was dialyzed today.  With the confusion had a pericardial rub obvious concern of byproducts hopefully the dialysis today will significantly help.  5.  Pneumonia being treated with Zosyn  6.  Will check an echocardiogram and follow.    Thank you for allowing me to participate in the care of Sonia Acharya. Feel free to contact me directly with any further questions or concerns.    Rudi Hassan MD  [facility]  01/10/25  15:24 EST

## 2025-01-10 NOTE — SIGNIFICANT NOTE
01/10/25 1436   OTHER   Discipline physical therapy assistant   Rehab Time/Intention   Session Not Performed other (see comments)  (Pt off the floor in dialysis and now in X-ray. Will follow up with pt over the weekend of not d/c'd tomorrow.)   Recommendation   PT - Next Appointment 01/11/25

## 2025-01-10 NOTE — PROGRESS NOTES
[facility] Progress Note      Patient Name: Sonia Acharya  : 1950  MRN: [id number]  Primary Care Physician:  Marcy Paez PA  Date of admission: 2025    Subjective     Interval History:   F/u ESRD     Review of Systems:   BP been high ie 150s systolic since I stopped clonidine yesterday  Denies dyspnea or nausea     Objective     Vitals:   Temp:  [97.9 °F (36.6 °C)-98.4 °F (36.9 °C)] 98.4 °F (36.9 °C)  Heart Rate:  [68-80] 73  Resp:  [16] 16  BP: (118-165)/() 158/73  Flow (L/min) (Oxygen Therapy):  [2] 2    Intake/Output Summary (Last 24 hours) at 1/10/2025 0817  Last data filed at 2025 2105  Gross per 24 hour   Intake 1210 ml   Output --   Net 1210 ml       Physical Exam:    General Appearance: alert, comfortable on NC O2  Neck: supple, no JVD  Lungs: CTA bilat no rales  Heart: RRR, normal S1 and S2  Abdomen: soft, nontender, nondistended  Extremities: no edema, cyanosis or clubbing       Scheduled Meds:     amLODIPine, 5 mg, Oral, Daily  aspirin, 81 mg, Oral, Daily  atorvastatin, 40 mg, Oral, Daily  cloNIDine, 0.1 mg, Oral, Q12H  clopidogrel, 75 mg, Oral, Daily  guaiFENesin, 1,200 mg, Oral, Q12H  hydrALAZINE, 100 mg, Oral, Q8H  ipratropium-albuterol, 3 mL, Nebulization, 4x Daily - RT  isosorbide dinitrate, 30 mg, Oral, Daily  lactulose, 15 mL, Oral, Daily  losartan, 100 mg, Oral, Nightly  pantoprazole, 40 mg, Oral, Q AM  piperacillin-tazobactam, 3.375 g, Intravenous, Q12H      IV Meds:        Results Reviewed:   I have personally reviewed the results from the time of this admission to 1/10/2025 08:17 EST     Results from last 7 days   Lab Units 01/10/25  0624 25  0558 25  0602 25  0703 25  1321   SODIUM mmol/L 138 138 138   < > 134*   POTASSIUM mmol/L 5.2 4.4 4.1   < > 4.0   CHLORIDE mmol/L 99 99 103   < > 94*   CO2 mmol/L 20.0* 21.0* 23.0   < > 21.9*   BUN mg/dL 49* 38* 24*   < > 29*   CREATININE mg/dL 6.78* 5.44* 3.99*   < > 5.54*    CALCIUM mg/dL 9.3 9.0 9.2   < > 9.7   BILIRUBIN mg/dL  --  0.2 0.2  --  0.3   ALK PHOS U/L  --  82 93  --  96   ALT (SGPT) U/L  --  7 8  --  7   AST (SGOT) U/L  --  10 12  --  15   GLUCOSE mg/dL 77 81 91   < > 104*    < > = values in this interval not displayed.     Estimated Creatinine Clearance: 5.2 mL/min (A) (by C-G formula based on SCr of 6.78 mg/dL (H)).  Results from last 7 days   Lab Units 01/10/25  0624 01/09/25  0558 01/08/25  0602   MAGNESIUM mg/dL 2.2 2.2 2.2   PHOSPHORUS mg/dL 5.8* 4.6* 3.7         Results from last 7 days   Lab Units 01/10/25  0624 01/09/25  0558 01/08/25  0602 01/07/25  0703 01/06/25  1321   WBC 10*3/mm3 11.74* 10.05 8.27 10.51 11.19*   HEMOGLOBIN g/dL 9.1* 8.8* 9.5* 10.1* 9.3*   PLATELETS 10*3/mm3 406 357 348 429 416           Assessment / Plan     ASSESSMENT:  ESRD on HD, MWF, via RUE AVF, last HD was on last TUES (off cycle).  K generous 5.2.  Vol stable but unable to remove fluid on TUES treatment due to hypotension.  Dialyze today to get back in sync with regular schedule   Nausea/vomiting/diarrhea + abdominal pain, CT abdomen/pelvis negative for acute findings, followed by primary team, symptoms have resolved   Altered mental status/hallucination, CT head negative for acute findings.  Improved, followed by primary team.  I attempted to stop clonidine but BP already rebounded.  She's alert now   Pneumonia, on IV Zosyn and oxygen supplementation, managed by primary team  Hypertension with ESRD, BP labile, was on low side down to 100/45 ~ 48h ago and unable to remove fluid last HD so and I stopped clonidine also given age, early AMS etc, and BP has rebounded to 150s systolic  HFpEF, volume mgmt via HD     PLAN:  Restart clonidine 0.1 BID as BP already rebounding  HD today, remove 2L as tolerated - asked RN to hold antihypertensives predialysis this AM  No objection to discharge from nephrology standpoint      Arjun Ramirez MD  01/10/25  08:17 EST    Nephrology  [facility]  [phone]

## 2025-01-11 ENCOUNTER — APPOINTMENT (OUTPATIENT)
Dept: CARDIOLOGY | Facility: HOSPITAL | Age: 75
DRG: 177 | End: 2025-01-11
Payer: MEDICARE

## 2025-01-11 LAB
ALBUMIN SERPL-MCNC: 2.9 G/DL (ref 3.5–5.2)
ALBUMIN/GLOB SERPL: 0.8 G/DL
ALP SERPL-CCNC: 81 U/L (ref 39–117)
ALT SERPL W P-5'-P-CCNC: 6 U/L (ref 1–33)
ANION GAP SERPL CALCULATED.3IONS-SCNC: 13.7 MMOL/L (ref 5–15)
AORTIC ARCH: 3 CM
AORTIC DIMENSIONLESS INDEX: 0.44 (DI)
ASCENDING AORTA: 3 CM
AST SERPL-CCNC: 12 U/L (ref 1–32)
AV MEAN PRESS GRAD SYS DOP V1V2: 14.6 MMHG
AV VMAX SYS DOP: 263.8 CM/SEC
BACTERIA SPEC AEROBE CULT: NORMAL
BACTERIA SPEC AEROBE CULT: NORMAL
BH CV ECHO LEFT VENTRICLE GLOBAL LONGITUDINAL STRAIN: -16.4 %
BH CV ECHO MEAS - ACS: 1.56 CM
BH CV ECHO MEAS - AO MAX PG: 27.8 MMHG
BH CV ECHO MEAS - AO ROOT DIAM: 2.8 CM
BH CV ECHO MEAS - AO V2 VTI: 45.3 CM
BH CV ECHO MEAS - AVA(I,D): 1.12 CM2
BH CV ECHO MEAS - EDV(CUBED): 89.5 ML
BH CV ECHO MEAS - EDV(MOD-SP2): 79 ML
BH CV ECHO MEAS - EDV(MOD-SP4): 82 ML
BH CV ECHO MEAS - EF(MOD-SP2): 50.6 %
BH CV ECHO MEAS - EF(MOD-SP4): 58.5 %
BH CV ECHO MEAS - ESV(CUBED): 22.8 ML
BH CV ECHO MEAS - ESV(MOD-SP2): 39 ML
BH CV ECHO MEAS - ESV(MOD-SP4): 34 ML
BH CV ECHO MEAS - FS: 36.6 %
BH CV ECHO MEAS - IVS/LVPW: 0.92 CM
BH CV ECHO MEAS - IVSD: 1.63 CM
BH CV ECHO MEAS - LAT PEAK E' VEL: 7 CM/SEC
BH CV ECHO MEAS - LV MASS(C)D: 331.1 GRAMS
BH CV ECHO MEAS - LV MAX PG: 5.3 MMHG
BH CV ECHO MEAS - LV MEAN PG: 2.8 MMHG
BH CV ECHO MEAS - LV V1 MAX: 115.2 CM/SEC
BH CV ECHO MEAS - LV V1 VTI: 20 CM
BH CV ECHO MEAS - LVIDD: 4.5 CM
BH CV ECHO MEAS - LVIDS: 2.8 CM
BH CV ECHO MEAS - LVOT AREA: 2.5 CM2
BH CV ECHO MEAS - LVOT DIAM: 1.8 CM
BH CV ECHO MEAS - LVPWD: 1.76 CM
BH CV ECHO MEAS - MED PEAK E' VEL: 4.9 CM/SEC
BH CV ECHO MEAS - MR MAX PG: 28.6 MMHG
BH CV ECHO MEAS - MR MAX VEL: 267.4 CM/SEC
BH CV ECHO MEAS - MV A DUR: 0.12 SEC
BH CV ECHO MEAS - MV A MAX VEL: 105.2 CM/SEC
BH CV ECHO MEAS - MV DEC SLOPE: 375.4 CM/SEC2
BH CV ECHO MEAS - MV DEC TIME: 0.27 SEC
BH CV ECHO MEAS - MV E MAX VEL: 108.2 CM/SEC
BH CV ECHO MEAS - MV E/A: 1.03
BH CV ECHO MEAS - MV MAX PG: 4.8 MMHG
BH CV ECHO MEAS - MV MEAN PG: 2.29 MMHG
BH CV ECHO MEAS - MV P1/2T: 84.6 MSEC
BH CV ECHO MEAS - MV V2 VTI: 28.6 CM
BH CV ECHO MEAS - MVA(P1/2T): 2.6 CM2
BH CV ECHO MEAS - MVA(VTI): 1.78 CM2
BH CV ECHO MEAS - PA ACC TIME: 0.07 SEC
BH CV ECHO MEAS - PA V2 MAX: 146.9 CM/SEC
BH CV ECHO MEAS - PI END-D VEL: 139 CM/SEC
BH CV ECHO MEAS - PULM A REVS DUR: 0.12 SEC
BH CV ECHO MEAS - PULM A REVS VEL: 26 CM/SEC
BH CV ECHO MEAS - PULM DIAS VEL: 37.6 CM/SEC
BH CV ECHO MEAS - PULM S/D: 1.42
BH CV ECHO MEAS - PULM SYS VEL: 53.4 CM/SEC
BH CV ECHO MEAS - QP/QS: 2.07
BH CV ECHO MEAS - RV MAX PG: 7.5 MMHG
BH CV ECHO MEAS - RV V1 MAX: 137.1 CM/SEC
BH CV ECHO MEAS - RV V1 VTI: 22.6 CM
BH CV ECHO MEAS - RVOT DIAM: 2.44 CM
BH CV ECHO MEAS - SV(LVOT): 50.9 ML
BH CV ECHO MEAS - SV(MOD-SP2): 40 ML
BH CV ECHO MEAS - SV(MOD-SP4): 48 ML
BH CV ECHO MEAS - SV(RVOT): 105.5 ML
BH CV ECHO MEAS - TAPSE (>1.6): 1.9 CM
BH CV ECHO MEASUREMENTS AVERAGE E/E' RATIO: 18.18
BH CV XLRA - RV BASE: 3.6 CM
BH CV XLRA - RV LENGTH: 7.6 CM
BH CV XLRA - RV MID: 2.26 CM
BH CV XLRA - TDI S': 14 CM/SEC
BILIRUB SERPL-MCNC: 0.2 MG/DL (ref 0–1.2)
BUN SERPL-MCNC: 29 MG/DL (ref 8–23)
BUN/CREAT SERPL: 7.4 (ref 7–25)
CALCIUM SPEC-SCNC: 9.1 MG/DL (ref 8.6–10.5)
CHLORIDE SERPL-SCNC: 98 MMOL/L (ref 98–107)
CO2 SERPL-SCNC: 25.3 MMOL/L (ref 22–29)
CREAT SERPL-MCNC: 3.92 MG/DL (ref 0.57–1)
DEPRECATED RDW RBC AUTO: 51 FL (ref 37–54)
EGFRCR SERPLBLD CKD-EPI 2021: 11.4 ML/MIN/1.73
ERYTHROCYTE [DISTWIDTH] IN BLOOD BY AUTOMATED COUNT: 16.4 % (ref 12.3–15.4)
GLOBULIN UR ELPH-MCNC: 3.6 GM/DL
GLUCOSE BLDC GLUCOMTR-MCNC: 144 MG/DL (ref 70–130)
GLUCOSE SERPL-MCNC: 76 MG/DL (ref 65–99)
HCT VFR BLD AUTO: 31.2 % (ref 34–46.6)
HGB BLD-MCNC: 9.8 G/DL (ref 12–15.9)
LEFT ATRIUM VOLUME INDEX: 52.3 ML/M2
LV EF BIPLANE MOD: 56.6 %
MAGNESIUM SERPL-MCNC: 2.2 MG/DL (ref 1.6–2.4)
MCH RBC QN AUTO: 26.6 PG (ref 26.6–33)
MCHC RBC AUTO-ENTMCNC: 31.4 G/DL (ref 31.5–35.7)
MCV RBC AUTO: 84.6 FL (ref 79–97)
PHOSPHATE SERPL-MCNC: 4.9 MG/DL (ref 2.5–4.5)
PLATELET # BLD AUTO: 390 10*3/MM3 (ref 140–450)
PMV BLD AUTO: 8.9 FL (ref 6–12)
POTASSIUM SERPL-SCNC: 4.5 MMOL/L (ref 3.5–5.2)
PROT SERPL-MCNC: 6.5 G/DL (ref 6–8.5)
RBC # BLD AUTO: 3.69 10*6/MM3 (ref 3.77–5.28)
SINUS: 2.7 CM
SODIUM SERPL-SCNC: 137 MMOL/L (ref 136–145)
STJ: 2.21 CM
TROPONIN T SERPL HS-MCNC: 129 NG/L
WBC NRBC COR # BLD AUTO: 13.12 10*3/MM3 (ref 3.4–10.8)

## 2025-01-11 PROCEDURE — 80053 COMPREHEN METABOLIC PANEL: CPT | Performed by: HOSPITALIST

## 2025-01-11 PROCEDURE — 93010 ELECTROCARDIOGRAM REPORT: CPT | Performed by: INTERNAL MEDICINE

## 2025-01-11 PROCEDURE — 85027 COMPLETE CBC AUTOMATED: CPT | Performed by: HOSPITALIST

## 2025-01-11 PROCEDURE — 93306 TTE W/DOPPLER COMPLETE: CPT

## 2025-01-11 PROCEDURE — 94799 UNLISTED PULMONARY SVC/PX: CPT

## 2025-01-11 PROCEDURE — 99232 SBSQ HOSP IP/OBS MODERATE 35: CPT | Performed by: INTERNAL MEDICINE

## 2025-01-11 PROCEDURE — 93306 TTE W/DOPPLER COMPLETE: CPT | Performed by: INTERNAL MEDICINE

## 2025-01-11 PROCEDURE — 94664 DEMO&/EVAL PT USE INHALER: CPT

## 2025-01-11 PROCEDURE — 93005 ELECTROCARDIOGRAM TRACING: CPT | Performed by: INTERNAL MEDICINE

## 2025-01-11 PROCEDURE — 84100 ASSAY OF PHOSPHORUS: CPT | Performed by: HOSPITALIST

## 2025-01-11 PROCEDURE — 84484 ASSAY OF TROPONIN QUANT: CPT | Performed by: INTERNAL MEDICINE

## 2025-01-11 PROCEDURE — 93356 MYOCRD STRAIN IMG SPCKL TRCK: CPT | Performed by: INTERNAL MEDICINE

## 2025-01-11 PROCEDURE — 83735 ASSAY OF MAGNESIUM: CPT | Performed by: HOSPITALIST

## 2025-01-11 PROCEDURE — 93356 MYOCRD STRAIN IMG SPCKL TRCK: CPT

## 2025-01-11 PROCEDURE — 82948 REAGENT STRIP/BLOOD GLUCOSE: CPT

## 2025-01-11 PROCEDURE — 25010000002 PIPERACILLIN SOD-TAZOBACTAM PER 1 G: Performed by: HOSPITALIST

## 2025-01-11 RX ORDER — BISACODYL 10 MG
10 SUPPOSITORY, RECTAL RECTAL DAILY PRN
Status: DISCONTINUED | OUTPATIENT
Start: 2025-01-11 | End: 2025-01-17 | Stop reason: HOSPADM

## 2025-01-11 RX ORDER — CALCIUM CARBONATE 500 MG/1
2 TABLET, CHEWABLE ORAL 3 TIMES DAILY PRN
Status: DISCONTINUED | OUTPATIENT
Start: 2025-01-11 | End: 2025-01-17 | Stop reason: HOSPADM

## 2025-01-11 RX ORDER — BISACODYL 5 MG/1
5 TABLET, DELAYED RELEASE ORAL DAILY PRN
Status: DISCONTINUED | OUTPATIENT
Start: 2025-01-11 | End: 2025-01-17 | Stop reason: HOSPADM

## 2025-01-11 RX ORDER — POLYETHYLENE GLYCOL 3350 17 G/17G
17 POWDER, FOR SOLUTION ORAL DAILY PRN
Status: DISCONTINUED | OUTPATIENT
Start: 2025-01-11 | End: 2025-01-17 | Stop reason: HOSPADM

## 2025-01-11 RX ORDER — SIMETHICONE 80 MG
80 TABLET,CHEWABLE ORAL 4 TIMES DAILY PRN
Status: DISCONTINUED | OUTPATIENT
Start: 2025-01-11 | End: 2025-01-17 | Stop reason: HOSPADM

## 2025-01-11 RX ORDER — AMOXICILLIN 250 MG
2 CAPSULE ORAL 2 TIMES DAILY
Status: DISCONTINUED | OUTPATIENT
Start: 2025-01-12 | End: 2025-01-17 | Stop reason: HOSPADM

## 2025-01-11 RX ADMIN — PIPERACILLIN AND TAZOBACTAM 3.38 G: 3; .375 INJECTION, POWDER, FOR SOLUTION INTRAVENOUS at 14:37

## 2025-01-11 RX ADMIN — GUAIFENESIN 1200 MG: 600 TABLET, EXTENDED RELEASE ORAL at 08:41

## 2025-01-11 RX ADMIN — PANTOPRAZOLE SODIUM 40 MG: 40 TABLET, DELAYED RELEASE ORAL at 06:41

## 2025-01-11 RX ADMIN — ACETAMINOPHEN 650 MG: 325 TABLET ORAL at 22:20

## 2025-01-11 RX ADMIN — IPRATROPIUM BROMIDE AND ALBUTEROL SULFATE 3 ML: 2.5; .5 SOLUTION RESPIRATORY (INHALATION) at 11:54

## 2025-01-11 RX ADMIN — IPRATROPIUM BROMIDE AND ALBUTEROL SULFATE 3 ML: 2.5; .5 SOLUTION RESPIRATORY (INHALATION) at 21:18

## 2025-01-11 RX ADMIN — IPRATROPIUM BROMIDE AND ALBUTEROL SULFATE 3 ML: 2.5; .5 SOLUTION RESPIRATORY (INHALATION) at 09:04

## 2025-01-11 RX ADMIN — AMLODIPINE BESYLATE 5 MG: 5 TABLET ORAL at 08:41

## 2025-01-11 RX ADMIN — LACTULOSE 15 ML: 10 SOLUTION ORAL at 08:41

## 2025-01-11 RX ADMIN — IPRATROPIUM BROMIDE AND ALBUTEROL SULFATE 3 ML: 2.5; .5 SOLUTION RESPIRATORY (INHALATION) at 14:43

## 2025-01-11 RX ADMIN — CLOPIDOGREL BISULFATE 75 MG: 75 TABLET ORAL at 08:41

## 2025-01-11 RX ADMIN — GUAIFENESIN 1200 MG: 600 TABLET, EXTENDED RELEASE ORAL at 20:12

## 2025-01-11 RX ADMIN — NITROGLYCERIN 0.4 MG: 0.4 TABLET SUBLINGUAL at 22:38

## 2025-01-11 RX ADMIN — CLONIDINE HYDROCHLORIDE 0.1 MG: 0.1 TABLET ORAL at 08:41

## 2025-01-11 RX ADMIN — SENNOSIDES AND DOCUSATE SODIUM 2 TABLET: 50; 8.6 TABLET ORAL at 22:43

## 2025-01-11 RX ADMIN — NITROGLYCERIN 0.4 MG: 0.4 TABLET SUBLINGUAL at 22:26

## 2025-01-11 RX ADMIN — ATORVASTATIN CALCIUM 40 MG: 20 TABLET, FILM COATED ORAL at 08:41

## 2025-01-11 RX ADMIN — PIPERACILLIN AND TAZOBACTAM 3.38 G: 3; .375 INJECTION, POWDER, FOR SOLUTION INTRAVENOUS at 00:50

## 2025-01-11 RX ADMIN — LOSARTAN POTASSIUM 100 MG: 100 TABLET, FILM COATED ORAL at 20:12

## 2025-01-11 RX ADMIN — ASPIRIN 81 MG: 81 TABLET, COATED ORAL at 08:41

## 2025-01-11 NOTE — PROGRESS NOTES
Name: Sonia Acharya ADMIT: 2025   : 1950  PCP: Marcy Paez PA    MRN: [id number] LOS: 5 days   AGE/SEX: 74 y.o. female  ROOM: [facility]     Subjective   Subjective   Patient is seen at bedside, she reportedly had chest discomfort for which cardiology has been consulted.       Objective   Objective   Vital Signs  Temp:  [98.1 °F (36.7 °C)-99.1 °F (37.3 °C)] 98.4 °F (36.9 °C)  Heart Rate:  [70-95] 82  Resp:  [18] 18  BP: ()/(42-64) 107/50  SpO2:  [97 %-100 %] 100 %  on  Flow (L/min) (Oxygen Therapy):  [2] 2;   Device (Oxygen Therapy): nasal cannula  Body mass index is 20.31 kg/m².  Physical Exam  General, awake and alert.  Head and ENT, normocephalic and atraumatic.  Lungs, symmetric expansion, equal air entry bilaterally.  Heart, regular rate and rhythm.  Pericardial rub  Abdomen, soft and nontender.  Extremities, no clubbing or cyanosis.  Neuro, no focal deficits.  Skin: Warm and no rash.  Psych, normal mood and affect.  Musculoskeletal, joint examination is grossly normal.      Results Review     I reviewed the patient's new clinical results.  Results from last 7 days   Lab Units 25  0530 01/10/25  0624 25  0558 25  0602   WBC 10*3/mm3 13.12* 11.74* 10.05 8.27   HEMOGLOBIN g/dL 9.8* 9.1* 8.8* 9.5*   PLATELETS 10*3/mm3 390 406 357 348     Results from last 7 days   Lab Units 25  0530 01/10/25  0624 25  0558 25  0602   SODIUM mmol/L 137 138 138 138   POTASSIUM mmol/L 4.5 5.2 4.4 4.1   CHLORIDE mmol/L 98 99 99 103   CO2 mmol/L 25.3 20.0* 21.0* 23.0   BUN mg/dL 29* 49* 38* 24*   CREATININE mg/dL 3.92* 6.78* 5.44* 3.99*   GLUCOSE mg/dL 76 77 81 91   EGFR mL/min/1.73 11.4* 5.9* 7.7* 11.2*     Results from last 7 days   Lab Units 25  0530 25  0558 25  0602 25  1321   ALBUMIN g/dL 2.9* 2.9* 3.0* 3.4*   BILIRUBIN mg/dL 0.2 0.2 0.2 0.3   ALK PHOS U/L 81 82 93 96   AST (SGOT) U/L 12 10 12 15   ALT (SGPT) U/L 6 7 8 7     Results from last 7  "days   Lab Units 01/11/25  0530 01/10/25  0624 01/09/25  0558 01/08/25  0602 01/07/25  0703 01/06/25  1321   CALCIUM mg/dL 9.1 9.3 9.0 9.2   < > 9.7   ALBUMIN g/dL 2.9*  --  2.9* 3.0*  --  3.4*   MAGNESIUM mg/dL 2.2 2.2 2.2 2.2  --   --    PHOSPHORUS mg/dL 4.9* 5.8* 4.6* 3.7  --   --     < > = values in this interval not displayed.     Results from last 7 days   Lab Units 01/06/25  1801 01/06/25  1321   PROCALCITONIN ng/mL  --  0.81*   LACTATE mmol/L 1.1  --      No results found for: \"HGBA1C\", \"POCGLU\"    XR Chest PA & Lateral    Result Date: 1/10/2025  1. Mild cardiomegaly. 2. Mild to moderate patchy atelectasis or pneumonia in the left infrahilar region, more prominent than on the 1/6/2025 study. 2. There may be some additional mild patchy atelectasis or infiltrate in the right infrahilar region.   This report was finalized on 1/10/2025 3:18 PM by Dr. Homero Kumar M.D on Workstation: [id number]       I have personally reviewed all medications:  Scheduled Medications  amLODIPine, 5 mg, Oral, Daily  aspirin, 81 mg, Oral, Daily  atorvastatin, 40 mg, Oral, Daily  clopidogrel, 75 mg, Oral, Daily  guaiFENesin, 1,200 mg, Oral, Q12H  hydrALAZINE, 100 mg, Oral, Q8H  ipratropium-albuterol, 3 mL, Nebulization, 4x Daily - RT  isosorbide dinitrate, 30 mg, Oral, Daily  lactulose, 15 mL, Oral, Daily  losartan, 100 mg, Oral, Nightly  pantoprazole, 40 mg, Oral, Q AM  piperacillin-tazobactam, 3.375 g, Intravenous, Q12H    Infusions   Diet  Diet: Cardiac; Healthy Heart (2-3 Na+); Fluid Consistency: Thin (IDDSI 0)    I have personally reviewed:  [x]  Laboratory   [x]  Microbiology   [x]  Radiology   [x]  EKG/Telemetry  [x]  Cardiology/Vascular   []  Pathology    []  Records       Assessment/Plan     Active Hospital Problems    Diagnosis  POA    **Bacterial pneumonia, treating as gram negative [J15.9]  Yes    Severe protein-calorie malnutrition [E43]  Yes    Chronic respiratory failure with hypoxia [J96.11]  Yes    CAD (coronary " artery disease) [I25.10]  Yes    Nausea, vomiting, and diarrhea [R11.2, R19.7]  Yes    ESRD on dialysis [N18.6, Z99.2]  Not Applicable    ANGIE (obstructive sleep apnea) [G47.33]  Yes    Chronic diastolic CHF (congestive heart failure) [I50.32]  Yes    Anemia, chronic disease [D63.8]  Yes    HTN (hypertension) [I10]  Yes    Generalized abdominal pain [R10.84]  Yes      Resolved Hospital Problems   No resolved problems to display.       74 y.o. female admitted with Bacterial pneumonia.    Assessment and plan  1.  Left lower lobe pneumonia, concern for aspiration, underlying chronic respiratory failure with hypoxia, currently on IV Zosyn, which will be continued to complete the course.    2.  Chest discomfort, cardiology consultation was requested, plans for echo noted for evaluation of pericardial rub.    3.  End-stage renal disease on dialysis, continuation of dialysis based on nephrology recommendations.    4.  Encephalopathy, hallucinations, clinically has improved.    5.  Chronic diastolic congestive heart failure, watch for volume status changes, patient followed by nephrology.    6.  Hypertension, currently hypotensive, clonidine was discontinued.    7.  GERD, continue PPI therapy.    8.  Coronary artery disease, anemia, DVT, chronic left knee pain, chronic conditions.    9.  CODE STATUS is full code.  Further plans based on hospital course.      Romeo Gonsalez MD  [facility]  01/11/25  15:42 EST

## 2025-01-11 NOTE — PROGRESS NOTES
[facility] Progress Note      Patient Name: Sonia Acharya  : 1950  MRN: [id number]  Primary Care Physician:  Marcy Paez PA  Date of admission: 2025    Subjective     Interval History:   F/u ESRD     Review of Systems:   Patient resting comfortably  Denies any chest pain, shortness of breath, nausea or vomiting  Blood pressure was low last night after dialysis.  Received 250 normal saline bolus.  Blood pressure better this morning    Objective     Vitals:   Temp:  [97.3 °F (36.3 °C)-99.1 °F (37.3 °C)] 98.1 °F (36.7 °C)  Heart Rate:  [70-95] 70  Resp:  [18] 18  BP: ()/(42-73) 130/64  Flow (L/min) (Oxygen Therapy):  [2] 2    Intake/Output Summary (Last 24 hours) at 2025 1135  Last data filed at 1/10/2025 1227  Gross per 24 hour   Intake --   Output 3000 ml   Net -3000 ml       Physical Exam:    General Appearance: alert, comfortable on NC O2  Neck: supple, no JVD  Lungs: CTA bilat no rales  Heart: RRR, normal S1 and S2  Abdomen: soft, nontender, nondistended  Extremities: no edema, cyanosis or clubbing       Scheduled Meds:     amLODIPine, 5 mg, Oral, Daily  aspirin, 81 mg, Oral, Daily  atorvastatin, 40 mg, Oral, Daily  cloNIDine, 0.1 mg, Oral, Q12H  clopidogrel, 75 mg, Oral, Daily  guaiFENesin, 1,200 mg, Oral, Q12H  hydrALAZINE, 100 mg, Oral, Q8H  ipratropium-albuterol, 3 mL, Nebulization, 4x Daily - RT  isosorbide dinitrate, 30 mg, Oral, Daily  lactulose, 15 mL, Oral, Daily  losartan, 100 mg, Oral, Nightly  pantoprazole, 40 mg, Oral, Q AM  piperacillin-tazobactam, 3.375 g, Intravenous, Q12H      IV Meds:        Results Reviewed:   I have personally reviewed the results from the time of this admission to 2025 11:35 EST     Results from last 7 days   Lab Units 25  0530 01/10/25  0624 25  0558 25  0602   SODIUM mmol/L 137 138 138 138   POTASSIUM mmol/L 4.5 5.2 4.4 4.1   CHLORIDE mmol/L 98 99 99 103   CO2 mmol/L 25.3 20.0* 21.0* 23.0   BUN  mg/dL 29* 49* 38* 24*   CREATININE mg/dL 3.92* 6.78* 5.44* 3.99*   CALCIUM mg/dL 9.1 9.3 9.0 9.2   BILIRUBIN mg/dL 0.2  --  0.2 0.2   ALK PHOS U/L 81  --  82 93   ALT (SGPT) U/L 6  --  7 8   AST (SGOT) U/L 12  --  10 12   GLUCOSE mg/dL 76 77 81 91     Estimated Creatinine Clearance: 9.2 mL/min (A) (by C-G formula based on SCr of 3.92 mg/dL (H)).  Results from last 7 days   Lab Units 01/11/25  0530 01/10/25  0624 01/09/25  0558   MAGNESIUM mg/dL 2.2 2.2 2.2   PHOSPHORUS mg/dL 4.9* 5.8* 4.6*         Results from last 7 days   Lab Units 01/11/25  0530 01/10/25  0624 01/09/25  0558 01/08/25  0602 01/07/25  0703   WBC 10*3/mm3 13.12* 11.74* 10.05 8.27 10.51   HEMOGLOBIN g/dL 9.8* 9.1* 8.8* 9.5* 10.1*   PLATELETS 10*3/mm3 390 406 357 348 429           Assessment / Plan     ASSESSMENT:  ESRD on HD, MWF, via RUE AVF, electrolytes, volume status okay  Nausea/vomiting/diarrhea + abdominal pain, CT abdomen/pelvis negative for acute findings, followed by primary team, symptoms have resolved   Altered mental status/hallucination, CT head negative for acute findings.  Improved, followed by primary team.  Stop clonidine   pneumonia, on IV Zosyn and oxygen supplementation, managed by primary team  Hypertension with ESRD, BP labile,   HFpEF, volume mgmt via HD     PLAN:  Hemodialysis TTS  Discontinued clonidine due to hypotension  Monitor blood pressure.  Increase the dose of amlodipine if needed  Surveillance labs  She will resume outpatient dialysis on discharge      Andrew Macias MD  01/11/25  11:35 [facility]    [facility]  [phone]

## 2025-01-11 NOTE — NURSING NOTE
Patient's NA reported low BP reading to this RN. I checked her BP manually and got 85/50. Patient states she feels nauseous and having some abdominal discomfort. No c/o dizziness, blurred vision, more thanusual weakness, and is breathing normally. Paged [facility].

## 2025-01-11 NOTE — PROGRESS NOTES
Hospital Follow Up    LOS: 5  Patient Name: Sonia Acharya  Age/Sex: 74 y.o. female  : 1950  MRN: [id number]    Day of Service: 25   Length of Stay: 5  Encounter Provider: Rudi Hassan MD  Place of Service: [facility]  Patient Care Team:  Marcy Paez PA as PCP - General (Physician Assistant)    Subjective:     Chief Complaint: Chest pain    Interval History: Patient was much more sedate today.  She would only awaken intermittently in and go right back to sleep.    Objective:     Objective:  Temp:  [97.3 °F (36.3 °C)-99.1 °F (37.3 °C)] 98.1 °F (36.7 °C)  Heart Rate:  [70-95] 70  Resp:  [18] 18  BP: ()/(42-73) 130/64     Intake/Output Summary (Last 24 hours) at 2025 1119  Last data filed at 1/10/2025 1227  Gross per 24 hour   Intake --   Output 3000 ml   Net -3000 ml     Body mass index is 20.31 kg/m².      01/10/25  1227 25  0639 25  1007   Weight: 39.1 kg (86 lb 3.2 oz) 47.2 kg (104 lb 0.9 oz) 47.2 kg (104 lb)     Weight change: -6.6 kg (-14 lb 8.8 oz)      Physical Exam:   General :  Alert, cooperative, in no acute distress.  Neuro:   Alert,cooperative and oriented.  Lungs:  CTAB. Normal respiratory effort and rate.  CV:  Regular rate and rhythm, normal S1 and S2, no murmurs, gallops or rubs.   ABD:  Soft, nontender, nondistended. Positive bowel sounds.  Extr:  No edema or cyanosis, moves all extremities.    Lab Review:   Results from last 7 days   Lab Units 25  0530 01/10/25  0624 25  0558   SODIUM mmol/L 137 138 138   POTASSIUM mmol/L 4.5 5.2 4.4   CHLORIDE mmol/L 98 99 99   CO2 mmol/L 25.3 20.0* 21.0*   BUN mg/dL 29* 49* 38*   CREATININE mg/dL 3.92* 6.78* 5.44*   GLUCOSE mg/dL 76 77 81   CALCIUM mg/dL 9.1 9.3 9.0   AST (SGOT) U/L 12  --  10   ALT (SGPT) U/L 6  --  7     Results from last 7 days   Lab Units 01/10/25  1544 01/10/25  1352 25  1425 25  1321   HSTROP T ng/L 124* 117* 113* 111*  "    Results from last 7 days   Lab Units 01/11/25  0530 01/10/25  0624   WBC 10*3/mm3 13.12* 11.74*   HEMOGLOBIN g/dL 9.8* 9.1*   HEMATOCRIT % 31.2* 28.7*   PLATELETS 10*3/mm3 390 406         Results from last 7 days   Lab Units 01/11/25  0530 01/10/25  0624   MAGNESIUM mg/dL 2.2 2.2           Invalid input(s): \"LDLCALC\"            Current Medications:   Scheduled Meds:amLODIPine, 5 mg, Oral, Daily  aspirin, 81 mg, Oral, Daily  atorvastatin, 40 mg, Oral, Daily  cloNIDine, 0.1 mg, Oral, Q12H  clopidogrel, 75 mg, Oral, Daily  guaiFENesin, 1,200 mg, Oral, Q12H  hydrALAZINE, 100 mg, Oral, Q8H  ipratropium-albuterol, 3 mL, Nebulization, 4x Daily - RT  isosorbide dinitrate, 30 mg, Oral, Daily  lactulose, 15 mL, Oral, Daily  losartan, 100 mg, Oral, Nightly  pantoprazole, 40 mg, Oral, Q AM  piperacillin-tazobactam, 3.375 g, Intravenous, Q12H      Continuous Infusions:     Allergies:  No Known Allergies    Assessment:       Bacterial pneumonia, treating as gram negative    Generalized abdominal pain    Anemia, chronic disease    HTN (hypertension)    ESRD on dialysis    ANGIE (obstructive sleep apnea)    Chronic diastolic CHF (congestive heart failure)    Chronic respiratory failure with hypoxia    CAD (coronary artery disease)    Nausea, vomiting, and diarrhea    Severe protein-calorie malnutrition        Plan:   1.  Right sided chest discomfort.  This is consistent with a pulmonary etiology.  Patient with a chest x-ray that shows some patchy atelectasis or infiltrate on the right infrahilar region.  This would be consistent with a pneumonia and pain secondary to that.  2.  Patient has a classic pericardial rub that is very loud.  Check an echocardiogram.  3.  History of coronary artery disease.  ECG shows left bundle branch block.  Troponins are elevated but are not changing this is frequently found in patients with end-stage renal disease.  Not consistent with ischemic event.  4.  End-stage renal disease patient was " dialyzed today.  With the confusion had a pericardial rub obvious concern of byproducts hopefully the dialysis today will significantly help.  5.  Pneumonia being treated with Zosyn  6.  Patient has had echo done however has not been reviewed yet or in the system.  I will review this later continue to follow and see what evolves.  Patient was much more lethargic today.        Rudi Hassan MD  01/11/25  11:19 EST

## 2025-01-11 NOTE — PLAN OF CARE
Goal Outcome Evaluation:            Echo done. Resting well this shift. Denies complaints of pain.

## 2025-01-12 LAB
ALBUMIN SERPL-MCNC: 3 G/DL (ref 3.5–5.2)
ANION GAP SERPL CALCULATED.3IONS-SCNC: 16.7 MMOL/L (ref 5–15)
BUN SERPL-MCNC: 49 MG/DL (ref 8–23)
BUN/CREAT SERPL: 9.3 (ref 7–25)
CALCIUM SPEC-SCNC: 9.1 MG/DL (ref 8.6–10.5)
CHLORIDE SERPL-SCNC: 94 MMOL/L (ref 98–107)
CO2 SERPL-SCNC: 23.3 MMOL/L (ref 22–29)
CREAT SERPL-MCNC: 5.29 MG/DL (ref 0.57–1)
DEPRECATED RDW RBC AUTO: 50.1 FL (ref 37–54)
EGFRCR SERPLBLD CKD-EPI 2021: 8 ML/MIN/1.73
ERYTHROCYTE [DISTWIDTH] IN BLOOD BY AUTOMATED COUNT: 16.4 % (ref 12.3–15.4)
GEN 5 1HR TROPONIN T REFLEX: 123 NG/L
GLUCOSE SERPL-MCNC: 85 MG/DL (ref 65–99)
HCT VFR BLD AUTO: 28.1 % (ref 34–46.6)
HGB BLD-MCNC: 8.9 G/DL (ref 12–15.9)
MCH RBC QN AUTO: 26.7 PG (ref 26.6–33)
MCHC RBC AUTO-ENTMCNC: 31.7 G/DL (ref 31.5–35.7)
MCV RBC AUTO: 84.4 FL (ref 79–97)
PHOSPHATE SERPL-MCNC: 5.5 MG/DL (ref 2.5–4.5)
PLATELET # BLD AUTO: 350 10*3/MM3 (ref 140–450)
PMV BLD AUTO: 8.6 FL (ref 6–12)
POTASSIUM SERPL-SCNC: 5.5 MMOL/L (ref 3.5–5.2)
RBC # BLD AUTO: 3.33 10*6/MM3 (ref 3.77–5.28)
SODIUM SERPL-SCNC: 134 MMOL/L (ref 136–145)
TROPONIN T % DELTA: -5 %
TROPONIN T NUMERIC DELTA: -6 NG/L
WBC NRBC COR # BLD AUTO: 15.86 10*3/MM3 (ref 3.4–10.8)

## 2025-01-12 PROCEDURE — 94799 UNLISTED PULMONARY SVC/PX: CPT

## 2025-01-12 PROCEDURE — 85027 COMPLETE CBC AUTOMATED: CPT | Performed by: INTERNAL MEDICINE

## 2025-01-12 PROCEDURE — 25010000002 PIPERACILLIN SOD-TAZOBACTAM PER 1 G: Performed by: HOSPITALIST

## 2025-01-12 PROCEDURE — 99232 SBSQ HOSP IP/OBS MODERATE 35: CPT | Performed by: NURSE PRACTITIONER

## 2025-01-12 PROCEDURE — 97530 THERAPEUTIC ACTIVITIES: CPT

## 2025-01-12 PROCEDURE — 84484 ASSAY OF TROPONIN QUANT: CPT | Performed by: INTERNAL MEDICINE

## 2025-01-12 PROCEDURE — 80069 RENAL FUNCTION PANEL: CPT | Performed by: INTERNAL MEDICINE

## 2025-01-12 RX ORDER — HYDROCODONE BITARTRATE AND ACETAMINOPHEN 7.5; 325 MG/1; MG/1
1 TABLET ORAL ONCE
Status: COMPLETED | OUTPATIENT
Start: 2025-01-12 | End: 2025-01-12

## 2025-01-12 RX ADMIN — IPRATROPIUM BROMIDE AND ALBUTEROL SULFATE 3 ML: 2.5; .5 SOLUTION RESPIRATORY (INHALATION) at 11:22

## 2025-01-12 RX ADMIN — SODIUM ZIRCONIUM CYCLOSILICATE 10 G: 10 POWDER, FOR SUSPENSION ORAL at 20:55

## 2025-01-12 RX ADMIN — ASPIRIN 81 MG: 81 TABLET, COATED ORAL at 09:01

## 2025-01-12 RX ADMIN — ANTACID TABLETS 2 TABLET: 500 TABLET, CHEWABLE ORAL at 00:51

## 2025-01-12 RX ADMIN — PANTOPRAZOLE SODIUM 40 MG: 40 TABLET, DELAYED RELEASE ORAL at 05:44

## 2025-01-12 RX ADMIN — PIPERACILLIN AND TAZOBACTAM 3.38 G: 3; .375 INJECTION, POWDER, FOR SOLUTION INTRAVENOUS at 02:10

## 2025-01-12 RX ADMIN — IPRATROPIUM BROMIDE AND ALBUTEROL SULFATE 3 ML: 2.5; .5 SOLUTION RESPIRATORY (INHALATION) at 21:25

## 2025-01-12 RX ADMIN — ATORVASTATIN CALCIUM 40 MG: 20 TABLET, FILM COATED ORAL at 09:07

## 2025-01-12 RX ADMIN — AMLODIPINE BESYLATE 5 MG: 5 TABLET ORAL at 09:02

## 2025-01-12 RX ADMIN — GUAIFENESIN 1200 MG: 600 TABLET, EXTENDED RELEASE ORAL at 10:48

## 2025-01-12 RX ADMIN — LACTULOSE 15 ML: 10 SOLUTION ORAL at 09:01

## 2025-01-12 RX ADMIN — SODIUM ZIRCONIUM CYCLOSILICATE 10 G: 10 POWDER, FOR SUSPENSION ORAL at 13:52

## 2025-01-12 RX ADMIN — SENNOSIDES AND DOCUSATE SODIUM 2 TABLET: 50; 8.6 TABLET ORAL at 20:55

## 2025-01-12 RX ADMIN — HYDRALAZINE HYDROCHLORIDE 100 MG: 50 TABLET ORAL at 05:44

## 2025-01-12 RX ADMIN — IPRATROPIUM BROMIDE AND ALBUTEROL SULFATE 3 ML: 2.5; .5 SOLUTION RESPIRATORY (INHALATION) at 07:36

## 2025-01-12 RX ADMIN — ACETAMINOPHEN 650 MG: 325 TABLET ORAL at 10:48

## 2025-01-12 RX ADMIN — CLOPIDOGREL BISULFATE 75 MG: 75 TABLET ORAL at 09:01

## 2025-01-12 RX ADMIN — ISOSORBIDE DINITRATE 30 MG: 20 TABLET ORAL at 09:02

## 2025-01-12 RX ADMIN — HYDROCODONE BITARTRATE AND ACETAMINOPHEN 1 TABLET: 7.5; 325 TABLET ORAL at 05:58

## 2025-01-12 RX ADMIN — GABAPENTIN 100 MG: 100 CAPSULE ORAL at 20:56

## 2025-01-12 RX ADMIN — GUAIFENESIN 1200 MG: 600 TABLET, EXTENDED RELEASE ORAL at 20:56

## 2025-01-12 RX ADMIN — ACETAMINOPHEN 650 MG: 325 TABLET ORAL at 18:39

## 2025-01-12 RX ADMIN — SENNOSIDES AND DOCUSATE SODIUM 2 TABLET: 50; 8.6 TABLET ORAL at 09:07

## 2025-01-12 NOTE — NURSING NOTE
"Patient c/o chest pain 6/10. /53 heart rate of 91. No symptoms other than pain. Location is on right side of chest up into shoulder and neck. STAT EKG and troponin ordered. Has O2 on. One sublingual nitro given. Pain is subsiding but patient states is moving to her stomach. She now states \"I think its gas pains, I need an antacid\". Paging [facility] to make them aware and get order for tums. Cardiology on board,paging them about chest pain and EKG results. Patient also states it has been a few days since having a BM. I will give her stool softeners/laxatives.     RRT called d/t abnormal EKG. Previous EKG is different, not showing acute infarction but tracing does look similar. Will get additional eyes on patient and information.     Carolina martini/Cardiology called back stating no new orders at this time. The EKG shows no changes from previous EKGs from this admission and pt had ECHO today. Feel the chest pain and discomfort is pulmonary related.   "

## 2025-01-12 NOTE — CODE DOCUMENTATION
RRT called for CP. Pt c/o cp 8/10 with radiation to r arm and r side of neck. 2 nitros given before rrt arrival. ekg completed and [facility] and cardiology paged by primary RN. Blood pressure stable. Charge rn reviewed EKG with no significant changes from prior. Received page back from dr. cordero with a, EKG reviewed- no new orders recieved. Primary rn to follow up with [facility] for order for tums. Primary rn to call rrt back with any changes or questions.

## 2025-01-12 NOTE — THERAPY TREATMENT NOTE
Patient Name: Sonia Acharya  : 1950    MRN: [id number]                              Today's Date: 2025       Admit Date: 2025    Visit Dx:     ICD-10-CM ICD-9-CM   1. Nausea and vomiting, unspecified vomiting type  R11.2 787.01   2. ESRD on dialysis  N18.6 585.6    Z99.2 V45.11   3. Community acquired pneumonia, unspecified laterality  J18.9 486   4. Elevated procalcitonin  R79.89 790.99   5. ESRD (end stage renal disease) on dialysis  N18.6 585.6    Z99.2 V45.11   6. Chronic respiratory failure with hypoxia  J96.11 518.83     799.02   7. Altered mental status, unspecified altered mental status type  R41.82 780.97     Patient Active Problem List   Diagnosis    Generalized abdominal pain    ODALIS (acute kidney injury)    Anemia, chronic disease    Metabolic acidosis, increased anion gap    Obesity (BMI 30-39.9)    HTN (hypertension)    Chest pain, atypical    Cervical radiculopathy    ESRD on dialysis    Bradycardia    Right arm pain    ANGIE (obstructive sleep apnea)    Chronic diastolic CHF (congestive heart failure)    Aortic stenosis    Pancytopenia    Hyperphosphatemia    Hyperkalemia    Pneumonia    Leukocytopenia    Anemia, chronic disease    ESRD (end stage renal disease) on dialysis    Bacterial pneumonia, treating as gram negative    Chronic respiratory failure with hypoxia    CAD (coronary artery disease)    Nausea, vomiting, and diarrhea    Severe protein-calorie malnutrition     Past Medical History:   Diagnosis Date    Arthritis     Chronic kidney disease     Heart disease     Hypertension      Past Surgical History:   Procedure Laterality Date    BREAST SURGERY      DIALYSIS FISTULA CREATION      EYE SURGERY      HERNIA REPAIR      Dr. Sung      General Information       Row Name 25 1417          Physical Therapy Time and Intention    Document Type therapy note (daily note)  -DJ     Mode of Treatment individual therapy;physical therapy  -DJ       Row Name 25 1417           General Information    Existing Precautions/Restrictions fall  -DJ       Row Name 01/12/25 1417          Cognition    Orientation Status (Cognition) oriented x 3  -DJ       Row Name 01/12/25 1417          Safety Issues/Impairments Affecting Functional Mobility    Comment, Safety Issues/Impairments (Mobility) gt belt, nonskid socks  -DJ               User Key  (r) = Recorded By, (t) = Taken By, (c) = Cosigned By      Initials Name Provider Type    DJ Lien Santillan, PT Physical Therapist                   Mobility       Row Name 01/12/25 1418          Bed Mobility    Bed Mobility supine-sit;sit-supine  -DJ     Supine-Sit [place] (Bed Mobility) standby assist;verbal cues  -DJ     Sit-Supine [place] (Bed Mobility) standby assist;verbal cues  -DJ     Assistive Device (Bed Mobility) head of bed elevated;bed rails  -DJ       Row Name 01/12/25 1418          Transfers    Comment, (Transfers) sit/stand from EOB  -DJ       Row Name 01/12/25 1418          Bed-Chair Transfer    Bed-Chair [place] (Transfers) not tested  -DJ       Row Name 01/12/25 1418          Sit-Stand Transfer    Sit-Stand [place] (Transfers) contact guard;verbal cues  -DJ     Assistive Device (Sit-Stand Transfers) walker, front-wheeled  -DJ       Row Name 01/12/25 1418          Gait/Stairs (Locomotion)    [place] Level (Gait) contact guard  -DJ     Assistive Device (Gait) walker, front-wheeled  -DJ     Distance in Feet (Gait) 25  -DJ     Deviations/Abnormal Patterns (Gait) gait speed decreased;stride length decreased;antalgic  -DJ     Bilateral Gait Deviations forward flexed posture  -DJ     Left Sided Gait Deviations heel strike decreased  -DJ     [place] Level (Stairs) not tested  -DJ     Comment, (Gait/Stairs) Pt amb 25' with r wx and CGA - slow pace but good balance and able to WB thru L LE and place foot more flat on ground. Endurance improving but still limited.  -DJ               User Key  (r) = Recorded By, (t) = Taken  By, (c) = Cosigned By      Initials Name Provider Type    Lien Chicas PT Physical Therapist                   Obj/Interventions       Row Name 01/12/25 1420          Motor Skills    Motor Skills functional endurance  -DJ     Functional Endurance improving but still limited  -DJ     Therapeutic Exercise other (see comments)  AP, LAQ, seated hip flex 5x each  -DJ       Row Name 01/12/25 1420          Balance    Balance Assessment standing static balance;standing dynamic balance  -DJ     Static Standing Balance contact guard;verbal cues  -DJ     Dynamic Standing Balance contact guard;verbal cues  -DJ     Position/Device Used, Standing Balance walker, front-wheeled;supported  -DJ     Balance Interventions sitting;standing;sit to stand;supported;weight shifting activity  -DJ     Comment, Balance good  -DJ               User Key  (r) = Recorded By, (t) = Taken By, (c) = Cosigned By      Initials Name Provider Type    Lien Chicas PT Physical Therapist                   Goals/Plan    No documentation.                  Clinical Impression       Row Name 01/12/25 1421          Pain    Pretreatment Pain Rating 0/10 - no pain  -DJ     Pre/Posttreatment Pain Comment Pt reports her L knee and chest pain are currently good due to recent meds  -DJ       Row Name 01/12/25 1421          Plan of Care Review    Plan of Care Reviewed With patient  -DJ     Progress improving  -DJ     Outcome Evaluation Pt resting in bed in AD, pleasant and cooperative. She reports her L knee pain is better today and also reports her chest pain is better. She req SBA and vc to sit EOB and stood from EOB with CGA using r wx. Pt amb 25' with r wx and CGA - slow pace but good balance and able to WB thru L LE and place foot more flat on ground. Endurance improving but still limited. She performed seated LE ther ex with vc for correct form. She returned supine with SBA. Pt dem good progress with mobility today and reports her pain is improved. Cont  PT to address functional deficits and prepare for d/c as appropriate.  -DJ       Row Name 01/12/25 1421          Therapy Assessment/Plan (PT)    Patient/Family Therapy Goals Statement (PT) home with fern  -DJ     Criteria for Skilled Interventions Met (PT) skilled treatment is necessary  -DJ       Row Name 01/12/25 1421          Vital Signs    O2 Delivery Pre Treatment room air  -DJ     O2 Delivery Intra Treatment room air  -DJ     O2 Delivery Post Treatment room air  -DJ     Pre Patient Position Supine  -DJ     Intra Patient Position Standing  -DJ     Post Patient Position Supine  -DJ       Row Name 01/12/25 1421          Positioning and Restraints    Pre-Treatment Position in bed  -DJ     Post Treatment Position bed  -DJ     In Bed notified nsg;supine;call light within reach;encouraged to call for assist;exit alarm on  -DJ               User Key  (r) = Recorded By, (t) = Taken By, (c) = Cosigned By      Initials Name Provider Type    Lien Chicas, PT Physical Therapist                   Outcome Measures       Row Name 01/12/25 1426          How much help from another person do you currently need...    Turning from your back to your side while in flat bed without using bedrails? 4  -DJ     Moving from lying on back to sitting on the side of a flat bed without bedrails? 4  -DJ     Moving to and from a bed to a chair (including a wheelchair)? 3  -DJ     Standing up from a chair using your arms (e.g., wheelchair, bedside chair)? 3  -DJ     Climbing 3-5 steps with a railing? 2  -DJ     To walk in hospital room? 3  -DJ     AM-PAC 6 Clicks Score (PT) 19  -DJ     Highest Level of Mobility Goal 6 --> Walk 10 steps or more  -DJ       Row Name 01/12/25 1426          Functional Assessment    Outcome Measure Options AM-PAC 6 Clicks Basic Mobility (PT)  -DJ               User Key  (r) = Recorded By, (t) = Taken By, (c) = Cosigned By      Initials Name Provider Type    Lien Chicas, PT Physical Therapist                                  Physical Therapy Education       Title: PT OT SLP Therapies (Done)       Topic: Physical Therapy (Done)       Point: Mobility training (Done)       Learning Progress Summary            Patient Acceptance, E, VU,NR by  at 1/12/2025 1426                      Point: Home exercise program (Done)       Learning Progress Summary            Patient Acceptance, E, VU,NR by DJ at 1/12/2025 1426                      Point: Body mechanics (Done)       Learning Progress Summary            Patient Acceptance, E, VU,NR by DJ at 1/12/2025 1426                      Point: Precautions (Done)       Learning Progress Summary            Patient Acceptance, E, VU,NR by  at 1/12/2025 1426                                      User Key       Initials Effective Dates Name Provider Type Discipline     10/25/19 -  Lien Santillan, PT Physical Therapist PT                  PT Recommendation and Plan     Progress: improving  Outcome Evaluation: Pt resting in bed in AD, pleasant and cooperative. She reports her L knee pain is better today and also reports her chest pain is better. She req SBA and vc to sit EOB and stood from EOB with CGA using r wx. Pt amb 25' with r wx and CGA - slow pace but good balance and able to WB thru L LE and place foot more flat on ground. Endurance improving but still limited. She performed seated LE ther ex with vc for correct form. She returned supine with SBA. Pt dem good progress with mobility today and reports her pain is improved. Cont PT to address functional deficits and prepare for d/c as appropriate.     Time Calculation:         PT Charges       Row Name 01/12/25 142             Time Calculation    Start Time 1350  -DJ      Stop Time 1416  -DJ      Time Calculation (min) 26 min  -DJ      PT Non-Billable Time (min) 10 min  -DJ      PT Received On 01/12/25  -DJ      PT - Next Appointment 01/13/25  -DJ                User Key  (r) = Recorded By, (t) = Taken By, (c) = Cosigned By      Initials  Name Provider Type    DJ Lien Santillan, PT Physical Therapist                  Therapy Charges for Today       Code Description Service Date Service Provider Modifiers Qty    48679845399 HC PT THERAPEUTIC ACT EA 15 MIN 1/12/2025 Lien Santillan, PT GP 2            PT G-Codes  Outcome Measure Options: AM-PAC 6 Clicks Basic Mobility (PT)  AM-PAC 6 Clicks Score (PT): 19  PT Discharge Summary  Anticipated Discharge Disposition (PT): home with assist, home with home health    Lien Santillan, PT  1/12/2025

## 2025-01-12 NOTE — PLAN OF CARE
Goal Outcome Evaluation:  Plan of Care Reviewed With: patient        Progress: improving  Outcome Evaluation: Pt resting in bed in AD, pleasant and cooperative. She reports her L knee pain is better today and also reports her chest pain is better. She req SBA and vc to sit EOB and stood from EOB with CGA using r wx. Pt amb 25' with r wx and CGA - slow pace but good balance and able to WB thru L LE and place foot more flat on ground. Endurance improving but still limited. She performed seated LE ther ex with vc for correct form. She returned supine with SBA. Pt dem good progress with mobility today and reports her pain is improved. Cont PT to address functional deficits and prepare for d/c as appropriate.    Anticipated Discharge Disposition (PT): home with assist, home with home health

## 2025-01-12 NOTE — PROGRESS NOTES
"[facility]    Patient Name: Sonia Acharya  :1950  74 y.o.  LOS: 6  Encounter Provider: KANDIS Mejia      Patient Care Team:  Marcy Paez PA as PCP - General (Physician Assistant)    Chief Complaint: Follow-up chest pain    Interval History: Patient had an episode of chest pain overnight.  I personally reviewed the ECG when this was called to me, no ischemic changes.  Troponin remains flat.  Currently denies chest pain       Objective   Vital Signs  Temp:  [97.7 °F (36.5 °C)-98.6 °F (37 °C)] 97.7 °F (36.5 °C)  Heart Rate:  [73-91] 80  Resp:  [18] 18  BP: ()/(50-74) 126/54  No intake or output data in the 24 hours ending 25 1312  Flowsheet Rows      Flowsheet Row First Filed Value   Admission Height 152.4 cm (60\") Documented at 2025 2216   Admission Weight 44.2 kg (97 lb 7.1 oz) Documented at 2025 2216              Vitals and nursing note reviewed.   Constitutional:       Appearance: Normal appearance. Well-developed.   Eyes:      Conjunctiva/sclera: Conjunctivae normal.   Neck:      Vascular: No carotid bruit.   Pulmonary:      Breath sounds: Normal breath sounds.   Cardiovascular:      Normal rate. Regular rhythm. Normal S1 with normal intensity. Normal S2 with normal intensity.       Murmurs: There is no murmur.      No gallop.  No click. No rub.   Edema:     Peripheral edema absent.   Musculoskeletal: Normal range of motion. Skin:     General: Skin is warm and dry.   Neurological:      Mental Status: Alert and oriented to person, place, and time.      GCS: GCS eye subscore is 4. GCS verbal subscore is 5. GCS motor subscore is 6.   Psychiatric:         Speech: Speech normal.         Behavior: Behavior normal.         Thought Content: Thought content normal.         Judgment: Judgment normal.           Pertinent Test Results:  Results from last 7 days   Lab Units 25  0602 25  0530 01/10/25  0624 25  0558 25  0602 " "01/07/25  0703 01/06/25  1321   SODIUM mmol/L 134* 137 138 138 138 137 134*   POTASSIUM mmol/L 5.5* 4.5 5.2 4.4 4.1 4.8 4.0   CHLORIDE mmol/L 94* 98 99 99 103 97* 94*   CO2 mmol/L 23.3 25.3 20.0* 21.0* 23.0 20.0* 21.9*   BUN mg/dL 49* 29* 49* 38* 24* 41* 29*   CREATININE mg/dL 5.29* 3.92* 6.78* 5.44* 3.99* 6.24* 5.54*   GLUCOSE mg/dL 85 76 77 81 91 81 104*   CALCIUM mg/dL 9.1 9.1 9.3 9.0 9.2 9.6 9.7   AST (SGOT) U/L  --  12  --  10 12  --  15   ALT (SGPT) U/L  --  6  --  7 8  --  7     Results from last 7 days   Lab Units 01/12/25  0010 01/11/25  2301 01/10/25  1544 01/10/25  1352 01/06/25  1425 01/06/25  1321   HSTROP T ng/L 123* 129* 124* 117* 113* 111*     Results from last 7 days   Lab Units 01/12/25  0602 01/11/25  0530 01/10/25  0624 01/09/25  0558 01/08/25  0602 01/07/25  0703 01/06/25  1321   WBC 10*3/mm3 15.86* 13.12* 11.74* 10.05 8.27 10.51 11.19*   HEMOGLOBIN g/dL 8.9* 9.8* 9.1* 8.8* 9.5* 10.1* 9.3*   HEMATOCRIT % 28.1* 31.2* 28.7* 28.3* 29.7* 32.6* 31.6*   PLATELETS 10*3/mm3 350 390 406 357 348 429 416         Results from last 7 days   Lab Units 01/11/25  0530 01/10/25  0624 01/09/25  0558 01/08/25  0602   MAGNESIUM mg/dL 2.2 2.2 2.2 2.2           Invalid input(s): \"LDLCALC\"                Medication Review:   amLODIPine, 5 mg, Oral, Daily  aspirin, 81 mg, Oral, Daily  atorvastatin, 40 mg, Oral, Daily  clopidogrel, 75 mg, Oral, Daily  guaiFENesin, 1,200 mg, Oral, Q12H  hydrALAZINE, 100 mg, Oral, Q8H  ipratropium-albuterol, 3 mL, Nebulization, 4x Daily - RT  isosorbide dinitrate, 30 mg, Oral, Daily  lactulose, 15 mL, Oral, Daily  losartan, 100 mg, Oral, Nightly  pantoprazole, 40 mg, Oral, Q AM  senna-docusate sodium, 2 tablet, Oral, BID  sodium zirconium cyclosilicate, 10 g, Oral, Q8H              Assessment & Plan     Active Hospital Problems    Diagnosis  POA    **Bacterial pneumonia, treating as gram negative [J15.9]  Yes    Severe protein-calorie malnutrition [E43]  Yes    Chronic respiratory failure " with hypoxia [J96.11]  Yes    CAD (coronary artery disease) [I25.10]  Yes    Nausea, vomiting, and diarrhea [R11.2, R19.7]  Yes    ESRD on dialysis [N18.6, Z99.2]  Not Applicable    ANGIE (obstructive sleep apnea) [G47.33]  Yes    Chronic diastolic CHF (congestive heart failure) [I50.32]  Yes    Anemia, chronic disease [D63.8]  Yes    HTN (hypertension) [I10]  Yes    Generalized abdominal pain [R10.84]  Yes      Resolved Hospital Problems   No resolved problems to display.        Right sided chest pain  Consistent with pulmonary etiology  Echocardiogram reveals LVEF of 56% with hypokinesis of the anterior wall segments, trivial pericardial effusion  History of CAD  Known LBBB  Troponins elevated but flat consistent with #3  ESRD on HD  Has been dialyzed this admission  Pneumonia  On antibiotics  Noted leukocytosis  Mild aortic valve stenosis           KANDIS Mejia  [facility]   [facility]  [address] [place] [address]  Office: [phone]    01/12/25  13:12 EST

## 2025-01-12 NOTE — PROGRESS NOTES
[facility] Progress Note      Patient Name: Sonia Acharya  : 1950  MRN: [id number]  Primary Care Physician:  Marcy Paez PA  Date of admission: 2025    Subjective     Interval History:   F/u ESRD     Review of Systems:     Patient complaining of chest pain with deep breaths and cough.  Denies any shortness of breath, nausea or vomiting    Objective     Vitals:   Temp:  [97.7 °F (36.5 °C)-98.6 °F (37 °C)] 97.7 °F (36.5 °C)  Heart Rate:  [73-91] 80  Resp:  [18] 18  BP: ()/(50-74) 126/54  Flow (L/min) (Oxygen Therapy):  [2] 2    Intake/Output Summary (Last 24 hours) at 2025 1213  Last data filed at 2025 1230  Gross per 24 hour   Intake 100 ml   Output --   Net 100 ml       Physical Exam:    General Appearance: alert, comfortable on NC O2  Neck: supple, no JVD  Lungs: CTA bilat no rales  Heart: RRR, normal S1 and S2  Abdomen: soft, nontender, nondistended  Extremities: no edema, cyanosis or clubbing       Scheduled Meds:     amLODIPine, 5 mg, Oral, Daily  aspirin, 81 mg, Oral, Daily  atorvastatin, 40 mg, Oral, Daily  clopidogrel, 75 mg, Oral, Daily  guaiFENesin, 1,200 mg, Oral, Q12H  hydrALAZINE, 100 mg, Oral, Q8H  ipratropium-albuterol, 3 mL, Nebulization, 4x Daily - RT  isosorbide dinitrate, 30 mg, Oral, Daily  lactulose, 15 mL, Oral, Daily  losartan, 100 mg, Oral, Nightly  pantoprazole, 40 mg, Oral, Q AM  senna-docusate sodium, 2 tablet, Oral, BID      IV Meds:        Results Reviewed:   I have personally reviewed the results from the time of this admission to 2025 12:13 EST     Results from last 7 days   Lab Units 25  0602 25  0530 01/10/25  0624 25  0558 25  0602   SODIUM mmol/L 134* 137 138 138 138   POTASSIUM mmol/L 5.5* 4.5 5.2 4.4 4.1   CHLORIDE mmol/L 94* 98 99 99 103   CO2 mmol/L 23.3 25.3 20.0* 21.0* 23.0   BUN mg/dL 49* 29* 49* 38* 24*   CREATININE mg/dL 5.29* 3.92* 6.78* 5.44* 3.99*   CALCIUM mg/dL 9.1 9.1 9.3 9.0  9.2   BILIRUBIN mg/dL  --  0.2  --  0.2 0.2   ALK PHOS U/L  --  81  --  82 93   ALT (SGPT) U/L  --  6  --  7 8   AST (SGOT) U/L  --  12  --  10 12   GLUCOSE mg/dL 85 76 77 81 91     Estimated Creatinine Clearance: 6.8 mL/min (A) (by C-G formula based on SCr of 5.29 mg/dL (H)).  Results from last 7 days   Lab Units 01/12/25  0602 01/11/25  0530 01/10/25  0624 01/09/25  0558   MAGNESIUM mg/dL  --  2.2 2.2 2.2   PHOSPHORUS mg/dL 5.5* 4.9* 5.8* 4.6*         Results from last 7 days   Lab Units 01/12/25  0602 01/11/25  0530 01/10/25  0624 01/09/25  0558 01/08/25  0602   WBC 10*3/mm3 15.86* 13.12* 11.74* 10.05 8.27   HEMOGLOBIN g/dL 8.9* 9.8* 9.1* 8.8* 9.5*   PLATELETS 10*3/mm3 350 390 406 357 348           Assessment / Plan     ASSESSMENT:  ESRD on HD, MWF, via RUE AVF, hyperkalemia per morning labs.  Volume status okay   nausea/vomiting/diarrhea + abdominal pain, CT abdomen/pelvis negative for acute findings, followed by primary team, symptoms have resolved   Altered mental status/hallucination, CT head negative for acute findings.  Improved, followed by primary team.  Stop clonidine   pneumonia, on IV Zosyn and oxygen supplementation, managed by primary team  Hypertension with ESRD, BP labile,   HFpEF, volume mgmt via HD     PLAN:  Ordering Lokelma for hyperkalemia  Hemodialysis MWF  Continue current).  Keep off clonidine  Monitor blood pressure.  Increase the dose of amlodipine if needed  Surveillance labs      Andrew Macias MD  01/12/25  12:13 [facility]    [facility]  [phone]

## 2025-01-12 NOTE — PROGRESS NOTES
Name: Sonia Acharya ADMIT: 2025   : 1950  PCP: Marcy Paez PA    MRN: [id number] LOS: 6 days   AGE/SEX: 74 y.o. female  ROOM: [facility]     Subjective   Subjective   Patient is seen at bedside, she is lying in bed.  Does not appear very comfortable today.       Objective   Objective   Vital Signs  Temp:  [97.7 °F (36.5 °C)-98.6 °F (37 °C)] 98.2 °F (36.8 °C)  Heart Rate:  [73-91] 73  Resp:  [18] 18  BP: ()/(48-74) 114/48  SpO2:  [94 %-100 %] 100 %  on  Flow (L/min) (Oxygen Therapy):  [2] 2;   Device (Oxygen Therapy): nasal cannula  Body mass index is 20.32 kg/m².  Physical Exam  General, awake and alert.  Head and ENT, normocephalic and atraumatic.  Lungs, symmetric expansion, equal air entry bilaterally.  Heart, regular rate and rhythm.  Pericardial rub  Abdomen, soft and nontender.  Extremities, no clubbing or cyanosis.  Neuro, no focal deficits.  Skin: Warm and no rash.  Psych, normal mood and affect.  Musculoskeletal, joint examination is grossly normal.    Copied text material from yesterday's note has been reviewed for appropriate changes and remains accurate as of 25.        Results Review     I reviewed the patient's new clinical results.  Results from last 7 days   Lab Units 01/12/25  0602 01/11/25  0530 01/10/25  0624 01/09/25  0558   WBC 10*3/mm3 15.86* 13.12* 11.74* 10.05   HEMOGLOBIN g/dL 8.9* 9.8* 9.1* 8.8*   PLATELETS 10*3/mm3 350 390 406 357     Results from last 7 days   Lab Units 01/12/25  0602 01/11/25  0530 01/10/25  0624 01/09/25  0558   SODIUM mmol/L 134* 137 138 138   POTASSIUM mmol/L 5.5* 4.5 5.2 4.4   CHLORIDE mmol/L 94* 98 99 99   CO2 mmol/L 23.3 25.3 20.0* 21.0*   BUN mg/dL 49* 29* 49* 38*   CREATININE mg/dL 5.29* 3.92* 6.78* 5.44*   GLUCOSE mg/dL 85 76 77 81   EGFR mL/min/1.73 8.0* 11.4* 5.9* 7.7*     Results from last 7 days   Lab Units 25  0602 25  0530 25  0558 25  0602 25  1321   ALBUMIN g/dL 3.0* 2.9* 2.9* 3.0* 3.4*    BILIRUBIN mg/dL  --  0.2 0.2 0.2 0.3   ALK PHOS U/L  --  81 82 93 96   AST (SGOT) U/L  --  12 10 12 15   ALT (SGPT) U/L  --  6 7 8 7     Results from last 7 days   Lab Units 01/12/25  0602 01/11/25  0530 01/10/25  0624 01/09/25  0558 01/08/25  0602   CALCIUM mg/dL 9.1 9.1 9.3 9.0 9.2   ALBUMIN g/dL 3.0* 2.9*  --  2.9* 3.0*   MAGNESIUM mg/dL  --  2.2 2.2 2.2 2.2   PHOSPHORUS mg/dL 5.5* 4.9* 5.8* 4.6* 3.7     Results from last 7 days   Lab Units 01/06/25  1801 01/06/25  1321   PROCALCITONIN ng/mL  --  0.81*   LACTATE mmol/L 1.1  --      Glucose   Date/Time Value Ref Range Status   01/11/2025 2258 144 (H) 70 - 130 mg/dL Final       No radiology results for the last day    I have personally reviewed all medications:  Scheduled Medications  amLODIPine, 5 mg, Oral, Daily  aspirin, 81 mg, Oral, Daily  atorvastatin, 40 mg, Oral, Daily  clopidogrel, 75 mg, Oral, Daily  guaiFENesin, 1,200 mg, Oral, Q12H  hydrALAZINE, 100 mg, Oral, Q8H  ipratropium-albuterol, 3 mL, Nebulization, 4x Daily - RT  isosorbide dinitrate, 30 mg, Oral, Daily  lactulose, 15 mL, Oral, Daily  losartan, 100 mg, Oral, Nightly  pantoprazole, 40 mg, Oral, Q AM  senna-docusate sodium, 2 tablet, Oral, BID  sodium zirconium cyclosilicate, 10 g, Oral, Q8H    Infusions   Diet  Diet: Cardiac; Healthy Heart (2-3 Na+); Fluid Consistency: Thin (IDDSI 0)    I have personally reviewed:  [x]  Laboratory   [x]  Microbiology   [x]  Radiology   [x]  EKG/Telemetry  [x]  Cardiology/Vascular   []  Pathology    []  Records       Assessment/Plan     Active Hospital Problems    Diagnosis  POA    **Bacterial pneumonia, treating as gram negative [J15.9]  Yes    Severe protein-calorie malnutrition [E43]  Yes    Chronic respiratory failure with hypoxia [J96.11]  Yes    CAD (coronary artery disease) [I25.10]  Yes    Nausea, vomiting, and diarrhea [R11.2, R19.7]  Yes    ESRD on dialysis [N18.6, Z99.2]  Not Applicable    ANGIE (obstructive sleep apnea) [G47.33]  Yes    Chronic  diastolic CHF (congestive heart failure) [I50.32]  Yes    Anemia, chronic disease [D63.8]  Yes    HTN (hypertension) [I10]  Yes    Generalized abdominal pain [R10.84]  Yes      Resolved Hospital Problems   No resolved problems to display.       74 y.o. female admitted with Bacterial pneumonia.    Assessment and plan  1.  Left lower lobe pneumonia, concern for aspiration, underlying chronic respiratory failure with hypoxia, completed IV Zosyn course.     2.  Chest discomfort, status post cardiology evaluation, follow recommendations.     3.  End-stage renal disease on dialysis, continuation of dialysis based on nephrology recommendations.  Patient has hyperkalemia, Lokelma was ordered.     4.  Encephalopathy, hallucinations, clinically has improved.     5.  Chronic diastolic congestive heart failure, watch for volume status changes, patient followed by nephrology.     6.  Hypertension, currently hypotensive, clonidine was discontinued.     7.  GERD, continue PPI therapy.     8.  Coronary artery disease, anemia, DVT, chronic left knee pain, chronic conditions.     9.  CODE STATUS is full code.  Further plans based on hospital course.         Romeo Gonsalez MD  [facility]  01/12/25  15:31 EST

## 2025-01-12 NOTE — NURSING NOTE
Patient still c/o pain in chest. Called A and got a one time dose of hydrocodone to give her. Pt resting at this time.

## 2025-01-13 ENCOUNTER — APPOINTMENT (OUTPATIENT)
Dept: GENERAL RADIOLOGY | Facility: HOSPITAL | Age: 75
DRG: 177 | End: 2025-01-13
Payer: MEDICARE

## 2025-01-13 LAB
ALBUMIN SERPL-MCNC: 2.9 G/DL (ref 3.5–5.2)
ALBUMIN SERPL-MCNC: 3.1 G/DL (ref 3.5–5.2)
ALBUMIN/GLOB SERPL: 0.8 G/DL
ALP SERPL-CCNC: 85 U/L (ref 39–117)
ALT SERPL W P-5'-P-CCNC: 7 U/L (ref 1–33)
ANION GAP SERPL CALCULATED.3IONS-SCNC: 12 MMOL/L (ref 5–15)
ANION GAP SERPL CALCULATED.3IONS-SCNC: 22 MMOL/L (ref 5–15)
AST SERPL-CCNC: 13 U/L (ref 1–32)
BASOPHILS # BLD AUTO: 0.04 10*3/MM3 (ref 0–0.2)
BASOPHILS NFR BLD AUTO: 0.2 % (ref 0–1.5)
BILIRUB SERPL-MCNC: 0.3 MG/DL (ref 0–1.2)
BUN SERPL-MCNC: 18 MG/DL (ref 8–23)
BUN SERPL-MCNC: 56 MG/DL (ref 8–23)
BUN/CREAT SERPL: 6.2 (ref 7–25)
BUN/CREAT SERPL: 8.9 (ref 7–25)
CALCIUM SPEC-SCNC: 8.9 MG/DL (ref 8.6–10.5)
CALCIUM SPEC-SCNC: 9.2 MG/DL (ref 8.6–10.5)
CHLORIDE SERPL-SCNC: 90 MMOL/L (ref 98–107)
CHLORIDE SERPL-SCNC: 97 MMOL/L (ref 98–107)
CO2 SERPL-SCNC: 19 MMOL/L (ref 22–29)
CO2 SERPL-SCNC: 23 MMOL/L (ref 22–29)
CREAT SERPL-MCNC: 2.92 MG/DL (ref 0.57–1)
CREAT SERPL-MCNC: 6.27 MG/DL (ref 0.57–1)
DEPRECATED RDW RBC AUTO: 50.2 FL (ref 37–54)
DEPRECATED RDW RBC AUTO: 51.4 FL (ref 37–54)
EGFRCR SERPLBLD CKD-EPI 2021: 16.3 ML/MIN/1.73
EGFRCR SERPLBLD CKD-EPI 2021: 6.5 ML/MIN/1.73
EOSINOPHIL # BLD AUTO: 0.22 10*3/MM3 (ref 0–0.4)
EOSINOPHIL NFR BLD AUTO: 1.2 % (ref 0.3–6.2)
ERYTHROCYTE [DISTWIDTH] IN BLOOD BY AUTOMATED COUNT: 16.4 % (ref 12.3–15.4)
ERYTHROCYTE [DISTWIDTH] IN BLOOD BY AUTOMATED COUNT: 16.6 % (ref 12.3–15.4)
GEN 5 1HR TROPONIN T REFLEX: 124 NG/L
GLOBULIN UR ELPH-MCNC: 3.8 GM/DL
GLUCOSE SERPL-MCNC: 115 MG/DL (ref 65–99)
GLUCOSE SERPL-MCNC: 70 MG/DL (ref 65–99)
HCT VFR BLD AUTO: 28.1 % (ref 34–46.6)
HCT VFR BLD AUTO: 28.6 % (ref 34–46.6)
HGB BLD-MCNC: 8.9 G/DL (ref 12–15.9)
HGB BLD-MCNC: 9.1 G/DL (ref 12–15.9)
IMM GRANULOCYTES # BLD AUTO: 0.37 10*3/MM3 (ref 0–0.05)
IMM GRANULOCYTES NFR BLD AUTO: 2 % (ref 0–0.5)
LYMPHOCYTES # BLD AUTO: 0.76 10*3/MM3 (ref 0.7–3.1)
LYMPHOCYTES NFR BLD AUTO: 4.2 % (ref 19.6–45.3)
MAGNESIUM SERPL-MCNC: 2.3 MG/DL (ref 1.6–2.4)
MCH RBC QN AUTO: 26.8 PG (ref 26.6–33)
MCH RBC QN AUTO: 27.1 PG (ref 26.6–33)
MCHC RBC AUTO-ENTMCNC: 31.7 G/DL (ref 31.5–35.7)
MCHC RBC AUTO-ENTMCNC: 31.8 G/DL (ref 31.5–35.7)
MCV RBC AUTO: 84.1 FL (ref 79–97)
MCV RBC AUTO: 85.7 FL (ref 79–97)
MONOCYTES # BLD AUTO: 1.05 10*3/MM3 (ref 0.1–0.9)
MONOCYTES NFR BLD AUTO: 5.8 % (ref 5–12)
NEUTROPHILS NFR BLD AUTO: 15.61 10*3/MM3 (ref 1.7–7)
NEUTROPHILS NFR BLD AUTO: 86.6 % (ref 42.7–76)
NRBC BLD AUTO-RTO: 0 /100 WBC (ref 0–0.2)
PHOSPHATE SERPL-MCNC: 5.9 MG/DL (ref 2.5–4.5)
PLATELET # BLD AUTO: 356 10*3/MM3 (ref 140–450)
PLATELET # BLD AUTO: 371 10*3/MM3 (ref 140–450)
PMV BLD AUTO: 8.9 FL (ref 6–12)
PMV BLD AUTO: 9.1 FL (ref 6–12)
POTASSIUM SERPL-SCNC: 3.9 MMOL/L (ref 3.5–5.2)
POTASSIUM SERPL-SCNC: 4.6 MMOL/L (ref 3.5–5.2)
PROT SERPL-MCNC: 6.9 G/DL (ref 6–8.5)
QT INTERVAL: 402 MS
QT INTERVAL: 409 MS
QTC INTERVAL: 475 MS
QTC INTERVAL: 484 MS
RBC # BLD AUTO: 3.28 10*6/MM3 (ref 3.77–5.28)
RBC # BLD AUTO: 3.4 10*6/MM3 (ref 3.77–5.28)
SODIUM SERPL-SCNC: 131 MMOL/L (ref 136–145)
SODIUM SERPL-SCNC: 132 MMOL/L (ref 136–145)
TROPONIN T % DELTA: -2 %
TROPONIN T NUMERIC DELTA: -2 NG/L
TROPONIN T SERPL HS-MCNC: 126 NG/L
WBC NRBC COR # BLD AUTO: 16.93 10*3/MM3 (ref 3.4–10.8)
WBC NRBC COR # BLD AUTO: 18.05 10*3/MM3 (ref 3.4–10.8)

## 2025-01-13 PROCEDURE — 83735 ASSAY OF MAGNESIUM: CPT | Performed by: INTERNAL MEDICINE

## 2025-01-13 PROCEDURE — 94664 DEMO&/EVAL PT USE INHALER: CPT

## 2025-01-13 PROCEDURE — 84100 ASSAY OF PHOSPHORUS: CPT | Performed by: INTERNAL MEDICINE

## 2025-01-13 PROCEDURE — 85027 COMPLETE CBC AUTOMATED: CPT | Performed by: INTERNAL MEDICINE

## 2025-01-13 PROCEDURE — 94799 UNLISTED PULMONARY SVC/PX: CPT

## 2025-01-13 PROCEDURE — 84484 ASSAY OF TROPONIN QUANT: CPT | Performed by: INTERNAL MEDICINE

## 2025-01-13 PROCEDURE — 71046 X-RAY EXAM CHEST 2 VIEWS: CPT

## 2025-01-13 PROCEDURE — 80053 COMPREHEN METABOLIC PANEL: CPT | Performed by: INTERNAL MEDICINE

## 2025-01-13 PROCEDURE — 93010 ELECTROCARDIOGRAM REPORT: CPT | Performed by: INTERNAL MEDICINE

## 2025-01-13 PROCEDURE — 94761 N-INVAS EAR/PLS OXIMETRY MLT: CPT

## 2025-01-13 PROCEDURE — 85025 COMPLETE CBC W/AUTO DIFF WBC: CPT | Performed by: INTERNAL MEDICINE

## 2025-01-13 PROCEDURE — 94760 N-INVAS EAR/PLS OXIMETRY 1: CPT

## 2025-01-13 PROCEDURE — 99232 SBSQ HOSP IP/OBS MODERATE 35: CPT | Performed by: NURSE PRACTITIONER

## 2025-01-13 PROCEDURE — 93005 ELECTROCARDIOGRAM TRACING: CPT | Performed by: INTERNAL MEDICINE

## 2025-01-13 RX ORDER — MANNITOL 250 MG/ML
12.5 INJECTION, SOLUTION INTRAVENOUS AS NEEDED
Status: ACTIVE | OUTPATIENT
Start: 2025-01-13 | End: 2025-01-14

## 2025-01-13 RX ORDER — ALBUMIN (HUMAN) 12.5 G/50ML
12.5 SOLUTION INTRAVENOUS AS NEEDED
Status: ACTIVE | OUTPATIENT
Start: 2025-01-13 | End: 2025-01-13

## 2025-01-13 RX ORDER — MORPHINE SULFATE 2 MG/ML
2 INJECTION, SOLUTION INTRAMUSCULAR; INTRAVENOUS ONCE AS NEEDED
Status: DISCONTINUED | OUTPATIENT
Start: 2025-01-13 | End: 2025-01-17 | Stop reason: HOSPADM

## 2025-01-13 RX ADMIN — ISOSORBIDE DINITRATE 30 MG: 20 TABLET ORAL at 13:33

## 2025-01-13 RX ADMIN — CLOPIDOGREL BISULFATE 75 MG: 75 TABLET ORAL at 13:35

## 2025-01-13 RX ADMIN — IPRATROPIUM BROMIDE AND ALBUTEROL SULFATE 3 ML: 2.5; .5 SOLUTION RESPIRATORY (INHALATION) at 14:30

## 2025-01-13 RX ADMIN — IPRATROPIUM BROMIDE AND ALBUTEROL SULFATE 3 ML: 2.5; .5 SOLUTION RESPIRATORY (INHALATION) at 21:13

## 2025-01-13 RX ADMIN — GUAIFENESIN 1200 MG: 600 TABLET, EXTENDED RELEASE ORAL at 13:34

## 2025-01-13 RX ADMIN — NITROGLYCERIN 0.4 MG: 0.4 TABLET SUBLINGUAL at 00:25

## 2025-01-13 RX ADMIN — GUAIFENESIN 1200 MG: 600 TABLET, EXTENDED RELEASE ORAL at 20:16

## 2025-01-13 RX ADMIN — PANTOPRAZOLE SODIUM 40 MG: 40 TABLET, DELAYED RELEASE ORAL at 05:24

## 2025-01-13 RX ADMIN — GABAPENTIN 100 MG: 100 CAPSULE ORAL at 05:24

## 2025-01-13 RX ADMIN — ATORVASTATIN CALCIUM 40 MG: 20 TABLET, FILM COATED ORAL at 13:34

## 2025-01-13 RX ADMIN — ASPIRIN 81 MG: 81 TABLET, COATED ORAL at 13:33

## 2025-01-13 RX ADMIN — IPRATROPIUM BROMIDE AND ALBUTEROL SULFATE 3 ML: 2.5; .5 SOLUTION RESPIRATORY (INHALATION) at 06:48

## 2025-01-13 RX ADMIN — HYDRALAZINE HYDROCHLORIDE 100 MG: 50 TABLET ORAL at 20:16

## 2025-01-13 RX ADMIN — AMLODIPINE BESYLATE 5 MG: 5 TABLET ORAL at 13:38

## 2025-01-13 RX ADMIN — SENNOSIDES AND DOCUSATE SODIUM 2 TABLET: 50; 8.6 TABLET ORAL at 20:15

## 2025-01-13 RX ADMIN — LOSARTAN POTASSIUM 100 MG: 100 TABLET, FILM COATED ORAL at 20:15

## 2025-01-13 NOTE — NURSING NOTE
Around 0020. Patient called out to go to bathroom.  When patient leaned forward to try to get up to use bathroom, patient started to complain of chest pain. Rated the pain a 7 out of 10. BP was checked at the time and was 135/65 HR 81. Patient states the pain was different than the pain she had experienced previously. STAT EKG and labs were ordered. Patient given x1 dose of prn Nitro @ 0025. After dose of Nitro, BP was 111/54 HR 86 @ 0030. Patient stated then that her chest pain had improved and was placed back in bed from chair. Around 2am lab called to notify of trop was 126. Patient's previus was 123. Call placed to Cardiology to give EKG and lab results. Awaiting call back at this time

## 2025-01-13 NOTE — PROGRESS NOTES
Name: Sonia Acharya ADMIT: 2025   : 1950  PCP: Marcy Paez PA    MRN: [id number] LOS: 7 days   AGE/SEX: 74 y.o. female  ROOM: [facility]     Subjective   Subjective   Patient is seen at bedside, no new complaints.  She is in dialysis unit, appears comfortable at the time of my evaluation.       Objective   Objective   Vital Signs  Temp:  [97.8 °F (36.6 °C)-99.1 °F (37.3 °C)] 99.1 °F (37.3 °C)  Heart Rate:  [] 92  Resp:  [16-18] 16  BP: (104-176)/(46-67) 114/53  SpO2:  [94 %-100 %] 97 %  on   ;   Device (Oxygen Therapy): room air  Body mass index is 19.38 kg/m².  Physical Exam  General, awake and alert.  Head and ENT, normocephalic and atraumatic.  Lungs, symmetric expansion, equal air entry bilaterally.  Heart, regular rate and rhythm.  Pericardial rub  Abdomen, soft and nontender.  Extremities, no clubbing or cyanosis.  Neuro, no focal deficits.  Skin: Warm and no rash.  Psych, normal mood and affect.  Musculoskeletal, joint examination is grossly normal.     Copied text material from yesterday's note has been reviewed for appropriate changes and remains accurate as of 25.      Results Review     I reviewed the patient's new clinical results.  Results from last 7 days   Lab Units 01/13/25  0119 01/12/25  0602 01/11/25  0530 01/10/25  0624   WBC 10*3/mm3 16.93* 15.86* 13.12* 11.74*   HEMOGLOBIN g/dL 8.9* 8.9* 9.8* 9.1*   PLATELETS 10*3/mm3 356 350 390 406     Results from last 7 days   Lab Units 25  0530 01/10/25  0624   SODIUM mmol/L 131* 134* 137 138   POTASSIUM mmol/L 4.6 5.5* 4.5 5.2   CHLORIDE mmol/L 90* 94* 98 99   CO2 mmol/L 19.0* 23.3 25.3 20.0*   BUN mg/dL 56* 49* 29* 49*   CREATININE mg/dL 6.27* 5.29* 3.92* 6.78*   GLUCOSE mg/dL 115* 85 76 77   EGFR mL/min/1.73 6.5* 8.0* 11.4* 5.9*     Results from last 7 days   Lab Units 25  0119 25  0602 25  0530 25  0558 25  0602   ALBUMIN g/dL 2.9* 3.0* 2.9* 2.9* 3.0*    BILIRUBIN mg/dL  --   --  0.2 0.2 0.2   ALK PHOS U/L  --   --  81 82 93   AST (SGOT) U/L  --   --  12 10 12   ALT (SGPT) U/L  --   --  6 7 8     Results from last 7 days   Lab Units 01/13/25  0119 01/12/25  0602 01/11/25  0530 01/10/25  0624 01/09/25  0558   CALCIUM mg/dL 8.9 9.1 9.1 9.3 9.0   ALBUMIN g/dL 2.9* 3.0* 2.9*  --  2.9*   MAGNESIUM mg/dL 2.3  --  2.2 2.2 2.2   PHOSPHORUS mg/dL 5.9* 5.5* 4.9* 5.8* 4.6*     Results from last 7 days   Lab Units 01/06/25  1801   LACTATE mmol/L 1.1     Glucose   Date/Time Value Ref Range Status   01/11/2025 2258 144 (H) 70 - 130 mg/dL Final       No radiology results for the last day    I have personally reviewed all medications:  Scheduled Medications  amLODIPine, 5 mg, Oral, Daily  aspirin, 81 mg, Oral, Daily  atorvastatin, 40 mg, Oral, Daily  clopidogrel, 75 mg, Oral, Daily  guaiFENesin, 1,200 mg, Oral, Q12H  hydrALAZINE, 100 mg, Oral, Q8H  ipratropium-albuterol, 3 mL, Nebulization, 4x Daily - RT  isosorbide dinitrate, 30 mg, Oral, Daily  lactulose, 15 mL, Oral, Daily  losartan, 100 mg, Oral, Nightly  pantoprazole, 40 mg, Oral, Q AM  senna-docusate sodium, 2 tablet, Oral, BID    Infusions   Diet  Diet: Cardiac; Healthy Heart (2-3 Na+); Fluid Consistency: Thin (IDDSI 0)    I have personally reviewed:  [x]  Laboratory   [x]  Microbiology   [x]  Radiology   [x]  EKG/Telemetry  [x]  Cardiology/Vascular   []  Pathology    []  Records       Assessment/Plan     Active Hospital Problems    Diagnosis  POA    **Bacterial pneumonia, treating as gram negative [J15.9]  Yes    Severe protein-calorie malnutrition [E43]  Yes    Chronic respiratory failure with hypoxia [J96.11]  Yes    CAD (coronary artery disease) [I25.10]  Yes    Nausea, vomiting, and diarrhea [R11.2, R19.7]  Yes    ESRD on dialysis [N18.6, Z99.2]  Not Applicable    ANGIE (obstructive sleep apnea) [G47.33]  Yes    Chronic diastolic CHF (congestive heart failure) [I50.32]  Yes    Anemia, chronic disease [D63.8]  Yes    HTN  (hypertension) [I10]  Yes    Generalized abdominal pain [R10.84]  Yes      Resolved Hospital Problems   No resolved problems to display.       74 y.o. female admitted with Bacterial pneumonia.    Assessment and plan  1.  Left lower lobe pneumonia, concern for aspiration, underlying chronic respiratory failure with hypoxia, completed IV Zosyn course.  Leukocytosis is worsening, repeat chest x-ray.     2.  Chest discomfort, status post cardiology evaluation, follow recommendations.     3.  End-stage renal disease on dialysis, continuation of dialysis based on nephrology recommendations.  Patient has hyperkalemia, Lokelma was ordered.     4.  Encephalopathy, hallucinations, clinically has improved.     5.  Chronic diastolic congestive heart failure, watch for volume status changes, patient followed by nephrology.     6.  Hypertension, currently hypotensive, clonidine was discontinued.     7.  GERD, continue PPI therapy.     8.  Coronary artery disease, anemia, DVT, chronic left knee pain, chronic conditions.     9.  CODE STATUS is full code.  Further plans based on hospital course.       Romeo Gonsalez MD  [facility]  01/13/25  13:48 EST

## 2025-01-13 NOTE — PROGRESS NOTES
[facility] Progress Note      Patient Name: Sonia Acharya  : 1950  MRN: [id number]  Primary Care Physician:  Marcy Paez PA  Date of admission: 2025    Subjective     Interval History:   F/u ESRD     Review of Systems:     Seen on dialysis today.  No new complaints.  Denies any chest pain or shortness of breath.    Objective     Vitals:   Temp:  [98.2 °F (36.8 °C)-99.1 °F (37.3 °C)] 99.1 °F (37.3 °C)  Heart Rate:  [] 82  Resp:  [16-18] 16  BP: (111-152)/(46-65) 146/60  Flow (L/min) (Oxygen Therapy):  [2] 2  No intake or output data in the 24 hours ending 25 1001      Physical Exam:    General Appearance: alert, comfortable on NC O2  Neck: supple, no JVD  Lungs: CTA bilat no rales  Heart: RRR, normal S1 and S2  Abdomen: soft, nontender, nondistended  Extremities: no edema, cyanosis or clubbing       Scheduled Meds:     amLODIPine, 5 mg, Oral, Daily  aspirin, 81 mg, Oral, Daily  atorvastatin, 40 mg, Oral, Daily  clopidogrel, 75 mg, Oral, Daily  guaiFENesin, 1,200 mg, Oral, Q12H  hydrALAZINE, 100 mg, Oral, Q8H  ipratropium-albuterol, 3 mL, Nebulization, 4x Daily - RT  isosorbide dinitrate, 30 mg, Oral, Daily  lactulose, 15 mL, Oral, Daily  losartan, 100 mg, Oral, Nightly  pantoprazole, 40 mg, Oral, Q AM  senna-docusate sodium, 2 tablet, Oral, BID      IV Meds:        Results Reviewed:   I have personally reviewed the results from the time of this admission to 2025 10:01 EST     Results from last 7 days   Lab Units 25  0119 25  0602 25  0530 01/10/25  0624 25  0558 25  0602   SODIUM mmol/L 131* 134* 137   < > 138 138   POTASSIUM mmol/L 4.6 5.5* 4.5   < > 4.4 4.1   CHLORIDE mmol/L 90* 94* 98   < > 99 103   CO2 mmol/L 19.0* 23.3 25.3   < > 21.0* 23.0   BUN mg/dL 56* 49* 29*   < > 38* 24*   CREATININE mg/dL 6.27* 5.29* 3.92*   < > 5.44* 3.99*   CALCIUM mg/dL 8.9 9.1 9.1   < > 9.0 9.2   BILIRUBIN mg/dL  --   --  0.2  --  0.2 0.2    ALK PHOS U/L  --   --  81  --  82 93   ALT (SGPT) U/L  --   --  6  --  7 8   AST (SGOT) U/L  --   --  12  --  10 12   GLUCOSE mg/dL 115* 85 76   < > 81 91    < > = values in this interval not displayed.     Estimated Creatinine Clearance: 6.2 mL/min (A) (by C-G formula based on SCr of 6.27 mg/dL (H)).  Results from last 7 days   Lab Units 01/13/25  0119 01/12/25  0602 01/11/25  0530 01/10/25  0624   MAGNESIUM mg/dL 2.3  --  2.2 2.2   PHOSPHORUS mg/dL 5.9* 5.5* 4.9* 5.8*         Results from last 7 days   Lab Units 01/13/25  0119 01/12/25  0602 01/11/25  0530 01/10/25  0624 01/09/25  0558   WBC 10*3/mm3 16.93* 15.86* 13.12* 11.74* 10.05   HEMOGLOBIN g/dL 8.9* 8.9* 9.8* 9.1* 8.8*   PLATELETS 10*3/mm3 356 350 390 406 357           Assessment / Plan     ASSESSMENT:  ESRD on HD, MWF, via RUE AVF, hyperkalemia per morning labs.  Volume status okay   nausea/vomiting/diarrhea + abdominal pain, CT abdomen/pelvis negative for acute findings, followed by primary team, symptoms have resolved   Altered mental status/hallucination, CT head negative for acute findings.  Improved, followed by primary team.    pneumonia, on IV Zosyn and oxygen supplementation, managed by primary team  Hypertension with ESRD, BP labile,   HFpEF, volume mgmt via HD     PLAN:  Will continue dialysis on Monday Wednesday and Friday.  Surveillance labs    Micheline Torrez MD  01/13/25  10:01 [facility]    [facility]  [phone]

## 2025-01-13 NOTE — CONSULTS
"    Patient Name: Sonia Acharya  :1950  74 y.o.      Patient Care Team:  Marcy Paez PA as PCP - General (Physician Assistant)    Chief Complaint: follow up CAD    Interval History: had chest pain overnight. None currently. She doesn't know if she has had pain like this before.        Objective   Vital Signs  Temp:  [97.8 °F (36.6 °C)-99.1 °F (37.3 °C)] 97.8 °F (36.6 °C)  Heart Rate:  [] 81  Resp:  [16-18] 18  BP: (111-176)/(46-65) 176/62  No intake or output data in the 24 hours ending 25 1052  Flowsheet Rows      Flowsheet Row First Filed Value   Admission Height 152.4 cm (60\") Documented at 2025   Admission Weight 44.2 kg (97 lb 7.1 oz) Documented at 2025            Physical Exam:   General Appearance:    Alert, cooperative, in no acute distress   Lungs:     Clear to auscultation.  Normal respiratory effort and rate.      Heart:    Regular rhythm and normal rate, normal S1 and S2, no murmurs, gallops or rubs.     Chest Wall:    No abnormalities observed   Abdomen:     Soft, nontender, positive bowel sounds.     Extremities:   no cyanosis, clubbing or edema.  No marked joint deformities.  Adequate musculoskeletal strength.       Results Review:    Results from last 7 days   Lab Units 25  0119   SODIUM mmol/L 131*   POTASSIUM mmol/L 4.6   CHLORIDE mmol/L 90*   CO2 mmol/L 19.0*   BUN mg/dL 56*   CREATININE mg/dL 6.27*   GLUCOSE mg/dL 115*   CALCIUM mg/dL 8.9     Results from last 7 days   Lab Units 25  0315 25  0119 25  0010   HSTROP T ng/L 124* 126* 123*     Results from last 7 days   Lab Units 25  0119   WBC 10*3/mm3 16.93*   HEMOGLOBIN g/dL 8.9*   HEMATOCRIT % 28.1*   PLATELETS 10*3/mm3 356         Results from last 7 days   Lab Units 25  0119   MAGNESIUM mg/dL 2.3                   Medication Review:   amLODIPine, 5 mg, Oral, Daily  aspirin, 81 mg, Oral, Daily  atorvastatin, 40 mg, Oral, Daily  clopidogrel, 75 mg, Oral, " Daily  guaiFENesin, 1,200 mg, Oral, Q12H  hydrALAZINE, 100 mg, Oral, Q8H  ipratropium-albuterol, 3 mL, Nebulization, 4x Daily - RT  isosorbide dinitrate, 30 mg, Oral, Daily  lactulose, 15 mL, Oral, Daily  losartan, 100 mg, Oral, Nightly  pantoprazole, 40 mg, Oral, Q AM  senna-docusate sodium, 2 tablet, Oral, BID       Diagnostic Summary/Impression   1. Critical ostial RCA narrowing is identified (in-stent renarrowing up to   99%).   2. Nonobstructive CAD is visualized in the left coronary distribution.   3. Normal left heart catheterization pressures are identified.   4. Additional medical issues are noted in the record including a concern for   possible amyloidosis.   Diagnostic Recommendations   1. PCI to the ostial RCA is recommended.   2. Further recommendations will be dictated by the results of this procedure.   Interventional Summary   1. Successful PCI to the ostial RCA was completed with a 3.5 x 20 mm Synergy   drug-eluting stent (postdilated with both 3.75 and 4.0 mm noncompliant   balloons).   2. No procedural complications were identified.   Interventional Recommendation   1. The patient was reinitiated on dual antiplatelet therapy. Guideline   directed medical therapies for CAD will be adjusted.   2. Barring any unforeseen complications, the patient may be considered for   discharge on August 16, 2024.   3. Endomyocardial biopsy for amyloidosis will be deferred to the outpatient   setting when dual antiplatelet therapy can be effectively interrupte        Assessment & Plan   Chest pain - initially , non cardiac / right sided chest pain. Had an episode last night that was a little more concerning for angina however troponin and EKG unchanged. Would continue careful observation .   End stage renal disease - on HD   Pneumonia , concern for aspiration.   Coronary artery disease status post PCI to ostial RCA 3.5 x 20 mm Synergy in August 2024. Nonobstructive disease of LAD and circ at that time.   Chronic  HFrEF   Hypertension  Diabetes mellitus type II   Severe concentric LVH  Mild aortic valve stenosis   LBBB  She follows with [facility] and has been considered for infiltrative CM evaluation (had a cath 8/16/2024 Endomyocardial biopsy for amyloidosis will be deferred to the outpatient setting when dual antiplatelet therapy can be effectively interrupted).     KANDIS Jennings  [facility]  01/13/25  10:52 EST

## 2025-01-13 NOTE — CASE MANAGEMENT/SOCIAL WORK
"Continued Stay Note  [facility]     Patient Name: Sonia Acharya  MRN: [id number]  Today's Date: 1/13/2025    Admit Date: 1/6/2025    Plan: Home with [facility] vs potentially wanting to go palliative   Discharge Plan       Row Name 01/13/25 1634       Plan    Plan Home with [facility] vs potentially wanting to go palliative    Patient/Family in Agreement with Plan yes    Plan Comments CCP reviewed chart, discussed with Dr. Gonsalez, Wbc up, pt states she doesn't feel good, may want to talk to palliative to discuss options for stopping HD, she is \"thinking about it\" Assured pt she didn't have to make decision at this time but if she would like to speak to someone to get information to please let nursing or CCP know. CCP will follow - Marcy ROMERO                   Discharge Codes    No documentation.                 Expected Discharge Date and Time       Expected Discharge Date Expected Discharge Time    Jeramie 15, 2025               Marcy Taylor RN    "

## 2025-01-13 NOTE — PLAN OF CARE
Problem: Adult Inpatient Plan of Care  Goal: Plan of Care Review  Outcome: Progressing  Flowsheets (Taken 1/13/2025 0438)  Progress: no change  Outcome Evaluation:   No s/sx of distress noted at this time. Rested some throughout night. Vital signs. On room air   tolerated. Fall precautions maintained. Up with assist to BSC   tolerated. Some complaints of right sided chest pain during shift. EKG and cardiac labs completed. Cardiology notified and reviewed results. No new orders noted at this time. No complaints of chest pain noted at this time. Will cont with current plan of care.  Goal: Patient-Specific Goal (Individualized)  Outcome: Progressing  Goal: Absence of Hospital-Acquired Illness or Injury  Outcome: Progressing  Intervention: Identify and Manage Fall Risk  Recent Flowsheet Documentation  Taken 1/13/2025 0414 by Paris Kenney, FERCHO  Safety Promotion/Fall Prevention:   assistive device/personal items within reach   activity supervised   clutter free environment maintained   fall prevention program maintained   lighting adjusted   nonskid shoes/slippers when out of bed   room organization consistent   safety round/check completed  Taken 1/13/2025 0211 by Paris Kenney, RN  Safety Promotion/Fall Prevention:   activity supervised   assistive device/personal items within reach   clutter free environment maintained   fall prevention program maintained   lighting adjusted   nonskid shoes/slippers when out of bed   room organization consistent   safety round/check completed  Taken 1/13/2025 0030 by Paris Kenney, RN  Safety Promotion/Fall Prevention:   activity supervised   assistive device/personal items within reach   clutter free environment maintained   fall prevention program maintained   lighting adjusted   nonskid shoes/slippers when out of bed   room organization consistent   safety round/check completed  Taken 1/12/2025 2246 by Paris Kenney, RN  Safety Promotion/Fall Prevention:   activity supervised    assistive device/personal items within reach   clutter free environment maintained   fall prevention program maintained   lighting adjusted   nonskid shoes/slippers when out of bed   room organization consistent   safety round/check completed  Taken 1/12/2025 2054 by Paris Kenney RN  Safety Promotion/Fall Prevention:   activity supervised   assistive device/personal items within reach   clutter free environment maintained   fall prevention program maintained   lighting adjusted   nonskid shoes/slippers when out of bed   room organization consistent   safety round/check completed  Intervention: Prevent Skin Injury  Recent Flowsheet Documentation  Taken 1/13/2025 0414 by Paris Kenney RN  Body Position: position changed independently  Taken 1/13/2025 0030 by Paris Kenney RN  Body Position: supine  Taken 1/12/2025 2246 by Paris Kenney RN  Body Position: position changed independently  Taken 1/12/2025 2054 by Paris Kenney RN  Body Position: position changed independently  Skin Protection:   incontinence pads utilized   transparent dressing maintained   skin sealant/moisture barrier applied  Intervention: Prevent and Manage VTE (Venous Thromboembolism) Risk  Recent Flowsheet Documentation  Taken 1/12/2025 2054 by Paris Kenney RN  VTE Prevention/Management: (ASA/ Plavix) other (see comments)  Intervention: Prevent Infection  Recent Flowsheet Documentation  Taken 1/13/2025 0414 by Paris Kenney RN  Infection Prevention:   hand hygiene promoted   rest/sleep promoted   single patient room provided  Taken 1/13/2025 0211 by Paris Kenney RN  Infection Prevention:   hand hygiene promoted   rest/sleep promoted   single patient room provided  Taken 1/13/2025 0030 by Paris Kenney RN  Infection Prevention:   hand hygiene promoted   rest/sleep promoted   single patient room provided  Taken 1/12/2025 2246 by Paris Kenney RN  Infection Prevention:   hand hygiene promoted   rest/sleep promoted   single patient room  provided  Taken 1/12/2025 2054 by Paris Kenney RN  Infection Prevention:   hand hygiene promoted   single patient room provided   rest/sleep promoted  Goal: Optimal Comfort and Wellbeing  Outcome: Progressing  Intervention: Provide Person-Centered Care  Recent Flowsheet Documentation  Taken 1/12/2025 2054 by Paris Kenney RN  Trust Relationship/Rapport:   care explained   reassurance provided   thoughts/feelings acknowledged  Goal: Readiness for Transition of Care  Outcome: Progressing     Problem: Chronic Kidney Disease  Goal: Optimal Coping with Chronic Illness  Outcome: Progressing  Intervention: Support Psychosocial Response  Recent Flowsheet Documentation  Taken 1/12/2025 2054 by Paris Kenney RN  Supportive Measures:   active listening utilized   verbalization of feelings encouraged  Goal: Electrolyte Balance  Outcome: Progressing  Goal: Fluid Balance  Outcome: Progressing  Intervention: Monitor and Manage Hypervolemia  Recent Flowsheet Documentation  Taken 1/12/2025 2054 by Paris Kenney RN  Skin Protection:   incontinence pads utilized   transparent dressing maintained   skin sealant/moisture barrier applied  Goal: Optimal Functional Ability  Outcome: Progressing  Intervention: Optimize Functional Ability  Recent Flowsheet Documentation  Taken 1/13/2025 0414 by Paris Kenney RN  Activity Management: activity encouraged  Taken 1/13/2025 0211 by Paris Kenney RN  Activity Management: activity encouraged  Taken 1/13/2025 0030 by Paris Kenney RN  Activity Management: activity encouraged  Taken 1/12/2025 2246 by Paris Kenney RN  Activity Management: up in chair  Taken 1/12/2025 2054 by Paris Kenney RN  Activity Management: up ad david  Goal: Acceptable Pain Control  Outcome: Progressing  Intervention: Prevent or Manage Pain  Recent Flowsheet Documentation  Taken 1/12/2025 2054 by Paris Kenney RN  Sleep/Rest Enhancement:   awakenings minimized   regular sleep/rest pattern promoted   relaxation techniques  promoted   room darkened  Goal: Minimize Renal Failure Effects  Outcome: Progressing  Intervention: Monitor and Support Renal Function  Recent Flowsheet Documentation  Taken 1/13/2025 0414 by Paris Kenney RN  Medication Review/Management: medications reviewed  Taken 1/13/2025 0211 by Paris Kenney RN  Medication Review/Management: medications reviewed  Taken 1/13/2025 0030 by Paris Kenney RN  Medication Review/Management: medications reviewed  Taken 1/12/2025 2246 by Paris Kenney RN  Medication Review/Management: medications reviewed     Problem: Comorbidity Management  Goal: Blood Pressure in Desired Range  Outcome: Progressing  Intervention: Maintain Blood Pressure Management  Recent Flowsheet Documentation  Taken 1/13/2025 0414 by Paris Kenney RN  Medication Review/Management: medications reviewed  Taken 1/13/2025 0211 by Paris Kenney RN  Medication Review/Management: medications reviewed  Taken 1/13/2025 0030 by Paris Kenney RN  Medication Review/Management: medications reviewed  Taken 1/12/2025 2246 by Paris Kenney RN  Medication Review/Management: medications reviewed     Problem: Skin Injury Risk Increased  Goal: Skin Health and Integrity  Outcome: Progressing  Intervention: Optimize Skin Protection  Recent Flowsheet Documentation  Taken 1/13/2025 0414 by Paris Kenney RN  Activity Management: activity encouraged  Taken 1/13/2025 0211 by Paris Kenney RN  Activity Management: activity encouraged  Taken 1/13/2025 0030 by Paris Kenney RN  Activity Management: activity encouraged  Head of Bed (HOB) Positioning: HOB at 20-30 degrees  Taken 1/12/2025 2246 by Paris Kenney RN  Activity Management: up in chair  Taken 1/12/2025 2054 by Paris Kenney RN  Activity Management: up ad david  Pressure Reduction Techniques:   frequent weight shift encouraged   pressure points protected  Pressure Reduction Devices:   alternating pressure pump (NEVAEH)   pressure-redistributing mattress utilized  Skin Protection:    incontinence pads utilized   transparent dressing maintained   skin sealant/moisture barrier applied     Problem: Fall Injury Risk  Goal: Absence of Fall and Fall-Related Injury  Outcome: Progressing  Intervention: Identify and Manage Contributors  Recent Flowsheet Documentation  Taken 1/13/2025 0414 by Paris Kenney RN  Medication Review/Management: medications reviewed  Taken 1/13/2025 0211 by Paris Kenney RN  Medication Review/Management: medications reviewed  Taken 1/13/2025 0030 by Paris Kenney RN  Medication Review/Management: medications reviewed  Taken 1/12/2025 2246 by Paris Kenney RN  Medication Review/Management: medications reviewed  Intervention: Promote Injury-Free Environment  Recent Flowsheet Documentation  Taken 1/13/2025 0414 by Paris Kenney RN  Safety Promotion/Fall Prevention:   assistive device/personal items within reach   activity supervised   clutter free environment maintained   fall prevention program maintained   lighting adjusted   nonskid shoes/slippers when out of bed   room organization consistent   safety round/check completed  Taken 1/13/2025 0211 by Paris Kenney RN  Safety Promotion/Fall Prevention:   activity supervised   assistive device/personal items within reach   clutter free environment maintained   fall prevention program maintained   lighting adjusted   nonskid shoes/slippers when out of bed   room organization consistent   safety round/check completed  Taken 1/13/2025 0030 by Paris Kenney RN  Safety Promotion/Fall Prevention:   activity supervised   assistive device/personal items within reach   clutter free environment maintained   fall prevention program maintained   lighting adjusted   nonskid shoes/slippers when out of bed   room organization consistent   safety round/check completed  Taken 1/12/2025 2246 by Paris Kenney RN  Safety Promotion/Fall Prevention:   activity supervised   assistive device/personal items within reach   clutter free environment  maintained   fall prevention program maintained   lighting adjusted   nonskid shoes/slippers when out of bed   room organization consistent   safety round/check completed  Taken 1/12/2025 2054 by Paris Kenney RN  Safety Promotion/Fall Prevention:   activity supervised   assistive device/personal items within reach   clutter free environment maintained   fall prevention program maintained   lighting adjusted   nonskid shoes/slippers when out of bed   room organization consistent   safety round/check completed   Goal Outcome Evaluation:           Progress: no change  Outcome Evaluation: No s/sx of distress noted at this time. Rested some throughout night. Vital signs. On room air; tolerated. Fall precautions maintained. Up with assist to BSC; tolerated. Some complaints of right sided chest pain during shift. EKG and cardiac labs completed. Cardiology notified and reviewed results. No new orders noted at this time. No complaints of chest pain noted at this time. Will cont with current plan of care.

## 2025-01-13 NOTE — NURSING NOTE
Spoke with KANDIS Zepeda with Cardiology she reviewed patient's EKG and labs and states they are unchanged. Thinks patient is experiencing pleuritic pain. No further workup ordered at this time

## 2025-01-14 ENCOUNTER — APPOINTMENT (OUTPATIENT)
Dept: GENERAL RADIOLOGY | Facility: HOSPITAL | Age: 75
DRG: 177 | End: 2025-01-14
Payer: MEDICARE

## 2025-01-14 LAB
ALBUMIN SERPL-MCNC: 2.9 G/DL (ref 3.5–5.2)
ANION GAP SERPL CALCULATED.3IONS-SCNC: 13.7 MMOL/L (ref 5–15)
BUN SERPL-MCNC: 28 MG/DL (ref 8–23)
BUN/CREAT SERPL: 7.5 (ref 7–25)
CALCIUM SPEC-SCNC: 9.4 MG/DL (ref 8.6–10.5)
CHLORIDE SERPL-SCNC: 97 MMOL/L (ref 98–107)
CO2 SERPL-SCNC: 23.3 MMOL/L (ref 22–29)
CREAT SERPL-MCNC: 3.75 MG/DL (ref 0.57–1)
DEPRECATED RDW RBC AUTO: 50.2 FL (ref 37–54)
EGFRCR SERPLBLD CKD-EPI 2021: 12.1 ML/MIN/1.73
ERYTHROCYTE [DISTWIDTH] IN BLOOD BY AUTOMATED COUNT: 16.5 % (ref 12.3–15.4)
GEN 5 1HR TROPONIN T REFLEX: 150 NG/L
GLUCOSE SERPL-MCNC: 79 MG/DL (ref 65–99)
HCT VFR BLD AUTO: 28.4 % (ref 34–46.6)
HGB BLD-MCNC: 9.1 G/DL (ref 12–15.9)
MCH RBC QN AUTO: 26.8 PG (ref 26.6–33)
MCHC RBC AUTO-ENTMCNC: 32 G/DL (ref 31.5–35.7)
MCV RBC AUTO: 83.5 FL (ref 79–97)
PHOSPHATE SERPL-MCNC: 4.7 MG/DL (ref 2.5–4.5)
PLATELET # BLD AUTO: 440 10*3/MM3 (ref 140–450)
PMV BLD AUTO: 9 FL (ref 6–12)
POTASSIUM SERPL-SCNC: 4.5 MMOL/L (ref 3.5–5.2)
QT INTERVAL: 416 MS
QTC INTERVAL: 498 MS
RBC # BLD AUTO: 3.4 10*6/MM3 (ref 3.77–5.28)
SODIUM SERPL-SCNC: 134 MMOL/L (ref 136–145)
TROPONIN T % DELTA: 1 %
TROPONIN T NUMERIC DELTA: 2 NG/L
TROPONIN T SERPL HS-MCNC: 148 NG/L
WBC NRBC COR # BLD AUTO: 15.21 10*3/MM3 (ref 3.4–10.8)

## 2025-01-14 PROCEDURE — 93010 ELECTROCARDIOGRAM REPORT: CPT | Performed by: INTERNAL MEDICINE

## 2025-01-14 PROCEDURE — 84484 ASSAY OF TROPONIN QUANT: CPT | Performed by: HOSPITALIST

## 2025-01-14 PROCEDURE — 73560 X-RAY EXAM OF KNEE 1 OR 2: CPT

## 2025-01-14 PROCEDURE — 94799 UNLISTED PULMONARY SVC/PX: CPT

## 2025-01-14 PROCEDURE — 99232 SBSQ HOSP IP/OBS MODERATE 35: CPT | Performed by: INTERNAL MEDICINE

## 2025-01-14 PROCEDURE — 99223 1ST HOSP IP/OBS HIGH 75: CPT | Performed by: INTERNAL MEDICINE

## 2025-01-14 PROCEDURE — 93005 ELECTROCARDIOGRAM TRACING: CPT | Performed by: HOSPITALIST

## 2025-01-14 PROCEDURE — 85027 COMPLETE CBC AUTOMATED: CPT | Performed by: HOSPITALIST

## 2025-01-14 PROCEDURE — 97110 THERAPEUTIC EXERCISES: CPT

## 2025-01-14 PROCEDURE — 80069 RENAL FUNCTION PANEL: CPT | Performed by: HOSPITALIST

## 2025-01-14 PROCEDURE — 94664 DEMO&/EVAL PT USE INHALER: CPT

## 2025-01-14 RX ADMIN — HYDRALAZINE HYDROCHLORIDE 100 MG: 50 TABLET ORAL at 06:45

## 2025-01-14 RX ADMIN — AMLODIPINE BESYLATE 5 MG: 5 TABLET ORAL at 08:56

## 2025-01-14 RX ADMIN — GUAIFENESIN 1200 MG: 600 TABLET, EXTENDED RELEASE ORAL at 08:56

## 2025-01-14 RX ADMIN — GUAIFENESIN 1200 MG: 600 TABLET, EXTENDED RELEASE ORAL at 20:35

## 2025-01-14 RX ADMIN — IPRATROPIUM BROMIDE AND ALBUTEROL SULFATE 3 ML: 2.5; .5 SOLUTION RESPIRATORY (INHALATION) at 16:12

## 2025-01-14 RX ADMIN — GABAPENTIN 100 MG: 100 CAPSULE ORAL at 16:52

## 2025-01-14 RX ADMIN — ASPIRIN 81 MG: 81 TABLET, COATED ORAL at 08:56

## 2025-01-14 RX ADMIN — ACETAMINOPHEN 650 MG: 325 TABLET ORAL at 11:30

## 2025-01-14 RX ADMIN — LOSARTAN POTASSIUM 100 MG: 100 TABLET, FILM COATED ORAL at 20:35

## 2025-01-14 RX ADMIN — SENNOSIDES AND DOCUSATE SODIUM 2 TABLET: 50; 8.6 TABLET ORAL at 20:34

## 2025-01-14 RX ADMIN — CLOPIDOGREL BISULFATE 75 MG: 75 TABLET ORAL at 08:56

## 2025-01-14 RX ADMIN — GABAPENTIN 100 MG: 100 CAPSULE ORAL at 03:36

## 2025-01-14 RX ADMIN — IPRATROPIUM BROMIDE AND ALBUTEROL SULFATE 3 ML: 2.5; .5 SOLUTION RESPIRATORY (INHALATION) at 11:15

## 2025-01-14 RX ADMIN — ISOSORBIDE DINITRATE 30 MG: 20 TABLET ORAL at 08:55

## 2025-01-14 RX ADMIN — HYDRALAZINE HYDROCHLORIDE 100 MG: 50 TABLET ORAL at 20:34

## 2025-01-14 RX ADMIN — ATORVASTATIN CALCIUM 40 MG: 20 TABLET, FILM COATED ORAL at 08:56

## 2025-01-14 RX ADMIN — IPRATROPIUM BROMIDE AND ALBUTEROL SULFATE 3 ML: 2.5; .5 SOLUTION RESPIRATORY (INHALATION) at 20:41

## 2025-01-14 RX ADMIN — NITROGLYCERIN 0.4 MG: 0.4 TABLET SUBLINGUAL at 11:39

## 2025-01-14 RX ADMIN — PANTOPRAZOLE SODIUM 40 MG: 40 TABLET, DELAYED RELEASE ORAL at 06:45

## 2025-01-14 NOTE — PLAN OF CARE
Goal Outcome Evaluation:  Plan of Care Reviewed With: patient        Progress: improving  Outcome Evaluation: Denies chest pain so far this shift.  Bed alarm on for safety.  C/O BLE pain-medicated per MAR.

## 2025-01-14 NOTE — PLAN OF CARE
Goal Outcome Evaluation:  Plan of Care Reviewed With: patient        Progress: improving  Outcome Evaluation: Pt seen for PT tx this PM and tolerated the session well. Today, pt was agreeable to LE ther-ex and STS, politely declining any ambulation d/t fatigue. Pt performed 10-20 reps of LE ther-ex and total of 10 STS to RW w/ CG/SBA. Pt denied having any chest pain during the session or any L knee pain with weight bearing. Pt reports L knee pain is worst with knee extension and when pushing off LLE during ambulation. Pt reported feeling SOB follwing STS w/ sats >97% while on room air. PT will cont to follow to progress pts functional mobility independence.    Anticipated Discharge Disposition (PT): home with assist, home with home health

## 2025-01-14 NOTE — PROGRESS NOTES
[facility] Progress Note      Patient Name: Sonia Acharya  : 1950  MRN: [id number]  Primary Care Physician:  Marcy Paez PA  Date of admission: 2025    Subjective     Interval History:   F/u ESRD     Review of Systems:     No events noted overnight.  Objective     Vitals:   Temp:  [98.1 °F (36.7 °C)-99.3 °F (37.4 °C)] 98.6 °F (37 °C)  Heart Rate:  [] 103  Resp:  [18-20] 20  BP: ()/(44-70) 94/44    Intake/Output Summary (Last 24 hours) at 2025 1541  Last data filed at 2025 1300  Gross per 24 hour   Intake 480 ml   Output --   Net 480 ml         Physical Exam:    General Appearance: alert, comfortable on NC O2  Neck: supple, no JVD  Lungs: CTA bilat no rales  Heart: RRR, normal S1 and S2  Abdomen: soft, nontender, nondistended  Extremities: no edema, cyanosis or clubbing       Scheduled Meds:     amLODIPine, 5 mg, Oral, Daily  aspirin, 81 mg, Oral, Daily  atorvastatin, 40 mg, Oral, Daily  clopidogrel, 75 mg, Oral, Daily  guaiFENesin, 1,200 mg, Oral, Q12H  hydrALAZINE, 100 mg, Oral, Q8H  ipratropium-albuterol, 3 mL, Nebulization, 4x Daily - RT  isosorbide dinitrate, 30 mg, Oral, Daily  lactulose, 15 mL, Oral, Daily  losartan, 100 mg, Oral, Nightly  pantoprazole, 40 mg, Oral, Q AM  senna-docusate sodium, 2 tablet, Oral, BID      IV Meds:        Results Reviewed:   I have personally reviewed the results from the time of this admission to 2025 15:41 EST     Results from last 7 days   Lab Units 25  0601 25  1600 25  0119 25  0602 25  0530 01/10/25  0624 25  0558   SODIUM mmol/L 134* 132* 131*   < > 137   < > 138   POTASSIUM mmol/L 4.5 3.9 4.6   < > 4.5   < > 4.4   CHLORIDE mmol/L 97* 97* 90*   < > 98   < > 99   CO2 mmol/L 23.3 23.0 19.0*   < > 25.3   < > 21.0*   BUN mg/dL 28* 18 56*   < > 29*   < > 38*   CREATININE mg/dL 3.75* 2.92* 6.27*   < > 3.92*   < > 5.44*   CALCIUM mg/dL 9.4 9.2 8.9   < > 9.1   < > 9.0    BILIRUBIN mg/dL  --  0.3  --   --  0.2  --  0.2   ALK PHOS U/L  --  85  --   --  81  --  82   ALT (SGPT) U/L  --  7  --   --  6  --  7   AST (SGOT) U/L  --  13  --   --  12  --  10   GLUCOSE mg/dL 79 70 115*   < > 76   < > 81    < > = values in this interval not displayed.     Estimated Creatinine Clearance: 9.7 mL/min (A) (by C-G formula based on SCr of 3.75 mg/dL (H)).  Results from last 7 days   Lab Units 01/14/25  0601 01/13/25  0119 01/12/25  0602 01/11/25  0530 01/10/25  0624   MAGNESIUM mg/dL  --  2.3  --  2.2 2.2   PHOSPHORUS mg/dL 4.7* 5.9* 5.5* 4.9* 5.8*         Results from last 7 days   Lab Units 01/14/25  1355 01/13/25  1600 01/13/25  0119 01/12/25  0602 01/11/25  0530   WBC 10*3/mm3 15.21* 18.05* 16.93* 15.86* 13.12*   HEMOGLOBIN g/dL 9.1* 9.1* 8.9* 8.9* 9.8*   PLATELETS 10*3/mm3 440 371 356 350 390           Assessment / Plan     ASSESSMENT:  ESRD on HD, MWF, via RUE AVF, hyperkalemia per morning labs.  Volume status okay   nausea/vomiting/diarrhea + abdominal pain, CT abdomen/pelvis negative for acute findings, followed by primary team, symptoms have resolved   Altered mental status/hallucination, CT head negative for acute findings.  Improved, followed by primary team.    pneumonia, on IV Zosyn and oxygen supplementation, managed by primary team  Hypertension with ESRD, BP labile,   HFpEF, volume mgmt via HD     PLAN:  Will continue dialysis on Monday Wednesday and Friday.  Plan for dialysis tomorrow per schedule   Surveillance labs    Micheline Torrez MD  01/14/25  15:41 [facility]    [facility]  [phone]

## 2025-01-14 NOTE — PROGRESS NOTES
Hospital Follow Up    LOS: 8  Patient Name: Sonia Acharya  Age/Sex: 74 y.o. female  : 1950  MRN: [id number]    Day of Service: 25   Length of Stay: 8  Encounter Provider: Rudi Hassan MD  Place of Service: [facility] CARDIOLOGY  Patient Care Team:  Marcy Paez PA as PCP - General (Physician Assistant)    Subjective:     Chief Complaint: Chest pain    Interval History: Patient says she is feeling much better.    Objective:     Objective:  Temp:  [97.8 °F (36.6 °C)-99.3 °F (37.4 °C)] 98.1 °F (36.7 °C)  Heart Rate:  [85-99] 99  Resp:  [16-18] 18  BP: ()/(47-70) 98/47     Intake/Output Summary (Last 24 hours) at 2025 1123  Last data filed at 2025 0900  Gross per 24 hour   Intake 240 ml   Output 3000 ml   Net -2760 ml     Body mass index is 20.37 kg/m².      25  0549 25  1218 25  0515   Weight: 50.8 kg (111 lb 15.9 oz) 45 kg (99 lb 3.3 oz) 47.3 kg (104 lb 4.4 oz)     Weight change: -5.8 kg (-12 lb 12.6 oz)      Physical Exam:   General :  Alert, cooperative, in no acute distress.  Neuro:   Alert,cooperative and oriented.  Lungs:  CTAB. Normal respiratory effort and rate.  CV:  Regular rate and rhythm, normal S1 and S2, no murmurs, gallops or rubs.   ABD:  Soft, nontender, nondistended. Positive bowel sounds.  Extr:  No edema or cyanosis, moves all extremities.    Lab Review:   Results from last 7 days   Lab Units 25  0601 25  1600 25  0602 25  0530   SODIUM mmol/L 134* 132*   < > 137   POTASSIUM mmol/L 4.5 3.9   < > 4.5   CHLORIDE mmol/L 97* 97*   < > 98   CO2 mmol/L 23.3 23.0   < > 25.3   BUN mg/dL 28* 18   < > 29*   CREATININE mg/dL 3.75* 2.92*   < > 3.92*   GLUCOSE mg/dL 79 70   < > 76   CALCIUM mg/dL 9.4 9.2   < > 9.1   AST (SGOT) U/L  --  13  --  12   ALT (SGPT) U/L  --  7  --  6    < > = values in this interval not displayed.     Results from last 7 days   Lab Units 25  1002 25  0315  "01/13/25  0119 01/12/25  0010 01/11/25  2301 01/10/25  1544 01/10/25  1352   HSTROP T ng/L 148* 124* 126* 123* 129* 124* 117*     Results from last 7 days   Lab Units 01/13/25  1600 01/13/25  0119   WBC 10*3/mm3 18.05* 16.93*   HEMOGLOBIN g/dL 9.1* 8.9*   HEMATOCRIT % 28.6* 28.1*   PLATELETS 10*3/mm3 371 356         Results from last 7 days   Lab Units 01/13/25  0119 01/11/25  0530   MAGNESIUM mg/dL 2.3 2.2           Invalid input(s): \"LDLCALC\"            Current Medications:   Scheduled Meds:amLODIPine, 5 mg, Oral, Daily  aspirin, 81 mg, Oral, Daily  atorvastatin, 40 mg, Oral, Daily  clopidogrel, 75 mg, Oral, Daily  guaiFENesin, 1,200 mg, Oral, Q12H  hydrALAZINE, 100 mg, Oral, Q8H  ipratropium-albuterol, 3 mL, Nebulization, 4x Daily - RT  isosorbide dinitrate, 30 mg, Oral, Daily  lactulose, 15 mL, Oral, Daily  losartan, 100 mg, Oral, Nightly  pantoprazole, 40 mg, Oral, Q AM  senna-docusate sodium, 2 tablet, Oral, BID      Continuous Infusions:     Allergies:  No Known Allergies    Assessment:       Bacterial pneumonia, treating as gram negative    Generalized abdominal pain    Anemia, chronic disease    HTN (hypertension)    ESRD on dialysis    ANGIE (obstructive sleep apnea)    Chronic diastolic CHF (congestive heart failure)    Chronic respiratory failure with hypoxia    CAD (coronary artery disease)    Nausea, vomiting, and diarrhea    Severe protein-calorie malnutrition        Plan:      Chest pain - initially , non cardiac / right sided chest pain.  Chest pain has resolved with improvement of her pneumonia.  End stage renal disease - on HD   Pneumonia , concern for aspiration.   Coronary artery disease status post PCI to ostial RCA 3.5 x 20 mm Synergy in August 2024. Nonobstructive disease of LAD and circ at that time.   Chronic HFrEF   Hypertension  Diabetes mellitus type II   Severe concentric LVH  Mild aortic valve stenosis   LBBB  She follows with [facility] and has been considered for " infiltrative CM evaluation (had a cath 8/16/2024 Endomyocardial biopsy for amyloidosis will be deferred to the outpatient setting when dual antiplatelet therapy can be effectively interrupted).   Patient did have a new wall motion abnormality on her echocardiogram.  As patient clinically improves from a pulmonary standpoint question would be whether we redo her heart catheterization.  She just had a heart catheterization in August 2024.  The region had nonobstructive LAD at the time.  It may be reasonable to do a heart cath however I would do it after she recovers from the current clinical state.  She may want a follow-up with [place] but we will continue to reassess her.  If she agrees it may not be unreasonable do a heart catheterization this admit however in my opinion is not clear what needs to be done at the current time.        Rudi Hassan MD  01/14/25  11:23 EST

## 2025-01-14 NOTE — CONSULTS
"Referring Provider: Riaz Davalos MD  [address] [place] [address]    Reason for Consultation: leukocytosis despite completing tx of pneumonia     History of present illness:  Sonia Acharya is a 74 y.o. with PMH ESRD on HD and chronic hypoxemic respiratory failure on 2 L nasal cannula who I am asked to evaluate and give opinion for \"leukocytosis despite completing tx of pneumonia.\" History is obtained from the patient, Dr Baltazar, and review of the old medical records which I summarize/synthesize as follows: She had a recent admission at [facility] from 12/31 - 1/3/2025.  I reviewed the discharge summary for this.  She was diagnosed with volume overload, acute on chronic heart failure with preserved ejection fraction, and required urgent dialysis.    She presented to [facility] on 1/6/2025 with nausea, vomiting, diarrhea, and abdominal pain that had been going on for about 10 days.  She was also having cough, confusion, and hallucinations.  In the emergency room she was afebrile and bradycardic.  She had a slightly elevated blood pressure of 140/86.  Labs were notable for WBC 8, creatinine 4, and a negative Gram stain on a respiratory sample.  She was started on empiric piperacillin-tazobactam.  CT of the abdomen and pelvis showed a left lower lobe infiltrate.    Her hospital course has also been complicated by chest pain concerning for pulmonary etiology.  She was evaluated by cardiology and despite her history of coronary artery disease there were no concerns for recurrent ischemia.    She completed a 6-day course of Zosyn on 1/12 at 2 AM.  She has been weaned to room air.  She has not been febrile throughout the duration of her hospital stay.  Her blood cultures were negative.  Her respiratory culture showed only normal barbara.  Her RPP was negative.    ID has been consulted for an elevated WBC.  Her trend is as follows: 11, 10, 8, 10, 11, 13, 15, 16, 18, 15.  She has not been on " any steroids this admission.    At the time of visit she says she is overall feeling better.  Her respiratory status is much improved.  Really her only symptom to report is intermittent chest pain which she also says is trending for the better.  She did have a CTA of the chest approximately 2 weeks ago when she was admitted to [facility] and it did not show any evidence of pulmonary embolism.    On exam she has some swelling of the left knee.  This has also been noticed by her hospitalist after my discussion with him.  The patient says this is a chronic problem.  She says it is actually a little less swollen right now than it usually is.  She is unsure if she has ever had gout or pseudogout before.    Past Medical History:   Diagnosis Date    Arthritis     Chronic kidney disease     Heart disease     Hypertension    MSSA septicemia in 2017  ESRD on hemodialysis q. MWF    Past Surgical History:   Procedure Laterality Date    BREAST SURGERY      DIALYSIS FISTULA CREATION      EYE SURGERY      HERNIA REPAIR  2017    Dr. Sung       Social History:  Lives in [place], [place]  Retired    Antibiotic allergies and intolerances:  None    Medications:    Current Facility-Administered Medications:     acetaminophen (TYLENOL) tablet 650 mg, 650 mg, Oral, Q4H PRN, 650 mg at 01/14/25 1130 **OR** acetaminophen (TYLENOL) 160 MG/5ML oral solution 650 mg, 650 mg, Oral, Q4H PRN **OR** acetaminophen (TYLENOL) suppository 650 mg, 650 mg, Rectal, Q4H PRN, Cherie Reich MD    amLODIPine (NORVASC) tablet 5 mg, 5 mg, Oral, Daily, Cherie Reich MD, 5 mg at 01/14/25 0856    aspirin EC tablet 81 mg, 81 mg, Oral, Daily, Shaan Baltazar MD, 81 mg at 01/14/25 0856    atorvastatin (LIPITOR) tablet 40 mg, 40 mg, Oral, Daily, Cherie Reich MD, 40 mg at 01/14/25 0856    benzocaine-menthol (CHLORASEPTIC) lozenge 1 each, 1 lozenge, Mouth/Throat, TID PRN, Cherie Reich MD    sennosides-docusate (PERICOLACE) 8.6-50  MG per tablet 2 tablet, 2 tablet, Oral, BID, 2 tablet at 01/13/25 2015 **AND** polyethylene glycol (MIRALAX) packet 17 g, 17 g, Oral, Daily PRN **AND** bisacodyl (DULCOLAX) EC tablet 5 mg, 5 mg, Oral, Daily PRN **AND** bisacodyl (DULCOLAX) suppository 10 mg, 10 mg, Rectal, Daily PRN, Essence Verdugo APRN    calcium carbonate (TUMS) chewable tablet 500 mg (200 mg elemental), 2 tablet, Oral, TID PRN, Essence Verdugo APRN, 2 tablet at 01/12/25 0051    clopidogrel (PLAVIX) tablet 75 mg, 75 mg, Oral, Daily, Cherie Reich MD, 75 mg at 01/14/25 0856    gabapentin (NEURONTIN) capsule 100 mg, 100 mg, Oral, TID PRN, Cherie Reich MD, 100 mg at 01/14/25 0336    guaiFENesin (MUCINEX) 12 hr tablet 1,200 mg, 1,200 mg, Oral, Q12H, Shaan Baltazar MD, 1,200 mg at 01/14/25 0856    hydrALAZINE (APRESOLINE) tablet 100 mg, 100 mg, Oral, Q8H, Arjun Ramirez MD, 100 mg at 01/14/25 0645    ipratropium-albuterol (DUO-NEB) nebulizer solution 3 mL, 3 mL, Nebulization, 4x Daily - RT, Shaan Baltazar MD, 3 mL at 01/14/25 1115    ipratropium-albuterol (DUO-NEB) nebulizer solution 3 mL, 3 mL, Nebulization, Q6H PRN, Sisi Hopson, APRN    isosorbide dinitrate (ISORDIL) tablet 30 mg, 30 mg, Oral, Daily, Cherie Reich MD, 30 mg at 01/14/25 0855    lactulose (CHRONULAC) 10 GM/15ML solution 15 mL, 15 mL, Oral, Daily, Shaan Baltazar MD, 15 mL at 01/12/25 0901    losartan (COZAAR) tablet 100 mg, 100 mg, Oral, Nightly, Arjun Ramirez MD, 100 mg at 01/13/25 2015    mannitol 25% (RENAL) injection, 12.5 g, Intravenous, PRN, Alexandra Brown APRN    melatonin tablet 2.5 mg, 2.5 mg, Oral, Nightly PRN, Cherie Reich MD    morphine injection 2 mg, 2 mg, Intravenous, Once PRN, Scar Dodge MD    nitroglycerin (NITROSTAT) SL tablet 0.4 mg, 0.4 mg, Sublingual, Q5 Min PRN, Shaan Baltazar MD, 0.4 mg at 01/14/25 1139    ondansetron ODT (ZOFRAN-ODT) disintegrating tablet 4 mg, 4 mg, Oral,  Q6H PRN **OR** ondansetron (ZOFRAN) injection 4 mg, 4 mg, Intravenous, Q6H PRN, Cherie Reich MD, 4 mg at 01/10/25 2020    pantoprazole (PROTONIX) EC tablet 40 mg, 40 mg, Oral, Q AM, Shaan Baltazar MD, 40 mg at 01/14/25 0645    Polyvinyl Alcohol-Povidone PF (ARTIFICIAL TEARS) 1.4-0.6 % ophthalmic solution 2 drop, 2 drop, Both Eyes, Q1H PRN, Shaan Baltazar MD    simethicone (MYLICON) chewable tablet 80 mg, 80 mg, Oral, 4x Daily PRN, Essence Verdugo APRN    sodium chloride 0.9 % bolus 1,000 mL, 1,000 mL, Intravenous, PRN, Alexandra Brown APRN      Objective   Vital Signs   Temp:  [98.1 °F (36.7 °C)-99.3 °F (37.4 °C)] 98.1 °F (36.7 °C)  Heart Rate:  [85-99] 92  Resp:  [18-20] 20  BP: ()/(44-70) 95/44    Physical Exam:   General: awake, alert, NAD, very nice, sitting up in chair  Eyes: no scleral icterus  ENT: no thrush, missing several teeth  Cardiovascular: Normal rate  Respiratory: normal work of breathing on RA without wheezing  GI: Abdomen is soft, not tender, not distended  :  no Doherty catheter  MSK: Left knee edematous especially compared to right knee; no heat or redness  Skin: No rashes on exposed areas  Neurological: Alert and oriented x 3  Psychiatric: Normal mood and affect   Vasc: PIV w/o erythema    Labs:     Lab Results   Component Value Date    WBC 15.21 (H) 01/14/2025    HGB 9.1 (L) 01/14/2025    HCT 28.4 (L) 01/14/2025    MCV 83.5 01/14/2025     01/14/2025       Lab Results   Component Value Date    GLUCOSE 79 01/14/2025    BUN 28 (H) 01/14/2025    CREATININE 3.75 (H) 01/14/2025    EGFRIFNONA  12/14/2021      Comment:      <15 Indicative of kidney failure.    EGFRIFAFRI 10 (L) 03/04/2023    BCR 7.5 01/14/2025    CO2 23.3 01/14/2025    CALCIUM 9.4 01/14/2025    ALBUMIN 2.9 (L) 01/14/2025    LABIL2 1.4 03/04/2023    AST 13 01/13/2025    ALT 7 01/13/2025     Lab Results   Component Value Date    HGBA1C 4.90 01/08/2025     Procalcitonin 0.8 on 1/6/2025    Microbiology:  1/6  RPP:   1/6 BCx: Negative  1/7 respiratory cx: Negative    Radiology:  1/13 CXR personally reviewed and shows mild atelectasis versus infiltrate at the bases; per the radiologist it is essentially unchanged from the prior x-ray on 1/10    1/6 CT abdomen/pelvis no acute intra-abdominal process; mild left lower lobe bronchial wall thickening with potential mild adjacent infiltrate; cardiomegaly    Isolation:  No active isolations    ASSESSMENT/PLAN:  Left lower lobe pneumonia  Chronic hypoxemic respiratory failure (2 L nasal cannula at home)  ESRD on hemodialysis q. MWF  Nausea, vomiting, diarrhea, and abdominal pain  Encephalopathy present on admission now resolved  Chronic diastolic heart failure  Intermittent chest pain -not ACS per cardiology  Chronic left knee swelling    It seems there is a disconnect between her WBC and her clinical recovery.  She is significantly improved compared to admission and breathing comfortably on room air.  She is afebrile with reassuring vital signs.    We are going to repeat a CTA of the chest to make sure she does not have a pulmonary embolism as what sounds like pleuritic chest pain is her main symptom.  I am also going to obtain a plain film of her left knee.  I do not see a role for reinitiating broad-spectrum antibiotics at this time given her stability.    If she has a temperature greater than 100.4 F, then I will check a set of blood cultures.    ID will follow.  D/W Dr. Baltazar.

## 2025-01-14 NOTE — PROGRESS NOTES
"    Name: Sonia Acharya ADMIT: 2025   : 1950  PCP: Marcy Paez PA    MRN: [id number] LOS: 8 days   AGE/SEX: 74 y.o. female  ROOM: [facility]     Subjective   Subjective   Cough improved. No SOA. No palp. Still c/o intermittent \"sharp\" pains in left chest. C/o \"hot flashes\" last couple days. No longer confused or hallucinating. Sleeping well. Tolerating po. No N/V/D/abd pain.       Objective   Objective   Vital Signs  Temp:  [98.1 °F (36.7 °C)-99.3 °F (37.4 °C)] 98.1 °F (36.7 °C)  Heart Rate:  [85-99] 92  Resp:  [18-20] 20  BP: ()/(44-70) 95/44  SpO2:  [95 %-100 %] 100 %  on   ;   Device (Oxygen Therapy): room air  Body mass index is 20.37 kg/m².    (No change in exam today)    Physical Exam  Vitals and nursing note reviewed.   Constitutional:       General: She is not in acute distress.     Appearance: She is ill-appearing (chronically). She is not toxic-appearing or diaphoretic.   HENT:      Head: Normocephalic.      Nose: Nose normal.      Mouth/Throat:      Mouth: Mucous membranes are moist.      Pharynx: Oropharynx is clear.   Eyes:      General: No scleral icterus.     Extraocular Movements: Extraocular movements intact.   Cardiovascular:      Rate and Rhythm: Normal rate and regular rhythm.      Pulses: Normal pulses.      Comments: AVF RUE  Pulmonary:      Effort: Pulmonary effort is normal. No respiratory distress.      Breath sounds: Normal breath sounds. No wheezing or rales.      Comments: Anteriorly   Abdominal:      General: Bowel sounds are normal. There is no distension.      Palpations: Abdomen is soft.      Tenderness: There is no abdominal tenderness.   Musculoskeletal:         General: No swelling. Normal range of motion.      Cervical back: Neck supple.      Comments: Left knee with chronic bony changes, no acute effusion or erythema   Skin:     General: Skin is warm and dry.      Capillary Refill: Capillary refill takes less than 2 seconds.      Coloration: Skin is not " jaundiced.   Neurological:      General: No focal deficit present.      Mental Status: She is alert and oriented to person, place, and time. Mental status is at baseline.      Cranial Nerves: No cranial nerve deficit.      Coordination: Coordination normal.   Psychiatric:         Mood and Affect: Mood normal.         Behavior: Behavior normal.         Thought Content: Thought content normal.       Results Review     I reviewed the patient's new clinical results.  Results from last 7 days   Lab Units 01/13/25 1600 01/13/25 0119 01/12/25  0602 01/11/25  0530   WBC 10*3/mm3 18.05* 16.93* 15.86* 13.12*   HEMOGLOBIN g/dL 9.1* 8.9* 8.9* 9.8*   PLATELETS 10*3/mm3 371 356 350 390     Results from last 7 days   Lab Units 01/14/25  0601 01/13/25 1600 01/13/25 0119 01/12/25  0602   SODIUM mmol/L 134* 132* 131* 134*   POTASSIUM mmol/L 4.5 3.9 4.6 5.5*   CHLORIDE mmol/L 97* 97* 90* 94*   CO2 mmol/L 23.3 23.0 19.0* 23.3   BUN mg/dL 28* 18 56* 49*   CREATININE mg/dL 3.75* 2.92* 6.27* 5.29*   GLUCOSE mg/dL 79 70 115* 85   EGFR mL/min/1.73 12.1* 16.3* 6.5* 8.0*     Results from last 7 days   Lab Units 01/14/25 0601 01/13/25 1600 01/13/25 0119 01/12/25  0602 01/11/25  0530 01/09/25  0558 01/08/25  0602   ALBUMIN g/dL 2.9* 3.1* 2.9* 3.0* 2.9* 2.9* 3.0*   BILIRUBIN mg/dL  --  0.3  --   --  0.2 0.2 0.2   ALK PHOS U/L  --  85  --   --  81 82 93   AST (SGOT) U/L  --  13  --   --  12 10 12   ALT (SGPT) U/L  --  7  --   --  6 7 8     Results from last 7 days   Lab Units 01/14/25 0601 01/13/25 1600 01/13/25 0119 01/12/25  0602 01/11/25  0530 01/10/25  0624 01/09/25  0558   CALCIUM mg/dL 9.4 9.2 8.9 9.1 9.1 9.3 9.0   ALBUMIN g/dL 2.9* 3.1* 2.9* 3.0* 2.9*  --  2.9*   MAGNESIUM mg/dL  --   --  2.3  --  2.2 2.2 2.2   PHOSPHORUS mg/dL 4.7*  --  5.9* 5.5* 4.9* 5.8* 4.6*           Glucose   Date/Time Value Ref Range Status   01/11/2025 2258 144 (H) 70 - 130 mg/dL Final       XR Chest PA & Lateral    Result Date: 1/13/2025  No significant  change.  This report was finalized on 1/13/2025 3:22 PM by Dr. Michael Lauren M.D on Workstation: [id number]       I have personally reviewed all medications:  Scheduled Medications  amLODIPine, 5 mg, Oral, Daily  aspirin, 81 mg, Oral, Daily  atorvastatin, 40 mg, Oral, Daily  clopidogrel, 75 mg, Oral, Daily  guaiFENesin, 1,200 mg, Oral, Q12H  hydrALAZINE, 100 mg, Oral, Q8H  ipratropium-albuterol, 3 mL, Nebulization, 4x Daily - RT  isosorbide dinitrate, 30 mg, Oral, Daily  lactulose, 15 mL, Oral, Daily  losartan, 100 mg, Oral, Nightly  pantoprazole, 40 mg, Oral, Q AM  senna-docusate sodium, 2 tablet, Oral, BID    Infusions     Diet  Diet: Renal; Low Sodium (2-3g), Low Potassium; Fluid Consistency: Thin (IDDSI 0)    I have personally reviewed:  [x]  Laboratory   [x]  Microbiology   [x]  Radiology   [x]  EKG/Telemetry  [x]  Cardiology/Vascular   []  Pathology    []  Records       Assessment/Plan     Active Hospital Problems    Diagnosis  POA    **Bacterial pneumonia, treating as gram negative [J15.9]  Yes    Severe protein-calorie malnutrition [E43]  Yes    Chronic respiratory failure with hypoxia [J96.11]  Yes    CAD (coronary artery disease) [I25.10]  Yes    Nausea, vomiting, and diarrhea [R11.2, R19.7]  Yes    ESRD on dialysis [N18.6, Z99.2]  Not Applicable    ANGIE (obstructive sleep apnea) [G47.33]  Yes    Chronic diastolic CHF (congestive heart failure) [I50.32]  Yes    Anemia, chronic disease [D63.8]  Yes    HTN (hypertension) [I10]  Yes    Generalized abdominal pain [R10.84]  Yes      Resolved Hospital Problems   No resolved problems to display.     73yo woman with ESRD, ACD, HTN, chronic diastolic CHF, CHRF (2L/min), ANGIE, and GERD , who presented to ER from home with N/V/D/abd pain, cough, confusion, and hallucinations. She was admitted with LLL pneumonia--concern for aspiration.    LLL PNA  Concern for aspiration  CHRF (2L/min at home)  SLP eval good  Completed 7d of IV Zosyn  Blood cultures negative  Sputum  "culture neg  RVP neg  WBC normalized but now climbing again --up to 18 last night with left shift, will ask ID to see  Afebrile  Weaned to RA here though uses NC O2 at home  Continue OPEP and IS  Continue Mucinex  Continue DuoNebs    N/V/D  Abd pain  Uncertain etiology  CT A/P unrevealing  GI PCR ordered but symptoms improved  Moving bowels now on her regular home Lactulose    Encephalopathy NOS  Hallucinations  Suspect due to acute illness  CT Head showed NAD  Much improved    ESRD on dialysis  Appreciate Renal attention to pt  Continue HD here MWF    Chronic diastolic CHF  Volume mgmt per Renal  Not on diuretics PTA    HTN  BPs quite labile on current regimen of hydralazine, amlodipine, losartan, and Imdur  Continue to monitor on telemetry    GERD  PPI    Anemia of CKD  Hgb stable  No evidence of bleeding  Continue to monitor closely    CAD  Left sided chest pain  ARIANA placed in August  Continue DAPT and statin  Continues to c/o intermittent chest pain  Reports this has been intermittent for \"weeks now\"   Had CTA Chest on 12/31 that did not show any rib fractures or PE  Card following, the do not feel this is ACS, recommend f/u with her [facility] at [facility]    H/o DVT  DVT ppx with DAPT and SCDs    Chronic left knee pain  This is a long term issue for her  No acute changes    DM2?  Mentioned in chart but on no meds  A1c only 4.9  Sugars fine here  I do not think that she has diabetes      DAPT and SCDs should suffice for DVT prophylaxis.  Full code.  Discussed with patient and RN. No family present.  Anticipate discharge home with [facility] (per PT recs) when ready.   Expected Discharge Date: 1/15/2025; Expected Discharge Time:       Shaan Baltazar MD  [facility]  01/14/25  14:05 EST    "

## 2025-01-14 NOTE — THERAPY TREATMENT NOTE
Patient Name: Sonia Acharya  : 1950    MRN: [id number]                              Today's Date: 2025       Admit Date: 2025    Visit Dx:     ICD-10-CM ICD-9-CM   1. Nausea and vomiting, unspecified vomiting type  R11.2 787.01   2. ESRD on dialysis  N18.6 585.6    Z99.2 V45.11   3. Community acquired pneumonia, unspecified laterality  J18.9 486   4. Elevated procalcitonin  R79.89 790.99   5. ESRD (end stage renal disease) on dialysis  N18.6 585.6    Z99.2 V45.11   6. Chronic respiratory failure with hypoxia  J96.11 518.83     799.02   7. Altered mental status, unspecified altered mental status type  R41.82 780.97     Patient Active Problem List   Diagnosis    Generalized abdominal pain    ODALIS (acute kidney injury)    Anemia, chronic disease    Metabolic acidosis, increased anion gap    Obesity (BMI 30-39.9)    HTN (hypertension)    Chest pain, atypical    Cervical radiculopathy    ESRD on dialysis    Bradycardia    Right arm pain    ANGIE (obstructive sleep apnea)    Chronic diastolic CHF (congestive heart failure)    Aortic stenosis    Pancytopenia    Hyperphosphatemia    Hyperkalemia    Pneumonia    Leukocytopenia    Anemia, chronic disease    ESRD (end stage renal disease) on dialysis    Bacterial pneumonia, treating as gram negative    Chronic respiratory failure with hypoxia    CAD (coronary artery disease)    Nausea, vomiting, and diarrhea    Severe protein-calorie malnutrition     Past Medical History:   Diagnosis Date    Arthritis     Chronic kidney disease     Heart disease     Hypertension      Past Surgical History:   Procedure Laterality Date    BREAST SURGERY      DIALYSIS FISTULA CREATION      EYE SURGERY      HERNIA REPAIR      Dr. Sung      General Information       Row Name 25 1541          Physical Therapy Time and Intention    Document Type therapy note (daily note)  -MG     Mode of Treatment individual therapy;physical therapy  -MG       Row Name 25 1541           General Information    Patient Profile Reviewed yes  -MG     Existing Precautions/Restrictions fall  -MG     Barriers to Rehab none identified  -MG       Row Name 01/14/25 1541          Cognition    Orientation Status (Cognition) oriented x 4  -MG       Row Name 01/14/25 1541          Safety Issues/Impairments Affecting Functional Mobility    Impairments Affecting Function (Mobility) endurance/activity tolerance;pain;strength;range of motion (ROM);balance  -MG     Comment, Safety Issues/Impairments (Mobility) Gait belt and non-skid socks donned.  -MG               User Key  (r) = Recorded By, (t) = Taken By, (c) = Cosigned By      Initials Name Provider Type    MG Xiomara Edwards, PT Physical Therapist                   Mobility       Row Name 01/14/25 1541          Sit-Stand Transfer    Sit-Stand [place] (Transfers) contact guard;standby assist;verbal cues  -MG     Assistive Device (Sit-Stand Transfers) walker, front-wheeled  -MG     Comment, (Sit-Stand Transfer) x10 from chair for general strengthening and working on sequencing. Cues for hand placement, sequencing and upright posture once standing.  -MG       Row Name 01/14/25 1541          Gait/Stairs (Locomotion)    Comment, (Gait/Stairs) Declined any ambulation d/t fatigue and feeling SOB.  -MG               User Key  (r) = Recorded By, (t) = Taken By, (c) = Cosigned By      Initials Name Provider Type    Xiomara Stanton, PT Physical Therapist                   Obj/Interventions       Row Name 01/14/25 1542          Motor Skills    Therapeutic Exercise other (see comments)  AP x20, LAQ x10, HF x15. Cues for slow/controlled mvmt and full ROM. Pt w/ limited L knee ext d/t pain.  -MG       Row Name 01/14/25 1542          Balance    Static Sitting Balance independent  -MG     Dynamic Sitting Balance modified independence  -MG     Position, Sitting Balance sitting in chair  -MG     Static Standing Balance standby assist  -MG     Position/Device Used,  Standing Balance supported;walker, front-wheeled  -MG               User Key  (r) = Recorded By, (t) = Taken By, (c) = Cosigned By      Initials Name Provider Type    MG Xiomara Edwards, PT Physical Therapist                   Goals/Plan    No documentation.                  Clinical Impression       Row Name 01/14/25 1543          Pain    Pain Location knee  -MG     Pain Side/Orientation left  -MG     Pain Management Interventions exercise or physical activity utilized  -MG     Pre/Posttreatment Pain Comment Reports pain at L knee w/ knee extension. No L knee pain w/ weight bearing. Denies any chest pain during the session.  -MG       Row Name 01/14/25 1543          Plan of Care Review    Plan of Care Reviewed With patient  -MG     Progress improving  -MG     Outcome Evaluation Pt seen for PT tx this PM and tolerated the session well. Today, pt was agreeable to LE ther-ex and STS, politely declining any ambulation d/t fatigue. Pt performed 10-20 reps of LE ther-ex and total of 10 STS to RW w/ CG/SBA. Pt denied having any chest pain during the session or any L knee pain with weight bearing. Pt reports L knee pain is worst with knee extension and when pushing off LLE during ambulation. Pt reported feeling SOB follwing STS w/ sats >97% while on room air. PT will cont to follow to progress pts functional mobility independence.  -MG       Row Name 01/14/25 1543          Therapy Assessment/Plan (PT)    Rehab Potential (PT) good  -MG     Criteria for Skilled Interventions Met (PT) skilled treatment is necessary;yes  -MG     Therapy Frequency (PT) 5 times/wk  -MG       Row Name 01/14/25 1543          Vital Signs    O2 Delivery Pre Treatment room air  -MG     Intra SpO2 (%) 97  -MG     O2 Delivery Intra Treatment room air  -MG     Post SpO2 (%) 97  -MG     O2 Delivery Post Treatment room air  -MG     Pre Patient Position Sitting  -MG     Intra Patient Position Standing  -MG     Post Patient Position Sitting  -MG       Row  Name 01/14/25 1543          Positioning and Restraints    Pre-Treatment Position sitting in chair/recliner  -MG     Post Treatment Position chair  -MG     In Chair notified nsg;reclined;call light within reach;encouraged to call for assist;exit alarm on;legs elevated  -MG               User Key  (r) = Recorded By, (t) = Taken By, (c) = Cosigned By      Initials Name Provider Type    Xiomara Stanton PT Physical Therapist                   Outcome Measures       Row Name 01/14/25 1546          How much help from another person do you currently need...    Turning from your back to your side while in flat bed without using bedrails? 4  -MG     Moving from lying on back to sitting on the side of a flat bed without bedrails? 3  -MG     Moving to and from a bed to a chair (including a wheelchair)? 3  -MG     Standing up from a chair using your arms (e.g., wheelchair, bedside chair)? 3  -MG     Climbing 3-5 steps with a railing? 2  -MG     To walk in hospital room? 3  -MG     AM-PAC 6 Clicks Score (PT) 18  -MG     Highest Level of Mobility Goal 6 --> Walk 10 steps or more  -MG               User Key  (r) = Recorded By, (t) = Taken By, (c) = Cosigned By      Initials Name Provider Type    Xiomara Stanton, RAFAEL Physical Therapist                                 Physical Therapy Education       Title: PT OT SLP Therapies (Done)       Topic: Physical Therapy (Done)       Point: Mobility training (Done)       Learning Progress Summary            Patient Acceptance, E,D, VU,NR by  at 1/14/2025 1546    Acceptance, E, VU,NR by JUDSON at 1/12/2025 1426                      Point: Home exercise program (Done)       Learning Progress Summary            Patient Acceptance, E,D, VU,NR by  at 1/14/2025 1546    Acceptance, E, VU,NR by JUDSON at 1/12/2025 1426                      Point: Body mechanics (Done)       Learning Progress Summary            Patient Acceptance, E,D, VU,NR by  at 1/14/2025 1546    Acceptance, E, VU,NR by JUDSON at  1/12/2025 1426                      Point: Precautions (Done)       Learning Progress Summary            Patient Acceptance, E,D, VU,NR by MG at 1/14/2025 1546    Acceptance, E, VU,NR by DJ at 1/12/2025 1426                                      User Key       Initials Effective Dates Name Provider Type Discipline    MG 05/24/22 -  Xiomara Edwards, PT Physical Therapist PT    DJ 10/25/19 -  Lien Santillan PT Physical Therapist PT                  PT Recommendation and Plan     Progress: improving  Outcome Evaluation: Pt seen for PT tx this PM and tolerated the session well. Today, pt was agreeable to LE ther-ex and STS, politely declining any ambulation d/t fatigue. Pt performed 10-20 reps of LE ther-ex and total of 10 STS to RW w/ CG/SBA. Pt denied having any chest pain during the session or any L knee pain with weight bearing. Pt reports L knee pain is worst with knee extension and when pushing off LLE during ambulation. Pt reported feeling SOB follwing STS w/ sats >97% while on room air. PT will cont to follow to progress pts functional mobility independence.     Time Calculation:         PT Charges       Row Name 01/14/25 1547             Time Calculation    Start Time 1458  -MG      Stop Time 1513  -MG      Time Calculation (min) 15 min  -MG      PT Received On 01/14/25  -MG      PT - Next Appointment 01/15/25  -MG      PT Goal Re-Cert Due Date 01/21/25  -MG                User Key  (r) = Recorded By, (t) = Taken By, (c) = Cosigned By      Initials Name Provider Type     Xiomara Edwards, PT Physical Therapist                  Therapy Charges for Today       Code Description Service Date Service Provider Modifiers Qty    07337960835 HC PT THER PROC EA 15 MIN 1/14/2025 Xiomara Edwards, PT GP 1            PT G-Codes  Outcome Measure Options: AM-PAC 6 Clicks Basic Mobility (PT)  AM-PAC 6 Clicks Score (PT): 18  PT Discharge Summary  Anticipated Discharge Disposition (PT): home with assist, home with home  health    Xiomara Edwards, PT  1/14/2025

## 2025-01-15 LAB
ALBUMIN SERPL-MCNC: 2.7 G/DL (ref 3.5–5.2)
ALBUMIN/GLOB SERPL: 0.7 G/DL
ALP SERPL-CCNC: 90 U/L (ref 39–117)
ALT SERPL W P-5'-P-CCNC: 10 U/L (ref 1–33)
ANION GAP SERPL CALCULATED.3IONS-SCNC: 18 MMOL/L (ref 5–15)
AST SERPL-CCNC: 15 U/L (ref 1–32)
BASOPHILS # BLD AUTO: 0.04 10*3/MM3 (ref 0–0.2)
BASOPHILS NFR BLD AUTO: 0.3 % (ref 0–1.5)
BILIRUB SERPL-MCNC: 0.2 MG/DL (ref 0–1.2)
BUN SERPL-MCNC: 48 MG/DL (ref 8–23)
BUN/CREAT SERPL: 8.9 (ref 7–25)
CALCIUM SPEC-SCNC: 9.1 MG/DL (ref 8.6–10.5)
CHLORIDE SERPL-SCNC: 96 MMOL/L (ref 98–107)
CO2 SERPL-SCNC: 19 MMOL/L (ref 22–29)
CREAT SERPL-MCNC: 5.38 MG/DL (ref 0.57–1)
DEPRECATED RDW RBC AUTO: 52.4 FL (ref 37–54)
EGFRCR SERPLBLD CKD-EPI 2021: 7.8 ML/MIN/1.73
EOSINOPHIL # BLD AUTO: 0.25 10*3/MM3 (ref 0–0.4)
EOSINOPHIL NFR BLD AUTO: 2 % (ref 0.3–6.2)
ERYTHROCYTE [DISTWIDTH] IN BLOOD BY AUTOMATED COUNT: 16.7 % (ref 12.3–15.4)
GLOBULIN UR ELPH-MCNC: 3.8 GM/DL
GLUCOSE SERPL-MCNC: 63 MG/DL (ref 65–99)
HCT VFR BLD AUTO: 28.1 % (ref 34–46.6)
HGB BLD-MCNC: 8.4 G/DL (ref 12–15.9)
IMM GRANULOCYTES # BLD AUTO: 0.15 10*3/MM3 (ref 0–0.05)
IMM GRANULOCYTES NFR BLD AUTO: 1.2 % (ref 0–0.5)
LYMPHOCYTES # BLD AUTO: 1.35 10*3/MM3 (ref 0.7–3.1)
LYMPHOCYTES NFR BLD AUTO: 10.8 % (ref 19.6–45.3)
MAGNESIUM SERPL-MCNC: 2.4 MG/DL (ref 1.6–2.4)
MCH RBC QN AUTO: 26 PG (ref 26.6–33)
MCHC RBC AUTO-ENTMCNC: 29.9 G/DL (ref 31.5–35.7)
MCV RBC AUTO: 87 FL (ref 79–97)
MONOCYTES # BLD AUTO: 0.95 10*3/MM3 (ref 0.1–0.9)
MONOCYTES NFR BLD AUTO: 7.6 % (ref 5–12)
NEUTROPHILS NFR BLD AUTO: 78.1 % (ref 42.7–76)
NEUTROPHILS NFR BLD AUTO: 9.79 10*3/MM3 (ref 1.7–7)
NRBC BLD AUTO-RTO: 0 /100 WBC (ref 0–0.2)
PHOSPHATE SERPL-MCNC: 4.8 MG/DL (ref 2.5–4.5)
PLATELET # BLD AUTO: 431 10*3/MM3 (ref 140–450)
PMV BLD AUTO: 9.5 FL (ref 6–12)
POTASSIUM SERPL-SCNC: 4.6 MMOL/L (ref 3.5–5.2)
PROT SERPL-MCNC: 6.5 G/DL (ref 6–8.5)
RBC # BLD AUTO: 3.23 10*6/MM3 (ref 3.77–5.28)
SODIUM SERPL-SCNC: 133 MMOL/L (ref 136–145)
URATE SERPL-MCNC: 4.9 MG/DL (ref 2.4–5.7)
WBC NRBC COR # BLD AUTO: 12.53 10*3/MM3 (ref 3.4–10.8)

## 2025-01-15 PROCEDURE — 83735 ASSAY OF MAGNESIUM: CPT | Performed by: HOSPITALIST

## 2025-01-15 PROCEDURE — 94799 UNLISTED PULMONARY SVC/PX: CPT

## 2025-01-15 PROCEDURE — 84550 ASSAY OF BLOOD/URIC ACID: CPT | Performed by: ORTHOPAEDIC SURGERY

## 2025-01-15 PROCEDURE — 94664 DEMO&/EVAL PT USE INHALER: CPT

## 2025-01-15 PROCEDURE — 85025 COMPLETE CBC W/AUTO DIFF WBC: CPT | Performed by: HOSPITALIST

## 2025-01-15 PROCEDURE — 84100 ASSAY OF PHOSPHORUS: CPT | Performed by: HOSPITALIST

## 2025-01-15 PROCEDURE — 94761 N-INVAS EAR/PLS OXIMETRY MLT: CPT

## 2025-01-15 PROCEDURE — 80053 COMPREHEN METABOLIC PANEL: CPT | Performed by: HOSPITALIST

## 2025-01-15 PROCEDURE — 99232 SBSQ HOSP IP/OBS MODERATE 35: CPT | Performed by: NURSE PRACTITIONER

## 2025-01-15 PROCEDURE — 99232 SBSQ HOSP IP/OBS MODERATE 35: CPT | Performed by: INTERNAL MEDICINE

## 2025-01-15 RX ORDER — METHYLPREDNISOLONE ACETATE 40 MG/ML
80 INJECTION, SUSPENSION INTRA-ARTICULAR; INTRALESIONAL; INTRAMUSCULAR; SOFT TISSUE ONCE
Status: DISCONTINUED | OUTPATIENT
Start: 2025-01-15 | End: 2025-01-17 | Stop reason: HOSPADM

## 2025-01-15 RX ORDER — LIDOCAINE HYDROCHLORIDE 10 MG/ML
10 INJECTION, SOLUTION INFILTRATION; PERINEURAL ONCE
Status: DISCONTINUED | OUTPATIENT
Start: 2025-01-15 | End: 2025-01-17 | Stop reason: HOSPADM

## 2025-01-15 RX ADMIN — LACTULOSE 15 ML: 10 SOLUTION ORAL at 13:43

## 2025-01-15 RX ADMIN — ASPIRIN 81 MG: 81 TABLET, COATED ORAL at 13:43

## 2025-01-15 RX ADMIN — GUAIFENESIN 1200 MG: 600 TABLET, EXTENDED RELEASE ORAL at 13:43

## 2025-01-15 RX ADMIN — ACETAMINOPHEN 650 MG: 325 TABLET ORAL at 21:38

## 2025-01-15 RX ADMIN — CLOPIDOGREL BISULFATE 75 MG: 75 TABLET ORAL at 13:43

## 2025-01-15 RX ADMIN — GUAIFENESIN 1200 MG: 600 TABLET, EXTENDED RELEASE ORAL at 21:28

## 2025-01-15 RX ADMIN — HYDRALAZINE HYDROCHLORIDE 100 MG: 50 TABLET ORAL at 13:43

## 2025-01-15 RX ADMIN — ACETAMINOPHEN 650 MG: 325 TABLET ORAL at 13:55

## 2025-01-15 RX ADMIN — IPRATROPIUM BROMIDE AND ALBUTEROL SULFATE 3 ML: 2.5; .5 SOLUTION RESPIRATORY (INHALATION) at 06:51

## 2025-01-15 RX ADMIN — ATORVASTATIN CALCIUM 40 MG: 20 TABLET, FILM COATED ORAL at 13:43

## 2025-01-15 RX ADMIN — PANTOPRAZOLE SODIUM 40 MG: 40 TABLET, DELAYED RELEASE ORAL at 13:55

## 2025-01-15 RX ADMIN — ISOSORBIDE DINITRATE 30 MG: 20 TABLET ORAL at 13:43

## 2025-01-15 RX ADMIN — GABAPENTIN 100 MG: 100 CAPSULE ORAL at 13:55

## 2025-01-15 RX ADMIN — LOSARTAN POTASSIUM 100 MG: 100 TABLET, FILM COATED ORAL at 21:29

## 2025-01-15 RX ADMIN — IPRATROPIUM BROMIDE AND ALBUTEROL SULFATE 3 ML: 2.5; .5 SOLUTION RESPIRATORY (INHALATION) at 19:54

## 2025-01-15 RX ADMIN — IPRATROPIUM BROMIDE AND ALBUTEROL SULFATE 3 ML: 2.5; .5 SOLUTION RESPIRATORY (INHALATION) at 14:55

## 2025-01-15 NOTE — PROGRESS NOTES
Nutrition Services    Patient Name: Sonia Acharya  YOB: 1950  MRN: [id number]  Admission date: 1/6/2025    PROGRESS NOTE      Encounter Information: Following-up with patient today r/t decreased PO intake reported. S/p HD 2.5 liters removed today.     Patient reports today she is eating less because she is not liking what she is getting, feels like her choices are limited. D/w patient her choices are limited r/t the renal diet and since she is on HD we cannot change her diet. She does enjoy the novasource renal and reports drinking these most of the time. Noted 3 at bedside. RD obtained food preferences and will provide to [facility] staff to accommodate as able within diet restrictions. Daughter did bring [employer] yesterday and she enjoyed the fried chicken.           PO Diet: Diet: Renal; Low Sodium (2-3g), Low Potassium; Fluid Consistency: Thin (IDDSI 0)   PO Supplements: Novasource Renal TID    PO Intake:  50%           Labs (reviewed below):         GI Function:  WDL, last BM 1/15/2025        PES    Problem Inadequate oral intake   Etiology PMH/Food preferences    Signs/Symptoms Poor PO intake             Nutrition Intervention Updates:        Results from last 7 days   Lab Units 01/15/25  0629 01/14/25  0601 01/13/25  1600 01/12/25  0602 01/11/25  0530   SODIUM mmol/L 133* 134* 132*   < > 137   POTASSIUM mmol/L 4.6 4.5 3.9   < > 4.5   CHLORIDE mmol/L 96* 97* 97*   < > 98   CO2 mmol/L 19.0* 23.3 23.0   < > 25.3   BUN mg/dL 48* 28* 18   < > 29*   CREATININE mg/dL 5.38* 3.75* 2.92*   < > 3.92*   CALCIUM mg/dL 9.1 9.4 9.2   < > 9.1   BILIRUBIN mg/dL 0.2  --  0.3  --  0.2   ALK PHOS U/L 90  --  85  --  81   ALT (SGPT) U/L 10  --  7  --  6   AST (SGOT) U/L 15  --  13  --  12   GLUCOSE mg/dL 63* 79 70   < > 76    < > = values in this interval not displayed.     Results from last 7 days   Lab Units 01/15/25  0629 01/13/25  1600 01/13/25  0119 01/12/25  0602 01/11/25  0530   MAGNESIUM mg/dL 2.4  --  2.3  --  2.2    PHOSPHORUS mg/dL 4.8*   < > 5.9*   < > 4.9*   HEMOGLOBIN g/dL 8.4*   < > 8.9*   < > 9.8*   HEMATOCRIT % 28.1*   < > 28.1*   < > 31.2*    < > = values in this interval not displayed.     COVID19   Date Value Ref Range Status   01/06/2025 Not Detected Not Detected - Ref. Range Final     Lab Results   Component Value Date    HGBA1C 4.90 01/08/2025       Wt Readings from Last 10 Encounters:   01/14/25 0515 47.3 kg (104 lb 4.4 oz)   01/13/25 1218 45 kg (99 lb 3.3 oz)   01/13/25 0549 50.8 kg (111 lb 15.9 oz)   01/12/25 0656 47.2 kg (104 lb 0.9 oz)   01/11/25 1007 47.2 kg (104 lb)   01/11/25 0639 47.2 kg (104 lb 0.9 oz)   01/10/25 1227 39.1 kg (86 lb 3.2 oz)   01/10/25 0358 45.7 kg (100 lb 12 oz)   01/09/25 0641 43.6 kg (96 lb 1.9 oz)   01/08/25 0500 45.9 kg (101 lb 3.1 oz)   01/07/25 0500 29.3 kg (64 lb 9.5 oz)   01/06/25 2216 44.2 kg (97 lb 7.1 oz)   12/21/23 0540 50.9 kg (112 lb 4.8 oz)   12/20/23 0743 49.4 kg (109 lb)   01/19/23 0300 51.7 kg (113 lb 15.7 oz)   01/18/23 0632 50.5 kg (111 lb 5.3 oz)   01/17/23 0702 49.4 kg (108 lb 14.5 oz)   01/16/23 1500 50.9 kg (112 lb 3.4 oz)   01/16/23 0415 52.6 kg (115 lb 15.4 oz)   01/17/23 1446 49 kg (108 lb)   12/14/21 0639 55.5 kg (122 lb 5.7 oz)   12/13/21 0536 57 kg (125 lb 10.6 oz)   12/13/21 0017 59 kg (130 lb)   07/18/19 1042 84.8 kg (187 lb)       .    RD to follow up per protocol.    Electronically signed by:  Naty Lauren RD  01/15/25 16:29 EST

## 2025-01-15 NOTE — CONSULTS
ORTHOPEDIC SURGERY CONSULT      Patient: Soina Acharya  Date of Admission: 1/6/2025  1:03 PM  YOB: 1950  Medical Record Number: [id number]  Attending Physician: Shaan Baltazar MD  Consulting Physician: Marcy Middleton PA-C    CHIEF COMPLAINT: Left knee pain    HISTORY OF PRESENT ILLNESS: Patient is a 74 y.o. year old female presents to [facility] with above complaints.  I was consulted for further evaluation and treatment.  The patient states that she has chronic left knee pain due to known osteoarthritis.  She states that her knee constantly swells and is actually less swollen than normal at this time.  She states that the main reason for her visit to the ER was due to pneumonia.  She was asleep comfortably in bed not exhibiting any signs of acute pain or distress.  States she is able to put weight on her left leg, but has pain, but this is usual for her.  She denies any recent injury to the left knee.    ALLERGIES: No Known Allergies    HOME MEDICATIONS:  Medications Prior to Admission   Medication Sig Dispense Refill Last Dose/Taking    amLODIPine (NORVASC) 5 MG tablet Take 1 tablet by mouth Daily. Take if SBP >160   Past Week    aspirin 81 MG EC tablet Take 1 tablet by mouth Daily.   Taking    atorvastatin (LIPITOR) 40 MG tablet Take 1 tablet by mouth Daily.   Past Week    calcium carbonate (TUMS) 500 MG chewable tablet Chew 1 tablet Every 6 (Six) Hours As Needed for Indigestion or Heartburn.   Past Week    cloNIDine (CATAPRES) 0.1 MG tablet Take 1 tablet by mouth 2 (Two) Times a Day. Do not take if SBP <130   Past Week    clopidogrel (PLAVIX) 75 MG tablet Take 1 tablet by mouth Daily.   Past Week    hydrALAZINE (APRESOLINE) 25 MG tablet Take 4 tablets by mouth 3 (Three) Times a Day.   Past Week    isosorbide dinitrate (ISORDIL) 30 MG tablet Take 1 tablet by mouth Daily.   Past Week    lactulose (CHRONULAC) 10 GM/15ML solution Take 15 mL by mouth Daily. (Patient taking  differently: Take 15 mL by mouth Daily As Needed.) 30 mL 0 Past Week    losartan (COZAAR) 100 MG tablet Take 1 tablet by mouth Daily. 30 tablet 0 Past Week    pantoprazole (PROTONIX) 40 MG EC tablet Take 1 tablet by mouth 2 (Two) Times a Day Before Meals. (Patient taking differently: Take 1 tablet by mouth 2 (Two) Times a Day As Needed.) 30 tablet 0        CURRENT MEDICATIONS:  Scheduled Meds:  amLODIPine, 5 mg, Oral, Daily  aspirin, 81 mg, Oral, Daily  atorvastatin, 40 mg, Oral, Daily  clopidogrel, 75 mg, Oral, Daily  guaiFENesin, 1,200 mg, Oral, Q12H  hydrALAZINE, 100 mg, Oral, Q8H  ipratropium-albuterol, 3 mL, Nebulization, 4x Daily - RT  isosorbide dinitrate, 30 mg, Oral, Daily  lactulose, 15 mL, Oral, Daily  losartan, 100 mg, Oral, Nightly  pantoprazole, 40 mg, Oral, Q AM  senna-docusate sodium, 2 tablet, Oral, BID      Continuous Infusions:   PRN Meds:    acetaminophen **OR** acetaminophen **OR** acetaminophen    benzocaine-menthol    senna-docusate sodium **AND** polyethylene glycol **AND** bisacodyl **AND** bisacodyl    calcium carbonate    gabapentin    ipratropium-albuterol    melatonin    Morphine    nitroglycerin    ondansetron ODT **OR** ondansetron    Polyvinyl Alcohol-Povidone PF    simethicone    Past Medical History:   Diagnosis Date    Arthritis     Chronic kidney disease     Heart disease     Hypertension      Past Surgical History:   Procedure Laterality Date    BREAST SURGERY      DIALYSIS FISTULA CREATION      EYE SURGERY      HERNIA REPAIR  2017    Dr. Sung     Social History     Occupational History    Not on file   Tobacco Use    Smoking status: Never     Passive exposure: Never    Smokeless tobacco: Never   Vaping Use    Vaping status: Never Used   Substance and Sexual Activity    Alcohol use: No    Drug use: No    Sexual activity: Defer      Social History     Social History Narrative    Not on file     Family History   Problem Relation Age of Onset    Heart disease Father     Breast  cancer Sister        REVIEW OF SYSTEMS:    HEENT: Patient denies any headaches, vision changes, change in hearing, or tinnitus, Patient denies epistaxis, sinus pain, hoarseness, or dysphagia   Pulmonary: Patient denies any cough, congestion, acute change in SOA or wheezing.   Cardiovascular: Patient denies any change in chest pain, dyspnea, palpitations, weakness, intolerance of exercise, varicosities, change in murmur   Gastrointestinal:  Patient denies change in appetite, melena, change in bowel habits.   Genital/Urinary: Patient denies dysuria, change in color of urine, change in frequency of urination, pain with urgency, change in incontinence, retention.   Musculoskeletal: Patient denies complaints of acute changes in symptoms of other joints not mentioned above.   Neurological: Patient denies changes in dizziness, tremor, ataxia, or difficulty in speaking or changes in memory.   Endocrine system: Patient denies acute changes in tremors, palpitations, polyuria, polydipsia, polyphagia, diaphoresis, exophthalmos, or goiter.   Psychological: Patient denies thoughts/plans or harming self or other; denies acute changes in depression,  insomnia, night terrors, john, disorientation.   Skin: Patient denies any bruising, rashes, discoloration, pruritus,or wounds not mentioned in history of present illness or chief complaint above.   Hematopoietic: Patient denies current bleeding, epistaxis, hematuria, or melena.    PHYSICAL EXAM:   Vitals:  Vitals:    01/14/25 1614 01/14/25 1941 01/14/25 2041 01/15/25 0100   BP:  129/51  122/64   BP Location:  Left arm  Left arm   Patient Position:  Lying  Lying   Pulse: 96  99    Resp: 20 18 18 16   Temp:  99 °F (37.2 °C)  98.4 °F (36.9 °C)   TempSrc:  Oral  Oral   SpO2: 100%  100%    Weight:       Height:           General:  74 y.o. female who appears about stated age.    Alert, cooperative, in no acute distress         Head:    Normocephalic, without obvious abnormality, atraumatic    Eyes:            Lids and lashes normal, conjunctivae and sclerae normal, no         icterus, no pallor, corneas clear, PERRLA   Ears:    Ears appear intact with no abnormalities noted   Throat:   No oral lesions, no thrush, oral mucosa moist   Neck:   No adenopathy, supple, trachea midline, no JVD   Back:     Limited exam shows no severe kyphosis present,no visible           erythema, no excessive  tenderness to palpation.    Lungs:     Respirations regular, even and unlabored.     Heart:    Normal rate, Pulses palpable   Chest Wall:    No abnormalities observed.   Abdomen:     Normal bowel sounds, no masses, no organomegaly, soft              non-tender, non-distended, no guarding, no rebound                      tenderness   Rectal:     Deferred   Pulses:   Pulses palpable and equal bilaterally   Skin:   No bleeding, bruising or rash   Lymph nodes:   No palpable adenopathy   Extremities:     Examination of the left lower extremity reveals skin is intact.  Mild warmth noted.  Mild effusion noted.  The patella not ballotable.  No signs of erythema or infection present.  Normal range of motion.    DIAGNOSTIC TEST:  No results displayed because visit has over 200 results.          No results found.    ASSESSMENT:  Left knee osteoarthritis             Bacterial pneumonia, treating as gram negative    Generalized abdominal pain    Anemia, chronic disease    HTN (hypertension)    ESRD on dialysis    ANGIE (obstructive sleep apnea)    Chronic diastolic CHF (congestive heart failure)    Chronic respiratory failure with hypoxia    CAD (coronary artery disease)    Nausea, vomiting, and diarrhea    Severe protein-calorie malnutrition      PLAN:    The patient's left knee pain has chronic.  She has been receiving intermittent corticosteroid injections for her known left knee osteoarthritis from [facility].  Due to the patient's effusion being mild and less than what she normally has in her knee due to the  arthritis, we will not aspirate the knee at this time.  If effusion worsens and knee pain is exacerbated from her normal chronic pain we can reevaluate for possible aspiration.  Continue medications provided by infectious disease and internal medicine  Weightbearing as tolerated on the left lower extremity  No restrictions at this time  Follow-up with [facility] for left knee pain    The above diagnosis and treatment plan was discussed with the patient.  They were educated in treatment options for their condition.   They were given the opportunity to ask questions and were answered to their satisfaction.  They agreed to proceed with the above treatment plan.        Marcy Middleton PA-C  Date: 1/15/2025

## 2025-01-15 NOTE — PLAN OF CARE
Goal Outcome Evaluation:  Plan of Care Reviewed With: patient        Progress: improving  Outcome Evaluation: To have dialysis today.  C/O midsternal chest pain that radiates to left chest with cough.  Not bad at rest.  Also has pain to left knee which is swollen.  Ortho to see today.  Bed alarm on for safety

## 2025-01-15 NOTE — PROGRESS NOTES
"[facility]    Patient Name: Sonia Acharya  :1950  74 y.o.  LOS: 9  Encounter Provider: KANDIS Mejia      Patient Care Team:  Marcy Paez PA as PCP - General (Physician Assistant)    Chief Complaint: Follow-up chest pain    Interval History: No recurrence of chest pain.  Patient evaluated in dialysis.       Objective   Vital Signs  Temp:  [98.1 °F (36.7 °C)-99 °F (37.2 °C)] 98.4 °F (36.9 °C)  Heart Rate:  [] 82  Resp:  [16-20] 16  BP: ()/(44-64) 122/64    Intake/Output Summary (Last 24 hours) at 1/15/2025 0823  Last data filed at 2025 1300  Gross per 24 hour   Intake 480 ml   Output --   Net 480 ml     Flowsheet Rows      Flowsheet Row First Filed Value   Admission Height 152.4 cm (60\") Documented at 2025   Admission Weight 44.2 kg (97 lb 7.1 oz) Documented at 2025 221              Vitals and nursing note reviewed.   Constitutional:       Appearance: Normal appearance. Well-developed.   Eyes:      Conjunctiva/sclera: Conjunctivae normal.   Neck:      Vascular: No carotid bruit.   Pulmonary:      Breath sounds: Normal breath sounds.   Cardiovascular:      Normal rate. Regular rhythm. Normal S1 with normal intensity. Normal S2 with normal intensity.       Murmurs: There is no murmur.      No gallop.  No click. No rub.   Edema:     Peripheral edema absent.   Musculoskeletal: Normal range of motion. Skin:     General: Skin is warm and dry.   Neurological:      Mental Status: Alert and oriented to person, place, and time.      GCS: GCS eye subscore is 4. GCS verbal subscore is 5. GCS motor subscore is 6.   Psychiatric:         Speech: Speech normal.         Behavior: Behavior normal.         Thought Content: Thought content normal.         Judgment: Judgment normal.           Pertinent Test Results:  Results from last 7 days   Lab Units 01/15/25  0629 25  0601 25  1600 25  0119 25  0602 25  0530 01/10/25  0624 " "01/09/25  0558   SODIUM mmol/L 133* 134* 132* 131* 134* 137 138 138   POTASSIUM mmol/L 4.6 4.5 3.9 4.6 5.5* 4.5 5.2 4.4   CHLORIDE mmol/L 96* 97* 97* 90* 94* 98 99 99   CO2 mmol/L 19.0* 23.3 23.0 19.0* 23.3 25.3 20.0* 21.0*   BUN mg/dL 48* 28* 18 56* 49* 29* 49* 38*   CREATININE mg/dL 5.38* 3.75* 2.92* 6.27* 5.29* 3.92* 6.78* 5.44*   GLUCOSE mg/dL 63* 79 70 115* 85 76 77 81   CALCIUM mg/dL 9.1 9.4 9.2 8.9 9.1 9.1 9.3 9.0   AST (SGOT) U/L 15  --  13  --   --  12  --  10   ALT (SGPT) U/L 10  --  7  --   --  6  --  7     Results from last 7 days   Lab Units 01/14/25  1057 01/14/25  1002 01/13/25  0315 01/13/25  0119 01/12/25  0010 01/11/25  2301 01/10/25  1544 01/10/25  1352   HSTROP T ng/L 150* 148* 124* 126* 123* 129* 124* 117*     Results from last 7 days   Lab Units 01/15/25  0629 01/14/25  1355 01/13/25  1600 01/13/25  0119 01/12/25  0602 01/11/25  0530 01/10/25  0624   WBC 10*3/mm3 12.53* 15.21* 18.05* 16.93* 15.86* 13.12* 11.74*   HEMOGLOBIN g/dL 8.4* 9.1* 9.1* 8.9* 8.9* 9.8* 9.1*   HEMATOCRIT % 28.1* 28.4* 28.6* 28.1* 28.1* 31.2* 28.7*   PLATELETS 10*3/mm3 431 440 371 356 350 390 406         Results from last 7 days   Lab Units 01/15/25  0629 01/13/25  0119 01/11/25  0530 01/10/25  0624 01/09/25  0558   MAGNESIUM mg/dL 2.4 2.3 2.2 2.2 2.2           Invalid input(s): \"LDLCALC\"                Medication Review:   amLODIPine, 5 mg, Oral, Daily  aspirin, 81 mg, Oral, Daily  atorvastatin, 40 mg, Oral, Daily  clopidogrel, 75 mg, Oral, Daily  guaiFENesin, 1,200 mg, Oral, Q12H  hydrALAZINE, 100 mg, Oral, Q8H  ipratropium-albuterol, 3 mL, Nebulization, 4x Daily - RT  isosorbide dinitrate, 30 mg, Oral, Daily  lactulose, 15 mL, Oral, Daily  lidocaine, 10 mL, Infiltration, Once  losartan, 100 mg, Oral, Nightly  methylPREDNISolone acetate, 80 mg, Intra-articular, Once  pantoprazole, 40 mg, Oral, Q AM  senna-docusate sodium, 2 tablet, Oral, BID              Assessment & Plan     Active Hospital Problems    Diagnosis  POA    " **Bacterial pneumonia, treating as gram negative [J15.9]  Yes    Severe protein-calorie malnutrition [E43]  Yes    Chronic respiratory failure with hypoxia [J96.11]  Yes    CAD (coronary artery disease) [I25.10]  Yes    Nausea, vomiting, and diarrhea [R11.2, R19.7]  Yes    ESRD on dialysis [N18.6, Z99.2]  Not Applicable    ANGIE (obstructive sleep apnea) [G47.33]  Yes    Chronic diastolic CHF (congestive heart failure) [I50.32]  Yes    Anemia, chronic disease [D63.8]  Yes    HTN (hypertension) [I10]  Yes    Generalized abdominal pain [R10.84]  Yes      Resolved Hospital Problems   No resolved problems to display.        Right sided chest pain  Consistent with pulmonary etiology  Echocardiogram reveals LVEF of 56% with hypokinesis of the anterior wall segments, trivial pericardial effusion, given new hypokinesis question if patient would need a repeat cardiac catheterization however she had 1 in August 2024 in this region had nonobstructive LAD at that time.  If patient recovers from acute illness it may be reasonable to repeat cardiac catheterization either during this admission or to defer in the outpatient setting with her cardiologist at [facility].  History of CAD  Known LBBB  Troponins elevated but flat consistent with #3  ESRD on HD  Has been dialyzed this admission  Pneumonia  On antibiotics  Noted leukocytosis  Mild aortic valve stenosis  Questionable infiltrative cardiomyopathy  Considered for infiltrative cardiomyopathy evaluation, cardiac catheterization in August 2024.  Defer further workup to her primary cardiology team at [facility].    Patient has not had recurrence of chest pain.  We discussed possibility of repeating a cardiac catheterization given #1 above however patient would prefer to defer this to her cardiologist with [facility].  She will need to contact them for follow-up.  Cardiology will sign off.  Please do not hesitate to reach out if new needs arise.         Edna LOMELI  KANDIS Ugarte  [facility]   [facility]  [address]  [place] [address]  Office: [phone]    01/15/25  08:23 EST

## 2025-01-15 NOTE — PROGRESS NOTES
ID NOTE    CC: f/u leukocytosis    Subj: No acute events. Pt seen at HD. No fever. WBC trending down.       Medications:    Current Facility-Administered Medications:     acetaminophen (TYLENOL) tablet 650 mg, 650 mg, Oral, Q4H PRN, 650 mg at 01/14/25 1130 **OR** acetaminophen (TYLENOL) 160 MG/5ML oral solution 650 mg, 650 mg, Oral, Q4H PRN **OR** acetaminophen (TYLENOL) suppository 650 mg, 650 mg, Rectal, Q4H PRN, Cherie Reich MD    amLODIPine (NORVASC) tablet 5 mg, 5 mg, Oral, Daily, Cherie Reich MD, 5 mg at 01/14/25 0856    aspirin EC tablet 81 mg, 81 mg, Oral, Daily, Shaan Baltazar MD, 81 mg at 01/14/25 0856    atorvastatin (LIPITOR) tablet 40 mg, 40 mg, Oral, Daily, Cherie Reich MD, 40 mg at 01/14/25 0856    benzocaine-menthol (CHLORASEPTIC) lozenge 1 each, 1 lozenge, Mouth/Throat, TID PRN, Cherie Reich MD    sennosides-docusate (PERICOLACE) 8.6-50 MG per tablet 2 tablet, 2 tablet, Oral, BID, 2 tablet at 01/14/25 2034 **AND** polyethylene glycol (MIRALAX) packet 17 g, 17 g, Oral, Daily PRN **AND** bisacodyl (DULCOLAX) EC tablet 5 mg, 5 mg, Oral, Daily PRN **AND** bisacodyl (DULCOLAX) suppository 10 mg, 10 mg, Rectal, Daily PRN, Essence Verdugo APRN    calcium carbonate (TUMS) chewable tablet 500 mg (200 mg elemental), 2 tablet, Oral, TID PRN, Essence Verdugo APRN, 2 tablet at 01/12/25 0051    clopidogrel (PLAVIX) tablet 75 mg, 75 mg, Oral, Daily, Cherie Reich MD, 75 mg at 01/14/25 0856    gabapentin (NEURONTIN) capsule 100 mg, 100 mg, Oral, TID PRN, Stingl, Cherie Sullivan MD, 100 mg at 01/14/25 1652    guaiFENesin (MUCINEX) 12 hr tablet 1,200 mg, 1,200 mg, Oral, Q12H, Shaan Baltazar MD, 1,200 mg at 01/14/25 2035    hydrALAZINE (APRESOLINE) tablet 100 mg, 100 mg, Oral, Q8H, Arjun Ramirez MD, 100 mg at 01/14/25 2034    ipratropium-albuterol (DUO-NEB) nebulizer solution 3 mL, 3 mL, Nebulization, 4x Daily - RT, Shaan Baltazar MD, 3 mL at  01/15/25 0651    ipratropium-albuterol (DUO-NEB) nebulizer solution 3 mL, 3 mL, Nebulization, Q6H PRN, Sisi Hopson, KANDIS    isosorbide dinitrate (ISORDIL) tablet 30 mg, 30 mg, Oral, Daily, Cherie Reich MD, 30 mg at 01/14/25 0855    lactulose (CHRONULAC) 10 GM/15ML solution 15 mL, 15 mL, Oral, Daily, Shaan Baltazar MD, 15 mL at 01/12/25 0901    lidocaine (XYLOCAINE) 1 % injection 10 mL, 10 mL, Infiltration, Once, Marcio Rios MD    losartan (COZAAR) tablet 100 mg, 100 mg, Oral, Nightly, Arjun Ramirez MD, 100 mg at 01/14/25 2035    melatonin tablet 2.5 mg, 2.5 mg, Oral, Nightly PRN, Cherie Reich MD    methylPREDNISolone acetate (DEPO-medrol) injection 80 mg, 80 mg, Intra-articular, Once, Marcio Rios MD    morphine injection 2 mg, 2 mg, Intravenous, Once PRN, Scar Dodge MD    ondansetron ODT (ZOFRAN-ODT) disintegrating tablet 4 mg, 4 mg, Oral, Q6H PRN **OR** ondansetron (ZOFRAN) injection 4 mg, 4 mg, Intravenous, Q6H PRN, Cherie Reich MD, 4 mg at 01/10/25 2020    pantoprazole (PROTONIX) EC tablet 40 mg, 40 mg, Oral, Q AM, Shaan Baltazar MD, 40 mg at 01/14/25 0645    Polyvinyl Alcohol-Povidone PF (ARTIFICIAL TEARS) 1.4-0.6 % ophthalmic solution 2 drop, 2 drop, Both Eyes, Q1H PRN, Shaan Baltazar MD    simethicone (MYLICON) chewable tablet 80 mg, 80 mg, Oral, 4x Daily PRN, Essence Verdugo, KANDIS      Objective   Vital Signs   Temp:  [97.6 °F (36.4 °C)-99 °F (37.2 °C)] 97.6 °F (36.4 °C)  Heart Rate:  [] 85  Resp:  [16-20] 16  BP: ()/(44-76) 151/76    Physical Exam:   General: NAD, had HD  Eyes: no scleral icterus  ENT: no thrush, missing several teeth  Cardiovascular: NR  Respiratory: normal work of breathing on ambient air  GI: Abdomen is nondistended  :  no Doherty catheter  MSK: Left knee edematous especially compared to right knee; no heat or redness  Skin: No rashes on exposed areas      Labs:   CBC, BMP, magnesium, and  uric acid reviewed today  Lab Results   Component Value Date    WBC 12.53 (H) 01/15/2025    HGB 8.4 (L) 01/15/2025    HCT 28.1 (L) 01/15/2025    MCV 87.0 01/15/2025     01/15/2025     Lab Results   Component Value Date    GLUCOSE 63 (L) 01/15/2025    CALCIUM 9.1 01/15/2025     (L) 01/15/2025    K 4.6 01/15/2025    CO2 19.0 (L) 01/15/2025    CL 96 (L) 01/15/2025    BUN 48 (H) 01/15/2025    CREATININE 5.38 (H) 01/15/2025    EGFR 7.8 (L) 01/15/2025    BCR 8.9 01/15/2025    ANIONGAP 18.0 (H) 01/15/2025     Lab Results   Component Value Date    URICACID 4.9 01/15/2025     Lab Results   Component Value Date    HGBA1C 4.90 01/08/2025     Magnesium 2.4  Procalcitonin 0.8 on 1/6/2025    Microbiology:  1/6 RPP: Negative  1/6 BCx: Negative  1/7 respiratory cx: Negative    Radiology:  1/14 left knee x-ray with large effusion    Isolation:  No active isolations    ASSESSMENT/PLAN:  Left lower lobe pneumonia  Chronic hypoxemic respiratory failure (2 L nasal cannula at home)  ESRD on hemodialysis q. MWF  Nausea, vomiting, diarrhea, and abdominal pain - resolved  Encephalopathy present on admission now resolved  Chronic diastolic heart failure  Intermittent chest pain -not ACS per cardiology  Chronic left knee swelling with effusion present    She remains afebrile and hemodynamically stable on room air.  WBC down to 12 today.  Plain film of the left knee showed a large effusion.  Primary team has consulted orthopedics, and I will follow-up their recommendations.  Arthrocentesis seems reasonable to me.  I doubt infectious but could be helpful to diagnose a crystal arthropathy so that we can provide her the appropriate treatment.    I do not see a role for reinitiating broad-spectrum antibiotics at this time given her stability.    If she has a temperature greater than 100.4 F, then I will check a set of blood cultures.    ID will follow.

## 2025-01-15 NOTE — PROGRESS NOTES
[facility] Progress Note      Patient Name: Sonia Acharya  : 1950  MRN: [id number]  Primary Care Physician:  Marcy Paez PA  Date of admission: 2025    Subjective     Interval History:   F/u ESRD     Review of Systems:   Seen on dialysis today with no reported problems  Was hypotensive last night  Objective     Vitals:   Temp:  [97.6 °F (36.4 °C)-99 °F (37.2 °C)] 97.6 °F (36.4 °C)  Heart Rate:  [] 85  Resp:  [16-20] 16  BP: ()/(44-76) 151/76  Flow (L/min) (Oxygen Therapy):  [2] 2    Intake/Output Summary (Last 24 hours) at 1/15/2025 1101  Last data filed at 2025 1300  Gross per 24 hour   Intake 240 ml   Output --   Net 240 ml         Physical Exam:    General Appearance: alert, comfortable on NC O2  Neck: supple, no JVD  Lungs: CTA bilat no rales  Heart: RRR, normal S1 and S2  Abdomen: soft, nontender, nondistended  Extremities: no edema, cyanosis or clubbing       Scheduled Meds:     amLODIPine, 5 mg, Oral, Daily  aspirin, 81 mg, Oral, Daily  atorvastatin, 40 mg, Oral, Daily  clopidogrel, 75 mg, Oral, Daily  guaiFENesin, 1,200 mg, Oral, Q12H  hydrALAZINE, 100 mg, Oral, Q8H  ipratropium-albuterol, 3 mL, Nebulization, 4x Daily - RT  isosorbide dinitrate, 30 mg, Oral, Daily  lactulose, 15 mL, Oral, Daily  lidocaine, 10 mL, Infiltration, Once  losartan, 100 mg, Oral, Nightly  methylPREDNISolone acetate, 80 mg, Intra-articular, Once  pantoprazole, 40 mg, Oral, Q AM  senna-docusate sodium, 2 tablet, Oral, BID      IV Meds:        Results Reviewed:   I have personally reviewed the results from the time of this admission to 1/15/2025 11:01 EST     Results from last 7 days   Lab Units 01/15/25  0629 25  0601 25  1600 25  0602 25  0530   SODIUM mmol/L 133* 134* 132*   < > 137   POTASSIUM mmol/L 4.6 4.5 3.9   < > 4.5   CHLORIDE mmol/L 96* 97* 97*   < > 98   CO2 mmol/L 19.0* 23.3 23.0   < > 25.3   BUN mg/dL 48* 28* 18   < > 29*    CREATININE mg/dL 5.38* 3.75* 2.92*   < > 3.92*   CALCIUM mg/dL 9.1 9.4 9.2   < > 9.1   BILIRUBIN mg/dL 0.2  --  0.3  --  0.2   ALK PHOS U/L 90  --  85  --  81   ALT (SGPT) U/L 10  --  7  --  6   AST (SGOT) U/L 15  --  13  --  12   GLUCOSE mg/dL 63* 79 70   < > 76    < > = values in this interval not displayed.     Estimated Creatinine Clearance: 6.7 mL/min (A) (by C-G formula based on SCr of 5.38 mg/dL (H)).  Results from last 7 days   Lab Units 01/15/25  0629 01/14/25  0601 01/13/25  0119 01/12/25  0602 01/11/25  0530   MAGNESIUM mg/dL 2.4  --  2.3  --  2.2   PHOSPHORUS mg/dL 4.8* 4.7* 5.9*   < > 4.9*    < > = values in this interval not displayed.     Results from last 7 days   Lab Units 01/15/25  0629   URIC ACID mg/dL 4.9     Results from last 7 days   Lab Units 01/15/25  0629 01/14/25  1355 01/13/25  1600 01/13/25  0119 01/12/25  0602   WBC 10*3/mm3 12.53* 15.21* 18.05* 16.93* 15.86*   HEMOGLOBIN g/dL 8.4* 9.1* 9.1* 8.9* 8.9*   PLATELETS 10*3/mm3 431 440 371 356 350           Assessment / Plan     ASSESSMENT:  ESRD on HD, MWF, via RUE AVF, hyperkalemia per morning labs.  Volume status okay   nausea/vomiting/diarrhea + abdominal pain, CT abdomen/pelvis negative for acute findings, followed by primary team, symptoms have resolved   Altered mental status/hallucination, CT head negative for acute findings.  Improved, followed by primary team.    pneumonia, treated with antibiotics   hypertension with ESRD, BP labile,   HFpEF, volume mgmt via HD   Left knee effusion Ortho already consulted    PLAN:  Will continue dialysis on Monday Wednesday and Friday.    Will hold amlodipine today given soft blood pressure last night  Surveillance labs    Micheline Torrez MD  01/15/25  11:01 EST    [facility]  [phone]

## 2025-01-15 NOTE — PROGRESS NOTES
"    Name: Sonia Acharya ADMIT: 2025   : 1950  PCP: Marcy Paez PA    MRN: [id number] LOS: 9 days   AGE/SEX: 74 y.o. female  ROOM: [facility]     Subjective   Subjective   Cough improved. No SOA. No palp. Still c/o intermittent \"sharp\" pains in left chest. No longer confused or hallucinating. Sleeping well. Tolerating po. No N/V/D/abd pain.   Today c/o feeling cold in dialysis unit--says that makes her chest hurt too.  Left knee pain is at baseline.  Pt seen in HD unit       Objective   Objective   Vital Signs  Temp:  [97.6 °F (36.4 °C)-99 °F (37.2 °C)] 97.8 °F (36.6 °C)  Heart Rate:  [] 84  Resp:  [16-20] 16  BP: ()/(44-76) 117/69  SpO2:  [100 %] 100 %  on  Flow (L/min) (Oxygen Therapy):  [2] 2;   Device (Oxygen Therapy): room air  Body mass index is 20.37 kg/m².    (No change in exam today)    Physical Exam  Vitals and nursing note reviewed. Exam conducted with a chaperone present (HD techs).   Constitutional:       General: She is not in acute distress.     Appearance: She is ill-appearing (chronically). She is not toxic-appearing or diaphoretic.   HENT:      Head: Normocephalic.      Nose: Nose normal.      Mouth/Throat:      Mouth: Mucous membranes are moist.      Pharynx: Oropharynx is clear.   Eyes:      General: No scleral icterus.     Extraocular Movements: Extraocular movements intact.   Cardiovascular:      Rate and Rhythm: Normal rate and regular rhythm.      Pulses: Normal pulses.      Comments: AVF RUE  Pulmonary:      Effort: Pulmonary effort is normal. No respiratory distress.      Breath sounds: Normal breath sounds. No wheezing or rales.      Comments: Anteriorly   Abdominal:      General: Bowel sounds are normal. There is no distension.      Palpations: Abdomen is soft.      Tenderness: There is no abdominal tenderness.   Musculoskeletal:         General: No swelling. Normal range of motion.      Cervical back: Neck supple.      Comments: Left knee with chronic bony " changes  Mild-moderate effusion present  Non-tender, no warmth or erythema   Skin:     General: Skin is warm and dry.      Capillary Refill: Capillary refill takes less than 2 seconds.      Coloration: Skin is not jaundiced.   Neurological:      General: No focal deficit present.      Mental Status: She is alert and oriented to person, place, and time. Mental status is at baseline.      Cranial Nerves: No cranial nerve deficit.      Coordination: Coordination normal.   Psychiatric:         Mood and Affect: Mood normal.         Behavior: Behavior normal.         Thought Content: Thought content normal.       Results Review     I reviewed the patient's new clinical results.  Results from last 7 days   Lab Units 01/15/25  0629 01/14/25  1355 01/13/25  1600 01/13/25  0119   WBC 10*3/mm3 12.53* 15.21* 18.05* 16.93*   HEMOGLOBIN g/dL 8.4* 9.1* 9.1* 8.9*   PLATELETS 10*3/mm3 431 440 371 356     Results from last 7 days   Lab Units 01/15/25  0629 01/14/25  0601 01/13/25  1600 01/13/25  0119   SODIUM mmol/L 133* 134* 132* 131*   POTASSIUM mmol/L 4.6 4.5 3.9 4.6   CHLORIDE mmol/L 96* 97* 97* 90*   CO2 mmol/L 19.0* 23.3 23.0 19.0*   BUN mg/dL 48* 28* 18 56*   CREATININE mg/dL 5.38* 3.75* 2.92* 6.27*   GLUCOSE mg/dL 63* 79 70 115*   EGFR mL/min/1.73 7.8* 12.1* 16.3* 6.5*     Results from last 7 days   Lab Units 01/15/25  0629 01/14/25  0601 01/13/25  1600 01/13/25  0119 01/12/25  0602 01/11/25  0530 01/09/25  0558   ALBUMIN g/dL 2.7* 2.9* 3.1* 2.9*   < > 2.9* 2.9*   BILIRUBIN mg/dL 0.2  --  0.3  --   --  0.2 0.2   ALK PHOS U/L 90  --  85  --   --  81 82   AST (SGOT) U/L 15  --  13  --   --  12 10   ALT (SGPT) U/L 10  --  7  --   --  6 7    < > = values in this interval not displayed.     Results from last 7 days   Lab Units 01/15/25  0629 01/14/25  0601 01/13/25  1600 01/13/25  0119 01/12/25  0602 01/11/25  0530 01/10/25  0624   CALCIUM mg/dL 9.1 9.4 9.2 8.9 9.1 9.1 9.3   ALBUMIN g/dL 2.7* 2.9* 3.1* 2.9* 3.0* 2.9*  --   "  MAGNESIUM mg/dL 2.4  --   --  2.3  --  2.2 2.2   PHOSPHORUS mg/dL 4.8* 4.7*  --  5.9* 5.5* 4.9* 5.8*           No results found for: \"HGBA1C\", \"POCGLU\"      XR Knee 1 or 2 View Left    Result Date: 1/14/2025  Large effusion. Osteoarthritis best demonstrated at the medial compartment. Atherosclerotic disease. No evidence for fracture. If there is suspicion for intra-articular infection, arthrocentesis could be performed for further evaluation.  This report was finalized on 1/14/2025 7:35 PM by Abdulaziz Boyce M.D on Workstation: [id number]      XR Chest PA & Lateral    Result Date: 1/13/2025  No significant change.  This report was finalized on 1/13/2025 3:22 PM by Dr. Michael Lauren M.D on Workstation: [id number]       I have personally reviewed all medications:  Scheduled Medications  [Held by provider] amLODIPine, 5 mg, Oral, Daily  aspirin, 81 mg, Oral, Daily  atorvastatin, 40 mg, Oral, Daily  clopidogrel, 75 mg, Oral, Daily  guaiFENesin, 1,200 mg, Oral, Q12H  hydrALAZINE, 100 mg, Oral, Q8H  ipratropium-albuterol, 3 mL, Nebulization, 4x Daily - RT  isosorbide dinitrate, 30 mg, Oral, Daily  lactulose, 15 mL, Oral, Daily  lidocaine, 10 mL, Infiltration, Once  losartan, 100 mg, Oral, Nightly  methylPREDNISolone acetate, 80 mg, Intra-articular, Once  pantoprazole, 40 mg, Oral, Q AM  senna-docusate sodium, 2 tablet, Oral, BID    Infusions     Diet  Diet: Renal; Low Sodium (2-3g), Low Potassium; Fluid Consistency: Thin (IDDSI 0)    I have personally reviewed:  [x]  Laboratory   []  Microbiology   [x]  Radiology   [x]  EKG/Telemetry  []  Cardiology/Vascular   []  Pathology    []  Records       Assessment/Plan     Active Hospital Problems    Diagnosis  POA    **Bacterial pneumonia, treating as gram negative [J15.9]  Yes    Severe protein-calorie malnutrition [E43]  Yes    Chronic respiratory failure with hypoxia [J96.11]  Yes    CAD (coronary artery disease) [I25.10]  Yes    Nausea, vomiting, and diarrhea [R11.2, " "R19.7]  Yes    ESRD on dialysis [N18.6, Z99.2]  Not Applicable    ANGIE (obstructive sleep apnea) [G47.33]  Yes    Chronic diastolic CHF (congestive heart failure) [I50.32]  Yes    Anemia, chronic disease [D63.8]  Yes    HTN (hypertension) [I10]  Yes    Generalized abdominal pain [R10.84]  Yes      Resolved Hospital Problems   No resolved problems to display.     73yo woman with ESRD, ACD, HTN, chronic diastolic CHF, CHRF (2L/min), ANGIE, and GERD , who presented to ER from home with N/V/D/abd pain, cough, confusion, and hallucinations. She was admitted with LLL pneumonia--concern for aspiration.    LLL PNA  Concern for aspiration  CHRF (2L/min at home)  SLP eval good  Completed 7d of IV Zosyn  Blood cultures negative  Sputum culture neg  RVP neg  WBC normalized but then spiked again (see below)  Afebrile  Weaned to RA here though uses NC O2 at home  Continue OPEP and IS  Continue Mucinex  Continue DuoNebs    Leukocytosis  Had normalized but then went back up to 18  Now falling w/o abx  Appreciate ID attention to pt  Dr. Collins checked xray left knee (see below) and mentioned checking CTA Chest    N/V/D  Abd pain  Uncertain etiology  CT A/P unrevealing  GI PCR ordered but symptoms improved  Moving bowels now on her regular home Lactulose    Encephalopathy NOS  Hallucinations  Suspect due to acute illness  CT Head showed NAD  Much improved    ESRD on dialysis  Appreciate Renal attention to pt  Continue HD here MWF    Chronic diastolic CHF  Volume mgmt per Renal  Not on diuretics PTA    HTN  BPs quite labile on regimen of hydralazine, amlodipine, losartan, and Imdur  Renal has placed amlodipine on hold with improvement noted  Continue to monitor on telemetry    GERD  PPI    Anemia of CKD  Hgb stable  No evidence of bleeding  Continue to monitor closely    CAD  Left sided chest pain  ARIANA placed in August  Continue DAPT and statin  Continues to c/o intermittent chest pain  Reports this has been intermittent for \"weeks " "now\"   Had CTA Chest on 12/31 that did not show any rib fractures or PE  Card following, the do not feel this is ACS, they discussed possible left heart cath with pt but she prefers to f/u with her regular Cardiologist at [facility]    H/o DVT  DVT ppx with DAPT and SCDs    Chronic left knee pain  Left knee effusion  This is a long term issue for her  No acute changes  Xray left knee yesterday showed large effusion  Ortho consulted for arthrocentesis  Infection less likely but could have a crystal arthropathy contributing to her leukocytosis    DM2?  Mentioned in chart but on no meds  A1c only 4.9  Sugars fine here  I do not think that she has diabetes      DAPT and SCDs should suffice for DVT prophylaxis.  Full code.  Discussed with patient. No family present. D/w Dr. Torrez and Dr. Collins.  Anticipate discharge home with [facility] (per PT recs) when ready.   Expected Discharge Date: 1/16/2025; Expected Discharge Time:       Shaan Baltazar MD  [facility]  01/15/25  11:42 EST    "

## 2025-01-15 NOTE — NURSING NOTE
Dialysis complete, pt came off 30 minutes early per request, just not feeling well. No major complications noted, and removed 2.5 liters with this treatment.  Pre and post vitals are in epic.

## 2025-01-16 LAB
ALBUMIN SERPL-MCNC: 3.1 G/DL (ref 3.5–5.2)
ALP SERPL-CCNC: 102 U/L (ref 39–117)
ALT SERPL W P-5'-P-CCNC: 11 U/L (ref 1–33)
ANION GAP SERPL CALCULATED.3IONS-SCNC: 13 MMOL/L (ref 5–15)
AST SERPL-CCNC: 18 U/L (ref 1–32)
BASOPHILS # BLD AUTO: 0.05 10*3/MM3 (ref 0–0.2)
BASOPHILS NFR BLD AUTO: 0.4 % (ref 0–1.5)
BILIRUB CONJ SERPL-MCNC: <0.2 MG/DL (ref 0–0.3)
BILIRUB INDIRECT SERPL-MCNC: ABNORMAL MG/DL
BILIRUB SERPL-MCNC: 0.2 MG/DL (ref 0–1.2)
BUN SERPL-MCNC: 32 MG/DL (ref 8–23)
BUN/CREAT SERPL: 8 (ref 7–25)
CALCIUM SPEC-SCNC: 9.2 MG/DL (ref 8.6–10.5)
CHLORIDE SERPL-SCNC: 99 MMOL/L (ref 98–107)
CO2 SERPL-SCNC: 22 MMOL/L (ref 22–29)
CREAT SERPL-MCNC: 4.02 MG/DL (ref 0.57–1)
CRP SERPL-MCNC: 12.32 MG/DL (ref 0–0.5)
DEPRECATED RDW RBC AUTO: 50.7 FL (ref 37–54)
EGFRCR SERPLBLD CKD-EPI 2021: 11.1 ML/MIN/1.73
EOSINOPHIL # BLD AUTO: 0.24 10*3/MM3 (ref 0–0.4)
EOSINOPHIL NFR BLD AUTO: 2.1 % (ref 0.3–6.2)
ERYTHROCYTE [DISTWIDTH] IN BLOOD BY AUTOMATED COUNT: 16.4 % (ref 12.3–15.4)
ERYTHROCYTE [SEDIMENTATION RATE] IN BLOOD: >130 MM/HR (ref 0–30)
GLUCOSE SERPL-MCNC: 103 MG/DL (ref 65–99)
HCT VFR BLD AUTO: 29.2 % (ref 34–46.6)
HGB BLD-MCNC: 9.2 G/DL (ref 12–15.9)
IMM GRANULOCYTES # BLD AUTO: 0.13 10*3/MM3 (ref 0–0.05)
IMM GRANULOCYTES NFR BLD AUTO: 1.1 % (ref 0–0.5)
LYMPHOCYTES # BLD AUTO: 0.98 10*3/MM3 (ref 0.7–3.1)
LYMPHOCYTES NFR BLD AUTO: 8.5 % (ref 19.6–45.3)
MAGNESIUM SERPL-MCNC: 2.6 MG/DL (ref 1.6–2.4)
MCH RBC QN AUTO: 27.1 PG (ref 26.6–33)
MCHC RBC AUTO-ENTMCNC: 31.5 G/DL (ref 31.5–35.7)
MCV RBC AUTO: 85.9 FL (ref 79–97)
MONOCYTES # BLD AUTO: 0.92 10*3/MM3 (ref 0.1–0.9)
MONOCYTES NFR BLD AUTO: 8 % (ref 5–12)
NEUTROPHILS NFR BLD AUTO: 79.9 % (ref 42.7–76)
NEUTROPHILS NFR BLD AUTO: 9.21 10*3/MM3 (ref 1.7–7)
NRBC BLD AUTO-RTO: 0 /100 WBC (ref 0–0.2)
PHOSPHATE SERPL-MCNC: 4.1 MG/DL (ref 2.5–4.5)
PLATELET # BLD AUTO: 464 10*3/MM3 (ref 140–450)
PMV BLD AUTO: 9.1 FL (ref 6–12)
POTASSIUM SERPL-SCNC: 4.4 MMOL/L (ref 3.5–5.2)
PROT SERPL-MCNC: 7 G/DL (ref 6–8.5)
RBC # BLD AUTO: 3.4 10*6/MM3 (ref 3.77–5.28)
SODIUM SERPL-SCNC: 134 MMOL/L (ref 136–145)
WBC NRBC COR # BLD AUTO: 11.53 10*3/MM3 (ref 3.4–10.8)

## 2025-01-16 PROCEDURE — 99232 SBSQ HOSP IP/OBS MODERATE 35: CPT | Performed by: INTERNAL MEDICINE

## 2025-01-16 PROCEDURE — 94799 UNLISTED PULMONARY SVC/PX: CPT

## 2025-01-16 PROCEDURE — 80076 HEPATIC FUNCTION PANEL: CPT | Performed by: HOSPITALIST

## 2025-01-16 PROCEDURE — 84100 ASSAY OF PHOSPHORUS: CPT | Performed by: HOSPITALIST

## 2025-01-16 PROCEDURE — 85025 COMPLETE CBC W/AUTO DIFF WBC: CPT | Performed by: HOSPITALIST

## 2025-01-16 PROCEDURE — 86140 C-REACTIVE PROTEIN: CPT | Performed by: ORTHOPAEDIC SURGERY

## 2025-01-16 PROCEDURE — 94761 N-INVAS EAR/PLS OXIMETRY MLT: CPT

## 2025-01-16 PROCEDURE — 83735 ASSAY OF MAGNESIUM: CPT | Performed by: HOSPITALIST

## 2025-01-16 PROCEDURE — 85652 RBC SED RATE AUTOMATED: CPT | Performed by: ORTHOPAEDIC SURGERY

## 2025-01-16 PROCEDURE — 94760 N-INVAS EAR/PLS OXIMETRY 1: CPT

## 2025-01-16 PROCEDURE — 80048 BASIC METABOLIC PNL TOTAL CA: CPT | Performed by: HOSPITALIST

## 2025-01-16 RX ADMIN — IPRATROPIUM BROMIDE AND ALBUTEROL SULFATE 3 ML: 2.5; .5 SOLUTION RESPIRATORY (INHALATION) at 07:16

## 2025-01-16 RX ADMIN — PANTOPRAZOLE SODIUM 40 MG: 40 TABLET, DELAYED RELEASE ORAL at 05:41

## 2025-01-16 RX ADMIN — GUAIFENESIN 1200 MG: 600 TABLET, EXTENDED RELEASE ORAL at 08:15

## 2025-01-16 RX ADMIN — ACETAMINOPHEN 650 MG: 325 TABLET ORAL at 22:17

## 2025-01-16 RX ADMIN — GUAIFENESIN 1200 MG: 600 TABLET, EXTENDED RELEASE ORAL at 22:17

## 2025-01-16 RX ADMIN — GABAPENTIN 100 MG: 100 CAPSULE ORAL at 15:08

## 2025-01-16 RX ADMIN — GABAPENTIN 100 MG: 100 CAPSULE ORAL at 05:41

## 2025-01-16 RX ADMIN — HYDRALAZINE HYDROCHLORIDE 100 MG: 50 TABLET ORAL at 13:57

## 2025-01-16 RX ADMIN — ANTACID TABLETS 2 TABLET: 500 TABLET, CHEWABLE ORAL at 18:04

## 2025-01-16 RX ADMIN — SENNOSIDES AND DOCUSATE SODIUM 2 TABLET: 50; 8.6 TABLET ORAL at 08:23

## 2025-01-16 RX ADMIN — SIMETHICONE 80 MG: 80 TABLET, CHEWABLE ORAL at 18:04

## 2025-01-16 RX ADMIN — ISOSORBIDE DINITRATE 30 MG: 20 TABLET ORAL at 08:15

## 2025-01-16 RX ADMIN — IPRATROPIUM BROMIDE AND ALBUTEROL SULFATE 3 ML: 2.5; .5 SOLUTION RESPIRATORY (INHALATION) at 15:27

## 2025-01-16 RX ADMIN — SENNOSIDES AND DOCUSATE SODIUM 2 TABLET: 50; 8.6 TABLET ORAL at 22:17

## 2025-01-16 RX ADMIN — ACETAMINOPHEN 650 MG: 325 TABLET ORAL at 05:41

## 2025-01-16 RX ADMIN — ATORVASTATIN CALCIUM 40 MG: 20 TABLET, FILM COATED ORAL at 08:15

## 2025-01-16 RX ADMIN — ASPIRIN 81 MG: 81 TABLET, COATED ORAL at 08:15

## 2025-01-16 RX ADMIN — HYDRALAZINE HYDROCHLORIDE 100 MG: 50 TABLET ORAL at 05:45

## 2025-01-16 RX ADMIN — LACTULOSE 15 ML: 10 SOLUTION ORAL at 08:23

## 2025-01-16 RX ADMIN — CLOPIDOGREL BISULFATE 75 MG: 75 TABLET ORAL at 08:15

## 2025-01-16 RX ADMIN — ACETAMINOPHEN 650 MG: 325 TABLET ORAL at 15:08

## 2025-01-16 RX ADMIN — LOSARTAN POTASSIUM 100 MG: 100 TABLET, FILM COATED ORAL at 22:17

## 2025-01-16 NOTE — PROGRESS NOTES
Yesterdays consult note reviewed  Nothing further to add to plan of care at this time  Will hold of on aspiration at this time  Recommend follow outpatient with regular ortho  Will sign off  Please reconsult if issues arise or symptoms worsen

## 2025-01-16 NOTE — PROGRESS NOTES
[facility] Progress Note      Patient Name: Sonia Acharya  : 1950  MRN: [id number]  Primary Care Physician:  Marcy Paez PA  Date of admission: 2025    Subjective     Interval History:   F/u ESRD     Review of Systems:     Feeling mildly nauseated.  Denies any fever or chills  Denies any chest pain or shortness of breath.  The patient had dialysis yesterday and has had to be cut short per patient request.  2.5 L of fluid removed  Objective     Vitals:   Temp:  [97.5 °F (36.4 °C)-98.4 °F (36.9 °C)] 98.4 °F (36.9 °C)  Heart Rate:  [84-97] 85  Resp:  [16-18] 16  BP: (117-149)/(51-89) 144/51  Flow (L/min) (Oxygen Therapy):  [2] 2    Intake/Output Summary (Last 24 hours) at 2025 1016  Last data filed at 1/15/2025 1700  Gross per 24 hour   Intake 120 ml   Output 2500 ml   Net -2380 ml         Physical Exam:    General Appearance: alert, comfortable on NC O2  Neck: supple, no JVD  Lungs: CTA bilat no rales  Heart: RRR, normal S1 and S2  Abdomen: soft, nontender, nondistended  Extremities: no edema, cyanosis or clubbing       Scheduled Meds:     [Held by provider] amLODIPine, 5 mg, Oral, Daily  aspirin, 81 mg, Oral, Daily  atorvastatin, 40 mg, Oral, Daily  clopidogrel, 75 mg, Oral, Daily  guaiFENesin, 1,200 mg, Oral, Q12H  hydrALAZINE, 100 mg, Oral, Q8H  ipratropium-albuterol, 3 mL, Nebulization, 4x Daily - RT  isosorbide dinitrate, 30 mg, Oral, Daily  lactulose, 15 mL, Oral, Daily  lidocaine, 10 mL, Infiltration, Once  losartan, 100 mg, Oral, Nightly  methylPREDNISolone acetate, 80 mg, Intra-articular, Once  pantoprazole, 40 mg, Oral, Q AM  senna-docusate sodium, 2 tablet, Oral, BID      IV Meds:        Results Reviewed:   I have personally reviewed the results from the time of this admission to 2025 10:16 EST     Results from last 7 days   Lab Units 25  0632 01/15/25  0629 25  0601 25  1600   SODIUM mmol/L 134* 133* 134* 132*   POTASSIUM mmol/L  4.4 4.6 4.5 3.9   CHLORIDE mmol/L 99 96* 97* 97*   CO2 mmol/L 22.0 19.0* 23.3 23.0   BUN mg/dL 32* 48* 28* 18   CREATININE mg/dL 4.02* 5.38* 3.75* 2.92*   CALCIUM mg/dL 9.2 9.1 9.4 9.2   BILIRUBIN mg/dL 0.2 0.2  --  0.3   ALK PHOS U/L 102 90  --  85   ALT (SGPT) U/L 11 10  --  7   AST (SGOT) U/L 18 15  --  13   GLUCOSE mg/dL 103* 63* 79 70     Estimated Creatinine Clearance: 9 mL/min (A) (by C-G formula based on SCr of 4.02 mg/dL (H)).  Results from last 7 days   Lab Units 01/16/25  0632 01/15/25  0629 01/14/25  0601 01/13/25  0119   MAGNESIUM mg/dL 2.6* 2.4  --  2.3   PHOSPHORUS mg/dL 4.1 4.8* 4.7* 5.9*     Results from last 7 days   Lab Units 01/15/25  0629   URIC ACID mg/dL 4.9     Results from last 7 days   Lab Units 01/16/25  0632 01/15/25  0629 01/14/25  1355 01/13/25  1600 01/13/25  0119   WBC 10*3/mm3 11.53* 12.53* 15.21* 18.05* 16.93*   HEMOGLOBIN g/dL 9.2* 8.4* 9.1* 9.1* 8.9*   PLATELETS 10*3/mm3 464* 431 440 371 356           Assessment / Plan     ASSESSMENT:  ESRD on HD, MWF, via RUE AVF, hyperkalemia per morning labs.  Volume status okay   nausea/vomiting/diarrhea + abdominal pain, CT abdomen/pelvis negative for acute findings, followed by primary team, symptoms have resolved   Altered mental status/hallucination, CT head negative for acute findings.  Improved, followed by primary team.    pneumonia, treated with antibiotics   hypertension with ESRD, BP labile,   HFpEF, volume mgmt via HD   Left knee effusion Ortho recommended conservative management wit    PLAN:  Will continue dialysis on Monday Wednesday and Friday.  Plan for dialysis tomorrow  Continue to hold amlodipine for now  Surveillance labs    Micheline Torrez MD  01/16/25  10:16 [facility]    [facility]  [phone]   none

## 2025-01-16 NOTE — CASE MANAGEMENT/SOCIAL WORK
Continued Stay Note  [facility]     Patient Name: Sonia Acharya  MRN: [id number]  Today's Date: 1/16/2025    Admit Date: 1/6/2025    Plan: Home with [facility]   Discharge Plan       Row Name 01/16/25 1341       Plan    Plan Home with [facility]    Plan Comments CCP reviewed chart, she has 02 tank at bedside for dc, on RA now at rest, orders received and entered for walking oximetry in am to determine if pt still requires 02 at dc. Plan for dc tomorrow per Dr. Baltazar after HD, spoke with patient dtr Naty to update, she will transport, LVM with [facility], to alert of dc tomorrow. CCP will follow - Marcy ROMERO                   Discharge Codes    No documentation.                 Expected Discharge Date and Time       Expected Discharge Date Expected Discharge Time    Jan 17, 2025               Marcy [facility] RN

## 2025-01-16 NOTE — PROGRESS NOTES
Name: Sonia Acharya ADMIT: 2025   : 1950  PCP: Marcy Paez PA    MRN: [id number] LOS: 10 days   AGE/SEX: 74 y.o. female  ROOM: [facility]     Subjective   Subjective   No cough. No SOA. No palp. No CP today. No longer confused or hallucinating. Sleeping well. Tolerating po. No N/V/D/abd pain.   Left knee pain is at baseline.       Objective   Objective   Vital Signs  Temp:  [97.5 °F (36.4 °C)-98.4 °F (36.9 °C)] 98.4 °F (36.9 °C)  Heart Rate:  [84-97] 85  Resp:  [16-18] 16  BP: (123-149)/(51-89) 144/51  SpO2:  [92 %-100 %] 92 %  on  Flow (L/min) (Oxygen Therapy):  [2] 2;   Device (Oxygen Therapy): room air  Body mass index is 20.37 kg/m².    (No change in exam today)    Physical Exam  Vitals and nursing note reviewed.   Constitutional:       General: She is not in acute distress.     Appearance: She is ill-appearing (chronically). She is not toxic-appearing or diaphoretic.   HENT:      Head: Normocephalic.      Nose: Nose normal.      Mouth/Throat:      Mouth: Mucous membranes are moist.      Pharynx: Oropharynx is clear.   Eyes:      General: No scleral icterus.     Extraocular Movements: Extraocular movements intact.   Cardiovascular:      Rate and Rhythm: Normal rate and regular rhythm.      Pulses: Normal pulses.      Comments: AVF RUE  Pulmonary:      Effort: Pulmonary effort is normal. No respiratory distress.      Breath sounds: Normal breath sounds. No wheezing or rales.      Comments: Anteriorly   Abdominal:      General: Bowel sounds are normal. There is no distension.      Palpations: Abdomen is soft.      Tenderness: There is no abdominal tenderness.   Musculoskeletal:         General: No swelling. Normal range of motion.      Cervical back: Neck supple.      Comments: Left knee with chronic bony changes  Mild-moderate effusion present  Non-tender, no warmth or erythema   Skin:     General: Skin is warm and dry.      Capillary Refill: Capillary refill takes less than 2 seconds.       "Coloration: Skin is not jaundiced.   Neurological:      General: No focal deficit present.      Mental Status: She is alert and oriented to person, place, and time. Mental status is at baseline.      Cranial Nerves: No cranial nerve deficit.      Coordination: Coordination normal.   Psychiatric:         Mood and Affect: Mood normal.         Behavior: Behavior normal.         Thought Content: Thought content normal.       Results Review     I reviewed the patient's new clinical results.  Results from last 7 days   Lab Units 01/16/25  0632 01/15/25  0629 01/14/25  1355 01/13/25  1600   WBC 10*3/mm3 11.53* 12.53* 15.21* 18.05*   HEMOGLOBIN g/dL 9.2* 8.4* 9.1* 9.1*   PLATELETS 10*3/mm3 464* 431 440 371     Results from last 7 days   Lab Units 01/16/25  0632 01/15/25  0629 01/14/25  0601 01/13/25  1600   SODIUM mmol/L 134* 133* 134* 132*   POTASSIUM mmol/L 4.4 4.6 4.5 3.9   CHLORIDE mmol/L 99 96* 97* 97*   CO2 mmol/L 22.0 19.0* 23.3 23.0   BUN mg/dL 32* 48* 28* 18   CREATININE mg/dL 4.02* 5.38* 3.75* 2.92*   GLUCOSE mg/dL 103* 63* 79 70   EGFR mL/min/1.73 11.1* 7.8* 12.1* 16.3*     Results from last 7 days   Lab Units 01/16/25  0632 01/15/25  0629 01/14/25  0601 01/13/25  1600 01/12/25  0602 01/11/25  0530   ALBUMIN g/dL 3.1* 2.7* 2.9* 3.1*   < > 2.9*   BILIRUBIN mg/dL 0.2 0.2  --  0.3  --  0.2   ALK PHOS U/L 102 90  --  85  --  81   AST (SGOT) U/L 18 15  --  13  --  12   ALT (SGPT) U/L 11 10  --  7  --  6    < > = values in this interval not displayed.     Results from last 7 days   Lab Units 01/16/25  0632 01/15/25  0629 01/14/25  0601 01/13/25  1600 01/13/25  0119 01/12/25  0602 01/11/25  0530   CALCIUM mg/dL 9.2 9.1 9.4 9.2 8.9   < > 9.1   ALBUMIN g/dL 3.1* 2.7* 2.9* 3.1* 2.9*   < > 2.9*   MAGNESIUM mg/dL 2.6* 2.4  --   --  2.3  --  2.2   PHOSPHORUS mg/dL 4.1 4.8* 4.7*  --  5.9*   < > 4.9*    < > = values in this interval not displayed.           No results found for: \"HGBA1C\", \"POCGLU\"      XR Knee 1 or 2 View " Left    Result Date: 1/14/2025  Large effusion. Osteoarthritis best demonstrated at the medial compartment. Atherosclerotic disease. No evidence for fracture. If there is suspicion for intra-articular infection, arthrocentesis could be performed for further evaluation.  This report was finalized on 1/14/2025 7:35 PM by Abdulaziz Boyce M.D on Workstation: [id number]       I have personally reviewed all medications:  Scheduled Medications  [Held by provider] amLODIPine, 5 mg, Oral, Daily  aspirin, 81 mg, Oral, Daily  atorvastatin, 40 mg, Oral, Daily  clopidogrel, 75 mg, Oral, Daily  guaiFENesin, 1,200 mg, Oral, Q12H  hydrALAZINE, 100 mg, Oral, Q8H  ipratropium-albuterol, 3 mL, Nebulization, 4x Daily - RT  isosorbide dinitrate, 30 mg, Oral, Daily  lactulose, 15 mL, Oral, Daily  lidocaine, 10 mL, Infiltration, Once  losartan, 100 mg, Oral, Nightly  methylPREDNISolone acetate, 80 mg, Intra-articular, Once  pantoprazole, 40 mg, Oral, Q AM  senna-docusate sodium, 2 tablet, Oral, BID    Infusions     Diet  Diet: Renal; Low Sodium (2-3g), Low Potassium; Fluid Consistency: Thin (IDDSI 0)    I have personally reviewed:  [x]  Laboratory   []  Microbiology   []  Radiology   [x]  EKG/Telemetry  []  Cardiology/Vascular   []  Pathology    []  Records       Assessment/Plan     Active Hospital Problems    Diagnosis  POA    **Bacterial pneumonia, treating as gram negative [J15.9]  Yes    Severe protein-calorie malnutrition [E43]  Yes    Chronic respiratory failure with hypoxia [J96.11]  Yes    CAD (coronary artery disease) [I25.10]  Yes    Nausea, vomiting, and diarrhea [R11.2, R19.7]  Yes    ESRD on dialysis [N18.6, Z99.2]  Not Applicable    ANGIE (obstructive sleep apnea) [G47.33]  Yes    Chronic diastolic CHF (congestive heart failure) [I50.32]  Yes    Anemia, chronic disease [D63.8]  Yes    HTN (hypertension) [I10]  Yes    Generalized abdominal pain [R10.84]  Yes      Resolved Hospital Problems   No resolved problems to display.  "    73yo woman with ESRD, ACD, HTN, chronic diastolic CHF, CHRF (2L/min), ANGIE, and GERD , who presented to ER from home with N/V/D/abd pain, cough, confusion, and hallucinations. She was admitted with LLL pneumonia--concern for aspiration.    LLL PNA  Concern for aspiration  CHRF (2L/min at home)  SLP eval good  Completed 7d of IV Zosyn  Blood cultures negative  Sputum culture neg  RVP neg  WBC normalized but then spiked again (see below)  Afebrile  Weaned to RA here though uses NC O2 at home  Continue OPEP and IS  Continue Mucinex  Continue DuoNebs    Leukocytosis  Had normalized but then went back up to 18  Now falling w/o abx--down to 11.5 this AM  Appreciate ID attention to pt  No further w/u indicated as long as WBC falling and she remains afebrile    N/V/D  Abd pain  Uncertain etiology  CT A/P unrevealing  GI PCR ordered but symptoms improved  Moving bowels now on her regular home Lactulose    Encephalopathy NOS  Hallucinations  Suspect due to acute illness  CT Head showed NAD  Much improved    ESRD on dialysis  Appreciate Renal attention to pt  Continue HD here MWF    Chronic diastolic CHF  Volume mgmt per Renal  Not on diuretics PTA    HTN  BPs quite labile on regimen of hydralazine, amlodipine, losartan, and Imdur  Renal has placed amlodipine on hold with improvement noted  Continue to monitor on telemetry    GERD  PPI    Anemia of CKD  Hgb stable  No evidence of bleeding  Continue to monitor closely    CAD  Left sided chest pain  ARIANA placed in August  Continue DAPT and statin  Continues to c/o intermittent chest pain  Reports this has been intermittent for \"weeks now\"   Had CTA Chest on 12/31 that did not show any rib fractures or PE  Card following, the do not feel this is ACS, they discussed possible left heart cath with pt but she prefers to f/u with her regular Cardiologist at [facility]    H/o DVT  DVT ppx with DAPT and SCDs    Chronic left knee pain  Left knee effusion  This is a long " term issue for her  No acute changes  Xray left knee showed large effusion  Ortho consulted for possible arthrocentesis but they do not feel this is indicated at present as her knee is at baseline for her    DM2?  Mentioned in chart but on no meds  A1c only 4.9  Sugars fine here  I do not think that she has diabetes      DAPT and SCDs should suffice for DVT prophylaxis.  Full code.  Discussed with patient. No family present.   Anticipate discharge home with [facility] (per PT recs) tomorrow after dialysis.   Expected Discharge Date: 1/17/2025; Expected Discharge Time:       Shaan Baltazar MD  [facility]  01/16/25  12:10 EST     I personally performed the service described in the documentation recorded by the scribe in my presence, and it accurately and completely records my words and actions.

## 2025-01-16 NOTE — PROGRESS NOTES
ID NOTE    CC: f/u leukocytosis    Subj: No acute events. No fever. WBC trending down.  Required 2 L nasal cannula (home dose) overnight but is back to ambient air this morning.      Medications:    Current Facility-Administered Medications:     acetaminophen (TYLENOL) tablet 650 mg, 650 mg, Oral, Q4H PRN, 650 mg at 01/16/25 0541 **OR** acetaminophen (TYLENOL) 160 MG/5ML oral solution 650 mg, 650 mg, Oral, Q4H PRN **OR** acetaminophen (TYLENOL) suppository 650 mg, 650 mg, Rectal, Q4H PRN, Cherie Reich MD    [Held by provider] amLODIPine (NORVASC) tablet 5 mg, 5 mg, Oral, Daily, Cherie Reich MD, 5 mg at 01/14/25 0856    aspirin EC tablet 81 mg, 81 mg, Oral, Daily, Shaan Baltazar MD, 81 mg at 01/16/25 0815    atorvastatin (LIPITOR) tablet 40 mg, 40 mg, Oral, Daily, Cherie Reich MD, 40 mg at 01/16/25 0815    benzocaine-menthol (CHLORASEPTIC) lozenge 1 each, 1 lozenge, Mouth/Throat, TID PRN, Cherie Reich MD    sennosides-docusate (PERICOLACE) 8.6-50 MG per tablet 2 tablet, 2 tablet, Oral, BID, 2 tablet at 01/16/25 0823 **AND** polyethylene glycol (MIRALAX) packet 17 g, 17 g, Oral, Daily PRN **AND** bisacodyl (DULCOLAX) EC tablet 5 mg, 5 mg, Oral, Daily PRN **AND** bisacodyl (DULCOLAX) suppository 10 mg, 10 mg, Rectal, Daily PRN, Essence Verdugo APRN    calcium carbonate (TUMS) chewable tablet 500 mg (200 mg elemental), 2 tablet, Oral, TID PRN, Essence Verdugo APRN, 2 tablet at 01/12/25 0051    clopidogrel (PLAVIX) tablet 75 mg, 75 mg, Oral, Daily, Cherie Reich MD, 75 mg at 01/16/25 0815    gabapentin (NEURONTIN) capsule 100 mg, 100 mg, Oral, TID PRN, Stingl, Cherie Sullivan MD, 100 mg at 01/16/25 0541    guaiFENesin (MUCINEX) 12 hr tablet 1,200 mg, 1,200 mg, Oral, Q12H, Shaan Baltazar MD, 1,200 mg at 01/16/25 0815    hydrALAZINE (APRESOLINE) tablet 100 mg, 100 mg, Oral, Q8H, Arjun Ramirez MD, 100 mg at 01/16/25 0545    ipratropium-albuterol  (DUO-NEB) nebulizer solution 3 mL, 3 mL, Nebulization, 4x Daily - RT, Shaan Baltazar MD, 3 mL at 01/16/25 0716    ipratropium-albuterol (DUO-NEB) nebulizer solution 3 mL, 3 mL, Nebulization, Q6H PRN, Sisi Hopson, APRN    isosorbide dinitrate (ISORDIL) tablet 30 mg, 30 mg, Oral, Daily, Cherie Reich MD, 30 mg at 01/16/25 0815    lactulose (CHRONULAC) 10 GM/15ML solution 15 mL, 15 mL, Oral, Daily, Shaan Baltazar MD, 15 mL at 01/16/25 0823    lidocaine (XYLOCAINE) 1 % injection 10 mL, 10 mL, Infiltration, Once, Marcio Rios MD    losartan (COZAAR) tablet 100 mg, 100 mg, Oral, Nightly, Arjun Ramirez MD, 100 mg at 01/15/25 2129    melatonin tablet 2.5 mg, 2.5 mg, Oral, Nightly PRN, Cherie Reich MD    methylPREDNISolone acetate (DEPO-medrol) injection 80 mg, 80 mg, Intra-articular, Once, Marcio Rios MD    morphine injection 2 mg, 2 mg, Intravenous, Once PRN, Scar Dodge MD    ondansetron ODT (ZOFRAN-ODT) disintegrating tablet 4 mg, 4 mg, Oral, Q6H PRN **OR** ondansetron (ZOFRAN) injection 4 mg, 4 mg, Intravenous, Q6H PRN, Cherie Reich MD, 4 mg at 01/10/25 2020    pantoprazole (PROTONIX) EC tablet 40 mg, 40 mg, Oral, Q AM, Shaan Baltazar MD, 40 mg at 01/16/25 0541    Polyvinyl Alcohol-Povidone PF (ARTIFICIAL TEARS) 1.4-0.6 % ophthalmic solution 2 drop, 2 drop, Both Eyes, Q1H PRN, Shaan Baltazar MD    simethicone (MYLICON) chewable tablet 80 mg, 80 mg, Oral, 4x Daily PRN, Essence Verdugo, APRN      Objective   Vital Signs   Temp:  [97.5 °F (36.4 °C)-98.4 °F (36.9 °C)] 98.4 °F (36.9 °C)  Heart Rate:  [84-97] 85  Resp:  [16-18] 16  BP: (117-149)/(51-89) 144/51    Physical Exam:   General: NAD, sitting up in chair  Eyes: no scleral icterus  ENT: no thrush, missing several teeth  Cardiovascular: Normal rate  Respiratory: normal work of breathing on RA without wheezing  GI: Abdomen is nondistended  :  no Doherty catheter  MSK: Left knee edematous  especially compared to right knee; no heat or redness  Skin: No rashes on exposed areas      Labs:   CBC, BMP, magnesium, and CRP reviewed today  Lab Results   Component Value Date    WBC 11.53 (H) 01/16/2025    HGB 9.2 (L) 01/16/2025    HCT 29.2 (L) 01/16/2025    MCV 85.9 01/16/2025     (H) 01/16/2025     Lab Results   Component Value Date    GLUCOSE 103 (H) 01/16/2025    CALCIUM 9.2 01/16/2025     (L) 01/16/2025    K 4.4 01/16/2025    CO2 22.0 01/16/2025    CL 99 01/16/2025    BUN 32 (H) 01/16/2025    CREATININE 4.02 (H) 01/16/2025    EGFR 11.1 (L) 01/16/2025    BCR 8.0 01/16/2025    ANIONGAP 13.0 01/16/2025     Lab Results   Component Value Date    URICACID 4.9 01/15/2025     Lab Results   Component Value Date    HGBA1C 4.90 01/08/2025     Lab Results   Component Value Date    CRP 12.32 (H) 01/16/2025       Magnesium 2.6  Procalcitonin 0.8 on 1/6/2025    Microbiology:  1/6 RPP: Negative  1/6 BCx: Negative  1/7 respiratory cx: Negative    Prior radiology:  1/14 left knee x-ray with large effusion    Isolation:  No active isolations    ASSESSMENT/PLAN:  Left lower lobe pneumonia  Chronic hypoxemic respiratory failure (2 L nasal cannula at home)  ESRD on hemodialysis q. MWF  Nausea, vomiting, diarrhea, and abdominal pain - resolved  Encephalopathy present on admission now resolved  Chronic diastolic heart failure  Intermittent chest pain -not ACS per cardiology  Chronic left knee swelling with effusion present    She remains afebrile and hemodynamically stable on room air.  WBC down to 11 today.  Plain film of the left knee showed a large effusion.  Orthopedics did not recommend arthrocentesis given appearance and chronicity.     I feel less strongly about a CTA of the chest at this point.  She is hemodynamically stable with a normal heart rate and blood pressure.  She had a CT of the chest done approximately 2 weeks ago when the same symptoms were present and it was negative for pulmonary embolism.  If  she has any clinical changes then this could be reconsidered and ordered by her internal medicine team.    I do not see a role for reinitiating broad-spectrum antibiotics at this time given her stability.    If she has a temperature greater than 100.4 F, then I would recommend checking a set of blood cultures.    Thank you for allowing me to be involved in the care of this patient. Infectious diseases will sign off at this time, but please call me at [phone] if any further ID questions or new ID concerns.

## 2025-01-16 NOTE — PLAN OF CARE
Goal Outcome Evaluation:              Outcome Evaluation: medicated  for abdominal/gas discomfort.  Asleep off and on today.  No SOA.  Intermittent left sided chest pain today/unchanged.

## 2025-01-17 ENCOUNTER — READMISSION MANAGEMENT (OUTPATIENT)
Dept: CALL CENTER | Facility: HOSPITAL | Age: 75
End: 2025-01-17
Payer: MEDICARE

## 2025-01-17 VITALS
HEART RATE: 95 BPM | OXYGEN SATURATION: 93 % | TEMPERATURE: 97.8 F | WEIGHT: 99.65 LBS | HEIGHT: 60 IN | BODY MASS INDEX: 19.56 KG/M2 | SYSTOLIC BLOOD PRESSURE: 125 MMHG | RESPIRATION RATE: 20 BRPM | DIASTOLIC BLOOD PRESSURE: 59 MMHG

## 2025-01-17 LAB
ALBUMIN SERPL-MCNC: 3.4 G/DL (ref 3.5–5.2)
ALP SERPL-CCNC: 108 U/L (ref 39–117)
ALT SERPL W P-5'-P-CCNC: 12 U/L (ref 1–33)
ANION GAP SERPL CALCULATED.3IONS-SCNC: 16.1 MMOL/L (ref 5–15)
AST SERPL-CCNC: 22 U/L (ref 1–32)
BASOPHILS # BLD AUTO: 0.06 10*3/MM3 (ref 0–0.2)
BASOPHILS NFR BLD AUTO: 0.4 % (ref 0–1.5)
BILIRUB CONJ SERPL-MCNC: <0.2 MG/DL (ref 0–0.3)
BILIRUB INDIRECT SERPL-MCNC: ABNORMAL MG/DL
BILIRUB SERPL-MCNC: 0.2 MG/DL (ref 0–1.2)
BUN SERPL-MCNC: 48 MG/DL (ref 8–23)
BUN/CREAT SERPL: 7.9 (ref 7–25)
CALCIUM SPEC-SCNC: 9.7 MG/DL (ref 8.6–10.5)
CHLORIDE SERPL-SCNC: 96 MMOL/L (ref 98–107)
CO2 SERPL-SCNC: 21.9 MMOL/L (ref 22–29)
CREAT SERPL-MCNC: 6.04 MG/DL (ref 0.57–1)
DEPRECATED RDW RBC AUTO: 52 FL (ref 37–54)
EGFRCR SERPLBLD CKD-EPI 2021: 6.8 ML/MIN/1.73
EOSINOPHIL # BLD AUTO: 0.23 10*3/MM3 (ref 0–0.4)
EOSINOPHIL NFR BLD AUTO: 1.6 % (ref 0.3–6.2)
ERYTHROCYTE [DISTWIDTH] IN BLOOD BY AUTOMATED COUNT: 16.5 % (ref 12.3–15.4)
GLUCOSE SERPL-MCNC: 137 MG/DL (ref 65–99)
HCT VFR BLD AUTO: 30.2 % (ref 34–46.6)
HGB BLD-MCNC: 9.4 G/DL (ref 12–15.9)
IMM GRANULOCYTES # BLD AUTO: 0.12 10*3/MM3 (ref 0–0.05)
IMM GRANULOCYTES NFR BLD AUTO: 0.8 % (ref 0–0.5)
LYMPHOCYTES # BLD AUTO: 0.97 10*3/MM3 (ref 0.7–3.1)
LYMPHOCYTES NFR BLD AUTO: 6.8 % (ref 19.6–45.3)
MAGNESIUM SERPL-MCNC: 2.2 MG/DL (ref 1.6–2.4)
MCH RBC QN AUTO: 27.2 PG (ref 26.6–33)
MCHC RBC AUTO-ENTMCNC: 31.1 G/DL (ref 31.5–35.7)
MCV RBC AUTO: 87.3 FL (ref 79–97)
MONOCYTES # BLD AUTO: 0.84 10*3/MM3 (ref 0.1–0.9)
MONOCYTES NFR BLD AUTO: 5.9 % (ref 5–12)
NEUTROPHILS NFR BLD AUTO: 12 10*3/MM3 (ref 1.7–7)
NEUTROPHILS NFR BLD AUTO: 84.5 % (ref 42.7–76)
NRBC BLD AUTO-RTO: 0 /100 WBC (ref 0–0.2)
PHOSPHATE SERPL-MCNC: 4.4 MG/DL (ref 2.5–4.5)
PLATELET # BLD AUTO: 513 10*3/MM3 (ref 140–450)
PMV BLD AUTO: 9.2 FL (ref 6–12)
POTASSIUM SERPL-SCNC: 5.2 MMOL/L (ref 3.5–5.2)
PROT SERPL-MCNC: 7.5 G/DL (ref 6–8.5)
QT INTERVAL: 400 MS
QTC INTERVAL: 493 MS
RBC # BLD AUTO: 3.46 10*6/MM3 (ref 3.77–5.28)
SODIUM SERPL-SCNC: 134 MMOL/L (ref 136–145)
WBC NRBC COR # BLD AUTO: 14.22 10*3/MM3 (ref 3.4–10.8)

## 2025-01-17 PROCEDURE — 83735 ASSAY OF MAGNESIUM: CPT | Performed by: HOSPITALIST

## 2025-01-17 PROCEDURE — 94799 UNLISTED PULMONARY SVC/PX: CPT

## 2025-01-17 PROCEDURE — 94761 N-INVAS EAR/PLS OXIMETRY MLT: CPT

## 2025-01-17 PROCEDURE — 93010 ELECTROCARDIOGRAM REPORT: CPT | Performed by: INTERNAL MEDICINE

## 2025-01-17 PROCEDURE — 84100 ASSAY OF PHOSPHORUS: CPT | Performed by: HOSPITALIST

## 2025-01-17 PROCEDURE — 93005 ELECTROCARDIOGRAM TRACING: CPT | Performed by: HOSPITALIST

## 2025-01-17 PROCEDURE — 85025 COMPLETE CBC W/AUTO DIFF WBC: CPT | Performed by: HOSPITALIST

## 2025-01-17 PROCEDURE — 94664 DEMO&/EVAL PT USE INHALER: CPT

## 2025-01-17 PROCEDURE — 80076 HEPATIC FUNCTION PANEL: CPT | Performed by: HOSPITALIST

## 2025-01-17 PROCEDURE — 80048 BASIC METABOLIC PNL TOTAL CA: CPT | Performed by: HOSPITALIST

## 2025-01-17 RX ORDER — LACTULOSE 10 G/15ML
10 SOLUTION ORAL DAILY PRN
Start: 2025-01-17

## 2025-01-17 RX ADMIN — GABAPENTIN 100 MG: 100 CAPSULE ORAL at 18:11

## 2025-01-17 RX ADMIN — LACTULOSE 15 ML: 10 SOLUTION ORAL at 09:07

## 2025-01-17 RX ADMIN — GUAIFENESIN 1200 MG: 600 TABLET, EXTENDED RELEASE ORAL at 09:07

## 2025-01-17 RX ADMIN — HYDRALAZINE HYDROCHLORIDE 100 MG: 50 TABLET ORAL at 06:14

## 2025-01-17 RX ADMIN — CLOPIDOGREL BISULFATE 75 MG: 75 TABLET ORAL at 09:07

## 2025-01-17 RX ADMIN — IPRATROPIUM BROMIDE AND ALBUTEROL SULFATE 3 ML: 2.5; .5 SOLUTION RESPIRATORY (INHALATION) at 07:07

## 2025-01-17 RX ADMIN — IPRATROPIUM BROMIDE AND ALBUTEROL SULFATE 3 ML: 2.5; .5 SOLUTION RESPIRATORY (INHALATION) at 10:48

## 2025-01-17 RX ADMIN — GABAPENTIN 100 MG: 100 CAPSULE ORAL at 09:07

## 2025-01-17 RX ADMIN — ACETAMINOPHEN 650 MG: 325 TABLET ORAL at 09:08

## 2025-01-17 RX ADMIN — ASPIRIN 81 MG: 81 TABLET, COATED ORAL at 09:07

## 2025-01-17 RX ADMIN — ATORVASTATIN CALCIUM 40 MG: 20 TABLET, FILM COATED ORAL at 09:08

## 2025-01-17 RX ADMIN — ACETAMINOPHEN 650 MG: 325 TABLET ORAL at 18:11

## 2025-01-17 RX ADMIN — ISOSORBIDE DINITRATE 30 MG: 20 TABLET ORAL at 09:07

## 2025-01-17 RX ADMIN — PANTOPRAZOLE SODIUM 40 MG: 40 TABLET, DELAYED RELEASE ORAL at 06:14

## 2025-01-17 RX ADMIN — SENNOSIDES AND DOCUSATE SODIUM 2 TABLET: 50; 8.6 TABLET ORAL at 09:07

## 2025-01-17 NOTE — SIGNIFICANT NOTE
01/17/25 1328   OTHER   Discipline physical therapist   Rehab Time/Intention   Session Not Performed patient unavailable for treatment  (Pt currently NEIL for HD, and per chart pt is to DC home once HD completed. PT will keep on caseload in chance pt does not DC this date.)   Therapy Assessment/Plan (PT)   Criteria for Skilled Interventions Met (PT) skilled treatment is necessary;yes   Recommendation   PT - Next Appointment 01/20/25

## 2025-01-17 NOTE — DISCHARGE SUMMARY
"    Patient Name: Sonia Acharya  : 1950  MRN: [id number]    Date of Admission: 2025  Date of Discharge:  2025  Primary Care Physician: Marcy Paez PA      Chief Complaint:   Vomiting, Abdominal Pain, and Cough      Discharge Diagnoses     Active Hospital Problems    Diagnosis  POA    **Bacterial pneumonia, treating as gram negative [J15.9]  Yes    Severe protein-calorie malnutrition [E43]  Yes    Chronic respiratory failure with hypoxia [J96.11]  Yes    CAD (coronary artery disease) [I25.10]  Yes    Nausea, vomiting, and diarrhea [R11.2, R19.7]  Yes    ESRD on dialysis [N18.6, Z99.2]  Not Applicable    ANGIE (obstructive sleep apnea) [G47.33]  Yes    Chronic diastolic CHF (congestive heart failure) [I50.32]  Yes    Anemia, chronic disease [D63.8]  Yes    HTN (hypertension) [I10]  Yes    Generalized abdominal pain [R10.84]  Yes      Resolved Hospital Problems   No resolved problems to display.        Hospital Course     Very pleasant 73yo woman with ESRD, ACD, HTN, chronic diastolic CHF, CHRF (2L/min), ANGIE, and GERD , who presented to ER from home with N/V/D/abd pain, cough, confusion, and hallucinations. She was admitted with LLL pneumonia--concern for aspiration. Please see below for details of admission by problem:      LLL PNA  Concern for aspiration  CHRF (2L/min at home)  SLP eval good  Completed 7d of IV Zosyn  Blood cultures negative  Sputum culture neg  RVP neg  WBC normalized but then spiked again (see below)  Afebrile  Weaned to RA intermittently here though uses NC O2 at home \"as needed\"  Continue OPEP and IS after dc     Leukocytosis  Had normalized but then went back up to 18  Consulted ID  They recommend no further w/u or treatment unless she becomes febrile again  Have instructed pt to return to ER or call PCP for any fever  Can have WBC rechecked next week when she follows up with PCP in office     N/V/D  Abd pain  Uncertain etiology  CT A/P unrevealing  GI PCR ordered but " "symptoms improved  Moving bowels now on her regular home Lactulose     Encephalopathy NOS  Hallucinations  Suspect due to acute illness  CT Head showed NAD  Resolved     ESRD on dialysis  Appreciate Renal attention to pt  Continued HD here MWF     Chronic diastolic CHF  Volume mgmt per Renal  Not on diuretics PTA     HTN  BPs were quite labile on regimen of hydralazine, amlodipine, losartan, and Imdur  Renal has placed amlodipine on hold with improvement noted  Continue to monitor at dialysis, Renal to address     GERD  PPI     Anemia of CKD  Hgb stable  No evidence of bleeding     CAD  Left sided chest pain  ARIANA placed in August  Continue DAPT and statin  Continues to c/o intermittent chest pain here  Reports this has been intermittent for \"weeks now\"   Had CTA Chest on 12/31 that did not show any rib fractures or PE  Card following, the do not feel this is ACS, they discussed possible left heart cath with pt but she prefers to f/u with her regular Cardiologist at [facility]--has appt in one week with them     H/o DVT  DVT ppx with DAPT and SCDs     Chronic left knee pain  Left knee effusion  This is a long term issue for her  No acute changes  Xray left knee showed large effusion  Ortho consulted for possible arthrocentesis but they do not feel this is indicated at present as her knee is at baseline for her     DM2?  Mentioned in chart but on no meds  A1c only 4.9  Sugars fine here  I do not think that she has diabetes        DAPT and SCDs sufficed for DVT prophylaxis.  Full code confirmed.  Discussed with patient, RN, and CCP. No family present.   Anticipate discharge home with [facility] (per PT recs) today after dialysis.    Day of Discharge     Subjective:  No cough. No SOA. No palp. No CP at present. No longer confused or hallucinating. Sleeping well. Tolerating po. No N/V/D/abd pain.   Left knee pain is at baseline. Eager to go home.    Physical Exam:  Temp:  [98.2 °F (36.8 °C)-99.9 °F (37.7 °C)] " 98.2 °F (36.8 °C)  Heart Rate:  [] 90  Resp:  [16-20] 20  BP: (125-159)/(53-63) 126/54  Body mass index is 23.77 kg/m².  Physical Exam  Vitals and nursing note reviewed.   Constitutional:       General: She is not in acute distress.     Appearance: She is ill-appearing (chronically). She is not toxic-appearing or diaphoretic.    Cardiovascular:      Rate and Rhythm: Normal rate and regular rhythm.      Pulses: Normal pulses.      Comments: AVF RUE  Pulmonary:      Effort: Pulmonary effort is normal. No respiratory distress.      Breath sounds: Normal breath sounds. No wheezing or rales.      Comments: Anteriorly   Abdominal:      General: Bowel sounds are normal. There is no distension.      Palpations: Abdomen is soft.      Tenderness: There is no abdominal tenderness.   Musculoskeletal:         General: No swelling. Normal range of motion.      Cervical back: Neck supple.      Comments: Left knee with chronic bony changes  Mild-moderate effusion present  Non-tender, no warmth or erythema   Skin:     General: Skin is warm and dry.      Capillary Refill: Capillary refill takes less than 2 seconds.      Coloration: Skin is not jaundiced.   Neurological:      General: No focal deficit present.      Mental Status: She is alert and oriented to person, place, and time. Mental status is at baseline.      Cranial Nerves: No cranial nerve deficit.      Coordination: Coordination normal.   Psychiatric:         Mood and Affect: Mood normal.         Behavior: Behavior normal.         Thought Content: Thought content normal.         Consultants     Consult Orders (all) (From admission, onward)       Start     Ordered    01/15/25 0702  Inpatient Orthopedic Surgery Consult  IN AM        Specialty:  Orthopedic Surgery  Provider:  Marcio Rios MD    01/14/25 2029 01/14/25 1423  Inpatient Infectious Diseases Consult  Once        Specialty:  Infectious Diseases  Provider:  Earle Robbins DO    01/14/25 1422     01/10/25 1347  Inpatient Cardiology Consult  Once        Specialty:  Cardiology  Provider:  Shaan Wang Jr., MD    01/10/25 1347    01/06/25 1607  Nephrology (on -call MD unless specified)  Once        Specialty:  Nephrology  Provider:  Micheline Torrez MD    01/06/25 1606    01/06/25 1603  Nephrology (on -call MD unless specified)  Once,   Status:  Canceled        Specialty:  Nephrology  Provider:  Maira Pizarro MD    01/06/25 1602                  Procedures     * Surgery not found *    Imaging Results (All)       Procedure Component Value Units Date/Time    XR Knee 1 or 2 View Left [[id number]] Collected: 01/14/25 1914     Updated: 01/14/25 1938    Narrative:      XR KNEE 1 OR 2 VW LEFT-     HISTORY: Pain and swelling; R11.2-Nausea with vomiting, unspecified;  N18.6-End stage renal disease; Z99.2-Dependence on renal dialysis;  J18.9-Pneumonia, unspecified organism; R79.89-Other specified abnormal  findings of blood chemistry; N18.6-End stage renal disease;  Z99.2-Dependence on renal dialysis; J96.11-Chronic respiratory failure  with hypoxia; R41.82-Altered mental status, unspecified     COMPARISON: None     FINDINGS: Osteoarthritis of the left knee most pronounced at the medial  compartment where there is joint space narrowing and marginal spur  formation. Bones appear osteopenic. There is a large effusion  demonstrated at the suprapatellar recess. Vascular calcifications are  present.       Impression:      Large effusion. Osteoarthritis best demonstrated at the  medial compartment. Atherosclerotic disease. No evidence for fracture.  If there is suspicion for intra-articular infection, arthrocentesis  could be performed for further evaluation.     This report was finalized on 1/14/2025 7:35 PM by Abdulaziz Boyce M.D  on Workstation: [id number]       XR Chest PA & Lateral [[id number]] Collected: 01/13/25 1518     Updated: 01/13/25 1525    Narrative:      XR CHEST PA AND LATERAL-     HISTORY:  Female who is 74 years-old, worsening leukocytosis     TECHNIQUE: Frontal view of the chest     COMPARISON: 1/10/2025     FINDINGS: Heart, mediastinum and pulmonary vasculature are unremarkable.  Small atelectasis or infiltrate at the bases, similar to prior exam. No  pleural effusion, or pneumothorax. Right hemidiaphragm is elevated. No  acute osseous process.       Impression:      No significant change.     This report was finalized on 1/13/2025 3:22 PM by Dr. Michael Lauren M.D on Workstation: [id number]       XR Chest PA & Lateral [[id number]] Collected: 01/10/25 1510     Updated: 01/10/25 1521    Narrative:      XR CHEST PA AND LATERAL-1/10/2025     HISTORY: Chest pain.     Heart size is mildly enlarged. There is mild patchy increased density in  the left infrahilar region which may represent some mild atelectasis or  pneumonia. This finding appears slightly more prominent than on the  1/6/2025 study. There may be some atelectasis or infiltrate in the right  infrahilar region as well. There is some aortic calcification. Vascular  stent overlies the left axilla.       Impression:      1. Mild cardiomegaly.  2. Mild to moderate patchy atelectasis or pneumonia in the left  infrahilar region, more prominent than on the 1/6/2025 study.  2. There may be some additional mild patchy atelectasis or infiltrate in  the right infrahilar region.        This report was finalized on 1/10/2025 3:18 PM by Dr. Homero Kumar M.D on Workstation: [id number]       CT Abdomen Pelvis Without Contrast [[id number]] Collected: 01/06/25 1546     Updated: 01/06/25 1611    Narrative:      CT ABDOMEN AND PELVIS WITHOUT CONTRAST     HISTORY: 74 years of age, female. Abdominal pain with nausea and  vomiting.     TECHNIQUE:  CT includes axial imaging from the lung bases to the  trochanters without intravenous contrast and without use of oral  contrast. Data reconstructed in coronal and sagittal planes. Radiation  dose reduction techniques  were utilized, including automated exposure  control and exposure modulation based on body size.     COMPARISON: CT abdomen and pelvis without contrast 02/17/2024.     FINDINGS: Cardiomegaly. Mild left lower lobe bronchial wall thickening,  potential mild adjacent small infiltrate.     The liver, gallbladder, and spleen appear within normal limits. There is  thickening of the left adrenal gland. Right adrenal gland appears  normal. Chronic bilateral renal atrophy. Right upper pole renal cyst  measures 2.8 cm.     No bowel dilatation or evidence for bowel obstruction. Evaluation is  limited due to lack of IV or oral contrast.  No evidence for ascites or  abscess formation. No finding to suggest appendicitis. Diffuse  atherosclerotic calcifications are present. No evidence for reena  enlargement. Multilevel bridging endplate osteophyte formation within  the thoracic and upper lumbar spine.       Impression:      1. No evidence for acute intra-abdominal process on CT abdomen and  pelvis without contrast.  2. Mild left lower lobe bronchial wall thickening with potential mild  adjacent infiltrate. Cardiomegaly.  3. Bilateral renal atrophy. Right upper pole renal cyst.  4. Diffuse atherosclerotic calcifications.     Radiation dose reduction techniques were utilized, including automated  exposure control and exposure modulation based on body size.        This report was finalized on 1/6/2025 4:08 PM by Abdulaziz Boyce M.D on  Workstation: [id number]       CT Head Without Contrast [[id number]] Collected: 01/06/25 1515     Updated: 01/06/25 1520    Narrative:      CT HEAD WO CONTRAST-     INDICATIONS: Altered mental status     TECHNIQUE: Radiation dose reduction techniques were utilized, including  automated exposure control and exposure modulation based on body size.  Noncontrast head CT     COMPARISON: None available     FINDINGS:     No acute intracranial hemorrhage, midline shift or mass effect. No acute  territorial  infarct is identified.     Mild periventricular hypodensities suggest chronic small vessel ischemic  change in a patient this age.     Arterial calcifications are seen at the base of the brain.     Ventricles, cisterns, cerebral sulci are unremarkable for patient age.     Right mastoid air cells are hypopneumatized. Mild paranasal sinus  mucosal thickening is present. The visualized paranasal sinuses, orbits,  mastoid air cells are otherwise unremarkable.          Impression:         No acute intracranial hemorrhage or hydrocephalus. If there is further  clinical concern, MRI could be considered for further evaluation.     This report was finalized on 1/6/2025 3:17 PM by Dr. Michael Lauren M.D on Workstation: [id number]       XR Chest 1 View [[id number]] Collected: 01/06/25 1356     Updated: 01/06/25 1400    Narrative:      XR CHEST 1 VW-1/6/2025     HISTORY: Altered mental status.     Heart size is mildly enlarged. Lungs are underinflated with some  vascular crowding. No focal infiltrates are seen. There is some aortic  calcification. Vascular stent is seen in the left axilla and left upper  arm.       Impression:      1. Mild cardiomegaly.  2. Underinflation of the lungs with no acute infiltrates.        This report was finalized on 1/6/2025 1:57 PM by Dr. Homero Kumar M.D on Workstation: [id number]               Results for orders placed during the hospital encounter of 01/06/25    Adult Transthoracic Echo Complete W/ Cont if Necessary Per Protocol    Interpretation Summary    Left ventricular systolic function is normal. Calculated left ventricular EF = 56.6%    Left ventricular wall thickness is consistent with severe concentric hypertrophy.    The following left ventricular wall segments are hypokinetic: mid anterior, apical anterior and apex hypokinetic.    Left ventricular diastolic function is consistent with (grade II w/high LAP) pseudonormalization.    The left atrial cavity is severely  "dilated.    Left atrial volume is severely increased.    Mild aortic valve stenosis is present. Aortic valve area is 1.1 cm2.    Aortic valve maximum pressure gradient is 28 mmHg. Aortic valve mean pressure gradient is 15 mmHg.    Mild mitral valve regurgitation is present    There is a trivial circumferential pericardial effusion. There is no evidence of cardiac tamponade.    Pertinent Labs     Results from last 7 days   Lab Units 01/17/25  0648 01/16/25  0632 01/15/25  0629 01/14/25  1355   WBC 10*3/mm3 14.22* 11.53* 12.53* 15.21*   HEMOGLOBIN g/dL 9.4* 9.2* 8.4* 9.1*   PLATELETS 10*3/mm3 513* 464* 431 440     Results from last 7 days   Lab Units 01/17/25  0648 01/16/25  0632 01/15/25  0629 01/14/25  0601   SODIUM mmol/L 134* 134* 133* 134*   POTASSIUM mmol/L 5.2 4.4 4.6 4.5   CHLORIDE mmol/L 96* 99 96* 97*   CO2 mmol/L 21.9* 22.0 19.0* 23.3   BUN mg/dL 48* 32* 48* 28*   CREATININE mg/dL 6.04* 4.02* 5.38* 3.75*   GLUCOSE mg/dL 137* 103* 63* 79   EGFR mL/min/1.73 6.8* 11.1* 7.8* 12.1*     Results from last 7 days   Lab Units 01/17/25  0648 01/16/25  0632 01/15/25  0629 01/14/25  0601 01/13/25  1600   ALBUMIN g/dL 3.4* 3.1* 2.7* 2.9* 3.1*   BILIRUBIN mg/dL 0.2 0.2 0.2  --  0.3   ALK PHOS U/L 108 102 90  --  85   AST (SGOT) U/L 22 18 15  --  13   ALT (SGPT) U/L 12 11 10  --  7     Results from last 7 days   Lab Units 01/17/25  0648 01/16/25  0632 01/15/25  0629 01/14/25  0601 01/13/25  1600 01/13/25  0119   CALCIUM mg/dL 9.7 9.2 9.1 9.4   < > 8.9   ALBUMIN g/dL 3.4* 3.1* 2.7* 2.9*   < > 2.9*   MAGNESIUM mg/dL 2.2 2.6* 2.4  --   --  2.3   PHOSPHORUS mg/dL 4.4 4.1 4.8* 4.7*  --  5.9*    < > = values in this interval not displayed.       Results from last 7 days   Lab Units 01/14/25  1057 01/14/25  1002 01/13/25  0315 01/13/25  0119   HSTROP T ng/L 150* 148* 124* 126*     Results from last 7 days   Lab Units 01/15/25  0629   URIC ACID mg/dL 4.9         Invalid input(s): \"LDLCALC\"          Test Results Pending at " Discharge     Pending Results       None              Discharge Details        Discharge Medications        Changes to Medications        Instructions Start Date   lactulose 10 GM/15ML solution  Commonly known as: CHRONULAC  What changed:   when to take this  reasons to take this   10 g, Oral, Daily PRN      pantoprazole 40 MG EC tablet  Commonly known as: PROTONIX   40 mg, Oral, 2 Times Daily Before Meals             Continue These Medications        Instructions Start Date   aspirin 81 MG EC tablet   81 mg, Daily      atorvastatin 40 MG tablet  Commonly known as: LIPITOR   40 mg, Daily      calcium carbonate 500 MG chewable tablet  Commonly known as: TUMS   1 tablet, Oral, Every 6 Hours PRN      clopidogrel 75 MG tablet  Commonly known as: PLAVIX   75 mg, Daily      hydrALAZINE 25 MG tablet  Commonly known as: APRESOLINE   100 mg, 3 Times Daily      isosorbide dinitrate 30 MG tablet  Commonly known as: ISORDIL   30 mg, Daily      losartan 100 MG tablet  Commonly known as: COZAAR   100 mg, Oral, Daily             Stop These Medications      amLODIPine 5 MG tablet  Commonly known as: NORVASC     cloNIDine 0.1 MG tablet  Commonly known as: CATAPRES              No Known Allergies    Discharge Disposition:  Home-Health Care [facility]      Discharge Diet:  Diet Order   Procedures    Diet: Renal; Low Sodium (2-3g), Low Potassium; Fluid Consistency: Thin (IDDSI 0)       Discharge Activity:   As tolerated    CODE STATUS:    Code Status and Medical Interventions: CPR (Attempt to Resuscitate); Full   Ordered at: 01/06/25 1842     Code Status (Patient has no pulse and is not breathing):    CPR (Attempt to Resuscitate)     Medical Interventions (Patient has pulse or is breathing):    Full       No future appointments.  Additional Instructions for the Follow-ups that You Need to Schedule       Ambulatory Referral to Home Health   As directed      Face to Face Visit Date: 1/17/2025   Follow-up provider for Plan of Care?: I treated  the patient in an acute care facility and will not continue treatment after discharge.   Follow-up provider: MARCY PAEZ [[id number]]   Reason/Clinical Findings: Pneumonia, HTN, ESRD, chronic diastolic CHF, chronic resp failure with hypoxia   Describe mobility limitations that make leaving home difficult: Requires the assistance of another to leave the home   Nursing/Therapeutic Services Requested: Skilled Nursing Physical Therapy   Skilled nursing orders: Cardiopulmonary assessments CHF management Medication education   PT orders: Therapeutic exercise Strengthening   Frequency: 1 Week 1        Ambulatory Referral to Home Health ([facility])   As directed      Face to Face Visit Date: 1/8/2025   Follow-up provider for Plan of Care?: I treated the patient in an acute care facility and will not continue treatment after discharge.   Follow-up provider: MARCY PAEZ [[id number]]   Reason/Clinical Findings: mobility below baseline   Describe mobility limitations that make leaving home difficult: does not drive   Nursing/Therapeutic Services Requested: Physical Therapy Occupational Therapy   PT orders: Therapeutic exercise Home safety assessment Strengthening   Occupational orders: Energy conservation Strengthening Home safety assessment Activities of daily living   Frequency: 1 Week 1        Discharge Follow-up with PCP   As directed       Currently Documented PCP:    Marcy Paez PA    PCP Phone Number:    [phone]     Follow Up Details: Jeannine MENDOZA (PCP) at next available appt        Discharge Follow-up with Specified Provider: [facility] on Jan 24th   As directed      To: [facility] on Jan 24th               Contact information for follow-up providers       Marcy Paez PA .    Specialty: Physician Assistant  Why: Jeannine MENDOZA (PCP) at next available appt  Contact information:  [address]  [place] [address]  [phone]                       Contact information for  after-discharge care       Durable Medical Equipment       [facility] .    Service: Durable Medical Equipment  Contact information:  [address]  [facility] [address]  [phone]                     Home Medical Care       [facility]    Service: Home Rehabilitation  Contact information:  [address], [address]  [facility] [id number] [phone]                                   Additional Instructions for the Follow-ups that You Need to Schedule       Ambulatory Referral to Home Health   As directed      Face to Face Visit Date: 1/17/2025   Follow-up provider for Plan of Care?: I treated the patient in an acute care facility and will not continue treatment after discharge.   Follow-up provider: FAN WARD [[id number]]   Reason/Clinical Findings: Pneumonia, HTN, ESRD, chronic diastolic CHF, chronic resp failure with hypoxia   Describe mobility limitations that make leaving home difficult: Requires the assistance of another to leave the home   Nursing/Therapeutic Services Requested: Skilled Nursing Physical Therapy   Skilled nursing orders: Cardiopulmonary assessments CHF management Medication education   PT orders: Therapeutic exercise Strengthening   Frequency: 1 Week 1        Ambulatory Referral to Home Health ([facility])   As directed      Face to Face Visit Date: 1/8/2025   Follow-up provider for Plan of Care?: I treated the patient in an acute care facility and will not continue treatment after discharge.   Follow-up provider: FAN WARD [[id number]]   Reason/Clinical Findings: mobility below baseline   Describe mobility limitations that make leaving home difficult: does not drive   Nursing/Therapeutic Services Requested: Physical Therapy Occupational Therapy   PT orders: Therapeutic exercise Home safety assessment Strengthening   Occupational orders: Energy conservation Strengthening Home safety assessment Activities of daily  living   Frequency: 1 Week 1        Discharge Follow-up with PCP   As directed       Currently Documented PCP:    Marcy Paez PA    PCP Phone Number:    [phone]     Follow Up Details: Jeannine MENDOZA (PCP) at next available appt        Discharge Follow-up with Specified Provider: [facility] on Jan 24th   As directed      To: [facility] on Jan 24th            Time Spent on Discharge:  Greater than 30 minutes      Shaan Baltazar MD  [facility]  01/17/25  10:08 EST

## 2025-01-17 NOTE — PROGRESS NOTES
"Enter Query Response Below      Query Response: Gram negative pneumonia (excluding Haemophilus influenzae)              If applicable, please update the problem list.   Patient: Sonia Acharya        : 1950  Account: [id number]           Admit Date:         How to Respond to this query:       a. Click New Note     b. Answer query within the yellow box.                c. Update the Problem List, if applicable.      If you have any questions about this query contact me at: [email or web address]     Dr. Baltazar,    74 year old female with history of heart failure, admitted with \"Bacterial pneumonia, treating as gram negative\" under active hospital problems and \"Concern for aspiration\" under hospital course per discharge summary.  PRocal: 0.81 ()  WBC: 11.19 ()  Chest X-Ray 1/10: Mild to moderate patchy atelectasis or pneumonia in the left infrahilar region, more prominent than on the 2025 study.  CT abdomen  \" Mild left lower lobe bronchial wall thickening with potential mild adjacent infiltrate.\"  Treatment: IV Zosyn (-) and DuoNeb    Please clarify the type of pneumonia the patient was treated/monitored for:    Gram negative pneumonia (excluding Haemophilus influenzae)  Bacterial pneumonia unspecified  Aspiration pneumonia  Other- specify______    By submitting this query, we are merely seeking further clarification of documentation to accurately reflect all conditions that you are monitoring, evaluating, treating or that extend the hospitalization or utilize additional resources of care. Please utilize your independent clinical judgment when addressing the question(s) above.     This query and your response, once completed, will be entered into the legal medical record.    Sincerely,  Mariajose Ross RN  Clinical Documentation Integrity Program   "

## 2025-01-17 NOTE — PROGRESS NOTES
[facility] Progress Note      Patient Name: Sonia Acharya  : 1950  MRN: [id number]  Primary Care Physician:  Marcy Paez PA  Date of admission: 2025    Subjective     Interval History:   F/u ESRD     Review of Systems:     Doing well overall today.  Denies chest pain or shortness of breath.  Afebrile.  Objective     Vitals:   Temp:  [98.2 °F (36.8 °C)-99.9 °F (37.7 °C)] 98.2 °F (36.8 °C)  Heart Rate:  [] 88  Resp:  [16-20] 20  BP: (125-148)/(53-60) 126/54    Intake/Output Summary (Last 24 hours) at 2025 1433  Last data filed at 2025 1100  Gross per 24 hour   Intake 440 ml   Output --   Net 440 ml         Physical Exam:    General Appearance: alert, comfortable on NC O2  Neck: supple, no JVD  Lungs: CTA bilat no rales  Heart: RRR, normal S1 and S2  Abdomen: soft, nontender, nondistended  Extremities: no edema, cyanosis or clubbing       Scheduled Meds:     [Held by provider] amLODIPine, 5 mg, Oral, Daily  aspirin, 81 mg, Oral, Daily  atorvastatin, 40 mg, Oral, Daily  clopidogrel, 75 mg, Oral, Daily  guaiFENesin, 1,200 mg, Oral, Q12H  hydrALAZINE, 100 mg, Oral, Q8H  ipratropium-albuterol, 3 mL, Nebulization, 4x Daily - RT  isosorbide dinitrate, 30 mg, Oral, Daily  lactulose, 15 mL, Oral, Daily  lidocaine, 10 mL, Infiltration, Once  losartan, 100 mg, Oral, Nightly  methylPREDNISolone acetate, 80 mg, Intra-articular, Once  pantoprazole, 40 mg, Oral, Q AM  senna-docusate sodium, 2 tablet, Oral, BID      IV Meds:        Results Reviewed:   I have personally reviewed the results from the time of this admission to 2025 14:33 EST     Results from last 7 days   Lab Units 25  0648 25  0632 01/15/25  0629   SODIUM mmol/L 134* 134* 133*   POTASSIUM mmol/L 5.2 4.4 4.6   CHLORIDE mmol/L 96* 99 96*   CO2 mmol/L 21.9* 22.0 19.0*   BUN mg/dL 48* 32* 48*   CREATININE mg/dL 6.04* 4.02* 5.38*   CALCIUM mg/dL 9.7 9.2 9.1   BILIRUBIN mg/dL 0.2 0.2 0.2   ALK  PHOS U/L 108 102 90   ALT (SGPT) U/L 12 11 10   AST (SGOT) U/L 22 18 15   GLUCOSE mg/dL 137* 103* 63*     Estimated Creatinine Clearance: 6.3 mL/min (A) (by C-G formula based on SCr of 6.04 mg/dL (H)).  Results from last 7 days   Lab Units 01/17/25  0648 01/16/25  0632 01/15/25  0629   MAGNESIUM mg/dL 2.2 2.6* 2.4   PHOSPHORUS mg/dL 4.4 4.1 4.8*     Results from last 7 days   Lab Units 01/15/25  0629   URIC ACID mg/dL 4.9     Results from last 7 days   Lab Units 01/17/25  0648 01/16/25  0632 01/15/25  0629 01/14/25  1355 01/13/25  1600   WBC 10*3/mm3 14.22* 11.53* 12.53* 15.21* 18.05*   HEMOGLOBIN g/dL 9.4* 9.2* 8.4* 9.1* 9.1*   PLATELETS 10*3/mm3 513* 464* 431 440 371           Assessment / Plan     ASSESSMENT:  ESRD on HD, MWF, via RUE AVF, hyperkalemia per morning labs.  Volume status okay   nausea/vomiting/diarrhea + abdominal pain, CT abdomen/pelvis negative for acute findings, followed by primary team, symptoms have resolved   Altered mental status/hallucination, CT head negative for acute findings.  Improved, followed by primary team.    pneumonia, treated with antibiotics   hypertension with ESRD, BP labile,   HFpEF, volume mgmt via HD   Left knee effusion Ortho recommended conservative management wit    PLAN:  Will continue dialysis on Monday Wednesday and Friday.  Plan for dialysis  today per schedule  Okay to discharge from nephrology standpoint after dialysis  Surveillance labs    Micheline Torrez MD  01/17/25  14:33 [facility]    [facility]  [phone]

## 2025-01-17 NOTE — PLAN OF CARE
Goal Outcome Evaluation:  Plan of Care Reviewed With: patient        Progress: no change     Occasional nonproductive cough. Denies chest pain or shortness of air. Rested well at long intervals.

## 2025-01-18 NOTE — OUTREACH NOTE
Prep Survey      Flowsheet Row Responses   [facility] facility patient discharged from? [place]   Is LACE score < 7 ? No   Eligibility Readm Mgmt   Discharge diagnosis Bacterial pneumonia, treating as gram negative, ESRD on HD   Does the patient have one of the following disease processes/diagnoses(primary or secondary)? Pneumonia   Does the patient have Home health ordered? Yes   What is the Home health agency?  [facility]   Is there a DME ordered? Yes   What DME was ordered? O2 2L [place]   Prep survey completed? Yes            Vanessa FORTUNE - Registered Nurse

## 2025-01-20 NOTE — CASE MANAGEMENT/SOCIAL WORK
Case Management Discharge Note      Final Note: home with HH    Provided Post Acute Provider List?: Yes  Post Acute Provider List: Home Health    Selected Continued Care - Discharged on 1/17/2025 Admission date: 1/6/2025 - Discharge disposition: Home-Health Care Svc      Destination    No services have been selected for the patient.                Durable Medical Equipment       Service Provider Services Address Phone Fax Patient Preferred    [facility] [address] [facility] [address] [phone] --              Dialysis/Infusion    No services have been selected for the patient.                Home Medical Care       Service Provider Services Address Phone Fax Patient Preferred    [facility] [address], [address], [place] [facility] [address] [phone] --              Therapy    No services have been selected for the patient.                Community Resources    No services have been selected for the patient.                Community & DME    No services have been selected for the patient.                    Transportation Services  Private: Car    Final Discharge Disposition Code: 06 - home with home health care

## 2025-01-22 ENCOUNTER — READMISSION MANAGEMENT (OUTPATIENT)
Dept: CALL CENTER | Facility: HOSPITAL | Age: 75
End: 2025-01-22
Payer: MEDICARE

## 2025-01-22 NOTE — OUTREACH NOTE
COPD/PN Week 1 Survey      Flowsheet Row Responses   [facility] patient discharged from? [facility]   Does the patient have one of the following disease processes/diagnoses(primary or secondary)? Pneumonia   Week 1 attempt successful? Yes   Call start time 1339   Call end time 1345   Discharge diagnosis Bacterial pneumonia, treating as gram negative, ESRD on HD   Person spoke with today (if not patient) and relationship Naty Acharya, [place]   Medication alerts for this patient Lactulose, Protonix   Meds reviewed with patient/caregiver? Yes   Is the patient having any side effects they believe may be caused by any medication additions or changes? No   Does the patient have all medications ordered at discharge? Yes   Prescription comments No concerns or questions   Is the patient taking all medications as directed (includes completed medication regime)? Yes   Comments regarding appointments Cardiologist f/u appt on 1/24/25   Does the patient have a primary care provider?  Yes   Does the patient have an appointment with their PCP or specialist within 7 days of discharge? No   Comments regarding PCP PCP--KELLY Mcdonnell   Nursing Interventions Advised patient to make appointment   Has the patient kept scheduled appointments due by today? N/A   What is the [facility] agency[facility]    Has home health visited the patient within 72 hours of discharge? Call prior to 72 hours   Home health comments Daughter states HH contacted her and will be calling today to set up an appt to visit.   Pulse Ox monitoring None   Psychosocial issues? Yes   Nursing interventions Other  [send a message to  Case Management per daughter's request.]   Psychosocial comments Daughter states she needs assistance in care for the patient. She needs a caregiver in the home on Tuesdays and Thursdays. Daughter would like CM to contact her to see if patient qualifies for any assistance.   Comments ESRD--HD on MWF at [facility]   Did the patient  receive a copy of their discharge instructions? Yes   Nursing interventions Reviewed instructions with patient  [with daughter]   What is the patient's perception of their health status since discharge? Improving   If the patient is a current smoker, are they able to teach back resources for cessation? Not a smoker   Is the patient/caregiver able to teach back the hierarchy of who to call/visit for symptoms/problems? PCP, Specialist, Home health nurse, Urgent Care, ED, 911 Yes   Is the patient/caregiver able to teach back signs and symptoms of worsening condition: Fever/chills, Shortness of breath, Chest pain   Is the patient/caregiver able to teach back importance of completing antibiotic course of treatment? Yes   Week 1 call completed? Yes   Graduated Yes   Is the patient interested in additional calls from an ambulatory ? Yes   What is the reason the patient needs support from an ACM? Caregiving/Support   Would this patient benefit from a Referral to Amb Social Work? Yes   Reason for Social Work Referral Caregiving/Support   Wrap up additional comments Daughter states patient is doing better. No needs other than caregiving support. Referral sent to  and Social Work.   Call end time 1345            Kimberlyn MACHADO - Registered Nurse

## 2025-01-23 ENCOUNTER — PATIENT OUTREACH (OUTPATIENT)
Dept: CASE MANAGEMENT | Facility: OTHER | Age: 75
End: 2025-01-23
Payer: MEDICARE

## 2025-01-23 NOTE — OUTREACH NOTE
AMBULATORY CASE MANAGEMENT NOTE    Names and Relationships of Patient/Support Persons: Contact: Naty Acharya; Relationship: Emergency Contact -     Patient Outreach    Attempted outreach to patients number x2. Number listed is for a voice mailbox requiring code. Outreach to Daughter Naty for phone number. She is with her mother and states they are all sleeping due to a death in the family. Offered contact to start next week and she would prefer that. Informed her I would ask the  to also delay until next week and she would like that as well. FARHEEN Smith notified.         Rowena AYON  Ambulatory Case Management    1/23/2025, 15:15 EST

## 2025-01-26 ENCOUNTER — APPOINTMENT (OUTPATIENT)
Dept: CT IMAGING | Facility: HOSPITAL | Age: 75
End: 2025-01-26
Payer: MEDICARE

## 2025-01-26 ENCOUNTER — HOSPITAL ENCOUNTER (EMERGENCY)
Facility: HOSPITAL | Age: 75
Discharge: HOME | End: 2025-01-26
Attending: EMERGENCY MEDICINE | Admitting: EMERGENCY MEDICINE
Payer: MEDICARE

## 2025-01-26 VITALS
DIASTOLIC BLOOD PRESSURE: 82 MMHG | HEART RATE: 66 BPM | WEIGHT: 109 LBS | BODY MASS INDEX: 21.4 KG/M2 | TEMPERATURE: 98.1 F | RESPIRATION RATE: 16 BRPM | HEIGHT: 60 IN | OXYGEN SATURATION: 99 % | SYSTOLIC BLOOD PRESSURE: 172 MMHG

## 2025-01-26 DIAGNOSIS — S20.212A RIB CONTUSION, LEFT, INITIAL ENCOUNTER: Primary | ICD-10-CM

## 2025-01-26 PROCEDURE — 71250 CT THORAX DX C-: CPT

## 2025-01-26 PROCEDURE — 99284 EMERGENCY DEPT VISIT MOD MDM: CPT

## 2025-01-26 RX ORDER — OXYCODONE AND ACETAMINOPHEN 5; 325 MG/1; MG/1
1 TABLET ORAL ONCE
Status: COMPLETED | OUTPATIENT
Start: 2025-01-26 | End: 2025-01-26

## 2025-01-26 RX ORDER — LIDOCAINE 4 G/G
1 PATCH TOPICAL ONCE
Status: DISCONTINUED | OUTPATIENT
Start: 2025-01-26 | End: 2025-01-26 | Stop reason: HOSPADM

## 2025-01-26 RX ORDER — LIDOCAINE 4 G/G
1 PATCH TOPICAL ONCE
Status: DISCONTINUED | OUTPATIENT
Start: 2025-01-26 | End: 2025-01-26

## 2025-01-26 RX ORDER — METHOCARBAMOL 750 MG/1
750 TABLET, FILM COATED ORAL ONCE
Status: COMPLETED | OUTPATIENT
Start: 2025-01-26 | End: 2025-01-26

## 2025-01-26 RX ORDER — LIDOCAINE 50 MG/G
1 PATCH TOPICAL EVERY 24 HOURS
Qty: 6 EACH | Refills: 0 | Status: SHIPPED | OUTPATIENT
Start: 2025-01-26

## 2025-01-26 RX ADMIN — METHOCARBAMOL TABLETS 750 MG: 750 TABLET, COATED ORAL at 10:37

## 2025-01-26 RX ADMIN — LIDOCAINE 1 PATCH: 4 PATCH TOPICAL at 10:37

## 2025-01-26 RX ADMIN — OXYCODONE HYDROCHLORIDE AND ACETAMINOPHEN 1 TABLET: 5; 325 TABLET ORAL at 10:37

## 2025-01-26 NOTE — ED NOTES
Patient to ed via ems from home. Patient fell out of bed and complaining of left middle-lower back pain. 10/10 pain level. Patient is resting comfortably on stretcher at time of triage.

## 2025-01-26 NOTE — ED PROVIDER NOTES
EMERGENCY DEPARTMENT MD ATTESTATION NOTE    Room Number:  17/17  PCP: Marcy Paez PA  Independent Historians: Patient and EMS    HPI:  A complete HPI/ROS/PMH/PSH/SH/FH are unobtainable due to: None    Chronic or social conditions impacting patient care (Social Determinants of Health): None      Context: Sonia Acharya is a 74 y.o. female with a medical history of hypertension, chronic kidney disease,  coronary artery disease who presents to the ED c/o acute back pain.  The patient reports she was sitting on the side of the bed and she slid down.  She reports she injured her left back.  She denies shortness of breath.  She denies nausea or vomiting.  She denies hitting her head.  She denies syncope or near syncope.        Review of prior external notes (non-ED) -and- Review of prior external test results outside of this encounter:  Cardiology note dated 1/15/2025 notes no cardiac murmur.    Prescription drug monitoring program review: RADHA reviewed by Varun Daniel MD         PHYSICAL EXAM    I have reviewed the triage vital signs and nursing notes.    ED Triage Vitals [01/26/25 1010]   Temp Heart Rate Resp BP SpO2   98.1 °F (36.7 °C) 70 16 168/75 99 %      Temp src Heart Rate Source Patient Position BP Location FiO2 (%)   Tympanic Monitor Lying -- --       Physical Exam  GENERAL: Awake, alert, no acute distress  SKIN: Warm, dry  HENT: Normocephalic, atraumatic  EYES: no scleral icterus  CV: regular rhythm, regular rate  RESPIRATORY: normal effort, lungs clear.  Tenderness to the left posterior lateral lower ribs.  No bruising.  ABDOMEN: soft, nontender, nondistended  MUSCULOSKELETAL: no deformity.  Dialysis access with thrill.  NEURO: alert, moves all extremities, follows commands            MEDICATIONS GIVEN IN ER  Medications   Lidocaine 4 % 1 patch (1 patch Transdermal Medication Applied 1/26/25 1037)   methocarbamol (ROBAXIN) tablet 750 mg (750 mg Oral Given 1/26/25 1037)   oxyCODONE-acetaminophen  (PERCOCET) 5-325 MG per tablet 1 tablet (1 tablet Oral Given 1/26/25 1037)         ORDERS PLACED DURING THIS VISIT:  Orders Placed This Encounter   Procedures    CT Chest Without Contrast Diagnostic    Ambulate patient         PROCEDURES  Procedures            PROGRESS, DATA ANALYSIS, CONSULTS, AND MEDICAL DECISION MAKING  All labs have been independently interpreted by me.  All radiology studies have been reviewed by me. All EKG's have been independently viewed and interpreted by me.  Discussion below represents my analysis of pertinent findings related to patient's condition, differential diagnosis, treatment plan and final disposition.    Differential diagnosis includes but is not limited to rib fracture, rib contusion, syncope, pneumothorax, kidney laceration.    Clinical Scores:                                     ED Course as of 01/26/25 1405   Sun Jan 26, 2025   1154 CT Chest Without Contrast Diagnostic  My independent interpretation of the imaging study is no pneumothorax [TR]   1344 I reviewed the workup and findings with the patient.  She has no evidence of fracture or injury on imaging.  She reports persistent pain.  She does not know when she last went to dialysis.  She is Monday Wednesday Friday.  I attempted to call her daughter for more information but there was no answer. [TR]   1349 I discussed with the daughter by phone.  She states the patient went to dialysis on Friday.  She had a mechanical fall out of bed.  There is no evidence of other injury.  Plan to ambulate her and if she does well discharge home. [TR]   1404 The patient was able to ambulate with a walker without difficulty.  Plan discharge home. [TR]      ED Course User Index  [TR] Varun Daniel MD       MDM: The patient overall appears comfortable and well-appearing.  Plan CT of the chest to evaluate for rib injury.  She has no midline thoracic or lumbar tenderness to suggest spine fracture.  There is no syncope.  She denies any other  injury.  She did not hit her head.      COMPLEXITY OF CARE  Admission was considered but after careful review of the patient's presentation, physical examination, diagnostic results, and response to treatment the patient may be safely discharged with outpatient follow-up.    Please note that portions of this document were completed with a voice recognition program.    Note Disclaimer: At [facility], we believe that sharing information builds trust and better relationships. You are receiving this note because you recently visited [facility]. It is possible you will see health information before a provider has talked with you about it. This kind of information can be easy to misunderstand. To help you fully understand what it means for your health, we urge you to discuss this note with your provider.         Varun Daniel MD  01/26/25 4139

## 2025-01-26 NOTE — ED PROVIDER NOTES
EMERGENCY DEPARTMENT ENCOUNTER      Room Number:  17/17  PCP: Marcy Paez PA  Independent Historians: Patient  Patient Care Team:  Marcy Paez PA as PCP - General (Physician Assistant)  Rowena Lutz, FERCHO as Ambulatory  ([facility])  Sarah Clemente MSW as  (Amb Case Mgmt) ([facility])       HPI:  Chief Complaint: Back pain    A complete HPI/ROS/PMH/PSH/SH/FH are unobtainable due to: None    Chronic or social conditions impacting patient care (Social Determinants of Health): None      Context: Sonia Acharya is a 74 y.o. female with a PMH significant for ESRD on dialysis, congestive heart failure, aortic stenosis, CAD who presents to the ED c/o acute left-sided upper back pain since a fall from bed today.  The patient reports that she was sleeping and remembers falling off the side of her bed and onto the floor onto her back.  No head injury or neck injury.  She has not had any medications to alleviate symptoms prior to arrival.  No shortness of breath, abdominal pain, chest pain.  She does not take blood thinners.      Upon review of prior external notes (non-ED) -and- Review of prior external test results outside of this encounter it appears the patient was evaluated in the office with cardiology for CAD on January 24, 2025.  The patient had a normal uric acid on 1/15/2025 and a normal phosphorus on 1/16/2025.      PAST MEDICAL HISTORY  Active Ambulatory Problems     Diagnosis Date Noted    Generalized abdominal pain 12/13/2021    ODALIS (acute kidney injury) 12/13/2021    Anemia, chronic disease 12/13/2021    Metabolic acidosis, increased anion gap 12/13/2021    Obesity (BMI 30-39.9) 12/13/2021    HTN (hypertension) 12/13/2021    Chest pain, atypical 12/13/2021    Cervical radiculopathy 01/16/2023    ESRD on dialysis 01/16/2023    Bradycardia 01/16/2023    Right arm pain 01/16/2023    ANGIE (obstructive sleep apnea) 01/16/2023    Chronic diastolic CHF (congestive  heart failure) 01/16/2023    Aortic stenosis 01/16/2023    Pancytopenia 01/18/2023    Hyperphosphatemia 01/18/2023    Hyperkalemia 12/20/2023    Pneumonia 12/20/2023    Leukocytopenia 12/22/2023    Anemia, chronic disease 12/22/2023    ESRD (end stage renal disease) on dialysis 01/06/2025    Bacterial pneumonia, treating as gram negative 01/07/2025    Chronic respiratory failure with hypoxia 01/07/2025    CAD (coronary artery disease) 01/07/2025    Nausea, vomiting, and diarrhea 01/07/2025    Severe protein-calorie malnutrition 01/09/2025     Resolved Ambulatory Problems     Diagnosis Date Noted    No Resolved Ambulatory Problems     Past Medical History:   Diagnosis Date    Arthritis     Chronic kidney disease     Heart disease     Hypertension          PAST SURGICAL HISTORY  Past Surgical History:   Procedure Laterality Date    BREAST SURGERY      DIALYSIS FISTULA CREATION      EYE SURGERY      HERNIA REPAIR  2017    Dr. Sung         FAMILY HISTORY  Family History   Problem Relation Age of Onset    Heart disease Father     Breast cancer Sister          SOCIAL HISTORY  Social History     Socioeconomic History    Marital status: Single   Tobacco Use    Smoking status: Never     Passive exposure: Never    Smokeless tobacco: Never   Vaping Use    Vaping status: Never Used   Substance and Sexual Activity    Alcohol use: No    Drug use: No    Sexual activity: Defer         ALLERGIES  Patient has no known allergies.      REVIEW OF SYSTEMS  Included in HPI  All systems reviewed and negative except for those discussed in HPI.      PHYSICAL EXAM    I have reviewed the triage vital signs and nursing notes.    ED Triage Vitals [01/26/25 1010]   Temp Heart Rate Resp BP SpO2   98.1 °F (36.7 °C) 70 16 168/75 99 %      Temp src Heart Rate Source Patient Position BP Location FiO2 (%)   Tympanic Monitor Lying -- --       Physical Exam  Constitutional:       General: She is not in acute distress.     Appearance: She is  well-developed.   HENT:      Head: Normocephalic and atraumatic.   Eyes:      General: No scleral icterus.     Conjunctiva/sclera: Conjunctivae normal.   Neck:      Trachea: No tracheal deviation.   Cardiovascular:      Rate and Rhythm: Normal rate and regular rhythm.      Comments: Dialysis access to the right upper extremity with positive thrill  Pulmonary:      Effort: Pulmonary effort is normal.      Breath sounds: Normal breath sounds.       Abdominal:      Palpations: Abdomen is soft.      Tenderness: There is no abdominal tenderness.   Musculoskeletal:         General: No deformity.      Cervical back: Normal range of motion.   Lymphadenopathy:      Cervical: No cervical adenopathy.   Skin:     General: Skin is warm and dry.   Neurological:      Mental Status: She is alert and oriented to person, place, and time.   Psychiatric:         Behavior: Behavior normal.         Vital signs and nursing notes reviewed.      PPE: I wore a surgical mask throughout my encounters with the pt. I performed hand hygiene on entry into the pt room and upon exit.     LAB RESULTS  No results found for this or any previous visit (from the past 24 hours).      RADIOLOGY  No Radiology Exams Resulted Within Past 24 Hours      MEDICATIONS GIVEN IN ER  Medications   methocarbamol (ROBAXIN) tablet 750 mg (750 mg Oral Given 1/26/25 1037)   oxyCODONE-acetaminophen (PERCOCET) 5-325 MG per tablet 1 tablet (1 tablet Oral Given 1/26/25 1037)         ORDERS PLACED DURING THIS VISIT:  Orders Placed This Encounter   Procedures    CT Chest Without Contrast Diagnostic    Ambulate patient         OUTPATIENT MEDICATION MANAGEMENT:  No current Epic-ordered facility-administered medications on file.     Current Outpatient Medications Ordered in Epic   Medication Sig Dispense Refill    aspirin 81 MG EC tablet Take 1 tablet by mouth Daily.      atorvastatin (LIPITOR) 40 MG tablet Take 1 tablet by mouth Daily.      calcium carbonate (TUMS) 500 MG  chewable tablet Chew 1 tablet Every 6 (Six) Hours As Needed for Indigestion or Heartburn.      clopidogrel (PLAVIX) 75 MG tablet Take 1 tablet by mouth Daily.      hydrALAZINE (APRESOLINE) 25 MG tablet Take 4 tablets by mouth 3 (Three) Times a Day.      isosorbide dinitrate (ISORDIL) 30 MG tablet Take 1 tablet by mouth Daily.      lactulose (CHRONULAC) 10 GM/15ML solution Take 15 mL by mouth Daily As Needed (constipation).      lidocaine (LIDODERM) 5 % Place 1 patch on the skin as directed by provider Daily. Remove & Discard patch within 12 hours or as directed by MD 6 each 0    losartan (COZAAR) 100 MG tablet Take 1 tablet by mouth Daily. 30 tablet 0    pantoprazole (PROTONIX) 40 MG EC tablet Take 1 tablet by mouth 2 (Two) Times a Day Before Meals. 30 tablet 0             PROGRESS, DATA ANALYSIS, CONSULTS, AND MEDICAL DECISION MAKING  All labs have been independently interpreted by me.  All radiology studies have been reviewed by me. All EKG's have been independently viewed and interpreted by me.  Discussion below represents my analysis of pertinent findings related to patient's condition, differential diagnosis, treatment plan and final disposition.    Patient presentation and evaluation most consistent with posterior rib contusion.  No concerning findings on today's evaluation to indicate the need for further workup or admission.      DIFFERENTIAL DIAGNOSIS INCLUDE BUT NOT LIMITED TO:     Chest contusion, rib fracture, intercostal strain    Clinical Scores: N/A      ED Course as of 01/28/25 0604   Sun Jan 26, 2025   1154 CT Chest Without Contrast Diagnostic  My independent interpretation of the imaging study is no pneumothorax [TR]   1344 I reviewed the workup and findings with the patient.  She has no evidence of fracture or injury on imaging.  She reports persistent pain.  She does not know when she last went to dialysis.  She is Monday Wednesday Friday.  I attempted to call her daughter for more information  but there was no answer. [TR]   1349 I discussed with the daughter by phone.  She states the patient went to dialysis on Friday.  She had a mechanical fall out of bed.  There is no evidence of other injury.  Plan to ambulate her and if she does well discharge home. [TR]   1404 The patient was able to ambulate with a walker without difficulty.  Plan discharge home. [TR]      ED Course User Index  [TR] Varun Daniel MD         I rechecked the patient.  I discussed the patient's radiology findings (including all incidental findings), diagnosis, and plan for discharge.  A repeat exam reveals no new worrisome changes from my initial exam findings.  The patient understands that the fact that they are being discharged does not denote that nothing is abnormal, it indicates that no clinical emergency is present and that they must follow-up as directed in order to properly maintain their health.  Follow-up instructions (specifically listed below) and return to ER precautions were given at this time.  I specifically instructed the patient to follow-up with their PCP.  The patient understands and agrees with the plan, and is ready for discharge.  All questions answered.        AS OF 06:04 EST VITALS:    BP - 172/82  HR - 66  TEMP - 98.1 °F (36.7 °C) (Tympanic)  O2 SATS - 99%    COMPLEXITY OF CARE  Admission was considered but after careful review of the patient's presentation, physical examination, diagnostic results, and response to treatment the patient may be safely discharged with outpatient follow-up.      DIAGNOSIS  Final diagnoses:   Rib contusion, left, initial encounter         DISPOSITION  ED Disposition       ED Disposition   Discharge    Condition   Stable    Comment   --                Please note that portions of this document were completed with a voice recognition program.    Note Disclaimer: At [facility], we believe that sharing information builds trust and better relationships. You are receiving this  note because you recently visited [facility]. It is possible you will see health information before a provider has talked with you about it. This kind of information can be easy to misunderstand. To help you fully understand what it means for your health, we urge you to discuss this note with your provider.         Marcio Aponte PA  01/28/25 0605

## 2025-01-26 NOTE — DISCHARGE INSTRUCTIONS
Use Tylenol for pain.  Use the Lidoderm patches for pain.  Return immediately for any further falls.  Return immediately for any shortness of breath or passing out.  
none

## 2025-01-27 ENCOUNTER — PATIENT OUTREACH (OUTPATIENT)
Age: 75
End: 2025-01-27
Payer: MEDICARE

## 2025-01-27 NOTE — OUTREACH NOTE
MSW received referral from primary care providers office for assistance with community resources. MSW outreach to patient's daughter by phone and left voicemail and call back number. MSW will continue to attempt outreach.    Sarah HIDALGO -   Ambulatory Case Management    1/27/2025, 15:14 EST

## 2025-01-31 ENCOUNTER — APPOINTMENT (OUTPATIENT)
Dept: GENERAL RADIOLOGY | Facility: HOSPITAL | Age: 75
End: 2025-01-31
Payer: MEDICARE

## 2025-01-31 ENCOUNTER — HOSPITAL ENCOUNTER (EMERGENCY)
Facility: HOSPITAL | Age: 75
Discharge: HOME | End: 2025-01-31
Attending: EMERGENCY MEDICINE
Payer: MEDICARE

## 2025-01-31 VITALS
OXYGEN SATURATION: 96 % | HEART RATE: 56 BPM | DIASTOLIC BLOOD PRESSURE: 81 MMHG | RESPIRATION RATE: 18 BRPM | SYSTOLIC BLOOD PRESSURE: 196 MMHG | TEMPERATURE: 98 F

## 2025-01-31 DIAGNOSIS — E16.2 HYPOGLYCEMIA: Primary | ICD-10-CM

## 2025-01-31 DIAGNOSIS — Z99.2 END STAGE RENAL DISEASE ON DIALYSIS: ICD-10-CM

## 2025-01-31 DIAGNOSIS — N18.6 END STAGE RENAL DISEASE ON DIALYSIS: ICD-10-CM

## 2025-01-31 DIAGNOSIS — E87.5 HYPERKALEMIA: ICD-10-CM

## 2025-01-31 DIAGNOSIS — E87.20 METABOLIC ACIDOSIS: ICD-10-CM

## 2025-01-31 LAB
ALBUMIN SERPL-MCNC: 3.7 G/DL (ref 3.5–5.2)
ALBUMIN/GLOB SERPL: 1.1 G/DL
ALP SERPL-CCNC: 118 U/L (ref 39–117)
ALT SERPL W P-5'-P-CCNC: 13 U/L (ref 1–33)
ANION GAP SERPL CALCULATED.3IONS-SCNC: 28 MMOL/L (ref 5–15)
AST SERPL-CCNC: 27 U/L (ref 1–32)
BASOPHILS # BLD AUTO: 0.02 10*3/MM3 (ref 0–0.2)
BASOPHILS NFR BLD AUTO: 0.2 % (ref 0–1.5)
BILIRUB SERPL-MCNC: 0.3 MG/DL (ref 0–1.2)
BUN SERPL-MCNC: 52 MG/DL (ref 8–23)
BUN/CREAT SERPL: 7.1 (ref 7–25)
CALCIUM SPEC-SCNC: 10.4 MG/DL (ref 8.6–10.5)
CHLORIDE SERPL-SCNC: 93 MMOL/L (ref 98–107)
CO2 SERPL-SCNC: 16 MMOL/L (ref 22–29)
CREAT SERPL-MCNC: 7.3 MG/DL (ref 0.57–1)
DEPRECATED RDW RBC AUTO: 53.5 FL (ref 37–54)
EGFRCR SERPLBLD CKD-EPI 2021: 5.4 ML/MIN/1.73
EOSINOPHIL # BLD AUTO: 0.01 10*3/MM3 (ref 0–0.4)
EOSINOPHIL NFR BLD AUTO: 0.1 % (ref 0.3–6.2)
ERYTHROCYTE [DISTWIDTH] IN BLOOD BY AUTOMATED COUNT: 16.1 % (ref 12.3–15.4)
GLOBULIN UR ELPH-MCNC: 3.5 GM/DL
GLUCOSE BLDC GLUCOMTR-MCNC: 153 MG/DL (ref 70–130)
GLUCOSE BLDC GLUCOMTR-MCNC: 178 MG/DL (ref 70–130)
GLUCOSE SERPL-MCNC: 154 MG/DL (ref 65–99)
HCT VFR BLD AUTO: 26.4 % (ref 34–46.6)
HGB BLD-MCNC: 8 G/DL (ref 12–15.9)
IMM GRANULOCYTES # BLD AUTO: 0.11 10*3/MM3 (ref 0–0.05)
IMM GRANULOCYTES NFR BLD AUTO: 1.1 % (ref 0–0.5)
LYMPHOCYTES # BLD AUTO: 0.5 10*3/MM3 (ref 0.7–3.1)
LYMPHOCYTES NFR BLD AUTO: 5 % (ref 19.6–45.3)
MCH RBC QN AUTO: 27.5 PG (ref 26.6–33)
MCHC RBC AUTO-ENTMCNC: 30.3 G/DL (ref 31.5–35.7)
MCV RBC AUTO: 90.7 FL (ref 79–97)
MONOCYTES # BLD AUTO: 0.24 10*3/MM3 (ref 0.1–0.9)
MONOCYTES NFR BLD AUTO: 2.4 % (ref 5–12)
NEUTROPHILS NFR BLD AUTO: 9.03 10*3/MM3 (ref 1.7–7)
NEUTROPHILS NFR BLD AUTO: 91.2 % (ref 42.7–76)
NRBC BLD AUTO-RTO: 0 /100 WBC (ref 0–0.2)
PLATELET # BLD AUTO: 361 10*3/MM3 (ref 140–450)
PMV BLD AUTO: 9.4 FL (ref 6–12)
POTASSIUM SERPL-SCNC: 6.3 MMOL/L (ref 3.5–5.2)
PROT SERPL-MCNC: 7.2 G/DL (ref 6–8.5)
RBC # BLD AUTO: 2.91 10*6/MM3 (ref 3.77–5.28)
SODIUM SERPL-SCNC: 137 MMOL/L (ref 136–145)
WBC NRBC COR # BLD AUTO: 9.91 10*3/MM3 (ref 3.4–10.8)

## 2025-01-31 PROCEDURE — 36415 COLL VENOUS BLD VENIPUNCTURE: CPT

## 2025-01-31 PROCEDURE — 99284 EMERGENCY DEPT VISIT MOD MDM: CPT

## 2025-01-31 PROCEDURE — 82948 REAGENT STRIP/BLOOD GLUCOSE: CPT

## 2025-01-31 PROCEDURE — 71045 X-RAY EXAM CHEST 1 VIEW: CPT

## 2025-01-31 PROCEDURE — 80053 COMPREHEN METABOLIC PANEL: CPT | Performed by: EMERGENCY MEDICINE

## 2025-01-31 PROCEDURE — 93005 ELECTROCARDIOGRAM TRACING: CPT | Performed by: EMERGENCY MEDICINE

## 2025-01-31 PROCEDURE — 96374 THER/PROPH/DIAG INJ IV PUSH: CPT

## 2025-01-31 PROCEDURE — 85025 COMPLETE CBC W/AUTO DIFF WBC: CPT | Performed by: EMERGENCY MEDICINE

## 2025-01-31 RX ORDER — DEXTROSE MONOHYDRATE 25 G/50ML
25 INJECTION, SOLUTION INTRAVENOUS ONCE
Status: COMPLETED | OUTPATIENT
Start: 2025-01-31 | End: 2025-01-31

## 2025-01-31 RX ADMIN — DEXTROSE MONOHYDRATE 25 G: 25 INJECTION, SOLUTION INTRAVENOUS at 12:26

## 2025-01-31 NOTE — ED PROVIDER NOTES
EMERGENCY DEPARTMENT ENCOUNTER  Room Number:  34/34  PCP: Marcy Paez PA  Independent Historians: Patient, EMS who brought patient, daughter at bedside      HPI:  Chief Complaint: had concerns including Hypoglycemia.  Cough, decreased appetite    A complete HPI/ROS/PMH/PSH/SH/FH are unobtainable due to:   Chronic or social conditions impacting patient care (Social Determinants of Health):       Context: Sonia Acharya is a 74 y.o. female with a medical history of end-stage renal disease on dialysis, coronary artery disease who presents to the ED c/o acute cough, shortness of breath.  Patient was coughing and short of breath so they called EMS.  Patient was found to have low glucose and route.  Patient was treated with an amp of D50 prior to my arrival.  Currently she is feeling better and denies any shortness of breath.  She does report dry cough ongoing over the last 2 days.  Denies recent fever.  Patient does get dialysis on  and Friday but missed dialysis on Wednesday secondary to a .  She did not go to dialysis today because they called EMS.  Patient does report poor appetite and has not had anything to eat today.  She states she ate last night but daughter does not think she ate very much.  Patient does not have a history of diabetes and is on no antihyperglycemic medications      Review of prior external notes (non-ED) -and- Review of prior external test results outside of this encounter:   I reviewed prior medical records note patient was hospitalized earlier this month with left lower lobe pneumonia.  Chronic conditions include end-stage renal disease, heart failure, coronary artery disease      Prescription drug monitoring program review:         PAST MEDICAL HISTORY  Active Ambulatory Problems     Diagnosis Date Noted    Generalized abdominal pain 2021    ODALIS (acute kidney injury) 2021    Anemia, chronic disease 2021    Metabolic acidosis, increased anion gap  12/13/2021    Obesity (BMI 30-39.9) 12/13/2021    HTN (hypertension) 12/13/2021    Chest pain, atypical 12/13/2021    Cervical radiculopathy 01/16/2023    ESRD on dialysis 01/16/2023    Bradycardia 01/16/2023    Right arm pain 01/16/2023    ANGIE (obstructive sleep apnea) 01/16/2023    Chronic diastolic CHF (congestive heart failure) 01/16/2023    Aortic stenosis 01/16/2023    Pancytopenia 01/18/2023    Hyperphosphatemia 01/18/2023    Hyperkalemia 12/20/2023    Pneumonia 12/20/2023    Leukocytopenia 12/22/2023    Anemia, chronic disease 12/22/2023    ESRD (end stage renal disease) on dialysis 01/06/2025    Bacterial pneumonia, treating as gram negative 01/07/2025    Chronic respiratory failure with hypoxia 01/07/2025    CAD (coronary artery disease) 01/07/2025    Nausea, vomiting, and diarrhea 01/07/2025    Severe protein-calorie malnutrition 01/09/2025     Resolved Ambulatory Problems     Diagnosis Date Noted    No Resolved Ambulatory Problems     Past Medical History:   Diagnosis Date    Arthritis     Chronic kidney disease     Heart disease     Hypertension          PAST SURGICAL HISTORY  Past Surgical History:   Procedure Laterality Date    BREAST SURGERY      DIALYSIS FISTULA CREATION      EYE SURGERY      HERNIA REPAIR  2017    Dr. Sung         FAMILY HISTORY  Family History   Problem Relation Age of Onset    Heart disease Father     Breast cancer Sister          SOCIAL HISTORY  Social History     Socioeconomic History    Marital status: Single   Tobacco Use    Smoking status: Never     Passive exposure: Never    Smokeless tobacco: Never   Vaping Use    Vaping status: Never Used   Substance and Sexual Activity    Alcohol use: No    Drug use: No    Sexual activity: Defer         ALLERGIES  Patient has no known allergies.      REVIEW OF SYSTEMS  Review of Systems   Constitutional:  Positive for appetite change and fatigue. Negative for fever.   Respiratory:  Positive for cough and shortness of breath.     Cardiovascular:  Negative for chest pain.   Musculoskeletal:  Positive for arthralgias (Chronic left knee pain).   All other systems reviewed and are negative.    Included in HPI  All systems reviewed and negative except for those discussed in HPI.      PHYSICAL EXAM    I have reviewed the triage vital signs and nursing notes.    ED Triage Vitals [01/31/25 1219]   Temp Heart Rate Resp BP SpO2   98 °F (36.7 °C) 72 18 (!) 162/108 98 %      Temp src Heart Rate Source Patient Position BP Location FiO2 (%)   Tympanic Monitor Sitting Left arm --       Physical Exam  GENERAL: Alert well-appearing female no obvious distress.  Triage vitals reviewed notable for temperature 98.  Heart rate 72, blood pressure 162/108.  O2 sats 98% on room air  SKIN: Warm, dry  HENT: Normocephalic, atraumatic  EYES: no scleral icterus  CV: regular rhythm, regular rate-mild systolic murmur  RESPIRATORY: normal effort, lungs clear-O2 sats upper 90s room air  ABDOMEN: soft, nontender, nondistended  MUSCULOSKELETAL: no deformity  NEURO: alert, moves all extremities, follows commands      LAB RESULTS  Recent Results (from the past 24 hours)   POC Glucose Once    Collection Time: 01/31/25 12:36 PM    Specimen: Blood   Result Value Ref Range    Glucose 153 (H) 70 - 130 mg/dL   POC Glucose Once    Collection Time: 01/31/25  1:35 PM    Specimen: Blood   Result Value Ref Range    Glucose 178 (H) 70 - 130 mg/dL   Comprehensive Metabolic Panel    Collection Time: 01/31/25  1:37 PM    Specimen: Blood   Result Value Ref Range    Glucose 154 (H) 65 - 99 mg/dL    BUN 52 (H) 8 - 23 mg/dL    Creatinine 7.30 (H) 0.57 - 1.00 mg/dL    Sodium 137 136 - 145 mmol/L    Potassium 6.3 (C) 3.5 - 5.2 mmol/L    Chloride 93 (L) 98 - 107 mmol/L    CO2 16.0 (L) 22.0 - 29.0 mmol/L    Calcium 10.4 8.6 - 10.5 mg/dL    Total Protein 7.2 6.0 - 8.5 g/dL    Albumin 3.7 3.5 - 5.2 g/dL    ALT (SGPT) 13 1 - 33 U/L    AST (SGOT) 27 1 - 32 U/L    Alkaline Phosphatase 118 (H) 39 - 117  U/L    Total Bilirubin 0.3 0.0 - 1.2 mg/dL    Globulin 3.5 gm/dL    A/G Ratio 1.1 g/dL    BUN/Creatinine Ratio 7.1 7.0 - 25.0    Anion Gap 28.0 (H) 5.0 - 15.0 mmol/L    eGFR 5.4 (L) >60.0 mL/min/1.73   CBC Auto Differential    Collection Time: 01/31/25  1:37 PM    Specimen: Blood   Result Value Ref Range    WBC 9.91 3.40 - 10.80 10*3/mm3    RBC 2.91 (L) 3.77 - 5.28 10*6/mm3    Hemoglobin 8.0 (L) 12.0 - 15.9 g/dL    Hematocrit 26.4 (L) 34.0 - 46.6 %    MCV 90.7 79.0 - 97.0 fL    MCH 27.5 26.6 - 33.0 pg    MCHC 30.3 (L) 31.5 - 35.7 g/dL    RDW 16.1 (H) 12.3 - 15.4 %    RDW-SD 53.5 37.0 - 54.0 fl    MPV 9.4 6.0 - 12.0 fL    Platelets 361 140 - 450 10*3/mm3    Neutrophil % 91.2 (H) 42.7 - 76.0 %    Lymphocyte % 5.0 (L) 19.6 - 45.3 %    Monocyte % 2.4 (L) 5.0 - 12.0 %    Eosinophil % 0.1 (L) 0.3 - 6.2 %    Basophil % 0.2 0.0 - 1.5 %    Immature Grans % 1.1 (H) 0.0 - 0.5 %    Neutrophils, Absolute 9.03 (H) 1.70 - 7.00 10*3/mm3    Lymphocytes, Absolute 0.50 (L) 0.70 - 3.10 10*3/mm3    Monocytes, Absolute 0.24 0.10 - 0.90 10*3/mm3    Eosinophils, Absolute 0.01 0.00 - 0.40 10*3/mm3    Basophils, Absolute 0.02 0.00 - 0.20 10*3/mm3    Immature Grans, Absolute 0.11 (H) 0.00 - 0.05 10*3/mm3    nRBC 0.0 0.0 - 0.2 /100 WBC   ECG 12 Lead Dyspnea    Collection Time: 01/31/25  1:47 PM   Result Value Ref Range    QT Interval 583 ms    QTC Interval 563 ms         RADIOLOGY  XR Chest 1 View    Result Date: 1/31/2025  XR CHEST 1 VW-  Clinical: Cough, possible pneumonia  COMPARISON chest radiograph from 1/13/2025 and CT chest from 1/26/2025  FINDINGS: There is cardiac enlargement as before. There is atherosclerotic calcification of the aorta. Vascular stent within the left upper extremity again seen. No pulmonary edema or gross pleural effusion seen. No acute consolidation identified and the pulmonary vasculature is within normal limits.  CONCLUSION: Cardiomegaly.  This report was finalized on 1/31/2025 1:50 PM by Dr. Rubio Cope M.D  on Workstation: [id number]         MEDICATIONS GIVEN IN ER  Medications   dextrose (D50W) (25 g/50 mL) IV injection 25 g (25 g Intravenous Given 1/31/25 1226)         ORDERS PLACED DURING THIS VISIT:  Orders Placed This Encounter   Procedures    XR Chest 1 View    Comprehensive Metabolic Panel    CBC Auto Differential    Nephrology (on -call MD unless specified)    POC Glucose Once    POC Glucose Once    ECG 12 Lead Dyspnea    CBC & Differential         OUTPATIENT MEDICATION MANAGEMENT:  No current Epic-ordered facility-administered medications on file.     Current Outpatient Medications Ordered in Epic   Medication Sig Dispense Refill    aspirin 81 MG EC tablet Take 1 tablet by mouth Daily.      atorvastatin (LIPITOR) 40 MG tablet Take 1 tablet by mouth Daily.      calcium carbonate (TUMS) 500 MG chewable tablet Chew 1 tablet Every 6 (Six) Hours As Needed for Indigestion or Heartburn.      clopidogrel (PLAVIX) 75 MG tablet Take 1 tablet by mouth Daily.      hydrALAZINE (APRESOLINE) 25 MG tablet Take 4 tablets by mouth 3 (Three) Times a Day.      isosorbide dinitrate (ISORDIL) 30 MG tablet Take 1 tablet by mouth Daily.      lactulose (CHRONULAC) 10 GM/15ML solution Take 15 mL by mouth Daily As Needed (constipation).      lidocaine (LIDODERM) 5 % Place 1 patch on the skin as directed by provider Daily. Remove & Discard patch within 12 hours or as directed by MD 6 each 0    losartan (COZAAR) 100 MG tablet Take 1 tablet by mouth Daily. 30 tablet 0    pantoprazole (PROTONIX) 40 MG EC tablet Take 1 tablet by mouth 2 (Two) Times a Day Before Meals. 30 tablet 0         PROCEDURES  Procedures            PROGRESS, DATA ANALYSIS, CONSULTS, AND MEDICAL DECISION MAKING  All labs have been independently interpreted by me.  All radiology studies have been reviewed by me. All EKG's have been independently viewed and interpreted by me.  Discussion below represents my analysis of pertinent findings related to patient's condition,  differential diagnosis, treatment plan and final disposition.    Differential diagnosis includes but is not limited to hypoglycemia, renal failure, electrolyte disturbance, pneumonia, heart failure.      ED Course as of 01/31/25 1444   Fri Jan 31, 2025   1327 Patient's glucose improved after amp of D50 and most recent glucose of 153.  I did bring her a box lunch which she is eating currently at bedside [DB]   1358 EKG independently interpreted by me    Time 1:47 PM  Sinus 56  Normal P waves and GA intervals  QRS-left axis deviation, IVCD.  There are inferior and anterior Q waves  ST, T waves-nonspecific change  Not significant change compared to 1/17/2025 [DB]   1405 Chest x-ray independently interpreted by me does show cardiomegaly, no obvious acute infiltrates.  Official reading by Dr. Cope is in agreement. [DB]   1406 CBC shows normal white count of 9.9 which go against underlying infection.  Hemoglobin of 8.0 slightly decreased from baseline with mild worsening of chronic anemia. [DB]   1416 Chemistries reviewed notable for metabolic acidosis bicarb of 16 and potassium of 6.3.  Suspect that this is related to missing dialysis.  Will discuss with on-call nephrology. [DB]   1431 Treatment and evaluation as well as current lab results with nephrologist, Dr. Logan Person.  He is going to call her dialysis unit and see if we get her dialyzed today. [DB]   1441 I did discuss results with Dr. Person.  He has arranged for dialysis to be done immediately at her usual dialysis center but we need to get her there in the next hour.  Daughter is present at bedside and is comfortable with this plan as is patient. [DB]      ED Course User Index  [DB] Marcio Rodriguez MD             AS OF 14:44 EST VITALS:    BP - (!) 196/81  HR - 56  TEMP - 98 °F (36.7 °C) (Tympanic)  O2 SATS - 96%    COMPLEXITY OF CARE  74-year-old female with history of end-stage renal disease on dialysis presents with cough and shortness of breath,  found to have hypoglycemia by EMS.    Please see ED course above, independent evaluation and interpretation of ED testing including EKG, x-ray and emergency department laboratory analysis notable for the following:      X-ray benign without evidence of pneumonia or significant heart failure  Labs showed normal white count which would go against infection.  There was slight worsening of chronic anemia  Repeat glucose 150s.  Potassium elevated 6.3, bicarb 16 consistent with metabolic acidosis  Patient continued to have benign room air saturations and was well-appearing.  She did meet sandwich crackers and juice and is feeling much better.    Consider possible admission to the hospital for diagnostic analysis and nephrology consultation but as patient is well-appearing, I discussed with Dr. Person and plan is to discharge home for emergency dialysis now.    DIAGNOSIS  Final diagnoses:   Hypoglycemia   Hyperkalemia   Metabolic acidosis   End stage renal disease on dialysis         DISPOSITION  ED Disposition       ED Disposition   Discharge    Condition   Stable    Comment   --                Please note that portions of this document were completed with a voice recognition program.    Note Disclaimer: At [facility], we believe that sharing information builds trust and better relationships. You are receiving this note because you recently visited [facility]. It is possible you will see health information before a provider has talked with you about it. This kind of information can be easy to misunderstand. To help you fully understand what it means for your health, we urge you to discuss this note with your provider.         Marcio Rodriguez MD  01/31/25 [id number]

## 2025-01-31 NOTE — DISCHARGE INSTRUCTIONS
I suspect you are feeling bad because of lack of dialysis.  X-ray does not show pneumonia.  Labs did show elevated potassium levels.    I did discuss with the on-call kidney doctor who when she to go immediately to dialysis to get dialysis today.  Please regular meals at least 3 times daily.

## 2025-02-01 LAB
QT INTERVAL: 583 MS
QTC INTERVAL: 563 MS

## 2025-02-10 ENCOUNTER — PATIENT OUTREACH (OUTPATIENT)
Age: 75
End: 2025-02-10
Payer: MEDICARE

## 2025-02-10 NOTE — OUTREACH NOTE
MSW outreach to patient's daughter by phone on 3 attempts with voicemail. MSW unable to reach by phone and has left MSW contact information for patient's daughter as needed for follow-up regarding community resource needs. MSW to sign off.    Sarah HIDALGO -   Ambulatory Case Management    2/10/2025, 08:32 EST

## 2025-02-13 ENCOUNTER — APPOINTMENT (OUTPATIENT)
Dept: GENERAL RADIOLOGY | Facility: HOSPITAL | Age: 75
End: 2025-02-13
Payer: MEDICARE

## 2025-02-13 ENCOUNTER — HOSPITAL ENCOUNTER (OUTPATIENT)
Facility: HOSPITAL | Age: 75
Setting detail: OBSERVATION
Discharge: NURSING, REHAB OR LONG-TERM CARE | End: 2025-02-15
Attending: EMERGENCY MEDICINE | Admitting: HOSPITALIST
Payer: MEDICARE

## 2025-02-13 DIAGNOSIS — R07.9 CHEST PAIN, UNSPECIFIED TYPE: Primary | ICD-10-CM

## 2025-02-13 DIAGNOSIS — R45.851 SUICIDAL THOUGHTS: ICD-10-CM

## 2025-02-13 DIAGNOSIS — N18.6 ESRD ON DIALYSIS: ICD-10-CM

## 2025-02-13 DIAGNOSIS — Z99.2 ESRD ON DIALYSIS: ICD-10-CM

## 2025-02-13 LAB
ALBUMIN SERPL-MCNC: 3.3 G/DL (ref 3.5–5.2)
ALBUMIN/GLOB SERPL: 1.1 G/DL
ALP SERPL-CCNC: 91 U/L (ref 39–117)
ALT SERPL W P-5'-P-CCNC: 12 U/L (ref 1–33)
ANION GAP SERPL CALCULATED.3IONS-SCNC: 9.5 MMOL/L (ref 5–15)
AST SERPL-CCNC: 20 U/L (ref 1–32)
BASOPHILS # BLD AUTO: 0.05 10*3/MM3 (ref 0–0.2)
BASOPHILS NFR BLD AUTO: 1.3 % (ref 0–1.5)
BILIRUB SERPL-MCNC: <0.2 MG/DL (ref 0–1.2)
BUN SERPL-MCNC: 28 MG/DL (ref 8–23)
BUN/CREAT SERPL: 6.9 (ref 7–25)
CALCIUM SPEC-SCNC: 8.9 MG/DL (ref 8.6–10.5)
CHLORIDE SERPL-SCNC: 100 MMOL/L (ref 98–107)
CO2 SERPL-SCNC: 26.5 MMOL/L (ref 22–29)
CREAT SERPL-MCNC: 4.05 MG/DL (ref 0.57–1)
DEPRECATED RDW RBC AUTO: 59.2 FL (ref 37–54)
EGFRCR SERPLBLD CKD-EPI 2021: 11 ML/MIN/1.73
EOSINOPHIL # BLD AUTO: 0.18 10*3/MM3 (ref 0–0.4)
EOSINOPHIL NFR BLD AUTO: 4.7 % (ref 0.3–6.2)
ERYTHROCYTE [DISTWIDTH] IN BLOOD BY AUTOMATED COUNT: 17.2 % (ref 12.3–15.4)
GEN 5 1HR TROPONIN T REFLEX: 131 NG/L
GLOBULIN UR ELPH-MCNC: 3 GM/DL
GLUCOSE SERPL-MCNC: 97 MG/DL (ref 65–99)
HCT VFR BLD AUTO: 34.4 % (ref 34–46.6)
HGB BLD-MCNC: 10.3 G/DL (ref 12–15.9)
HOLD SPECIMEN: NORMAL
HOLD SPECIMEN: NORMAL
IMM GRANULOCYTES # BLD AUTO: 0.01 10*3/MM3 (ref 0–0.05)
IMM GRANULOCYTES NFR BLD AUTO: 0.3 % (ref 0–0.5)
LYMPHOCYTES # BLD AUTO: 1.58 10*3/MM3 (ref 0.7–3.1)
LYMPHOCYTES NFR BLD AUTO: 41.7 % (ref 19.6–45.3)
MAGNESIUM SERPL-MCNC: 2.3 MG/DL (ref 1.6–2.4)
MCH RBC QN AUTO: 28.5 PG (ref 26.6–33)
MCHC RBC AUTO-ENTMCNC: 29.9 G/DL (ref 31.5–35.7)
MCV RBC AUTO: 95.3 FL (ref 79–97)
MONOCYTES # BLD AUTO: 0.39 10*3/MM3 (ref 0.1–0.9)
MONOCYTES NFR BLD AUTO: 10.3 % (ref 5–12)
NEUTROPHILS NFR BLD AUTO: 1.58 10*3/MM3 (ref 1.7–7)
NEUTROPHILS NFR BLD AUTO: 41.7 % (ref 42.7–76)
NRBC BLD AUTO-RTO: 0 /100 WBC (ref 0–0.2)
PHOSPHATE SERPL-MCNC: 3.9 MG/DL (ref 2.5–4.5)
PLATELET # BLD AUTO: 176 10*3/MM3 (ref 140–450)
PMV BLD AUTO: 10.4 FL (ref 6–12)
POTASSIUM SERPL-SCNC: 4.3 MMOL/L (ref 3.5–5.2)
PROT SERPL-MCNC: 6.3 G/DL (ref 6–8.5)
QT INTERVAL: 510 MS
QTC INTERVAL: 483 MS
RBC # BLD AUTO: 3.61 10*6/MM3 (ref 3.77–5.28)
SODIUM SERPL-SCNC: 136 MMOL/L (ref 136–145)
TROPONIN T % DELTA: 2
TROPONIN T NUMERIC DELTA: 3 NG/L
TROPONIN T SERPL HS-MCNC: 128 NG/L
WBC NRBC COR # BLD AUTO: 3.79 10*3/MM3 (ref 3.4–10.8)
WHOLE BLOOD HOLD COAG: NORMAL
WHOLE BLOOD HOLD SPECIMEN: NORMAL

## 2025-02-13 PROCEDURE — 99214 OFFICE O/P EST MOD 30 MIN: CPT | Performed by: INTERNAL MEDICINE

## 2025-02-13 PROCEDURE — 71045 X-RAY EXAM CHEST 1 VIEW: CPT

## 2025-02-13 PROCEDURE — 93005 ELECTROCARDIOGRAM TRACING: CPT | Performed by: EMERGENCY MEDICINE

## 2025-02-13 PROCEDURE — 86706 HEP B SURFACE ANTIBODY: CPT | Performed by: INTERNAL MEDICINE

## 2025-02-13 PROCEDURE — 87340 HEPATITIS B SURFACE AG IA: CPT | Performed by: INTERNAL MEDICINE

## 2025-02-13 PROCEDURE — G0378 HOSPITAL OBSERVATION PER HR: HCPCS

## 2025-02-13 PROCEDURE — 93010 ELECTROCARDIOGRAM REPORT: CPT | Performed by: INTERNAL MEDICINE

## 2025-02-13 PROCEDURE — 83735 ASSAY OF MAGNESIUM: CPT | Performed by: EMERGENCY MEDICINE

## 2025-02-13 PROCEDURE — 25010000002 MORPHINE PER 10 MG: Performed by: EMERGENCY MEDICINE

## 2025-02-13 PROCEDURE — 99285 EMERGENCY DEPT VISIT HI MDM: CPT

## 2025-02-13 PROCEDURE — 84484 ASSAY OF TROPONIN QUANT: CPT | Performed by: EMERGENCY MEDICINE

## 2025-02-13 PROCEDURE — 96374 THER/PROPH/DIAG INJ IV PUSH: CPT

## 2025-02-13 PROCEDURE — 80053 COMPREHEN METABOLIC PANEL: CPT | Performed by: EMERGENCY MEDICINE

## 2025-02-13 PROCEDURE — 36415 COLL VENOUS BLD VENIPUNCTURE: CPT

## 2025-02-13 PROCEDURE — 85025 COMPLETE CBC W/AUTO DIFF WBC: CPT | Performed by: EMERGENCY MEDICINE

## 2025-02-13 PROCEDURE — 84100 ASSAY OF PHOSPHORUS: CPT | Performed by: EMERGENCY MEDICINE

## 2025-02-13 PROCEDURE — 36415 COLL VENOUS BLD VENIPUNCTURE: CPT | Performed by: EMERGENCY MEDICINE

## 2025-02-13 RX ORDER — LOSARTAN POTASSIUM 100 MG/1
100 TABLET ORAL DAILY
Status: DISCONTINUED | OUTPATIENT
Start: 2025-02-13 | End: 2025-02-15 | Stop reason: HOSPADM

## 2025-02-13 RX ORDER — BUDESONIDE AND FORMOTEROL FUMARATE DIHYDRATE 80; 4.5 UG/1; UG/1
2 AEROSOL RESPIRATORY (INHALATION)
COMMUNITY

## 2025-02-13 RX ORDER — ISOSORBIDE MONONITRATE 30 MG/1
30 TABLET, EXTENDED RELEASE ORAL DAILY
COMMUNITY
Start: 2022-01-31

## 2025-02-13 RX ORDER — METOPROLOL SUCCINATE 25 MG/1
25 TABLET, EXTENDED RELEASE ORAL DAILY
COMMUNITY

## 2025-02-13 RX ORDER — DIPHENHYDRAMINE HCL 25 MG
50 CAPSULE ORAL EVERY 6 HOURS PRN
COMMUNITY

## 2025-02-13 RX ORDER — DONEPEZIL HYDROCHLORIDE 5 MG/1
5 TABLET, FILM COATED ORAL NIGHTLY
COMMUNITY

## 2025-02-13 RX ORDER — DIPHENHYDRAMINE HCL 25 MG
50 CAPSULE ORAL EVERY 6 HOURS PRN
Status: DISCONTINUED | OUTPATIENT
Start: 2025-02-13 | End: 2025-02-15 | Stop reason: HOSPADM

## 2025-02-13 RX ORDER — AMLODIPINE BESYLATE 10 MG/1
10 TABLET ORAL DAILY
COMMUNITY

## 2025-02-13 RX ORDER — FLUTICASONE PROPIONATE 50 MCG
2 SPRAY, SUSPENSION (ML) NASAL DAILY
COMMUNITY

## 2025-02-13 RX ORDER — ISOSORBIDE MONONITRATE 30 MG/1
30 TABLET, EXTENDED RELEASE ORAL DAILY
Status: DISCONTINUED | OUTPATIENT
Start: 2025-02-13 | End: 2025-02-15 | Stop reason: HOSPADM

## 2025-02-13 RX ORDER — NITROGLYCERIN 0.4 MG/1
0.4 TABLET SUBLINGUAL
Status: DISCONTINUED | OUTPATIENT
Start: 2025-02-13 | End: 2025-02-15 | Stop reason: HOSPADM

## 2025-02-13 RX ORDER — SODIUM CHLORIDE 9 MG/ML
40 INJECTION, SOLUTION INTRAVENOUS AS NEEDED
Status: DISCONTINUED | OUTPATIENT
Start: 2025-02-13 | End: 2025-02-15 | Stop reason: HOSPADM

## 2025-02-13 RX ORDER — ACETAMINOPHEN 160 MG/5ML
650 SOLUTION ORAL EVERY 4 HOURS PRN
Status: DISCONTINUED | OUTPATIENT
Start: 2025-02-13 | End: 2025-02-15 | Stop reason: HOSPADM

## 2025-02-13 RX ORDER — FLUTICASONE PROPIONATE 50 MCG
2 SPRAY, SUSPENSION (ML) NASAL DAILY
Status: DISCONTINUED | OUTPATIENT
Start: 2025-02-13 | End: 2025-02-15 | Stop reason: HOSPADM

## 2025-02-13 RX ORDER — ASPIRIN 81 MG/1
81 TABLET ORAL DAILY
Status: DISCONTINUED | OUTPATIENT
Start: 2025-02-13 | End: 2025-02-15 | Stop reason: HOSPADM

## 2025-02-13 RX ORDER — SODIUM CHLORIDE 0.9 % (FLUSH) 0.9 %
10 SYRINGE (ML) INJECTION AS NEEDED
Status: DISCONTINUED | OUTPATIENT
Start: 2025-02-13 | End: 2025-02-15 | Stop reason: HOSPADM

## 2025-02-13 RX ORDER — SODIUM CHLORIDE 0.9 % (FLUSH) 0.9 %
10 SYRINGE (ML) INJECTION EVERY 12 HOURS SCHEDULED
Status: DISCONTINUED | OUTPATIENT
Start: 2025-02-13 | End: 2025-02-15 | Stop reason: HOSPADM

## 2025-02-13 RX ORDER — BUDESONIDE AND FORMOTEROL FUMARATE DIHYDRATE 160; 4.5 UG/1; UG/1
2 AEROSOL RESPIRATORY (INHALATION)
Status: DISCONTINUED | OUTPATIENT
Start: 2025-02-13 | End: 2025-02-15 | Stop reason: HOSPADM

## 2025-02-13 RX ORDER — DONEPEZIL HYDROCHLORIDE 10 MG/1
5 TABLET, FILM COATED ORAL NIGHTLY
Status: DISCONTINUED | OUTPATIENT
Start: 2025-02-13 | End: 2025-02-15 | Stop reason: HOSPADM

## 2025-02-13 RX ORDER — PANTOPRAZOLE SODIUM 40 MG/1
40 TABLET, DELAYED RELEASE ORAL
Status: DISCONTINUED | OUTPATIENT
Start: 2025-02-13 | End: 2025-02-15 | Stop reason: HOSPADM

## 2025-02-13 RX ORDER — ONDANSETRON 2 MG/ML
4 INJECTION INTRAMUSCULAR; INTRAVENOUS EVERY 6 HOURS PRN
Status: DISCONTINUED | OUTPATIENT
Start: 2025-02-13 | End: 2025-02-15 | Stop reason: HOSPADM

## 2025-02-13 RX ORDER — POLYETHYLENE GLYCOL 3350 17 G/17G
17 POWDER, FOR SOLUTION ORAL DAILY PRN
Status: DISCONTINUED | OUTPATIENT
Start: 2025-02-13 | End: 2025-02-15 | Stop reason: HOSPADM

## 2025-02-13 RX ORDER — METOPROLOL SUCCINATE 25 MG/1
25 TABLET, EXTENDED RELEASE ORAL DAILY
Status: DISCONTINUED | OUTPATIENT
Start: 2025-02-13 | End: 2025-02-15 | Stop reason: HOSPADM

## 2025-02-13 RX ORDER — ONDANSETRON 4 MG/1
4 TABLET, ORALLY DISINTEGRATING ORAL EVERY 6 HOURS PRN
Status: DISCONTINUED | OUTPATIENT
Start: 2025-02-13 | End: 2025-02-15 | Stop reason: HOSPADM

## 2025-02-13 RX ORDER — BUDESONIDE AND FORMOTEROL FUMARATE DIHYDRATE 80; 4.5 UG/1; UG/1
2 AEROSOL RESPIRATORY (INHALATION)
Status: DISCONTINUED | OUTPATIENT
Start: 2025-02-13 | End: 2025-02-13 | Stop reason: CLARIF

## 2025-02-13 RX ORDER — ACETAMINOPHEN 650 MG/1
650 SUPPOSITORY RECTAL EVERY 4 HOURS PRN
Status: DISCONTINUED | OUTPATIENT
Start: 2025-02-13 | End: 2025-02-15 | Stop reason: HOSPADM

## 2025-02-13 RX ORDER — AMLODIPINE BESYLATE 10 MG/1
10 TABLET ORAL DAILY
Status: DISCONTINUED | OUTPATIENT
Start: 2025-02-13 | End: 2025-02-15 | Stop reason: HOSPADM

## 2025-02-13 RX ORDER — AMOXICILLIN 250 MG
2 CAPSULE ORAL 2 TIMES DAILY PRN
Status: DISCONTINUED | OUTPATIENT
Start: 2025-02-13 | End: 2025-02-15 | Stop reason: HOSPADM

## 2025-02-13 RX ORDER — BISACODYL 10 MG
10 SUPPOSITORY, RECTAL RECTAL DAILY PRN
Status: DISCONTINUED | OUTPATIENT
Start: 2025-02-13 | End: 2025-02-15 | Stop reason: HOSPADM

## 2025-02-13 RX ORDER — BISACODYL 5 MG/1
5 TABLET, DELAYED RELEASE ORAL DAILY PRN
Status: DISCONTINUED | OUTPATIENT
Start: 2025-02-13 | End: 2025-02-15 | Stop reason: HOSPADM

## 2025-02-13 RX ORDER — ACETAMINOPHEN 325 MG/1
650 TABLET ORAL EVERY 4 HOURS PRN
Status: DISCONTINUED | OUTPATIENT
Start: 2025-02-13 | End: 2025-02-15 | Stop reason: HOSPADM

## 2025-02-13 RX ORDER — LUBIPROSTONE 8 UG/1
8 CAPSULE ORAL 2 TIMES DAILY
Status: DISCONTINUED | OUTPATIENT
Start: 2025-02-13 | End: 2025-02-15 | Stop reason: HOSPADM

## 2025-02-13 RX ORDER — MORPHINE SULFATE 2 MG/ML
2 INJECTION, SOLUTION INTRAMUSCULAR; INTRAVENOUS ONCE
Status: COMPLETED | OUTPATIENT
Start: 2025-02-13 | End: 2025-02-13

## 2025-02-13 RX ORDER — ATORVASTATIN CALCIUM 20 MG/1
40 TABLET, FILM COATED ORAL DAILY
Status: DISCONTINUED | OUTPATIENT
Start: 2025-02-13 | End: 2025-02-15 | Stop reason: HOSPADM

## 2025-02-13 RX ORDER — HYDRALAZINE HYDROCHLORIDE 50 MG/1
50 TABLET, FILM COATED ORAL 3 TIMES DAILY
Status: DISCONTINUED | OUTPATIENT
Start: 2025-02-13 | End: 2025-02-15 | Stop reason: HOSPADM

## 2025-02-13 RX ADMIN — HYDRALAZINE HYDROCHLORIDE 50 MG: 50 TABLET ORAL at 20:37

## 2025-02-13 RX ADMIN — DONEPEZIL HYDROCHLORIDE 5 MG: 10 TABLET, FILM COATED ORAL at 20:33

## 2025-02-13 RX ADMIN — LUBIPROSTONE 8 MCG: 8 CAPSULE, GELATIN COATED ORAL at 20:36

## 2025-02-13 RX ADMIN — PANTOPRAZOLE SODIUM 40 MG: 40 TABLET, DELAYED RELEASE ORAL at 18:34

## 2025-02-13 RX ADMIN — MORPHINE SULFATE 2 MG: 2 INJECTION, SOLUTION INTRAMUSCULAR; INTRAVENOUS at 15:02

## 2025-02-13 RX ADMIN — Medication 10 ML: at 20:36

## 2025-02-13 NOTE — PROGRESS NOTES
Clinical Pharmacy Services: Medication History    Sonia Acharya is a 74 y.o. female presenting to [facility] for   Chief Complaint   Patient presents with    Chest Pain     Started 1 hour ago       She  has a past medical history of Arthritis, Chronic kidney disease, Heart disease, and Hypertension.    Allergies as of 02/13/2025    (No Known Allergies)       Medication information was obtained from: Patient   Pharmacy and Phone Number:     Prior to Admission Medications       Prescriptions Last Dose Informant Patient Reported? Taking?    amLODIPine (NORVASC) 10 MG tablet  Self Yes Yes    Take 1 tablet by mouth Daily.    aspirin 81 MG EC tablet  Self Yes Yes    Take 1 tablet by mouth Daily.    atorvastatin (LIPITOR) 40 MG tablet  Pharmacy, Self Yes Yes    Take 1 tablet by mouth Daily.    budesonide-formoterol (SYMBICORT) 80-4.5 MCG/ACT inhaler  Self Yes Yes    Inhale 2 puffs 2 (Two) Times a Day.    diphenhydrAMINE (BENADRYL) 25 mg capsule  Self Yes Yes    Take 2 capsules by mouth Every 6 (Six) Hours As Needed for Itching.    donepezil (ARICEPT) 5 MG tablet  Pharmacy, Self Yes Yes    Take 1 tablet by mouth Every Night.    fluticasone (FLONASE) 50 MCG/ACT nasal spray  Self Yes Yes    Administer 2 sprays into the nostril(s) as directed by provider Daily.    hydrALAZINE (APRESOLINE) 50 MG tablet  Self, Pharmacy Yes Yes    Take 1 tablet by mouth 3 (Three) Times a Day.    isosorbide mononitrate (IMDUR) 30 MG 24 hr tablet  Self Yes Yes    Take 1 tablet by mouth Daily.    lidocaine (LIDODERM) 5 %  Self No Yes    Place 1 patch on the skin as directed by provider Daily. Remove & Discard patch within 12 hours or as directed by MD    linaclotide (LINZESS) 290 MCG capsule capsule  Self Yes Yes    Take 1 capsule by mouth Every Morning Before Breakfast.    losartan (COZAAR) 100 MG tablet  Pharmacy, Self No Yes    Take 1 tablet by mouth Daily.    melatonin 3 MG tablet  Self Yes Yes    Take 1 tablet by mouth Every  Night.    metoprolol succinate XL (TOPROL-XL) 25 MG 24 hr tablet  Pharmacy, Self Yes Yes    Take 1 tablet by mouth Daily.    clopidogrel (PLAVIX) 75 MG tablet Not Taking  Yes No    Take 1 tablet by mouth Daily.    Patient not taking:  Reported on 2/13/2025    pantoprazole (PROTONIX) 40 MG EC tablet Not Taking  No No    Take 1 tablet by mouth 2 (Two) Times a Day Before Meals.    Patient not taking:  Reported on 2/13/2025              Medication notes:     This medication list is complete to the best of my knowledge as of 2/13/2025    Please call if questions.    Delfin Ramsey  Medication History Technician  [phone]    2/13/2025 12:53 EST

## 2025-02-13 NOTE — ED PROVIDER NOTES
EMERGENCY DEPARTMENT ENCOUNTER  Room Number:  20/20  PCP: Marcy Paez PA  Independent Historians: Patient and EMS      HPI:  Chief Complaint: had concerns including Chest Pain (Started 1 hour ago).     A complete HPI/ROS/PMH/PSH/SH/FH are unobtainable due to: None    Chronic or social conditions impacting patient care (Social Determinants of Health): None      Context: Sonia Acharya is a 74 y.o. female with a medical history of end-stage renal disease on dialysis, bradycardia, hyperkalemia, hyperphosphatemia who presents to the ED c/o acute chest pain.  The patient reports that she was sitting in her house when she suddenly developed left-sided chest pain.  She states she had no shortness of breath.  She has no nausea or vomiting.  EMS was called.  She states that when she has chest pain she typically has left knee cap Pain.  She denies any thigh or lower leg pain.  The pain started about an hour ago and resolved about 10 minutes ago.  She states her last dialysis was yesterday.      Review of prior external notes (non-ED) -and- Review of prior external test results outside of this encounter:  Cardiology note dated 1/15/2025 notes right sided chest pain consistent with a pulmonary etiology.  History of coronary disease.  Known left bundle branch block.  Notes that if the patient recovers from the acute illness and may be reasonable to repeat cardiac catheterization.    Prescription drug monitoring program review:         PAST MEDICAL HISTORY  Active Ambulatory Problems     Diagnosis Date Noted    Generalized abdominal pain 12/13/2021    ODALIS (acute kidney injury) 12/13/2021    Anemia, chronic disease 12/13/2021    Metabolic acidosis, increased anion gap 12/13/2021    Obesity (BMI 30-39.9) 12/13/2021    HTN (hypertension) 12/13/2021    Chest pain, atypical 12/13/2021    Cervical radiculopathy 01/16/2023    ESRD on dialysis 01/16/2023    Bradycardia 01/16/2023    Right arm pain 01/16/2023    ANGIE (obstructive  sleep apnea) 01/16/2023    Chronic diastolic CHF (congestive heart failure) 01/16/2023    Aortic stenosis 01/16/2023    Pancytopenia 01/18/2023    Hyperphosphatemia 01/18/2023    Hyperkalemia 12/20/2023    Pneumonia 12/20/2023    Leukocytopenia 12/22/2023    Anemia, chronic disease 12/22/2023    ESRD (end stage renal disease) on dialysis 01/06/2025    Bacterial pneumonia, treating as gram negative 01/07/2025    Chronic respiratory failure with hypoxia 01/07/2025    CAD (coronary artery disease) 01/07/2025    Nausea, vomiting, and diarrhea 01/07/2025    Severe protein-calorie malnutrition 01/09/2025     Resolved Ambulatory Problems     Diagnosis Date Noted    No Resolved Ambulatory Problems     Past Medical History:   Diagnosis Date    Arthritis     Chronic kidney disease     Heart disease     Hypertension          PAST SURGICAL HISTORY  Past Surgical History:   Procedure Laterality Date    BREAST SURGERY      DIALYSIS FISTULA CREATION      EYE SURGERY      HERNIA REPAIR  2017    Dr. Sung         FAMILY HISTORY  Family History   Problem Relation Age of Onset    Heart disease Father     Breast cancer Sister          SOCIAL HISTORY  Social History     Socioeconomic History    Marital status: Single   Tobacco Use    Smoking status: Never     Passive exposure: Never    Smokeless tobacco: Never   Vaping Use    Vaping status: Never Used   Substance and Sexual Activity    Alcohol use: No    Drug use: No    Sexual activity: Defer         ALLERGIES  Patient has no known allergies.      REVIEW OF SYSTEMS  Review of Systems  Included in HPI  All systems reviewed and negative except for those discussed in HPI.      PHYSICAL EXAM    I have reviewed the triage vital signs and nursing notes.    ED Triage Vitals [02/13/25 1209]   Temp Heart Rate Resp BP SpO2   97.8 °F (36.6 °C) 60 14 118/82 98 %      Temp src Heart Rate Source Patient Position BP Location FiO2 (%)   Oral Monitor -- -- --       Physical Exam  GENERAL: Awake, alert,  no acute distress  SKIN: Warm, dry  HENT: Normocephalic, atraumatic  EYES: no scleral icterus  CV: regular rhythm, regular rate  RESPIRATORY: normal effort, lungs clear  ABDOMEN: soft, nontender, nondistended  MUSCULOSKELETAL: no deformity, no calf tenderness or swelling  NEURO: alert, moves all extremities, follows commands            LAB RESULTS  Recent Results (from the past 24 hours)   ECG 12 Lead Chest Pain    Collection Time: 02/13/25 12:27 PM   Result Value Ref Range    QT Interval 510 ms    QTC Interval 483 ms   CBC Auto Differential    Collection Time: 02/13/25 12:33 PM    Specimen: Blood   Result Value Ref Range    WBC 3.79 3.40 - 10.80 10*3/mm3    RBC 3.61 (L) 3.77 - 5.28 10*6/mm3    Hemoglobin 10.3 (L) 12.0 - 15.9 g/dL    Hematocrit 34.4 34.0 - 46.6 %    MCV 95.3 79.0 - 97.0 fL    MCH 28.5 26.6 - 33.0 pg    MCHC 29.9 (L) 31.5 - 35.7 g/dL    RDW 17.2 (H) 12.3 - 15.4 %    RDW-SD 59.2 (H) 37.0 - 54.0 fl    MPV 10.4 6.0 - 12.0 fL    Platelets 176 140 - 450 10*3/mm3    Neutrophil % 41.7 (L) 42.7 - 76.0 %    Lymphocyte % 41.7 19.6 - 45.3 %    Monocyte % 10.3 5.0 - 12.0 %    Eosinophil % 4.7 0.3 - 6.2 %    Basophil % 1.3 0.0 - 1.5 %    Immature Grans % 0.3 0.0 - 0.5 %    Neutrophils, Absolute 1.58 (L) 1.70 - 7.00 10*3/mm3    Lymphocytes, Absolute 1.58 0.70 - 3.10 10*3/mm3    Monocytes, Absolute 0.39 0.10 - 0.90 10*3/mm3    Eosinophils, Absolute 0.18 0.00 - 0.40 10*3/mm3    Basophils, Absolute 0.05 0.00 - 0.20 10*3/mm3    Immature Grans, Absolute 0.01 0.00 - 0.05 10*3/mm3    nRBC 0.0 0.0 - 0.2 /100 WBC   Green Top (Gel)    Collection Time: 02/13/25 12:33 PM   Result Value Ref Range    Extra Tube Hold for add-ons.    Lavender Top    Collection Time: 02/13/25 12:33 PM   Result Value Ref Range    Extra Tube hold for add-on    Gold Top - SST    Collection Time: 02/13/25 12:33 PM   Result Value Ref Range    Extra Tube Hold for add-ons.    Light Blue Top    Collection Time: 02/13/25 12:33 PM   Result Value Ref Range     Extra Tube Hold for add-ons.    Phosphorus    Collection Time: 02/13/25 12:33 PM    Specimen: Blood   Result Value Ref Range    Phosphorus 3.9 2.5 - 4.5 mg/dL   Comprehensive Metabolic Panel    Collection Time: 02/13/25  1:38 PM    Specimen: Arm, Right; Blood   Result Value Ref Range    Glucose 97 65 - 99 mg/dL    BUN 28 (H) 8 - 23 mg/dL    Creatinine 4.05 (H) 0.57 - 1.00 mg/dL    Sodium 136 136 - 145 mmol/L    Potassium 4.3 3.5 - 5.2 mmol/L    Chloride 100 98 - 107 mmol/L    CO2 26.5 22.0 - 29.0 mmol/L    Calcium 8.9 8.6 - 10.5 mg/dL    Total Protein 6.3 6.0 - 8.5 g/dL    Albumin 3.3 (L) 3.5 - 5.2 g/dL    ALT (SGPT) 12 1 - 33 U/L    AST (SGOT) 20 1 - 32 U/L    Alkaline Phosphatase 91 39 - 117 U/L    Total Bilirubin <0.2 0.0 - 1.2 mg/dL    Globulin 3.0 gm/dL    A/G Ratio 1.1 g/dL    BUN/Creatinine Ratio 6.9 (L) 7.0 - 25.0    Anion Gap 9.5 5.0 - 15.0 mmol/L    eGFR 11.0 (L) >60.0 mL/min/1.73   High Sensitivity Troponin T    Collection Time: 02/13/25  1:38 PM    Specimen: Arm, Right; Blood   Result Value Ref Range    HS Troponin T 128 (C) <14 ng/L   Magnesium    Collection Time: 02/13/25  1:38 PM    Specimen: Arm, Right; Blood   Result Value Ref Range    Magnesium 2.3 1.6 - 2.4 mg/dL         RADIOLOGY  XR Chest 1 View    Result Date: 2/13/2025  XR CHEST 1 VW-  HISTORY: Female who is 74 years-old, chest pain  TECHNIQUE: Frontal view of the chest  COMPARISON: 1/31/2025  FINDINGS: The heart size is borderline. Aorta is calcified. Pulmonary vasculature is unremarkable. No focal pulmonary consolidation, pleural effusion, or pneumothorax. Right hemidiaphragm remains elevated. No acute osseous process.      No focal pulmonary consolidation. Borderline heart size. Follow-up as clinical indications persist.  This report was finalized on 2/13/2025 1:55 PM by Dr. Michael Lauren M.D on Workstation: [id number]         MEDICATIONS GIVEN IN ER  Medications   morphine injection 2 mg (2 mg Intravenous Given 2/13/25 1509)          ORDERS PLACED DURING THIS VISIT:  Orders Placed This Encounter   Procedures    XR Chest 1 View    Comprehensive Metabolic Panel    High Sensitivity Troponin T    CBC Auto Differential    Miles Draw    Magnesium    Phosphorus    High Sensitivity Troponin T 1Hr    Cardiology (on-call MD unless specified)    LHA (on-call MD unless specified) Details    ECG 12 Lead Chest Pain    Initiate Observation Status    CBC & Differential    Green Top (Gel)    Lavender Top    Gold Top - SST    Light Blue Top         OUTPATIENT MEDICATION MANAGEMENT:  No current Epic-ordered facility-administered medications on file.     Current Outpatient Medications Ordered in Epic   Medication Sig Dispense Refill    amLODIPine (NORVASC) 10 MG tablet Take 1 tablet by mouth Daily.      aspirin 81 MG EC tablet Take 1 tablet by mouth Daily.      atorvastatin (LIPITOR) 40 MG tablet Take 1 tablet by mouth Daily.      budesonide-formoterol (SYMBICORT) 80-4.5 MCG/ACT inhaler Inhale 2 puffs 2 (Two) Times a Day.      diphenhydrAMINE (BENADRYL) 25 mg capsule Take 2 capsules by mouth Every 6 (Six) Hours As Needed for Itching.      donepezil (ARICEPT) 5 MG tablet Take 1 tablet by mouth Every Night.      fluticasone (FLONASE) 50 MCG/ACT nasal spray Administer 2 sprays into the nostril(s) as directed by provider Daily.      hydrALAZINE (APRESOLINE) 50 MG tablet Take 1 tablet by mouth 3 (Three) Times a Day.      isosorbide mononitrate (IMDUR) 30 MG 24 hr tablet Take 1 tablet by mouth Daily.      lidocaine (LIDODERM) 5 % Place 1 patch on the skin as directed by provider Daily. Remove & Discard patch within 12 hours or as directed by MD 6 each 0    linaclotide (LINZESS) 290 MCG capsule capsule Take 1 capsule by mouth Every Morning Before Breakfast.      losartan (COZAAR) 100 MG tablet Take 1 tablet by mouth Daily. 30 tablet 0    melatonin 3 MG tablet Take 1 tablet by mouth Every Night.      metoprolol succinate XL (TOPROL-XL) 25 MG 24 hr tablet Take 1  tablet by mouth Daily.      clopidogrel (PLAVIX) 75 MG tablet Take 1 tablet by mouth Daily. (Patient not taking: Reported on 2/13/2025)      pantoprazole (PROTONIX) 40 MG EC tablet Take 1 tablet by mouth 2 (Two) Times a Day Before Meals. (Patient not taking: Reported on 2/13/2025) 30 tablet 0         PROCEDURES  Procedures            PROGRESS, DATA ANALYSIS, CONSULTS, AND MEDICAL DECISION MAKING  All labs have been independently interpreted by me.  All radiology studies have been reviewed by me. All EKG's have been independently viewed and interpreted by me.  Discussion below represents my analysis of pertinent findings related to patient's condition, differential diagnosis, treatment plan and final disposition.    Differential diagnosis includes but is not limited to acute coronary syndrome, acute aortic syndrome, PE, pneumothorax unstable angina.    Clinical Scores:         HEART Score: 8   Shared Decision Making  I discussed the findings with the patient/patient representative who is in agreement with the treatment plan and the final disposition.  Risks and benefits of discharge and/or observation/admission were discussed: Yes                               ED Course as of 02/13/25 1503   Thu Feb 13, 2025   1232 EKG PROCEDURE    EKG time: 1227  Rhythm/Rate: Normal sinus, rate 54  P waves and IA: Normal P, normal IA  QRS, axis: Left bundle branch block  ST and T waves: Sgarbossa negative    Independently Interpreted by me  Not significantly changed compared to prior 1/31/2025   [TR]   1327 XR Chest 1 View  My independent interpretation of the imaging study is no dense consolidation [TR]   1412 HS Troponin T(!!): 128  Chronically elevated [TR]   1448 The patient is reporting persistent chest pain.  Have a call out to cardiology for consultation.  I have a call out to the hospitalist for admission. [TR]   1459 Discussing with Dr. Young with [facility].  He agrees to admit to telemetry. [TR]   1501 Discussing with   Dipti with cardiology.  He agrees to consult. [TR]   1501 I reviewed the workup and findings with the patient at the bedside.  She tells me that she wants to die.  She has no plan.  She is agreeable to admission.  She has multiple chronic complaints as well. [TR]      ED Course User Index  [TR] Varun Daniel MD             AS OF 15:03 EST VITALS:    BP - 125/62  HR - 54  TEMP - 97.8 °F (36.6 °C) (Oral)  O2 SATS - 99%    COMPLEXITY OF CARE  The patient requires admission.      DIAGNOSIS  Final diagnoses:   Chest pain, unspecified type   ESRD on dialysis   Suicidal thoughts         DISPOSITION  ED Disposition       ED Disposition   Decision to Admit    Condition   --    Comment   Level of Care: Telemetry [5]   Diagnosis: Chest pain [213421]   Admitting Physician: KRISTIN SEVILLA [[id number]]   Attending Physician: KRISTIN SEVILLA [[id number]]   Bed Request Comments: dialysis                  Please note that portions of this document were completed with a voice recognition program.    Note Disclaimer: At [facility], we believe that sharing information builds trust and better relationships. You are receiving this note because you recently visited [facility]. It is possible you will see health information before a provider has talked with you about it. This kind of information can be easy to misunderstand. To help you fully understand what it means for your health, we urge you to discuss this note with your provider.         Varun Daniel MD  02/13/25 1505       Varun Daniel MD  02/13/25 150

## 2025-02-13 NOTE — PLAN OF CARE
Goal Outcome Evaluation:  Plan of Care Reviewed With: patient        Progress: no change  Outcome Evaluation: Pt admitted with complaints of CP. Pt with cardiac history. Plan for left heart cath. in am. Pt to be NPO at [facility].

## 2025-02-13 NOTE — CONSULTS
Date of Consultation: 25    Referral Provider: Derek Young MD     Reason for Consultation: Chest pain, LBBB.    Encounter Provider: Scar Dodge MD    Group of Service: [facility]       Patient Name: Sonia Acharya    :1950    Chief complaint: Chest pain.    History of Present Illness:      This is a very pleasant 74 year-old female with a history of end-stage renal disease, coronary artery disease, and hypertension.  The patient had a stent placed to her RCA in 2021.  She underwent a heart catheterization on 8/15/2024 which showed a critical narrowing of the ostial RCA stent 5 to 99%.  At that time, she had placement of a 3.5 x 20 mm Synergy ARIANA.  She last had an echocardiogram on 2025 which showed severe LVH, EF 56% with apical wall motion abnormalities, grade 2 diastolic dysfunction, and mild aortic stenosis with a mean gradient of 15 mmHg.    The patient presents today for worsening chest pain.  She states that she has had worsening chest pain over the last several weeks.  She describes this as a heaviness and pressure sensation which occurs in the center of her chest, although can also occur to the right and left sides as well intermittently.  The pain can last for several hours at a time.  Today, she states that she had fairly severe pain.  Her EKG showed a left bundle branch block, which is chronic.  Her troponin was elevated, although not above her baseline given her end-stage renal disease.  She does state that the chest pain has been occurring more frequently and lasting longer.    Past Medical History:   Diagnosis Date    Arthritis     Chronic kidney disease     Heart disease     Hypertension          Past Surgical History:   Procedure Laterality Date    BREAST SURGERY      DIALYSIS FISTULA CREATION      EYE SURGERY      HERNIA REPAIR      Dr. Sung         No Known Allergies      No current facility-administered medications on file prior  to encounter.     Current Outpatient Medications on File Prior to Encounter   Medication Sig Dispense Refill    amLODIPine (NORVASC) 10 MG tablet Take 1 tablet by mouth Daily.      aspirin 81 MG EC tablet Take 1 tablet by mouth Daily.      atorvastatin (LIPITOR) 40 MG tablet Take 1 tablet by mouth Daily.      budesonide-formoterol (SYMBICORT) 80-4.5 MCG/ACT inhaler Inhale 2 puffs 2 (Two) Times a Day.      diphenhydrAMINE (BENADRYL) 25 mg capsule Take 2 capsules by mouth Every 6 (Six) Hours As Needed for Itching.      donepezil (ARICEPT) 5 MG tablet Take 1 tablet by mouth Every Night.      fluticasone (FLONASE) 50 MCG/ACT nasal spray Administer 2 sprays into the nostril(s) as directed by provider Daily.      hydrALAZINE (APRESOLINE) 50 MG tablet Take 1 tablet by mouth 3 (Three) Times a Day.      isosorbide mononitrate (IMDUR) 30 MG 24 hr tablet Take 1 tablet by mouth Daily.      lidocaine (LIDODERM) 5 % Place 1 patch on the skin as directed by provider Daily. Remove & Discard patch within 12 hours or as directed by MD 6 each 0    linaclotide (LINZESS) 290 MCG capsule capsule Take 1 capsule by mouth Every Morning Before Breakfast.      losartan (COZAAR) 100 MG tablet Take 1 tablet by mouth Daily. 30 tablet 0    melatonin 3 MG tablet Take 1 tablet by mouth Every Night.      metoprolol succinate XL (TOPROL-XL) 25 MG 24 hr tablet Take 1 tablet by mouth Daily.      clopidogrel (PLAVIX) 75 MG tablet Take 1 tablet by mouth Daily. (Patient not taking: Reported on 2/13/2025)      pantoprazole (PROTONIX) 40 MG EC tablet Take 1 tablet by mouth 2 (Two) Times a Day Before Meals. (Patient not taking: Reported on 2/13/2025) 30 tablet 0    [DISCONTINUED] calcium carbonate (TUMS) 500 MG chewable tablet Chew 1 tablet Every 6 (Six) Hours As Needed for Indigestion or Heartburn.      [DISCONTINUED] isosorbide dinitrate (ISORDIL) 30 MG tablet Take 1 tablet by mouth Daily.      [DISCONTINUED] lactulose (CHRONULAC) 10 GM/15ML solution Take  "15 mL by mouth Daily As Needed (constipation).           Social History     Socioeconomic History    Marital status: Single   Tobacco Use    Smoking status: Never     Passive exposure: Never    Smokeless tobacco: Never   Vaping Use    Vaping status: Never Used   Substance and Sexual Activity    Alcohol use: No    Drug use: No    Sexual activity: Defer         Family History   Problem Relation Age of Onset    Heart disease Father     Breast cancer Sister        REVIEW OF SYSTEMS:   Positives are noted in the HPI above.  Otherwise, all other systems were reviewed, and are negative.     Objective:     Vitals:    02/13/25 1246 02/13/25 1345 02/13/25 1401 02/13/25 1512   BP:   125/62    Pulse:  54 54 60   Resp:   14    Temp:       TempSrc:       SpO2:  99% 99% (!) 85%   Weight: 49.9 kg (110 lb)      Height: 152.4 cm (60\")        Body mass index is 21.48 kg/m².  Flowsheet Rows      Flowsheet Row First Filed Value   Admission Height 152.4 cm (60\") Documented at 02/13/2025 1246   Admission Weight 49.9 kg (110 lb) Documented at 02/13/2025 1246             General:    No acute distress, alert and oriented x4, pleasant                   Head:    Normocephalic, atraumatic.   Eyes:          Conjunctivae and sclerae normal, no icterus.   Throat:   No oral lesions, no thrush, oral mucosa moist.    Neck:   Supple, trachea midline.   Lungs:     Clear to auscultation bilaterally     Heart:    Regular rhythm and normal rate. III/VI SM RUSB and LUSB.   Abdomen:     Soft, non-tender, non-distended, positive bowel sounds.    Extremities:   No clubbing, cyanosis, or edema.     Pulses:   Pulses palpable and equal bilaterally.    Skin:   No bleeding or rash.   Neuro:   Non-focal.  Moves all extremities well.    Psychiatric:   Normal mood and affect.         Lab Review:                Results from last 7 days   Lab Units 02/13/25  1338   SODIUM mmol/L 136   POTASSIUM mmol/L 4.3   CHLORIDE mmol/L 100   CO2 mmol/L 26.5   BUN mg/dL 28* "   CREATININE mg/dL 4.05*   GLUCOSE mg/dL 97   CALCIUM mg/dL 8.9     Results from last 7 days   Lab Units 02/13/25  1505 02/13/25  1338   HSTROP T ng/L 131* 128*     Results from last 7 days   Lab Units 02/13/25  1233   WBC 10*3/mm3 3.79   HEMOGLOBIN g/dL 10.3*   HEMATOCRIT % 34.4   PLATELETS 10*3/mm3 176             Results from last 7 days   Lab Units 02/13/25  1338   MAGNESIUM mg/dL 2.3           EKG (reviewed by me personally): Sinus rhythm, left bundle branch block.      Assessment:   1.  Chest pain, worsening over the last 2 weeks  2.  Coronary artery disease, status post ARIANA to the RCA in 2021.  LHC on 8/15/2024 which showed a critical narrowing of the ostial RCA stent 5 to 99%.  At that time, she had placement of a 3.5 x 20 mm Synergy ARIANA.  Also with 30% mid LAD and 60% ostial first diagonal disease at that time.  3.  Baseline left bundle branch block  4.  Elevated troponin, not consistent with type I or type II NSTEMI (secondary to renal disease)  5.  Mild aortic stenosis by echo on 1/11/2025  6.  Severe left ventricular hypertrophy by echo on 1/11/2025  7.  End-stage renal disease, on hemodialysis  8.  Chronic diastolic CHF  9.  Hypertension  10.  Type 2 diabetes    Plan:       She has had worsening and more frequent chest pain over the last 2 weeks.  She has a left bundle branch block at baseline, which negates the ability to interpret the EKG effectively.  Her troponin is elevated in the setting of end-stage renal disease, although is not consistent with ACS or type II NSTEMI.  She had critical renarrowing of her ostial RCA stent in August 2024, necessitating another stent.  Given these factors, I recommended a repeat left heart catheterization tomorrow.  The patient was in agreement with this plan.  This will need to be coordinated with dialysis.    She just had an echocardiogram in January 2025 which did show some apical wall motion abnormalities, although her LAD had mild disease on her cath in August  2024.  I would continue her on her home medications, including aspirin, Plavix, Lipitor, hydralazine, Imdur, losartan, and metoprolol.    Further plans pending the heart catheterization tomorrow.    Thank you very much for this consult.    Karlo Dodge MD

## 2025-02-13 NOTE — ED NOTES
"Nursing report ED to floor  Sonia Acharya  74 y.o.  female    HPI :  HPI  Stated Reason for Visit: CP  History Obtained From: EMS  Precipitating Event(s): none  Onset of Symptoms: sudden  Duration (Days): 1    Chief Complaint  Chief Complaint   Patient presents with    Chest Pain     Started 1 hour ago       Admitting doctor:   Derek Young MD    Admitting diagnosis:   The primary encounter diagnosis was Chest pain, unspecified type. Diagnoses of ESRD on dialysis and Suicidal thoughts were also pertinent to this visit.    Code status:   Current Code Status       Date Active Code Status Order ID Comments User Context       Prior            Allergies:   Patient has no known allergies.    Isolation:   No active isolations    Intake and Output  No intake or output data in the 24 hours ending 02/13/25 1503    Weight:       02/13/25  1246   Weight: 49.9 kg (110 lb)       Most recent vitals:   Vitals:    02/13/25 1209 02/13/25 1246 02/13/25 1345 02/13/25 1401   BP: 118/82   125/62   Pulse: 60  54 54   Resp: 14   14   Temp: 97.8 °F (36.6 °C)      TempSrc: Oral      SpO2: 98%  99% 99%   Weight:  49.9 kg (110 lb)     Height:  152.4 cm (60\")         Active LDAs/IV Access:   Lines, Drains & Airways       Active LDAs       Name Placement date Placement time Site Days    Peripheral IV Left Antecubital --  --  Antecubital  --                    Labs (abnormal labs have a star):   Labs Reviewed   COMPREHENSIVE METABOLIC PANEL - Abnormal; Notable for the following components:       Result Value    BUN 28 (*)     Creatinine 4.05 (*)     Albumin 3.3 (*)     BUN/Creatinine Ratio 6.9 (*)     eGFR 11.0 (*)     All other components within normal limits    Narrative:     GFR Categories in Chronic Kidney Disease (CKD)      GFR Category          GFR (mL/min/1.73)    Interpretation  G1                     90 or greater         Normal or high (1)  G2                      60-89                Mild decrease (1)  G3a                   " 45-59                Mild to moderate decrease  G3b                   30-44                Moderate to severe decrease  G4                    15-29                Severe decrease  G5                    14 or less           Kidney failure          (1)In the absence of evidence of kidney disease, neither GFR category G1 or G2 fulfill the criteria for CKD.    eGFR calculation 2021 CKD-EPI creatinine equation, which does not include race as a factor   TROPONIN - Abnormal; Notable for the following components:    HS Troponin T 128 (*)     All other components within normal limits    Narrative:     High Sensitive Troponin T Reference Range:  <14.0 ng/L- Negative Female for AMI  <22.0 ng/L- Negative Male for AMI  >=14 - Abnormal Female indicating possible myocardial injury.  >=22 - Abnormal Male indicating possible myocardial injury.   Clinicians would have to utilize clinical acumen, EKG, Troponin, and serial changes to determine if it is an Acute Myocardial Infarction or myocardial injury due to an underlying chronic condition.        CBC WITH AUTO DIFFERENTIAL - Abnormal; Notable for the following components:    RBC 3.61 (*)     Hemoglobin 10.3 (*)     MCHC 29.9 (*)     RDW 17.2 (*)     RDW-SD 59.2 (*)     Neutrophil % 41.7 (*)     Neutrophils, Absolute 1.58 (*)     All other components within normal limits   MAGNESIUM - Normal   PHOSPHORUS - Normal   RAINBOW DRAW    Narrative:     The following orders were created for panel order [place] Draw.  Procedure                               Abnormality         Status                     ---------                               -----------         ------                     Green Top (Gel)[[id number]]                                  Final result               Lavender Top[[id number]]                                     Final result               Gold Top - [facility][[id number]]                                   Final result               Light Blue Top[[id number]]                                    Final result                 Please view results for these tests on the individual orders.   HIGH SENSITIVITIY TROPONIN T 1HR   CBC AND DIFFERENTIAL    Narrative:     The following orders were created for panel order CBC & Differential.  Procedure                               Abnormality         Status                     ---------                               -----------         ------                     CBC Auto Differential[[id number]]        Abnormal            Final result                 Please view results for these tests on the individual orders.   GREEN TOP   LAVENDER TOP   GOLD TOP - SST   LIGHT BLUE TOP       EKG:   ECG 12 Lead Chest Pain   Preliminary Result   HEART RATE=54  bpm   RR Xxnfewpq=7472  ms   TN Xejcolur=812  ms   P Horizontal Axis=21  deg   P Front Axis=28  deg   QRSD Kusmibmc=260  ms   QT Fgwhmjlz=387  ms   AVjG=984  ms   QRS Axis=-76  deg   T Wave Axis=148  deg   - ABNORMAL ECG -   Sinus rhythm   Probable left atrial enlargement   Nonspecific IVCD with LAD   LVH with secondary repolarization abnormality   ST elevation secondary to IVCD   Date and Time of Study:2025-02-13 12:27:04          Meds given in ED:   Medications   morphine injection 2 mg (2 mg Intravenous Given 2/13/25 1502)       Imaging results:  XR Chest 1 View    Result Date: 2/13/2025  No focal pulmonary consolidation. Borderline heart size. Follow-up as clinical indications persist.  This report was finalized on 2/13/2025 1:55 PM by Dr. Michael Lauren M.D on Workstation: [id number]       Ambulatory status:   - with assist     Social issues:   Social History     Socioeconomic History    Marital status: Single   Tobacco Use    Smoking status: Never     Passive exposure: Never    Smokeless tobacco: Never   Vaping Use    Vaping status: Never Used   Substance and Sexual Activity    Alcohol use: No    Drug use: No    Sexual activity: Defer       Peripheral Neurovascular  Peripheral Neurovascular (Adult)  Peripheral  Neurovascular WDL: WDL    Neuro Cognitive  Neuro Cognitive (Adult)  Cognitive/Neuro/Behavioral WDL: WDL    Learning  Learning Assessment  Learning Readiness and Ability: no barriers identified    Respiratory  Respiratory WDL  Respiratory WDL: WDL    Abdominal Pain       Pain Assessments  Pain (Adult)  (0-10) Pain Rating: Rest: 0    NIH Stroke Scale       Maira Michelle RN  02/13/25 15:03 EST

## 2025-02-13 NOTE — Clinical Note
A 6 fr sheath was successfully inserted using micropuncture technique with ultrasound guidance into the right radial artery. Sheath insertion not delayed.
Allergies reviewed.  H&P note has been confirmed for the patient. Procedural consent has been signed.  Staff has reviewed the patient's labs.
Catheter Pulled back from LV to AO. Measurement captured.
Catheter inserted with wire simultaneously.
Department notified to bring the patient.
Hemostasis started on the right femoral artery. Mynx was used in achieving hemostasis. Closure device deployed in the vessel. Hemostasis achieved successfully. Closure device additional comment: mynx
Inserted under fluoro.
No in lab complications
Patient was given Post Procedure instruction by the staff.
Pre-Procedural Saturation was taken from the Right Hand. Sat result is 96%
Prepped: right groin. Prepped with: Hibiclens. The site was clipped.
The left coronary artery was selectively engaged, injected and visualized.
The patient was draped in a sterile fashion.
The physician has confirmed that the patient has been reassessed and is appropriate for moderate sedation
The physician has confirmed that the patient has been reassessed and is appropriate for moderate sedation
The right DP pulse is +2. The right PT pulse is +1. The right radial pulse is +2. The right ulnar pulse is +1. The right femoral pulse is +2.
The right coronary artery was selectively engaged, injected and visualized.
catheter advanced into LV.
catheter removed  over the wire.
inserted over wire.
removed.
no

## 2025-02-13 NOTE — H&P
HISTORY AND PHYSICAL   [facility]        Patient Identification:  Name: Sonia Acharya  Age: 74 y.o.  Sex: female  :  1950  MRN: [id number]                     Primary Care Physician: Marcy Paez PA    Chief Complaint: Chest pain    History of Present Illness:   Pleasant 74-year-old female with multiple medical issues including a history of coronary disease and recurrent chest pain.  She presents complaining of increased frequency and severity of her chest pain.  She actually describes this to me as being a lower sternal, epigastric pain.  She states that sharp.  She has not noted any exertional pattern to it.  Presently she is pain-free.      Past Medical History:  Past Medical History:   Diagnosis Date    Arthritis     Chronic kidney disease     Heart disease     Hypertension      Past Surgical History:  Past Surgical History:   Procedure Laterality Date    BREAST SURGERY      DIALYSIS FISTULA CREATION      EYE SURGERY      HERNIA REPAIR      Dr. Sung      Home Meds:  Medications Prior to Admission   Medication Sig Dispense Refill Last Dose/Taking    amLODIPine (NORVASC) 10 MG tablet Take 1 tablet by mouth Daily.   Taking    aspirin 81 MG EC tablet Take 1 tablet by mouth Daily.   Taking    atorvastatin (LIPITOR) 40 MG tablet Take 1 tablet by mouth Daily.   Taking    budesonide-formoterol (SYMBICORT) 80-4.5 MCG/ACT inhaler Inhale 2 puffs 2 (Two) Times a Day.   Taking    diphenhydrAMINE (BENADRYL) 25 mg capsule Take 2 capsules by mouth Every 6 (Six) Hours As Needed for Itching.   Taking As Needed    donepezil (ARICEPT) 5 MG tablet Take 1 tablet by mouth Every Night.   Taking    fluticasone (FLONASE) 50 MCG/ACT nasal spray Administer 2 sprays into the nostril(s) as directed by provider Daily.   Taking    hydrALAZINE (APRESOLINE) 50 MG tablet Take 1 tablet by mouth 3 (Three) Times a Day.   Taking    isosorbide mononitrate (IMDUR) 30 MG 24 hr tablet Take 1 tablet by mouth Daily.    Taking    lidocaine (LIDODERM) 5 % Place 1 patch on the skin as directed by provider Daily. Remove & Discard patch within 12 hours or as directed by MD 6 each 0 Taking    linaclotide (LINZESS) 290 MCG capsule capsule Take 1 capsule by mouth Every Morning Before Breakfast.   Taking    losartan (COZAAR) 100 MG tablet Take 1 tablet by mouth Daily. 30 tablet 0 Taking    melatonin 3 MG tablet Take 1 tablet by mouth Every Night.   Taking    metoprolol succinate XL (TOPROL-XL) 25 MG 24 hr tablet Take 1 tablet by mouth Daily.   Taking    clopidogrel (PLAVIX) 75 MG tablet Take 1 tablet by mouth Daily. (Patient not taking: Reported on 2/13/2025)   Not Taking    pantoprazole (PROTONIX) 40 MG EC tablet Take 1 tablet by mouth 2 (Two) Times a Day Before Meals. (Patient not taking: Reported on 2/13/2025) 30 tablet 0 Not Taking       Allergies:  No Known Allergies  Immunizations:    There is no immunization history on file for this patient.  Social History:   Social History     Social History Narrative    Not on file     Social History     Tobacco Use    Smoking status: Never     Passive exposure: Never    Smokeless tobacco: Never   Substance Use Topics    Alcohol use: No     Family History:  Family History   Problem Relation Age of Onset    Heart disease Father     Breast cancer Sister         Review of Systems  Review of Systems   Constitutional: Negative.    HENT: Negative.     Eyes: Negative.    Respiratory: Negative.     Cardiovascular:         As per history of present illness.   Gastrointestinal:         As per history of present illness.   Endocrine: Negative.    Genitourinary: Negative.    Musculoskeletal: Negative.    Skin: Negative.    Allergic/Immunologic: Negative.    Neurological: Negative.    Hematological: Negative.    Psychiatric/Behavioral:          In discussion with the ER physician there was concern of possible depression.  The patient presently denies this.  She states she was only tired of not knowing what  was causing her chest pain.       Objective:  T Max 24 hrs: Temp (24hrs), Av.7 °F (36.5 °C), Min:97.6 °F (36.4 °C), Max:97.8 °F (36.6 °C)    Vitals Ranges:   Temp:  [97.6 °F (36.4 °C)-97.8 °F (36.6 °C)] 97.6 °F (36.4 °C)  Heart Rate:  [54-62] 55  Resp:  [14-16] 16  BP: (118-125)/(55-82) 123/55      Exam:  Physical Exam  Constitutional:       General: She is not in acute distress.     Appearance: Normal appearance. She is normal weight. She is not ill-appearing, toxic-appearing or diaphoretic.   HENT:      Head: Normocephalic and atraumatic.      Right Ear: External ear normal.      Left Ear: External ear normal.      Nose: Nose normal.      Mouth/Throat:      Mouth: Mucous membranes are moist.   Eyes:      General: No scleral icterus.        Right eye: No discharge.         Left eye: No discharge.      Extraocular Movements: Extraocular movements intact.      Conjunctiva/sclera: Conjunctivae normal.   Cardiovascular:      Rate and Rhythm: Normal rate and regular rhythm.      Heart sounds: Murmur heard.      No friction rub. No gallop.      Comments: Harsh 3/6 systolic murmur loudest upper sternal borders.  Pulmonary:      Effort: Pulmonary effort is normal.      Breath sounds: Normal breath sounds.   Abdominal:      General: Abdomen is flat. Bowel sounds are normal. There is no distension.      Palpations: Abdomen is soft. There is no mass.      Tenderness: There is no abdominal tenderness. There is no guarding or rebound.   Musculoskeletal:      Cervical back: Neck supple.      Right lower leg: No edema.      Left lower leg: No edema.   Skin:     General: Skin is warm and dry.   Neurological:      General: No focal deficit present.      Mental Status: She is alert and oriented to person, place, and time.   Psychiatric:         Mood and Affect: Mood normal.         Behavior: Behavior normal.         Thought Content: Thought content normal.         Judgment: Judgment normal.         Data Review:  All labs and  radiology reviewed.    Assessment:  Chest pain/epigastric pain: Cardiology evaluation appreciated.  Will increase PPI to twice daily.  Monitor.  Hypertension: Continue home meds.  Monitor.  Anemia: Secondary to renal disease.  Stable.  Monitor.  ESRD-HDD: Nephrology consultation.  CODE STATUS: Patient request full team.    Plan:  Please see above.  Discussed with patient.  Discussed with ER provider.    Derek Young MD  2/13/2025  17:34 EST    EMR Dragon/Transcription disclaimer:   Much of this encounter note is an electronic transcription/translation of spoken language to printed text. The electronic translation of spoken language may permit erroneous, or at times, nonsensical words or phrases to be inadvertently transcribed; Although I have reviewed the note for such errors, some may still exist.

## 2025-02-14 PROBLEM — R10.9 CHRONIC ABDOMINAL PAIN: Status: ACTIVE | Noted: 2025-02-14

## 2025-02-14 PROBLEM — G89.29 CHRONIC ABDOMINAL PAIN: Status: ACTIVE | Noted: 2025-02-14

## 2025-02-14 PROBLEM — R07.9 CHEST PAIN: Status: RESOLVED | Noted: 2025-02-13 | Resolved: 2025-02-14

## 2025-02-14 PROBLEM — E11.649 TYPE 2 DIABETES MELLITUS WITH HYPOGLYCEMIA: Status: ACTIVE | Noted: 2025-02-14

## 2025-02-14 LAB
ALBUMIN SERPL-MCNC: 3.1 G/DL (ref 3.5–5.2)
ANION GAP SERPL CALCULATED.3IONS-SCNC: 10.6 MMOL/L (ref 5–15)
BUN SERPL-MCNC: 34 MG/DL (ref 8–23)
BUN/CREAT SERPL: 6.4 (ref 7–25)
CALCIUM SPEC-SCNC: 9.3 MG/DL (ref 8.6–10.5)
CHLORIDE SERPL-SCNC: 100 MMOL/L (ref 98–107)
CO2 SERPL-SCNC: 27.4 MMOL/L (ref 22–29)
CREAT SERPL-MCNC: 5.29 MG/DL (ref 0.57–1)
DEPRECATED RDW RBC AUTO: 55.8 FL (ref 37–54)
EGFRCR SERPLBLD CKD-EPI 2021: 8 ML/MIN/1.73
ERYTHROCYTE [DISTWIDTH] IN BLOOD BY AUTOMATED COUNT: 16.9 % (ref 12.3–15.4)
GLUCOSE BLDC GLUCOMTR-MCNC: 121 MG/DL (ref 70–130)
GLUCOSE BLDC GLUCOMTR-MCNC: 131 MG/DL (ref 70–130)
GLUCOSE BLDC GLUCOMTR-MCNC: 152 MG/DL (ref 70–130)
GLUCOSE BLDC GLUCOMTR-MCNC: 158 MG/DL (ref 70–130)
GLUCOSE BLDC GLUCOMTR-MCNC: 58 MG/DL (ref 70–130)
GLUCOSE BLDC GLUCOMTR-MCNC: 60 MG/DL (ref 70–130)
GLUCOSE BLDC GLUCOMTR-MCNC: 63 MG/DL (ref 70–130)
GLUCOSE SERPL-MCNC: 76 MG/DL (ref 65–99)
HBV SURFACE AB SER RIA-ACNC: REACTIVE
HBV SURFACE AG SERPL QL IA: NORMAL
HCT VFR BLD AUTO: 31.4 % (ref 34–46.6)
HGB BLD-MCNC: 9.7 G/DL (ref 12–15.9)
MCH RBC QN AUTO: 28.3 PG (ref 26.6–33)
MCHC RBC AUTO-ENTMCNC: 30.9 G/DL (ref 31.5–35.7)
MCV RBC AUTO: 91.5 FL (ref 79–97)
PHOSPHATE SERPL-MCNC: 5.3 MG/DL (ref 2.5–4.5)
PLATELET # BLD AUTO: 184 10*3/MM3 (ref 140–450)
PMV BLD AUTO: 9.5 FL (ref 6–12)
POTASSIUM SERPL-SCNC: 5.3 MMOL/L (ref 3.5–5.2)
RBC # BLD AUTO: 3.43 10*6/MM3 (ref 3.77–5.28)
SODIUM SERPL-SCNC: 138 MMOL/L (ref 136–145)
TROPONIN T SERPL HS-MCNC: 123 NG/L
WBC NRBC COR # BLD AUTO: 4.38 10*3/MM3 (ref 3.4–10.8)

## 2025-02-14 PROCEDURE — 63710000001 ACETAMINOPHEN 325 MG TABLET: Performed by: INTERNAL MEDICINE

## 2025-02-14 PROCEDURE — A9270 NON-COVERED ITEM OR SERVICE: HCPCS | Performed by: INTERNAL MEDICINE

## 2025-02-14 PROCEDURE — 63710000001 DONEPEZIL 10 MG TABLET: Performed by: INTERNAL MEDICINE

## 2025-02-14 PROCEDURE — G0378 HOSPITAL OBSERVATION PER HR: HCPCS

## 2025-02-14 PROCEDURE — 96375 TX/PRO/DX INJ NEW DRUG ADDON: CPT

## 2025-02-14 PROCEDURE — 63710000001 DIPHENHYDRAMINE PER 50 MG: Performed by: INTERNAL MEDICINE

## 2025-02-14 PROCEDURE — C1760 CLOSURE DEV, VASC: HCPCS | Performed by: INTERNAL MEDICINE

## 2025-02-14 PROCEDURE — C1894 INTRO/SHEATH, NON-LASER: HCPCS | Performed by: INTERNAL MEDICINE

## 2025-02-14 PROCEDURE — 84484 ASSAY OF TROPONIN QUANT: CPT | Performed by: INTERNAL MEDICINE

## 2025-02-14 PROCEDURE — G0257 UNSCHED DIALYSIS ESRD PT HOS: HCPCS

## 2025-02-14 PROCEDURE — 25010000002 MIDAZOLAM PER 1 MG: Performed by: INTERNAL MEDICINE

## 2025-02-14 PROCEDURE — 25010000002 MORPHINE PER 10 MG

## 2025-02-14 PROCEDURE — 85027 COMPLETE CBC AUTOMATED: CPT | Performed by: HOSPITALIST

## 2025-02-14 PROCEDURE — 63710000001 CLOPIDOGREL 75 MG TABLET: Performed by: INTERNAL MEDICINE

## 2025-02-14 PROCEDURE — 96376 TX/PRO/DX INJ SAME DRUG ADON: CPT

## 2025-02-14 PROCEDURE — 25010000002 ONDANSETRON PER 1 MG: Performed by: INTERNAL MEDICINE

## 2025-02-14 PROCEDURE — 93010 ELECTROCARDIOGRAM REPORT: CPT | Performed by: INTERNAL MEDICINE

## 2025-02-14 PROCEDURE — 80069 RENAL FUNCTION PANEL: CPT | Performed by: HOSPITALIST

## 2025-02-14 PROCEDURE — 93458 L HRT ARTERY/VENTRICLE ANGIO: CPT | Performed by: INTERNAL MEDICINE

## 2025-02-14 PROCEDURE — 63710000001 PANTOPRAZOLE 40 MG TABLET DELAYED-RELEASE: Performed by: INTERNAL MEDICINE

## 2025-02-14 PROCEDURE — 82948 REAGENT STRIP/BLOOD GLUCOSE: CPT

## 2025-02-14 PROCEDURE — 25010000002 MORPHINE PER 10 MG: Performed by: INTERNAL MEDICINE

## 2025-02-14 PROCEDURE — 99214 OFFICE O/P EST MOD 30 MIN: CPT | Performed by: INTERNAL MEDICINE

## 2025-02-14 PROCEDURE — 93005 ELECTROCARDIOGRAM TRACING: CPT | Performed by: INTERNAL MEDICINE

## 2025-02-14 PROCEDURE — 25010000002 LIDOCAINE 2% SOLUTION: Performed by: INTERNAL MEDICINE

## 2025-02-14 PROCEDURE — 25510000001 IOPAMIDOL PER 1 ML: Performed by: INTERNAL MEDICINE

## 2025-02-14 PROCEDURE — 25010000002 FENTANYL CITRATE (PF) 50 MCG/ML SOLUTION: Performed by: INTERNAL MEDICINE

## 2025-02-14 PROCEDURE — C1769 GUIDE WIRE: HCPCS | Performed by: INTERNAL MEDICINE

## 2025-02-14 PROCEDURE — 63710000001 LUBIPROSTONE 8 MCG CAPSULE: Performed by: INTERNAL MEDICINE

## 2025-02-14 PROCEDURE — 63710000001 HYDRALAZINE 50 MG TABLET: Performed by: INTERNAL MEDICINE

## 2025-02-14 RX ORDER — IBUPROFEN 600 MG/1
1 TABLET ORAL
Status: DISCONTINUED | OUTPATIENT
Start: 2025-02-14 | End: 2025-02-15 | Stop reason: HOSPADM

## 2025-02-14 RX ORDER — NICOTINE POLACRILEX 4 MG
15 LOZENGE BUCCAL
Status: DISCONTINUED | OUTPATIENT
Start: 2025-02-14 | End: 2025-02-15 | Stop reason: HOSPADM

## 2025-02-14 RX ORDER — MORPHINE SULFATE 2 MG/ML
INJECTION, SOLUTION INTRAMUSCULAR; INTRAVENOUS
Status: COMPLETED
Start: 2025-02-14 | End: 2025-02-14

## 2025-02-14 RX ORDER — DEXTROSE MONOHYDRATE 25 G/50ML
25 INJECTION, SOLUTION INTRAVENOUS
Status: DISCONTINUED | OUTPATIENT
Start: 2025-02-14 | End: 2025-02-15 | Stop reason: HOSPADM

## 2025-02-14 RX ORDER — MORPHINE SULFATE 2 MG/ML
2 INJECTION, SOLUTION INTRAMUSCULAR; INTRAVENOUS EVERY 4 HOURS PRN
Status: DISCONTINUED | OUTPATIENT
Start: 2025-02-14 | End: 2025-02-15 | Stop reason: HOSPADM

## 2025-02-14 RX ORDER — MIDAZOLAM HYDROCHLORIDE 1 MG/ML
INJECTION, SOLUTION INTRAMUSCULAR; INTRAVENOUS
Status: DISCONTINUED | OUTPATIENT
Start: 2025-02-14 | End: 2025-02-14 | Stop reason: HOSPADM

## 2025-02-14 RX ORDER — MANNITOL 250 MG/ML
12.5 INJECTION, SOLUTION INTRAVENOUS AS NEEDED
Status: CANCELLED | OUTPATIENT
Start: 2025-02-14 | End: 2025-02-15

## 2025-02-14 RX ORDER — LIDOCAINE HYDROCHLORIDE 20 MG/ML
INJECTION, SOLUTION INFILTRATION; PERINEURAL
Status: DISCONTINUED | OUTPATIENT
Start: 2025-02-14 | End: 2025-02-14 | Stop reason: HOSPADM

## 2025-02-14 RX ORDER — IOPAMIDOL 755 MG/ML
INJECTION, SOLUTION INTRAVASCULAR
Status: DISCONTINUED | OUTPATIENT
Start: 2025-02-14 | End: 2025-02-14 | Stop reason: HOSPADM

## 2025-02-14 RX ORDER — FENTANYL CITRATE 50 UG/ML
INJECTION, SOLUTION INTRAMUSCULAR; INTRAVENOUS
Status: DISCONTINUED | OUTPATIENT
Start: 2025-02-14 | End: 2025-02-14 | Stop reason: HOSPADM

## 2025-02-14 RX ORDER — CLOPIDOGREL BISULFATE 75 MG/1
75 TABLET ORAL DAILY
Status: DISCONTINUED | OUTPATIENT
Start: 2025-02-14 | End: 2025-02-15 | Stop reason: HOSPADM

## 2025-02-14 RX ORDER — DEXTROSE MONOHYDRATE 25 G/50ML
INJECTION, SOLUTION INTRAVENOUS
Status: COMPLETED
Start: 2025-02-14 | End: 2025-02-14

## 2025-02-14 RX ADMIN — Medication 10 ML: at 08:58

## 2025-02-14 RX ADMIN — DEXTROSE MONOHYDRATE 25 G: 25 INJECTION, SOLUTION INTRAVENOUS at 11:17

## 2025-02-14 RX ADMIN — ACETAMINOPHEN 325MG 650 MG: 325 TABLET ORAL at 16:12

## 2025-02-14 RX ADMIN — CLOPIDOGREL 75 MG: 75 TABLET, FILM COATED ORAL at 16:18

## 2025-02-14 RX ADMIN — Medication 10 ML: at 20:35

## 2025-02-14 RX ADMIN — DIPHENHYDRAMINE HYDROCHLORIDE 50 MG: 25 CAPSULE ORAL at 20:36

## 2025-02-14 RX ADMIN — PANTOPRAZOLE SODIUM 40 MG: 40 TABLET, DELAYED RELEASE ORAL at 16:18

## 2025-02-14 RX ADMIN — LOSARTAN POTASSIUM 100 MG: 100 TABLET, FILM COATED ORAL at 08:59

## 2025-02-14 RX ADMIN — MORPHINE SULFATE 2 MG: 2 INJECTION, SOLUTION INTRAMUSCULAR; INTRAVENOUS at 20:28

## 2025-02-14 RX ADMIN — ISOSORBIDE MONONITRATE 30 MG: 30 TABLET, EXTENDED RELEASE ORAL at 08:59

## 2025-02-14 RX ADMIN — HYDRALAZINE HYDROCHLORIDE 50 MG: 50 TABLET ORAL at 20:28

## 2025-02-14 RX ADMIN — DONEPEZIL HYDROCHLORIDE 5 MG: 10 TABLET, FILM COATED ORAL at 20:28

## 2025-02-14 RX ADMIN — ACETAMINOPHEN 325MG 650 MG: 325 TABLET ORAL at 20:28

## 2025-02-14 RX ADMIN — HYDRALAZINE HYDROCHLORIDE 50 MG: 50 TABLET ORAL at 08:59

## 2025-02-14 RX ADMIN — PANTOPRAZOLE SODIUM 40 MG: 40 TABLET, DELAYED RELEASE ORAL at 05:38

## 2025-02-14 RX ADMIN — MORPHINE SULFATE 2 MG: 2 INJECTION, SOLUTION INTRAMUSCULAR; INTRAVENOUS at 06:38

## 2025-02-14 RX ADMIN — ASPIRIN 81 MG: 81 TABLET, COATED ORAL at 08:59

## 2025-02-14 RX ADMIN — ATORVASTATIN CALCIUM 40 MG: 20 TABLET, FILM COATED ORAL at 08:59

## 2025-02-14 RX ADMIN — LUBIPROSTONE 8 MCG: 8 CAPSULE, GELATIN COATED ORAL at 20:28

## 2025-02-14 RX ADMIN — DEXTROSE MONOHYDRATE 25 G: 25 INJECTION, SOLUTION INTRAVENOUS at 06:28

## 2025-02-14 RX ADMIN — METOPROLOL SUCCINATE 25 MG: 25 TABLET, EXTENDED RELEASE ORAL at 08:59

## 2025-02-14 RX ADMIN — ONDANSETRON 4 MG: 2 INJECTION, SOLUTION INTRAMUSCULAR; INTRAVENOUS at 20:34

## 2025-02-14 RX ADMIN — AMLODIPINE BESYLATE 10 MG: 10 TABLET ORAL at 08:59

## 2025-02-14 NOTE — CONSULTS
[facility] Consult Note      Patient Name: Sonia Acharya  : 1950  MRN: [id number]  Primary Care Physician:  Marcy Paez PA  Referring Physician: Derek Young MD  Date of admission: 2025    Subjective     Reason for Consult: End-stage renal disease    HPI:   Sonia Acharya is a 74 y.o. female patient was admitted to the on 2025 with recurrent chest pain, she has history of coronary artery disease prior PCI and stents also she had ejection fraction about 56% and LVH.  She has end-stage renal disease continues hemodialysis every Monday, Wednesday and Friday via right upper extremity [place] [facility] [place] followed by my partner Dr. Lazo.  She  has history of hypertension prior history of DVT.    She denies shortness of breath or chest pain is improved since admission but on and off and being evaluated by cardiology, no nausea or vomiting, no abdominal pain, no lightheadedness    Review of Systems:   14 point review of systems is otherwise negative except for mentioned above on HPI    Personal History     Past Medical History:   Diagnosis Date    Arthritis     Chronic kidney disease     Heart disease     Hypertension        Past Surgical History:   Procedure Laterality Date    BREAST SURGERY      DIALYSIS FISTULA CREATION      EYE SURGERY      HERNIA REPAIR      Dr. Sung       Family History: family history includes Breast cancer in her sister; Heart disease in her father.    Social History:  reports that she has never smoked. She has never been exposed to tobacco smoke. She has never used smokeless tobacco. She reports that she does not drink alcohol and does not use drugs.    Home Medications:  Prior to Admission medications    Medication Sig Start Date End Date Taking? Authorizing Provider   amLODIPine (NORVASC) 10 MG tablet Take 1 tablet by mouth Daily.   Yes Provider, MD Manish   aspirin 81 MG EC tablet Take 1 tablet by mouth Daily.    Yes Manish Tracy MD   atorvastatin (LIPITOR) 40 MG tablet Take 1 tablet by mouth Daily.   Yes Manish Tracy MD   budesonide-formoterol (SYMBICORT) 80-4.5 MCG/ACT inhaler Inhale 2 puffs 2 (Two) Times a Day.   Yes Manish Tracy MD   diphenhydrAMINE (BENADRYL) 25 mg capsule Take 2 capsules by mouth Every 6 (Six) Hours As Needed for Itching.   Yes Manish Tracy MD   donepezil (ARICEPT) 5 MG tablet Take 1 tablet by mouth Every Night.   Yes Manish Tracy MD   fluticasone (FLONASE) 50 MCG/ACT nasal spray Administer 2 sprays into the nostril(s) as directed by provider Daily.   Yes Manish Tracy MD   hydrALAZINE (APRESOLINE) 50 MG tablet Take 1 tablet by mouth 3 (Three) Times a Day.   Yes Manish Tracy MD   isosorbide mononitrate (IMDUR) 30 MG 24 hr tablet Take 1 tablet by mouth Daily. 1/31/22  Yes Manish Tracy MD   lidocaine (LIDODERM) 5 % Place 1 patch on the skin as directed by provider Daily. Remove & Discard patch within 12 hours or as directed by MD 1/26/25  Yes Varun Daniel MD   linaclotide (LINZESS) 290 MCG capsule capsule Take 1 capsule by mouth Every Morning Before Breakfast.   Yes Manish Tracy MD   losartan (COZAAR) 100 MG tablet Take 1 tablet by mouth Daily. 1/19/23  Yes Hayden Granda MD   melatonin 3 MG tablet Take 1 tablet by mouth Every Night.   Yes Manish Tracy MD   metoprolol succinate XL (TOPROL-XL) 25 MG 24 hr tablet Take 1 tablet by mouth Daily.   Yes Manish Tracy MD   clopidogrel (PLAVIX) 75 MG tablet Take 1 tablet by mouth Daily.  Patient not taking: Reported on 2/13/2025    Manish Tracy MD   pantoprazole (PROTONIX) 40 MG EC tablet Take 1 tablet by mouth 2 (Two) Times a Day Before Meals.  Patient not taking: Reported on 2/13/2025 12/24/23   Monroe Chin MD       Allergies:  No Known Allergies    Objective     Vitals:   Temp:  [97.6 °F (36.4 °C)-98.1 °F (36.7 °C)] 97.9 °F (36.6 °C)  Heart  Rate:  [54-71] 65  Resp:  [14-16] 16  BP: (118-170)/() 170/64  Flow (L/min) (Oxygen Therapy):  [2] 2    Intake/Output Summary (Last 24 hours) at 2/14/2025 0910  Last data filed at 2/13/2025 1900  Gross per 24 hour   Intake 240 ml   Output --   Net 240 ml       Physical Exam:   Constitutional: Awake, alert, no acute distress.  Chronically ill  HEENT: Sclera anicteric, no conjunctival injection  Neck: No JVD  Respiratory: Clear to auscultation bilaterally, nonlabored respiration  Cardiovascular: RRR, grade 2/6 systolic murmur, no rubs or gallops, no carotid bruit  Gastrointestinal: Positive bowel sounds, abdomen is soft, nontender and nondistended  : No palpable bladder  Musculoskeletal: No edema, no clubbing or cyanosis, functional AV fistula in the right upper arm with good thrill and bruit.  Psychiatric: Appropriate affect, cooperative  Neurologic: Oriented x3, moving all extremities, normal speech and mental status  Skin: Warm and dry       Scheduled Meds:     amLODIPine, 10 mg, Oral, Daily  aspirin, 81 mg, Oral, Daily  atorvastatin, 40 mg, Oral, Daily  budesonide-formoterol, 2 puff, Inhalation, BID - RT  donepezil, 5 mg, Oral, Nightly  fluticasone, 2 spray, Nasal, Daily  hydrALAZINE, 50 mg, Oral, TID  isosorbide mononitrate, 30 mg, Oral, Daily  losartan, 100 mg, Oral, Daily  lubiprostone, 8 mcg, Oral, BID  metoprolol succinate XL, 25 mg, Oral, Daily  pantoprazole, 40 mg, Oral, BID AC  sodium chloride, 10 mL, Intravenous, Q12H      IV Meds:        Results Reviewed:   I have personally reviewed the results from the time of this admission to 2/14/2025 09:10 EST     Lab Results   Component Value Date    GLUCOSE 76 02/14/2025    CALCIUM 9.3 02/14/2025     02/14/2025    K 5.3 (H) 02/14/2025    CO2 27.4 02/14/2025     02/14/2025    BUN 34 (H) 02/14/2025    CREATININE 5.29 (H) 02/14/2025    EGFRIFAFRI 10 (L) 03/04/2023    EGFRIFNONA  12/14/2021      Comment:      <15 Indicative of kidney failure.     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  Today's potassium is 5.3.  Appears to be euvolemic.  Coronary artery disease with recurrent chest pain she had prior PCI and stent, cardiology evaluation is in progress.  Hypertension with end-stage renal disease blood pressure is not controlled with systolic component probably there is an element of volume excess as a cause of the systolic hypertension.  Anemia of chronic kidney disease, hemoglobin 9.7, treated with long-acting ZULAY as an outpatient.    PLAN:  Hemodialysis today  I agree with the present treatment  Surveillance labs    Reviewed the chart and other providers notes, reviewed labs.  I discussed the case with the patient.    Thank you for involving us in the care of Sonia GAVIOTA Marck.  Please feel free to call with any questions.    Nathan Adkins MD  02/14/25  09:10 [facility]    [facility]  [phone]      Please note that portions of this note were completed with a voice recognition program.

## 2025-02-14 NOTE — PROGRESS NOTES
[facility] Medicine Services  PROGRESS NOTE    Patient Name: Sonia Acharya  : 1950  MRN: [id number]    Date of Admission: 2025  Primary Care Physician: Marcy Paez PA    Subjective   Subjective     CC:  Chest pain    Subjective:  No current chest pain reported today.  Abdominal pain reportedly improved.    Review of Systems  No current fevers or chills  No current shortness of breath or cough  No current nausea, vomiting, or diarrhea       Objective   Objective     Vital Signs:   Temp:  [97.3 °F (36.3 °C)-98.1 °F (36.7 °C)] 97.3 °F (36.3 °C)  Heart Rate:  [51-71] 55  Resp:  [14-18] 16  BP: (110-170)/() 125/65        Physical Exam:  Constitutional: Awake, alert, elderly appearing, chronically ill appearing but no acute distress  HENT: NCAT, mucous membranes moist, neck supple  Respiratory: No cough or wheezes, normal respirations, nonlabored breathing   Cardiovascular: Pulse rate is borderline bradycardia, palpable radial pulses  Gastrointestinal:  Soft, nontender, nondistended  Musculoskeletal: Thin frail and chronically debilitated in appearance, muscle wasting noted  Psychiatric: Appropriate affect, cooperative, conversational  Neurologic: No slurred speech or facial droop, follows commands  Skin: No rashes or jaundice, warm      Results Reviewed:  Results from last 7 days   Lab Units 25  0447 25  1233   WBC 10*3/mm3 4.38 3.79   HEMOGLOBIN g/dL 9.7* 10.3*   HEMATOCRIT % 31.4* 34.4   PLATELETS 10*3/mm3 184 176     Results from last 7 days   Lab Units 25  0447 25  1505 25  1338   SODIUM mmol/L 138  --  136   POTASSIUM mmol/L 5.3*  --  4.3   CHLORIDE mmol/L 100  --  100   CO2 mmol/L 27.4  --  26.5   BUN mg/dL 34*  --  28*   CREATININE mg/dL 5.29*  --  4.05*   GLUCOSE mg/dL 76  --  97   CALCIUM mg/dL 9.3  --  8.9   ALK PHOS U/L  --   --  91   ALT (SGPT) U/L  --   --  12   AST (SGOT) U/L  --   --  20   HSTROP T ng/L 123* 131* 128*     Estimated Creatinine  Clearance: 7.2 mL/min (A) (by C-G formula based on SCr of 5.29 mg/dL (H)).    Microbiology Results Abnormal       None            Imaging Results (Last 24 Hours)       ** No results found for the last 24 hours. **            Results for orders placed during the hospital encounter of 01/06/25    Adult Transthoracic Echo Complete W/ Cont if Necessary Per Protocol    Interpretation Summary    Left ventricular systolic function is normal. Calculated left ventricular EF = 56.6%    Left ventricular wall thickness is consistent with severe concentric hypertrophy.    The following left ventricular wall segments are hypokinetic: mid anterior, apical anterior and apex hypokinetic.    Left ventricular diastolic function is consistent with (grade II w/high LAP) pseudonormalization.    The left atrial cavity is severely dilated.    Left atrial volume is severely increased.    Mild aortic valve stenosis is present. Aortic valve area is 1.1 cm2.    Aortic valve maximum pressure gradient is 28 mmHg. Aortic valve mean pressure gradient is 15 mmHg.    Mild mitral valve regurgitation is present    There is a trivial circumferential pericardial effusion. There is no evidence of cardiac tamponade.      I have reviewed the medications:  Scheduled Meds:amLODIPine, 10 mg, Oral, Daily  aspirin, 81 mg, Oral, Daily  atorvastatin, 40 mg, Oral, Daily  budesonide-formoterol, 2 puff, Inhalation, BID - RT  clopidogrel, 75 mg, Oral, Daily  donepezil, 5 mg, Oral, Nightly  fluticasone, 2 spray, Nasal, Daily  hydrALAZINE, 50 mg, Oral, TID  isosorbide mononitrate, 30 mg, Oral, Daily  losartan, 100 mg, Oral, Daily  lubiprostone, 8 mcg, Oral, BID  metoprolol succinate XL, 25 mg, Oral, Daily  pantoprazole, 40 mg, Oral, BID AC  sodium chloride, 10 mL, Intravenous, Q12H      Continuous Infusions:   PRN Meds:.  acetaminophen **OR** acetaminophen **OR** acetaminophen    senna-docusate sodium **AND** polyethylene glycol **AND** bisacodyl **AND** bisacodyl     dextrose    dextrose    dextrose    dextrose    diphenhydrAMINE    glucagon (human recombinant)    glucagon (human recombinant)    melatonin    Morphine    nitroglycerin    ondansetron ODT **OR** ondansetron    sodium chloride    sodium chloride    Assessment & Plan   Assessment & Plan     Active Hospital Problems    Diagnosis  POA    Chronic abdominal pain [R10.9, G89.29]  Yes    Type 2 diabetes mellitus with hypoglycemia [E11.649]  Yes    Severe protein-calorie malnutrition [E43]  Yes    Chronic respiratory failure with hypoxia [J96.11]  Yes    CAD (coronary artery disease) [I25.10]  Yes    ESRD (end stage renal disease) on dialysis [N18.6, Z99.2]  Not Applicable    Hyperkalemia [E87.5]  Yes    ESRD on dialysis [N18.6, Z99.2]  Not Applicable    Chronic diastolic CHF (congestive heart failure) [I50.32]  Yes    Bradycardia [R00.1]  Yes    Aortic stenosis [I35.0]  Yes    HTN (hypertension) [I10]  Yes    Chest pain, atypical [R07.89]  Yes    Anemia, chronic disease [D63.8]  Yes      Resolved Hospital Problems    Diagnosis Date Resolved POA    **Chest pain [R07.9] 02/14/2025 Yes        Brief Hospital Course to date:  Sonia Acharya is a 74 y.o. female presents the hospital with chest pain    Discussion/plan today:  All medical problems are new under my management today.  Patient has not had any chest pain today.  She underwent catheterization and did not need intervention.  Plan is for medical management for known CAD.  Suspect noncardiac etiology such as GI symptoms.  Patient thinks this is possible.  Admitting physician changed PPI to twice daily.  Perhaps PPI twice daily for 7 to 10 days would be acceptable and then she could go back to once daily.  She could also use H2 blocker as needed if she had breakthrough reflux.  Dialysis is planned for later today.  Notably patient has had some episodes of hypoglycemia.  She was recently diagnosed with severe malnutrition on 1/8.  She has history of diabetes but her most  recent A1c is actually only 4.9.  Suspect her low blood sugars are related to nutrition as she is not on any diabetic medications currently.  I will add Nepro supplements to maximize nutrition and recommend she continue these at discharge.  Hyperkalemia will be treated with dialysis.  Bradycardia is asymptomatic and patient benefits from low-dose beta-blocker.  Continue blood pressure medications.  Patient currently normotensive.  Statin for hyperlipidemia.  Discharge plan is home however she is to get dialysis tonight and may need to stay if it cannot be completed at a reasonable hour    Chest pain:  Resolved.  Status post left heart catheterization.  No intervention required.  Cardiology suspects noncardiac etiologies.    GERD and chronic abdominal pain:  Possible GERD symptoms contributing to chest pain.  Admitting physician changed PPI to twice daily.  Probably could do PPI twice daily for 7 to 10 days and then back to once daily and use Pepcid as needed for breakthrough heartburn.    CAD, hyperlipidemia, essential hypertension, diastolic CHF:  Reasonably euvolemic.  Continue appropriate cardiac medications.    Severe malnutrition: Supplement.    End-stage renal disease on hemodialysis: Nephrology following dialysis today    DVT Prophylaxis:   SCDs      Disposition: home in AM likely    CODE STATUS:   Code Status and Medical Interventions: CPR (Attempt to Resuscitate); Full Support   Ordered at: 02/13/25 1747     Code Status (Patient has no pulse and is not breathing):    CPR (Attempt to Resuscitate)     Medical Interventions (Patient has pulse or is breathing):    Full Support       Casey Castanon MD  02/14/25

## 2025-02-14 NOTE — DISCHARGE INSTRUCTIONS
[facility]  [address] [place] [address]      Coronary Angiogram (Femoral Approach) After Care     Refer to this sheet in the next few weeks. These instructions provide you with information on caring for yourself after your procedure. Your health care provider may also give you more specific instructions. Your treatment has been planned according to current medical practices, but problems sometimes occur. Call your health care provider if you have any problems or questions after your procedure.      What to Expect After the Procedure:  After your procedure, it is typical to have the following sensations:  Minor discomfort or tenderness and a small bump at the catheter insertion site. The bump should usually decrease in size and tenderness within 1 to 2 weeks.  Any bruising will usually fade within 2 to 4 weeks.    Home Care Instructions:  Do not apply powder or lotion to the site.  Do not take baths, swim, or use a hot tub until your health care provider approves and the site is completely healed.  Do not bend, squat, or lift anything over 20 lb (9 kg) or as directed by your health care provider. However, we recommend lifting nothing heavier than a gallon of milk.    You may shower 24 hours after the procedure. Remove the bandage (dressing) and gently wash the site with plain soap and water. Gently pat the site dry. You may apply a band aid daily for 2 days if desired.    Inspect the site at least twice daily.  Increase your fluid intake for the next 2 days.    Limit your activity for the first 48 hours. .    Avoid strenuous activity for 1 week or as advised by your physician.    Follow instructions about when you can drive or return to work as directed by your physician.    Hold direct pressure over the site when you cough, sneeze, laugh or change positions.  Do this for the next 2 days.    Do not operate machinery or power tools for 24 hours.  A responsible adult should be with you for the  first 24 hours after you arrive home. Do not make any important legal decisions or sign legal papers for 24 hours.  Do not drink alcohol for 24 hours.  Metformin or any medications containing Metformin should not be taken for 48 hours after your procedure.      Call Your Doctor If:  You have drainage (other than a small amount of blood on the dressing).  You have chills or a fever > 101.  You have redness, warmth, swelling(larger than a walnut), or pain at the insertion site  You develop chest pain or shortness of breath, feel faint, or pass out.  You develop pain, discoloration, coldness, numbness, tingling, or severe bruising in the leg that held the catheter.  You develop bleeding from any other place, such as the bowels.  You have heavy bleeding from the site, hold pressure on the site for 20 minutes.  If the bleeding stops, apply a fresh bandage and call your cardiologist.  However, if you continue to have bleeding, call 911.  You have any symptoms of a stroke.  Remember BE FAST  B-balance. Sudden trouble walking or loss of balance.  E-eyes.  Sudden changes in how you see or a sudden onset of a very bad headache.   F-face. Sudden weakness or loss of feeling of the face or facial droop on one side.   A-arms Sudden weakness or numbness in one arm.  One arm drifts down if they are both held out in front of you. This happens suddenly and usually on one side of the body.  S-speech.  Sudden trouble speaking, slurred speech or trouble understanding what people are saying.   T-time  Time to call emergency services.  Write down the symptoms and the time they started.        Make Sure You:  Understand these instructions.  Will watch your condition.  Will get help right away if you are not doing well or get worse.

## 2025-02-14 NOTE — DISCHARGE PLACEMENT REQUEST
"Sonia Lara (74 y.o. Female)       Date of Birth   1950    Social Security Number       Address   [address] [place] [address]    Home Phone   [phone]    MRN   [id number]       Restorationism   None    Marital Status   Single                            Admission Date   2/13/25    Admission Type   Emergency    Admitting Provider   Derek Young MD    Attending Provider   Casey Castanon MD    Department, Room/Bed   [address], S618/1       Discharge Date       Discharge Disposition       Discharge Destination                                 Attending Provider: Casey Castanon MD    Allergies: No Known Allergies    Isolation: None   Infection: None   Code Status: CPR    Ht: 152.4 cm (60\")   Wt: 49.9 kg (110 lb)    Admission Cmt: None   Principal Problem: Chest pain [R07.9]                   Active Insurance as of 2/13/2025       Primary Coverage       Payor Plan Insurance Group Employer/Plan Group    HUMANA MEDICARE REPLACEMENT HUMANA MED ADV HMO [id number]       Payor Plan Address Payor Plan Phone Number Payor Plan Fax Number Effective Dates    [address] [address] [phone]  1/1/2023 - None Entered    [facility] [id number]         Subscriber Name Subscriber Birth Date Member ID       SONIA LARA 1950 [id number]                     Emergency Contacts        (Rel.) Home Phone Work Phone Mobile Phone    Naty Lara (Daughter) -- -- [phone]                "

## 2025-02-14 NOTE — PROGRESS NOTES
LOS: 0 days   Patient Care Team:  Marcy Paez PA as PCP - General (Physician Assistant)    Chief Complaint: Follow-up chest pain, coronary artery disease.    Interval History: Mild chest discomfort overnight.  No other acute events.  Denied significant shortness of breath this morning.    Vital Signs:  Temp:  [97.6 °F (36.4 °C)-98.1 °F (36.7 °C)] 97.9 °F (36.6 °C)  Heart Rate:  [54-71] 65  Resp:  [14-16] 16  BP: (118-170)/() 170/64    Intake/Output Summary (Last 24 hours) at 2/14/2025 1153  Last data filed at 2/13/2025 1900  Gross per 24 hour   Intake 240 ml   Output --   Net 240 ml       Physical Exam:   General Appearance:    No acute distress, alert and oriented x4   Lungs:     Clear to auscultation bilaterally     Heart:    Regular rhythm and normal rate. III/VI SM RUSB and LUSB.    Abdomen:     Soft, nontender, nondistended.    Extremities:   No clubbing, cyanosis, or edema.     Results Review:    Results from last 7 days   Lab Units 02/14/25  0447   SODIUM mmol/L 138   POTASSIUM mmol/L 5.3*   CHLORIDE mmol/L 100   CO2 mmol/L 27.4   BUN mg/dL 34*   CREATININE mg/dL 5.29*   GLUCOSE mg/dL 76   CALCIUM mg/dL 9.3     Results from last 7 days   Lab Units 02/14/25  0447 02/13/25  1505 02/13/25  1338   HSTROP T ng/L 123* 131* 128*     Results from last 7 days   Lab Units 02/14/25  0447   WBC 10*3/mm3 4.38   HEMOGLOBIN g/dL 9.7*   HEMATOCRIT % 31.4*   PLATELETS 10*3/mm3 184             Results from last 7 days   Lab Units 02/13/25  1338   MAGNESIUM mg/dL 2.3           I reviewed the patient's new clinical results.        Assessment:  1.  Chest pain, worsening over the last 2 weeks  2.  Coronary artery disease, status post ARIANA to the RCA in 2021.  [facility] on 8/15/2024 which showed a critical narrowing of the ostial RCA stent 5 to 99%.  At that time, she had placement of a 3.5 x 20 mm Synergy ARIANA.  Also with 30% mid LAD and 60% ostial first diagonal disease at that time.  3.  Baseline left bundle branch block  4.   Elevated troponin, not consistent with type I or type II NSTEMI (secondary to renal disease)  5.  Mild aortic stenosis by echo on 1/11/2025  6.  Severe left ventricular hypertrophy by echo on 1/11/2025  7.  End-stage renal disease, on hemodialysis  8.  Chronic diastolic CHF  9.  Hypertension  10.  Type 2 diabetes    Plan:  -Frequent worsening chest pain over the last 2 weeks.  Left bundle branch block at baseline.  Critical renarrowing of her ostial RCA stent in August 2024, necessitating another stent.    -Proceed with left heart catheterization today.  Spoke with her daughter this morning on the phone as well and explained the plan in detail.    -Echo in January 2025 did show some apical wall motion abnormalities, although her LAD only had mild disease on her heart cath in August 2024.    -Volume management per hemodialysis.    -Continue aspirin, Plavix, Lipitor, Imdur, Toprol-XL, and blood pressure medications.    Scar Dodge MD  02/14/25  11:53 EST

## 2025-02-14 NOTE — PROGRESS NOTES
Discharge Planning Assessment  [facility]     Patient Name: Sonia Acharya  MRN: [id number]  Today's Date: 2/14/2025    Admit Date: 2/13/2025    Plan: Return home with daughter and [facility] following   Discharge Needs Assessment       Row Name 02/14/25 1548       Living Environment    People in Home child(giuseppe), adult    Name(s) of People in Home Olive Alfonso    Current Living Arrangements home    Potentially Unsafe Housing Conditions none    Primary Care Provided by self    Provides Primary Care For no one    Family Caregiver if Needed child(giuseppe), adult    Family Caregiver Names Olive Acharya [phone]    Quality of Family Relationships helpful    Able to Return to Prior Arrangements yes       Resource/Environmental Concerns    Resource/Environmental Concerns none    Transportation Concerns none       Transition Planning    Patient/Family Anticipates Transition to home with family    Patient/Family Anticipated Services at Transition home health care    Transportation Anticipated family or friend will provide       Discharge Needs Assessment    Readmission Within the Last 30 Days no previous admission in last 30 days    Equipment Currently Used at Home cane, quad tip;rollator;wheelchair;oxygen    Concerns to be Addressed denies needs/concerns at this time    Anticipated Changes Related to Illness none    Equipment Needed After Discharge none                   Discharge Plan       Row Name 02/14/25 2714       Plan    Plan Return home with daughter and [facility] following    Patient/Family in Agreement with Plan yes    Plan Comments Spoke with patient and daughter Naty [phone] at bedside.  Patient lives with daughter in a SS house with 1STE.  She has a cane, rollator, W/C, O2 from [facility].  She is current with [facility] and has never been to SNF.  PCP is Marcy MENDOZA and pharmacy is [facility] at [facility].  Daughter drives, helps her as needed.  She plans to return home with [facility] following.  CCP  will follow.  Vishnu BRANTLEY                  Continued Care and Services - Admitted Since 2/13/2025       Home Medical Care Coordination complete.      Service Provider Request Status Services Address Phone Fax Patient Preferred    [facility]  Selected Home Nursing [address] [facility] [address] [address] [phone] --                  Selected Continued Care - Prior Encounters Includes continued care and service providers with selected services from prior encounters from 11/15/2024 to 2/14/2025      Discharged on 1/17/2025 Admission date: 1/6/2025 - Discharge disposition: Home-Health Care Svc      Durable Medical Equipment       Service Provider Services Address Phone Fax Patient Preferred    [facility] Durable Medical Equipment [address] [facility] [address] [phone] --              Home Medical Care       Service Provider Services Address Phone Fax Patient Preferred    [facility] [address] [facility] [facility], [facility] [address] [address] [phone] --                          Expected Discharge Date and Time       Expected Discharge Date Expected Discharge Time    Feb 17, 2025            Demographic Summary       Row Name 02/14/25 1544       General Information    Admission Type observation    Arrived From home    Referral Source admission list    Reason for Consult discharge planning    Preferred Language English                   Functional Status       Row Name 02/14/25 1545       Functional Status    Usual Activity Tolerance moderate    Current Activity Tolerance moderate       Functional Status, IADL    Medications independent    Meal Preparation independent;assistive person  Live with her daughter    Housekeeping assistive person;independent    Laundry independent;assistive person    Shopping assistive person       Mental Status    General Appearance WDL WDL        Mental Status Summary    Recent Changes in Mental Status/Cognitive Functioning no changes                              Becky S. [place], RN

## 2025-02-15 VITALS
HEIGHT: 60 IN | WEIGHT: 110 LBS | DIASTOLIC BLOOD PRESSURE: 53 MMHG | RESPIRATION RATE: 20 BRPM | TEMPERATURE: 98 F | HEART RATE: 61 BPM | OXYGEN SATURATION: 98 % | SYSTOLIC BLOOD PRESSURE: 114 MMHG | BODY MASS INDEX: 21.6 KG/M2

## 2025-02-15 LAB
ALBUMIN SERPL-MCNC: 2.8 G/DL (ref 3.5–5.2)
ANION GAP SERPL CALCULATED.3IONS-SCNC: 12.5 MMOL/L (ref 5–15)
BASOPHILS # BLD AUTO: 0.01 10*3/MM3 (ref 0–0.2)
BASOPHILS NFR BLD AUTO: 0.2 % (ref 0–1.5)
BUN SERPL-MCNC: 14 MG/DL (ref 8–23)
BUN/CREAT SERPL: 4.6 (ref 7–25)
CALCIUM SPEC-SCNC: 8.4 MG/DL (ref 8.6–10.5)
CHLORIDE SERPL-SCNC: 101 MMOL/L (ref 98–107)
CO2 SERPL-SCNC: 22.5 MMOL/L (ref 22–29)
CREAT SERPL-MCNC: 3.02 MG/DL (ref 0.57–1)
DEPRECATED RDW RBC AUTO: 56.5 FL (ref 37–54)
EGFRCR SERPLBLD CKD-EPI 2021: 15.6 ML/MIN/1.73
EOSINOPHIL # BLD AUTO: 0.17 10*3/MM3 (ref 0–0.4)
EOSINOPHIL NFR BLD AUTO: 3.3 % (ref 0.3–6.2)
ERYTHROCYTE [DISTWIDTH] IN BLOOD BY AUTOMATED COUNT: 16.8 % (ref 12.3–15.4)
GLUCOSE BLDC GLUCOMTR-MCNC: 67 MG/DL (ref 70–130)
GLUCOSE BLDC GLUCOMTR-MCNC: 79 MG/DL (ref 70–130)
GLUCOSE BLDC GLUCOMTR-MCNC: 88 MG/DL (ref 70–130)
GLUCOSE SERPL-MCNC: 58 MG/DL (ref 65–99)
HCT VFR BLD AUTO: 29 % (ref 34–46.6)
HGB BLD-MCNC: 8.6 G/DL (ref 12–15.9)
IMM GRANULOCYTES # BLD AUTO: 0.03 10*3/MM3 (ref 0–0.05)
IMM GRANULOCYTES NFR BLD AUTO: 0.6 % (ref 0–0.5)
LYMPHOCYTES # BLD AUTO: 0.93 10*3/MM3 (ref 0.7–3.1)
LYMPHOCYTES NFR BLD AUTO: 18.2 % (ref 19.6–45.3)
MCH RBC QN AUTO: 27.3 PG (ref 26.6–33)
MCHC RBC AUTO-ENTMCNC: 29.7 G/DL (ref 31.5–35.7)
MCV RBC AUTO: 92.1 FL (ref 79–97)
MONOCYTES # BLD AUTO: 0.39 10*3/MM3 (ref 0.1–0.9)
MONOCYTES NFR BLD AUTO: 7.6 % (ref 5–12)
NEUTROPHILS NFR BLD AUTO: 3.58 10*3/MM3 (ref 1.7–7)
NEUTROPHILS NFR BLD AUTO: 70.1 % (ref 42.7–76)
NRBC BLD AUTO-RTO: 0 /100 WBC (ref 0–0.2)
PHOSPHATE SERPL-MCNC: 4.1 MG/DL (ref 2.5–4.5)
PLATELET # BLD AUTO: 103 10*3/MM3 (ref 140–450)
PMV BLD AUTO: 9.9 FL (ref 6–12)
POTASSIUM SERPL-SCNC: 4.6 MMOL/L (ref 3.5–5.2)
RBC # BLD AUTO: 3.15 10*6/MM3 (ref 3.77–5.28)
SODIUM SERPL-SCNC: 136 MMOL/L (ref 136–145)
WBC NRBC COR # BLD AUTO: 5.11 10*3/MM3 (ref 3.4–10.8)

## 2025-02-15 PROCEDURE — 94761 N-INVAS EAR/PLS OXIMETRY MLT: CPT

## 2025-02-15 PROCEDURE — 63710000001 CLOPIDOGREL 75 MG TABLET: Performed by: INTERNAL MEDICINE

## 2025-02-15 PROCEDURE — 94664 DEMO&/EVAL PT USE INHALER: CPT

## 2025-02-15 PROCEDURE — 63710000001 METOPROLOL SUCCINATE XL 25 MG TABLET SUSTAINED-RELEASE 24 HOUR: Performed by: INTERNAL MEDICINE

## 2025-02-15 PROCEDURE — 80069 RENAL FUNCTION PANEL: CPT | Performed by: INTERNAL MEDICINE

## 2025-02-15 PROCEDURE — A9270 NON-COVERED ITEM OR SERVICE: HCPCS | Performed by: INTERNAL MEDICINE

## 2025-02-15 PROCEDURE — 63710000001 BUDESONIDE-FORMOTEROL 160-4.5 MCG/ACT AEROSOL 6 G INHALER: Performed by: INTERNAL MEDICINE

## 2025-02-15 PROCEDURE — 85025 COMPLETE CBC W/AUTO DIFF WBC: CPT | Performed by: INTERNAL MEDICINE

## 2025-02-15 PROCEDURE — 63710000001 LOSARTAN 100 MG TABLET: Performed by: INTERNAL MEDICINE

## 2025-02-15 PROCEDURE — 63710000001 ISOSORBIDE MONONITRATE 30 MG TABLET SUSTAINED-RELEASE 24 HOUR: Performed by: INTERNAL MEDICINE

## 2025-02-15 PROCEDURE — 63710000001 ATORVASTATIN 20 MG TABLET: Performed by: INTERNAL MEDICINE

## 2025-02-15 PROCEDURE — 94799 UNLISTED PULMONARY SVC/PX: CPT

## 2025-02-15 PROCEDURE — 63710000001 LUBIPROSTONE 8 MCG CAPSULE: Performed by: INTERNAL MEDICINE

## 2025-02-15 PROCEDURE — 63710000001 HYDROXYZINE 10 MG TABLET: Performed by: STUDENT IN AN ORGANIZED HEALTH CARE EDUCATION/TRAINING PROGRAM

## 2025-02-15 PROCEDURE — 82948 REAGENT STRIP/BLOOD GLUCOSE: CPT

## 2025-02-15 PROCEDURE — 63710000001 FLUTICASONE 50 MCG/ACT SUSPENSION 16 G BOTTLE: Performed by: INTERNAL MEDICINE

## 2025-02-15 PROCEDURE — 63710000001 ASPIRIN 81 MG TABLET DELAYED-RELEASE: Performed by: INTERNAL MEDICINE

## 2025-02-15 PROCEDURE — 99232 SBSQ HOSP IP/OBS MODERATE 35: CPT

## 2025-02-15 PROCEDURE — 94760 N-INVAS EAR/PLS OXIMETRY 1: CPT

## 2025-02-15 PROCEDURE — 63710000001 PANTOPRAZOLE 40 MG TABLET DELAYED-RELEASE: Performed by: INTERNAL MEDICINE

## 2025-02-15 PROCEDURE — 63710000001 AMLODIPINE 10 MG TABLET: Performed by: INTERNAL MEDICINE

## 2025-02-15 PROCEDURE — A9270 NON-COVERED ITEM OR SERVICE: HCPCS | Performed by: STUDENT IN AN ORGANIZED HEALTH CARE EDUCATION/TRAINING PROGRAM

## 2025-02-15 PROCEDURE — 63710000001 HYDRALAZINE 50 MG TABLET: Performed by: INTERNAL MEDICINE

## 2025-02-15 PROCEDURE — G0378 HOSPITAL OBSERVATION PER HR: HCPCS

## 2025-02-15 RX ORDER — FAMOTIDINE 20 MG/1
20 TABLET, FILM COATED ORAL 2 TIMES DAILY PRN
Qty: 60 TABLET | Refills: 0 | Status: SHIPPED | OUTPATIENT
Start: 2025-02-15 | End: 2025-03-17

## 2025-02-15 RX ORDER — HYDROXYZINE HYDROCHLORIDE 10 MG/1
25 TABLET, FILM COATED ORAL ONCE
Status: COMPLETED | OUTPATIENT
Start: 2025-02-15 | End: 2025-02-15

## 2025-02-15 RX ORDER — PANTOPRAZOLE SODIUM 40 MG/1
TABLET, DELAYED RELEASE ORAL
Qty: 30 TABLET | Refills: 0 | Status: SHIPPED | OUTPATIENT
Start: 2025-02-15 | End: 2025-03-01

## 2025-02-15 RX ORDER — CLOPIDOGREL BISULFATE 75 MG/1
75 TABLET ORAL DAILY
Qty: 30 TABLET | Refills: 0 | Status: SHIPPED | OUTPATIENT
Start: 2025-02-15 | End: 2025-03-17

## 2025-02-15 RX ADMIN — CLOPIDOGREL 75 MG: 75 TABLET, FILM COATED ORAL at 10:01

## 2025-02-15 RX ADMIN — HYDROXYZINE HYDROCHLORIDE 25 MG: 10 TABLET ORAL at 10:02

## 2025-02-15 RX ADMIN — BUDESONIDE AND FORMOTEROL FUMARATE DIHYDRATE 2 PUFF: 160; 4.5 AEROSOL RESPIRATORY (INHALATION) at 08:26

## 2025-02-15 RX ADMIN — Medication 10 ML: at 10:08

## 2025-02-15 RX ADMIN — HYDRALAZINE HYDROCHLORIDE 50 MG: 50 TABLET ORAL at 10:01

## 2025-02-15 RX ADMIN — LUBIPROSTONE 8 MCG: 8 CAPSULE, GELATIN COATED ORAL at 10:01

## 2025-02-15 RX ADMIN — ISOSORBIDE MONONITRATE 30 MG: 30 TABLET, EXTENDED RELEASE ORAL at 10:01

## 2025-02-15 RX ADMIN — LOSARTAN POTASSIUM 100 MG: 100 TABLET, FILM COATED ORAL at 10:01

## 2025-02-15 RX ADMIN — FLUTICASONE PROPIONATE 2 SPRAY: 50 SPRAY, METERED NASAL at 10:01

## 2025-02-15 RX ADMIN — ATORVASTATIN CALCIUM 40 MG: 20 TABLET, FILM COATED ORAL at 10:02

## 2025-02-15 RX ADMIN — METOPROLOL SUCCINATE 25 MG: 25 TABLET, EXTENDED RELEASE ORAL at 10:02

## 2025-02-15 RX ADMIN — AMLODIPINE BESYLATE 10 MG: 10 TABLET ORAL at 10:01

## 2025-02-15 RX ADMIN — ASPIRIN 81 MG: 81 TABLET, COATED ORAL at 10:02

## 2025-02-15 RX ADMIN — PANTOPRAZOLE SODIUM 40 MG: 40 TABLET, DELAYED RELEASE ORAL at 10:02

## 2025-02-15 NOTE — PROGRESS NOTES
Electrophysiology Follow-Up Note      Patient Name: Sonia Acharya  Age/Sex: 74 y.o. female  : 1950  MRN: [id number]      Day of Service: 02/15/25       Chief Complaint/Follow-up: chest pain     S: heart cath yesterday with no new CAD. No other events. She feels a little better today.       Temp:  [97.3 °F (36.3 °C)-98.4 °F (36.9 °C)] 98.1 °F (36.7 °C)  Heart Rate:  [51-75] 61  Resp:  [14-20] 16  BP: ()/(46-66) 126/62     PHYSICAL EXAM:    General Appearance: No acute distress, well developed and well nourished.   Eyes: Conjunctiva and lids: No erythema, swelling, or discharge. Sclera non-icteric.   HENT: Atraumatic, normocephalic. External eyes, ears, and nose normal.   Respiratory: No signs of respiratory distress. Respiration rhythm and depth normal.   Clear to auscultation. No rales, crackles, rhonchi, or wheezing auscultated.   Cardiovascular:  Heart Rate and Rhythm: Normal, Heart Sounds: Normal S1 and S2. No S3 or S4 noted.  Murmurs: No murmurs noted. No rubs, thrills, or gallops.   Lower Extremities: No edema noted.  Gastrointestinal:  Abdomen soft, non-distended, non-tender.  Musculoskeletal: Normal movement of extremities  Skin: Warm and dry.   Psychiatric: Patient alert and oriented to person, place, and time. Speech and behavior appropriate. Normal mood and affect.      Results from last 7 days   Lab Units 02/15/25  0625  1338   SODIUM mmol/L 136 138 136   POTASSIUM mmol/L 4.6 5.3* 4.3   CHLORIDE mmol/L 101 100 100   CO2 mmol/L 22.5 27.4 26.5   BUN mg/dL 14 34* 28*   CREATININE mg/dL 3.02* 5.29* 4.05*   GLUCOSE mg/dL 58* 76 97   CALCIUM mg/dL 8.4* 9.3 8.9     Results from last 7 days   Lab Units 02/15/25  0625  1233   WBC 10*3/mm3 5.11 4.38 3.79   HEMOGLOBIN g/dL 8.6* 9.7* 10.3*   HEMATOCRIT % 29.0* 31.4* 34.4   PLATELETS 10*3/mm3 103* 184 176         Results from last 7 days   Lab Units 25  0447 25  1505 25  1332    HSTROP T ng/L 123* 131* 128*               Current Medications:   Scheduled Meds:amLODIPine, 10 mg, Oral, Daily  aspirin, 81 mg, Oral, Daily  atorvastatin, 40 mg, Oral, Daily  budesonide-formoterol, 2 puff, Inhalation, BID - RT  clopidogrel, 75 mg, Oral, Daily  donepezil, 5 mg, Oral, Nightly  fluticasone, 2 spray, Nasal, Daily  hydrALAZINE, 50 mg, Oral, TID  isosorbide mononitrate, 30 mg, Oral, Daily  losartan, 100 mg, Oral, Daily  lubiprostone, 8 mcg, Oral, BID  metoprolol succinate XL, 25 mg, Oral, Daily  pantoprazole, 40 mg, Oral, BID AC  sodium chloride, 10 mL, Intravenous, Q12H            Anemia, chronic disease    HTN (hypertension)    Chest pain, atypical    ESRD on dialysis    Bradycardia    Chronic diastolic CHF (congestive heart failure)    Aortic stenosis    Hyperkalemia    ESRD (end stage renal disease) on dialysis    Chronic respiratory failure with hypoxia    CAD (coronary artery disease)    Severe protein-calorie malnutrition    Chronic abdominal pain    Type 2 diabetes mellitus with hypoglycemia       Plan:   CAD--- chest pain---status post cath (2/14)---- cath showed stable CAD, no new lesions or targets for revascularization. Chest pain is not cardiac in etiology.     Continue aspirin, plavix, lipitor, imdur, and metoprolol.     We will see as needed. She will need to follow up with her cardiologist at discharge.         Aneesh Fiore, APRN  02/15/25  11:52 EST

## 2025-02-15 NOTE — NURSING NOTE
A&Ox4, confused at times to situation. RA. HD was completed yesterday 2/14. Educated on outpatient appointment needs with cardio and gyno. Daughter in room when AVS explained. Meds delivered to bedside. Discharge home today. Right femoral dsg DCI. Low blood suagr at times, corrected with eating at meals. VSS.  
Chest pain started at 0605, pt stated pain 1000 and was nauseous.  Bs checked was 60, new hospital protocol orders for dextrose as she was NPO. 25g dextrose given    New orders from [facility] for morphine 2mg Q4H- given    Rapid called to to make sure no code STEMI to be called.  CP subsided with morphine, no nitro given cardiology to see this AM prior to cath that is scheduled at noon  
bp low throughout despite extra fluid given due to extra circuit and rinse patient was still unable to tolerate fluid removal,  ran 90s/40s most of tx, tx ultimately terminated 15 mins early due to bp starting to drop again to 80s/40s, bp a little better post tx at 101/46, patient received about 1L NS during tx, no issues with access flow or bleeding, no other complications  
(4) rarely moist

## 2025-02-15 NOTE — PROGRESS NOTES
[facility] Progress Note      Patient Name: Sonia Acharya  : 1950  MRN: [id number]  Primary Care Physician:  Marcy Paez PA  Date of admission: 2025    Subjective     Interval History:   Follow-up end-stage renal disease    The patient is feeling better today, denies any chest pain or shortness of air, no orthopnea or PND, no nausea or vomiting no chest pain or shoulder pain.  She had dialysis yesterday, blood pressure was on the low side but tolerated the treatment and 1 L of fluid removed.    Review of Systems:   As noted above    Objective     Vitals:   Temp:  [97.3 °F (36.3 °C)-98.4 °F (36.9 °C)] 98.1 °F (36.7 °C)  Heart Rate:  [51-75] 61  Resp:  [14-20] 16  BP: ()/(46-66) 126/62  Flow (L/min) (Oxygen Therapy):  [2] 2    Intake/Output Summary (Last 24 hours) at 2/15/2025 0928  Last data filed at 2/15/2025 0926  Gross per 24 hour   Intake 1080 ml   Output --   Net 1080 ml       Physical Exam:    General Appearance: alert, awake, chronically ill, no acute distress   Skin: warm and dry  HEENT: oral mucosa normal, nonicteric sclera  Neck: No JVD  Lungs: CTA, unlabored breathing effort  Heart: RRR, no rub  Abdomen: soft, nontender, nondistended  : no palpable bladder  Extremities: no edema, cyanosis or clubbing, functional AV fistula in the right upper arm with good thrill and bruit.  Neuro: normal speech and mental status     Scheduled Meds:     amLODIPine, 10 mg, Oral, Daily  aspirin, 81 mg, Oral, Daily  atorvastatin, 40 mg, Oral, Daily  budesonide-formoterol, 2 puff, Inhalation, BID - RT  clopidogrel, 75 mg, Oral, Daily  donepezil, 5 mg, Oral, Nightly  fluticasone, 2 spray, Nasal, Daily  hydrALAZINE, 50 mg, Oral, TID  hydrOXYzine, 25 mg, Oral, Once  isosorbide mononitrate, 30 mg, Oral, Daily  losartan, 100 mg, Oral, Daily  lubiprostone, 8 mcg, Oral, BID  metoprolol succinate XL, 25 mg, Oral, Daily  pantoprazole, 40 mg, Oral, BID AC  sodium chloride, 10 mL,  Intravenous, Q12H      IV Meds:        Results Reviewed:   I have personally reviewed the results from the time of this admission to 2/15/2025 09:28 EST     Results from last 7 days   Lab Units 02/15/25  0622 02/14/25  0447 02/13/25  1338   SODIUM mmol/L 136 138 136   POTASSIUM mmol/L 4.6 5.3* 4.3   CHLORIDE mmol/L 101 100 100   CO2 mmol/L 22.5 27.4 26.5   BUN mg/dL 14 34* 28*   CREATININE mg/dL 3.02* 5.29* 4.05*   CALCIUM mg/dL 8.4* 9.3 8.9   BILIRUBIN mg/dL  --   --  <0.2   ALK PHOS U/L  --   --  91   ALT (SGPT) U/L  --   --  12   AST (SGOT) U/L  --   --  20   GLUCOSE mg/dL 58* 76 97       Estimated Creatinine Clearance: 12.7 mL/min (A) (by C-G formula based on SCr of 3.02 mg/dL (H)).    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  He had dialysis yesterday without any difficulties potassium today 4.6 and she appears to be euvolemic  Coronary artery disease with recurrent chest pain she had prior PCI and stent, by cardiology currently she is chest pain-free.  Hypertension with end-stage renal disease, blood pressure is controlled anemia of chronic kidney disease, hemoglobin 8.6, treated with long-acting ZULAY as an outpatient.    PLAN:  Continue the same treatment  Surveillance labs  Next dialysis will be on Monday.      I reviewed the chart and other providers notes, reviewed labs.  I discussed the case with the patient she was good understanding.  Copied text in this note has been reviewed and is accurate as of 02/15/25.        Thank you for involving us in the care of Sonia Acharya.  Please feel free to call with any questions.    Nathan Adkins MD  02/15/25  09:28 [facility]    [facility]  [phone]    Please note that portions of this note were completed with a voice recognition program.

## 2025-02-15 NOTE — DISCHARGE SUMMARY
Patient Name: Sonia Acharya  : 1950  MRN: [id number]    Date of Admission: 2025  Date of Discharge:  2/15/2025  Primary Care Physician: Macry Paez PA      Chief Complaint:   Chest Pain (Started 1 hour ago)      Discharge Diagnoses     Active Hospital Problems    Diagnosis  POA    Chronic abdominal pain [R10.9, G89.29]  Yes    Type 2 diabetes mellitus with hypoglycemia [E11.649]  Yes    Severe protein-calorie malnutrition [E43]  Yes    Chronic respiratory failure with hypoxia [J96.11]  Yes    CAD (coronary artery disease) [I25.10]  Yes    ESRD (end stage renal disease) on dialysis [N18.6, Z99.2]  Not Applicable    Hyperkalemia [E87.5]  Yes    ESRD on dialysis [N18.6, Z99.2]  Not Applicable    Chronic diastolic CHF (congestive heart failure) [I50.32]  Yes    Bradycardia [R00.1]  Yes    Aortic stenosis [I35.0]  Yes    HTN (hypertension) [I10]  Yes    Chest pain, atypical [R07.89]  Yes    Anemia, chronic disease [D63.8]  Yes      Resolved Hospital Problems    Diagnosis Date Resolved POA    **Chest pain [R07.9] 2025 Yes        Hospital Course     Atypical chest pain  Patient is a 74 year-old female with a history of including but not limited to type II DM, ESRD, hypertension, CAD status post stent placed to RCA, presented  to the ED complaining of chest pain.  Cardiology was consulted, patient underwent heart cath  on  2024 ---- cath showed stable CAD, no new lesions or targets for revascularization.  Chest pain was noncardiac per cardiology.  Patient was initiated on pantoprazole also possibly GERD related atypical chest pain, Home pantoprazole was listed however was not taking.  Initiated on pantoprazole 40 mg twice daily, symptoms seem to be improved, patient denied further chest pain or abdominal pain.  Discharged on pantoprazole 40 mg twice daily for 7 days followed by daily afterwards for breakthrough famotidine for breakthrough heartburn.   Will need to follow-up with PCP and  possible evaluation by GI outpatient if continues to have symptoms.          At the time of discharge patient was told to take all medications as prescribed, keep all follow-up appointments, and call their doctor or return to the hospital with any worsening or concerning symptoms.       Day of Discharge     Subjective:  Laying in bed, no acute events overnight.  Denies further chest pain or abdominal pain.  Tolerating diet.  No nausea no vomiting.  Complaining of generalized itching.  Does have chronic itching at baseline, as needed Benadryl outpatient listed for itching.  Physical Exam:  Temp:  [98 °F (36.7 °C)-98.4 °F (36.9 °C)] 98 °F (36.7 °C)  Heart Rate:  [58-75] 61  Resp:  [16-20] 20  BP: ()/(46-66) 114/53  Body mass index is 21.48 kg/m².  Physical Exam    General: Alert, laying in bed, pleasant, not in distress,  HEENT: Normocephalic, atraumatic  CV: Regular rate and rhythm, no murmurs rubs or gallops  Lungs: Clear to auscultation bilaterally, no crackles or wheezes  Abdomen: Soft, nontender, nondistended  Extremities: No significant peripheral edema , no cyanosis, right upper arm fistula in place    Consultants     Consult Orders (all) (From admission, onward)       Start     Ordered    02/13/25 1746  Inpatient Nephrology Consult  Once        Specialty:  Nephrology  Provider:  Micheline Torrez MD    02/13/25 1747    02/13/25 1702  Inpatient Case Management  Consult  Once        Provider:  (Not yet assigned)    02/13/25 1702    02/13/25 1448  Cardiology (on-call MD unless specified)  Once        Specialty:  Cardiology  Provider:  Scar Dodge MD    02/13/25 1447    02/13/25 1448  [facility] (on-call MD unless specified) Details  Once,   Status:  Canceled        Specialty:  Hospitalist  Provider:  Derek Young MD    02/13/25 1447                  Procedures     Left Heart Cath, Coronary angiography      Imaging Results (All)       Procedure Component Value Units Date/Time    XR  Chest 1 View [[id number]] Collected: 02/13/25 1354     Updated: 02/13/25 1358    Narrative:      XR CHEST 1 VW-     HISTORY: Female who is 74 years-old, chest pain     TECHNIQUE: Frontal view of the chest     COMPARISON: 1/31/2025     FINDINGS: The heart size is borderline. Aorta is calcified. Pulmonary  vasculature is unremarkable. No focal pulmonary consolidation, pleural  effusion, or pneumothorax. Right hemidiaphragm remains elevated. No  acute osseous process.       Impression:      No focal pulmonary consolidation. Borderline heart size.  Follow-up as clinical indications persist.     This report was finalized on 2/13/2025 1:55 PM by Dr. Michael Lauren M.D on Workstation: [employer]               Results for orders placed during the hospital encounter of 01/06/25    Adult Transthoracic Echo Complete W/ Cont if Necessary Per Protocol    Interpretation Summary    Left ventricular systolic function is normal. Calculated left ventricular EF = 56.6%    Left ventricular wall thickness is consistent with severe concentric hypertrophy.    The following left ventricular wall segments are hypokinetic: mid anterior, apical anterior and apex hypokinetic.    Left ventricular diastolic function is consistent with (grade II w/high LAP) pseudonormalization.    The left atrial cavity is severely dilated.    Left atrial volume is severely increased.    Mild aortic valve stenosis is present. Aortic valve area is 1.1 cm2.    Aortic valve maximum pressure gradient is 28 mmHg. Aortic valve mean pressure gradient is 15 mmHg.    Mild mitral valve regurgitation is present    There is a trivial circumferential pericardial effusion. There is no evidence of cardiac tamponade.    Pertinent Labs     Results from last 7 days   Lab Units 02/15/25  0622 02/14/25  0447 02/13/25  1233   WBC 10*3/mm3 5.11 4.38 3.79   HEMOGLOBIN g/dL 8.6* 9.7* 10.3*   PLATELETS 10*3/mm3 103* 184 176     Results from last 7 days   Lab Units 02/15/25  0622  "02/14/25  0447 02/13/25  1338   SODIUM mmol/L 136 138 136   POTASSIUM mmol/L 4.6 5.3* 4.3   CHLORIDE mmol/L 101 100 100   CO2 mmol/L 22.5 27.4 26.5   BUN mg/dL 14 34* 28*   CREATININE mg/dL 3.02* 5.29* 4.05*   GLUCOSE mg/dL 58* 76 97   Estimated Creatinine Clearance: 12.7 mL/min (A) (by C-G formula based on SCr of 3.02 mg/dL (H)).  Results from last 7 days   Lab Units 02/15/25  0622 02/14/25 0447 02/13/25  1338   ALBUMIN g/dL 2.8* 3.1* 3.3*   BILIRUBIN mg/dL  --   --  <0.2   ALK PHOS U/L  --   --  91   AST (SGOT) U/L  --   --  20   ALT (SGPT) U/L  --   --  12     Results from last 7 days   Lab Units 02/15/25  0622 02/14/25  0447 02/13/25  1338 02/13/25  1233   CALCIUM mg/dL 8.4* 9.3 8.9  --    ALBUMIN g/dL 2.8* 3.1* 3.3*  --    MAGNESIUM mg/dL  --   --  2.3  --    PHOSPHORUS mg/dL 4.1 5.3*  --  3.9       Results from last 7 days   Lab Units 02/14/25 0447 02/13/25  1505 02/13/25  1338   HSTROP T ng/L 123* 131* 128*           Invalid input(s): \"LDLCALC\"          Test Results Pending at Discharge       Discharge Details        Discharge Medications        New Medications        Instructions Start Date   famotidine 20 MG tablet  Commonly known as: Pepcid   20 mg, Oral, 2 Times Daily PRN             Changes to Medications        Instructions Start Date   pantoprazole 40 MG EC tablet  Commonly known as: PROTONIX  What changed: See the new instructions.   Take 1 tablet by mouth 2 (Two) Times a Day Before Meals for 7 days, THEN 1 tablet Daily for 7 days.   Start Date: February 15, 2025            Continue These Medications        Instructions Start Date   amLODIPine 10 MG tablet  Commonly known as: NORVASC   10 mg, Daily      aspirin 81 MG EC tablet   81 mg, Daily      atorvastatin 40 MG tablet  Commonly known as: LIPITOR   40 mg, Oral, Daily      budesonide-formoterol 80-4.5 MCG/ACT inhaler  Commonly known as: SYMBICORT   2 puffs, 2 Times Daily - RT      clopidogrel 75 MG tablet  Commonly known as: PLAVIX   75 mg, Oral, " Daily      diphenhydrAMINE 25 mg capsule  Commonly known as: BENADRYL   50 mg, Every 6 Hours PRN      donepezil 5 MG tablet  Commonly known as: ARICEPT   5 mg, Nightly      fluticasone 50 MCG/ACT nasal spray  Commonly known as: FLONASE   2 sprays, Daily      hydrALAZINE 50 MG tablet  Commonly known as: APRESOLINE   50 mg, Oral, 3 Times Daily      isosorbide mononitrate 30 MG 24 hr tablet  Commonly known as: IMDUR   30 mg, Oral, Daily      lidocaine 5 %  Commonly known as: LIDODERM   1 patch, Transdermal, Every 24 Hours, Remove & Discard patch within 12 hours or as directed by MD      linaclotide 290 MCG capsule capsule  Commonly known as: LINZESS   290 mcg, Every Morning Before Breakfast      losartan 100 MG tablet  Commonly known as: COZAAR   100 mg, Oral, Daily      melatonin 3 MG tablet   3 mg, Nightly      metoprolol succinate XL 25 MG 24 hr tablet  Commonly known as: TOPROL-XL   25 mg, Daily               No Known Allergies    Discharge Disposition:  Skilled Nursing Facility (DC - External)      Discharge Diet:  No active diet order      Discharge Activity:       CODE STATUS:    Code Status and Medical Interventions: CPR (Attempt to Resuscitate); Full Support   Ordered at: 02/13/25 1747     Code Status (Patient has no pulse and is not breathing):    CPR (Attempt to Resuscitate)     Medical Interventions (Patient has pulse or is breathing):    Full Support       No future appointments.   Contact information for follow-up providers       Cleveland Dickens MD Follow up.    Specialty: Cardiology  Why: This MD performed your cardiac catheterization on 02/14/2025  Contact information:  [address]  [address]  [facility] [id number] [phone]               Marcy Paez PA. Schedule an appointment as soon as possible for a visit in 1 week(s).    Specialty: Physician Assistant  Contact information:  [address]  [address]  [facility] [id number] [phone]               Scar Dodge MD. Schedule an  appointment as soon as possible for a visit in 2 week(s).    Specialty: Cardiology  Contact information:  [address] [facility] [address] [facility] [address]  [facility] [id number] [phone]                       Contact information for after-discharge care       Home Medical Care       [facility] .    Service: Home Nursing  Contact information:  [address], [address]  [place] [address] [phone]                                   Time Spent on Discharge:  Greater than 30 minutes      Farzad Modi MD  [facility]  02/15/25  17:38 EST

## 2025-02-15 NOTE — CASE MANAGEMENT/SOCIAL WORK
Case Management Discharge Note      Final Note: dc home with [facility]         Selected Continued Care - Discharged on 2/15/2025 Admission date: 2/13/2025 - Discharge disposition: Skilled Nursing Facility (DC - External)      Destination    No services have been selected for the patient.                Durable Medical Equipment    No services have been selected for the patient.                Dialysis/Infusion    No services have been selected for the patient.                Home Medical Care Coordination complete.      Service Provider Services Address Phone Fax Patient Preferred    [facility] [address], [address] [address] [phone] --              Therapy    No services have been selected for the patient.                Community Resources    No services have been selected for the patient.                Community & DME    No services have been selected for the patient.                    Selected Continued Care - Prior Encounters Includes continued care and service providers with selected services from prior encounters from 11/15/2024 to 2/15/2025      Discharged on 1/17/2025 Admission date: 1/6/2025 - Discharge disposition: Home-Health Care Svc      Durable Medical Equipment       Service Provider Services Address Phone Fax Patient Preferred    [facility] [address] [facility] [address] [phone] --              Home Medical Care       Service Provider Services Address Phone Fax Patient Preferred    [facility] [address], [address], [facility] [id number] [phone] --                               Final Discharge Disposition Code: 06 - home with home health care

## 2025-02-16 LAB
QT INTERVAL: 494 MS
QTC INTERVAL: 483 MS

## 2025-03-09 ENCOUNTER — APPOINTMENT (OUTPATIENT)
Dept: GENERAL RADIOLOGY | Facility: HOSPITAL | Age: 75
DRG: 884 | End: 2025-03-09
Payer: MEDICARE

## 2025-03-09 ENCOUNTER — APPOINTMENT (OUTPATIENT)
Dept: CT IMAGING | Facility: HOSPITAL | Age: 75
DRG: 884 | End: 2025-03-09
Payer: MEDICARE

## 2025-03-09 ENCOUNTER — HOSPITAL ENCOUNTER (INPATIENT)
Facility: HOSPITAL | Age: 75
LOS: 3 days | Discharge: HOME | DRG: 884 | End: 2025-03-15
Attending: STUDENT IN AN ORGANIZED HEALTH CARE EDUCATION/TRAINING PROGRAM | Admitting: HOSPITALIST
Payer: MEDICARE

## 2025-03-09 DIAGNOSIS — I10 HYPERTENSION NOT AT GOAL: ICD-10-CM

## 2025-03-09 DIAGNOSIS — Z99.2 ESRD ON HEMODIALYSIS: ICD-10-CM

## 2025-03-09 DIAGNOSIS — R41.82 ALTERED MENTAL STATUS, UNSPECIFIED ALTERED MENTAL STATUS TYPE: Primary | ICD-10-CM

## 2025-03-09 DIAGNOSIS — N18.6 ESRD ON HEMODIALYSIS: ICD-10-CM

## 2025-03-09 LAB
ALBUMIN SERPL-MCNC: 4.1 G/DL (ref 3.5–5.2)
ALBUMIN/GLOB SERPL: 1.3 G/DL
ALP SERPL-CCNC: 115 U/L (ref 39–117)
ALT SERPL W P-5'-P-CCNC: 6 U/L (ref 1–33)
ANION GAP SERPL CALCULATED.3IONS-SCNC: 19.6 MMOL/L (ref 5–15)
AST SERPL-CCNC: 16 U/L (ref 1–32)
BASOPHILS # BLD AUTO: 0.02 10*3/MM3 (ref 0–0.2)
BASOPHILS NFR BLD AUTO: 0.4 % (ref 0–1.5)
BILIRUB SERPL-MCNC: 0.3 MG/DL (ref 0–1.2)
BUN SERPL-MCNC: 33 MG/DL (ref 8–23)
BUN/CREAT SERPL: 5.7 (ref 7–25)
CALCIUM SPEC-SCNC: 9.8 MG/DL (ref 8.6–10.5)
CHLORIDE SERPL-SCNC: 95 MMOL/L (ref 98–107)
CO2 SERPL-SCNC: 24.4 MMOL/L (ref 22–29)
CREAT SERPL-MCNC: 5.8 MG/DL (ref 0.57–1)
DEPRECATED RDW RBC AUTO: 49.3 FL (ref 37–54)
EGFRCR SERPLBLD CKD-EPI 2021: 7.1 ML/MIN/1.73
EOSINOPHIL # BLD AUTO: 0.04 10*3/MM3 (ref 0–0.4)
EOSINOPHIL NFR BLD AUTO: 0.8 % (ref 0.3–6.2)
ERYTHROCYTE [DISTWIDTH] IN BLOOD BY AUTOMATED COUNT: 14.8 % (ref 12.3–15.4)
GEN 5 1HR TROPONIN T REFLEX: 121 NG/L
GLOBULIN UR ELPH-MCNC: 3.1 GM/DL
GLUCOSE SERPL-MCNC: 83 MG/DL (ref 65–99)
HCT VFR BLD AUTO: 32.1 % (ref 34–46.6)
HGB BLD-MCNC: 10.2 G/DL (ref 12–15.9)
HOLD SPECIMEN: NORMAL
HOLD SPECIMEN: NORMAL
IMM GRANULOCYTES # BLD AUTO: 0.02 10*3/MM3 (ref 0–0.05)
IMM GRANULOCYTES NFR BLD AUTO: 0.4 % (ref 0–0.5)
LYMPHOCYTES # BLD AUTO: 0.81 10*3/MM3 (ref 0.7–3.1)
LYMPHOCYTES NFR BLD AUTO: 16.9 % (ref 19.6–45.3)
MAGNESIUM SERPL-MCNC: 2.5 MG/DL (ref 1.6–2.4)
MCH RBC QN AUTO: 29 PG (ref 26.6–33)
MCHC RBC AUTO-ENTMCNC: 31.8 G/DL (ref 31.5–35.7)
MCV RBC AUTO: 91.2 FL (ref 79–97)
MONOCYTES # BLD AUTO: 0.46 10*3/MM3 (ref 0.1–0.9)
MONOCYTES NFR BLD AUTO: 9.6 % (ref 5–12)
NEUTROPHILS NFR BLD AUTO: 3.43 10*3/MM3 (ref 1.7–7)
NEUTROPHILS NFR BLD AUTO: 71.9 % (ref 42.7–76)
NRBC BLD AUTO-RTO: 0 /100 WBC (ref 0–0.2)
PLATELET # BLD AUTO: 310 10*3/MM3 (ref 140–450)
PMV BLD AUTO: 9.2 FL (ref 6–12)
POTASSIUM SERPL-SCNC: 4.6 MMOL/L (ref 3.5–5.2)
PROT SERPL-MCNC: 7.2 G/DL (ref 6–8.5)
QT INTERVAL: 464 MS
QTC INTERVAL: 534 MS
RBC # BLD AUTO: 3.52 10*6/MM3 (ref 3.77–5.28)
SODIUM SERPL-SCNC: 139 MMOL/L (ref 136–145)
TROPONIN T % DELTA: -8
TROPONIN T NUMERIC DELTA: -10 NG/L
TROPONIN T SERPL HS-MCNC: 131 NG/L
WBC NRBC COR # BLD AUTO: 4.78 10*3/MM3 (ref 3.4–10.8)
WHOLE BLOOD HOLD COAG: NORMAL
WHOLE BLOOD HOLD SPECIMEN: NORMAL

## 2025-03-09 PROCEDURE — 99285 EMERGENCY DEPT VISIT HI MDM: CPT

## 2025-03-09 PROCEDURE — G0378 HOSPITAL OBSERVATION PER HR: HCPCS

## 2025-03-09 PROCEDURE — 83735 ASSAY OF MAGNESIUM: CPT | Performed by: STUDENT IN AN ORGANIZED HEALTH CARE EDUCATION/TRAINING PROGRAM

## 2025-03-09 PROCEDURE — 93010 ELECTROCARDIOGRAM REPORT: CPT | Performed by: INTERNAL MEDICINE

## 2025-03-09 PROCEDURE — 93005 ELECTROCARDIOGRAM TRACING: CPT

## 2025-03-09 PROCEDURE — 36415 COLL VENOUS BLD VENIPUNCTURE: CPT

## 2025-03-09 PROCEDURE — 80053 COMPREHEN METABOLIC PANEL: CPT | Performed by: STUDENT IN AN ORGANIZED HEALTH CARE EDUCATION/TRAINING PROGRAM

## 2025-03-09 PROCEDURE — 93005 ELECTROCARDIOGRAM TRACING: CPT | Performed by: STUDENT IN AN ORGANIZED HEALTH CARE EDUCATION/TRAINING PROGRAM

## 2025-03-09 PROCEDURE — 70450 CT HEAD/BRAIN W/O DYE: CPT

## 2025-03-09 PROCEDURE — 25010000002 MORPHINE PER 10 MG: Performed by: STUDENT IN AN ORGANIZED HEALTH CARE EDUCATION/TRAINING PROGRAM

## 2025-03-09 PROCEDURE — 85025 COMPLETE CBC W/AUTO DIFF WBC: CPT

## 2025-03-09 PROCEDURE — 71045 X-RAY EXAM CHEST 1 VIEW: CPT

## 2025-03-09 PROCEDURE — 84484 ASSAY OF TROPONIN QUANT: CPT | Performed by: STUDENT IN AN ORGANIZED HEALTH CARE EDUCATION/TRAINING PROGRAM

## 2025-03-09 RX ORDER — ACETAMINOPHEN 500 MG
1000 TABLET ORAL ONCE
Status: COMPLETED | OUTPATIENT
Start: 2025-03-09 | End: 2025-03-09

## 2025-03-09 RX ORDER — SODIUM CHLORIDE 0.9 % (FLUSH) 0.9 %
10 SYRINGE (ML) INJECTION AS NEEDED
Status: DISCONTINUED | OUTPATIENT
Start: 2025-03-09 | End: 2025-03-15 | Stop reason: HOSPADM

## 2025-03-09 RX ORDER — HYDRALAZINE HYDROCHLORIDE 50 MG/1
50 TABLET, FILM COATED ORAL ONCE
Status: COMPLETED | OUTPATIENT
Start: 2025-03-09 | End: 2025-03-09

## 2025-03-09 RX ORDER — MORPHINE SULFATE 2 MG/ML
2 INJECTION, SOLUTION INTRAMUSCULAR; INTRAVENOUS ONCE
Status: COMPLETED | OUTPATIENT
Start: 2025-03-09 | End: 2025-03-09

## 2025-03-09 RX ADMIN — ACETAMINOPHEN 1000 MG: 500 TABLET, FILM COATED ORAL at 21:58

## 2025-03-09 RX ADMIN — MORPHINE SULFATE 2 MG: 2 INJECTION, SOLUTION INTRAMUSCULAR; INTRAVENOUS at 23:47

## 2025-03-09 RX ADMIN — HYDRALAZINE HYDROCHLORIDE 50 MG: 50 TABLET ORAL at 21:57

## 2025-03-10 LAB
ALBUMIN SERPL-MCNC: 3.6 G/DL (ref 3.5–5.2)
ALBUMIN/GLOB SERPL: 1.1 G/DL
ALP SERPL-CCNC: 101 U/L (ref 39–117)
ALT SERPL W P-5'-P-CCNC: 8 U/L (ref 1–33)
AMMONIA BLD-SCNC: 47 UMOL/L (ref 11–51)
ANION GAP SERPL CALCULATED.3IONS-SCNC: 23.9 MMOL/L (ref 5–15)
ARTERIAL PATENCY WRIST A: POSITIVE
AST SERPL-CCNC: 16 U/L (ref 1–32)
ATMOSPHERIC PRESS: 747.7 MMHG
B PARAPERT DNA SPEC QL NAA+PROBE: NOT DETECTED
B PERT DNA SPEC QL NAA+PROBE: NOT DETECTED
BASE EXCESS BLDA CALC-SCNC: 1.8 MMOL/L (ref 0–2)
BDY SITE: ABNORMAL
BILIRUB SERPL-MCNC: 0.3 MG/DL (ref 0–1.2)
BUN SERPL-MCNC: 35 MG/DL (ref 8–23)
BUN/CREAT SERPL: 5.9 (ref 7–25)
C PNEUM DNA NPH QL NAA+NON-PROBE: NOT DETECTED
CALCIUM SPEC-SCNC: 9.4 MG/DL (ref 8.6–10.5)
CHLORIDE SERPL-SCNC: 96 MMOL/L (ref 98–107)
CO2 SERPL-SCNC: 19.1 MMOL/L (ref 22–29)
CORTIS SERPL-MCNC: 30.6 MCG/DL
CREAT SERPL-MCNC: 5.92 MG/DL (ref 0.57–1)
CRP SERPL-MCNC: 4.61 MG/DL (ref 0–0.5)
DEPRECATED RDW RBC AUTO: 49.4 FL (ref 37–54)
EGFRCR SERPLBLD CKD-EPI 2021: 7 ML/MIN/1.73
ERYTHROCYTE [DISTWIDTH] IN BLOOD BY AUTOMATED COUNT: 14.6 % (ref 12.3–15.4)
FLUAV SUBTYP SPEC NAA+PROBE: NOT DETECTED
FLUBV RNA ISLT QL NAA+PROBE: NOT DETECTED
FOLATE SERPL-MCNC: 5.94 NG/ML (ref 4.78–24.2)
GLOBULIN UR ELPH-MCNC: 3.3 GM/DL
GLUCOSE BLDC GLUCOMTR-MCNC: 114 MG/DL (ref 70–130)
GLUCOSE BLDC GLUCOMTR-MCNC: 65 MG/DL (ref 70–130)
GLUCOSE BLDC GLUCOMTR-MCNC: 68 MG/DL (ref 70–130)
GLUCOSE BLDC GLUCOMTR-MCNC: 70 MG/DL (ref 70–130)
GLUCOSE BLDC GLUCOMTR-MCNC: 81 MG/DL (ref 70–130)
GLUCOSE BLDC GLUCOMTR-MCNC: 81 MG/DL (ref 70–130)
GLUCOSE SERPL-MCNC: 88 MG/DL (ref 65–99)
HADV DNA SPEC NAA+PROBE: NOT DETECTED
HCO3 BLDA-SCNC: 27.2 MMOL/L (ref 22–28)
HCOV 229E RNA SPEC QL NAA+PROBE: NOT DETECTED
HCOV HKU1 RNA SPEC QL NAA+PROBE: NOT DETECTED
HCOV NL63 RNA SPEC QL NAA+PROBE: NOT DETECTED
HCOV OC43 RNA SPEC QL NAA+PROBE: NOT DETECTED
HCT VFR BLD AUTO: 33.3 % (ref 34–46.6)
HEMODILUTION: NO
HGB BLD-MCNC: 10.3 G/DL (ref 12–15.9)
HIV 1+2 AB+HIV1 P24 AG SERPL QL IA: NORMAL
HMPV RNA NPH QL NAA+NON-PROBE: NOT DETECTED
HPIV1 RNA ISLT QL NAA+PROBE: NOT DETECTED
HPIV2 RNA SPEC QL NAA+PROBE: NOT DETECTED
HPIV3 RNA NPH QL NAA+PROBE: NOT DETECTED
HPIV4 P GENE NPH QL NAA+PROBE: NOT DETECTED
INHALED O2 CONCENTRATION: 21 %
M PNEUMO IGG SER IA-ACNC: NOT DETECTED
MCH RBC QN AUTO: 28.9 PG (ref 26.6–33)
MCHC RBC AUTO-ENTMCNC: 30.9 G/DL (ref 31.5–35.7)
MCV RBC AUTO: 93.5 FL (ref 79–97)
MODALITY: ABNORMAL
O2 A-A PPRESDIFF RESPIRATORY: 0.7 MMHG
PCO2 BLDA: 45.3 MM HG (ref 35–45)
PH BLDA: 7.39 PH UNITS (ref 7.35–7.45)
PLATELET # BLD AUTO: 297 10*3/MM3 (ref 140–450)
PMV BLD AUTO: 9.7 FL (ref 6–12)
PO2 BLD: 316 MM[HG] (ref 0–500)
PO2 BLDA: 66.3 MM HG (ref 80–100)
POTASSIUM SERPL-SCNC: 4.9 MMOL/L (ref 3.5–5.2)
PROT SERPL-MCNC: 6.9 G/DL (ref 6–8.5)
RBC # BLD AUTO: 3.56 10*6/MM3 (ref 3.77–5.28)
RHINOVIRUS RNA SPEC NAA+PROBE: NOT DETECTED
RSV RNA NPH QL NAA+NON-PROBE: NOT DETECTED
SAO2 % BLDCOA: 92.4 % (ref 92–98.5)
SARS-COV-2 RNA NPH QL NAA+NON-PROBE: NOT DETECTED
SODIUM SERPL-SCNC: 139 MMOL/L (ref 136–145)
TOTAL RATE: 16 BREATHS/MINUTE
TSH SERPL DL<=0.05 MIU/L-ACNC: 5.52 UIU/ML (ref 0.27–4.2)
VIT B12 BLD-MCNC: >2000 PG/ML (ref 211–946)
WBC NRBC COR # BLD AUTO: 3.97 10*3/MM3 (ref 3.4–10.8)

## 2025-03-10 PROCEDURE — 5A1D70Z PERFORMANCE OF URINARY FILTRATION, INTERMITTENT, LESS THAN 6 HOURS PER DAY: ICD-10-PCS | Performed by: INTERNAL MEDICINE

## 2025-03-10 PROCEDURE — 94799 UNLISTED PULMONARY SVC/PX: CPT

## 2025-03-10 PROCEDURE — 97530 THERAPEUTIC ACTIVITIES: CPT

## 2025-03-10 PROCEDURE — 63710000001 DIPHENHYDRAMINE PER 50 MG: Performed by: NURSE PRACTITIONER

## 2025-03-10 PROCEDURE — 94640 AIRWAY INHALATION TREATMENT: CPT

## 2025-03-10 PROCEDURE — 94664 DEMO&/EVAL PT USE INHALER: CPT

## 2025-03-10 PROCEDURE — 82607 VITAMIN B-12: CPT | Performed by: NURSE PRACTITIONER

## 2025-03-10 PROCEDURE — G0378 HOSPITAL OBSERVATION PER HR: HCPCS

## 2025-03-10 PROCEDURE — 99214 OFFICE O/P EST MOD 30 MIN: CPT

## 2025-03-10 PROCEDURE — 36600 WITHDRAWAL OF ARTERIAL BLOOD: CPT

## 2025-03-10 PROCEDURE — 84443 ASSAY THYROID STIM HORMONE: CPT | Performed by: HOSPITALIST

## 2025-03-10 PROCEDURE — 80053 COMPREHEN METABOLIC PANEL: CPT | Performed by: NURSE PRACTITIONER

## 2025-03-10 PROCEDURE — 82803 BLOOD GASES ANY COMBINATION: CPT

## 2025-03-10 PROCEDURE — 94761 N-INVAS EAR/PLS OXIMETRY MLT: CPT

## 2025-03-10 PROCEDURE — 82140 ASSAY OF AMMONIA: CPT | Performed by: NURSE PRACTITIONER

## 2025-03-10 PROCEDURE — 25010000002 THIAMINE HCL 200 MG/2ML SOLUTION: Performed by: HOSPITALIST

## 2025-03-10 PROCEDURE — 82533 TOTAL CORTISOL: CPT | Performed by: HOSPITALIST

## 2025-03-10 PROCEDURE — 86592 SYPHILIS TEST NON-TREP QUAL: CPT | Performed by: HOSPITALIST

## 2025-03-10 PROCEDURE — 86140 C-REACTIVE PROTEIN: CPT | Performed by: HOSPITALIST

## 2025-03-10 PROCEDURE — 82948 REAGENT STRIP/BLOOD GLUCOSE: CPT

## 2025-03-10 PROCEDURE — 97162 PT EVAL MOD COMPLEX 30 MIN: CPT

## 2025-03-10 PROCEDURE — 85027 COMPLETE CBC AUTOMATED: CPT | Performed by: NURSE PRACTITIONER

## 2025-03-10 PROCEDURE — 0202U NFCT DS 22 TRGT SARS-COV-2: CPT | Performed by: HOSPITALIST

## 2025-03-10 PROCEDURE — G0432 EIA HIV-1/HIV-2 SCREEN: HCPCS | Performed by: HOSPITALIST

## 2025-03-10 PROCEDURE — 82746 ASSAY OF FOLIC ACID SERUM: CPT | Performed by: NURSE PRACTITIONER

## 2025-03-10 RX ORDER — ACETAMINOPHEN 160 MG/5ML
650 SOLUTION ORAL EVERY 4 HOURS PRN
Status: DISCONTINUED | OUTPATIENT
Start: 2025-03-10 | End: 2025-03-15 | Stop reason: HOSPADM

## 2025-03-10 RX ORDER — SODIUM CHLORIDE 9 MG/ML
40 INJECTION, SOLUTION INTRAVENOUS AS NEEDED
Status: DISCONTINUED | OUTPATIENT
Start: 2025-03-10 | End: 2025-03-15 | Stop reason: HOSPADM

## 2025-03-10 RX ORDER — CLOPIDOGREL BISULFATE 75 MG/1
75 TABLET ORAL DAILY
Status: DISCONTINUED | OUTPATIENT
Start: 2025-03-10 | End: 2025-03-15 | Stop reason: HOSPADM

## 2025-03-10 RX ORDER — SODIUM CHLORIDE 0.9 % (FLUSH) 0.9 %
10 SYRINGE (ML) INJECTION AS NEEDED
Status: DISCONTINUED | OUTPATIENT
Start: 2025-03-10 | End: 2025-03-15 | Stop reason: HOSPADM

## 2025-03-10 RX ORDER — THIAMINE HYDROCHLORIDE 100 MG/ML
100 INJECTION, SOLUTION INTRAMUSCULAR; INTRAVENOUS ONCE
Status: COMPLETED | OUTPATIENT
Start: 2025-03-10 | End: 2025-03-10

## 2025-03-10 RX ORDER — BISACODYL 5 MG/1
5 TABLET, DELAYED RELEASE ORAL DAILY PRN
Status: DISCONTINUED | OUTPATIENT
Start: 2025-03-10 | End: 2025-03-15 | Stop reason: HOSPADM

## 2025-03-10 RX ORDER — DIPHENHYDRAMINE HCL 25 MG
50 CAPSULE ORAL EVERY 6 HOURS PRN
Status: DISCONTINUED | OUTPATIENT
Start: 2025-03-10 | End: 2025-03-15 | Stop reason: HOSPADM

## 2025-03-10 RX ORDER — HYDRALAZINE HYDROCHLORIDE 50 MG/1
50 TABLET, FILM COATED ORAL 3 TIMES DAILY
Status: DISCONTINUED | OUTPATIENT
Start: 2025-03-10 | End: 2025-03-15 | Stop reason: HOSPADM

## 2025-03-10 RX ORDER — SODIUM CHLORIDE 0.9 % (FLUSH) 0.9 %
10 SYRINGE (ML) INJECTION EVERY 12 HOURS SCHEDULED
Status: DISCONTINUED | OUTPATIENT
Start: 2025-03-10 | End: 2025-03-15 | Stop reason: HOSPADM

## 2025-03-10 RX ORDER — IBUPROFEN 600 MG/1
1 TABLET ORAL
Status: DISCONTINUED | OUTPATIENT
Start: 2025-03-10 | End: 2025-03-15 | Stop reason: HOSPADM

## 2025-03-10 RX ORDER — BUDESONIDE AND FORMOTEROL FUMARATE DIHYDRATE 160; 4.5 UG/1; UG/1
2 AEROSOL RESPIRATORY (INHALATION)
Status: DISCONTINUED | OUTPATIENT
Start: 2025-03-10 | End: 2025-03-15 | Stop reason: HOSPADM

## 2025-03-10 RX ORDER — ACETAMINOPHEN 650 MG/1
650 SUPPOSITORY RECTAL EVERY 4 HOURS PRN
Status: DISCONTINUED | OUTPATIENT
Start: 2025-03-10 | End: 2025-03-15 | Stop reason: HOSPADM

## 2025-03-10 RX ORDER — LOSARTAN POTASSIUM 50 MG/1
100 TABLET ORAL DAILY
Status: DISCONTINUED | OUTPATIENT
Start: 2025-03-11 | End: 2025-03-10

## 2025-03-10 RX ORDER — ONDANSETRON 2 MG/ML
4 INJECTION INTRAMUSCULAR; INTRAVENOUS EVERY 6 HOURS PRN
Status: DISCONTINUED | OUTPATIENT
Start: 2025-03-10 | End: 2025-03-15 | Stop reason: HOSPADM

## 2025-03-10 RX ORDER — INSULIN LISPRO 100 [IU]/ML
2-7 INJECTION, SOLUTION INTRAVENOUS; SUBCUTANEOUS
Status: DISCONTINUED | OUTPATIENT
Start: 2025-03-10 | End: 2025-03-15 | Stop reason: HOSPADM

## 2025-03-10 RX ORDER — LOSARTAN POTASSIUM 50 MG/1
100 TABLET ORAL DAILY
Status: DISCONTINUED | OUTPATIENT
Start: 2025-03-10 | End: 2025-03-15 | Stop reason: HOSPADM

## 2025-03-10 RX ORDER — ISOSORBIDE MONONITRATE 30 MG/1
30 TABLET, EXTENDED RELEASE ORAL DAILY
Status: DISCONTINUED | OUTPATIENT
Start: 2025-03-11 | End: 2025-03-10

## 2025-03-10 RX ORDER — DONEPEZIL HYDROCHLORIDE 10 MG/1
5 TABLET, FILM COATED ORAL NIGHTLY
Status: DISCONTINUED | OUTPATIENT
Start: 2025-03-10 | End: 2025-03-15 | Stop reason: HOSPADM

## 2025-03-10 RX ORDER — POLYETHYLENE GLYCOL 3350 17 G/17G
17 POWDER, FOR SOLUTION ORAL DAILY PRN
Status: DISCONTINUED | OUTPATIENT
Start: 2025-03-10 | End: 2025-03-15 | Stop reason: HOSPADM

## 2025-03-10 RX ORDER — ATORVASTATIN CALCIUM 20 MG/1
40 TABLET, FILM COATED ORAL DAILY
Status: DISCONTINUED | OUTPATIENT
Start: 2025-03-10 | End: 2025-03-15 | Stop reason: HOSPADM

## 2025-03-10 RX ORDER — BISACODYL 10 MG
10 SUPPOSITORY, RECTAL RECTAL DAILY PRN
Status: DISCONTINUED | OUTPATIENT
Start: 2025-03-10 | End: 2025-03-15 | Stop reason: HOSPADM

## 2025-03-10 RX ORDER — DEXTROSE MONOHYDRATE 25 G/50ML
25 INJECTION, SOLUTION INTRAVENOUS
Status: DISCONTINUED | OUTPATIENT
Start: 2025-03-10 | End: 2025-03-15 | Stop reason: HOSPADM

## 2025-03-10 RX ORDER — ISOSORBIDE MONONITRATE 30 MG/1
30 TABLET, EXTENDED RELEASE ORAL DAILY
Status: DISCONTINUED | OUTPATIENT
Start: 2025-03-10 | End: 2025-03-15 | Stop reason: HOSPADM

## 2025-03-10 RX ORDER — ASPIRIN 81 MG/1
81 TABLET ORAL DAILY
Status: DISCONTINUED | OUTPATIENT
Start: 2025-03-11 | End: 2025-03-10

## 2025-03-10 RX ORDER — METOPROLOL SUCCINATE 25 MG/1
25 TABLET, EXTENDED RELEASE ORAL DAILY
Status: DISCONTINUED | OUTPATIENT
Start: 2025-03-11 | End: 2025-03-10

## 2025-03-10 RX ORDER — ACETAMINOPHEN 325 MG/1
650 TABLET ORAL EVERY 4 HOURS PRN
Status: DISCONTINUED | OUTPATIENT
Start: 2025-03-10 | End: 2025-03-15 | Stop reason: HOSPADM

## 2025-03-10 RX ORDER — FLUTICASONE PROPIONATE 50 MCG
2 SPRAY, SUSPENSION (ML) NASAL DAILY
Status: DISCONTINUED | OUTPATIENT
Start: 2025-03-11 | End: 2025-03-15 | Stop reason: HOSPADM

## 2025-03-10 RX ORDER — LIDOCAINE 4 G/G
1 PATCH TOPICAL EVERY 24 HOURS
Status: DISCONTINUED | OUTPATIENT
Start: 2025-03-10 | End: 2025-03-15 | Stop reason: HOSPADM

## 2025-03-10 RX ORDER — FAMOTIDINE 20 MG/1
20 TABLET, FILM COATED ORAL 2 TIMES DAILY PRN
Status: DISCONTINUED | OUTPATIENT
Start: 2025-03-10 | End: 2025-03-15 | Stop reason: HOSPADM

## 2025-03-10 RX ORDER — METOPROLOL SUCCINATE 25 MG/1
25 TABLET, EXTENDED RELEASE ORAL DAILY
Status: DISCONTINUED | OUTPATIENT
Start: 2025-03-10 | End: 2025-03-15 | Stop reason: HOSPADM

## 2025-03-10 RX ORDER — NITROGLYCERIN 0.4 MG/1
0.4 TABLET SUBLINGUAL
Status: DISCONTINUED | OUTPATIENT
Start: 2025-03-10 | End: 2025-03-15 | Stop reason: HOSPADM

## 2025-03-10 RX ORDER — NICOTINE POLACRILEX 4 MG
15 LOZENGE BUCCAL
Status: DISCONTINUED | OUTPATIENT
Start: 2025-03-10 | End: 2025-03-15 | Stop reason: HOSPADM

## 2025-03-10 RX ORDER — ASPIRIN 81 MG/1
81 TABLET ORAL DAILY
Status: DISCONTINUED | OUTPATIENT
Start: 2025-03-10 | End: 2025-03-15 | Stop reason: HOSPADM

## 2025-03-10 RX ORDER — AMLODIPINE BESYLATE 10 MG/1
10 TABLET ORAL DAILY
Status: DISCONTINUED | OUTPATIENT
Start: 2025-03-10 | End: 2025-03-15 | Stop reason: HOSPADM

## 2025-03-10 RX ORDER — AMOXICILLIN 250 MG
2 CAPSULE ORAL 2 TIMES DAILY PRN
Status: DISCONTINUED | OUTPATIENT
Start: 2025-03-10 | End: 2025-03-15 | Stop reason: HOSPADM

## 2025-03-10 RX ADMIN — DONEPEZIL HYDROCHLORIDE 5 MG: 10 TABLET, FILM COATED ORAL at 20:12

## 2025-03-10 RX ADMIN — LIDOCAINE 1 PATCH: 4 PATCH TOPICAL at 08:51

## 2025-03-10 RX ADMIN — DONEPEZIL HYDROCHLORIDE 5 MG: 10 TABLET, FILM COATED ORAL at 01:49

## 2025-03-10 RX ADMIN — ATORVASTATIN CALCIUM 40 MG: 20 TABLET, FILM COATED ORAL at 08:51

## 2025-03-10 RX ADMIN — METOPROLOL SUCCINATE 25 MG: 25 TABLET, EXTENDED RELEASE ORAL at 08:50

## 2025-03-10 RX ADMIN — ISOSORBIDE MONONITRATE 30 MG: 30 TABLET, EXTENDED RELEASE ORAL at 08:50

## 2025-03-10 RX ADMIN — HYDRALAZINE HYDROCHLORIDE 50 MG: 50 TABLET ORAL at 20:12

## 2025-03-10 RX ADMIN — AMLODIPINE BESYLATE 10 MG: 10 TABLET ORAL at 01:49

## 2025-03-10 RX ADMIN — CLOPIDOGREL 75 MG: 75 TABLET, FILM COATED ORAL at 08:51

## 2025-03-10 RX ADMIN — ASPIRIN 81 MG: 81 TABLET, COATED ORAL at 10:00

## 2025-03-10 RX ADMIN — DIPHENHYDRAMINE HYDROCHLORIDE 50 MG: 25 CAPSULE ORAL at 22:16

## 2025-03-10 RX ADMIN — BUDESONIDE AND FORMOTEROL FUMARATE DIHYDRATE 2 PUFF: 160; 4.5 AEROSOL RESPIRATORY (INHALATION) at 20:43

## 2025-03-10 RX ADMIN — ACETAMINOPHEN 650 MG: 325 TABLET, FILM COATED ORAL at 23:27

## 2025-03-10 RX ADMIN — Medication 5 MG: at 01:50

## 2025-03-10 RX ADMIN — Medication 10 ML: at 01:50

## 2025-03-10 RX ADMIN — THIAMINE HYDROCHLORIDE 100 MG: 100 INJECTION, SOLUTION INTRAMUSCULAR; INTRAVENOUS at 18:08

## 2025-03-10 RX ADMIN — Medication 10 ML: at 08:51

## 2025-03-10 RX ADMIN — BUDESONIDE AND FORMOTEROL FUMARATE DIHYDRATE 2 PUFF: 160; 4.5 AEROSOL RESPIRATORY (INHALATION) at 07:54

## 2025-03-10 RX ADMIN — Medication 10 ML: at 20:14

## 2025-03-10 RX ADMIN — LOSARTAN POTASSIUM 100 MG: 50 TABLET, FILM COATED ORAL at 08:50

## 2025-03-10 RX ADMIN — Medication 5 MG: at 20:14

## 2025-03-10 NOTE — THERAPY EVALUATION
Patient Name: Sonia Acharya  : 1950    MRN: [id number]                              Today's Date: 3/10/2025       Admit Date: 3/9/2025    Visit Dx:     ICD-10-CM ICD-9-CM   1. Altered mental status, unspecified altered mental status type  R41.82 780.97   2. ESRD on hemodialysis  N18.6 585.6    Z99.2 V45.11   3. Hypertension not at goal  I10 401.9     Patient Active Problem List   Diagnosis    Generalized abdominal pain    ODALIS (acute kidney injury)    Anemia, chronic disease    Metabolic acidosis, increased anion gap    Obesity (BMI 30-39.9)    HTN (hypertension)    Chest pain, atypical    Cervical radiculopathy    ESRD on dialysis    Bradycardia    Right arm pain    ANGIE (obstructive sleep apnea)    Chronic diastolic CHF (congestive heart failure)    Aortic stenosis    Pancytopenia    Hyperphosphatemia    Hyperkalemia    Pneumonia    Leukocytopenia    Anemia, chronic disease    ESRD (end stage renal disease) on dialysis    Bacterial pneumonia, treating as gram negative    Chronic respiratory failure with hypoxia    CAD (coronary artery disease)    Nausea, vomiting, and diarrhea    Severe protein-calorie malnutrition    Chronic abdominal pain    Type 2 diabetes mellitus with hypoglycemia    Altered mental status     Past Medical History:   Diagnosis Date    Arthritis     Chronic kidney disease     Heart disease     Hypertension      Past Surgical History:   Procedure Laterality Date    BREAST SURGERY      CARDIAC CATHETERIZATION N/A 2025    Procedure: Left Heart Cath;  Surgeon: Cleveland Dickens MD;  Location:  [facility];  Service: Cardiovascular;  Laterality: N/A;  Dialysis patient    CARDIAC CATHETERIZATION N/A 2025    Procedure: Coronary angiography;  Surgeon: Cleveland Dickens MD;  Location:  [facility];  Service: Cardiovascular;  Laterality: N/A;    DIALYSIS FISTULA CREATION      EYE SURGERY      HERNIA REPAIR      Dr. Sung      General Information        Row Name 03/10/25 1158          Physical Therapy Time and Intention    Document Type evaluation  -EJ     Mode of Treatment physical therapy  -       Row Name 03/10/25 1158          General Information    Patient Profile Reviewed yes  -EJ     Prior Level of Function min assist:;independent:;all household mobility;ADL's  unsure of PLOF, pt difficult to understand and difficulty providing hx. pt reports living w her daughter and using rwx for ambulation. Unsure how much assist pt needs at home  -EJ     Existing Precautions/Restrictions fall  -EJ     Barriers to Rehab cognitive status;previous functional deficit  -       Row Name 03/10/25 1158          Living Environment    Current Living Arrangements home  -EJ     People in Home child(giuseppe), adult  -       Row Name 03/10/25 1158          Cognition    Orientation Status (Cognition) oriented to;person;place  -       Row Name 03/10/25 1158          Safety Issues/Impairments Affecting Functional Mobility    Impairments Affecting Function (Mobility) strength;endurance/activity tolerance;balance;cognition  -EJ               User Key  (r) = Recorded By, (t) = Taken By, (c) = Cosigned By      Initials Name Provider Type     Bernadette Sethi, PT Physical Therapist                   Mobility       Row Name 03/10/25 1200          Bed Mobility    Bed Mobility supine-sit  -EJ     Supine-Sit [place] (Bed Mobility) verbal cues;minimum assist (75% patient effort)  -     Assistive Device (Bed Mobility) head of bed elevated  -[facility] Name 03/10/25 1200          Sit-Stand Transfer    Sit-Stand [place] (Transfers) verbal cues;minimum assist (75% patient effort);2 person assist  -     Comment, (Sit-Stand Transfer) HHA x 2, slight posterior lean upon sitting  -[facility] Name 03/10/25 1200          Gait/Stairs (Locomotion)    [place] Level (Gait) verbal cues;nonverbal cues (demo/gesture);minimum assist (75% patient effort);2 person assist;contact  guard  -EJ     Assistive Device (Gait) --  HHA x 2  -EJ     Distance in Feet (Gait) 10  -EJ     Deviations/Abnormal Patterns (Gait) cass decreased;stride length decreased  -EJ     Comment, (Gait/Stairs) slow pace, declines ambulating further distance  -EJ               User Key  (r) = Recorded By, (t) = Taken By, (c) = Cosigned By      Initials Name Provider Type    Bernadette Acevedo, PT Physical Therapist                   Obj/Interventions       [facility] Name 03/10/25 1201          Range of Motion Comprehensive    General Range of Motion no range of motion deficits identified  -[facility] Name 03/10/25 1201          Strength Comprehensive (MMT)    Comment, General Manual Muscle Testing (MMT) Assessment generalized weakness  -[facility] Name 03/10/25 1201          Balance    Balance Assessment sitting static balance;standing static balance;standing dynamic balance  -EJ     Static Sitting Balance standby assist;contact guard  -EJ     Static Standing Balance minimal assist  -EJ     Dynamic Standing Balance minimal assist;2-person assist  -EJ               User Key  (r) = Recorded By, (t) = Taken By, (c) = Cosigned By      Initials Name Provider Type     Bernadette Sethi, PT Physical Therapist                   Goals/Plan       [facility] Name 03/10/25 1205          Bed Mobility Goal 1 (PT)    Activity/Assistive Device (Bed Mobility Goal 1, PT) bed mobility activities, all  -EJ     [place] Level/Cues Needed (Bed Mobility Goal 1, PT) standby assist  -EJ     Time Frame (Bed Mobility Goal 1, PT) 2 weeks  -[facility] Name 03/10/25 1205          Transfer Goal 1 (PT)    Activity/Assistive Device (Transfer Goal 1, PT) transfers, all;walker, rolling  -EJ     [place] Level/Cues Needed (Transfer Goal 1, PT) contact guard required  -EJ     Time Frame (Transfer Goal 1, PT) 2 weeks  -       Row Name 03/10/25 1205          Gait Training Goal 1 (PT)    Activity/Assistive Device (Gait Training Goal 1, PT) gait  (walking locomotion);walker, rolling  -EJ     [place] Level (Gait Training Goal 1, PT) contact guard required  -EJ     Distance (Gait Training Goal 1, PT) 100  -EJ     Time Frame (Gait Training Goal 1, PT) 2 weeks  -EJ       Row Name 03/10/25 1205          Therapy Assessment/Plan (PT)    Planned Therapy Interventions (PT) balance training;bed mobility training;gait training;home exercise program;transfer training;stretching;strengthening;stair training;ROM (range of motion);patient/family education  -EJ               User Key  (r) = Recorded By, (t) = Taken By, (c) = Cosigned By      Initials Name Provider Type    EJ Bernadette Sethi, PT Physical Therapist                   Clinical Impression       Row Name 03/10/25 1201          Pain    Pretreatment Pain Rating 0/10 - no pain  -EJ     Pre/Posttreatment Pain Comment reports legs hurt with movement, but unable to rate pain  -EJ       Row Name 03/10/25 1201          Plan of Care Review    Plan of Care Reviewed With patient  -EJ     Outcome Evaluation Pt seen for PT this AM. She was admitted to [facility] yesterday w AMS. Pt is alert and agreeable to therapy; however, she is difficult to understand and confusion noted throughout session. Pt has hx of ESRD, anemai, HTN, ANGIE, CHF, CAD, DM. At baseline, pt  lives with her daughter and reports using Rwx for ambulation. Today, pt presents w generalized weakenss and decreased activity tolerance. SHe was able to transition to EOB w min A. She stood w min A x 2. She demos slight posterior lean. Pt ambulated approx 10 ft w HHA/min A x 2. She demos slow pace, but no overt LOB noted. Pt agreeable to sit up in recliner at end of session. Unsure of DC plans. Anticipate pt to return home w daughter. She will continue to benefit from skilled PT to maximize safety, strength, and independence w mobility.  -EJ       Row Name 03/10/25 1201          Therapy Assessment/Plan (PT)    Rehab Potential (PT) good  -EJ     Criteria for Skilled  Interventions Met (PT) yes  -EJ     Therapy Frequency (PT) 5 times/wk  -EJ       Row Name 03/10/25 1201          Positioning and Restraints    Pre-Treatment Position in bed  -EJ     Post Treatment Position chair  -EJ     In Chair notified nsg;reclined;call light within reach;encouraged to call for assist;exit alarm on  -EJ               User Key  (r) = Recorded By, (t) = Taken By, (c) = Cosigned By      Initials Name Provider Type    Bernadette Acevedo, PT Physical Therapist                   Outcome Measures       Row Name 03/10/25 1206 03/10/25 0844       How much help from another person do you currently need...    Turning from your back to your side while in flat bed without using bedrails? 3  -EJ 3  -CE    Moving from lying on back to sitting on the side of a flat bed without bedrails? 3  -EJ 3  -CE    Moving to and from a bed to a chair (including a wheelchair)? 3  -EJ 2  -CE    Standing up from a chair using your arms (e.g., wheelchair, bedside chair)? 3  -EJ 2  -CE    Climbing 3-5 steps with a railing? 2  -EJ 2  -CE    To walk in hospital room? 3  -EJ 2  -CE    AM-PAC 6 Clicks Score (PT) 17  -EJ 14  -CE      Row Name 03/10/25 0040 03/10/25 0035       How much help from another person do you currently need...    Turning from your back to your side while in flat bed without using bedrails? 3  -CW 3  -CW    Moving from lying on back to sitting on the side of a flat bed without bedrails? 3  -CW 3  -CW    Moving to and from a bed to a chair (including a wheelchair)? 2  -CW 2  -CW    Standing up from a chair using your arms (e.g., wheelchair, bedside chair)? 2  -CW 2  -CW    Climbing 3-5 steps with a railing? 2  -CW 2  -CW    To walk in hospital room? 2  -CW 2  -CW    AM-PAC 6 Clicks Score (PT) 14  -CW 14  -CW              User Key  (r) = Recorded By, (t) = Taken By, (c) = Cosigned By      Initials Name Provider Type    Paris Hurtado RN Registered Nurse    Bernadette Acevedo, PT Physical Therapist     Sheron Diana, RN Registered Nurse                                   PT Recommendation and Plan  Planned Therapy Interventions (PT): balance training, bed mobility training, gait training, home exercise program, transfer training, stretching, strengthening, stair training, ROM (range of motion), patient/family education  Outcome Evaluation: Pt seen for PT this AM. She was admitted to [facility] yesterday w AMS. Pt is alert and agreeable to therapy; however, she is difficult to understand and confusion noted throughout session. Pt has hx of ESRD, anemai, HTN, ANGIE, CHF, CAD, DM. At baseline, pt  lives with her daughter and reports using Rwx for ambulation. Today, pt presents w generalized weakenss and decreased activity tolerance. SHe was able to transition to EOB w min A. She stood w min A x 2. She demos slight posterior lean. Pt ambulated approx 10 ft w HHA/min A x 2. She demos slow pace, but no overt LOB noted. Pt agreeable to sit up in recliner at end of session. Unsure of DC plans. Anticipate pt to return home w daughter. She will continue to benefit from skilled PT to maximize safety, strength, and independence w mobility.     Time Calculation:         PT Charges       Row Name 03/10/25 1207             Time Calculation    Start Time 1100  -EJ      Stop Time 1125  -EJ      Time Calculation (min) 25 min  -EJ      PT Received On 03/10/25  -EJ      PT - Next Appointment 03/11/25  -EJ      PT Goal Re-Cert Due Date 03/24/25  -EJ         Time Calculation- PT    Total Timed Code Minutes- PT 18 minute(s)  -EJ                User Key  (r) = Recorded By, (t) = Taken By, (c) = Cosigned By      Initials Name Provider Type    EJ Bernadette Sethi, PT Physical Therapist                  Therapy Charges for Today       Code Description Service Date Service Provider Modifiers Qty    91781561331  PT EVAL MOD COMPLEXITY 3 3/10/2025 Bernadette Sethi, PT GP 1    [id number]  PT THERAPEUTIC ACT EA 15 MIN 3/10/2025 Jossie  Bernadette AYON, PT GP 1    [id number]  PT THER SUPP EA 15 MIN 3/10/2025 Bernadette Sethi, PT GP 1            PT G-Codes  AM-PAC 6 Clicks Score (PT): 17  PT Discharge Summary  Anticipated Discharge Disposition (PT): home with assist, home with home health, skilled nursing facility    Bernadette Sethi, PT  3/10/2025

## 2025-03-10 NOTE — PROGRESS NOTES
"  Nephrology Progress Note - Chart review     Miss Acharya is a  ESRD on HD followed by  [facility], Dialysis Location: [facility] s/p AVFistula Right Upper Arm , Schedule: M-W-F . W CAD       Patient admitted for confusion     Nephrology consultation to arrange for HD during admission     BP (!) 185/108 (BP Location: Right arm, Patient Position: Lying)   Pulse 67   Temp 97.4 °F (36.3 °C) (Oral)   Resp 22   Ht 152.4 cm (60\")   Wt 47.8 kg (105 lb 6.1 oz)   SpO2 94%   BMI 20.58 kg/m²     Results from last 7 days   Lab Units 03/10/25  0323 03/09/25  1945 03/05/25  0000   WBC 10*3/mm3 3.97 4.78  --    HEMOGLOBIN g/dL 10.3* 10.2* 8.8*  26.4*   HEMATOCRIT % 33.3* 32.1*  --    PLATELETS 10*3/mm3 297 310  --      Results from last 7 days   Lab Units 03/10/25  0323 03/09/25  1945 03/05/25  0000   SODIUM mmol/L 139 139  --    POTASSIUM mmol/L 4.9 4.6  --    CHLORIDE mmol/L 96* 95*  --    CO2 mmol/L 19.1* 24.4  --    BUN mg/dL 35* 33* 53*   CREATININE mg/dL 5.92* 5.80*  --    CALCIUM mg/dL 9.4 9.8  --    BILIRUBIN mg/dL 0.3 0.3  --    ALK PHOS U/L 101 115  --    ALT (SGPT) U/L 8 6  --    AST (SGOT) U/L 16 16  --    GLUCOSE mg/dL 88 83  --        Plan   - Will arrange for HD   - HD orders placed   - Full consultation will follow     Thank you for allowing us to participate from this patent care   "

## 2025-03-10 NOTE — NURSING NOTE
Pagekareem Harris, APRN to report pt admission and meds reviewed and input.  Pt is a HD pt MWF, anuric, B/P still elevated.  Strict NPO orders observed.  Pain in LLQ abdomen pt says started today.  Denies alcohol use, affirms visual hallucinations but states none today but was unsure of the date.  Asymptomatic intermittent bradycardia and has hx of bradycardia.    APRN input desired orders.  Received call back from APRN and reviewed orders, mentioned B/P, remaining abdominal pain and asymptomatic bradycardia.  No additional orders received at this time.

## 2025-03-10 NOTE — ED NOTES
"Attempted to obtain orthostatic vitals, pt states \"I need pain medicine first\", Lisa MENDOZA aware. Will attempt to repeat after pain medicine administration.   "

## 2025-03-10 NOTE — PLAN OF CARE
Problem: Violence Risk or Actual  Goal: Anger and Impulse Control  Outcome: Not Progressing  Intervention: Minimize Safety Risk  Recent Flowsheet Documentation  Taken 3/10/2025 1206 by Paris Bonilla RN  Enhanced Safety Measures: chair alarm set  Taken 3/10/2025 1000 by Paris Bonilla RN  Enhanced Safety Measures: bed alarm set  Taken 3/10/2025 0844 by Paris Bonilla RN  Enhanced Safety Measures: bed alarm set     Problem: Adult Inpatient Plan of Care  Goal: Plan of Care Review  Outcome: Not Progressing  Flowsheets (Taken 3/10/2025 1637)  Progress: no change  Outcome Evaluation: VSS, room air, dialysis today, drowsy most of shift, awakens easily, needs MRI and EEG completed.  Plan of Care Reviewed With: patient  Goal: Patient-Specific Goal (Individualized)  Outcome: Not Progressing  Goal: Absence of Hospital-Acquired Illness or Injury  Outcome: Not Progressing  Intervention: Identify and Manage Fall Risk  Recent Flowsheet Documentation  Taken 3/10/2025 1600 by Paris Bonilla RN  Safety Promotion/Fall Prevention: patient off unit  Taken 3/10/2025 1400 by Paris Bonilla RN  Safety Promotion/Fall Prevention: patient off unit  Taken 3/10/2025 1206 by Paris Bonilla RN  Safety Promotion/Fall Prevention:   activity supervised   assistive device/personal items within reach   clutter free environment maintained   fall prevention program maintained   nonskid shoes/slippers when out of bed   room organization consistent   safety round/check completed  Taken 3/10/2025 1000 by Paris Bonilla RN  Safety Promotion/Fall Prevention:   activity supervised   assistive device/personal items within reach   clutter free environment maintained   fall prevention program maintained   nonskid shoes/slippers when out of bed   room organization consistent   safety round/check completed  Taken 3/10/2025 0844 by Paris Bonilla RN  Safety Promotion/Fall Prevention:   activity supervised   assistive  device/personal items within reach   clutter free environment maintained   fall prevention program maintained   nonskid shoes/slippers when out of bed   room organization consistent   safety round/check completed  Intervention: Prevent Skin Injury  Recent Flowsheet Documentation  Taken 3/10/2025 1330 by Paris Bonilla RN  Body Position: supine  Taken 3/10/2025 1206 by Paris Bonilla RN  Body Position: legs elevated  Taken 3/10/2025 1000 by Paris Bonilla RN  Body Position:   side-lying   right   heels elevated   legs elevated  Taken 3/10/2025 0844 by Paris Bonilla RN  Body Position:   supine   heels elevated   legs elevated  Goal: Optimal Comfort and Wellbeing  Outcome: Not Progressing  Goal: Readiness for Transition of Care  Outcome: Not Progressing     Problem: Skin Injury Risk Increased  Goal: Skin Health and Integrity  Outcome: Not Progressing  Intervention: Optimize Skin Protection  Recent Flowsheet Documentation  Taken 3/10/2025 1330 by Paris Bonilla RN  Activity Management: back to bed  Head of Bed (HOB) Positioning: HOB at 30-45 degrees  Taken 3/10/2025 1206 by Paris Bonilla RN  Activity Management: up in chair  Taken 3/10/2025 1000 by Paris Bonilla RN  Head of Bed (HOB) Positioning: HOB at 30-45 degrees  Taken 3/10/2025 0844 by Paris Bonilal RN  Activity Management: activity encouraged  Pressure Reduction Techniques:   heels elevated off bed   frequent weight shift encouraged   weight shift assistance provided  Head of Bed (HOB) Positioning: HOB at 30-45 degrees   Goal Outcome Evaluation:  Plan of Care Reviewed With: patient        Progress: no change  Outcome Evaluation: VSS, room air, dialysis today, drowsy most of shift, awakens easily, needs MRI and EEG completed.

## 2025-03-10 NOTE — NURSING NOTE
HD WITHOUT INCIDENT OR C/O. TOLERATED WELL. REMOVED 1 L. NO MEDS ADMINISTERED. AVF NEEDLES REMOVED X 2. HEMOSTASIS ACHIEVED. STABLE POST COMPLETION OF HD.

## 2025-03-10 NOTE — CONSULTS
[facility] Consult Note      Patient Name: Sonia Acharya  : 1950  MRN: [id number]  Primary Care Physician:  Marcy Paez PA  Referring Physician: No ref. provider found  Date of admission: 3/9/2025    Subjective     Reason for Consult: End-stage renal disease    HPI:   Snoia Acharya is a 74 y.o. female with past medical history of chronic kidney disease that progressed to end-stage renal disease undergoing hemodialysis on a Monday,  schedule through a right upper extremity AV fistula, chronic normocytic anemia, secondary hyperparathyroidism, vitamin  D deficiency, hypertension, dyslipidemia, osteoarthritis, congestive heart failure, diabetes mellitus type 2, and  coronary disease status postcardiac cath    Patient presents to the emergency department for onset of subacute altered mental status.  He was admitted to continue medical care    Nephrology consultation of been requested to arrange for hemodialysis    Patient unfortunately is unable to provide any information    Information gathered from patient's chart in [facility]    Review of Systems:   14 point review of systems is otherwise negative except for mentioned above on HPI    Personal History     Past Medical History:   Diagnosis Date    Arthritis     Chronic kidney disease     Heart disease     Hypertension        Past Surgical History:   Procedure Laterality Date    BREAST SURGERY      CARDIAC CATHETERIZATION N/A 2025    Procedure: Left Heart Cath;  Surgeon: Cleveland Dickens MD;  Location:  [facility];  Service: Cardiovascular;  Laterality: N/A;  Dialysis patient    CARDIAC CATHETERIZATION N/A 2025    Procedure: Coronary angiography;  Surgeon: Cleveland Dickens MD;  Location:  [facility];  Service: Cardiovascular;  Laterality: N/A;    DIALYSIS FISTULA CREATION      EYE SURGERY      HERNIA REPAIR      Dr. Sung       Family History: family history  includes Breast cancer in her sister; Heart disease in her father.    Social History:  reports that she has never smoked. She has never been exposed to tobacco smoke. She has never used smokeless tobacco. She reports that she does not drink alcohol and does not use drugs.    Home Medications:  Prior to Admission medications    Medication Sig Start Date End Date Taking? Authorizing Provider   amLODIPine (NORVASC) 10 MG tablet Take 1 tablet by mouth Daily.   Yes Manish Tracy MD   aspirin 81 MG EC tablet Take 1 tablet by mouth Daily.   Yes Manish Tracy MD   atorvastatin (LIPITOR) 40 MG tablet Take 1 tablet by mouth Daily.   Yes Manish Tracy MD   budesonide-formoterol (SYMBICORT) 80-4.5 MCG/ACT inhaler Inhale 2 puffs 2 (Two) Times a Day.   Yes Manish Tracy MD   donepezil (ARICEPT) 5 MG tablet Take 1 tablet by mouth Every Night.   Yes Manish Tracy MD   fluticasone (FLONASE) 50 MCG/ACT nasal spray Administer 2 sprays into the nostril(s) as directed by provider Daily.   Yes Manish Tracy MD   hydrALAZINE (APRESOLINE) 50 MG tablet Take 1 tablet by mouth 3 (Three) Times a Day.   Yes Manish Tracy MD   isosorbide mononitrate (IMDUR) 30 MG 24 hr tablet Take 1 tablet by mouth Daily. 1/31/22  Yes Manish Tracy MD   linaclotide (LINZESS) 290 MCG capsule capsule Take 1 capsule by mouth Every Morning Before Breakfast.   Yes Manish Tracy MD   losartan (COZAAR) 100 MG tablet Take 1 tablet by mouth Daily. 1/19/23  Yes Hayden Granda MD   melatonin 3 MG tablet Take 1 tablet by mouth Every Night.   Yes Manish Tracy MD   metoprolol succinate XL (TOPROL-XL) 25 MG 24 hr tablet Take 1 tablet by mouth Daily.   Yes Manish Tracy MD   clopidogrel (PLAVIX) 75 MG tablet Take 1 tablet by mouth Daily for 30 days. 2/15/25 3/17/25  Farzad Modi MD   diphenhydrAMINE (BENADRYL) 25 mg capsule Take 2 capsules by mouth Every 6 (Six) Hours As Needed  for Itching.    Provider, MD Manish   famotidine (Pepcid) 20 MG tablet Take 1 tablet by mouth 2 (Two) Times a Day As Needed for Heartburn or Indigestion for up to 30 days. 2/15/25 3/17/25  Farzad Modi MD   lidocaine (LIDODERM) 5 % Place 1 patch on the skin as directed by provider Daily. Remove & Discard patch within 12 hours or as directed by MD 1/26/25   Varun Daniel MD       Allergies:  No Known Allergies    Objective     Vitals:   Temp:  [97.4 °F (36.3 °C)-97.7 °F (36.5 °C)] 97.4 °F (36.3 °C)  Heart Rate:  [56-95] 61  Resp:  [18-22] 18  BP: (168-198)/() 168/77    Intake/Output Summary (Last 24 hours) at 3/10/2025 0859  Last data filed at 3/10/2025 0150  Gross per 24 hour   Intake 240 ml   Output --   Net 240 ml       Physical Exam:   Constitutional: Awake, alert, no acute distress.  HEENT: Sclera anicteric, no conjunctival injection  Neck: Supple, no thyromegaly, no lymphadenopathy, trachea at midline, no JVD  Respiratory: Clear to auscultation bilaterally, nonlabored respiration  Cardiovascular: RRR, systolic ejection murmur grade 4,  Gastrointestinal: Positive bowel sounds, abdomen is soft, nontender and nondistended  : No palpable bladder  Musculoskeletal: No edema, no clubbing or cyanosis.  Right upper extremity AV fistula bruit and thrill present  Neurologic: Somnolent      Scheduled Meds:     amLODIPine, 10 mg, Oral, Daily  aspirin, 81 mg, Oral, Daily  atorvastatin, 40 mg, Oral, Daily  budesonide-formoterol, 2 puff, Inhalation, BID - RT  clopidogrel, 75 mg, Oral, Daily  donepezil, 5 mg, Oral, Nightly  [START ON 3/11/2025] fluticasone, 2 spray, Nasal, Daily  hydrALAZINE, 50 mg, Oral, TID  insulin lispro, 2-7 Units, Subcutaneous, 4x Daily AC & at Bedtime  isosorbide mononitrate, 30 mg, Oral, Daily  Lidocaine, 1 patch, Transdermal, Q24H  losartan, 100 mg, Oral, Daily  metoprolol succinate XL, 25 mg, Oral, Daily  sodium chloride, 10 mL, Intravenous, Q12H      IV Meds:        Results  Reviewed:   I have personally reviewed the results from the time of this admission to 3/10/2025 08:59 EDT     Lab Results   Component Value Date    GLUCOSE 88 03/10/2025    CALCIUM 9.4 03/10/2025     03/10/2025    K 4.9 03/10/2025    CO2 19.1 (L) 03/10/2025    CL 96 (L) 03/10/2025    BUN 35 (H) 03/10/2025    CREATININE 5.92 (H) 03/10/2025    EGFRIFAFRI 10 (L) 03/04/2023    EGFRIFNONA  12/14/2021      Comment:      <15 Indicative of kidney failure.    BCR 5.9 (L) 03/10/2025    ANIONGAP 23.9 (H) 03/10/2025      Lab Results   Component Value Date    MG 2.5 (H) 03/09/2025    PHOS 4.1 02/15/2025    ALBUMIN 3.6 03/10/2025           Assessment / Plan       Altered mental status    Anemia, chronic disease    HTN (hypertension)    Bradycardia    ANGIE (obstructive sleep apnea)    Chronic diastolic CHF (congestive heart failure)    ESRD (end stage renal disease) on dialysis    Chronic respiratory failure with hypoxia    CAD (coronary artery disease)      ASSESSMENT:    -End Stage Renal Disease ( ESRD ) on Hemodialysis .s/p  RUE AVF functional .Continue to arrange hemodialysis treatment during patient  admission. Continue renal diet     -Chronic normocytic anemia. On Epogen protocol.  On hold hgb > 10 We will continue to follow H&H closely      -Hyperphosphatemia. Continue renal diet. We will follow phosphorus to make further adjustments to binder if indicated      -Hypertension w/ CKD .  Resume home regimen of amlodipine, hydralazine, isosorbide, metoprolol and losartan     . We will continue to follow closely. Heart healthy diet     -Coronary artery disease status post cardiac cath with chronic diastolic congestive (echocardiogram showing EF of 56% with severe concentric hypertrophy  ) heart failure.  Optimize volume status with dialysis.  Heart healthy diet. Recommendations per primary team    -Altered mental status/encephalopathy being worked up and evaluated    PLAN:  -The patient is scheduled to undergo hemodialysis  today  -Her chemistry does not justify her mentation, unclear  baseline mental status  -Continue surveillance labs    Thank you for involving us in the care of Sonia Acharya.  Please feel free to call with any questions.    Gabe Solano MD  03/10/25  08:59 EDT    [facility]  [phone]      Please note that portions of this note were completed with a voice recognition program.

## 2025-03-10 NOTE — H&P
[facility]   HISTORY AND PHYSICAL    Patient Name: Sonia Acharya  : 1950  MRN: [id number]  Primary Care Physician:  Marcy Paez PA  Date of admission: 3/9/2025    Subjective   Subjective     Chief Complaint: confusion    History of Present Illness  74-year-old with a history of anemia, hypertension, ANGIE, CHF, end-stage renal disease, coronary artery disease, diabetes who comes the hospital because she has been sleeping for the last 3 to 4 days.  The daughter states she is having some visual hallucinations unable to understand what she says at times.    When I am seeing the patient she wakes up talks to me she answers questions fairly well.  She is oriented. she is not able to stay awake long enough to tell me why she is in the hospital.  She quickly goes back to sleep.      Review of Systems     Personal History     Past Medical History:   Diagnosis Date    Arthritis     Chronic kidney disease     Heart disease     Hypertension        Past Surgical History:   Procedure Laterality Date    BREAST SURGERY      CARDIAC CATHETERIZATION N/A 2025    Procedure: Left Heart Cath;  Surgeon: Cleveland Dickens MD;  Location: [facility] INVASIVE LOCATION;  Service: Cardiovascular;  Laterality: N/A;  Dialysis patient    CARDIAC CATHETERIZATION N/A 2025    Procedure: Coronary angiography;  Surgeon: Cleveland Dickens MD;  Location: [facility] INVASIVE LOCATION;  Service: Cardiovascular;  Laterality: N/A;    DIALYSIS FISTULA CREATION      EYE SURGERY      HERNIA REPAIR      Dr. Sung       Family History: family history includes Breast cancer in her sister; Heart disease in her father. Otherwise pertinent FHx was reviewed and not pertinent to current issue.    Social History:  reports that she has never smoked. She has never been exposed to tobacco smoke. She has never used smokeless tobacco. She reports that she does not drink alcohol and does not use drugs.    Home Medications:  amLODIPine,  aspirin, atorvastatin, budesonide-formoterol, clopidogrel, diphenhydrAMINE, donepezil, famotidine, fluticasone, hydrALAZINE, isosorbide mononitrate, lidocaine, linaclotide, losartan, melatonin, and metoprolol succinate XL    Allergies:  No Known Allergies    Objective    Objective     Vitals:   Temp:  [97.4 °F (36.3 °C)-97.7 °F (36.5 °C)] 97.4 °F (36.3 °C)  Heart Rate:  [56-95] 61  Resp:  [18-22] 18  BP: (168-198)/() 168/77    Physical Exam  Constitutional:       General: She is not in acute distress.     Comments: Sleepy, will awaken   Cardiovascular:      Rate and Rhythm: Normal rate and regular rhythm.   Pulmonary:      Effort: Pulmonary effort is normal.      Breath sounds: Normal breath sounds.   Abdominal:      General: There is no distension.      Palpations: Abdomen is soft.      Tenderness: There is no abdominal tenderness.   Skin:     General: Skin is warm and dry.   Neurological:      General: No focal deficit present.   Psychiatric:      Comments: When awake she answers questions well, tells me she is in the hopsital         Result Review    Result Review:  I have personally reviewed the results from the time of this admission to 3/10/2025 08:58 EDT and agree with these findings:  []  Laboratory list / accordion  []  Microbiology  [x]  Radiology  [x]  EKG/Telemetry   [x]  Cardiology/Vascular   []  Pathology  [x]  Old records  []  Other:  Most notable findings include: somnolence      Assessment & Plan   Assessment / Plan     Brief Patient Summary:  Sonia Acharya is a 74 y.o. female who has history of ESRD, dementia and  altered mental status    Active Hospital Problems:  Active Hospital Problems    Diagnosis     **Altered mental status     CAD (coronary artery disease)     Chronic respiratory failure with hypoxia     ESRD (end stage renal disease) on dialysis     Bradycardia     Chronic diastolic CHF (congestive heart failure)     ANGIE (obstructive sleep apnea)     Anemia, chronic disease     HTN  (hypertension)      Plan:     Altered mental status  -ct head unremarkable  -thiamine 100 mg IV  -neurology eval  -nenphrology is seeing, hoping she improves after dialysis  -hiv, rpr, crp, b12 good, abg, tsh    HTN  -losartan and metoprolol  -Bp is elevated    DM  -SSI    CAD with stent  -aspirin and plavix  Recently admitted with chest in feb  -statin  -imdur, losartan and metoprolol xl    Dementia  -aricept    Last dialysis on Friday    VTE Prophylaxis:  Mechanical VTE prophylaxis orders are present.      CODE STATUS:    Code Status (Patient has no pulse and is not breathing): CPR (Attempt to Resuscitate)  Medical Interventions (Patient has pulse or is breathing): Full Support    Admission Status:  I believe this patient meets observation status.    Casey Mendoza MD

## 2025-03-10 NOTE — PLAN OF CARE
"Goal Outcome Evaluation:         Pt A&Ox3, confused or forgetful to time; R/A, SR-SB with BBB; heels boggy but intact, elevated off mattress; old scar on coccyx, Q2 hr turns assisted; pt is on HD MWF and says she is mostly anuric.  Pt having intermittent bouts of paranoia/confusion and talking in nonsensical threads.  No facial droop noted, SCD pump ordered; awaiting delivery.  Pt experiencing increased weakness than her baseline per the pt, PT consult in.  Pt says appetite has suffered and that she has lost \"about 100 lbs\" but couldn't specify over what length of time - nutrition consult input.  Nephrology consult input and called for this AM.  Elevated B/P addressed with APRN on call with [facility] - no additional meds/PRN meds ordered at this time.          Problem: Violence Risk or Actual  Goal: Anger and Impulse Control  Outcome: Progressing  Intervention: Minimize Safety Risk  Description: Listen actively, observing verbal and nonverbal cues (e.g., irritability, confusion, lack of cooperation, demanding behavior, body posture, expression); take threats seriously.Maintain a therapeutic presence; utilize calm, empathetic tone of voice, nonjudgmental attitude and nonthreatening body language; listen carefully.Assess and provide for unmet needs, including nutrition, comfort, hydration, hygiene, companionship and appropriate rest.Utilize empathetic but firm and concise communication; set limits, offer choices and propose alternatives.Remove stimuli and objects that may lead to harming self or others.Maintain clear path to room exit; keep door open during care.Ask directly about homicidal and suicidal intent; provide additional safety measures based on level of risk (e.g., one-on-one observation, duty to warn).Implement least restrictive measures if attempts to de-escalate violent or injurious behaviors are unsuccessful.  Recent Flowsheet Documentation  Taken 3/10/2025 0400 by Sheron Lockhart RN  Enhanced Safety " Measures:   bed alarm set   room near unit station  Taken 3/10/2025 0200 by Sheron Lockhart RN  Enhanced Safety Measures:   bed alarm set   room near unit station  Taken 3/10/2025 0035 by Sheron Lockhart RN  Sensory Stimulation Regulation:   care clustered   lighting decreased  De-Escalation Techniques:   reoriented   stimulation decreased  Enhanced Safety Measures: bed alarm set  Intervention: Promote Self-Control  Description: Explore perception of the situation; acknowledge feelings; provide information and reassurance.Maintain a calm and reassuring environment; minimize noise; respect personal space.Identify and reduce causes of stress, triggers or precipitating factors of unsafe behavior.Involve support system and family members, if helpful.Advocate for maintaining previously established self-management plan, including medication regimen for the treatment of an underlying disorder.Discuss and implement methods to recognize and manage emotions (e.g., verbalization, calming techniques, physical activity).Assess and monitor for signs and symptoms of behavioral health concerns (e.g., substance use, psychosis).Establish or reconnect linkage with social supports and community-based services.  Recent Flowsheet Documentation  Taken 3/10/2025 0035 by Sheron Lockhart RN  Supportive Measures:   active listening utilized   verbalization of feelings encouraged  Environmental Support: calm environment promoted     Problem: Adult Inpatient Plan of Care  Goal: Plan of Care Review  Outcome: Progressing  Goal: Optimal Comfort and Wellbeing  Outcome: Progressing  Intervention: Monitor Pain and Promote Comfort  Description: Assess pain level, treatment efficacy and patient response at regular intervals using a consistent pain scale.Consider the presence and impact of preexisting chronic pain.Encourage patient and caregiver involvement in pain assessment, interventions and safety measures.Promote activity; balance with sleep  and rest to enhance healing.  Recent Flowsheet Documentation  Taken 3/10/2025 0035 by Sheron Lockhart, RN  Pain Management Interventions:   care clustered   diversional activity provided   pillow support provided   position adjusted  Intervention: Provide Person-Centered Care  Description: Use a family-focused approach to care; encourage support system presence and participation.Develop trust and rapport by proactively providing information, encouraging questions, addressing concerns and offering reassurance.Acknowledge emotional response to hospitalization.Recognize and utilize personal coping strategies and strengths; develop goals via shared decision-making.Honor spiritual and cultural preferences.  Recent Flowsheet Documentation  Taken 3/10/2025 0035 by Sheron Lockhart, RN  Trust Relationship/Rapport:   care explained   choices provided   empathic listening provided   questions answered   thoughts/feelings acknowledged   reassurance provided   questions encouraged  Goal: Readiness for Transition of Care  Outcome: Progressing  Intervention: Mutually Develop Transition Plan  Description: Identify available resources for support (e.g., family, friends, community).Identify and address barriers to ongoing treatment and home management (e.g., environmental, financial).Provide opportunities to practice self-management skills.Assess and monitor emotional readiness for transition.Establish or reconnect linkage with outpatient providers or community-based services.  Recent Flowsheet Documentation  Taken 3/10/2025 0043 by Sheron Lockhart, RN  Transportation Anticipated: family or friend will provide  Patient/Family Anticipated Services at Transition: home health care  Patient/Family Anticipates Transition to: home with family  Taken 3/10/2025 0041 by Sheron Lockhart, RN  Equipment Currently Used at Home: cpap     Problem: Skin Injury Risk Increased  Goal: Skin Health and Integrity  Outcome: Progressing  Intervention:  Optimize Skin Protection  Description: Perform a full pressure injury risk assessment, as indicated by screening, upon admission to care unit.Reassess skin (full inspection and injury risk, including skin temperature, consistency and color) frequently (e.g., scheduled interval, with change in condition) to provide optimal early detection and prevention.Maintain adequate tissue perfusion (e.g., encourage fluid balance; avoid crossing legs, constrictive clothing or devices) to promote tissue oxygenation.Maintain head of bed at lowest degree of elevation tolerated, considering medical condition and other restrictions. Use positioning supports to prevent sliding and friction. Consider low friction textiles.Avoid positioning onto an area that remains reddened or on bony prominences.Minimize incontinence and moisture (e.g., toileting schedule; moisture-wicking pad, diaper or incontinence collection device; skin moisture barrier).Cleanse skin promptly and gently, when soiled, utilizing a pH-balanced cleanser.Relieve and redistribute pressure (e.g., scheduled position changes, weight shifts, use of support surface, medical device repositioning, protective dressing application, use of positioning device, microclimate control, use of pressure-injury-monitorEncourage increased activity, such as sitting in a chair at the bedside or early mobilization, when able to tolerate. Avoid prolonged sitting.  Recent Flowsheet Documentation  Taken 3/10/2025 0400 by Sheron Lockhart, RN  Head of Bed (HOB) Positioning: HOB at 30 degrees  Taken 3/10/2025 0200 by Sheron Lockhart, RN  Head of Bed (HOB) Positioning: HOB at 30 degrees  Taken 3/10/2025 0035 by Sheron Lockhart, RN  Activity Management: activity encouraged  Pressure Reduction Techniques:   frequent weight shift encouraged   heels elevated off bed   weight shift assistance provided  Head of Bed (HOB) Positioning: HOB at 20-30 degrees  Pressure Reduction Devices:    pressure-redistributing mattress utilized   positioning supports utilized  Skin Protection:   incontinence pads utilized   transparent dressing maintained  Intervention: Promote and Optimize Oral Intake  Description: Perform a nutrition assessment that includes a nutrition-focused physical exam; identify malnutrition risk.Assess for adequate oral intake; if inadequate, offer oral supplemental food or drinks to enhance calorie and protein intake.Assess for vitamin and mineral deficiencies; supplement if depleted.Assess need for, and assist with, meal set-up and feeding.Adjust diet or meal schedule based on preferences and tolerance.Minimize unnecessary dietary restrictions to increase oral intake.Establish bowel elimination program to increase comfort and appetite.Provide and encourage oral hygiene to enhance desire to eat.Consider enteral nutrition support if oral intake remains inadequate; provide parenteral nutrition if enteral is contraindicated.  Recent Flowsheet Documentation  Taken 3/10/2025 0035 by Sheron Lockhart, RN  Oral Nutrition Promotion: rest periods promoted

## 2025-03-10 NOTE — CONSULTS
Neurology Consult Note    Consult Date: 3/10/2025    Referring MD: No ref. provider found    Reason for Consult I have been asked to see the patient in neurological consultation to render advice and opinion regarding confusion.    Sonia Acharya is a 74 y.o. female with PMH of anemia, HTN, ANGIE, CHF, ESRD on hemodialysis MWF, CAD, diabetes presents to ED on 3/9 with AMS since 3/6. She was speaking nonsensically and having visual hallucinations. Daughter brought patient in with concerns for UTI. Denies missed dialysis sessions. Patient has had poor appetite over last few days. No recent fall or injury. BP on arrival 188/101 mmHg and pulse 95 bpm. CTH without contrast demonstrates nothing acute with atrophy and nonspecific white matter changes. TSH 5.52, CRP 4.61. B12 folate, ammonia all WNL. History limited as patient confused and no family present at bedside.     Patient follows with [place] neurolog[facility] outpatient for memory impairment last seen in April 2024. She is suspected to have MCI complicated by CHF and ESRD or mild dementia. She has high risk of vascular cognitive impairment. She was placed on Aricept 5 mg daily.     Past Medical/Surgical Hx:  Past Medical History:   Diagnosis Date    Arthritis     Chronic kidney disease     Heart disease     Hypertension      Past Surgical History:   Procedure Laterality Date    BREAST SURGERY      CARDIAC CATHETERIZATION N/A 2/14/2025    Procedure: Left Heart Cath;  Surgeon: Cleveland Dickens MD;  Location:  [facility];  Service: Cardiovascular;  Laterality: N/A;  Dialysis patient    CARDIAC CATHETERIZATION N/A 2/14/2025    Procedure: Coronary angiography;  Surgeon: Cleveland Dickens MD;  Location:  [facility];  Service: Cardiovascular;  Laterality: N/A;    DIALYSIS FISTULA CREATION      EYE SURGERY      HERNIA REPAIR  2017    Dr. Sung       Medications On Admission  Medications Prior to Admission   Medication Sig Dispense Refill Last  Dose/Taking    amLODIPine (NORVASC) 10 MG tablet Take 1 tablet by mouth Daily.   3/10/2025 Morning    aspirin 81 MG EC tablet Take 1 tablet by mouth Daily.   3/10/2025 Morning    atorvastatin (LIPITOR) 40 MG tablet Take 1 tablet by mouth Daily.   Past Week    budesonide-formoterol (SYMBICORT) 80-4.5 MCG/ACT inhaler Inhale 2 puffs 2 (Two) Times a Day.   3/10/2025 Morning    donepezil (ARICEPT) 5 MG tablet Take 1 tablet by mouth Every Night.   3/9/2025    fluticasone (FLONASE) 50 MCG/ACT nasal spray Administer 2 sprays into the nostril(s) as directed by provider Daily.   3/10/2025 Morning    hydrALAZINE (APRESOLINE) 50 MG tablet Take 1 tablet by mouth 3 (Three) Times a Day.   3/10/2025 Morning    isosorbide mononitrate (IMDUR) 30 MG 24 hr tablet Take 1 tablet by mouth Daily.   3/10/2025 Morning    linaclotide (LINZESS) 290 MCG capsule capsule Take 1 capsule by mouth Every Morning Before Breakfast.   3/10/2025 Morning    losartan (COZAAR) 100 MG tablet Take 1 tablet by mouth Daily. 30 tablet 0 3/10/2025 Morning    melatonin 3 MG tablet Take 1 tablet by mouth Every Night.   3/9/2025    metoprolol succinate XL (TOPROL-XL) 25 MG 24 hr tablet Take 1 tablet by mouth Daily.   3/10/2025 Morning    clopidogrel (PLAVIX) 75 MG tablet Take 1 tablet by mouth Daily for 30 days. 30 tablet 0 Unknown    diphenhydrAMINE (BENADRYL) 25 mg capsule Take 2 capsules by mouth Every 6 (Six) Hours As Needed for Itching.   Unknown    famotidine (Pepcid) 20 MG tablet Take 1 tablet by mouth 2 (Two) Times a Day As Needed for Heartburn or Indigestion for up to 30 days. 60 tablet 0 Unknown    lidocaine (LIDODERM) 5 % Place 1 patch on the skin as directed by provider Daily. Remove & Discard patch within 12 hours or as directed by MD 6 each 0 Unknown       Allergies:  No Known Allergies    Social Hx:  Social History     Socioeconomic History    Marital status: Single   Tobacco Use    Smoking status: Never     Passive exposure: Never    Smokeless  "tobacco: Never   Vaping Use    Vaping status: Never Used   Substance and Sexual Activity    Alcohol use: No    Drug use: No    Sexual activity: Defer       Family Hx:  Family History   Problem Relation Age of Onset    Heart disease Father     Breast cancer Sister          Exam    /67   Pulse 66   Temp 97.4 °F (36.3 °C) (Oral)   Resp 18   Ht 152.4 cm (60\")   Wt 47.8 kg (105 lb 6.1 oz)   SpO2 99%   BMI 20.58 kg/m²   gen: NAD, vitals reviewed  MS: oriented to self, age and place only, recent/remote memory impaired, immediate recall 2/3 and 3 minutes later 0/3 even with cueing, impaired attention/concentration, normal fluency, repetition, and able to follow simple commands but not complex, no neglect  CN: visual fields full, PERRL, EOMI, facial sensation equal, no facial droop, hearing symmetric, palate elevates symmetrically, shoulder shrug equal, tongue midline  Motor: 5/5 throughout upper and lower extremities, normal tone  Sensation: intact to light touch throughout  Coordination: no dysmetria with finger to nose bilaterally    DATA:    Lab Results   Component Value Date    GLUCOSE 88 03/10/2025    CALCIUM 9.4 03/10/2025     03/10/2025    K 4.9 03/10/2025    CO2 19.1 (L) 03/10/2025    CL 96 (L) 03/10/2025    BUN 35 (H) 03/10/2025    CREATININE 5.92 (H) 03/10/2025    EGFRIFAFRI 10 (L) 03/04/2023    EGFRIFNONA  12/14/2021      Comment:      <15 Indicative of kidney failure.    BCR 5.9 (L) 03/10/2025    ANIONGAP 23.9 (H) 03/10/2025     Lab Results   Component Value Date    WBC 3.97 03/10/2025    HGB 10.3 (L) 03/10/2025    HCT 33.3 (L) 03/10/2025    MCV 93.5 03/10/2025     03/10/2025     No results found for: \"LDL\"  Lab Results   Component Value Date    HGBA1C 4.5 (L) 02/19/2025     Lab Results   Component Value Date    INR 1.2 09/14/2024    INR 1 08/15/2024    INR 1.1 08/11/2024    PROTIME 13.2 09/14/2024    PROTIME 11.4 08/15/2024    PROTIME 11.8 08/11/2024       Lab review:   Troponin 131, " 121  Glucose 83, 88, 68, 65, 81  Anion gap: 19.6, 23.9  Creatinine 5.80, 5.92  eGFR 7.0    TSH 5.520  Vitamin B12 >2000  Folate 5.94  Ammonia 47  CRP 4.61        Imaging review:   Chest XR:  IMPRESSION:  There is mild cardiomegaly, though submaximal inspiration may exaggerate  the cardiac silhouette.  There is mild vascular congestion, though submaximal inspiration may  exaggerate the appearance.  There is no consolidation, effusion or pneumothorax.    CTH without contrast:  FINDINGS:  There is no CT evidence of acute intracranial hemorrhage, mass, or  infarct. There is volume loss, but there is no evidence of hydrocephalus  or extra-axial fluid collection.  There are nonspecific white matter changes, but there is no acute  intracranial abnormality.  Skull base, calvarium, and extracranial soft tissues show no acute  abnormality.  IMPRESSION:  There is volume loss and there are nonspecific white matter changes, but  there is no acute intracranial abnormality.     Diagnoses:  AMS  HTN  ESRD on hemodialysis MWF  CHF  CAD  Chronic respiratory failure with hypoxia   Type 2 diabetes    Comment: Patient symptoms most likely delirium secondary to hospitalization, ESRD and CHF on top potentially underlying dementia. No focal deficits appreciated on exam. However, with acute change will obtain EEG and MRI brain with and without contrast to further evaluate. Patient should follow delirium precautions.     PLAN:   MRI brain without contrast  Routine EEG  Continue ASA 81 mg and Plavix 75 mg daily  Continue lipitor 40 mg   -Telemetry to monitor for arrhythmia  -VTE prophylaxis  -Neuro checks, if change in exam call neuro oncall  -PT/OT when appropriate   Continue metabolic work up per primary care team.   Delirium precautions listed below    Delirium precautions:  1. Frequent reorientation, reminding patient not questioning patient   2. Shades up during day/down at night   3. TV off except for soft music only   4. Familiar  objects at bedside   5. Encourage friends/family to spend the night   6. Minimize anticholingeric medications   7. Minimize polypharmacy   8. Minimize restraints, includes lines and/or foleys and/or feeding tubes as able   9. Minimize overnight checks/vitals to encourage restful consistent sleep patterns   10. Out of bed during day as much as possible      Recommendations discussed with Dr. Easton who is in agreement with plan.

## 2025-03-10 NOTE — ED NOTES
Nursing report ED to floor  Sonia Acharya  74 y.o.  female    HPI :  HPI  Stated Reason for Visit: AMD, loss of appetite  History Obtained From: family  Precipitating Event(s): unknown  Duration (Days): 3  Associated Signs/Symptoms: abdominal pain    Chief Complaint  Chief Complaint   Patient presents with    Altered Mental Status       Admitting doctor:   Marcy Lui MD    Admitting diagnosis:   The primary encounter diagnosis was Altered mental status, unspecified altered mental status type. Diagnoses of ESRD on hemodialysis and Hypertension not at goal were also pertinent to this visit.    Code status:   Current Code Status       Date Active Code Status Order ID Comments User Context       Prior            Allergies:   Patient has no known allergies.    Isolation:   No active isolations    Intake and Output  No intake or output data in the 24 hours ending 03/09/25 2320    Weight:       03/09/25  1936   Weight: 49.4 kg (109 lb)       Most recent vitals:   Vitals:    03/09/25 2031 03/09/25 2133 03/09/25 2203 03/09/25 2230   BP: (!) 198/101 (!) 189/106 (!) 192/106 (!) 189/109   Pulse: 66 76 67 82   Resp:       Temp:       SpO2: 92% 97% 94% 95%   Weight:       Height:           Active LDAs/IV Access:   Lines, Drains & Airways       Active LDAs       Name Placement date Placement time Site Days    Peripheral IV Left Antecubital --  --  Antecubital  --    Peripheral IV 03/09/25 2024 Left Antecubital 03/09/25 2024  Antecubital  less than 1                    Labs (abnormal labs have a star):   Labs Reviewed   COMPREHENSIVE METABOLIC PANEL - Abnormal; Notable for the following components:       Result Value    BUN 33 (*)     Creatinine 5.80 (*)     Chloride 95 (*)     BUN/Creatinine Ratio 5.7 (*)     Anion Gap 19.6 (*)     eGFR 7.1 (*)     All other components within normal limits    Narrative:     GFR Categories in Chronic Kidney Disease (CKD)      GFR Category          GFR (mL/min/1.73)    Interpretation  G1                      90 or greater         Normal or high (1)  G2                      60-89                Mild decrease (1)  G3a                   45-59                Mild to moderate decrease  G3b                   30-44                Moderate to severe decrease  G4                    15-29                Severe decrease  G5                    14 or less           Kidney failure          (1)In the absence of evidence of kidney disease, neither GFR category G1 or G2 fulfill the criteria for CKD.    eGFR calculation 2021 CKD-EPI creatinine equation, which does not include race as a factor   TROPONIN - Abnormal; Notable for the following components:    HS Troponin T 131 (*)     All other components within normal limits    Narrative:     High Sensitive Troponin T Reference Range:  <14.0 ng/L- Negative Female for AMI  <22.0 ng/L- Negative Male for AMI  >=14 - Abnormal Female indicating possible myocardial injury.  >=22 - Abnormal Male indicating possible myocardial injury.   Clinicians would have to utilize clinical acumen, EKG, Troponin, and serial changes to determine if it is an Acute Myocardial Infarction or myocardial injury due to an underlying chronic condition.        MAGNESIUM - Abnormal; Notable for the following components:    Magnesium 2.5 (*)     All other components within normal limits   CBC WITH AUTO DIFFERENTIAL - Abnormal; Notable for the following components:    RBC 3.52 (*)     Hemoglobin 10.2 (*)     Hematocrit 32.1 (*)     Lymphocyte % 16.9 (*)     All other components within normal limits   HIGH SENSITIVITIY TROPONIN T 1HR - Abnormal; Notable for the following components:    HS Troponin T 121 (*)     All other components within normal limits    Narrative:     High Sensitive Troponin T Reference Range:  <14.0 ng/L- Negative Female for AMI  <22.0 ng/L- Negative Male for AMI  >=14 - Abnormal Female indicating possible myocardial injury.  >=22 - Abnormal Male indicating possible myocardial injury.    Clinicians would have to utilize clinical acumen, EKG, Troponin, and serial changes to determine if it is an Acute Myocardial Infarction or myocardial injury due to an underlying chronic condition.        RAINBOW DRAW    Narrative:     The following orders were created for panel order [place] Draw.  Procedure                               Abnormality         Status                     ---------                               -----------         ------                     Green Top (Gel)[[id number]]                                  Final result               Lavender Top[[id number]]                                     Final result               Gold Top - SST[[id number]]                                   Final result               Light Blue Top[[id number]]                                   Final result                 Please view results for these tests on the individual orders.   POCT GLUCOSE FINGERSTICK   CBC AND DIFFERENTIAL    Narrative:     The following orders were created for panel order CBC & Differential.  Procedure                               Abnormality         Status                     ---------                               -----------         ------                     CBC Auto Differential[[id number]]        Abnormal            Final result                 Please view results for these tests on the individual orders.   GREEN TOP   LAVENDER TOP   GOLD TOP - SST   LIGHT BLUE TOP       EKG:   ECG 12 Lead ED Triage Standing Order; Weak / Dizzy / AMS   Final Result   HEART RATE=80  bpm   RR Wlmzpswc=244  ms   KS Tzhqwvjy=573  ms   P Horizontal Axis=-44  deg   P Front Axis=84  deg   QRSD Adekhrcr=954  ms   QT Tyrjiyyy=768  ms   JHaE=610  ms   QRS Axis=-74  deg   T Wave Axis=108  deg   - ABNORMAL ECG -   Sinus rhythm   Probable  left atrial enlargement   IVCD, consider RBBB   LVH with secondary repolarization abnormality   ST elevation secondary to LVH   No change from prior tracing   Electronically Signed  By: Elliot Hare ([facility]) 2025-03-09 21:33:38   Date and Time of Study:2025-03-09 20:02:21          Meds given in ED:   Medications   sodium chloride 0.9 % flush 10 mL (has no administration in time range)   hydrALAZINE (APRESOLINE) tablet 50 mg (50 mg Oral Given 3/9/25 2157)   acetaminophen (TYLENOL) tablet 1,000 mg (1,000 mg Oral Given 3/9/25 2158)       Imaging results:  CT Head Without Contrast  Result Date: 3/9/2025   There is volume loss and there are nonspecific white matter changes, but there is no acute intracranial abnormality.        This report was finalized on 3/9/2025 10:34 PM by Dr. Solis Rhodes M.D on Workstation: [id number]      XR Chest 1 View  Result Date: 3/9/2025   There is mild cardiomegaly, though submaximal inspiration may exaggerate the cardiac silhouette.  There is mild vascular congestion, though submaximal inspiration may exaggerate the appearance.  There is no consolidation, effusion or pneumothorax.  This report was finalized on 3/9/2025 8:44 PM by Dr. Solis Rhodes M.D on Workstation: [id number]        Ambulatory status:   - assist x1    Social issues:   Social History     Socioeconomic History    Marital status: Single   Tobacco Use    Smoking status: Never     Passive exposure: Never    Smokeless tobacco: Never   Vaping Use    Vaping status: Never Used   Substance and Sexual Activity    Alcohol use: No    Drug use: No    Sexual activity: Defer       Peripheral Neurovascular  Peripheral Neurovascular (Adult)  Peripheral Neurovascular WDL: WDL    Neuro Cognitive  Neuro Cognitive (Adult)  Cognitive/Neuro/Behavioral WDL: WDL, level of consciousness, mood/behavior, speech, orientation  Level of Consciousness: Alert  Orientation: oriented x 4  Speech: clear  Mood/Behavior: cooperative, calm  Pupils  Pupil PERRLA: yes  Dixon Coma Scale  Best Eye Response: 4-->(E4) spontaneous  Best Motor Response: 6-->(M6) obeys commands  Best Verbal Response: 5-->(V5) oriented  Dixon Coma Scale  Score: 15    Learning       Respiratory  Respiratory WDL  Respiratory WDL: WDL    Abdominal Pain       Pain Assessments  Pain (Adult)  (0-10) Pain Rating: Rest: 10  (0-10) Pain Rating: Activity: 0  Pain Location: abdomen  Pain Side/Orientation: left, lower    NIH Stroke Scale       Maira Stack RN  03/09/25 23:20 EDT

## 2025-03-10 NOTE — PLAN OF CARE
Goal Outcome Evaluation:  Plan of Care Reviewed With: patient           Outcome Evaluation: Pt seen for PT this AM. She was admitted to [facility] yesterday w AMS. Pt is alert and agreeable to therapy; however, she is difficult to understand and confusion noted throughout session. Pt has hx of ESRD, anemai, HTN, ANGIE, CHF, CAD, DM. At baseline, pt  lives with her daughter and reports using Rwx for ambulation. Today, pt presents w generalized weakenss and decreased activity tolerance. SHe was able to transition to EOB w min A. She stood w min A x 2. She demos slight posterior lean. Pt ambulated approx 10 ft w HHA/min A x 2. She demos slow pace, but no overt LOB noted. Pt agreeable to sit up in recliner at end of session. Unsure of DC plans. Anticipate pt to return home w daughter. She will continue to benefit from skilled PT to maximize safety, strength, and independence w mobility.    Anticipated Discharge Disposition (PT): home with assist, home with home health, skilled nursing facility

## 2025-03-10 NOTE — ED PROVIDER NOTES
EMERGENCY DEPARTMENT ENCOUNTER  Room Number:  34/34  PCP: Marcy Paez PA  Independent Historians: Patient and Daughter      HPI:  Chief Complaint: had concerns including Altered Mental Status.    A complete HPI/ROS/PMH/PSH/SH/FH are unobtainable due to: None    Chronic or social conditions impacting patient care (Social Determinants of Health): None      Context: Sonia Acharya is a 74 y.o. female with a medical history of anemia, hypertension, ANGIE, CHF, end-stage renal disease on hemodialysis, coronary artery disease, diabetes who presents to the ED c/o acute altered mental status.  Daughter reports that patient's facility has noted altered mental status since Thursday.  She reports that she has been talking nonsensically and having visual hallucinations.  Facility is concerned she has urinary tract infection, although patient no longer makes urine.  Patient is M/W/F dialysis patient has not missed any dialysis sessions.  Reports she has had poor appetite for the last several days.  No reported fall or injury.  No reported vomiting or diarrhea.  No other systemic complaints at this time.      Review of prior external notes (non-ED) -and- Review of prior external test results outside of this encounter:  Patient seen in office by cardiology on 1/24/2025 for coronary artery disease.  Reviewed assessment and plan.  Patient will continue current medications and follow-up in 4 weeks.  Reviewed labs collected on 2/15/2025.  CBC with hemoglobin 8.6, renal function panel with creatinine 3.02.    Prescription drug monitoring program review:     N/A    PAST MEDICAL HISTORY  Active Ambulatory Problems     Diagnosis Date Noted    Generalized abdominal pain 12/13/2021    ODALIS (acute kidney injury) 12/13/2021    Anemia, chronic disease 12/13/2021    Metabolic acidosis, increased anion gap 12/13/2021    Obesity (BMI 30-39.9) 12/13/2021    HTN (hypertension) 12/13/2021    Chest pain, atypical 12/13/2021    Cervical  radiculopathy 01/16/2023    ESRD on dialysis 01/16/2023    Bradycardia 01/16/2023    Right arm pain 01/16/2023    ANGIE (obstructive sleep apnea) 01/16/2023    Chronic diastolic CHF (congestive heart failure) 01/16/2023    Aortic stenosis 01/16/2023    Pancytopenia 01/18/2023    Hyperphosphatemia 01/18/2023    Hyperkalemia 12/20/2023    Pneumonia 12/20/2023    Leukocytopenia 12/22/2023    Anemia, chronic disease 12/22/2023    ESRD (end stage renal disease) on dialysis 01/06/2025    Bacterial pneumonia, treating as gram negative 01/07/2025    Chronic respiratory failure with hypoxia 01/07/2025    CAD (coronary artery disease) 01/07/2025    Nausea, vomiting, and diarrhea 01/07/2025    Severe protein-calorie malnutrition 01/09/2025    Chronic abdominal pain 02/14/2025    Type 2 diabetes mellitus with hypoglycemia 02/14/2025     Resolved Ambulatory Problems     Diagnosis Date Noted    Chest pain 02/13/2025     Past Medical History:   Diagnosis Date    Arthritis     Chronic kidney disease     Heart disease     Hypertension          PAST SURGICAL HISTORY  Past Surgical History:   Procedure Laterality Date    BREAST SURGERY      CARDIAC CATHETERIZATION N/A 2/14/2025    Procedure: Left Heart Cath;  Surgeon: Cleveland Dickens MD;  Location:  [facility];  Service: Cardiovascular;  Laterality: N/A;  Dialysis patient    CARDIAC CATHETERIZATION N/A 2/14/2025    Procedure: Coronary angiography;  Surgeon: Cleveland Dickens MD;  Location:  [facility];  Service: Cardiovascular;  Laterality: N/A;    DIALYSIS FISTULA CREATION      EYE SURGERY      HERNIA REPAIR  2017    Dr. Sung         FAMILY HISTORY  Family History   Problem Relation Age of Onset    Heart disease Father     Breast cancer Sister          SOCIAL HISTORY  Social History     Socioeconomic History    Marital status: Single   Tobacco Use    Smoking status: Never     Passive exposure: Never    Smokeless tobacco: Never   Vaping Use     Vaping status: Never Used   Substance and Sexual Activity    Alcohol use: No    Drug use: No    Sexual activity: Defer         ALLERGIES  Patient has no known allergies.      REVIEW OF SYSTEMS  Included in HPI  All systems reviewed and negative except for those discussed in HPI.      PHYSICAL EXAM    I have reviewed the triage vital signs and nursing notes.    ED Triage Vitals   Temp Heart Rate Resp BP SpO2   03/09/25 1936 03/09/25 1936 03/09/25 1936 03/09/25 1935 03/09/25 1936   97.7 °F (36.5 °C) 95 18 (!) 188/101 100 %      Temp src Heart Rate Source Patient Position BP Location FiO2 (%)   -- -- -- -- --              Physical Exam  Constitutional:       General: She is not in acute distress.     Appearance: She is well-developed.   HENT:      Head: Normocephalic and atraumatic.   Eyes:      Extraocular Movements: Extraocular movements intact.   Cardiovascular:      Rate and Rhythm: Normal rate and regular rhythm.      Pulses:           Radial pulses are 2+ on the right side and 2+ on the left side.        Posterior tibial pulses are 2+ on the right side and 2+ on the left side.      Heart sounds: Normal heart sounds.      Comments: RUE AV fistula with palpable thrill  Pulmonary:      Effort: Pulmonary effort is normal.      Breath sounds: Normal breath sounds.   Abdominal:      General: There is no distension.   Skin:     General: Skin is warm.   Neurological:      General: No focal deficit present.      Mental Status: She is alert and oriented to person, place, and time.      Comments: No facial asymmetry.  No aphasia or dysarthria.  Sensation intact to light touch all 4 extremities.  Motor strength 5/5 all 4 extremities.   Psychiatric:         Mood and Affect: Mood normal.             LAB RESULTS  Recent Results (from the past 24 hours)   Comprehensive Metabolic Panel    Collection Time: 03/09/25  7:45 PM    Specimen: Arm, Left; Blood   Result Value Ref Range    Glucose 83 65 - 99 mg/dL    BUN 33 (H) 8 - 23  mg/dL    Creatinine 5.80 (H) 0.57 - 1.00 mg/dL    Sodium 139 136 - 145 mmol/L    Potassium 4.6 3.5 - 5.2 mmol/L    Chloride 95 (L) 98 - 107 mmol/L    CO2 24.4 22.0 - 29.0 mmol/L    Calcium 9.8 8.6 - 10.5 mg/dL    Total Protein 7.2 6.0 - 8.5 g/dL    Albumin 4.1 3.5 - 5.2 g/dL    ALT (SGPT) 6 1 - 33 U/L    AST (SGOT) 16 1 - 32 U/L    Alkaline Phosphatase 115 39 - 117 U/L    Total Bilirubin 0.3 0.0 - 1.2 mg/dL    Globulin 3.1 gm/dL    A/G Ratio 1.3 g/dL    BUN/Creatinine Ratio 5.7 (L) 7.0 - 25.0    Anion Gap 19.6 (H) 5.0 - 15.0 mmol/L    eGFR 7.1 (L) >60.0 mL/min/1.73   High Sensitivity Troponin T    Collection Time: 03/09/25  7:45 PM    Specimen: Arm, Left; Blood   Result Value Ref Range    HS Troponin T 131 (C) <14 ng/L   Magnesium    Collection Time: 03/09/25  7:45 PM    Specimen: Arm, Left; Blood   Result Value Ref Range    Magnesium 2.5 (H) 1.6 - 2.4 mg/dL   Green Top (Gel)    Collection Time: 03/09/25  7:45 PM   Result Value Ref Range    Extra Tube Hold for add-ons.    Lavender Top    Collection Time: 03/09/25  7:45 PM   Result Value Ref Range    Extra Tube hold for add-on    Gold Top - SST    Collection Time: 03/09/25  7:45 PM   Result Value Ref Range    Extra Tube Hold for add-ons.    Light Blue Top    Collection Time: 03/09/25  7:45 PM   Result Value Ref Range    Extra Tube Hold for add-ons.    CBC Auto Differential    Collection Time: 03/09/25  7:45 PM    Specimen: Arm, Left; Blood   Result Value Ref Range    WBC 4.78 3.40 - 10.80 10*3/mm3    RBC 3.52 (L) 3.77 - 5.28 10*6/mm3    Hemoglobin 10.2 (L) 12.0 - 15.9 g/dL    Hematocrit 32.1 (L) 34.0 - 46.6 %    MCV 91.2 79.0 - 97.0 fL    MCH 29.0 26.6 - 33.0 pg    MCHC 31.8 31.5 - 35.7 g/dL    RDW 14.8 12.3 - 15.4 %    RDW-SD 49.3 37.0 - 54.0 fl    MPV 9.2 6.0 - 12.0 fL    Platelets 310 140 - 450 10*3/mm3    Neutrophil % 71.9 42.7 - 76.0 %    Lymphocyte % 16.9 (L) 19.6 - 45.3 %    Monocyte % 9.6 5.0 - 12.0 %    Eosinophil % 0.8 0.3 - 6.2 %    Basophil % 0.4 0.0 -  1.5 %    Immature Grans % 0.4 0.0 - 0.5 %    Neutrophils, Absolute 3.43 1.70 - 7.00 10*3/mm3    Lymphocytes, Absolute 0.81 0.70 - 3.10 10*3/mm3    Monocytes, Absolute 0.46 0.10 - 0.90 10*3/mm3    Eosinophils, Absolute 0.04 0.00 - 0.40 10*3/mm3    Basophils, Absolute 0.02 0.00 - 0.20 10*3/mm3    Immature Grans, Absolute 0.02 0.00 - 0.05 10*3/mm3    nRBC 0.0 0.0 - 0.2 /100 WBC   ECG 12 Lead ED Triage Standing Order; Weak / Dizzy / AMS    Collection Time: 03/09/25  8:02 PM   Result Value Ref Range    QT Interval 464 ms    QTC Interval 534 ms   High Sensitivity Troponin T 1Hr    Collection Time: 03/09/25  8:50 PM    Specimen: Blood   Result Value Ref Range    HS Troponin T 121 (C) <14 ng/L    Troponin T Numeric Delta -10 ng/L    Troponin T % Delta -8 Abnormal if >/= 20%         RADIOLOGY  CT Head Without Contrast  Result Date: 3/9/2025  HEAD CT WITHOUT CONTRAST  REASON:  AMS/hallucinations, confusion  COMPARISON STUDIES: Head CT 1/6/2025.  TECHNIQUE:  Axial images were acquired from the skull base to vertex without contrast, including multiplanar reformats, per standard departmental protocol.    Radiation dose reduction techniques were utilized, including automated exposure control, and exposure modulation based on body size.  FINDINGS:  There is no CT evidence of acute intracranial hemorrhage, mass, or infarct. There is volume loss, but there is no evidence of hydrocephalus or extra-axial fluid collection.  There are nonspecific white matter changes, but there is no acute intracranial abnormality.  Skull base, calvarium, and extracranial soft tissues show no acute abnormality.       There is volume loss and there are nonspecific white matter changes, but there is no acute intracranial abnormality.        This report was finalized on 3/9/2025 10:34 PM by Dr. Solis Rhodes M.D on Workstation: [id number]      XR Chest 1 View  Result Date: 3/9/2025  CXR ONE VIEW  HISTORY: Weak/Dizzy/AMS triage protocol  COMPARISON:  2/13/2025  TECHNIQUE: single portable AP       There is mild cardiomegaly, though submaximal inspiration may exaggerate the cardiac silhouette.  There is mild vascular congestion, though submaximal inspiration may exaggerate the appearance.  There is no consolidation, effusion or pneumothorax.  This report was finalized on 3/9/2025 8:44 PM by Dr. Solis Rhodes M.D on Workstation: [id number]          MEDICATIONS GIVEN IN ER  Medications   sodium chloride 0.9 % flush 10 mL (has no administration in time range)   hydrALAZINE (APRESOLINE) tablet 50 mg (50 mg Oral Given 3/9/25 2157)   acetaminophen (TYLENOL) tablet 1,000 mg (1,000 mg Oral Given 3/9/25 2158)         ORDERS PLACED DURING THIS VISIT:  Orders Placed This Encounter   Procedures    XR Chest 1 View    CT Head Without Contrast    [place] Draw    Comprehensive Metabolic Panel    High Sensitivity Troponin T    Magnesium    CBC Auto Differential    High Sensitivity Troponin T 1Hr    NPO Diet NPO Type: Strict NPO    Undress & Gown    Continuous Pulse Oximetry    Vital Signs    Orthostatic Blood Pressure    [facility] (on-call MD unless specified) Details    Oxygen Therapy- Nasal Cannula; Titrate 1-6 LPM Per SpO2; 90 - 95%    POC Glucose Once    ECG 12 Lead ED Triage Standing Order; Weak / Dizzy / AMS    Insert Peripheral IV    Initiate Observation Status    Fall Precautions    CBC & Differential    Green Top (Gel)    Lavender Top    Gold Top - SST    Light Blue Top         OUTPATIENT MEDICATION MANAGEMENT:  Current Facility-Administered Medications Ordered in Epic   Medication Dose Route Frequency Provider Last Rate Last Admin    sodium chloride 0.9 % flush 10 mL  10 mL Intravenous PRN Scott Hilario MD         Current Outpatient Medications Ordered in Epic   Medication Sig Dispense Refill    amLODIPine (NORVASC) 10 MG tablet Take 1 tablet by mouth Daily.      aspirin 81 MG EC tablet Take 1 tablet by mouth Daily.      atorvastatin (LIPITOR) 40 MG tablet Take 1  tablet by mouth Daily.      budesonide-formoterol (SYMBICORT) 80-4.5 MCG/ACT inhaler Inhale 2 puffs 2 (Two) Times a Day.      clopidogrel (PLAVIX) 75 MG tablet Take 1 tablet by mouth Daily for 30 days. 30 tablet 0    diphenhydrAMINE (BENADRYL) 25 mg capsule Take 2 capsules by mouth Every 6 (Six) Hours As Needed for Itching.      donepezil (ARICEPT) 5 MG tablet Take 1 tablet by mouth Every Night.      famotidine (Pepcid) 20 MG tablet Take 1 tablet by mouth 2 (Two) Times a Day As Needed for Heartburn or Indigestion for up to 30 days. 60 tablet 0    fluticasone (FLONASE) 50 MCG/ACT nasal spray Administer 2 sprays into the nostril(s) as directed by provider Daily.      hydrALAZINE (APRESOLINE) 50 MG tablet Take 1 tablet by mouth 3 (Three) Times a Day.      isosorbide mononitrate (IMDUR) 30 MG 24 hr tablet Take 1 tablet by mouth Daily.      lidocaine (LIDODERM) 5 % Place 1 patch on the skin as directed by provider Daily. Remove & Discard patch within 12 hours or as directed by MD 6 each 0    linaclotide (LINZESS) 290 MCG capsule capsule Take 1 capsule by mouth Every Morning Before Breakfast.      losartan (COZAAR) 100 MG tablet Take 1 tablet by mouth Daily. 30 tablet 0    melatonin 3 MG tablet Take 1 tablet by mouth Every Night.      metoprolol succinate XL (TOPROL-XL) 25 MG 24 hr tablet Take 1 tablet by mouth Daily.             PROGRESS, DATA ANALYSIS, CONSULTS, AND MEDICAL DECISION MAKING  All labs have been independently interpreted by me.  All radiology studies have been reviewed by me. All EKG's have been independently viewed and interpreted by me.  Discussion below represents my analysis of pertinent findings related to patient's condition, differential diagnosis, treatment plan and final disposition.    Differential diagnosis includes but is not limited to dementia with behavioral disturbance, electrolyte abnormality, uremia.        ED Course as of 03/09/25 3687   Sun Mar 09, 2025   2019 Patient does not produce  urine [MP]   2125 Chest x-ray independently interpreted by me as no pneumothorax [MP]   2314 I spoke with Sisi with A. Discussed patient presentation and ED findings.  She agrees admit patient to a telemetry observation bed to the service of Dr. Lui. [MP]      ED Course User Index  [MP] Lisa Dumont PA-C             AS OF 23:17 EDT VITALS:    BP - (!) 189/109  HR - 82  TEMP - 97.7 °F (36.5 °C)  O2 SATS - 95%    COMPLEXITY OF CARE  The patient requires admission.      DIAGNOSIS  Final diagnoses:   Altered mental status, unspecified altered mental status type   ESRD on hemodialysis   Hypertension not at goal         DISPOSITION  ED Disposition       ED Disposition   Decision to Admit    Condition   --    Comment   Level of Care: Telemetry [5]   Diagnosis: Altered mental status [780.97.ICD-9-CM]   Admitting Physician: FAN LUI [[id number]]   Attending Physician: FAN LUI [[id number]]   Is patient appropriate for Inpatient Observation Unit?: No [0]                  Please note that portions of this document were completed with a voice recognition program.    Note Disclaimer: At [facility], we believe that sharing information builds trust and better relationships. You are receiving this note because you recently visited [facility]. It is possible you will see health information before a provider has talked with you about it. This kind of information can be easy to misunderstand. To help you fully understand what it means for your health, we urge you to discuss this note with your provider.     Lisa Dumont PA-C  03/10/25 0029

## 2025-03-10 NOTE — ED PROVIDER NOTES
MD ATTESTATION NOTE    The NEVAEH and I have discussed this patient's history, physical exam, MDM, and treatment plan.  I have reviewed the documentation and personally had a face to face interaction with the patient. I affirm the documentation and agree with the treatment and plan.  The attached note describes my personal findings.      I provided a substantive portion of the medical decision making.        Brief HPI: 74-year-old female, history of ESRD on HD MWF, last dialyzed 2 days ago, presents ED for evaluation of altered mental status.    Patient does not make urine.  No known fever or chills.    PHYSICAL EXAM  ED Triage Vitals   Temp Heart Rate Resp BP SpO2   03/09/25 1936 03/09/25 1936 03/09/25 1936 03/09/25 1935 03/09/25 1936   97.7 °F (36.5 °C) 95 18 (!) 188/101 100 %      Temp src Heart Rate Source Patient Position BP Location FiO2 (%)   -- -- -- -- --                GENERAL: no acute distress  HENT: nares patent  EYES: no scleral icterus  CV: regular rhythm, normal rate, AV fistula right upper extremity with palpable thrill  RESPIRATORY: normal effort  ABDOMEN: soft  MUSCULOSKELETAL: no deformity  NEURO: alert, moves all extremities, follows commands  PSYCH:  calm, cooperative  SKIN: warm, dry    Vital signs and nursing notes reviewed.        Medical Decision Making:  ED Course as of 03/12/25 1506   Sun Mar 09, 2025   2019 Patient does not produce urine [MP]   2125 Chest x-ray independently interpreted by me as no pneumothorax [MP]   2314 I spoke with Sisi with A. Discussed patient presentation and ED findings.  She agrees admit patient to a telemetry observation bed to the service of Dr. Lui. [MP]      ED Course User Index  [MP] Lisa Dumont PA-C Nichols, Dylan J, MD  03/09/25 [id number]       Scott Hilario MD  03/12/25 1506

## 2025-03-11 ENCOUNTER — APPOINTMENT (OUTPATIENT)
Dept: NEUROLOGY | Facility: HOSPITAL | Age: 75
DRG: 884 | End: 2025-03-11
Payer: MEDICARE

## 2025-03-11 ENCOUNTER — APPOINTMENT (OUTPATIENT)
Dept: MRI IMAGING | Facility: HOSPITAL | Age: 75
DRG: 884 | End: 2025-03-11
Payer: MEDICARE

## 2025-03-11 PROBLEM — F03.911 DEMENTIA WITH AGITATION: Status: ACTIVE | Noted: 2025-03-11

## 2025-03-11 LAB
GLUCOSE BLDC GLUCOMTR-MCNC: 100 MG/DL (ref 70–130)
GLUCOSE BLDC GLUCOMTR-MCNC: 87 MG/DL (ref 70–130)
GLUCOSE BLDC GLUCOMTR-MCNC: 87 MG/DL (ref 70–130)
RPR SER QL: NON REACTIVE

## 2025-03-11 PROCEDURE — 99214 OFFICE O/P EST MOD 30 MIN: CPT | Performed by: NURSE PRACTITIONER

## 2025-03-11 PROCEDURE — 94761 N-INVAS EAR/PLS OXIMETRY MLT: CPT

## 2025-03-11 PROCEDURE — 95816 EEG AWAKE AND DROWSY: CPT | Performed by: PSYCHIATRY & NEUROLOGY

## 2025-03-11 PROCEDURE — G0378 HOSPITAL OBSERVATION PER HR: HCPCS

## 2025-03-11 PROCEDURE — 94799 UNLISTED PULMONARY SVC/PX: CPT

## 2025-03-11 PROCEDURE — 82948 REAGENT STRIP/BLOOD GLUCOSE: CPT

## 2025-03-11 PROCEDURE — 95819 EEG AWAKE AND ASLEEP: CPT

## 2025-03-11 PROCEDURE — 97530 THERAPEUTIC ACTIVITIES: CPT

## 2025-03-11 PROCEDURE — 94664 DEMO&/EVAL PT USE INHALER: CPT

## 2025-03-11 PROCEDURE — 25010000002 ONDANSETRON PER 1 MG: Performed by: NURSE PRACTITIONER

## 2025-03-11 RX ORDER — DIPHENHYDRAMINE HYDROCHLORIDE 50 MG/ML
12.5 INJECTION INTRAMUSCULAR; INTRAVENOUS ONCE
Status: DISCONTINUED | OUTPATIENT
Start: 2025-03-11 | End: 2025-03-15 | Stop reason: HOSPADM

## 2025-03-11 RX ORDER — OLANZAPINE 5 MG/1
5 TABLET ORAL
Status: DISCONTINUED | OUTPATIENT
Start: 2025-03-11 | End: 2025-03-15 | Stop reason: HOSPADM

## 2025-03-11 RX ORDER — LORAZEPAM 2 MG/ML
1 INJECTION INTRAMUSCULAR ONCE AS NEEDED
Status: DISCONTINUED | OUTPATIENT
Start: 2025-03-11 | End: 2025-03-15 | Stop reason: HOSPADM

## 2025-03-11 RX ADMIN — LOSARTAN POTASSIUM 100 MG: 50 TABLET, FILM COATED ORAL at 08:32

## 2025-03-11 RX ADMIN — DONEPEZIL HYDROCHLORIDE 5 MG: 10 TABLET, FILM COATED ORAL at 21:47

## 2025-03-11 RX ADMIN — CLOPIDOGREL 75 MG: 75 TABLET, FILM COATED ORAL at 08:31

## 2025-03-11 RX ADMIN — OLANZAPINE 5 MG: 5 TABLET, FILM COATED ORAL at 16:53

## 2025-03-11 RX ADMIN — ASPIRIN 81 MG: 81 TABLET, COATED ORAL at 08:31

## 2025-03-11 RX ADMIN — ACETAMINOPHEN 650 MG: 325 TABLET, FILM COATED ORAL at 21:47

## 2025-03-11 RX ADMIN — AMLODIPINE BESYLATE 10 MG: 10 TABLET ORAL at 08:31

## 2025-03-11 RX ADMIN — ISOSORBIDE MONONITRATE 30 MG: 30 TABLET, EXTENDED RELEASE ORAL at 08:32

## 2025-03-11 RX ADMIN — ONDANSETRON 4 MG: 2 INJECTION, SOLUTION INTRAMUSCULAR; INTRAVENOUS at 10:40

## 2025-03-11 RX ADMIN — Medication 5 MG: at 21:47

## 2025-03-11 RX ADMIN — Medication 100 MG: at 08:32

## 2025-03-11 RX ADMIN — HYDRALAZINE HYDROCHLORIDE 50 MG: 50 TABLET ORAL at 08:31

## 2025-03-11 RX ADMIN — LIDOCAINE 1 PATCH: 4 PATCH TOPICAL at 08:32

## 2025-03-11 RX ADMIN — Medication 10 ML: at 21:48

## 2025-03-11 RX ADMIN — BUDESONIDE AND FORMOTEROL FUMARATE DIHYDRATE 2 PUFF: 160; 4.5 AEROSOL RESPIRATORY (INHALATION) at 21:14

## 2025-03-11 RX ADMIN — HYDRALAZINE HYDROCHLORIDE 50 MG: 50 TABLET ORAL at 16:53

## 2025-03-11 RX ADMIN — BUDESONIDE AND FORMOTEROL FUMARATE DIHYDRATE 2 PUFF: 160; 4.5 AEROSOL RESPIRATORY (INHALATION) at 08:03

## 2025-03-11 RX ADMIN — METOPROLOL SUCCINATE 25 MG: 25 TABLET, EXTENDED RELEASE ORAL at 08:32

## 2025-03-11 RX ADMIN — Medication 10 ML: at 08:32

## 2025-03-11 RX ADMIN — ATORVASTATIN CALCIUM 40 MG: 20 TABLET, FILM COATED ORAL at 08:32

## 2025-03-11 NOTE — PROGRESS NOTES
Name: Sonia Acharya ADMIT: 3/9/2025   : 1950  PCP: Marcy Paez PA    MRN: [id number] LOS: 0 days   AGE/SEX: 74 y.o. female  ROOM: [facility]     Subjective   Subjective   Sleeping most of the afternoon per daughter at bedside.  She awakened easily and did not complain of anything to me but did not say much.  Apparently was agitated overnight after around 04 100       Objective   Objective   Vital Signs  Temp:  [97.7 °F (36.5 °C)-98.3 °F (36.8 °C)] 97.9 °F (36.6 °C)  Heart Rate:  [62-79] 62  Resp:  [16-22] 20  BP: (123-182)/(64-93) 123/64  SpO2:  [93 %-99 %] 93 %  on   ;   Device (Oxygen Therapy): room air  Body mass index is 20.58 kg/m².  Physical Exam  Vitals reviewed.   Constitutional:       General: She is not in acute distress.  Cardiovascular:      Rate and Rhythm: Normal rate and regular rhythm.   Pulmonary:      Effort: No respiratory distress.      Breath sounds: Normal breath sounds. No wheezing.   Abdominal:      Palpations: Abdomen is soft.      Tenderness: There is no abdominal tenderness.   Musculoskeletal:      Right lower leg: No edema.      Left lower leg: No edema.   Skin:     General: Skin is warm and dry.   Neurological:      Mental Status: She is alert.       Results Review     I reviewed the patient's new clinical results.  Results from last 7 days   Lab Units 03/10/25  0323 03/09/25  1945 03/05/25  0000   WBC 10*3/mm3 3.97 4.78  --    HEMOGLOBIN g/dL 10.3* 10.2* 8.8*  26.4*   PLATELETS 10*3/mm3 297 310  --      Results from last 7 days   Lab Units 03/10/25  0323 03/09/25  1945 03/05/25  0000   SODIUM mmol/L 139 139  --    POTASSIUM mmol/L 4.9 4.6  --    CHLORIDE mmol/L 96* 95*  --    CO2 mmol/L 19.1* 24.4  --    BUN mg/dL 35* 33* 53*   CREATININE mg/dL 5.92* 5.80*  --    GLUCOSE mg/dL 88 83  --    EGFR mL/min/1.73 7.0* 7.1*  --      Results from last 7 days   Lab Units 03/10/25  0323 25  1945   ALBUMIN g/dL 3.6 4.1   BILIRUBIN mg/dL 0.3 0.3   ALK PHOS U/L 101 115   AST  (SGOT) U/L 16 16   ALT (SGPT) U/L 8 6     Results from last 7 days   Lab Units 03/10/25  0323 03/09/25  1945   CALCIUM mg/dL 9.4 9.8   ALBUMIN g/dL 3.6 4.1   MAGNESIUM mg/dL  --  2.5*       Glucose   Date/Time Value Ref Range Status   03/11/2025 1101 100 70 - 130 mg/dL Final   03/10/2025 1953 114 70 - 130 mg/dL Final   03/10/2025 1805 70 70 - 130 mg/dL Final   03/10/2025 1218 81 70 - 130 mg/dL Final   03/10/2025 1200 65 (L) 70 - 130 mg/dL Final   03/10/2025 1106 68 (L) 70 - 130 mg/dL Final   03/10/2025 0034 81 70 - 130 mg/dL Final       CT Head Without Contrast  Result Date: 3/9/2025   There is volume loss and there are nonspecific white matter changes, but there is no acute intracranial abnormality.        This report was finalized on 3/9/2025 10:34 PM by Dr. Solis Rhodes M.D on Workstation: [id number]      XR Chest 1 View  Result Date: 3/9/2025   There is mild cardiomegaly, though submaximal inspiration may exaggerate the cardiac silhouette.  There is mild vascular congestion, though submaximal inspiration may exaggerate the appearance.  There is no consolidation, effusion or pneumothorax.  This report was finalized on 3/9/2025 8:44 PM by Dr. Solis Rhodes M.D on Workstation: [id number]        I have personally reviewed all medications:  Scheduled Medications  amLODIPine, 10 mg, Oral, Daily  aspirin, 81 mg, Oral, Daily  atorvastatin, 40 mg, Oral, Daily  budesonide-formoterol, 2 puff, Inhalation, BID - RT  clopidogrel, 75 mg, Oral, Daily  diphenhydrAMINE, 12.5 mg, Intravenous, Once  donepezil, 5 mg, Oral, Nightly  fluticasone, 2 spray, Nasal, Daily  hydrALAZINE, 50 mg, Oral, TID  insulin lispro, 2-7 Units, Subcutaneous, 4x Daily AC & at Bedtime  isosorbide mononitrate, 30 mg, Oral, Daily  Lidocaine, 1 patch, Transdermal, Q24H  losartan, 100 mg, Oral, Daily  metoprolol succinate XL, 25 mg, Oral, Daily  OLANZapine, 5 mg, Oral, Daily With Dinner  sodium chloride, 10 mL, Intravenous, Q12H  thiamine, 100 mg,  Oral, Daily    Infusions   Diet  Diet: Cardiac, Diabetic; Healthy Heart (2-3 Na+); Consistent Carbohydrate; Fluid Consistency: Thin (IDDSI 0)    I have personally reviewed:  [x]  Laboratory   [x]  Microbiology   [x]  Radiology   [x]  EKG/Telemetry  [x]  Cardiology/Vascular   []  Pathology    []  Records       Assessment/Plan     Active Hospital Problems    Diagnosis  POA    **Altered mental status [R41.82]  Yes    Dementia with agitation [F03.911]  Yes    CAD (coronary artery disease) [I25.10]  Yes    Chronic respiratory failure with hypoxia [J96.11]  Yes    ESRD (end stage renal disease) on dialysis [N18.6, Z99.2]  Not Applicable    Bradycardia [R00.1]  Yes    Chronic diastolic CHF (congestive heart failure) [I50.32]  Yes    ANGIE (obstructive sleep apnea) [G47.33]  Yes    Anemia, chronic disease [D63.8]  Yes    HTN (hypertension) [I10]  Yes      Resolved Hospital Problems   No resolved problems to display.       74 y.o. female with ESRD, ANGIE, diastolic CHF and dementia admitted with Altered mental status.    Discussed with daughter.  Issues are primarily at nighttime.  Sounds like she has been forgetting names and yelling out especially at night.  Seem to be hallucinating.    Neurology workup pending.  EEG is done but not read and MRI is still pending.  She will probably have to be sedated to complete it.  - Reviewed labs.  Her TSH is marginally elevated.  Will check free T4.  RPR, HIV negative.  CRP 4.6.    Will add Zyprexa to use at nighttime to try to help her sleep.  I think psychiatry consult would be beneficial.  Certainly would urge delirium precautions and minimize interruptions at nighttime.  I encouraged her daughter to try to keep her awake during the day and not let her sleep all afternoon.    Hypertension uncontrolled at times which I suppose could be playing some part as well.  Currently within normal limits after getting morning meds.    ESRD management per nephrology.      DVT prophy: SCDs  Dispo:  Potentially home 1 to 2 days with family      Jorje Chin MD  [facility]  03/11/25  15:21 EDT

## 2025-03-11 NOTE — PLAN OF CARE
Goal Outcome Evaluation:  Plan of Care Reviewed With: patient           Outcome Evaluation: Pt seen for PT this afternoon. She is doing well and agreeable to therapy. Pt able to increase activity today.   She was able to transition to EOB w SBA/CGA.  Pt stood w HHA/CGA and was able to ambulate approx 25 ft in room w CGA/min A. Pt demos slow pace, but no overt unsteadiness. Pt agreeable to sit in recliner at end of session. She was able to participate w BLE exercises in chair. Pt doing well. Will continue to progress as tolerated.    Anticipated Discharge Disposition (PT): home with assist, home with home health, skilled nursing facility

## 2025-03-11 NOTE — PLAN OF CARE
Problem: Violence Risk or Actual  Goal: Anger and Impulse Control  Outcome: Progressing  Intervention: Minimize Safety Risk  Recent Flowsheet Documentation  Taken 3/11/2025 1600 by Paris Bonilla RN  Enhanced Safety Measures: bed alarm set  Taken 3/11/2025 1411 by Paris Bonilla RN  Enhanced Safety Measures: bed alarm set  Taken 3/11/2025 1200 by Paris Bonilla RN  Enhanced Safety Measures: bed alarm set  Taken 3/11/2025 1000 by Paris Bonilla RN  Enhanced Safety Measures: bed alarm set  Taken 3/11/2025 0830 by Paris Bonilla RN  Enhanced Safety Measures: bed alarm set     Problem: Adult Inpatient Plan of Care  Goal: Plan of Care Review  Outcome: Progressing  Flowsheets (Taken 3/11/2025 1640)  Progress: improving  Outcome Evaluation: VSS, room air, up with assist x1, sat in chair this shift, still with confusion at times, paranoia better throughout the shift, psych to see tomorrow, EEG completed, waiting for MRI, premeds ordered.  Plan of Care Reviewed With: patient  Goal: Patient-Specific Goal (Individualized)  Outcome: Progressing  Goal: Absence of Hospital-Acquired Illness or Injury  Outcome: Progressing  Intervention: Identify and Manage Fall Risk  Recent Flowsheet Documentation  Taken 3/11/2025 1600 by Paris Bonilla RN  Safety Promotion/Fall Prevention:   activity supervised   assistive device/personal items within reach   clutter free environment maintained   fall prevention program maintained   nonskid shoes/slippers when out of bed   room organization consistent   safety round/check completed  Taken 3/11/2025 1411 by Paris Bonilla RN  Safety Promotion/Fall Prevention:   activity supervised   assistive device/personal items within reach   clutter free environment maintained   fall prevention program maintained   nonskid shoes/slippers when out of bed   room organization consistent   safety round/check completed  Taken 3/11/2025 1200 by Paris Bonilla RN  Safety  Promotion/Fall Prevention:   activity supervised   assistive device/personal items within reach   clutter free environment maintained   fall prevention program maintained   nonskid shoes/slippers when out of bed   room organization consistent   safety round/check completed  Taken 3/11/2025 1000 by Paris Bonilla RN  Safety Promotion/Fall Prevention:   activity supervised   assistive device/personal items within reach   clutter free environment maintained   fall prevention program maintained   nonskid shoes/slippers when out of bed   room organization consistent   safety round/check completed  Taken 3/11/2025 0830 by Paris Bonilla RN  Safety Promotion/Fall Prevention:   activity supervised   assistive device/personal items within reach   clutter free environment maintained   fall prevention program maintained   nonskid shoes/slippers when out of bed   room organization consistent   safety round/check completed  Intervention: Prevent Skin Injury  Recent Flowsheet Documentation  Taken 3/11/2025 1600 by Paris Bonilla RN  Body Position: position changed independently  Taken 3/11/2025 1411 by Paris Bonilla RN  Body Position:   supine   position changed independently  Taken 3/11/2025 1200 by Paris Bonilla RN  Body Position: weight shifting  Taken 3/11/2025 1000 by Paris Bonilla RN  Body Position: supine  Taken 3/11/2025 0830 by Paris Bonilla RN  Body Position:   supine   position changed independently  Goal: Optimal Comfort and Wellbeing  Outcome: Progressing  Goal: Readiness for Transition of Care  Outcome: Progressing     Problem: Skin Injury Risk Increased  Goal: Skin Health and Integrity  Outcome: Progressing  Intervention: Optimize Skin Protection  Recent Flowsheet Documentation  Taken 3/11/2025 1600 by Paris Bonilla RN  Head of Bed (HOB) Positioning: HOB at 30-45 degrees  Taken 3/11/2025 1411 by Paris Bonilla RN  Head of Bed (HOB) Positioning: HOB at 20-30  degrees  Taken 3/11/2025 1200 by Paris Bonilla RN  Head of Bed (HOB) Positioning: HOB at 30-45 degrees  Taken 3/11/2025 1000 by Paris Bonilla RN  Head of Bed (HOB) Positioning: HOB at 20-30 degrees  Taken 3/11/2025 0830 by Paris Bonilla RN  Head of Bed (HOB) Positioning: HOB at 30-45 degrees     Problem: Fall Injury Risk  Goal: Absence of Fall and Fall-Related Injury  Outcome: Progressing  Intervention: Promote Injury-Free Environment  Recent Flowsheet Documentation  Taken 3/11/2025 1600 by Paris Bonilla RN  Safety Promotion/Fall Prevention:   activity supervised   assistive device/personal items within reach   clutter free environment maintained   fall prevention program maintained   nonskid shoes/slippers when out of bed   room organization consistent   safety round/check completed  Taken 3/11/2025 1411 by Paris Bonilla RN  Safety Promotion/Fall Prevention:   activity supervised   assistive device/personal items within reach   clutter free environment maintained   fall prevention program maintained   nonskid shoes/slippers when out of bed   room organization consistent   safety round/check completed  Taken 3/11/2025 1200 by Paris Bonilla RN  Safety Promotion/Fall Prevention:   activity supervised   assistive device/personal items within reach   clutter free environment maintained   fall prevention program maintained   nonskid shoes/slippers when out of bed   room organization consistent   safety round/check completed  Taken 3/11/2025 1000 by Paris Bonilla RN  Safety Promotion/Fall Prevention:   activity supervised   assistive device/personal items within reach   clutter free environment maintained   fall prevention program maintained   nonskid shoes/slippers when out of bed   room organization consistent   safety round/check completed  Taken 3/11/2025 0830 by Paris Bonilla RN  Safety Promotion/Fall Prevention:   activity supervised   assistive device/personal items  within reach   clutter free environment maintained   fall prevention program maintained   nonskid shoes/slippers when out of bed   room organization consistent   safety round/check completed   Goal Outcome Evaluation:  Plan of Care Reviewed With: patient        Progress: improving  Outcome Evaluation: VSS, room air, up with assist x1, sat in chair this shift, still with confusion at times, paranoia better throughout the shift, psych to see tomorrow, EEG completed, waiting for MRI, premeds ordered.

## 2025-03-11 NOTE — PROGRESS NOTES
DOS: 3/11/2025  NAME: Sonia Acharya   : 1950  PCP: Marcy Paez PA    Chief Complaint   Patient presents with    Altered Mental Status         Subjective: Pt seen in follow up, however the problem is new to me.  Patient lying in bed initially asleep but easily awakened to the calling of her name.  States she does not really know why she was brought to the hospital but she is having a lot of belly pain.  Not sure when her last bowel movement was.  Daughter was at bedside but was asleep in the recliner.    Objective:  Vital signs:   Vitals:    25 0423 25 0726 25 0803 25 1102   BP: (!) 182/83 175/82  125/66   BP Location: Left arm Left arm  Left arm   Patient Position: Lying Lying  Lying   Pulse: 74 77 73 62   Resp: 22  20    Temp: 97.8 °F (36.6 °C) 98 °F (36.7 °C)  97.9 °F (36.6 °C)   TempSrc: Oral Oral  Oral   SpO2: 97% 96% 93% 93%   Weight:       Height:           Current Facility-Administered Medications:     acetaminophen (TYLENOL) tablet 650 mg, 650 mg, Oral, Q4H PRN, 650 mg at 03/10/25 2327 **OR** acetaminophen (TYLENOL) 160 MG/5ML oral solution 650 mg, 650 mg, Oral, Q4H PRN **OR** acetaminophen (TYLENOL) suppository 650 mg, 650 mg, Rectal, Q4H PRN, Nasra Harris APRN    amLODIPine (NORVASC) tablet 10 mg, 10 mg, Oral, Daily, Nasra Harris APRN, 10 mg at 25 0831    aspirin EC tablet 81 mg, 81 mg, Oral, Daily, Casey Mendoza MD, 81 mg at 25 0831    atorvastatin (LIPITOR) tablet 40 mg, 40 mg, Oral, Daily, Nasra Harris APRN, 40 mg at 25 0832    sennosides-docusate (PERICOLACE) 8.6-50 MG per tablet 2 tablet, 2 tablet, Oral, BID PRN **AND** polyethylene glycol (MIRALAX) packet 17 g, 17 g, Oral, Daily PRN **AND** bisacodyl (DULCOLAX) EC tablet 5 mg, 5 mg, Oral, Daily PRN **AND** bisacodyl (DULCOLAX) suppository 10 mg, 10 mg, Rectal, Daily PRN, Nasra Harris, APRN    budesonide-formoterol (SYMBICORT) 160-4.5 MCG/ACT inhaler 2 puff, 2  puff, Inhalation, BID - RT, Nasra Harris APRN, 2 puff at 03/11/25 0803    clopidogrel (PLAVIX) tablet 75 mg, 75 mg, Oral, Daily, Nasra Harris APRN, 75 mg at 03/11/25 0831    dextrose (D50W) (25 g/50 mL) IV injection 25 g, 25 g, Intravenous, Q15 Min PRN, Nasra Harris APRN    dextrose (GLUTOSE) oral gel 15 g, 15 g, Oral, Q15 Min PRN, Nasra Harris APRN    diphenhydrAMINE (BENADRYL) capsule 50 mg, 50 mg, Oral, Q6H PRN, Nasra Harris APRN, 50 mg at 03/10/25 2216    diphenhydrAMINE (BENADRYL) injection 12.5 mg, 12.5 mg, Intravenous, Once, Sisi Hopson APRN    donepezil (ARICEPT) tablet 5 mg, 5 mg, Oral, Nightly, Nasar Harris APRN, 5 mg at 03/10/25 2012    famotidine (PEPCID) tablet 20 mg, 20 mg, Oral, BID PRN, Nasra Harris APRN    fluticasone (FLONASE) 50 MCG/ACT nasal spray 2 spray, 2 spray, Nasal, Daily, Nasra Harris APRN    glucagon (GLUCAGEN) injection 1 mg, 1 mg, Intramuscular, Q15 Min PRN, Nasra Harris APRN    hydrALAZINE (APRESOLINE) tablet 50 mg, 50 mg, Oral, TID, Nasra Harris APRN, 50 mg at 03/11/25 0831    insulin lispro (HUMALOG/ADMELOG) injection 2-7 Units, 2-7 Units, Subcutaneous, 4x Daily AC & at Bedtime, Nasra Harris APRN    isosorbide mononitrate (IMDUR) 24 hr tablet 30 mg, 30 mg, Oral, Daily, EdlingCasey MD, 30 mg at 03/11/25 0832    Lidocaine 4 % 1 patch, 1 patch, Transdermal, Q24H, Nasra Harris APRN, 1 patch at 03/11/25 0832    LORazepam (ATIVAN) injection 1 mg, 1 mg, Intravenous, Once PRN, Jorje Chin MD    losartan (COZAAR) tablet 100 mg, 100 mg, Oral, Daily, Casey Mendoza MD, 100 mg at 03/11/25 0832    melatonin tablet 5 mg, 5 mg, Oral, Nightly PRN, Nasra Harris, KANDIS, 5 mg at 03/10/25 2014    metoprolol succinate XL (TOPROL-XL) 24 hr tablet 25 mg, 25 mg, Oral, Daily, Casey Mendoza MD, 25 mg at 03/11/25 0832    nitroglycerin (NITROSTAT) SL tablet 0.4 mg, 0.4 mg,  Sublingual, Q5 Min PRN, Nasra Harris, APRN    ondansetron (ZOFRAN) injection 4 mg, 4 mg, Intravenous, Q6H PRN, Nasra Harris APRN, 4 mg at 03/11/25 1040    sodium chloride 0.9 % flush 10 mL, 10 mL, Intravenous, PRN, Scott Hilario MD    sodium chloride 0.9 % flush 10 mL, 10 mL, Intravenous, Q12H, Nasra Harris APRN, 10 mL at 03/11/25 0832    sodium chloride 0.9 % flush 10 mL, 10 mL, Intravenous, PRN, Nasra Harris, APRN    sodium chloride 0.9 % infusion 40 mL, 40 mL, Intravenous, PRN, Nasra Harris, APRN    thiamine (VITAMIN B-1) tablet 100 mg, 100 mg, Oral, Daily, Casey Mendoza MD, 100 mg at 03/11/25 0832    PRN meds    acetaminophen **OR** acetaminophen **OR** acetaminophen    senna-docusate sodium **AND** polyethylene glycol **AND** bisacodyl **AND** bisacodyl    dextrose    dextrose    diphenhydrAMINE    famotidine    glucagon (human recombinant)    LORazepam    melatonin    nitroglycerin    ondansetron    sodium chloride    sodium chloride    sodium chloride    No current facility-administered medications on file prior to encounter.     Current Outpatient Medications on File Prior to Encounter   Medication Sig    amLODIPine (NORVASC) 10 MG tablet Take 1 tablet by mouth Daily.    aspirin 81 MG EC tablet Take 1 tablet by mouth Daily.    atorvastatin (LIPITOR) 40 MG tablet Take 1 tablet by mouth Daily.    budesonide-formoterol (SYMBICORT) 80-4.5 MCG/ACT inhaler Inhale 2 puffs 2 (Two) Times a Day.    donepezil (ARICEPT) 5 MG tablet Take 1 tablet by mouth Every Night.    fluticasone (FLONASE) 50 MCG/ACT nasal spray Administer 2 sprays into the nostril(s) as directed by provider Daily.    hydrALAZINE (APRESOLINE) 50 MG tablet Take 1 tablet by mouth 3 (Three) Times a Day.    isosorbide mononitrate (IMDUR) 30 MG 24 hr tablet Take 1 tablet by mouth Daily.    linaclotide (LINZESS) 290 MCG capsule capsule Take 1 capsule by mouth Every Morning Before Breakfast.    losartan (COZAAR)  100 MG tablet Take 1 tablet by mouth Daily.    melatonin 3 MG tablet Take 1 tablet by mouth Every Night.    metoprolol succinate XL (TOPROL-XL) 25 MG 24 hr tablet Take 1 tablet by mouth Daily.    clopidogrel (PLAVIX) 75 MG tablet Take 1 tablet by mouth Daily for 30 days.    diphenhydrAMINE (BENADRYL) 25 mg capsule Take 2 capsules by mouth Every 6 (Six) Hours As Needed for Itching.    famotidine (Pepcid) 20 MG tablet Take 1 tablet by mouth 2 (Two) Times a Day As Needed for Heartburn or Indigestion for up to 30 days.    lidocaine (LIDODERM) 5 % Place 1 patch on the skin as directed by provider Daily. Remove & Discard patch within 12 hours or as directed by MD     General appearance: Elderly female, NAD, alert and cooperative  HEENT: Normocephalic, atraumatic    Neurological:   MS: oriented to self only, decreased attention, language intact, no neglect  CN: visual fields full, EOMI, facial sensation equal, no facial droop, shoulder shrug equal, tongue midline  Motor: 5/5 RUE/LUE, 5/5 RLE, 3/5 LLE 2/2 c/o pain from fall  Sensory: light touch sensation intact in all 4 ext.  Coordination: Normal finger to nose test  Gait and station: deferred     Laboratory results:  Lab Results   Component Value Date    TSH 5.520 (H) 03/10/2025     Lab Results   Component Value Date    ESZPORPZ09 >2,000 (H) 03/10/2025     Lab Results   Component Value Date    HGBA1C 4.5 (L) 02/19/2025     Lab Results   Component Value Date    GLUCOSE 88 03/10/2025    BUN 35 (H) 03/10/2025    CREATININE 5.92 (H) 03/10/2025    EGFRIFNONA  12/14/2021      Comment:      <15 Indicative of kidney failure.    EGFRIFAFRI 10 (L) 03/04/2023    BCR 5.9 (L) 03/10/2025    K 4.9 03/10/2025    CO2 19.1 (L) 03/10/2025    CALCIUM 9.4 03/10/2025    ALBUMIN 3.6 03/10/2025    LABIL2 1.4 03/04/2023    AST 16 03/10/2025    ALT 8 03/10/2025     Lab Results   Component Value Date    WBC 3.97 03/10/2025    HGB 10.3 (L) 03/10/2025    HCT 33.3 (L) 03/10/2025    MCV 93.5 03/10/2025  "    03/10/2025     Brief Urine Lab Results       None          No results found for: \"ACANTHNAEG\", \"AFBCX\", \"BPERTUSSISCX\", \"BLOODCX\"  No results found for: \"BCIDPCR\", \"CXREFLEX\", \"CSFCX\", \"CULTURETIS\"  No results found for: \"CULTURES\", \"HSVCX\", \"URCX\"  No results found for: \"EYECULTURE\", \"GCCX\", \"HSVCULTURE\", \"LABHSV\"  No results found for: \"LEGIONELLA\", \"MRSACX\", \"MUMPSCX\", \"MYCOPLASCX\"  No results found for: \"NOCARDIACX\", \"STOOLCX\"  No results found for: \"THROATCX\", \"UNSTIMCULT\", \"URINECX\", \"CULTURE\", \"VZVCULTUR\"  No results found for: \"VIRALCULTU\", \"WOUNDCX\"  Pain Management Panel           No data to display                Review and interpretation of imaging:  CT Head Without Contrast  Result Date: 3/9/2025   There is volume loss and there are nonspecific white matter changes, but there is no acute intracranial abnormality.        This report was finalized on 3/9/2025 10:34 PM by Dr. Solis Rhodes M.D on Workstation: [id number]      XR Chest 1 View  Result Date: 3/9/2025   There is mild cardiomegaly, though submaximal inspiration may exaggerate the cardiac silhouette.  There is mild vascular congestion, though submaximal inspiration may exaggerate the appearance.  There is no consolidation, effusion or pneumothorax.  This report was finalized on 3/9/2025 8:44 PM by Dr. Solis Rhodes M.D on Workstation: [id number]      Impression/Assessment:  This is a 74-year-old female with past medical history of MCI versus hypertension, CHF, mild dementia, ESRD on HD, CAD, ANGIE who presented to the hospital on 3/9/2025 with complaints of altered mental status since Thursday.  Daughter reported that the patient was not making sense when speaking and having visual hallucinations.  There was some concern for UTI.  She had reportedly not missed any dialysis sessions.  Her creatinine was 5.8 on arrival, unclear what her baseline creatinine is.  Seems to range anywhere from 3-7 from my review.      Her BP on " arrival was 188/101.  She had a noncontrast CT head did not show any acute intracranial changes, she had diffuse cerebral atrophy most prominent in the temporal lobes.  Per review of her home med rec she is currently taking donepezil 5 mg, DAPT with aspirin and Plavix, and melatonin at night.  I reviewed a [facility] note from April 2024 for which she underwent a MMSE and scored a 22/30.  Dr. Karlo Mcmanus suspected she had at least MCI complicated by CHF/ESRD/ATC or a mild dementia with a high risk for vascular cognitive impairment.  He checked a MRI of her brain which I was unable to review myself however per the report she did not reveal any acute changes, she had chronic lacunar infarcts noted.  He recommended starting her on Aricept 5 mg.    Diagnosis:  Acute encephalopathy, possibly multifactorial from hypertensive encephalopathy with an underlying dementia complicated by ESRD  Hypertensive urgency  ESRD on HD    Additional work up to date:  Labs: Folate 5.94, CRP 4.61, ammonia 47, TSH 5.520, respiratory panel negative, RPR non-reactive     Plan:  - Confused on exam but cooperative. No focal deficits.  She does complain of LLE pain from a fall as well as ABD pain ? Constipation.  - She was quite hypertensive on arrival, with her diffuse atrophy worse in the temporal lobes noted on her CTH, I suspect her encephalopathy is being compounded from underlying dementia, uncontrolled hypertension, and ESRD.  - She was not cooperative with EEG tech yesterday and has not had her MRI yet.   - Would like for her to at least complete the MRI.   - Start delirium precautions.   Please call us back to see once she has completed her MRI.    Evidence-based delirium precautions include:     1. Frequent reorientation, reminding patient not questioning patient   2. Shades up during day/down at night   3. TV off except for soft music only   4. Familiar objects at bedside   5. Encourage friends/family to spend the night   6.  Minimize anticholingeric medications   7. Minimize polypharmacy   8. Minimize restraints, includes lines and/or foleys and/or feeding tubes as able   9. Minimize overnight checks/vitals to encourage restful consistent sleep patterns   10. Out of bed during day as much as possible     Case discussed with patient, RN, and Dr. Easton, and he agrees with plan above.     KANDIS Rooney

## 2025-03-11 NOTE — PLAN OF CARE
Goal Outcome Evaluation:   Patient rested well until around 0400. She woke up extremely agitated and paranoid -- stated she was going to call the police, would not listen to staff. Security called as patient was very agitated and trying to get out of bed. Patient was able to be directed back into bed and staff minimized interactions with patient. Patient currently resting in bed. Daughter notified and states she will be in this morning.

## 2025-03-11 NOTE — SIGNIFICANT NOTE
EEG delayed as pt is currently refusing. RN states to try again in morning. Will leave message for dayshift.

## 2025-03-11 NOTE — PROGRESS NOTES
[facility] Progress Note      Patient Name: Sonia Acharya  : 1950  MRN: [id number]  Primary Care Physician:  Marcy Paez PA  Date of admission: 3/9/2025    Subjective     Interval History:      Patient underwent HD yesterday w.o complications   Patient more aware w episodes of agitation     Review of Systems:   As noted above    Objective     Vitals:   Temp:  [97.7 °F (36.5 °C)-98.3 °F (36.8 °C)] 97.9 °F (36.6 °C)  Heart Rate:  [62-79] 62  Resp:  [16-22] 20  BP: (125-182)/(65-93) 125/66    Intake/Output Summary (Last 24 hours) at 3/11/2025 1354  Last data filed at 3/11/2025 0830  Gross per 24 hour   Intake 240 ml   Output 1000 ml   Net -760 ml       Physical Exam:    General Appearance:  Chronically ill and deconditioned   Skin: warm and dry  HEENT: oral mucosa normal, nonicteric sclera  Neck: supple, no JVD  Lungs: CTA  Heart: RRR, normal S1 and S2  Abdomen: soft, nontender, nondistended  : no palpable bladder  Extremities: no edema, cyanosis or clubbing.right upper extremity AV fistula   Neuro: no focalization     Scheduled Meds:     amLODIPine, 10 mg, Oral, Daily  aspirin, 81 mg, Oral, Daily  atorvastatin, 40 mg, Oral, Daily  budesonide-formoterol, 2 puff, Inhalation, BID - RT  clopidogrel, 75 mg, Oral, Daily  diphenhydrAMINE, 12.5 mg, Intravenous, Once  donepezil, 5 mg, Oral, Nightly  fluticasone, 2 spray, Nasal, Daily  hydrALAZINE, 50 mg, Oral, TID  insulin lispro, 2-7 Units, Subcutaneous, 4x Daily AC & at Bedtime  isosorbide mononitrate, 30 mg, Oral, Daily  Lidocaine, 1 patch, Transdermal, Q24H  losartan, 100 mg, Oral, Daily  metoprolol succinate XL, 25 mg, Oral, Daily  sodium chloride, 10 mL, Intravenous, Q12H  thiamine, 100 mg, Oral, Daily      IV Meds:        Results Reviewed:   I have personally reviewed the results from the time of this admission to 3/11/2025 13:54 EDT     Results from last 7 days   Lab Units 03/10/25  0323 25  1945 25  0000    SODIUM mmol/L 139 139  --    POTASSIUM mmol/L 4.9 4.6  --    CHLORIDE mmol/L 96* 95*  --    CO2 mmol/L 19.1* 24.4  --    BUN mg/dL 35* 33* 53*   CREATININE mg/dL 5.92* 5.80*  --    CALCIUM mg/dL 9.4 9.8  --    BILIRUBIN mg/dL 0.3 0.3  --    ALK PHOS U/L 101 115  --    ALT (SGPT) U/L 8 6  --    AST (SGOT) U/L 16 16  --    GLUCOSE mg/dL 88 83  --        Estimated Creatinine Clearance: 6.2 mL/min (A) (by C-G formula based on SCr of 5.92 mg/dL (H)).    Results from last 7 days   Lab Units 03/09/25 1945   MAGNESIUM mg/dL 2.5*             Results from last 7 days   Lab Units 03/10/25  0323 03/09/25  1945 03/05/25  0000   WBC 10*3/mm3 3.97 4.78  --    HEMOGLOBIN g/dL 10.3* 10.2* 8.8*  26.4*   PLATELETS 10*3/mm3 297 310  --              Assessment / Plan     ASSESSMENT:    End Stage Renal Disease ( ESRD ) on Hemodialysis .s/p  RUE AVF functional .Continue to arrange hemodialysis treatment during patient  admission. Continue renal diet      -Chronic normocytic anemia. On Epogen protocol.  On hold hgb > 10 We will continue to follow H&H closely      -Hyperphosphatemia. Continue renal diet. We will follow phosphorus to make further adjustments to binder if indicated      -Hypertension w/ CKD .  Resume home regimen of amlodipine, hydralazine, isosorbide, metoprolol and losartan     . We will continue to follow closely. Heart healthy diet      -Coronary artery disease status post cardiac cath with chronic diastolic congestive (echocardiogram showing EF of 56% with severe concentric hypertrophy  ) heart failure.  Optimize volume status with dialysis.  Heart healthy diet. Recommendations per primary team     -Altered mental status/encephalopathy being worked up and evaluated followed by neurology        PLAN:  Patient underwent HD yesterday w/o complications and UF of 1 liter   Mentation improving w medical management   Appreciated neurology recommendations and advice   No acute indication for HD today will arrange for  tomorrow     Thank you for involving us in the care of Sonia Acharya.  Please feel free to call with any questions.    Gabe Solano MD  03/11/25  13:54 EDT    [facility]  [phone]    Please note that portions of this note were completed with a voice recognition program.

## 2025-03-11 NOTE — THERAPY TREATMENT NOTE
Patient Name: Sonia Acharya  : 1950    MRN: [id number]                              Today's Date: 3/11/2025       Admit Date: 3/9/2025    Visit Dx:     ICD-10-CM ICD-9-CM   1. Altered mental status, unspecified altered mental status type  R41.82 780.97   2. ESRD on hemodialysis  N18.6 585.6    Z99.2 V45.11   3. Hypertension not at goal  I10 401.9     Patient Active Problem List   Diagnosis    Generalized abdominal pain    ODALIS (acute kidney injury)    Anemia, chronic disease    Metabolic acidosis, increased anion gap    Obesity (BMI 30-39.9)    HTN (hypertension)    Chest pain, atypical    Cervical radiculopathy    ESRD on dialysis    Bradycardia    Right arm pain    ANGIE (obstructive sleep apnea)    Chronic diastolic CHF (congestive heart failure)    Aortic stenosis    Pancytopenia    Hyperphosphatemia    Hyperkalemia    Pneumonia    Leukocytopenia    Anemia, chronic disease    ESRD (end stage renal disease) on dialysis    Bacterial pneumonia, treating as gram negative    Chronic respiratory failure with hypoxia    CAD (coronary artery disease)    Nausea, vomiting, and diarrhea    Severe protein-calorie malnutrition    Chronic abdominal pain    Type 2 diabetes mellitus with hypoglycemia    Altered mental status    Dementia with agitation     Past Medical History:   Diagnosis Date    Arthritis     Chronic kidney disease     Heart disease     Hypertension      Past Surgical History:   Procedure Laterality Date    BREAST SURGERY      CARDIAC CATHETERIZATION N/A 2025    Procedure: Left Heart Cath;  Surgeon: Cleveland Dickens MD;  Location:  [facility];  Service: Cardiovascular;  Laterality: N/A;  Dialysis patient    CARDIAC CATHETERIZATION N/A 2025    Procedure: Coronary angiography;  Surgeon: Cleveland Dickens MD;  Location:  [facility];  Service: Cardiovascular;  Laterality: N/A;    DIALYSIS FISTULA CREATION      EYE SURGERY      HERNIA REPAIR      Dr. Sung       General Information       Row Name 03/11/25 1548          Physical Therapy Time and Intention    Document Type therapy note (daily note)  -EJ     Mode of Treatment physical therapy  -EJ       Row Name 03/11/25 1548          General Information    Existing Precautions/Restrictions fall  -EJ     Barriers to Rehab cognitive status  -EJ               User Key  (r) = Recorded By, (t) = Taken By, (c) = Cosigned By      Initials Name Provider Type    EJ Bernadette Sethi, PT Physical Therapist                   Mobility       Row Name 03/11/25 1549          Bed Mobility    Supine-Sit [place] (Bed Mobility) verbal cues;standby assist;contact guard  -EJ     Assistive Device (Bed Mobility) head of bed elevated  -EJ     Comment, (Bed Mobility) cues for sequencing  -EJ       Row Name 03/11/25 1549          Sit-Stand Transfer    Sit-Stand [place] (Transfers) verbal cues;contact guard  -EJ     Comment, (Sit-Stand Transfer) HHA  -EJ       Row Name 03/11/25 1549          Gait/Stairs (Locomotion)    [place] Level (Gait) verbal cues;contact guard;minimum assist (75% patient effort)  -EJ     Assistive Device (Gait) --  HHA  -EJ     Distance in Feet (Gait) 25  -EJ     Deviations/Abnormal Patterns (Gait) cass decreased;stride length decreased  -EJ     Bilateral Gait Deviations forward flexed posture;heel strike decreased  -EJ     Comment, (Gait/Stairs) no overt unsteadiness  -EJ               User Key  (r) = Recorded By, (t) = Taken By, (c) = Cosigned By      Initials Name Provider Type    EJ Bernadette Sethi, PT Physical Therapist                   Obj/Interventions       Row Name 03/11/25 1551          Motor Skills    Therapeutic Exercise --  seated BLE ther ex x 10 reps  -EJ               User Key  (r) = Recorded By, (t) = Taken By, (c) = Cosigned By      Initials Name Provider Type    EJ Bernadette Sethi, PT Physical Therapist                   Goals/Plan    No documentation.                  Clinical  Impression       Row Name 03/11/25 1553          Pain    Pretreatment Pain Rating 0/10 - no pain  -EJ     Posttreatment Pain Rating 0/10 - no pain  -EJ       Row Name 03/11/25 1553          Plan of Care Review    Plan of Care Reviewed With patient  -EJ     Outcome Evaluation Pt seen for PT this afternoon. She is doing well and agreeable to therapy. Pt able to increase activity today.   She was able to transition to EOB w SBA/CGA.  Pt stood w HHA/CGA and was able to ambulate approx 25 ft in room w CGA/min A. Pt demos slow pace, but no overt unsteadiness. Pt agreeable to sit in recliner at end of session. She was able to participate w BLE exercises in chair. Pt doing well. Will continue to progress as tolerated.  -EJ       Row Name 03/11/25 1553          Positioning and Restraints    Pre-Treatment Position in bed  -EJ     Post Treatment Position chair  -EJ     In Chair notified nsg;reclined;call light within reach;encouraged to call for assist;exit alarm on;with family/caregiver  -EJ               User Key  (r) = Recorded By, (t) = Taken By, (c) = Cosigned By      Initials Name Provider Type    Bernadette Acevedo, PT Physical Therapist                   Outcome Measures       Row Name 03/11/25 1556 03/11/25 0830       How much help from another person do you currently need...    Turning from your back to your side while in flat bed without using bedrails? 4  -EJ 3  -CE    Moving from lying on back to sitting on the side of a flat bed without bedrails? 3  -EJ 3  -CE    Moving to and from a bed to a chair (including a wheelchair)? 3  -EJ 3  -CE    Standing up from a chair using your arms (e.g., wheelchair, bedside chair)? 3  -EJ 3  -CE    Climbing 3-5 steps with a railing? 2  -EJ 2  -CE    To walk in hospital room? 3  -EJ 3  -CE    AM-PAC 6 Clicks Score (PT) 18  -EJ 17  -CE              User Key  (r) = Recorded By, (t) = Taken By, (c) = Cosigned By      Initials Name Provider Type    Paris Hurtado RN  Registered Nurse    Bernadette Acevedo, PT Physical Therapist                                   PT Recommendation and Plan  Planned Therapy Interventions (PT): balance training, bed mobility training, gait training, home exercise program, transfer training, stretching, strengthening, stair training, ROM (range of motion), patient/family education  Outcome Evaluation: Pt seen for PT this afternoon. She is doing well and agreeable to therapy. Pt able to increase activity today.   She was able to transition to EOB w SBA/CGA.  Pt stood w HHA/CGA and was able to ambulate approx 25 ft in room w CGA/min A. Pt demos slow pace, but no overt unsteadiness. Pt agreeable to sit in recliner at end of session. She was able to participate w BLE exercises in chair. Pt doing well. Will continue to progress as tolerated.     Time Calculation:         PT Charges       Row Name 03/11/25 1559             Time Calculation    Start Time 1426  -EJ      Stop Time 1440  -EJ      Time Calculation (min) 14 min  -EJ      PT Received On 03/11/25  -EJ      PT - Next Appointment 03/12/25  -EJ                User Key  (r) = Recorded By, (t) = Taken By, (c) = Cosigned By      Initials Name Provider Type    Bernadette Acevedo, PT Physical Therapist                  Therapy Charges for Today       Code Description Service Date Service Provider Modifiers Qty    98655138516  PT EVAL MOD COMPLEXITY 3 3/10/2025 Bernadette Sethi, PT GP 1    [id number]  PT THERAPEUTIC ACT EA 15 MIN 3/10/2025 Bernadette Sethi, PT GP 1    [id number]  PT THER SUPP EA 15 MIN 3/10/2025 Bernadette Sethi, PT GP 1    [id number]  PT THERAPEUTIC ACT EA 15 MIN 3/11/2025 Bernadette Sethi, PT GP 1            PT G-Codes  AM-PAC 6 Clicks Score (PT): 18  PT Discharge Summary  Anticipated Discharge Disposition (PT): home with assist, home with home health, skilled nursing facility    Bernadette Sethi PT  3/11/2025

## 2025-03-12 ENCOUNTER — APPOINTMENT (OUTPATIENT)
Dept: MRI IMAGING | Facility: HOSPITAL | Age: 75
DRG: 884 | End: 2025-03-12
Payer: MEDICARE

## 2025-03-12 LAB
ANION GAP SERPL CALCULATED.3IONS-SCNC: 14 MMOL/L (ref 5–15)
BUN SERPL-MCNC: 34 MG/DL (ref 8–23)
BUN/CREAT SERPL: 5.8 (ref 7–25)
CALCIUM SPEC-SCNC: 8.5 MG/DL (ref 8.6–10.5)
CHLORIDE SERPL-SCNC: 98 MMOL/L (ref 98–107)
CO2 SERPL-SCNC: 25 MMOL/L (ref 22–29)
CREAT SERPL-MCNC: 5.86 MG/DL (ref 0.57–1)
DEPRECATED RDW RBC AUTO: 49.7 FL (ref 37–54)
EGFRCR SERPLBLD CKD-EPI 2021: 7.1 ML/MIN/1.73
ERYTHROCYTE [DISTWIDTH] IN BLOOD BY AUTOMATED COUNT: 14.7 % (ref 12.3–15.4)
GLUCOSE BLDC GLUCOMTR-MCNC: 109 MG/DL (ref 70–130)
GLUCOSE BLDC GLUCOMTR-MCNC: 114 MG/DL (ref 70–130)
GLUCOSE BLDC GLUCOMTR-MCNC: 72 MG/DL (ref 70–130)
GLUCOSE BLDC GLUCOMTR-MCNC: 73 MG/DL (ref 70–130)
GLUCOSE SERPL-MCNC: 82 MG/DL (ref 65–99)
HCT VFR BLD AUTO: 30.1 % (ref 34–46.6)
HGB BLD-MCNC: 9.1 G/DL (ref 12–15.9)
MCH RBC QN AUTO: 28.1 PG (ref 26.6–33)
MCHC RBC AUTO-ENTMCNC: 30.2 G/DL (ref 31.5–35.7)
MCV RBC AUTO: 92.9 FL (ref 79–97)
PLATELET # BLD AUTO: 204 10*3/MM3 (ref 140–450)
PMV BLD AUTO: 9.4 FL (ref 6–12)
POTASSIUM SERPL-SCNC: 4.8 MMOL/L (ref 3.5–5.2)
RBC # BLD AUTO: 3.24 10*6/MM3 (ref 3.77–5.28)
SODIUM SERPL-SCNC: 137 MMOL/L (ref 136–145)
T4 FREE SERPL-MCNC: 1.17 NG/DL (ref 0.92–1.68)
WBC NRBC COR # BLD AUTO: 3.35 10*3/MM3 (ref 3.4–10.8)

## 2025-03-12 PROCEDURE — 82948 REAGENT STRIP/BLOOD GLUCOSE: CPT

## 2025-03-12 PROCEDURE — 83520 IMMUNOASSAY QUANT NOS NONAB: CPT | Performed by: HOSPITALIST

## 2025-03-12 PROCEDURE — 94799 UNLISTED PULMONARY SVC/PX: CPT

## 2025-03-12 PROCEDURE — 80048 BASIC METABOLIC PNL TOTAL CA: CPT | Performed by: HOSPITALIST

## 2025-03-12 PROCEDURE — 70551 MRI BRAIN STEM W/O DYE: CPT

## 2025-03-12 PROCEDURE — 99232 SBSQ HOSP IP/OBS MODERATE 35: CPT | Performed by: NURSE PRACTITIONER

## 2025-03-12 PROCEDURE — 84439 ASSAY OF FREE THYROXINE: CPT | Performed by: HOSPITALIST

## 2025-03-12 PROCEDURE — 85027 COMPLETE CBC AUTOMATED: CPT | Performed by: HOSPITALIST

## 2025-03-12 RX ADMIN — Medication 100 MG: at 08:21

## 2025-03-12 RX ADMIN — HYDRALAZINE HYDROCHLORIDE 50 MG: 50 TABLET ORAL at 18:07

## 2025-03-12 RX ADMIN — Medication 5 MG: at 20:40

## 2025-03-12 RX ADMIN — LIDOCAINE 1 PATCH: 4 PATCH TOPICAL at 08:22

## 2025-03-12 RX ADMIN — DONEPEZIL HYDROCHLORIDE 5 MG: 10 TABLET, FILM COATED ORAL at 20:40

## 2025-03-12 RX ADMIN — CLOPIDOGREL 75 MG: 75 TABLET, FILM COATED ORAL at 08:21

## 2025-03-12 RX ADMIN — Medication 10 ML: at 08:41

## 2025-03-12 RX ADMIN — ISOSORBIDE MONONITRATE 30 MG: 30 TABLET, EXTENDED RELEASE ORAL at 08:22

## 2025-03-12 RX ADMIN — ACETAMINOPHEN 650 MG: 325 TABLET, FILM COATED ORAL at 08:21

## 2025-03-12 RX ADMIN — Medication 10 ML: at 20:40

## 2025-03-12 RX ADMIN — BUDESONIDE AND FORMOTEROL FUMARATE DIHYDRATE 2 PUFF: 160; 4.5 AEROSOL RESPIRATORY (INHALATION) at 20:18

## 2025-03-12 RX ADMIN — ASPIRIN 81 MG: 81 TABLET, COATED ORAL at 08:21

## 2025-03-12 RX ADMIN — OLANZAPINE 5 MG: 5 TABLET, FILM COATED ORAL at 18:07

## 2025-03-12 RX ADMIN — ATORVASTATIN CALCIUM 40 MG: 20 TABLET, FILM COATED ORAL at 08:21

## 2025-03-12 NOTE — SIGNIFICANT NOTE
03/12/25 1113   OTHER   Discipline physical therapist   Rehab Time/Intention   Session Not Performed other (see comments);patient unavailable for treatment  (pt in dialysis at this time, will follow up as time allows)   Recommendation   PT - Next Appointment 03/13/25

## 2025-03-12 NOTE — CONSULTS
"Nutrition Services    Patient Name:  Sonia Acharya  YOB: 1950  MRN: [id number]  Admit Date:  3/9/2025Assessment Date:  03/12/25    NUTRITION SCREENING      Reason for Encounter MST score 2+, Nurse Admission Screen   Diagnosis/Problem Altered mental status   ESRD on HD  Acute encephalopathy   Hypertensive urgency     Pt NEIL at time of visit    PO Diet Diet: Cardiac, Diabetic; Healthy Heart (2-3 Na+); Consistent Carbohydrate; Fluid Consistency: Thin (IDDSI 0)   Supplements n/a   PO Intake % %       Medications MAR reviewed by RD   Labs  Listed below, reviewed   Physical Findings Confused, disoriented, 1+ edema    GI Function BM 3/9, abd pain    Skin Status Intact        Height  Weight  BMI  Weight Trend     Height: 152.4 cm (60\")  Weight: 47.8 kg (105 lb 6.1 oz) (03/10/25 0516)  Body mass index is 20.58 kg/m².  Stable, Loss wt flux 100-110#, fluid related with dialysis        Nutrition Problem (PES) Increased nutrient needs related to kidney dysfunction as evidence by need for HD       Intervention/Plan Encourage intakes >75%  FU for nutritional interview   ONS available as needed     RD to follow up per protocol.     Results from last 7 days   Lab Units 03/12/25  0325 03/10/25  0323 03/09/25  1945   SODIUM mmol/L 137 139 139   POTASSIUM mmol/L 4.8 4.9 4.6   CHLORIDE mmol/L 98 96* 95*   CO2 mmol/L 25.0 19.1* 24.4   BUN mg/dL 34* 35* 33*   CREATININE mg/dL 5.86* 5.92* 5.80*   CALCIUM mg/dL 8.5* 9.4 9.8   BILIRUBIN mg/dL  --  0.3 0.3   ALK PHOS U/L  --  101 115   ALT (SGPT) U/L  --  8 6   AST (SGOT) U/L  --  16 16   GLUCOSE mg/dL 82 88 83     Results from last 7 days   Lab Units 03/12/25  0325 03/10/25  0323 03/09/25  1945   MAGNESIUM mg/dL  --   --  2.5*   HEMOGLOBIN g/dL 9.1*   < > 10.2*   HEMATOCRIT % 30.1*   < > 32.1*    < > = values in this interval not displayed.     Lab Results   Component Value Date    HGBA1C 4.5 (L) 02/19/2025         Electronically signed by:  Kandice Suarez RD  03/12/25 " 14:23 EDT

## 2025-03-12 NOTE — PROGRESS NOTES
DOS: 3/12/2025  NAME: Sonia Acharya   : 1950  PCP: Marcy Paez PA    Chief Complaint   Patient presents with    Altered Mental Status        Subjective: No acute events overnight.  Patient currently in dialysis.  States she feels okay.  Oriented to self only.  Moving all extremities.  Dialysis staff says that she has been sleeping most of the time she has been dialyzed.    Objective:  Vital signs:   Vitals:    25 2146 25 2318 25 0320 25 0822   BP: 128/52 124/56 146/61 143/64   BP Location: Left arm Left arm Left arm Right arm   Patient Position: Lying Lying Lying Lying   Pulse: 62 53 52 52   Resp:  18 18 16   Temp:  97.7 °F (36.5 °C) 97.8 °F (36.6 °C) 97.8 °F (36.6 °C)   TempSrc:  Oral Oral Oral   SpO2: 90% 100% 100% 100%   Weight:       Height:           Current Facility-Administered Medications:     acetaminophen (TYLENOL) tablet 650 mg, 650 mg, Oral, Q4H PRN, 650 mg at 2521 **OR** acetaminophen (TYLENOL) 160 MG/5ML oral solution 650 mg, 650 mg, Oral, Q4H PRN **OR** acetaminophen (TYLENOL) suppository 650 mg, 650 mg, Rectal, Q4H PRN, Nasra Harris APRN    amLODIPine (NORVASC) tablet 10 mg, 10 mg, Oral, Daily, Nasra Harris APRN, 10 mg at 25 0831    aspirin EC tablet 81 mg, 81 mg, Oral, Daily, Casey Mendzoa MD, 81 mg at 25 0821    atorvastatin (LIPITOR) tablet 40 mg, 40 mg, Oral, Daily, Nasra Harris APRN, 40 mg at 25 0821    sennosides-docusate (PERICOLACE) 8.6-50 MG per tablet 2 tablet, 2 tablet, Oral, BID PRN **AND** polyethylene glycol (MIRALAX) packet 17 g, 17 g, Oral, Daily PRN **AND** bisacodyl (DULCOLAX) EC tablet 5 mg, 5 mg, Oral, Daily PRN **AND** bisacodyl (DULCOLAX) suppository 10 mg, 10 mg, Rectal, Daily PRN, Nasra Harris, APRN    budesonide-formoterol (SYMBICORT) 160-4.5 MCG/ACT inhaler 2 puff, 2 puff, Inhalation, BID - RT, Nasra Harris, KANDIS, 2 puff at 257    clopidogrel (PLAVIX) tablet  75 mg, 75 mg, Oral, Daily, Nasra Harris, APRN, 75 mg at 03/12/25 0821    dextrose (D50W) (25 g/50 mL) IV injection 25 g, 25 g, Intravenous, Q15 Min PRN, Nasra Harris APRN    dextrose (GLUTOSE) oral gel 15 g, 15 g, Oral, Q15 Min PRN, Nasra Harris APRN    diphenhydrAMINE (BENADRYL) capsule 50 mg, 50 mg, Oral, Q6H PRN, Nasra Harris, APRN, 50 mg at 03/10/25 2216    diphenhydrAMINE (BENADRYL) injection 12.5 mg, 12.5 mg, Intravenous, Once, Sisi Hopsno APRN    donepezil (ARICEPT) tablet 5 mg, 5 mg, Oral, Nightly, Nasra Harris APRN, 5 mg at 03/11/25 2147    famotidine (PEPCID) tablet 20 mg, 20 mg, Oral, BID PRN, Nasra Harris APRN    fluticasone (FLONASE) 50 MCG/ACT nasal spray 2 spray, 2 spray, Nasal, Daily, Nasra Harris APRN    glucagon (GLUCAGEN) injection 1 mg, 1 mg, Intramuscular, Q15 Min PRN, Nasra Harris APRBRANDON    hydrALAZINE (APRESOLINE) tablet 50 mg, 50 mg, Oral, TID, Nasra Harris APRN, 50 mg at 03/11/25 1653    insulin lispro (HUMALOG/ADMELOG) injection 2-7 Units, 2-7 Units, Subcutaneous, 4x Daily AC & at Bedtime, Nasra Harris APRBRANDON    isosorbide mononitrate (IMDUR) 24 hr tablet 30 mg, 30 mg, Oral, Daily, Casey Mendoza MD, 30 mg at 03/12/25 0822    Lidocaine 4 % 1 patch, 1 patch, Transdermal, Q24H, Nasra Harris, APRN, 1 patch at 03/12/25 0822    LORazepam (ATIVAN) injection 1 mg, 1 mg, Intravenous, Once PRN, Jorje Chin MD    losartan (COZAAR) tablet 100 mg, 100 mg, Oral, Daily, Casey Mendoza MD, 100 mg at 03/11/25 0832    melatonin tablet 5 mg, 5 mg, Oral, Nightly PRN, Nasra Harris APRN, 5 mg at 03/11/25 2147    metoprolol succinate XL (TOPROL-XL) 24 hr tablet 25 mg, 25 mg, Oral, Daily, Casey Mendoza MD, 25 mg at 03/11/25 0832    nitroglycerin (NITROSTAT) SL tablet 0.4 mg, 0.4 mg, Sublingual, Q5 Min PRN, Nasra Harris APRN    OLANZapine (zyPREXA) tablet 5 mg, 5 mg, Oral, Daily With  Giuseppe Noble Matthew Brett, MD, 5 mg at 03/11/25 1653    ondansetron (ZOFRAN) injection 4 mg, 4 mg, Intravenous, Q6H PRN, Nasra Harris APRN, 4 mg at 03/11/25 1040    sodium chloride 0.9 % flush 10 mL, 10 mL, Intravenous, PRN, Scott Hilario MD    sodium chloride 0.9 % flush 10 mL, 10 mL, Intravenous, Q12H, Nasra Harris APRN, 10 mL at 03/12/25 0841    sodium chloride 0.9 % flush 10 mL, 10 mL, Intravenous, PRN, Nasra Harris, APRN    sodium chloride 0.9 % infusion 40 mL, 40 mL, Intravenous, PRN, Nasra Harris, APRN    thiamine (VITAMIN B-1) tablet 100 mg, 100 mg, Oral, Daily, Casey Mendoza MD, 100 mg at 03/12/25 0821    PRN meds    acetaminophen **OR** acetaminophen **OR** acetaminophen    senna-docusate sodium **AND** polyethylene glycol **AND** bisacodyl **AND** bisacodyl    dextrose    dextrose    diphenhydrAMINE    famotidine    glucagon (human recombinant)    LORazepam    melatonin    nitroglycerin    ondansetron    sodium chloride    sodium chloride    sodium chloride    No current facility-administered medications on file prior to encounter.     Current Outpatient Medications on File Prior to Encounter   Medication Sig    amLODIPine (NORVASC) 10 MG tablet Take 1 tablet by mouth Daily.    aspirin 81 MG EC tablet Take 1 tablet by mouth Daily.    atorvastatin (LIPITOR) 40 MG tablet Take 1 tablet by mouth Daily.    budesonide-formoterol (SYMBICORT) 80-4.5 MCG/ACT inhaler Inhale 2 puffs 2 (Two) Times a Day.    donepezil (ARICEPT) 5 MG tablet Take 1 tablet by mouth Every Night.    fluticasone (FLONASE) 50 MCG/ACT nasal spray Administer 2 sprays into the nostril(s) as directed by provider Daily.    hydrALAZINE (APRESOLINE) 50 MG tablet Take 1 tablet by mouth 3 (Three) Times a Day.    isosorbide mononitrate (IMDUR) 30 MG 24 hr tablet Take 1 tablet by mouth Daily.    linaclotide (LINZESS) 290 MCG capsule capsule Take 1 capsule by mouth Every Morning Before Breakfast.     losartan (COZAAR) 100 MG tablet Take 1 tablet by mouth Daily.    melatonin 3 MG tablet Take 1 tablet by mouth Every Night.    metoprolol succinate XL (TOPROL-XL) 25 MG 24 hr tablet Take 1 tablet by mouth Daily.    clopidogrel (PLAVIX) 75 MG tablet Take 1 tablet by mouth Daily for 30 days.    diphenhydrAMINE (BENADRYL) 25 mg capsule Take 2 capsules by mouth Every 6 (Six) Hours As Needed for Itching.    famotidine (Pepcid) 20 MG tablet Take 1 tablet by mouth 2 (Two) Times a Day As Needed for Heartburn or Indigestion for up to 30 days.    lidocaine (LIDODERM) 5 % Place 1 patch on the skin as directed by provider Daily. Remove & Discard patch within 12 hours or as directed by MD       General appearance: Elderly female, NAD  HEENT: Normocephalic, atraumatic     Neurological:   MS: oriented to self only, language intact, no neglect  CN: visual fields full, EOMI, no facial droop, tongue midline  Motor: 5/5 RUE/LUE, 5/5 RLE, 3/5 LLE 2/2 c/o pain from fall  Sensory: light touch sensation intact in all 4 ext.    Physical exam performed, changes noted.    Laboratory results:  Lab Results   Component Value Date    TSH 5.520 (H) 03/10/2025     Lab Results   Component Value Date    IDMSFZSQ38 >2,000 (H) 03/10/2025     Lab Results   Component Value Date    HGBA1C 4.5 (L) 02/19/2025     Lab Results   Component Value Date    GLUCOSE 82 03/12/2025    BUN 34 (H) 03/12/2025    CREATININE 5.86 (H) 03/12/2025    EGFRIFNONA  12/14/2021      Comment:      <15 Indicative of kidney failure.    EGFRIFAFRI 10 (L) 03/04/2023    BCR 5.8 (L) 03/12/2025    K 4.8 03/12/2025    CO2 25.0 03/12/2025    CALCIUM 8.5 (L) 03/12/2025    ALBUMIN 3.6 03/10/2025    LABIL2 1.4 03/04/2023    AST 16 03/10/2025    ALT 8 03/10/2025     Lab Results   Component Value Date    WBC 3.35 (L) 03/12/2025    HGB 9.1 (L) 03/12/2025    HCT 30.1 (L) 03/12/2025    MCV 92.9 03/12/2025     03/12/2025     Brief Urine Lab Results       None          No results found for:  "\"ACANTHNAEG\", \"AFBCX\", \"BPERTUSSISCX\", \"BLOODCX\"  No results found for: \"BCIDPCR\", \"CXREFLEX\", \"CSFCX\", \"CULTURETIS\"  No results found for: \"CULTURES\", \"HSVCX\", \"URCX\"  No results found for: \"EYECULTURE\", \"GCCX\", \"HSVCULTURE\", \"LABHSV\"  No results found for: \"LEGIONELLA\", \"MRSACX\", \"MUMPSCX\", \"MYCOPLASCX\"  No results found for: \"NOCARDIACX\", \"STOOLCX\"  No results found for: \"THROATCX\", \"UNSTIMCULT\", \"URINECX\", \"CULTURE\", \"VZVCULTUR\"  No results found for: \"VIRALCULTU\", \"WOUNDCX\"  Pain Management Panel           No data to display                Review and interpretation of imaging:  MRI Brain Without Contrast  Result Date: 3/12/2025  Electronically signed by Maria Luz Vickers MD on 03-12-25 at 0613    CT Head Without Contrast  Result Date: 3/9/2025   There is volume loss and there are nonspecific white matter changes, but there is no acute intracranial abnormality.        This report was finalized on 3/9/2025 10:34 PM by Dr. Solis Rhodes M.D on Workstation: [id number]      XR Chest 1 View  Result Date: 3/9/2025   There is mild cardiomegaly, though submaximal inspiration may exaggerate the cardiac silhouette.  There is mild vascular congestion, though submaximal inspiration may exaggerate the appearance.  There is no consolidation, effusion or pneumothorax.  This report was finalized on 3/9/2025 8:44 PM by Dr. Solis Rhodes M.D on Workstation: [id number]      Results for orders placed during the hospital encounter of 01/06/25    Adult Transthoracic Echo Complete W/ Cont if Necessary Per Protocol    Interpretation Summary    Left ventricular systolic function is normal. Calculated left ventricular EF = 56.6%    Left ventricular wall thickness is consistent with severe concentric hypertrophy.    The following left ventricular wall segments are hypokinetic: mid anterior, apical anterior and apex hypokinetic.    Left ventricular diastolic function is consistent with (grade II w/high LAP) pseudonormalization.    " The left atrial cavity is severely dilated.    Left atrial volume is severely increased.    Mild aortic valve stenosis is present. Aortic valve area is 1.1 cm2.    Aortic valve maximum pressure gradient is 28 mmHg. Aortic valve mean pressure gradient is 15 mmHg.    Mild mitral valve regurgitation is present    There is a trivial circumferential pericardial effusion. There is no evidence of cardiac tamponade.      Impression/Assessment:  This is a 74-year-old female with past medical history of MCI versus hypertension, CHF, mild dementia, ESRD on HD, CAD, ANGIE who presented to the hospital on 3/9/2025 with complaints of altered mental status since Thursday.  Daughter reported that the patient was not making sense when speaking and having visual hallucinations.  There was some concern for UTI.  She had reportedly not missed any dialysis sessions.  Her creatinine was 5.8 on arrival, unclear what her baseline creatinine is.  Seems to range anywhere from 3-7 from my review.       Her BP on arrival was 188/101.  She had a noncontrast CT head did not show any acute intracranial changes, she had diffuse cerebral atrophy most prominent in the temporal lobes.  Per review of her home med rec she is currently taking donepezil 5 mg, DAPT with aspirin and Plavix, and melatonin at night.  I reviewed a [facility] note from April 2024 for which she underwent a MMSE and scored a 22/30.  Dr. Karlo Mcmanus suspected she had at least MCI complicated by CHF/ESRD/ATC or a mild dementia with a high risk for vascular cognitive impairment.  He checked a MRI of her brain which I was unable to review myself however per the report she did not reveal any acute changes, she had chronic lacunar infarcts noted.  He recommended starting her on Aricept 5 mg.     Diagnosis:  Acute encephalopathy, possibly multifactorial from hypertensive encephalopathy with an underlying dementia complicated by ESRD  Hypertensive urgency  ESRD on HD      Additional work up to date:  Labs: Folate 5.94, CRP 4.61, ammonia 47, TSH 5.520, respiratory panel negative, RPR non-reactive     Plan:  - MRI complete, reviewed with Dr. Easton. No acute processes noted.  Temporal atrophy noted.  - Routine EEG was obtained and showed mild showing, no epileptic seizures.  - Recommend f/u outpt for further cognitive evaluation.  I will send a message for follow up.  We will sign off and see again per request.     Case discussed with patient and Dr. Easton, and he agrees with plan above.     KANDIS Rooney

## 2025-03-12 NOTE — PROGRESS NOTES
[facility] Progress Note      Patient Name: Sonia Acharya  : 1950  MRN: [id number]  Primary Care Physician:  Marcy Paez PA  Date of admission: 3/9/2025    Subjective     Interval History:     Overnight no event  Patient lying in bed comfortable  Discontinue to undergo hemodialysis today    Patient worked with physical therapy yesterday      Review of Systems:   As noted above    Objective     Vitals:   Temp:  [97.7 °F (36.5 °C)-98 °F (36.7 °C)] 97.8 °F (36.6 °C)  Heart Rate:  [52-77] 52  Resp:  [18-20] 18  BP: (123-175)/(52-82) 146/61  Flow (L/min) (Oxygen Therapy):  [1.5] 1.5    Intake/Output Summary (Last 24 hours) at 3/12/2025 0723  Last data filed at 3/11/2025 1654  Gross per 24 hour   Intake 680 ml   Output --   Net 680 ml       Physical Exam:    General Appearance: Chronically ill  Skin: warm and dry  HEENT: oral mucosa normal, nonicteric sclera  Neck: supple, no JVD  Lungs: CTA  Heart: RRR, normal S1 and S2  Abdomen: soft, nontender, nondistended  : no palpable bladder  Extremities: no edema, cyanosis or clubbing.right upper extremity AV fistula   Neuro: no focalization     Scheduled Meds:     amLODIPine, 10 mg, Oral, Daily  aspirin, 81 mg, Oral, Daily  atorvastatin, 40 mg, Oral, Daily  budesonide-formoterol, 2 puff, Inhalation, BID - RT  clopidogrel, 75 mg, Oral, Daily  diphenhydrAMINE, 12.5 mg, Intravenous, Once  donepezil, 5 mg, Oral, Nightly  fluticasone, 2 spray, Nasal, Daily  hydrALAZINE, 50 mg, Oral, TID  insulin lispro, 2-7 Units, Subcutaneous, 4x Daily AC & at Bedtime  isosorbide mononitrate, 30 mg, Oral, Daily  Lidocaine, 1 patch, Transdermal, Q24H  losartan, 100 mg, Oral, Daily  metoprolol succinate XL, 25 mg, Oral, Daily  OLANZapine, 5 mg, Oral, Daily With Dinner  sodium chloride, 10 mL, Intravenous, Q12H  thiamine, 100 mg, Oral, Daily      IV Meds:        Results Reviewed:   I have personally reviewed the results from the time of this admission to  3/12/2025 07:23 EDT     Results from last 7 days   Lab Units 03/12/25  0325 03/10/25  0323 03/09/25  1945   SODIUM mmol/L 137 139 139   POTASSIUM mmol/L 4.8 4.9 4.6   CHLORIDE mmol/L 98 96* 95*   CO2 mmol/L 25.0 19.1* 24.4   BUN mg/dL 34* 35* 33*   CREATININE mg/dL 5.86* 5.92* 5.80*   CALCIUM mg/dL 8.5* 9.4 9.8   BILIRUBIN mg/dL  --  0.3 0.3   ALK PHOS U/L  --  101 115   ALT (SGPT) U/L  --  8 6   AST (SGOT) U/L  --  16 16   GLUCOSE mg/dL 82 88 83       Estimated Creatinine Clearance: 6.3 mL/min (A) (by C-G formula based on SCr of 5.86 mg/dL (H)).    Results from last 7 days   Lab Units 03/09/25 1945   MAGNESIUM mg/dL 2.5*             Results from last 7 days   Lab Units 03/12/25  0325 03/10/25  0323 03/09/25  1945   WBC 10*3/mm3 3.35* 3.97 4.78   HEMOGLOBIN g/dL 9.1* 10.3* 10.2*   PLATELETS 10*3/mm3 204 297 310             Assessment / Plan     ASSESSMENT:    End Stage Renal Disease ( ESRD ) on Hemodialysis .s/p  RUE AVF functional .Continue to arrange hemodialysis treatment during patient  admission. Continue renal diet      -Chronic normocytic anemia. On Epogen protocol.  On hold hgb > 10 We will continue to follow H&H closely      -Hyperphosphatemia. Continue renal diet. We will follow phosphorus to make further adjustments to binder if indicated      -Hypertension w/ CKD .  Resume home regimen of amlodipine, hydralazine, isosorbide, metoprolol and losartan     . We will continue to follow closely. Heart healthy diet      -Coronary artery disease status post cardiac cath with chronic diastolic congestive (echocardiogram showing EF of 56% with severe concentric hypertrophy  ) heart failure.  Optimize volume status with dialysis.  Heart healthy diet. Recommendations per primary team     -Altered mental status/encephalopathy being worked up and evaluated followed by neurology        PLAN:  Patient is scheduled to undergo hemodialysis today  Upon discharge patient can resume hemodialysis on a regular schedule at her  chronic hemodialysis unit  Continue surveillance labs    Thank you for involving us in the care of Sonia Acharya.  Please feel free to call with any questions.    Gabe Solano MD  03/12/25  07:23 EDT    [facility]  [phone]    Please note that portions of this note were completed with a voice recognition program.

## 2025-03-12 NOTE — PLAN OF CARE
Problem: Skin Injury Risk Increased  Goal: Skin Health and Integrity  Outcome: Progressing     Problem: Fall Injury Risk  Goal: Absence of Fall and Fall-Related Injury  Outcome: Progressing   Goal Outcome Evaluation:  Plan of Care Reviewed With: patient        Progress: no change  Outcome Evaluation: Pt appeared to sleep well, alert and oriented x2, no paranoia/hallucinations noted so far this shift, on o2 1.5 liters when sleeping, to have HD today, Delirium precautions maintained.

## 2025-03-12 NOTE — PAYOR COMM NOTE
"Sonia Lara (74 y.o. Female)     ATTN: NURSE REVIEWER  RE: INITIAL INPT AUTH CLINICALS   REF# [id number]  PLS REPLY TO NAVA JONES [phone] OR FAX# [phone]      Date of Birth   1950    Social Security Number       Address   [address] [place] [address]    Home Phone   [phone]    MRN   [id number]       Restorationism   None    Marital Status   Single                            Admission Date   3/9/2025    Admission Type   Emergency    Admitting Provider   Casey Mendoza MD    Attending Provider   Jorje Chin MD    Department, Room/Bed   [facility], 311/       Discharge Date       Discharge Disposition       Discharge Destination                                 Attending Provider: Jorje Chin MD    Allergies: No Known Allergies    Isolation: None   Infection: None   Code Status: CPR    Ht: 152.4 cm (60\")   Wt: 47.8 kg (105 lb 6.1 oz)    Admission Cmt: None   Principal Problem: Altered mental status [R41.82]                   Active Insurance as of 3/9/2025       Primary Coverage       Payor Plan Insurance Group Employer/Plan Group    HUMANA MEDICARE REPLACEMENT HU[employer]NA MEDICARE ADVANTAGE HMO [id number]       Payor Plan Address Payor Plan Phone Number Payor Plan Fax Number Effective Dates    [address] [address] [phone]  2023 - None Entered    [facility] [id number]         Subscriber Name Subscriber Birth Date Member ID       SONIA LARA 1950 [id number]                     Emergency Contacts        (Rel.) Home Phone Work Phone Mobile Phone    Naty Lara (Daughter) -- -- [phone]                 History & Physical        Casey Mendoza MD at 03/10/25 0858          [facility]   HISTORY AND PHYSICAL    Patient Name: Sonia Lara  : 1950  MRN: [id number]  Primary Care Physician:  Marcy Paez PA  Date of admission: 3/9/2025    Subjective  Subjective     Chief Complaint: " confusion    History of Present Illness  74-year-old with a history of anemia, hypertension, ANGIE, CHF, end-stage renal disease, coronary artery disease, diabetes who comes the hospital because she has been sleeping for the last 3 to 4 days.  The daughter states she is having some visual hallucinations unable to understand what she says at times.    When I am seeing the patient she wakes up talks to me she answers questions fairly well.  She is oriented. she is not able to stay awake long enough to tell me why she is in the hospital.  She quickly goes back to sleep.      Review of Systems     Personal History     Past Medical History:   Diagnosis Date    Arthritis     Chronic kidney disease     Heart disease     Hypertension        Past Surgical History:   Procedure Laterality Date    BREAST SURGERY      CARDIAC CATHETERIZATION N/A 2/14/2025    Procedure: Left Heart Cath;  Surgeon: Cleveland Dickens MD;  Location:  [facility];  Service: Cardiovascular;  Laterality: N/A;  Dialysis patient    CARDIAC CATHETERIZATION N/A 2/14/2025    Procedure: Coronary angiography;  Surgeon: Cleveland Dickens MD;  Location:  [facility];  Service: Cardiovascular;  Laterality: N/A;    DIALYSIS FISTULA CREATION      EYE SURGERY      HERNIA REPAIR  2017    Dr. Sung       Family History: family history includes Breast cancer in her sister; Heart disease in her father. Otherwise pertinent FHx was reviewed and not pertinent to current issue.    Social History:  reports that she has never smoked. She has never been exposed to tobacco smoke. She has never used smokeless tobacco. She reports that she does not drink alcohol and does not use drugs.    Home Medications:  amLODIPine, aspirin, atorvastatin, budesonide-formoterol, clopidogrel, diphenhydrAMINE, donepezil, famotidine, fluticasone, hydrALAZINE, isosorbide mononitrate, lidocaine, linaclotide, losartan, melatonin, and metoprolol succinate  XL    Allergies:  No Known Allergies    Objective   Objective     Vitals:   Temp:  [97.4 °F (36.3 °C)-97.7 °F (36.5 °C)] 97.4 °F (36.3 °C)  Heart Rate:  [56-95] 61  Resp:  [18-22] 18  BP: (168-198)/() 168/77    Physical Exam  Constitutional:       General: She is not in acute distress.     Comments: Sleepy, will awaken   Cardiovascular:      Rate and Rhythm: Normal rate and regular rhythm.   Pulmonary:      Effort: Pulmonary effort is normal.      Breath sounds: Normal breath sounds.   Abdominal:      General: There is no distension.      Palpations: Abdomen is soft.      Tenderness: There is no abdominal tenderness.   Skin:     General: Skin is warm and dry.   Neurological:      General: No focal deficit present.   Psychiatric:      Comments: When awake she answers questions well, tells me she is in the hopsital         Result Review   Result Review:  I have personally reviewed the results from the time of this admission to 3/10/2025 08:58 EDT and agree with these findings:  []  Laboratory list / accordion  []  Microbiology  [x]  Radiology  [x]  EKG/Telemetry   [x]  Cardiology/Vascular   []  Pathology  [x]  Old records  []  Other:  Most notable findings include: somnolence      Assessment & Plan  Assessment / Plan     Brief Patient Summary:  Sonia Acharya is a 74 y.o. female who has history of ESRD, dementia and  altered mental status    Active Hospital Problems:  Active Hospital Problems    Diagnosis     **Altered mental status     CAD (coronary artery disease)     Chronic respiratory failure with hypoxia     ESRD (end stage renal disease) on dialysis     Bradycardia     Chronic diastolic CHF (congestive heart failure)     ANGIE (obstructive sleep apnea)     Anemia, chronic disease     HTN (hypertension)      Plan:     Altered mental status  -ct head unremarkable  -thiamine 100 mg IV  -neurology eval  -nenphrology is seeing, hoping she improves after dialysis  -hiv, rpr, crp, b12 good, abg,  tsh    HTN  -losartan and metoprolol  -Bp is elevated    DM  -SSI    CAD with stent  -aspirin and plavix  Recently admitted with chest in feb  -statin  -imdur, losartan and metoprolol xl    Dementia  -aricept    Last dialysis on Friday    VTE Prophylaxis:  Mechanical VTE prophylaxis orders are present.      CODE STATUS:    Code Status (Patient has no pulse and is not breathing): CPR (Attempt to Resuscitate)  Medical Interventions (Patient has pulse or is breathing): Full Support    Admission Status:  I believe this patient meets observation status.    Casey Mendoza MD    Electronically signed by Casey Mendoza MD at 03/10/25 1907       Facility-Administered Medications as of 3/12/2025   Medication Dose Route Frequency Provider Last Rate Last Admin    acetaminophen (TYLENOL) tablet 650 mg  650 mg Oral Q4H PRN Nasra Harris APRN   650 mg at 03/12/25 0821    Or    acetaminophen (TYLENOL) 160 MG/5ML oral solution 650 mg  650 mg Oral Q4H PRN Nasra Harris APRN        Or    acetaminophen (TYLENOL) suppository 650 mg  650 mg Rectal Q4H PRN Nasra Harris APRN        [COMPLETED] acetaminophen (TYLENOL) tablet 1,000 mg  1,000 mg Oral Once Lisa Dumont PA-C   1,000 mg at 03/09/25 2158    amLODIPine (NORVASC) tablet 10 mg  10 mg Oral Daily Nasra Harris APRN   10 mg at 03/11/25 0831    aspirin EC tablet 81 mg  81 mg Oral Daily Casey Mendoza MD   81 mg at 03/12/25 0821    atorvastatin (LIPITOR) tablet 40 mg  40 mg Oral Daily Nasra Harris APRN   40 mg at 03/12/25 0821    sennosides-docusate (PERICOLACE) 8.6-50 MG per tablet 2 tablet  2 tablet Oral BID PRN Nasra Harris APRN        And    polyethylene glycol (MIRALAX) packet 17 g  17 g Oral Daily PRN Nasra Harris APRN        And    bisacodyl (DULCOLAX) EC tablet 5 mg  5 mg Oral Daily PRN Nasra Harris APRN        And    bisacodyl (DULCOLAX) suppository 10 mg  10 mg Rectal Daily PRN Nasra Harris,  KANDIS        budesonide-formoterol (SYMBICORT) 160-4.5 MCG/ACT inhaler 2 puff  2 puff Inhalation BID - RT Nasra Harris APRN   2 puff at 03/11/25 2114    clopidogrel (PLAVIX) tablet 75 mg  75 mg Oral Daily Nasra Harris APRN   75 mg at 03/12/25 0821    dextrose (D50W) (25 g/50 mL) IV injection 25 g  25 g Intravenous Q15 Min PRN Nasra Harris APRN        dextrose (GLUTOSE) oral gel 15 g  15 g Oral Q15 Min PRN Nasra Harris APRN        diphenhydrAMINE (BENADRYL) capsule 50 mg  50 mg Oral Q6H PRN Nasra Harris APRN   50 mg at 03/10/25 2216    diphenhydrAMINE (BENADRYL) injection 12.5 mg  12.5 mg Intravenous Once Sisi Hopson APRN        donepezil (ARICEPT) tablet 5 mg  5 mg Oral Nightly Nasra Harris APRN   5 mg at 03/11/25 2147    famotidine (PEPCID) tablet 20 mg  20 mg Oral BID PRN Nasra Harris APRN        fluticasone (FLONASE) 50 MCG/ACT nasal spray 2 spray  2 spray Nasal Daily Nasra Harris APRN        glucagon (GLUCAGEN) injection 1 mg  1 mg Intramuscular Q15 Min PRN Nasra Harris APRN        [COMPLETED] hydrALAZINE (APRESOLINE) tablet 50 mg  50 mg Oral Once Lisa Dumont PA-C   50 mg at 03/09/25 2157    hydrALAZINE (APRESOLINE) tablet 50 mg  50 mg Oral TID Nasra Harris APRN   50 mg at 03/11/25 1653    insulin lispro (HUMALOG/ADMELOG) injection 2-7 Units  2-7 Units Subcutaneous 4x Daily AC & at Bedtime Nasra Harris APRN        isosorbide mononitrate (IMDUR) 24 hr tablet 30 mg  30 mg Oral Daily Casey Mendoza MD   30 mg at 03/12/25 0822    Lidocaine 4 % 1 patch  1 patch Transdermal Q24H Nasra Harris APRN   1 patch at 03/12/25 0822    LORazepam (ATIVAN) injection 1 mg  1 mg Intravenous Once PRN Jorje Chin MD        losartan (COZAAR) tablet 100 mg  100 mg Oral Daily Casey Mendoza MD   100 mg at 03/11/25 0832    melatonin tablet 5 mg  5 mg Oral Nightly PRN Nasra Harris APRN   5 mg at  03/11/25 2147    metoprolol succinate XL (TOPROL-XL) 24 hr tablet 25 mg  25 mg Oral Daily Casey Mendoza MD   25 mg at 03/11/25 0832    [COMPLETED] morphine injection 2 mg  2 mg Intravenous Once Scott Hilario MD   2 mg at 03/09/25 2347    nitroglycerin (NITROSTAT) SL tablet 0.4 mg  0.4 mg Sublingual Q5 Min PRN Nasra Harris APRN        OLANZapine (zyPREXA) tablet 5 mg  5 mg Oral Daily With Dinner Jorje Chin MD   5 mg at 03/11/25 1653    ondansetron (ZOFRAN) injection 4 mg  4 mg Intravenous Q6H PRN Nasra Harris APRN   4 mg at 03/11/25 1040    sodium chloride 0.9 % flush 10 mL  10 mL Intravenous PRN Scott Hilario MD        sodium chloride 0.9 % flush 10 mL  10 mL Intravenous Q12H Nasra Harris APRN   10 mL at 03/12/25 0841    sodium chloride 0.9 % flush 10 mL  10 mL Intravenous PRN Nasra Harris APRN        sodium chloride 0.9 % infusion 40 mL  40 mL Intravenous PRN Nasra Harris APRN        [COMPLETED] thiamine (B-1) injection 100 mg  100 mg Intravenous Once Casey Mendoza MD   100 mg at 03/10/25 1808    thiamine (VITAMIN B-1) tablet 100 mg  100 mg Oral Daily Casey Mendoza MD   100 mg at 03/12/25 0821     Lab Results (last 24 hours)       Procedure Component Value Units Date/Time    POC Glucose Once [[id number]]  (Normal) Collected: 03/12/25 0604    Specimen: Blood Updated: 03/12/25 0606     Glucose 72 mg/dL     T4, Free [[id number]]  (Normal) Collected: 03/12/25 0325    Specimen: Blood Updated: 03/12/25 0422     Free T4 1.17 ng/dL     Basic Metabolic Panel [[id number]]  (Abnormal) Collected: 03/12/25 0325    Specimen: Blood Updated: 03/12/25 0416     Glucose 82 mg/dL      BUN 34 mg/dL      Creatinine 5.86 mg/dL      Sodium 137 mmol/L      Potassium 4.8 mmol/L      Chloride 98 mmol/L      CO2 25.0 mmol/L      Calcium 8.5 mg/dL      BUN/Creatinine Ratio 5.8     Anion Gap 14.0 mmol/L      eGFR 7.1 mL/min/1.73     Narrative:      GFR Categories in  Chronic Kidney Disease (CKD)      GFR Category          GFR (mL/min/1.73)    Interpretation  G1                     90 or greater         Normal or high (1)  G2                      60-89                Mild decrease (1)  G3a                   45-59                Mild to moderate decrease  G3b                   30-44                Moderate to severe decrease  G4                    15-29                Severe decrease  G5                    14 or less           Kidney failure          (1)In the absence of evidence of kidney disease, neither GFR category G1 or G2 fulfill the criteria for CKD.    eGFR calculation 2021 CKD-EPI creatinine equation, which does not include race as a factor    CBC (No Diff) [[id number]]  (Abnormal) Collected: 03/12/25 0325    Specimen: Blood Updated: 03/12/25 0354     WBC 3.35 10*3/mm3      RBC 3.24 10*6/mm3      Hemoglobin 9.1 g/dL      Hematocrit 30.1 %      MCV 92.9 fL      MCH 28.1 pg      MCHC 30.2 g/dL      RDW 14.7 %      RDW-SD 49.7 fl      MPV 9.4 fL      Platelets 204 10*3/mm3     POC Glucose Once [[id number]]  (Normal) Collected: 03/11/25 2021    Specimen: Blood Updated: 03/11/25 2023     Glucose 87 mg/dL     POC Glucose Once [[id number]]  (Normal) Collected: 03/11/25 1614    Specimen: Blood Updated: 03/11/25 1616     Glucose 87 mg/dL           Imaging Results (Last 24 Hours)       Procedure Component Value Units Date/Time    MRI Brain Without Contrast [[id number]] Collected: 03/12/25 0736     Updated: 03/12/25 0736    Narrative:        Patient: JUANITO LARA  Time Out: 06:13  Exam(s): MRI HEAD Without Contrast     EXAM:    MR Head Without Intravenous Contrast    CLINICAL HISTORY:    Lethargic and visual hallucinations, cant understand what she is saying     TECHNIQUE:    Magnetic resonance images of the head brain without intravenous   contrast in multiple planes.    COMPARISON:  Prior head CT from March 9, 2025.    FINDINGS:    Brain: Remote ischemic injuries of the bilateral  cerebellar lobes.    Mild nonspecific white matter changes.  No mass.  No hemorrhage.  No   acute infarct.  The flow voids at the base of the brain are intact.    Ventricles: Mild ventriculomegaly.    Bones joints: Remote fracture deformity of the left lamina papyracea.    No acute fracture.    Sinuses:  Unremarkable as visualized.  No acute sinusitis.    Mastoid air cells: Bilateral mastoid and middle ear effusions.    Orbits: Bilateral lens replacements.  Bilateral proptosis with   enlargement of the extraocular muscles and proliferation of the   intraorbital fat.    IMPRESSION:       No evidence for acute intracranial pathology.    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    Bilateral mastoid and middle ear effusions.      Impression:          Electronically signed by Maria Luz Vickers MD on 03-12-25 at 0613          ECG/EMG Results (last 24 hours)       Procedure Component Value Units Date/Time    Telemetry Scan [[id number]] Resulted: 03/09/25     Updated: 03/11/25 1359    Telemetry Scan [[id number]] Resulted: 03/09/25     Updated: 03/11/25 2315    Telemetry Scan [[id number]] Resulted: 03/09/25     Updated: 03/11/25 2315    Telemetry Scan [[id number]] Resulted: 03/09/25     Updated: 03/11/25 2315    Telemetry Scan [[id number]] Resulted: 03/09/25     Updated: 03/11/25 2315    Telemetry Scan [[id number]] Resulted: 03/09/25     Updated: 03/12/25 0243          Orders (last 24 hrs)        Start     Ordered    03/12/25 1239  Inpatient Admission  Once         03/12/25 1252    03/12/25 0607  POC Glucose Once  PROCEDURE ONCE        Comments: Complete no more than 45 minutes prior to patient eating      03/12/25 0604    03/12/25 0600  T4, Free  Morning Draw         03/11/25 1526    03/12/25 0600  CBC (No Diff)  Morning Draw         03/11/25 1526    03/12/25 0600  Basic Metabolic Panel  Morning Draw         03/11/25 1526    03/12/25 0000  Hemodialysis Inpatient  Once          03/11/25 1359    03/11/25 2024  POC Glucose Once  PROCEDURE ONCE        Comments: Complete no more than 45 minutes prior to patient eating      03/11/25 2021 03/11/25 1800  OLANZapine (zyPREXA) tablet 5 mg  Daily With Dinner         03/11/25 1520    03/11/25 1617  POC Glucose Once  PROCEDURE ONCE        Comments: Complete no more than 45 minutes prior to patient eating      03/11/25 1614    03/11/25 0900  fluticasone (FLONASE) 50 MCG/ACT nasal spray 2 spray  Daily         03/10/25 0129    03/11/25 0900  thiamine (VITAMIN B-1) tablet 100 mg  Daily         03/10/25 1558    03/11/25 0849  LORazepam (ATIVAN) injection 1 mg  Once As Needed         03/11/25 0849    03/11/25 0600  diphenhydrAMINE (BENADRYL) injection 12.5 mg  Once         03/11/25 0508    03/10/25 2130  budesonide-formoterol (SYMBICORT) 160-4.5 MCG/ACT inhaler 2 puff  2 Times Daily - RT         03/10/25 0129    03/10/25 1600  hydrALAZINE (APRESOLINE) tablet 50 mg  3 Times Daily         03/10/25 0129    03/10/25 0900  atorvastatin (LIPITOR) tablet 40 mg  Daily         03/10/25 0129    03/10/25 0900  clopidogrel (PLAVIX) tablet 75 mg  Daily         03/10/25 0129    03/10/25 0900  Lidocaine 4 % 1 patch  Every 24 Hours         03/10/25 0129    03/10/25 0843  aspirin EC tablet 81 mg  Daily         03/10/25 0843    03/10/25 0841  isosorbide mononitrate (IMDUR) 24 hr tablet 30 mg  Daily         03/10/25 0841    03/10/25 0841  metoprolol succinate XL (TOPROL-XL) 24 hr tablet 25 mg  Daily         03/10/25 0841    03/10/25 0840  losartan (COZAAR) tablet 100 mg  Daily         03/10/25 0841    03/10/25 0800  Oral Care  2 Times Daily       03/10/25 0129    03/10/25 0730  insulin lispro (HUMALOG/ADMELOG) injection 2-7 Units  4 Times Daily Before Meals & Nightly         03/10/25 0129    03/10/25 0700  POC Glucose Finger 4x Daily Before Meals & at Bedtime  4 Times Daily Before Meals & at Bedtime      Comments: Complete no more than 45 minutes prior to patient  "eating      03/10/25 0106    03/10/25 0700  POC Glucose 4x Daily Before Meals & at Bedtime  4 Times Daily Before Meals & at Bedtime      Comments: Complete no more than 45 minutes prior to patient eating      03/10/25 0129    03/10/25 0400  Vital Signs  Every 4 Hours       03/10/25 0129    03/10/25 0215  amLODIPine (NORVASC) tablet 10 mg  Daily         03/10/25 0129    03/10/25 0215  donepezil (ARICEPT) tablet 5 mg  Nightly         03/10/25 0129    03/10/25 0215  sodium chloride 0.9 % flush 10 mL  Every 12 Hours Scheduled         03/10/25 0129    03/10/25 0127  famotidine (PEPCID) tablet 20 mg  2 Times Daily PRN         03/10/25 0129    03/10/25 0127  diphenhydrAMINE (BENADRYL) capsule 50 mg  Every 6 Hours PRN         03/10/25 0129    03/10/25 0126  melatonin tablet 5 mg  Nightly PRN         03/10/25 0129    03/10/25 0125  dextrose (GLUTOSE) oral gel 15 g  Every 15 Minutes PRN         03/10/25 0129    03/10/25 0125  dextrose (D50W) (25 g/50 mL) IV injection 25 g  Every 15 Minutes PRN         03/10/25 0129    03/10/25 0125  glucagon (GLUCAGEN) injection 1 mg  Every 15 Minutes PRN         03/10/25 0129    03/10/25 0125  Intake & Output  Every Shift       03/10/25 0129    03/10/25 0124  sodium chloride 0.9 % flush 10 mL  As Needed         03/10/25 0129    03/10/25 0124  sodium chloride 0.9 % infusion 40 mL  As Needed         03/10/25 0129    03/10/25 0124  acetaminophen (TYLENOL) tablet 650 mg  Every 4 Hours PRN        Placed in \"Or\" Linked Group    03/10/25 0129    03/10/25 0124  acetaminophen (TYLENOL) 160 MG/5ML oral solution 650 mg  Every 4 Hours PRN        Placed in \"Or\" Linked Group    03/10/25 0129    03/10/25 0124  acetaminophen (TYLENOL) suppository 650 mg  Every 4 Hours PRN        Placed in \"Or\" Linked Group    03/10/25 0129    03/10/25 0124  sennosides-docusate (PERICOLACE) 8.6-50 MG per tablet 2 tablet  2 Times Daily PRN        Placed in \"And\" Linked Group    03/10/25 0129    03/10/25 0124  polyethylene " "glycol (MIRALAX) packet 17 g  Daily PRN        Placed in \"And\" Linked Group    03/10/25 0129    03/10/25 012  bisacodyl (DULCOLAX) EC tablet 5 mg  Daily PRN        Placed in \"And\" Linked Group    03/10/25 0129    03/10/25 012  bisacodyl (DULCOLAX) suppository 10 mg  Daily PRN        Placed in \"And\" Linked Group    03/10/25 0129    03/10/25 012  ondansetron (ZOFRAN) injection 4 mg  Every 6 Hours PRN         03/10/25 0129    03/10/25 0124  nitroglycerin (NITROSTAT) SL tablet 0.4 mg  Every 5 Minutes PRN         03/10/25 0129    03/09/25 1936  sodium chloride 0.9 % flush 10 mL  As Needed         25    Unscheduled  Oxygen Therapy- Nasal Cannula; Titrate 1-6 LPM Per SpO2; 90 - 95%  Continuous PRN       25    Unscheduled  Up With Assistance  As Needed       03/10/25 0129    Unscheduled  Oxygen Therapy- Nasal Cannula; Titrate 1-6 LPM Per SpO2; 90 - 95%  Continuous PRN       03/10/25 0129    Unscheduled  Follow Hypoglycemia Standing Orders For Blood Glucose <70 & Notify Provider of Treatment  As Needed      Comments: Follow Hypoglycemia Orders As Outlined in Process Instructions (Open Order Report to View Full Instructions)  Notify Provider Any Time Hypoglycemia Treatment is Administered    03/10/25 0129                  Operative/Procedure Notes (last 24 hours)  Notes from 25 1300 through 25 1300   No notes of this type exist for this encounter.          Physician Progress Notes (last 24 hours)        Lu Rivera APRN at 25 1219          DOS: 3/12/2025  NAME: Sonia Acharya   : 1950  PCP: Marcy Paez PA    Chief Complaint   Patient presents with    Altered Mental Status        Subjective: No acute events overnight.  Patient currently in dialysis.  States she feels okay.  Oriented to self only.  Moving all extremities.  Dialysis staff says that she has been sleeping most of the time she has been dialyzed.    Objective:  Vital signs:   Vitals:    25 2146 " 03/11/25 2318 03/12/25 0320 03/12/25 0822   BP: 128/52 124/56 146/61 143/64   BP Location: Left arm Left arm Left arm Right arm   Patient Position: Lying Lying Lying Lying   Pulse: 62 53 52 52   Resp:  18 18 16   Temp:  97.7 °F (36.5 °C) 97.8 °F (36.6 °C) 97.8 °F (36.6 °C)   TempSrc:  Oral Oral Oral   SpO2: 90% 100% 100% 100%   Weight:       Height:           Current Facility-Administered Medications:     acetaminophen (TYLENOL) tablet 650 mg, 650 mg, Oral, Q4H PRN, 650 mg at 03/12/25 0821 **OR** acetaminophen (TYLENOL) 160 MG/5ML oral solution 650 mg, 650 mg, Oral, Q4H PRN **OR** acetaminophen (TYLENOL) suppository 650 mg, 650 mg, Rectal, Q4H PRN, Nasra Harris APRN    amLODIPine (NORVASC) tablet 10 mg, 10 mg, Oral, Daily, Nasra Harris APRN, 10 mg at 03/11/25 0831    aspirin EC tablet 81 mg, 81 mg, Oral, Daily, Casey Mendoza MD, 81 mg at 03/12/25 0821    atorvastatin (LIPITOR) tablet 40 mg, 40 mg, Oral, Daily, Nasra Harris APRN, 40 mg at 03/12/25 0821    sennosides-docusate (PERICOLACE) 8.6-50 MG per tablet 2 tablet, 2 tablet, Oral, BID PRN **AND** polyethylene glycol (MIRALAX) packet 17 g, 17 g, Oral, Daily PRN **AND** bisacodyl (DULCOLAX) EC tablet 5 mg, 5 mg, Oral, Daily PRN **AND** bisacodyl (DULCOLAX) suppository 10 mg, 10 mg, Rectal, Daily PRN, Nasra Harris APRN    budesonide-formoterol (SYMBICORT) 160-4.5 MCG/ACT inhaler 2 puff, 2 puff, Inhalation, BID - RT, Nasra Harris APRN, 2 puff at 03/11/25 2114    clopidogrel (PLAVIX) tablet 75 mg, 75 mg, Oral, Daily, Nasra Harris APRN, 75 mg at 03/12/25 0821    dextrose (D50W) (25 g/50 mL) IV injection 25 g, 25 g, Intravenous, Q15 Min PRN, Nasra Harris APRN    dextrose (GLUTOSE) oral gel 15 g, 15 g, Oral, Q15 Min PRN, Nasra Harris APRN    diphenhydrAMINE (BENADRYL) capsule 50 mg, 50 mg, Oral, Q6H PRN, Nasra Harris APRN, 50 mg at 03/10/25 2216    diphenhydrAMINE (BENADRYL) injection 12.5 mg,  12.5 mg, Intravenous, Once, Sisi Hopson APRN    donepezil (ARICEPT) tablet 5 mg, 5 mg, Oral, Nightly, Nasra Harris APRN, 5 mg at 03/11/25 2147    famotidine (PEPCID) tablet 20 mg, 20 mg, Oral, BID PRN, Nasra Harris APRN    fluticasone (FLONASE) 50 MCG/ACT nasal spray 2 spray, 2 spray, Nasal, Daily, Nasra Harris APRN    glucagon (GLUCAGEN) injection 1 mg, 1 mg, Intramuscular, Q15 Min PRN, Nasra Harris APRN    hydrALAZINE (APRESOLINE) tablet 50 mg, 50 mg, Oral, TID, Nasra Harris APRN, 50 mg at 03/11/25 1653    insulin lispro (HUMALOG/ADMELOG) injection 2-7 Units, 2-7 Units, Subcutaneous, 4x Daily AC & at Bedtime, Nasra Harris APRN    isosorbide mononitrate (IMDUR) 24 hr tablet 30 mg, 30 mg, Oral, Daily, Casey Mendoza MD, 30 mg at 03/12/25 0822    Lidocaine 4 % 1 patch, 1 patch, Transdermal, Q24H, Nasra Harris APRN, 1 patch at 03/12/25 0822    LORazepam (ATIVAN) injection 1 mg, 1 mg, Intravenous, Once PRN, Jorje Chin MD    losartan (COZAAR) tablet 100 mg, 100 mg, Oral, Daily, Casey Mendoza MD, 100 mg at 03/11/25 0832    melatonin tablet 5 mg, 5 mg, Oral, Nightly PRN, Nasra Harris APRN, 5 mg at 03/11/25 2147    metoprolol succinate XL (TOPROL-XL) 24 hr tablet 25 mg, 25 mg, Oral, Daily, Casey Mendoza MD, 25 mg at 03/11/25 0832    nitroglycerin (NITROSTAT) SL tablet 0.4 mg, 0.4 mg, Sublingual, Q5 Min PRN, Nasra Harris APRN    OLANZapine (zyPREXA) tablet 5 mg, 5 mg, Oral, Daily With Dinner, Jorje Chin MD, 5 mg at 03/11/25 1653    ondansetron (ZOFRAN) injection 4 mg, 4 mg, Intravenous, Q6H PRN, Nasra Harris APRN, 4 mg at 03/11/25 1040    sodium chloride 0.9 % flush 10 mL, 10 mL, Intravenous, PRN, Scott Hilario MD    sodium chloride 0.9 % flush 10 mL, 10 mL, Intravenous, Q12H, Nasra Harris APRN, 10 mL at 03/12/25 0841    sodium chloride 0.9 % flush 10 mL, 10 mL, Intravenous, PRN,  Nasra Harris APRN    sodium chloride 0.9 % infusion 40 mL, 40 mL, Intravenous, PRN, Nasra Harris APRN    thiamine (VITAMIN B-1) tablet 100 mg, 100 mg, Oral, Daily, Casey Mendoza MD, 100 mg at 03/12/25 0821    PRN meds    acetaminophen **OR** acetaminophen **OR** acetaminophen    senna-docusate sodium **AND** polyethylene glycol **AND** bisacodyl **AND** bisacodyl    dextrose    dextrose    diphenhydrAMINE    famotidine    glucagon (human recombinant)    LORazepam    melatonin    nitroglycerin    ondansetron    sodium chloride    sodium chloride    sodium chloride    No current facility-administered medications on file prior to encounter.     Current Outpatient Medications on File Prior to Encounter   Medication Sig    amLODIPine (NORVASC) 10 MG tablet Take 1 tablet by mouth Daily.    aspirin 81 MG EC tablet Take 1 tablet by mouth Daily.    atorvastatin (LIPITOR) 40 MG tablet Take 1 tablet by mouth Daily.    budesonide-formoterol (SYMBICORT) 80-4.5 MCG/ACT inhaler Inhale 2 puffs 2 (Two) Times a Day.    donepezil (ARICEPT) 5 MG tablet Take 1 tablet by mouth Every Night.    fluticasone (FLONASE) 50 MCG/ACT nasal spray Administer 2 sprays into the nostril(s) as directed by provider Daily.    hydrALAZINE (APRESOLINE) 50 MG tablet Take 1 tablet by mouth 3 (Three) Times a Day.    isosorbide mononitrate (IMDUR) 30 MG 24 hr tablet Take 1 tablet by mouth Daily.    linaclotide (LINZESS) 290 MCG capsule capsule Take 1 capsule by mouth Every Morning Before Breakfast.    losartan (COZAAR) 100 MG tablet Take 1 tablet by mouth Daily.    melatonin 3 MG tablet Take 1 tablet by mouth Every Night.    metoprolol succinate XL (TOPROL-XL) 25 MG 24 hr tablet Take 1 tablet by mouth Daily.    clopidogrel (PLAVIX) 75 MG tablet Take 1 tablet by mouth Daily for 30 days.    diphenhydrAMINE (BENADRYL) 25 mg capsule Take 2 capsules by mouth Every 6 (Six) Hours As Needed for Itching.    famotidine (Pepcid) 20 MG tablet Take 1  "tablet by mouth 2 (Two) Times a Day As Needed for Heartburn or Indigestion for up to 30 days.    lidocaine (LIDODERM) 5 % Place 1 patch on the skin as directed by provider Daily. Remove & Discard patch within 12 hours or as directed by MD       General appearance: Elderly female, NAD  HEENT: Normocephalic, atraumatic     Neurological:   MS: oriented to self only, language intact, no neglect  CN: visual fields full, EOMI, no facial droop, tongue midline  Motor: 5/5 RUE/LUE, 5/5 RLE, 3/5 LLE 2/2 c/o pain from fall  Sensory: light touch sensation intact in all 4 ext.    Physical exam performed, changes noted.    Laboratory results:  Lab Results   Component Value Date    TSH 5.520 (H) 03/10/2025     Lab Results   Component Value Date    VNOGYCVZ97 >2,000 (H) 03/10/2025     Lab Results   Component Value Date    HGBA1C 4.5 (L) 02/19/2025     Lab Results   Component Value Date    GLUCOSE 82 03/12/2025    BUN 34 (H) 03/12/2025    CREATININE 5.86 (H) 03/12/2025    EGFRIFNONA  12/14/2021      Comment:      <15 Indicative of kidney failure.    EGFRIFAFRI 10 (L) 03/04/2023    BCR 5.8 (L) 03/12/2025    K 4.8 03/12/2025    CO2 25.0 03/12/2025    CALCIUM 8.5 (L) 03/12/2025    ALBUMIN 3.6 03/10/2025    LABIL2 1.4 03/04/2023    AST 16 03/10/2025    ALT 8 03/10/2025     Lab Results   Component Value Date    WBC 3.35 (L) 03/12/2025    HGB 9.1 (L) 03/12/2025    HCT 30.1 (L) 03/12/2025    MCV 92.9 03/12/2025     03/12/2025     Brief Urine Lab Results       None          No results found for: \"ACANTHNAEG\", \"AFBCX\", \"BPERTUSSISCX\", \"BLOODCX\"  No results found for: \"BCIDPCR\", \"CXREFLEX\", \"CSFCX\", \"CULTURETIS\"  No results found for: \"CULTURES\", \"HSVCX\", \"URCX\"  No results found for: \"EYECULTURE\", \"GCCX\", \"HSVCULTURE\", \"LABHSV\"  No results found for: \"LEGIONELLA\", \"MRSACX\", \"MUMPSCX\", \"MYCOPLASCX\"  No results found for: \"NOCARDIACX\", \"STOOLCX\"  No results found for: \"THROATCX\", \"UNSTIMCULT\", \"URINECX\", \"CULTURE\", \"VZVCULTUR\"  No " "results found for: \"VIRALCULTU\", \"WOUNDCX\"  Pain Management Panel           No data to display                Review and interpretation of imaging:  MRI Brain Without Contrast  Result Date: 3/12/2025  Electronically signed by Maria Luz Vickers MD on 03-12-25 at 0613    CT Head Without Contrast  Result Date: 3/9/2025   There is volume loss and there are nonspecific white matter changes, but there is no acute intracranial abnormality.        This report was finalized on 3/9/2025 10:34 PM by Dr. Solis Rhodes M.D on Workstation: [id number]      XR Chest 1 View  Result Date: 3/9/2025   There is mild cardiomegaly, though submaximal inspiration may exaggerate the cardiac silhouette.  There is mild vascular congestion, though submaximal inspiration may exaggerate the appearance.  There is no consolidation, effusion or pneumothorax.  This report was finalized on 3/9/2025 8:44 PM by Dr. Solis Rhodes M.D on Workstation: [id number]      Results for orders placed during the hospital encounter of 01/06/25    Adult Transthoracic Echo Complete W/ Cont if Necessary Per Protocol    Interpretation Summary    Left ventricular systolic function is normal. Calculated left ventricular EF = 56.6%    Left ventricular wall thickness is consistent with severe concentric hypertrophy.    The following left ventricular wall segments are hypokinetic: mid anterior, apical anterior and apex hypokinetic.    Left ventricular diastolic function is consistent with (grade II w/high LAP) pseudonormalization.    The left atrial cavity is severely dilated.    Left atrial volume is severely increased.    Mild aortic valve stenosis is present. Aortic valve area is 1.1 cm2.    Aortic valve maximum pressure gradient is 28 mmHg. Aortic valve mean pressure gradient is 15 mmHg.    Mild mitral valve regurgitation is present    There is a trivial circumferential pericardial effusion. There is no evidence of cardiac " tamponade.      Impression/Assessment:  This is a 74-year-old female with past medical history of MCI versus hypertension, CHF, mild dementia, ESRD on HD, CAD, ANGIE who presented to the hospital on 3/9/2025 with complaints of altered mental status since Thursday.  Daughter reported that the patient was not making sense when speaking and having visual hallucinations.  There was some concern for UTI.  She had reportedly not missed any dialysis sessions.  Her creatinine was 5.8 on arrival, unclear what her baseline creatinine is.  Seems to range anywhere from 3-7 from my review.       Her BP on arrival was 188/101.  She had a noncontrast CT head did not show any acute intracranial changes, she had diffuse cerebral atrophy most prominent in the temporal lobes.  Per review of her home med rec she is currently taking donepezil 5 mg, DAPT with aspirin and Plavix, and melatonin at night.  I reviewed a [place] [facility] note from April 2024 for which she underwent a MMSE and scored a 22/30.  Dr. Karlo Mcmanus suspected she had at least MCI complicated by CHF/ESRD/ATC or a mild dementia with a high risk for vascular cognitive impairment.  He checked a MRI of her brain which I was unable to review myself however per the report she did not reveal any acute changes, she had chronic lacunar infarcts noted.  He recommended starting her on Aricept 5 mg.     Diagnosis:  Acute encephalopathy, possibly multifactorial from hypertensive encephalopathy with an underlying dementia complicated by ESRD  Hypertensive urgency  ESRD on HD     Additional work up to date:  Labs: Folate 5.94, CRP 4.61, ammonia 47, TSH 5.520, respiratory panel negative, RPR non-reactive     Plan:  - MRI complete, reviewed with Dr. Easton. No acute processes noted.  Temporal atrophy noted.  - Routine EEG was obtained and showed mild showing, no epileptic seizures.  - Recommend f/u outpt for further cognitive evaluation.  I will send a message for follow up.  We  will sign off and see again per request.     Case discussed with patient and Dr. Easton, and he agrees with plan above.     KANDIS Rooney      Electronically signed by Lu Rivera APRN at 25 1240       Gabe Solano MD at 25 0776              [facility] Progress Note      Patient Name: Sonia Acharya  : 1950  MRN: [id number]  Primary Care Physician:  Marcy Paez PA  Date of admission: 3/9/2025    Subjective     Interval History:     Overnight no event  Patient lying in bed comfortable  Discontinue to undergo hemodialysis today    Patient worked with physical therapy yesterday      Review of Systems:   As noted above    Objective     Vitals:   Temp:  [97.7 °F (36.5 °C)-98 °F (36.7 °C)] 97.8 °F (36.6 °C)  Heart Rate:  [52-77] 52  Resp:  [18-20] 18  BP: (123-175)/(52-82) 146/61  Flow (L/min) (Oxygen Therapy):  [1.5] 1.5    Intake/Output Summary (Last 24 hours) at 3/12/2025 0723  Last data filed at 3/11/2025 1654  Gross per 24 hour   Intake 680 ml   Output --   Net 680 ml       Physical Exam:    General Appearance: Chronically ill  Skin: warm and dry  HEENT: oral mucosa normal, nonicteric sclera  Neck: supple, no JVD  Lungs: CTA  Heart: RRR, normal S1 and S2  Abdomen: soft, nontender, nondistended  : no palpable bladder  Extremities: no edema, cyanosis or clubbing.right upper extremity AV fistula   Neuro: no focalization     Scheduled Meds:     amLODIPine, 10 mg, Oral, Daily  aspirin, 81 mg, Oral, Daily  atorvastatin, 40 mg, Oral, Daily  budesonide-formoterol, 2 puff, Inhalation, BID - RT  clopidogrel, 75 mg, Oral, Daily  diphenhydrAMINE, 12.5 mg, Intravenous, Once  donepezil, 5 mg, Oral, Nightly  fluticasone, 2 spray, Nasal, Daily  hydrALAZINE, 50 mg, Oral, TID  insulin lispro, 2-7 Units, Subcutaneous, 4x Daily AC & at Bedtime  isosorbide mononitrate, 30 mg, Oral, Daily  Lidocaine, 1 patch, Transdermal, Q24H  losartan, 100 mg, Oral,  Daily  metoprolol succinate XL, 25 mg, Oral, Daily  OLANZapine, 5 mg, Oral, Daily With Dinner  sodium chloride, 10 mL, Intravenous, Q12H  thiamine, 100 mg, Oral, Daily      IV Meds:        Results Reviewed:   I have personally reviewed the results from the time of this admission to 3/12/2025 07:23 EDT     Results from last 7 days   Lab Units 03/12/25  0325 03/10/25  0323 03/09/25  1945   SODIUM mmol/L 137 139 139   POTASSIUM mmol/L 4.8 4.9 4.6   CHLORIDE mmol/L 98 96* 95*   CO2 mmol/L 25.0 19.1* 24.4   BUN mg/dL 34* 35* 33*   CREATININE mg/dL 5.86* 5.92* 5.80*   CALCIUM mg/dL 8.5* 9.4 9.8   BILIRUBIN mg/dL  --  0.3 0.3   ALK PHOS U/L  --  101 115   ALT (SGPT) U/L  --  8 6   AST (SGOT) U/L  --  16 16   GLUCOSE mg/dL 82 88 83       Estimated Creatinine Clearance: 6.3 mL/min (A) (by C-G formula based on SCr of 5.86 mg/dL (H)).    Results from last 7 days   Lab Units 03/09/25 1945   MAGNESIUM mg/dL 2.5*             Results from last 7 days   Lab Units 03/12/25  0325 03/10/25  0323 03/09/25  1945   WBC 10*3/mm3 3.35* 3.97 4.78   HEMOGLOBIN g/dL 9.1* 10.3* 10.2*   PLATELETS 10*3/mm3 204 297 310             Assessment / Plan     ASSESSMENT:    End Stage Renal Disease ( ESRD ) on Hemodialysis .s/p  RUE AVF functional .Continue to arrange hemodialysis treatment during patient  admission. Continue renal diet      -Chronic normocytic anemia. On Epogen protocol.  On hold hgb > 10 We will continue to follow H&H closely      -Hyperphosphatemia. Continue renal diet. We will follow phosphorus to make further adjustments to binder if indicated      -Hypertension w/ CKD .  Resume home regimen of amlodipine, hydralazine, isosorbide, metoprolol and losartan     . We will continue to follow closely. Heart healthy diet      -Coronary artery disease status post cardiac cath with chronic diastolic congestive (echocardiogram showing EF of 56% with severe concentric hypertrophy  ) heart failure.  Optimize volume status with dialysis.  Heart  healthy diet. Recommendations per primary team     -Altered mental status/encephalopathy being worked up and evaluated followed by neurology        PLAN:  Patient is scheduled to undergo hemodialysis today  Upon discharge patient can resume hemodialysis on a regular schedule at her chronic hemodialysis unit  Continue surveillance labs    Thank you for involving us in the care of Sonia Acharya.  Please feel free to call with any questions.    Gabe Solano MD  25  07:23 EDT    [facility]  [phone]    Please note that portions of this note were completed with a voice recognition program.      Electronically signed by Gabe Solano MD at 25 0724       Jorje Chin MD at 25 1521              Name: Sonia Acharya ADMIT: 3/9/2025   : 1950  PCP: Marcy Paez PA    MRN: [id number] LOS: 0 days   AGE/SEX: 74 y.o. female  ROOM: [facility]     Subjective   Subjective   Sleeping most of the afternoon per daughter at bedside.  She awakened easily and did not complain of anything to me but did not say much.  Apparently was agitated overnight after around 04 100      Objective   Objective   Vital Signs  Temp:  [97.7 °F (36.5 °C)-98.3 °F (36.8 °C)] 97.9 °F (36.6 °C)  Heart Rate:  [62-79] 62  Resp:  [16-22] 20  BP: (123-182)/(64-93) 123/64  SpO2:  [93 %-99 %] 93 %  on   ;   Device (Oxygen Therapy): room air  Body mass index is 20.58 kg/m².  Physical Exam  Vitals reviewed.   Constitutional:       General: She is not in acute distress.  Cardiovascular:      Rate and Rhythm: Normal rate and regular rhythm.   Pulmonary:      Effort: No respiratory distress.      Breath sounds: Normal breath sounds. No wheezing.   Abdominal:      Palpations: Abdomen is soft.      Tenderness: There is no abdominal tenderness.   Musculoskeletal:      Right lower leg: No edema.      Left lower leg: No edema.   Skin:     General: Skin is warm and dry.   Neurological:       Mental Status: She is alert.       Results Review     I reviewed the patient's new clinical results.  Results from last 7 days   Lab Units 03/10/25  0323 03/09/25  1945 03/05/25  0000   WBC 10*3/mm3 3.97 4.78  --    HEMOGLOBIN g/dL 10.3* 10.2* 8.8*  26.4*   PLATELETS 10*3/mm3 297 310  --      Results from last 7 days   Lab Units 03/10/25  0323 03/09/25  1945 03/05/25  0000   SODIUM mmol/L 139 139  --    POTASSIUM mmol/L 4.9 4.6  --    CHLORIDE mmol/L 96* 95*  --    CO2 mmol/L 19.1* 24.4  --    BUN mg/dL 35* 33* 53*   CREATININE mg/dL 5.92* 5.80*  --    GLUCOSE mg/dL 88 83  --    EGFR mL/min/1.73 7.0* 7.1*  --      Results from last 7 days   Lab Units 03/10/25  0323 03/09/25  1945   ALBUMIN g/dL 3.6 4.1   BILIRUBIN mg/dL 0.3 0.3   ALK PHOS U/L 101 115   AST (SGOT) U/L 16 16   ALT (SGPT) U/L 8 6     Results from last 7 days   Lab Units 03/10/25  0323 03/09/25  1945   CALCIUM mg/dL 9.4 9.8   ALBUMIN g/dL 3.6 4.1   MAGNESIUM mg/dL  --  2.5*       Glucose   Date/Time Value Ref Range Status   03/11/2025 1101 100 70 - 130 mg/dL Final   03/10/2025 1953 114 70 - 130 mg/dL Final   03/10/2025 1805 70 70 - 130 mg/dL Final   03/10/2025 1218 81 70 - 130 mg/dL Final   03/10/2025 1200 65 (L) 70 - 130 mg/dL Final   03/10/2025 1106 68 (L) 70 - 130 mg/dL Final   03/10/2025 0034 81 70 - 130 mg/dL Final       CT Head Without Contrast  Result Date: 3/9/2025   There is volume loss and there are nonspecific white matter changes, but there is no acute intracranial abnormality.        This report was finalized on 3/9/2025 10:34 PM by Dr. Solis Rhodes M.D on Workstation: [id number]      XR Chest 1 View  Result Date: 3/9/2025   There is mild cardiomegaly, though submaximal inspiration may exaggerate the cardiac silhouette.  There is mild vascular congestion, though submaximal inspiration may exaggerate the appearance.  There is no consolidation, effusion or pneumothorax.  This report was finalized on 3/9/2025 8:44 PM by Dr. Ely  EMMY Rhodes on Workstation: [id number]        I have personally reviewed all medications:  Scheduled Medications  amLODIPine, 10 mg, Oral, Daily  aspirin, 81 mg, Oral, Daily  atorvastatin, 40 mg, Oral, Daily  budesonide-formoterol, 2 puff, Inhalation, BID - RT  clopidogrel, 75 mg, Oral, Daily  diphenhydrAMINE, 12.5 mg, Intravenous, Once  donepezil, 5 mg, Oral, Nightly  fluticasone, 2 spray, Nasal, Daily  hydrALAZINE, 50 mg, Oral, TID  insulin lispro, 2-7 Units, Subcutaneous, 4x Daily AC & at Bedtime  isosorbide mononitrate, 30 mg, Oral, Daily  Lidocaine, 1 patch, Transdermal, Q24H  losartan, 100 mg, Oral, Daily  metoprolol succinate XL, 25 mg, Oral, Daily  OLANZapine, 5 mg, Oral, Daily With Dinner  sodium chloride, 10 mL, Intravenous, Q12H  thiamine, 100 mg, Oral, Daily    Infusions   Diet  Diet: Cardiac, Diabetic; Healthy Heart (2-3 Na+); Consistent Carbohydrate; Fluid Consistency: Thin (IDDSI 0)    I have personally reviewed:  [x]  Laboratory   [x]  Microbiology   [x]  Radiology   [x]  EKG/Telemetry  [x]  Cardiology/Vascular   []  Pathology    []  Records      Assessment/Plan     Active Hospital Problems    Diagnosis  POA    **Altered mental status [R41.82]  Yes    Dementia with agitation [F03.911]  Yes    CAD (coronary artery disease) [I25.10]  Yes    Chronic respiratory failure with hypoxia [J96.11]  Yes    ESRD (end stage renal disease) on dialysis [N18.6, Z99.2]  Not Applicable    Bradycardia [R00.1]  Yes    Chronic diastolic CHF (congestive heart failure) [I50.32]  Yes    ANGIE (obstructive sleep apnea) [G47.33]  Yes    Anemia, chronic disease [D63.8]  Yes    HTN (hypertension) [I10]  Yes      Resolved Hospital Problems   No resolved problems to display.       74 y.o. female with ESRD, ANGIE, diastolic CHF and dementia admitted with Altered mental status.    Discussed with daughter.  Issues are primarily at nighttime.  Sounds like she has been forgetting names and yelling out especially at night.  Seem to be  hallucinating.    Neurology workup pending.  EEG is done but not read and MRI is still pending.  She will probably have to be sedated to complete it.  - Reviewed labs.  Her TSH is marginally elevated.  Will check free T4.  RPR, HIV negative.  CRP 4.6.    Will add Zyprexa to use at nighttime to try to help her sleep.  I think psychiatry consult would be beneficial.  Certainly would urge delirium precautions and minimize interruptions at nighttime.  I encouraged her daughter to try to keep her awake during the day and not let her sleep all afternoon.    Hypertension uncontrolled at times which I suppose could be playing some part as well.  Currently within normal limits after getting morning meds.    ESRD management per nephrology.      DVT prophy: SCDs  Dispo: Potentially home 1 to 2 days with family      Jorje Chin MD  [facility]  25  15:21 EDT    Electronically signed by Jorje Chin MD at 25 1526       Gabe Solano MD at 25 1354              [facility] Progress Note      Patient Name: Sonia Acharya  : 1950  MRN: [id number]  Primary Care Physician:  Marcy Paez PA  Date of admission: 3/9/2025    Subjective     Interval History:      Patient underwent HD yesterday w.o complications   Patient more aware w episodes of agitation     Review of Systems:   As noted above    Objective     Vitals:   Temp:  [97.7 °F (36.5 °C)-98.3 °F (36.8 °C)] 97.9 °F (36.6 °C)  Heart Rate:  [62-79] 62  Resp:  [16-22] 20  BP: (125-182)/(65-93) 125/66    Intake/Output Summary (Last 24 hours) at 3/11/2025 1354  Last data filed at 3/11/2025 0830  Gross per 24 hour   Intake 240 ml   Output 1000 ml   Net -760 ml       Physical Exam:    General Appearance:  Chronically ill and deconditioned   Skin: warm and dry  HEENT: oral mucosa normal, nonicteric sclera  Neck: supple, no JVD  Lungs: CTA  Heart: RRR, normal S1 and S2  Abdomen:  soft, nontender, nondistended  : no palpable bladder  Extremities: no edema, cyanosis or clubbing.right upper extremity AV fistula   Neuro: no focalization     Scheduled Meds:     amLODIPine, 10 mg, Oral, Daily  aspirin, 81 mg, Oral, Daily  atorvastatin, 40 mg, Oral, Daily  budesonide-formoterol, 2 puff, Inhalation, BID - RT  clopidogrel, 75 mg, Oral, Daily  diphenhydrAMINE, 12.5 mg, Intravenous, Once  donepezil, 5 mg, Oral, Nightly  fluticasone, 2 spray, Nasal, Daily  hydrALAZINE, 50 mg, Oral, TID  insulin lispro, 2-7 Units, Subcutaneous, 4x Daily AC & at Bedtime  isosorbide mononitrate, 30 mg, Oral, Daily  Lidocaine, 1 patch, Transdermal, Q24H  losartan, 100 mg, Oral, Daily  metoprolol succinate XL, 25 mg, Oral, Daily  sodium chloride, 10 mL, Intravenous, Q12H  thiamine, 100 mg, Oral, Daily      IV Meds:        Results Reviewed:   I have personally reviewed the results from the time of this admission to 3/11/2025 13:54 EDT     Results from last 7 days   Lab Units 03/10/25  0323 03/09/25  1945 03/05/25  0000   SODIUM mmol/L 139 139  --    POTASSIUM mmol/L 4.9 4.6  --    CHLORIDE mmol/L 96* 95*  --    CO2 mmol/L 19.1* 24.4  --    BUN mg/dL 35* 33* 53*   CREATININE mg/dL 5.92* 5.80*  --    CALCIUM mg/dL 9.4 9.8  --    BILIRUBIN mg/dL 0.3 0.3  --    ALK PHOS U/L 101 115  --    ALT (SGPT) U/L 8 6  --    AST (SGOT) U/L 16 16  --    GLUCOSE mg/dL 88 83  --        Estimated Creatinine Clearance: 6.2 mL/min (A) (by C-G formula based on SCr of 5.92 mg/dL (H)).    Results from last 7 days   Lab Units 03/09/25 1945   MAGNESIUM mg/dL 2.5*             Results from last 7 days   Lab Units 03/10/25  0323 03/09/25  1945 03/05/25  0000   WBC 10*3/mm3 3.97 4.78  --    HEMOGLOBIN g/dL 10.3* 10.2* 8.8*  26.4*   PLATELETS 10*3/mm3 297 310  --              Assessment / Plan     ASSESSMENT:    End Stage Renal Disease ( ESRD ) on Hemodialysis .s/p  RUE AVF functional .Continue to arrange hemodialysis treatment during patient   admission. Continue renal diet      -Chronic normocytic anemia. On Epogen protocol.  On hold hgb > 10 We will continue to follow H&H closely      -Hyperphosphatemia. Continue renal diet. We will follow phosphorus to make further adjustments to binder if indicated      -Hypertension w/ CKD .  Resume home regimen of amlodipine, hydralazine, isosorbide, metoprolol and losartan     . We will continue to follow closely. Heart healthy diet      -Coronary artery disease status post cardiac cath with chronic diastolic congestive (echocardiogram showing EF of 56% with severe concentric hypertrophy  ) heart failure.  Optimize volume status with dialysis.  Heart healthy diet. Recommendations per primary team     -Altered mental status/encephalopathy being worked up and evaluated followed by neurology        PLAN:  Patient underwent HD yesterday w/o complications and UF of 1 liter   Mentation improving w medical management   Appreciated neurology recommendations and advice   No acute indication for HD today will arrange for tomorrow     Thank you for involving us in the care of Sonia Acharya.  Please feel free to call with any questions.    Gabe Solano MD  25  13:54 EDT    [facility]  [phone]    Please note that portions of this note were completed with a voice recognition program.      Electronically signed by Gabe Solano MD at 25 3178       Lu Rivera APRN at 25 1332          DOS: 3/11/2025  NAME: Sonia Acharya   : 1950  PCP: Marcy Paez PA    Chief Complaint   Patient presents with    Altered Mental Status         Subjective: Pt seen in follow up, however the problem is new to me.  Patient lying in bed initially asleep but easily awakened to the calling of her name.  States she does not really know why she was brought to the hospital but she is having a lot of belly pain.  Not sure when her last bowel movement was.  Daughter was at bedside  but was asleep in the recliner.    Objective:  Vital signs:   Vitals:    03/11/25 0423 03/11/25 0726 03/11/25 0803 03/11/25 1102   BP: (!) 182/83 175/82  125/66   BP Location: Left arm Left arm  Left arm   Patient Position: Lying Lying  Lying   Pulse: 74 77 73 62   Resp: 22  20    Temp: 97.8 °F (36.6 °C) 98 °F (36.7 °C)  97.9 °F (36.6 °C)   TempSrc: Oral Oral  Oral   SpO2: 97% 96% 93% 93%   Weight:       Height:           Current Facility-Administered Medications:     acetaminophen (TYLENOL) tablet 650 mg, 650 mg, Oral, Q4H PRN, 650 mg at 03/10/25 2327 **OR** acetaminophen (TYLENOL) 160 MG/5ML oral solution 650 mg, 650 mg, Oral, Q4H PRN **OR** acetaminophen (TYLENOL) suppository 650 mg, 650 mg, Rectal, Q4H PRN, Nasra Harris APRN    amLODIPine (NORVASC) tablet 10 mg, 10 mg, Oral, Daily, Nasra Harris APRN, 10 mg at 03/11/25 0831    aspirin EC tablet 81 mg, 81 mg, Oral, Daily, Casey Mendoza MD, 81 mg at 03/11/25 0831    atorvastatin (LIPITOR) tablet 40 mg, 40 mg, Oral, Daily, Nasra Harris APRN, 40 mg at 03/11/25 0832    sennosides-docusate (PERICOLACE) 8.6-50 MG per tablet 2 tablet, 2 tablet, Oral, BID PRN **AND** polyethylene glycol (MIRALAX) packet 17 g, 17 g, Oral, Daily PRN **AND** bisacodyl (DULCOLAX) EC tablet 5 mg, 5 mg, Oral, Daily PRN **AND** bisacodyl (DULCOLAX) suppository 10 mg, 10 mg, Rectal, Daily PRN, Nasra Harris APRN    budesonide-formoterol (SYMBICORT) 160-4.5 MCG/ACT inhaler 2 puff, 2 puff, Inhalation, BID - RT, Nasra Harris APRN, 2 puff at 03/11/25 0803    clopidogrel (PLAVIX) tablet 75 mg, 75 mg, Oral, Daily, Nasra Harris APRN, 75 mg at 03/11/25 0831    dextrose (D50W) (25 g/50 mL) IV injection 25 g, 25 g, Intravenous, Q15 Min PRN, Nasra Harris APRN    dextrose (GLUTOSE) oral gel 15 g, 15 g, Oral, Q15 Min PRN, Nasra Harris APRN    diphenhydrAMINE (BENADRYL) capsule 50 mg, 50 mg, Oral, Q6H PRN, Nasra Harris APRN, 50 mg at  03/10/25 2216    diphenhydrAMINE (BENADRYL) injection 12.5 mg, 12.5 mg, Intravenous, Once, Sisi Hopson APRN    donepezil (ARICEPT) tablet 5 mg, 5 mg, Oral, Nightly, Nasra Harris APRN, 5 mg at 03/10/25 2012    famotidine (PEPCID) tablet 20 mg, 20 mg, Oral, BID PRN, Nasra Harris APRN    fluticasone (FLONASE) 50 MCG/ACT nasal spray 2 spray, 2 spray, Nasal, Daily, Nasra Harris APRN    glucagon (GLUCAGEN) injection 1 mg, 1 mg, Intramuscular, Q15 Min PRN, Nasra Harris APRN    hydrALAZINE (APRESOLINE) tablet 50 mg, 50 mg, Oral, TID, Nasra Harris APRN, 50 mg at 03/11/25 0831    insulin lispro (HUMALOG/ADMELOG) injection 2-7 Units, 2-7 Units, Subcutaneous, 4x Daily AC & at Bedtime, Narsa Harris APRN    isosorbide mononitrate (IMDUR) 24 hr tablet 30 mg, 30 mg, Oral, Daily, Casey Mendoza MD, 30 mg at 03/11/25 0832    Lidocaine 4 % 1 patch, 1 patch, Transdermal, Q24H, Nasra Harris APRN, 1 patch at 03/11/25 0832    LORazepam (ATIVAN) injection 1 mg, 1 mg, Intravenous, Once PRN, Jorje Chin MD    losartan (COZAAR) tablet 100 mg, 100 mg, Oral, Daily, Casey Mendoza MD, 100 mg at 03/11/25 0832    melatonin tablet 5 mg, 5 mg, Oral, Nightly PRN, Nasra Harris APRN, 5 mg at 03/10/25 2014    metoprolol succinate XL (TOPROL-XL) 24 hr tablet 25 mg, 25 mg, Oral, Daily, Casey Mendoza MD, 25 mg at 03/11/25 0832    nitroglycerin (NITROSTAT) SL tablet 0.4 mg, 0.4 mg, Sublingual, Q5 Min PRN, Nasra Harris, APRN    ondansetron (ZOFRAN) injection 4 mg, 4 mg, Intravenous, Q6H PRN, Nasra Harris APRN, 4 mg at 03/11/25 1040    sodium chloride 0.9 % flush 10 mL, 10 mL, Intravenous, PRN, Scott Hilario MD    sodium chloride 0.9 % flush 10 mL, 10 mL, Intravenous, Q12H, Nasra aHrris APRN, 10 mL at 03/11/25 0832    sodium chloride 0.9 % flush 10 mL, 10 mL, Intravenous, PRN, Nasra Harris, APRN    sodium chloride 0.9 %  infusion 40 mL, 40 mL, Intravenous, PRN, Nasra Harris APRN    thiamine (VITAMIN B-1) tablet 100 mg, 100 mg, Oral, Daily, Casey Mendoza MD, 100 mg at 03/11/25 0832    PRN meds    acetaminophen **OR** acetaminophen **OR** acetaminophen    senna-docusate sodium **AND** polyethylene glycol **AND** bisacodyl **AND** bisacodyl    dextrose    dextrose    diphenhydrAMINE    famotidine    glucagon (human recombinant)    LORazepam    melatonin    nitroglycerin    ondansetron    sodium chloride    sodium chloride    sodium chloride    No current facility-administered medications on file prior to encounter.     Current Outpatient Medications on File Prior to Encounter   Medication Sig    amLODIPine (NORVASC) 10 MG tablet Take 1 tablet by mouth Daily.    aspirin 81 MG EC tablet Take 1 tablet by mouth Daily.    atorvastatin (LIPITOR) 40 MG tablet Take 1 tablet by mouth Daily.    budesonide-formoterol (SYMBICORT) 80-4.5 MCG/ACT inhaler Inhale 2 puffs 2 (Two) Times a Day.    donepezil (ARICEPT) 5 MG tablet Take 1 tablet by mouth Every Night.    fluticasone (FLONASE) 50 MCG/ACT nasal spray Administer 2 sprays into the nostril(s) as directed by provider Daily.    hydrALAZINE (APRESOLINE) 50 MG tablet Take 1 tablet by mouth 3 (Three) Times a Day.    isosorbide mononitrate (IMDUR) 30 MG 24 hr tablet Take 1 tablet by mouth Daily.    linaclotide (LINZESS) 290 MCG capsule capsule Take 1 capsule by mouth Every Morning Before Breakfast.    losartan (COZAAR) 100 MG tablet Take 1 tablet by mouth Daily.    melatonin 3 MG tablet Take 1 tablet by mouth Every Night.    metoprolol succinate XL (TOPROL-XL) 25 MG 24 hr tablet Take 1 tablet by mouth Daily.    clopidogrel (PLAVIX) 75 MG tablet Take 1 tablet by mouth Daily for 30 days.    diphenhydrAMINE (BENADRYL) 25 mg capsule Take 2 capsules by mouth Every 6 (Six) Hours As Needed for Itching.    famotidine (Pepcid) 20 MG tablet Take 1 tablet by mouth 2 (Two) Times a Day As Needed for  "Heartburn or Indigestion for up to 30 days.    lidocaine (LIDODERM) 5 % Place 1 patch on the skin as directed by provider Daily. Remove & Discard patch within 12 hours or as directed by MD     General appearance: Elderly female, NAD, alert and cooperative  HEENT: Normocephalic, atraumatic    Neurological:   MS: oriented to self only, decreased attention, language intact, no neglect  CN: visual fields full, EOMI, facial sensation equal, no facial droop, shoulder shrug equal, tongue midline  Motor: 5/5 RUE/LUE, 5/5 RLE, 3/5 LLE 2/2 c/o pain from fall  Sensory: light touch sensation intact in all 4 ext.  Coordination: Normal finger to nose test  Gait and station: deferred     Laboratory results:  Lab Results   Component Value Date    TSH 5.520 (H) 03/10/2025     Lab Results   Component Value Date    FFIQIMXF26 >2,000 (H) 03/10/2025     Lab Results   Component Value Date    HGBA1C 4.5 (L) 02/19/2025     Lab Results   Component Value Date    GLUCOSE 88 03/10/2025    BUN 35 (H) 03/10/2025    CREATININE 5.92 (H) 03/10/2025    EGFRIFNONA  12/14/2021      Comment:      <15 Indicative of kidney failure.    EGFRIFAFRI 10 (L) 03/04/2023    BCR 5.9 (L) 03/10/2025    K 4.9 03/10/2025    CO2 19.1 (L) 03/10/2025    CALCIUM 9.4 03/10/2025    ALBUMIN 3.6 03/10/2025    LABIL2 1.4 03/04/2023    AST 16 03/10/2025    ALT 8 03/10/2025     Lab Results   Component Value Date    WBC 3.97 03/10/2025    HGB 10.3 (L) 03/10/2025    HCT 33.3 (L) 03/10/2025    MCV 93.5 03/10/2025     03/10/2025     Brief Urine Lab Results       None          No results found for: \"ACANTHNAEG\", \"AFBCX\", \"BPERTUSSISCX\", \"BLOODCX\"  No results found for: \"BCIDPCR\", \"CXREFLEX\", \"CSFCX\", \"CULTURETIS\"  No results found for: \"CULTURES\", \"HSVCX\", \"URCX\"  No results found for: \"EYECULTURE\", \"GCCX\", \"HSVCULTURE\", \"LABHSV\"  No results found for: \"LEGIONELLA\", \"MRSACX\", \"MUMPSCX\", \"MYCOPLASCX\"  No results found for: \"NOCARDIACX\", \"STOOLCX\"  No results found for: " "\"THROATCX\", \"UNSTIMCULT\", \"URINECX\", \"CULTURE\", \"VZVCULTUR\"  No results found for: \"VIRALCULTU\", \"WOUNDCX\"  Pain Management Panel           No data to display                Review and interpretation of imaging:  CT Head Without Contrast  Result Date: 3/9/2025   There is volume loss and there are nonspecific white matter changes, but there is no acute intracranial abnormality.        This report was finalized on 3/9/2025 10:34 PM by Dr. Solis Rhodes M.D on Workstation: [id number]      XR Chest 1 View  Result Date: 3/9/2025   There is mild cardiomegaly, though submaximal inspiration may exaggerate the cardiac silhouette.  There is mild vascular congestion, though submaximal inspiration may exaggerate the appearance.  There is no consolidation, effusion or pneumothorax.  This report was finalized on 3/9/2025 8:44 PM by Dr. Solis Rhodes M.D on Workstation: [id number]      Impression/Assessment:  This is a 74-year-old female with past medical history of MCI versus hypertension, CHF, mild dementia, ESRD on HD, CAD, ANGIE who presented to the hospital on 3/9/2025 with complaints of altered mental status since Thursday.  Daughter reported that the patient was not making sense when speaking and having visual hallucinations.  There was some concern for UTI.  She had reportedly not missed any dialysis sessions.  Her creatinine was 5.8 on arrival, unclear what her baseline creatinine is.  Seems to range anywhere from 3-7 from my review.      Her BP on arrival was 188/101.  She had a noncontrast CT head did not show any acute intracranial changes, she had diffuse cerebral atrophy most prominent in the temporal lobes.  Per review of her home med rec she is currently taking donepezil 5 mg, DAPT with aspirin and Plavix, and melatonin at night.  I reviewed a [facility] note from April 2024 for which she underwent a MMSE and scored a 22/30.  Dr. Karlo Mcmanus suspected she had at least MCI complicated by " CHF/ESRD/ATC or a mild dementia with a high risk for vascular cognitive impairment.  He checked a MRI of her brain which I was unable to review myself however per the report she did not reveal any acute changes, she had chronic lacunar infarcts noted.  He recommended starting her on Aricept 5 mg.    Diagnosis:  Acute encephalopathy, possibly multifactorial from hypertensive encephalopathy with an underlying dementia complicated by ESRD  Hypertensive urgency  ESRD on HD    Additional work up to date:  Labs: Folate 5.94, CRP 4.61, ammonia 47, TSH 5.520, respiratory panel negative, RPR non-reactive     Plan:  - Confused on exam but cooperative. No focal deficits.  She does complain of LLE pain from a fall as well as ABD pain ? Constipation.  - She was quite hypertensive on arrival, with her diffuse atrophy worse in the temporal lobes noted on her CTH, I suspect her encephalopathy is being compounded from underlying dementia, uncontrolled hypertension, and ESRD.  - She was not cooperative with EEG tech yesterday and has not had her MRI yet.   - Would like for her to at least complete the MRI.   - Start delirium precautions.   Please call us back to see once she has completed her MRI.    Evidence-based delirium precautions include:     1. Frequent reorientation, reminding patient not questioning patient   2. Shades up during day/down at night   3. TV off except for soft music only   4. Familiar objects at bedside   5. Encourage friends/family to spend the night   6. Minimize anticholingeric medications   7. Minimize polypharmacy   8. Minimize restraints, includes lines and/or foleys and/or feeding tubes as able   9. Minimize overnight checks/vitals to encourage restful consistent sleep patterns   10. Out of bed during day as much as possible     Case discussed with patient, RN, and Dr. Easton, and he agrees with plan above.     KANDIS Rooney        Electronically signed by Lu Rivera APRN at 03/11/25 5900           Consult Notes (last 24 hours)        Cliff Eason III, MD at 03/12/25 1057        Consult Orders    1. Inpatient Psychiatrist Consult [[id number]] ordered by Jorje Chin MD at 03/11/25 0824                 The patient is in dialysis and unavailable for interview today.    Electronically signed by Cliff Eason III, MD at 03/12/25 5025

## 2025-03-12 NOTE — PROGRESS NOTES
Name: Sonia Acharya ADMIT: 3/9/2025   : 1950  PCP: Marcy Paez PA    MRN: [id number] LOS: 0 days   AGE/SEX: 74 y.o. female  ROOM: [facility]     Subjective   Subjective   Seen on HD this morning.  She actually looks a little bit better, was able to answer some questions for me.     Objective   Objective   Vital Signs  Temp:  [97.1 °F (36.2 °C)-98 °F (36.7 °C)] 98 °F (36.7 °C)  Heart Rate:  [52-63] 63  Resp:  [16-20] 18  BP: (124-156)/(52-64) 156/58  SpO2:  [90 %-100 %] 100 %  on  Flow (L/min) (Oxygen Therapy):  [1.5] 1.5;   Device (Oxygen Therapy): room air  Body mass index is 20.58 kg/m².  Physical Exam  Vitals reviewed.   Constitutional:       General: She is not in acute distress.  Cardiovascular:      Rate and Rhythm: Normal rate and regular rhythm.   Pulmonary:      Effort: No respiratory distress.      Breath sounds: Normal breath sounds. No wheezing.   Abdominal:      Palpations: Abdomen is soft.      Tenderness: There is no abdominal tenderness.   Musculoskeletal:      Right lower leg: No edema.      Left lower leg: No edema.   Skin:     General: Skin is warm and dry.   Neurological:      Mental Status: She is alert.       Results Review     I reviewed the patient's new clinical results.  Results from last 7 days   Lab Units 25  0325 03/10/25  0323 03/09/25  1945   WBC 10*3/mm3 3.35* 3.97 4.78   HEMOGLOBIN g/dL 9.1* 10.3* 10.2*   PLATELETS 10*3/mm3 204 297 310     Results from last 7 days   Lab Units 25  0325 03/10/25  0323 03/09/25  1945   SODIUM mmol/L 137 139 139   POTASSIUM mmol/L 4.8 4.9 4.6   CHLORIDE mmol/L 98 96* 95*   CO2 mmol/L 25.0 19.1* 24.4   BUN mg/dL 34* 35* 33*   CREATININE mg/dL 5.86* 5.92* 5.80*   GLUCOSE mg/dL 82 88 83   EGFR mL/min/1.73 7.1* 7.0* 7.1*     Results from last 7 days   Lab Units 03/10/25  0323 25  1945   ALBUMIN g/dL 3.6 4.1   BILIRUBIN mg/dL 0.3 0.3   ALK PHOS U/L 101 115   AST (SGOT) U/L 16 16   ALT (SGPT) U/L 8 6     Results from last 7 days    Lab Units 03/12/25  0325 03/10/25  0323 03/09/25  1945   CALCIUM mg/dL 8.5* 9.4 9.8   ALBUMIN g/dL  --  3.6 4.1   MAGNESIUM mg/dL  --   --  2.5*       Glucose   Date/Time Value Ref Range Status   03/12/2025 1358 73 70 - 130 mg/dL Final   03/12/2025 0604 72 70 - 130 mg/dL Final   03/11/2025 2021 87 70 - 130 mg/dL Final   03/11/2025 1614 87 70 - 130 mg/dL Final   03/11/2025 1101 100 70 - 130 mg/dL Final   03/10/2025 1953 114 70 - 130 mg/dL Final   03/10/2025 1805 70 70 - 130 mg/dL Final       MRI Brain Without Contrast  Result Date: 3/12/2025  Electronically signed by Maria Luz Vickers MD on 03-12-25 at 0613      I have personally reviewed all medications:  Scheduled Medications  amLODIPine, 10 mg, Oral, Daily  aspirin, 81 mg, Oral, Daily  atorvastatin, 40 mg, Oral, Daily  budesonide-formoterol, 2 puff, Inhalation, BID - RT  clopidogrel, 75 mg, Oral, Daily  diphenhydrAMINE, 12.5 mg, Intravenous, Once  donepezil, 5 mg, Oral, Nightly  fluticasone, 2 spray, Nasal, Daily  hydrALAZINE, 50 mg, Oral, TID  insulin lispro, 2-7 Units, Subcutaneous, 4x Daily AC & at Bedtime  isosorbide mononitrate, 30 mg, Oral, Daily  Lidocaine, 1 patch, Transdermal, Q24H  losartan, 100 mg, Oral, Daily  metoprolol succinate XL, 25 mg, Oral, Daily  OLANZapine, 5 mg, Oral, Daily With Dinner  sodium chloride, 10 mL, Intravenous, Q12H  thiamine, 100 mg, Oral, Daily    Infusions   Diet  Diet: Cardiac, Diabetic; Healthy Heart (2-3 Na+); Consistent Carbohydrate; Fluid Consistency: Thin (IDDSI 0)    I have personally reviewed:  [x]  Laboratory   [x]  Microbiology   [x]  Radiology   [x]  EKG/Telemetry  [x]  Cardiology/Vascular   []  Pathology    []  Records       Assessment/Plan     Active Hospital Problems    Diagnosis  POA    **Altered mental status [R41.82]  Yes    Dementia with agitation [F03.911]  Yes    CAD (coronary artery disease) [I25.10]  Yes    Chronic respiratory failure with hypoxia [J96.11]  Yes    ESRD (end stage renal disease) on dialysis  "[N18.6, Z99.2]  Not Applicable    Bradycardia [R00.1]  Yes    Chronic diastolic CHF (congestive heart failure) [I50.32]  Yes    ANGIE (obstructive sleep apnea) [G47.33]  Yes    Anemia, chronic disease [D63.8]  Yes    HTN (hypertension) [I10]  Yes      Resolved Hospital Problems   No resolved problems to display.       74 y.o. female with ESRD, ANGIE, diastolic CHF and dementia admitted with Altered mental status.    Discussed with daughter by phone.  Seems to have responded well to Zyprexa at night.  Will continue this for now.  Psychiatry was consulted but did not see her while she was on dialysis.     Also talked to her about the proptosis seen on MRI.  Daughter feels that her appearance has not changed.  Her TSH was actually elevated slightly and free T4 was added and is within normal limits.  She has no prior known history of Graves' disease.  Will add thyrotropin receptor antibodies.  I told the daughter she probably needs to have an eye exam.  Daughter thinks that their eyes look like that because of family trait and says \"mine look like that too\"    Neurology note reviewed.  MRI and EEG unremarkable.      Hypertension uncontrolled at times which I suppose could be playing some part as well.  Monitor after HD    ESRD management per nephrology.      DVT prophy: SCDs  Dispo: Plan is home with family at discharge.  Daughter would like for her to be watched 1 more night to make sure her behaviors continue to improve but she agrees that her mother seems much better and is probably stable for DC soon.      Jorje Chin MD  [facility]  03/12/25  16:10 EDT  "

## 2025-03-13 LAB
GLUCOSE BLDC GLUCOMTR-MCNC: 100 MG/DL (ref 70–130)
GLUCOSE BLDC GLUCOMTR-MCNC: 101 MG/DL (ref 70–130)
GLUCOSE BLDC GLUCOMTR-MCNC: 122 MG/DL (ref 70–130)
GLUCOSE BLDC GLUCOMTR-MCNC: 87 MG/DL (ref 70–130)
GLUCOSE BLDC GLUCOMTR-MCNC: 93 MG/DL (ref 70–130)
TSH RECEP AB SER-ACNC: <1.1 IU/L (ref 0–1.75)

## 2025-03-13 PROCEDURE — 94761 N-INVAS EAR/PLS OXIMETRY MLT: CPT

## 2025-03-13 PROCEDURE — 25010000002 EPOETIN ALFA-EPBX 10000 UNIT/ML SOLUTION: Performed by: INTERNAL MEDICINE

## 2025-03-13 PROCEDURE — 82948 REAGENT STRIP/BLOOD GLUCOSE: CPT

## 2025-03-13 PROCEDURE — 94799 UNLISTED PULMONARY SVC/PX: CPT

## 2025-03-13 PROCEDURE — 94664 DEMO&/EVAL PT USE INHALER: CPT

## 2025-03-13 RX ORDER — OLANZAPINE 10 MG/2ML
5 INJECTION, POWDER, FOR SOLUTION INTRAMUSCULAR EVERY 8 HOURS PRN
Status: DISCONTINUED | OUTPATIENT
Start: 2025-03-13 | End: 2025-03-15 | Stop reason: HOSPADM

## 2025-03-13 RX ADMIN — METOPROLOL SUCCINATE 25 MG: 25 TABLET, EXTENDED RELEASE ORAL at 08:57

## 2025-03-13 RX ADMIN — HYDRALAZINE HYDROCHLORIDE 50 MG: 50 TABLET ORAL at 09:00

## 2025-03-13 RX ADMIN — AMLODIPINE BESYLATE 10 MG: 10 TABLET ORAL at 08:57

## 2025-03-13 RX ADMIN — Medication 10 ML: at 08:57

## 2025-03-13 RX ADMIN — ATORVASTATIN CALCIUM 40 MG: 20 TABLET, FILM COATED ORAL at 08:57

## 2025-03-13 RX ADMIN — BUDESONIDE AND FORMOTEROL FUMARATE DIHYDRATE 2 PUFF: 160; 4.5 AEROSOL RESPIRATORY (INHALATION) at 21:15

## 2025-03-13 RX ADMIN — Medication 10 ML: at 20:51

## 2025-03-13 RX ADMIN — LOSARTAN POTASSIUM 100 MG: 50 TABLET, FILM COATED ORAL at 08:57

## 2025-03-13 RX ADMIN — DONEPEZIL HYDROCHLORIDE 5 MG: 10 TABLET, FILM COATED ORAL at 20:50

## 2025-03-13 RX ADMIN — Medication 100 MG: at 08:57

## 2025-03-13 RX ADMIN — ASPIRIN 81 MG: 81 TABLET, COATED ORAL at 08:57

## 2025-03-13 RX ADMIN — EPOETIN ALFA-EPBX 10000 UNITS: 10000 INJECTION, SOLUTION INTRAVENOUS; SUBCUTANEOUS at 12:10

## 2025-03-13 RX ADMIN — ISOSORBIDE MONONITRATE 30 MG: 30 TABLET, EXTENDED RELEASE ORAL at 08:57

## 2025-03-13 RX ADMIN — OLANZAPINE 5 MG: 5 TABLET, FILM COATED ORAL at 16:19

## 2025-03-13 RX ADMIN — ACETAMINOPHEN 650 MG: 325 TABLET, FILM COATED ORAL at 22:19

## 2025-03-13 RX ADMIN — HYDRALAZINE HYDROCHLORIDE 50 MG: 50 TABLET ORAL at 16:19

## 2025-03-13 RX ADMIN — CLOPIDOGREL 75 MG: 75 TABLET, FILM COATED ORAL at 08:57

## 2025-03-13 RX ADMIN — BUDESONIDE AND FORMOTEROL FUMARATE DIHYDRATE 2 PUFF: 160; 4.5 AEROSOL RESPIRATORY (INHALATION) at 08:38

## 2025-03-13 RX ADMIN — HYDRALAZINE HYDROCHLORIDE 50 MG: 50 TABLET ORAL at 20:51

## 2025-03-13 RX ADMIN — Medication 5 MG: at 20:51

## 2025-03-13 NOTE — CASE MANAGEMENT/SOCIAL WORK
Discharge Planning Assessment  [facility]     Patient Name: Sonia Acharya  MRN: [id number]  Today's Date: 3/13/2025    Admit Date: 3/9/2025    Plan: DC plan is return home with Naty, current with Naty YANG to transport.   Discharge Needs Assessment       Row Name 03/13/25 1154       Living Environment    People in Home child(giuseppe), adult    Name(s) of People in Home Naty Acharya    Current Living Arrangements home    Potentially Unsafe Housing Conditions none    In the past 12 months has the electric, gas, oil, or water company threatened to shut off services in your home? No    Primary Care Provided by child(giuseppe)    Provides Primary Care For child(giuseppe)    Family Caregiver if Needed child(giuseppe), adult    Family Caregiver Names Naty Acharya    Able to Return to Prior Arrangements yes       Resource/Environmental Concerns    Resource/Environmental Concerns none    Transportation Concerns none       Transportation Needs    In the past 12 months, has lack of transportation kept you from medical appointments or from getting medications? no    In the past 12 months, has lack of transportation kept you from meetings, work, or from getting things needed for daily living? No       Food Insecurity    Within the past 12 months, you worried that your food would run out before you got the money to buy more. Never true    Within the past 12 months, the food you bought just didn't last and you didn't have money to get more. Never true       Transition Planning    Patient/Family Anticipates Transition to home with family;home with help/services    Patient/Family Anticipated Services at Transition home health care    Transportation Anticipated family or friend will provide       Discharge Needs Assessment    Readmission Within the Last 30 Days no previous admission in last 30 days    Current Outpatient/Agency/Support Group homecare agency    Equipment Currently Used at Home rollator;oxygen;wheelchair    Concerns to be Addressed  denies needs/concerns at this time    Anticipated Changes Related to Illness none    Equipment Needed After Discharge other (see comments)  TBD, currently denies    Outpatient/Agency/Support Group Needs homecare agency    Provided Post Acute Provider List? N/A    N/A Provider List Comment Current with [facility]     Patient's Choice of Community Agency(s) [facility]                   Discharge Plan       Row Name 03/13/25 6701       Plan    Plan DC plan is return home with Naty, current with YANI Naty to transport.    Patient/Family in Agreement with Plan yes    Plan Comments Pt confused per Xiomara/RN, screen completed by pt's daughter, Naty Acharya. CCP called Naty introduced self, role, & DC planning discussed. Face sheet information & pharmacy verified. Naty reports pt lives in single-story house with her with 1 HIRA & no steps inside. Prior to admit, Naty reports pt does not drive; Naty drives or pt utilizes [employer]. Pt is mostly IADLs; Naty assists PRN & pt is current with [facility]. Naty reports use of the following DME:  cane, rollator, W/C, O2 supplied [employer]. Naty reports pt has HD MWF at [facility] [employer]. Naty denies pt having a living will & declined need for information. Naty declines Meds to Beds. Vivienneies issues with affording or managing medications, affording food, or affording utilities. Pt is current with [facility] & Naty wants to continue working with them. Naty denies history of rehab. Denies any needs/equipment. DCP report created. Epic referral placed. CCP notified [facility]/ [facility] regarding referral. CCP will follow. DC plan is return home with Naty, current with Naty YANG to transport. FERCHO Block/CCP                  Continued Care and Services - Admitted Since 3/9/2025       Home Medical Care       Service Provider Request Status Services Address Phone Fax Patient Preferred    [facility] Pending - Request Sent -- [address], [address],  [facility] [address] [phone] --                  Selected Continued Care - Prior Encounters Includes continued care and service providers with selected services from prior encounters from 12/9/2024 to 3/13/2025      Discharged on 2/15/2025 Admission date: 2/13/2025 - Discharge disposition: Home-Health Care Sv      Home Medical Care       Service Provider Services Address Phone Fax Patient Preferred    [facility] [address], [address] [address] [phone] --                      Discharged on 1/17/2025 Admission date: 1/6/2025 - Discharge disposition: Home-Health Care Svc      Durable Medical Equipment       Service Provider Services Address Phone Fax Patient Preferred    [facility] [address] [facility] [address] [phone] --              Home Medical Care       Service Provider Services Address Phone Fax Patient Preferred    [facility] [address], [address] [address] [phone] --                          Expected Discharge Date and Time       Expected Discharge Date Expected Discharge Time    Mar 13, 2025            Demographic Summary       Row Name 03/13/25 1154       General Information    Admission Type inpatient    Arrived From emergency department    Referral Source admission list    Reason for Consult discharge planning    Preferred Language English       Contact Information    Permission Granted to Share Info With ;family/designee                   Functional Status       Row Name 03/13/25 1154       Functional Status    Usual Activity Tolerance moderate    Current Activity Tolerance moderate       Assessment of Health Literacy    How often do you have someone help you read hospital materials? Occasionally    How often do you have problems learning about  your medical condition because of difficulty understanding written information? Occasionally    How often do you have a problem understanding what is told to you about your medical condition? Occasionally    How confident are you filling out medical forms by yourself? Somewhat    Health Literacy Good       Functional Status, IADL    Medications assistive person    Meal Preparation assistive person    Housekeeping assistive person    Laundry assistive person    Shopping assistive person    If for any reason you need help with day-to-day activities such as bathing, preparing meals, shopping, managing finances, etc., do you get the help you need? I get all the help I need    IADL Comments Pt's Naty campos, assists PRN & [facility] .       Mental Status    General Appearance WDL WDL       Mental Status Summary    Recent Changes in Mental Status/Cognitive Functioning unable to assess  Pt confused per Xiomara/FERCHO, screen completed by pt's daughter, Naty Acharya       Employment/    Employment Status retired           Sylvia Peters RN

## 2025-03-13 NOTE — SIGNIFICANT NOTE
"   03/13/25 1504   OTHER   Discipline physical therapist   Rehab Time/Intention   Session Not Performed patient/family declined treatment  (attempted to see this afternoon. pt declines, stating \"she is already strong enough and young enough\"-- will follow up again tomorrow.)   Recommendation   PT - Next Appointment 03/14/25       "

## 2025-03-13 NOTE — PLAN OF CARE
Goal Outcome Evaluation:  Plan of Care Reviewed With: patient        Progress: no change  Outcome Evaluation: vss. telemetry monitor, sr/sb. confused. room air. up with assist. hd scheduled for tomorrow.

## 2025-03-13 NOTE — DISCHARGE PLACEMENT REQUEST
"Juanito Lara (74 y.o. Female)       Date of Birth   1950    Social Security Number       Address   [address] [place] [address]    Home Phone   [phone]    MRN   [id number]       Faith   None    Marital Status   Single                            Admission Date   3/9/2025    Admission Type   Emergency    Admitting Provider   Casey Mendoza MD    Attending Provider   Casey Middleton MD    Department, Room/Bed   [facility], 3111/1       Discharge Date       Discharge Disposition       Discharge Destination                                 Attending Provider: Casey Middleton MD    Allergies: No Known Allergies    Isolation: None   Infection: None   Code Status: CPR    Ht: 152.4 cm (60\")   Wt: 47.8 kg (105 lb 6.1 oz)    Admission Cmt: None   Principal Problem: Altered mental status [R41.82]                   Active Insurance as of 3/9/2025       Primary Coverage       Payor Plan Insurance Group Employer/Plan Group    HUMANA MEDICARE REPLACEMENT HUMANA MEDICARE ADVANTAGE HMO [id number]       Payor Plan Address Payor Plan Phone Number Payor Plan Fax Number Effective Dates    [address] [address] [phone]  1/1/2023 - None Entered    [facility] [id number]         Subscriber Name Subscriber Birth Date Member ID       JUANITO LARA 1950 [id number]                     Emergency Contacts        (Rel.) Home Phone Work Phone Mobile Phone    Naty Lara (Daughter) -- -- [phone]          "

## 2025-03-13 NOTE — PROGRESS NOTES
[facility] Hospital [facility]    [phone]   LOS: 1 day     Name: Sonia Acharya  Age/Sex: 74 y.o. female  :  1950        PCP: Marcy Paez PA  Chief Complaint   Patient presents with    Altered Mental Status      Subjective   She is lethargic this morning but when I did get her awake she was confused.  She quickly fell back asleep.  Discussed with the nurse and she had a fairly similar interaction  Review of systems is unreliable.    amLODIPine, 10 mg, Oral, Daily  aspirin, 81 mg, Oral, Daily  atorvastatin, 40 mg, Oral, Daily  budesonide-formoterol, 2 puff, Inhalation, BID - RT  clopidogrel, 75 mg, Oral, Daily  diphenhydrAMINE, 12.5 mg, Intravenous, Once  donepezil, 5 mg, Oral, Nightly  fluticasone, 2 spray, Nasal, Daily  hydrALAZINE, 50 mg, Oral, TID  insulin lispro, 2-7 Units, Subcutaneous, 4x Daily AC & at Bedtime  isosorbide mononitrate, 30 mg, Oral, Daily  Lidocaine, 1 patch, Transdermal, Q24H  losartan, 100 mg, Oral, Daily  metoprolol succinate XL, 25 mg, Oral, Daily  OLANZapine, 5 mg, Oral, Daily With Dinner  sodium chloride, 10 mL, Intravenous, Q12H  thiamine, 100 mg, Oral, Daily           Objective   Vital Signs  Temp:  [97.1 °F (36.2 °C)-98.9 °F (37.2 °C)] 98.4 °F (36.9 °C)  Heart Rate:  [60-69] 69  Resp:  [16-18] 16  BP: (115-157)/(49-90) 157/61  Body mass index is 20.58 kg/m².    Intake/Output Summary (Last 24 hours) at 3/13/2025 0823  Last data filed at 3/12/2025 1800  Gross per 24 hour   Intake 240 ml   Output 1000 ml   Net -760 ml       Physical Exam      Results Review:       I reviewed the patient's new clinical results.  Results from last 7 days   Lab Units 25  0325 03/10/25  0323 25   WBC 10*3/mm3 3.35* 3.97 4.78   HEMOGLOBIN g/dL 9.1* 10.3* 10.2*   PLATELETS 10*3/mm3 204 297 310     Results from last 7 days   Lab Units 25  0325 03/10/25  0323 25  1945   SODIUM mmol/L 137 139 139   POTASSIUM mmol/L 4.8 4.9 4.6   CHLORIDE mmol/L 98 96* 95*   CO2 mmol/L  25.0 19.1* 24.4   BUN mg/dL 34* 35* 33*   CREATININE mg/dL 5.86* 5.92* 5.80*   CALCIUM mg/dL 8.5* 9.4 9.8   MAGNESIUM mg/dL  --   --  2.5*   Estimated Creatinine Clearance: 6.3 mL/min (A) (by C-G formula based on SCr of 5.86 mg/dL (H)).      Assessment & Plan   Active Hospital Problems    Diagnosis  POA    **Altered mental status [R41.82]  Yes    Dementia with agitation [F03.911]  Yes    CAD (coronary artery disease) [I25.10]  Yes    Chronic respiratory failure with hypoxia [J96.11]  Yes    ESRD (end stage renal disease) on dialysis [N18.6, Z99.2]  Not Applicable    Bradycardia [R00.1]  Yes    Chronic diastolic CHF (congestive heart failure) [I50.32]  Yes    ANGIE (obstructive sleep apnea) [G47.33]  Yes    Anemia, chronic disease [D63.8]  Yes    HTN (hypertension) [I10]  Yes      Resolved Hospital Problems   No resolved problems to display.       PLAN  This is a 74-year-old lady with a history of coronary artery disease end-stage renal disease chronic diastolic heart failure obstructive sleep apnea hypertension and anemia of chronic disease who presents to the hospital with confusion and was admitted with altered mental status to evaluate for medical etiologies.  -Certainly possible that hypertensive encephalopathy could be part of the issue but her blood pressure is normalized and her mentation has not.  -I think it is probably more likely this is a multifactorial process with a large component of this being dementia.  This is probably further exacerbated by her hospitalization.  -Appreciate neurology's evaluation and recommendations.  They have signed off and cleared for discharge from their perspective.  -Medical workup thus far remains negative for etiology of her symptoms and confusion.  -Tolerating dialysis appreciate nephrology assistance  -Initiated on Zyprexa and I wonder if this might be causing some of her somnolence this morning.  Will further evaluate this afternoon discussed with the nursing staff and  will see if maybe in the afternoon she is more awake alert and appropriate  -Mechanical DVT prophylaxis  -Full code      Disposition  Expected Discharge Date: 3/13/2025; Expected Discharge Time:        Casey Middleton MD  [facility]  03/13/25  08:23 EDT

## 2025-03-13 NOTE — PROGRESS NOTES
IDENTIFYING INFORMATION: The patient is a 74-year-old white female with a history of end-stage renal disease dementia admitted with altered mental status.    CHIEF COMPLAINT: None given    INFORMANT: Patient, staff and chart    RELIABILITY: Fair    HISTORY OF PRESENT ILLNESS: The patient is a 74-year-old -American female with history of end-stage renal disease she was admitted with changes in mental status, speaking nonsensically and having visual hallucinations.  The patient has reportedly had poor appetite for several days and has lost a great deal of weight recently.  She has also been sleeping for long periods of time.  On 311 the patient woke up extremely agitated and paranoid.  When seen today, the patient is quite suspicious and accuses her daughter of sending this physician in of trying to steal her belongings.  She refuses to comply with attempted mental status examination or other interview.  She does not appear to be responding to any internal stimuli but is clearly paranoid.    PAST PSYCHIATRIC HISTORY: The patient reportedly carries a diagnosis of dementing illness    PAST MEDICAL HISTORY: Significant for end-stage renal disease, coronary artery disease, chronic respiratory failure, bradycardia, congestive heart failure, obstructive sleep apnea, anemia, hypertension    MEDICATIONS:   Current Facility-Administered Medications   Medication Dose Route Frequency Provider Last Rate Last Admin    acetaminophen (TYLENOL) tablet 650 mg  650 mg Oral Q4H PRN Nasra Harris APRN   650 mg at 03/12/25 0821    Or    acetaminophen (TYLENOL) 160 MG/5ML oral solution 650 mg  650 mg Oral Q4H PRN Nasra Harris APRN        Or    acetaminophen (TYLENOL) suppository 650 mg  650 mg Rectal Q4H PRN Nasra Harris APRN        amLODIPine (NORVASC) tablet 10 mg  10 mg Oral Daily Nasra Harris APRN   10 mg at 03/13/25 0857    aspirin EC tablet 81 mg  81 mg Oral Daily Casey Mendoza MD   81 mg at  03/13/25 0857    atorvastatin (LIPITOR) tablet 40 mg  40 mg Oral Daily Nasra Harris APRN   40 mg at 03/13/25 0857    sennosides-docusate (PERICOLACE) 8.6-50 MG per tablet 2 tablet  2 tablet Oral BID PRN Nasra Harris APRN        And    polyethylene glycol (MIRALAX) packet 17 g  17 g Oral Daily PRN Nasra Harris APRN        And    bisacodyl (DULCOLAX) EC tablet 5 mg  5 mg Oral Daily PRN Nasra Harris APRN        And    bisacodyl (DULCOLAX) suppository 10 mg  10 mg Rectal Daily PRN Nasra Harris APRN        budesonide-formoterol (SYMBICORT) 160-4.5 MCG/ACT inhaler 2 puff  2 puff Inhalation BID - RT Nasra Harris APRN   2 puff at 03/13/25 0838    clopidogrel (PLAVIX) tablet 75 mg  75 mg Oral Daily Nasra Harris APRN   75 mg at 03/13/25 0857    dextrose (D50W) (25 g/50 mL) IV injection 25 g  25 g Intravenous Q15 Min PRN Nasra Harris APRN        dextrose (GLUTOSE) oral gel 15 g  15 g Oral Q15 Min PRN Nasra Harris APRN        diphenhydrAMINE (BENADRYL) capsule 50 mg  50 mg Oral Q6H PRN Nasra Harris APRN   50 mg at 03/10/25 2216    diphenhydrAMINE (BENADRYL) injection 12.5 mg  12.5 mg Intravenous Once Sisi Hopson APRN        donepezil (ARICEPT) tablet 5 mg  5 mg Oral Nightly Nasra Harris APRN   5 mg at 03/12/25 2040    famotidine (PEPCID) tablet 20 mg  20 mg Oral BID PRN Nasra Harris APRN        fluticasone (FLONASE) 50 MCG/ACT nasal spray 2 spray  2 spray Nasal Daily Nasra Harris APRN        glucagon (GLUCAGEN) injection 1 mg  1 mg Intramuscular Q15 Min PRN Nasra Harris APRN        hydrALAZINE (APRESOLINE) tablet 50 mg  50 mg Oral TID Nasra Harris APRN   50 mg at 03/13/25 0900    insulin lispro (HUMALOG/ADMELOG) injection 2-7 Units  2-7 Units Subcutaneous 4x Daily AC & at Bedtime Nasra Harris APRN        isosorbide mononitrate (IMDUR) 24 hr tablet 30 mg  30 mg Oral Daily Casey Mendoza MD    30 mg at 03/13/25 0857    Lidocaine 4 % 1 patch  1 patch Transdermal Q24H Nasra Harris APRN   1 patch at 03/12/25 0822    LORazepam (ATIVAN) injection 1 mg  1 mg Intravenous Once PRN Jorje Chin MD        losartan (COZAAR) tablet 100 mg  100 mg Oral Daily Casey Mendoza MD   100 mg at 03/13/25 0857    melatonin tablet 5 mg  5 mg Oral Nightly PRN Nasra Harris APRN   5 mg at 03/12/25 2040    metoprolol succinate XL (TOPROL-XL) 24 hr tablet 25 mg  25 mg Oral Daily Casey Mendoza MD   25 mg at 03/13/25 0857    nitroglycerin (NITROSTAT) SL tablet 0.4 mg  0.4 mg Sublingual Q5 Min PRN Nasra Harris APRN        OLANZapine (zyPREXA) tablet 5 mg  5 mg Oral Daily With Dinner Jorje Chin MD   5 mg at 03/12/25 1807    ondansetron (ZOFRAN) injection 4 mg  4 mg Intravenous Q6H PRN Nasra Harris APRN   4 mg at 03/11/25 1040    sodium chloride 0.9 % flush 10 mL  10 mL Intravenous PRN Scott Hilario MD        sodium chloride 0.9 % flush 10 mL  10 mL Intravenous Q12H Nasra Harris APRN   10 mL at 03/13/25 0857    sodium chloride 0.9 % flush 10 mL  10 mL Intravenous PRN Nasra Harris APRN        sodium chloride 0.9 % infusion 40 mL  40 mL Intravenous PRN Nasra Harris APRN        thiamine (VITAMIN B-1) tablet 100 mg  100 mg Oral Daily Casey Mendoza MD   100 mg at 03/13/25 0857         ALLERGIES: None    FAMILY HISTORY: Not obtained    SOCIAL HISTORY: The patient lives with her daughter.  There is no reported use of alcohol tobacco or street drugs.    MENTAL STATUS EXAM: The patient is an elderly -American female appearing her stated age.  She is dressed in hospital garb.  She is awake alert and oriented x 2.  Her mood is irritable her affect congruent.  Speech is impoverished and evasive.  The patient does not comply with formal testing of memory and cognition.  She denies current suicidal or homicidal ideation.  She does report  positive paranoid thinking regarding her daughter.  Her judgment and insight exhibit significant impairment.    ASSETS/LIABILITIES: To be assessed    DIAGNOSTIC IMPRESSION: Metabolic encephalopathy superimposed on pre-existing dementing illness, medical problems as noted previously    PLAN: I am in agreement with initiation of Zyprexa 5 mg with dinner and will leave orders for as needed Zyprexa to be given for any breakthrough agitation.  I will continue to follow with you.

## 2025-03-13 NOTE — PROGRESS NOTES
[facility] Progress Note      Patient Name: Sonia Acharya  : 1950  MRN: [id number]  Primary Care Physician:  Marcy Paez PA  Date of admission: 3/9/2025    Subjective     Interval History:     Overnight no event  Patient lying in bed comfortable  Patient somnolent this morning    Patient underwent hemodialysis yesterday without any complications and ultrafiltration of 1 L      Review of Systems:   As noted above    Objective     Vitals:   Temp:  [97.1 °F (36.2 °C)-98.9 °F (37.2 °C)] 98.4 °F (36.9 °C)  Heart Rate:  [60-69] 69  Resp:  [16-18] 16  BP: (115-157)/(49-90) 157/61    Intake/Output Summary (Last 24 hours) at 3/13/2025 0825  Last data filed at 3/12/2025 1800  Gross per 24 hour   Intake 240 ml   Output 1000 ml   Net -760 ml       Physical Exam:    General Appearance: Chronically ill  Skin: warm and dry  HEENT: oral mucosa normal, nonicteric sclera  Neck: supple, no JVD  Lungs: CTA  Heart: RRR, normal S1 and S2  Abdomen: soft, nontender, nondistended  : no palpable bladder  Extremities: no edema, cyanosis or clubbing.right upper extremity AV fistula   Neuro: no focalization     Scheduled Meds:     amLODIPine, 10 mg, Oral, Daily  aspirin, 81 mg, Oral, Daily  atorvastatin, 40 mg, Oral, Daily  budesonide-formoterol, 2 puff, Inhalation, BID - RT  clopidogrel, 75 mg, Oral, Daily  diphenhydrAMINE, 12.5 mg, Intravenous, Once  donepezil, 5 mg, Oral, Nightly  fluticasone, 2 spray, Nasal, Daily  hydrALAZINE, 50 mg, Oral, TID  insulin lispro, 2-7 Units, Subcutaneous, 4x Daily AC & at Bedtime  isosorbide mononitrate, 30 mg, Oral, Daily  Lidocaine, 1 patch, Transdermal, Q24H  losartan, 100 mg, Oral, Daily  metoprolol succinate XL, 25 mg, Oral, Daily  OLANZapine, 5 mg, Oral, Daily With Dinner  sodium chloride, 10 mL, Intravenous, Q12H  thiamine, 100 mg, Oral, Daily      IV Meds:        Results Reviewed:   I have personally reviewed the results from the time of this admission to  3/13/2025 08:25 EDT     Results from last 7 days   Lab Units 03/12/25  0325 03/10/25  0323 03/09/25  1945   SODIUM mmol/L 137 139 139   POTASSIUM mmol/L 4.8 4.9 4.6   CHLORIDE mmol/L 98 96* 95*   CO2 mmol/L 25.0 19.1* 24.4   BUN mg/dL 34* 35* 33*   CREATININE mg/dL 5.86* 5.92* 5.80*   CALCIUM mg/dL 8.5* 9.4 9.8   BILIRUBIN mg/dL  --  0.3 0.3   ALK PHOS U/L  --  101 115   ALT (SGPT) U/L  --  8 6   AST (SGOT) U/L  --  16 16   GLUCOSE mg/dL 82 88 83       Estimated Creatinine Clearance: 6.3 mL/min (A) (by C-G formula based on SCr of 5.86 mg/dL (H)).    Results from last 7 days   Lab Units 03/09/25 1945   MAGNESIUM mg/dL 2.5*             Results from last 7 days   Lab Units 03/12/25  0325 03/10/25  0323 03/09/25  1945   WBC 10*3/mm3 3.35* 3.97 4.78   HEMOGLOBIN g/dL 9.1* 10.3* 10.2*   PLATELETS 10*3/mm3 204 297 310             Assessment / Plan     ASSESSMENT:    End Stage Renal Disease ( ESRD ) on Hemodialysis on a Monday, Wednesday and Friday schedule.s/p  RUE AVF functional .Continue to arrange hemodialysis treatment during patient  admission. Continue renal diet      -Chronic normocytic anemia. On Epogen protocol.  We will continue to follow H&H closely      -Hyperphosphatemia. Continue renal diet. We will follow phosphorus to make further adjustments to binder if indicated      -Hypertension w/ CKD .  Resume home regimen of amlodipine, hydralazine, isosorbide, metoprolol and losartan     . We will continue to follow closely. Heart healthy diet      -Coronary artery disease status post cardiac cath with chronic diastolic congestive (echocardiogram showing EF of 56% with severe concentric hypertrophy  ) heart failure.  Optimize volume status with dialysis.  Heart healthy diet. Recommendations per primary team     -Altered mental status/encephalopathy being worked up and evaluated followed by neurology        PLAN:  Despite her regular hemodialysis schedule. patient continues to have fluctuating mentation   No acute  indication for hemodialysis today will arrange for tomorrow  Order Epogen 10,000 units subcu once  Continue surveillance labs    Thank you for involving us in the care of Sonia Acharya.  Please feel free to call with any questions.    Gabe Solano MD  03/13/25  08:25 EDT    [facility]  [phone]    Please note that portions of this note were completed with a voice recognition program.

## 2025-03-14 LAB
GLUCOSE BLDC GLUCOMTR-MCNC: 114 MG/DL (ref 70–130)
GLUCOSE BLDC GLUCOMTR-MCNC: 119 MG/DL (ref 70–130)
GLUCOSE BLDC GLUCOMTR-MCNC: 73 MG/DL (ref 70–130)
HBV SURFACE AG SERPL QL IA: NORMAL

## 2025-03-14 PROCEDURE — 94760 N-INVAS EAR/PLS OXIMETRY 1: CPT

## 2025-03-14 PROCEDURE — 87340 HEPATITIS B SURFACE AG IA: CPT | Performed by: INTERNAL MEDICINE

## 2025-03-14 PROCEDURE — 82948 REAGENT STRIP/BLOOD GLUCOSE: CPT

## 2025-03-14 PROCEDURE — 94799 UNLISTED PULMONARY SVC/PX: CPT

## 2025-03-14 PROCEDURE — 94761 N-INVAS EAR/PLS OXIMETRY MLT: CPT

## 2025-03-14 RX ADMIN — LOSARTAN POTASSIUM 100 MG: 50 TABLET, FILM COATED ORAL at 13:17

## 2025-03-14 RX ADMIN — AMLODIPINE BESYLATE 10 MG: 10 TABLET ORAL at 13:17

## 2025-03-14 RX ADMIN — HYDRALAZINE HYDROCHLORIDE 50 MG: 50 TABLET ORAL at 13:17

## 2025-03-14 RX ADMIN — BUDESONIDE AND FORMOTEROL FUMARATE DIHYDRATE 2 PUFF: 160; 4.5 AEROSOL RESPIRATORY (INHALATION) at 20:57

## 2025-03-14 RX ADMIN — ISOSORBIDE MONONITRATE 30 MG: 30 TABLET, EXTENDED RELEASE ORAL at 13:18

## 2025-03-14 RX ADMIN — ACETAMINOPHEN 650 MG: 325 TABLET, FILM COATED ORAL at 13:16

## 2025-03-14 RX ADMIN — OLANZAPINE 5 MG: 5 TABLET, FILM COATED ORAL at 17:00

## 2025-03-14 RX ADMIN — METOPROLOL SUCCINATE 25 MG: 25 TABLET, EXTENDED RELEASE ORAL at 13:18

## 2025-03-14 RX ADMIN — ATORVASTATIN CALCIUM 40 MG: 20 TABLET, FILM COATED ORAL at 13:17

## 2025-03-14 RX ADMIN — Medication 10 ML: at 20:48

## 2025-03-14 RX ADMIN — FLUTICASONE PROPIONATE 2 SPRAY: 50 SPRAY, METERED NASAL at 13:16

## 2025-03-14 RX ADMIN — DONEPEZIL HYDROCHLORIDE 5 MG: 10 TABLET, FILM COATED ORAL at 20:47

## 2025-03-14 RX ADMIN — CLOPIDOGREL 75 MG: 75 TABLET, FILM COATED ORAL at 13:18

## 2025-03-14 RX ADMIN — Medication 100 MG: at 13:17

## 2025-03-14 RX ADMIN — ASPIRIN 81 MG: 81 TABLET, COATED ORAL at 13:17

## 2025-03-14 NOTE — PROGRESS NOTES
The patient is unavailable for interview today.  She underwent hemodialysis earlier today and tolerated the treatment well.  Charting does not indicate any further management issues.  If discharged, I would recommend that she continue Zyprexa 5 mg with dinner and consideration may be given to formal neuropsychological evaluation in 4 to 6 weeks.

## 2025-03-14 NOTE — SIGNIFICANT NOTE
03/14/25 1535   OTHER   Discipline physical therapist   Rehab Time/Intention   Session Not Performed patient/family declined treatment  (attempted to see pt this afternoon, she was off floor to dialysis this AM. however, pt now very sleepy and would like to rest at this time. Will follow up.)   Recommendation   PT - Next Appointment 03/17/25

## 2025-03-14 NOTE — PROGRESS NOTES
[facility] Progress Note      Patient Name: Sonia Acharya  : 1950  MRN: [id number]  Primary Care Physician:  Marcy aPez PA  Date of admission: 3/9/2025    Subjective     Interval History:     Overnight no event  Patient lying in bed comfortable    Patient: Continues to remain somnolent    Patient seen and examined during hemodialysis tolerating procedure without complications patient remains hemodynamic stable.  Fistula is functional      Review of Systems:   As noted above    Objective     Vitals:   Temp:  [98 °F (36.7 °C)-98.2 °F (36.8 °C)] 98 °F (36.7 °C)  Heart Rate:  [55-69] 55  Resp:  [18-19] 18  BP: (133-161)/(53-73) 147/56  No intake or output data in the 24 hours ending 25 0953      Physical Exam:    General Appearance: Chronically ill  Skin: warm and dry  HEENT: oral mucosa normal, nonicteric sclera  Neck: supple, no JVD  Lungs: CTA  Heart: RRR, normal S1 and S2  Abdomen: soft, nontender, nondistended  : no palpable bladder  Extremities: no edema, cyanosis or clubbing.right upper extremity AV fistula   Neuro: no focalization     Scheduled Meds:     amLODIPine, 10 mg, Oral, Daily  aspirin, 81 mg, Oral, Daily  atorvastatin, 40 mg, Oral, Daily  budesonide-formoterol, 2 puff, Inhalation, BID - RT  clopidogrel, 75 mg, Oral, Daily  diphenhydrAMINE, 12.5 mg, Intravenous, Once  donepezil, 5 mg, Oral, Nightly  fluticasone, 2 spray, Nasal, Daily  hydrALAZINE, 50 mg, Oral, TID  insulin lispro, 2-7 Units, Subcutaneous, 4x Daily AC & at Bedtime  isosorbide mononitrate, 30 mg, Oral, Daily  Lidocaine, 1 patch, Transdermal, Q24H  losartan, 100 mg, Oral, Daily  metoprolol succinate XL, 25 mg, Oral, Daily  OLANZapine, 5 mg, Oral, Daily With Dinner  sodium chloride, 10 mL, Intravenous, Q12H  thiamine, 100 mg, Oral, Daily      IV Meds:        Results Reviewed:   I have personally reviewed the results from the time of this admission to 3/14/2025 09:53 EDT     Results from  last 7 days   Lab Units 03/12/25  0325 03/10/25  0323 03/09/25  1945   SODIUM mmol/L 137 139 139   POTASSIUM mmol/L 4.8 4.9 4.6   CHLORIDE mmol/L 98 96* 95*   CO2 mmol/L 25.0 19.1* 24.4   BUN mg/dL 34* 35* 33*   CREATININE mg/dL 5.86* 5.92* 5.80*   CALCIUM mg/dL 8.5* 9.4 9.8   BILIRUBIN mg/dL  --  0.3 0.3   ALK PHOS U/L  --  101 115   ALT (SGPT) U/L  --  8 6   AST (SGOT) U/L  --  16 16   GLUCOSE mg/dL 82 88 83       Estimated Creatinine Clearance: 6.3 mL/min (A) (by C-G formula based on SCr of 5.86 mg/dL (H)).    Results from last 7 days   Lab Units 03/09/25 1945   MAGNESIUM mg/dL 2.5*             Results from last 7 days   Lab Units 03/12/25  0325 03/10/25  0323 03/09/25  1945   WBC 10*3/mm3 3.35* 3.97 4.78   HEMOGLOBIN g/dL 9.1* 10.3* 10.2*   PLATELETS 10*3/mm3 204 297 310             Assessment / Plan     ASSESSMENT:    End Stage Renal Disease ( ESRD ) on Hemodialysis on a Monday, Wednesday and Friday schedule.s/p  RUE AVF functional .Continue to arrange hemodialysis treatment during patient  admission. Continue renal diet      -Chronic normocytic anemia. On Epogen protocol.  We will continue to follow H&H closely      -Hyperphosphatemia. Continue renal diet. We will follow phosphorus to make further adjustments to binder if indicated      -Hypertension w/ CKD .  Resume home regimen of amlodipine, hydralazine, isosorbide, metoprolol and losartan     . We will continue to follow closely. Heart healthy diet      -Coronary artery disease status post cardiac cath with chronic diastolic congestive (echocardiogram showing EF of 56% with severe concentric hypertrophy  ) heart failure.  Optimize volume status with dialysis.  Heart healthy diet. Recommendations per primary team     -Altered mental status/encephalopathy being worked up and evaluated followed by neurology        PLAN:    Patient seen and examined during hemodialysis tolerating procedure without complications patient remains hemodynamic stable.  Fistula is  functional  Continue surveillance labs    Discussed with dialysis nursing staff at bedside    Thank you for involving us in the care of Sonia Acharya.  Please feel free to call with any questions.    Gabe Solano MD  03/14/25  09:53 EDT    [facility]  [phone]    Please note that portions of this note were completed with a voice recognition program.

## 2025-03-14 NOTE — PROGRESS NOTES
[facility]    [phone]   LOS: 2 days     Name: Sonia Acharya  Age/Sex: 74 y.o. female  :  1950        PCP: Marcy Paez PA  Chief Complaint   Patient presents with    Altered Mental Status      Subjective   She is seen on dialysis today.  She is awake and alert she is actually oriented x 3 for me.  Had a better night last night and seems more agreeable today but was pretty delusional yesterday.  Did require additional medications.  Review of systems is unreliable.    amLODIPine, 10 mg, Oral, Daily  aspirin, 81 mg, Oral, Daily  atorvastatin, 40 mg, Oral, Daily  budesonide-formoterol, 2 puff, Inhalation, BID - RT  clopidogrel, 75 mg, Oral, Daily  diphenhydrAMINE, 12.5 mg, Intravenous, Once  donepezil, 5 mg, Oral, Nightly  fluticasone, 2 spray, Nasal, Daily  hydrALAZINE, 50 mg, Oral, TID  insulin lispro, 2-7 Units, Subcutaneous, 4x Daily AC & at Bedtime  isosorbide mononitrate, 30 mg, Oral, Daily  Lidocaine, 1 patch, Transdermal, Q24H  losartan, 100 mg, Oral, Daily  metoprolol succinate XL, 25 mg, Oral, Daily  OLANZapine, 5 mg, Oral, Daily With Dinner  sodium chloride, 10 mL, Intravenous, Q12H  thiamine, 100 mg, Oral, Daily           Objective   Vital Signs  Temp:  [98 °F (36.7 °C)-98.2 °F (36.8 °C)] 98.1 °F (36.7 °C)  Heart Rate:  [53-83] 73  Resp:  [14-19] 18  BP: (133-178)/(53-73) 178/73  Body mass index is 19.63 kg/m².    Intake/Output Summary (Last 24 hours) at 3/14/2025 1455  Last data filed at 3/14/2025 1142  Gross per 24 hour   Intake --   Output 1000 ml   Net -1000 ml       Physical Exam      Results Review:       I reviewed the patient's new clinical results.  Results from last 7 days   Lab Units 25  0325 03/10/25  0323 25   WBC 10*3/mm3 3.35* 3.97 4.78   HEMOGLOBIN g/dL 9.1* 10.3* 10.2*   PLATELETS 10*3/mm3 204 297 310     Results from last 7 days   Lab Units 25  0325 03/10/25  0323 25  1945   SODIUM mmol/L 137 139 139   POTASSIUM mmol/L 4.8  4.9 4.6   CHLORIDE mmol/L 98 96* 95*   CO2 mmol/L 25.0 19.1* 24.4   BUN mg/dL 34* 35* 33*   CREATININE mg/dL 5.86* 5.92* 5.80*   CALCIUM mg/dL 8.5* 9.4 9.8   MAGNESIUM mg/dL  --   --  2.5*   Estimated Creatinine Clearance: 6 mL/min (A) (by C-G formula based on SCr of 5.86 mg/dL (H)).      Assessment & Plan   Active Hospital Problems    Diagnosis  POA    **Altered mental status [R41.82]  Yes    Dementia with agitation [F03.911]  Yes    CAD (coronary artery disease) [I25.10]  Yes    Chronic respiratory failure with hypoxia [J96.11]  Yes    ESRD (end stage renal disease) on dialysis [N18.6, Z99.2]  Not Applicable    Bradycardia [R00.1]  Yes    Chronic diastolic CHF (congestive heart failure) [I50.32]  Yes    ANGIE (obstructive sleep apnea) [G47.33]  Yes    Anemia, chronic disease [D63.8]  Yes    HTN (hypertension) [I10]  Yes      Resolved Hospital Problems   No resolved problems to display.       PLAN  This is a 74-year-old lady with a history of coronary artery disease end-stage renal disease chronic diastolic heart failure obstructive sleep apnea hypertension and anemia of chronic disease who presents to the hospital with confusion and was admitted with altered mental status to evaluate for medical etiologies.  -Certainly possible that hypertensive encephalopathy could be part of the issue but her blood pressure is normalized and her mentation has not.  -I think it is probably more likely this is a multifactorial process with a large component of this being dementia.  This is probably further exacerbated by her hospitalization.  -Appreciate neurology's evaluation and recommendations.  They have signed off and cleared for discharge from their perspective.  -Medical workup thus far remains negative for etiology of her symptoms and confusion.  -Tolerating dialysis appreciate nephrology assistance  -Continue Zyprexa  -Mechanical DVT prophylaxis  -Full code      Disposition  Expected Discharge Date: 3/14/2025; Expected  Discharge Time:    I talked with her daughter on the phone today.  Discussed the natural course of dementia.  I feel like a lot of her mentation issues are probably related to her underlying dementia.  She has had formal neuropsychiatric testing in the outpatient setting about a year and a half ago with diagnosed with the early stages of dementia.  Discussed the natural progression of dementia and the likelihood of steep drop off at times and this may be 1 of those times will be dropped off and never returned to her prior baseline.  The daughter is agreeable to taking her home tomorrow if manage is to remain stable overnight tonight.  Continue Zyprexa for now    Casey Middleton MD  [facility]  03/14/25  14:55 EDT

## 2025-03-14 NOTE — PLAN OF CARE
Goal Outcome Evaluation:  Plan of Care Reviewed With: patient        Progress: no change  Outcome Evaluation: vss. telemetry monitor, sr. confused. room air. falls precautions. HD today.

## 2025-03-14 NOTE — PLAN OF CARE
Goal Outcome Evaluation:  Plan of Care Reviewed With: patient        Progress: no change  Outcome Evaluation: VSS. HD scheduled for today. Pain treated with PRN meds as ordered. Plan of care ongoing.

## 2025-03-15 ENCOUNTER — READMISSION MANAGEMENT (OUTPATIENT)
Dept: CALL CENTER | Facility: HOSPITAL | Age: 75
End: 2025-03-15
Payer: MEDICARE

## 2025-03-15 VITALS
TEMPERATURE: 98.5 F | HEART RATE: 68 BPM | HEIGHT: 60 IN | OXYGEN SATURATION: 100 % | RESPIRATION RATE: 18 BRPM | SYSTOLIC BLOOD PRESSURE: 156 MMHG | BODY MASS INDEX: 19.74 KG/M2 | WEIGHT: 100.53 LBS | DIASTOLIC BLOOD PRESSURE: 63 MMHG

## 2025-03-15 LAB — GLUCOSE BLDC GLUCOMTR-MCNC: 75 MG/DL (ref 70–130)

## 2025-03-15 PROCEDURE — 94761 N-INVAS EAR/PLS OXIMETRY MLT: CPT

## 2025-03-15 PROCEDURE — 94799 UNLISTED PULMONARY SVC/PX: CPT

## 2025-03-15 PROCEDURE — 82948 REAGENT STRIP/BLOOD GLUCOSE: CPT

## 2025-03-15 RX ORDER — LANOLIN ALCOHOL/MO/W.PET/CERES
100 CREAM (GRAM) TOPICAL DAILY
Qty: 30 TABLET | Refills: 0 | Status: SHIPPED | OUTPATIENT
Start: 2025-03-15

## 2025-03-15 RX ORDER — OLANZAPINE 5 MG/1
5 TABLET ORAL
Qty: 30 TABLET | Refills: 0 | Status: SHIPPED | OUTPATIENT
Start: 2025-03-15

## 2025-03-15 RX ADMIN — ISOSORBIDE MONONITRATE 30 MG: 30 TABLET, EXTENDED RELEASE ORAL at 08:35

## 2025-03-15 RX ADMIN — Medication 100 MG: at 08:35

## 2025-03-15 RX ADMIN — Medication 10 ML: at 08:36

## 2025-03-15 RX ADMIN — BUDESONIDE AND FORMOTEROL FUMARATE DIHYDRATE 2 PUFF: 160; 4.5 AEROSOL RESPIRATORY (INHALATION) at 08:00

## 2025-03-15 RX ADMIN — METOPROLOL SUCCINATE 25 MG: 25 TABLET, EXTENDED RELEASE ORAL at 08:35

## 2025-03-15 RX ADMIN — HYDRALAZINE HYDROCHLORIDE 50 MG: 50 TABLET ORAL at 08:35

## 2025-03-15 RX ADMIN — FLUTICASONE PROPIONATE 2 SPRAY: 50 SPRAY, METERED NASAL at 08:36

## 2025-03-15 RX ADMIN — LOSARTAN POTASSIUM 100 MG: 50 TABLET, FILM COATED ORAL at 08:34

## 2025-03-15 RX ADMIN — CLOPIDOGREL 75 MG: 75 TABLET, FILM COATED ORAL at 08:35

## 2025-03-15 RX ADMIN — ATORVASTATIN CALCIUM 40 MG: 20 TABLET, FILM COATED ORAL at 08:35

## 2025-03-15 RX ADMIN — ASPIRIN 81 MG: 81 TABLET, COATED ORAL at 08:35

## 2025-03-15 RX ADMIN — AMLODIPINE BESYLATE 10 MG: 10 TABLET ORAL at 08:34

## 2025-03-15 NOTE — CASE MANAGEMENT/SOCIAL WORK
Case Management Discharge Note      Final Note: dc home with [facility]    Provided Post Acute Provider List?: N/A  N/A Provider List Comment: Current with [facility]    Selected Continued Care - Discharged on 3/15/2025 Admission date: 3/9/2025 - Discharge disposition: Home or Self Care      Destination    No services have been selected for the patient.                Durable Medical Equipment    No services have been selected for the patient.                Dialysis/Infusion Coordination complete.      Service Provider Services Address Phone Fax Patient Preferred    [facility] [address], [address], [place] [id number] [phone] -- --              Home Medical Care    No services have been selected for the patient.                Therapy    No services have been selected for the patient.                Community Resources    No services have been selected for the patient.                Community & DME    No services have been selected for the patient.                    Selected Continued Care - Prior Encounters Includes continued care and service providers with selected services from prior encounters from 12/9/2024 to 3/15/2025      Discharged on 2/15/2025 Admission date: 2/13/2025 - Discharge disposition: Home-Health Care [facility]      Home Medical Care       Service Provider Services Address Phone Fax Patient Preferred    [facility] [address], [address], [place] [id number] [phone] --                      Discharged on 1/17/2025 Admission date: 1/6/2025 - Discharge disposition: Home-Health Care [facility]      Durable Medical Equipment       Service Provider Services Address Phone Fax Patient Preferred    [facility] [address] [facility] [address] [phone] --              Home Medical Care       Service Provider Services Address Phone Fax  Patient Preferred    [facility] [address], [address], [facility] [address] [phone] --                          Transportation Services  Private: Car    Final Discharge Disposition Code: 06 - home with home health care

## 2025-03-15 NOTE — PROGRESS NOTES
[facility] Progress Note      Patient Name: Sonia Acharya  : 1950  MRN: [id number]  Primary Care Physician:  Marcy Paez PA  Date of admission: 3/9/2025    Subjective     Interval History:   F/u ESRD     Review of Systems:   Dialyzed yesterday but does not remember this.  1L removed    Objective     Vitals:   Temp:  [97.7 °F (36.5 °C)-98.5 °F (36.9 °C)] 98.5 °F (36.9 °C)  Heart Rate:  [60-83] 68  Resp:  [16-20] 18  BP: (103-178)/(48-73) 156/63  Flow (L/min) (Oxygen Therapy):  [21] 21    Intake/Output Summary (Last 24 hours) at 3/15/2025 1111  Last data filed at 3/14/2025 1142  Gross per 24 hour   Intake --   Output 1000 ml   Net -1000 ml       Physical Exam:    General Appearance: frail AAF confused no distress on RA  Neck: supple, no JVD  Lungs: CTA bilat no rales  Heart: RRR, normal S1 and S2  Abdomen: soft, nontender, nondistended  Extremities: no edema, cyanosis or clubbing    Scheduled Meds:     amLODIPine, 10 mg, Oral, Daily  aspirin, 81 mg, Oral, Daily  atorvastatin, 40 mg, Oral, Daily  budesonide-formoterol, 2 puff, Inhalation, BID - RT  clopidogrel, 75 mg, Oral, Daily  diphenhydrAMINE, 12.5 mg, Intravenous, Once  donepezil, 5 mg, Oral, Nightly  fluticasone, 2 spray, Nasal, Daily  hydrALAZINE, 50 mg, Oral, TID  insulin lispro, 2-7 Units, Subcutaneous, 4x Daily AC & at Bedtime  isosorbide mononitrate, 30 mg, Oral, Daily  Lidocaine, 1 patch, Transdermal, Q24H  losartan, 100 mg, Oral, Daily  metoprolol succinate XL, 25 mg, Oral, Daily  OLANZapine, 5 mg, Oral, Daily With Dinner  sodium chloride, 10 mL, Intravenous, Q12H  thiamine, 100 mg, Oral, Daily      IV Meds:        Results Reviewed:   I have personally reviewed the results from the time of this admission to 3/15/2025 11:11 EDT     Results from last 7 days   Lab Units 03/12/25  0325 03/10/25  0323 25  1945   SODIUM mmol/L 137 139 139   POTASSIUM mmol/L 4.8 4.9 4.6   CHLORIDE mmol/L 98 96* 95*   CO2 mmol/L  25.0 19.1* 24.4   BUN mg/dL 34* 35* 33*   CREATININE mg/dL 5.86* 5.92* 5.80*   CALCIUM mg/dL 8.5* 9.4 9.8   BILIRUBIN mg/dL  --  0.3 0.3   ALK PHOS U/L  --  101 115   ALT (SGPT) U/L  --  8 6   AST (SGOT) U/L  --  16 16   GLUCOSE mg/dL 82 88 83     Estimated Creatinine Clearance: 6 mL/min (A) (by C-G formula based on SCr of 5.86 mg/dL (H)).  Results from last 7 days   Lab Units 03/09/25  1945   MAGNESIUM mg/dL 2.5*         Results from last 7 days   Lab Units 03/12/25  0325 03/10/25  0323 03/09/25 1945   WBC 10*3/mm3 3.35* 3.97 4.78   HEMOGLOBIN g/dL 9.1* 10.3* 10.2*   PLATELETS 10*3/mm3 204 297 310           Assessment / Plan     ASSESSMENT:    -End Stage Renal Disease ( ESRD ) on Hemodialysis on a Monday, Wednesday and Friday via  RUE AVF.  Dialyzed yesterday without incident.  Volume/lytes stable, 1L removed.  Last chemistries 3/12     -Anemia of CKD - hgb down slightly 9.1, monitor trend    -Hypertension w/ CKD - BP labile but adequate on hefty regimen     -Coronary artery disease status post cardiac cath with chronic diastolic congestive (echocardiogram showing EF of 56% with severe concentric hypertrophy  ) heart failure.  Optimize volume status with dialysis.        -Altered mental status/encephalopathy - agree dementia likely contributory.  On zyprexa per psychiatry     PLAN:  No objection to discharge from nephrology standpoint  Next dialysis MON       Arjun Ramirez MD  03/15/25  11:11 EDT    [facility]  [phone]

## 2025-03-15 NOTE — DISCHARGE SUMMARY
Patient Name: Sonia Acharya  : 1950  MRN: [id number]    Date of Admission: 3/9/2025  Date of Discharge:  3/15/2025  Primary Care Physician: Marcy Paez PA      Chief Complaint:   Altered Mental Status      Discharge Diagnoses     Active Hospital Problems    Diagnosis  POA    **Altered mental status [R41.82]  Yes    Dementia with agitation [F03.911]  Yes    CAD (coronary artery disease) [I25.10]  Yes    Chronic respiratory failure with hypoxia [J96.11]  Yes    ESRD (end stage renal disease) on dialysis [N18.6, Z99.2]  Not Applicable    Bradycardia [R00.1]  Yes    Chronic diastolic CHF (congestive heart failure) [I50.32]  Yes    ANGIE (obstructive sleep apnea) [G47.33]  Yes    Anemia, chronic disease [D63.8]  Yes    HTN (hypertension) [I10]  Yes      Resolved Hospital Problems   No resolved problems to display.        Hospital Course     Ms. Acharya is a 74 y.o. female with a history of dementia coronary artery disease end-stage renal disease chronic diastolic heart failure obstructive sleep apnea and hypertension who presented to [facility] initially complaining of confusion and altered mental status.  Please see the admitting history and physical for further details.  She was found to have confusion and was admitted to the hospital for further evaluation and treatment.  She has had some confusion with behavioral changes in the outpatient setting and was admitted to the hospital to rule out and evaluate for metabolic changes.  Unfortunately full workup here in the hospital showed no significant metabolic abnormalities other than her end-stage renal disease.  She tolerated dialysis here throughout the hospitalization and was initiated on Zyprexa in the evening to help with her symptoms.  She responded well to this and after the second dose has been doing much better and is remained stable over the last 48 hours.  She tolerated dialysis on the day prior to discharge without any confusion or  restlessness.  She has remained fairly oriented over the last 36 hours and at this point is stable to discharge home with her daughter.      Day of Discharge     Subjective:  She denies new issues she is sitting up in bed eating breakfast this morning.  She is alert and oriented x 2    Physical Exam:  Temp:  [97.7 °F (36.5 °C)-98.4 °F (36.9 °C)] 98.4 °F (36.9 °C)  Heart Rate:  [53-83] 67  Resp:  [14-20] 20  BP: (103-178)/(48-73) 120/55  Body mass index is 19.63 kg/m².  Physical Exam  Vitals reviewed.   Constitutional:       General: She is not in acute distress.     Appearance: Normal appearance. She is ill-appearing.   Cardiovascular:      Rate and Rhythm: Normal rate and regular rhythm.   Pulmonary:      Effort: No respiratory distress.      Breath sounds: Normal breath sounds.   Abdominal:      General: Bowel sounds are normal.      Palpations: Abdomen is soft.   Neurological:      Mental Status: She is alert. Mental status is at baseline.         Consultants     Consult Orders (all) (From admission, onward)       Start     Ordered    03/11/25 0830  Inpatient Psychiatrist Consult  Once        Specialty:  Psychiatry  Provider:  Cliff Eason III, MD    03/11/25 0830    03/10/25 0857  Inpatient Neurology Consult General  Once        Specialty:  Neurology  Provider:  Noah Womack MD    03/10/25 0857    03/10/25 0702  Inpatient Nephrology Consult  IN AM        Specialty:  Nephrology  Provider:  Nathan Adkins MD    03/10/25 0129    03/10/25 0106  Inpatient Case Management  Consult  Once        Provider:  (Not yet assigned)    03/10/25 0106    03/10/25 0106  Inpatient Nutrition Consult  Once        Provider:  (Not yet assigned)    03/10/25 0106    03/09/25 2243  [facility] (on-call MD unless specified) Details  Once        Specialty:  Hospitalist  Provider:  (Not yet assigned)    03/09/25 2242                  Procedures     * Surgery not found *    Imaging Results (All)        Procedure Component Value Units Date/Time    MRI Brain Without Contrast [[id number]] Collected: 03/12/25 0736     Updated: 03/12/25 0736    Narrative:        Patient: JUANITO LARA  Time Out: 06:13  Exam(s): MRI HEAD Without Contrast     EXAM:    MR Head Without Intravenous Contrast    CLINICAL HISTORY:    Lethargic and visual hallucinations, cant understand what she is saying     TECHNIQUE:    Magnetic resonance images of the head brain without intravenous   contrast in multiple planes.    COMPARISON:  Prior head CT from March 9, 2025.    FINDINGS:    Brain: Remote ischemic injuries of the bilateral cerebellar lobes.    Mild nonspecific white matter changes.  No mass.  No hemorrhage.  No   acute infarct.  The flow voids at the base of the brain are intact.    Ventricles: Mild ventriculomegaly.    Bones joints: Remote fracture deformity of the left lamina papyracea.    No acute fracture.    Sinuses:  Unremarkable as visualized.  No acute sinusitis.    Mastoid air cells: Bilateral mastoid and middle ear effusions.    Orbits: Bilateral lens replacements.  Bilateral proptosis with   enlargement of the extraocular muscles and proliferation of the   intraorbital fat.    IMPRESSION:       No evidence for acute intracranial pathology.    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    Bilateral mastoid and middle ear effusions.      Impression:          Electronically signed by Maria Luz Vickers MD on 03-12-25 at 0613    MRI Outside Films [[id number]] Resulted: 03/11/25 0859     Updated: 03/11/25 0859    Narrative:      This procedure was auto-finalized with no dictation required.    CT Outside Films [[id number]] Resulted: 03/11/25 0859     Updated: 03/11/25 0859    Narrative:      This procedure was auto-finalized with no dictation required.    CT Head Without Contrast [[id number]] Collected: 03/09/25 2226     Updated: 03/09/25 2237    Narrative:      HEAD CT WITHOUT  CONTRAST     REASON:  AMS/hallucinations, confusion     COMPARISON STUDIES: Head CT 1/6/2025.     TECHNIQUE:  Axial images were acquired from the skull base to vertex  without contrast, including multiplanar reformats, per standard  departmental protocol.    Radiation dose reduction techniques were  utilized, including automated exposure control, and exposure modulation  based on body size.     FINDINGS:     There is no CT evidence of acute intracranial hemorrhage, mass, or  infarct. There is volume loss, but there is no evidence of hydrocephalus  or extra-axial fluid collection.     There are nonspecific white matter changes, but there is no acute  intracranial abnormality.     Skull base, calvarium, and extracranial soft tissues show no acute  abnormality.       Impression:         There is volume loss and there are nonspecific white matter changes, but  there is no acute intracranial abnormality.                        This report was finalized on 3/9/2025 10:34 PM by Dr. Solis Rhodes M.D on Workstation: [id number]       XR Chest 1 View [[id number]] Collected: 03/09/25 2043     Updated: 03/09/25 2047    Narrative:      CXR ONE VIEW      HISTORY: Weak/Dizzy/AMS triage protocol     COMPARISON: 2/13/2025     TECHNIQUE: single portable AP       Impression:         There is mild cardiomegaly, though submaximal inspiration may exaggerate  the cardiac silhouette.     There is mild vascular congestion, though submaximal inspiration may  exaggerate the appearance.     There is no consolidation, effusion or pneumothorax.     This report was finalized on 3/9/2025 8:44 PM by Dr. Solis Rhodes M.D  on Workstation: [id number]               Results for orders placed during the hospital encounter of 01/06/25    Adult Transthoracic Echo Complete W/ Cont if Necessary Per Protocol    Interpretation Summary    Left ventricular systolic function is normal. Calculated left ventricular EF = 56.6%    Left ventricular wall  "thickness is consistent with severe concentric hypertrophy.    The following left ventricular wall segments are hypokinetic: mid anterior, apical anterior and apex hypokinetic.    Left ventricular diastolic function is consistent with (grade II w/high LAP) pseudonormalization.    The left atrial cavity is severely dilated.    Left atrial volume is severely increased.    Mild aortic valve stenosis is present. Aortic valve area is 1.1 cm2.    Aortic valve maximum pressure gradient is 28 mmHg. Aortic valve mean pressure gradient is 15 mmHg.    Mild mitral valve regurgitation is present    There is a trivial circumferential pericardial effusion. There is no evidence of cardiac tamponade.    Pertinent Labs     Results from last 7 days   Lab Units 03/12/25  0325 03/10/25  0323 03/09/25  1945   WBC 10*3/mm3 3.35* 3.97 4.78   HEMOGLOBIN g/dL 9.1* 10.3* 10.2*   PLATELETS 10*3/mm3 204 297 310     Results from last 7 days   Lab Units 03/12/25  0325 03/10/25  0323 03/09/25  1945   SODIUM mmol/L 137 139 139   POTASSIUM mmol/L 4.8 4.9 4.6   CHLORIDE mmol/L 98 96* 95*   CO2 mmol/L 25.0 19.1* 24.4   BUN mg/dL 34* 35* 33*   CREATININE mg/dL 5.86* 5.92* 5.80*   GLUCOSE mg/dL 82 88 83   EGFR mL/min/1.73 7.1* 7.0* 7.1*     Results from last 7 days   Lab Units 03/10/25  0323 03/09/25  1945   ALBUMIN g/dL 3.6 4.1   BILIRUBIN mg/dL 0.3 0.3   ALK PHOS U/L 101 115   AST (SGOT) U/L 16 16   ALT (SGPT) U/L 8 6     Results from last 7 days   Lab Units 03/12/25  0325 03/10/25  0323 03/09/25  1945   CALCIUM mg/dL 8.5* 9.4 9.8   ALBUMIN g/dL  --  3.6 4.1   MAGNESIUM mg/dL  --   --  2.5*     Results from last 7 days   Lab Units 03/10/25  0323   AMMONIA umol/L 47     Results from last 7 days   Lab Units 03/09/25 2050 03/09/25 1945   HSTROP T ng/L 121* 131*           Invalid input(s): \"LDLCALC\"      Results from last 7 days   Lab Units 03/10/25  0956   COVID19  Not Detected       Test Results Pending at Discharge     Pending Results       None      "         Discharge Details        Discharge Medications        New Medications        Instructions Start Date   OLANZapine 5 MG tablet  Commonly known as: zyPREXA   5 mg, Oral, Daily With Dinner      thiamine 100 MG tablet  Commonly known as: VITAMIN B1   100 mg, Oral, Daily             Continue These Medications        Instructions Start Date   amLODIPine 10 MG tablet  Commonly known as: NORVASC   10 mg, Daily      aspirin 81 MG EC tablet   81 mg, Daily      atorvastatin 40 MG tablet  Commonly known as: LIPITOR   40 mg, Daily      budesonide-formoterol 80-4.5 MCG/ACT inhaler  Commonly known as: SYMBICORT   2 puffs, 2 Times Daily - RT      clopidogrel 75 MG tablet  Commonly known as: PLAVIX   75 mg, Oral, Daily      diphenhydrAMINE 25 mg capsule  Commonly known as: BENADRYL   50 mg, Every 6 Hours PRN      donepezil 5 MG tablet  Commonly known as: ARICEPT   5 mg, Nightly      famotidine 20 MG tablet  Commonly known as: Pepcid   20 mg, Oral, 2 Times Daily PRN      fluticasone 50 MCG/ACT nasal spray  Commonly known as: FLONASE   2 sprays, Daily      hydrALAZINE 50 MG tablet  Commonly known as: APRESOLINE   50 mg, 3 Times Daily      isosorbide mononitrate 30 MG 24 hr tablet  Commonly known as: IMDUR   30 mg, Daily      lidocaine 5 %  Commonly known as: LIDODERM   1 patch, Transdermal, Every 24 Hours, Remove & Discard patch within 12 hours or as directed by MD      linaclotide 290 MCG capsule capsule  Commonly known as: LINZESS   290 mcg, Every Morning Before Breakfast      losartan 100 MG tablet  Commonly known as: COZAAR   100 mg, Oral, Daily      melatonin 3 MG tablet   3 mg, Nightly      metoprolol succinate XL 25 MG 24 hr tablet  Commonly known as: TOPROL-XL   25 mg, Daily               No Known Allergies    Discharge Disposition:  Home or Self Care      Discharge Diet:  Diet Order   Procedures    Diet: Cardiac, Diabetic; Healthy Heart (2-3 Na+); Consistent Carbohydrate; Fluid Consistency: Thin (IDDSI 0)        Discharge Activity:   Activity Instructions       Activity as Tolerated              CODE STATUS:    Code Status and Medical Interventions: CPR (Attempt to Resuscitate); Full Support   Ordered at: 03/10/25 0129     Code Status (Patient has no pulse and is not breathing):    CPR (Attempt to Resuscitate)     Medical Interventions (Patient has pulse or is breathing):    Full Support       No future appointments.  Additional Instructions for the Follow-ups that You Need to Schedule       Discharge Follow-up with PCP   As directed       Currently Documented PCP:    Marcy Paez PA    PCP Phone Number:    [phone]     Follow Up Details: 1-2 weeks               Contact information for follow-up providers       Marcy Paez PA .    Specialty: Physician Assistant  Why: 1-2 weeks  Contact information:  [address]  [address]  [place] [address]  [phone]                       Contact information for after-discharge care       Dialysis/Infusion       ILDA HERRERA .    Service: In-Center Hemodialysis  Contact information:  [address], [address]  [place] [address]  [phone]                                   Additional Instructions for the Follow-ups that You Need to Schedule       Discharge Follow-up with PCP   As directed       Currently Documented PCP:    Marcy Paez PA    PCP Phone Number:    [phone]     Follow Up Details: 1-2 weeks            Time Spent on Discharge:  Greater than 30 minutes      Casey Middleton MD  [facility]  03/15/25  07:22 EDT

## 2025-03-15 NOTE — PLAN OF CARE
Goal Outcome Evaluation:  Plan of Care Reviewed With: patient        Progress: no change  Outcome Evaluation: Answers orientation questions correctly - confused at times. VSS. RA. SR. Right upper arm fistula in place. Dialysis yesterday. Poss d/c today                              Attending to bill

## 2025-03-15 NOTE — PLAN OF CARE
Goal Outcome Evaluation:  Plan of Care Reviewed With: patient        Progress: no change  Outcome Evaluation: vss. telemetry monitor, sr. confused. room air. up with assist, x1. falls precautions. dc today

## 2025-03-16 NOTE — OUTREACH NOTE
Prep Survey      Flowsheet Row Responses   [facility] patient discharged from? [place]   Is LACE score < 7 ? No   Eligibility Readm Mgmt   Discharge diagnosis Altered mental status   Does the patient have one of the following disease processes/diagnoses(primary or secondary)? Other   Does the patient have Home health ordered? Yes   What is the Home health agency?  [facility],   Is there a DME ordered? No   Prep survey completed? Yes            LISA PEDROZA - Registered Nurse

## 2025-03-20 ENCOUNTER — READMISSION MANAGEMENT (OUTPATIENT)
Dept: CALL CENTER | Facility: HOSPITAL | Age: 75
End: 2025-03-20
Payer: MEDICARE

## 2025-03-20 NOTE — OUTREACH NOTE
Medical Week 1 Survey      Flowsheet Row Responses   [facility] patient discharged from? [place]   Does the patient have one of the following disease processes/diagnoses(primary or secondary)? Other   Week 1 attempt successful? No   Unsuccessful attempts Attempt 1            Hemanth CHAN - Registered Nurse

## 2025-03-24 ENCOUNTER — READMISSION MANAGEMENT (OUTPATIENT)
Dept: CALL CENTER | Facility: HOSPITAL | Age: 75
End: 2025-03-24
Payer: MEDICARE

## 2025-03-24 NOTE — OUTREACH NOTE
Medical Week 1 Survey      Flowsheet Row Responses   [facility] patient discharged from? [place]   Does the patient have one of the following disease processes/diagnoses(primary or secondary)? Other   Week 1 attempt successful? No   Unsuccessful attempts Attempt 2            Filomena PETIT - Registered Nurse

## 2025-03-27 ENCOUNTER — READMISSION MANAGEMENT (OUTPATIENT)
Dept: CALL CENTER | Facility: HOSPITAL | Age: 75
End: 2025-03-27
Payer: MEDICARE

## 2025-03-27 NOTE — OUTREACH NOTE
Medical Week 1 Survey      Flowsheet Row Responses   [facility] patient discharged from? [place]   Does the patient have one of the following disease processes/diagnoses(primary or secondary)? Other   Week 1 attempt successful? No   Unsuccessful attempts Attempt 3            BRANT YADAV - Registered Nurse

## 2025-05-25 ENCOUNTER — APPOINTMENT (OUTPATIENT)
Dept: GENERAL RADIOLOGY | Facility: HOSPITAL | Age: 75
End: 2025-05-25
Payer: MEDICARE

## 2025-05-25 ENCOUNTER — HOSPITAL ENCOUNTER (OUTPATIENT)
Facility: HOSPITAL | Age: 75
Setting detail: OBSERVATION
Discharge: HOME HEALTH | End: 2025-05-27
Attending: EMERGENCY MEDICINE | Admitting: INTERNAL MEDICINE
Payer: MEDICARE

## 2025-05-25 DIAGNOSIS — R06.00 DYSPNEA, UNSPECIFIED TYPE: ICD-10-CM

## 2025-05-25 DIAGNOSIS — N18.6 END STAGE RENAL DISEASE ON DIALYSIS: ICD-10-CM

## 2025-05-25 DIAGNOSIS — B34.8 RHINOVIRUS INFECTION: Primary | ICD-10-CM

## 2025-05-25 DIAGNOSIS — R09.89 PULMONARY VASCULAR CONGESTION: ICD-10-CM

## 2025-05-25 DIAGNOSIS — Z99.2 END STAGE RENAL DISEASE ON DIALYSIS: ICD-10-CM

## 2025-05-25 LAB
ALBUMIN SERPL-MCNC: 3.8 G/DL (ref 3.5–5.2)
ALBUMIN/GLOB SERPL: 1.5 G/DL
ALP SERPL-CCNC: 74 U/L (ref 39–117)
ALT SERPL W P-5'-P-CCNC: 7 U/L (ref 1–33)
ANION GAP SERPL CALCULATED.3IONS-SCNC: 16 MMOL/L (ref 5–15)
AST SERPL-CCNC: 13 U/L (ref 1–32)
B PARAPERT DNA SPEC QL NAA+PROBE: NOT DETECTED
B PERT DNA SPEC QL NAA+PROBE: NOT DETECTED
BASOPHILS # BLD AUTO: 0.03 10*3/MM3 (ref 0–0.2)
BASOPHILS NFR BLD AUTO: 0.7 % (ref 0–1.5)
BILIRUB SERPL-MCNC: 0.3 MG/DL (ref 0–1.2)
BUN SERPL-MCNC: 24 MG/DL (ref 8–23)
BUN/CREAT SERPL: 4 (ref 7–25)
C PNEUM DNA NPH QL NAA+NON-PROBE: NOT DETECTED
CALCIUM SPEC-SCNC: 10.7 MG/DL (ref 8.6–10.5)
CHLORIDE SERPL-SCNC: 96 MMOL/L (ref 98–107)
CO2 SERPL-SCNC: 24 MMOL/L (ref 22–29)
CREAT SERPL-MCNC: 5.96 MG/DL (ref 0.57–1)
DEPRECATED RDW RBC AUTO: 57.3 FL (ref 37–54)
EGFRCR SERPLBLD CKD-EPI 2021: 6.9 ML/MIN/1.73
EOSINOPHIL # BLD AUTO: 0.28 10*3/MM3 (ref 0–0.4)
EOSINOPHIL NFR BLD AUTO: 6.5 % (ref 0.3–6.2)
ERYTHROCYTE [DISTWIDTH] IN BLOOD BY AUTOMATED COUNT: 16.3 % (ref 12.3–15.4)
FLUAV SUBTYP SPEC NAA+PROBE: NOT DETECTED
FLUBV RNA ISLT QL NAA+PROBE: NOT DETECTED
GEN 5 1HR TROPONIN T REFLEX: 94 NG/L
GLOBULIN UR ELPH-MCNC: 2.5 GM/DL
GLUCOSE SERPL-MCNC: 68 MG/DL (ref 65–99)
HADV DNA SPEC NAA+PROBE: NOT DETECTED
HCOV 229E RNA SPEC QL NAA+PROBE: NOT DETECTED
HCOV HKU1 RNA SPEC QL NAA+PROBE: NOT DETECTED
HCOV NL63 RNA SPEC QL NAA+PROBE: NOT DETECTED
HCOV OC43 RNA SPEC QL NAA+PROBE: NOT DETECTED
HCT VFR BLD AUTO: 42.8 % (ref 34–46.6)
HGB BLD-MCNC: 13.4 G/DL (ref 12–15.9)
HMPV RNA NPH QL NAA+NON-PROBE: NOT DETECTED
HOLD SPECIMEN: NORMAL
HOLD SPECIMEN: NORMAL
HPIV1 RNA ISLT QL NAA+PROBE: NOT DETECTED
HPIV2 RNA SPEC QL NAA+PROBE: NOT DETECTED
HPIV3 RNA NPH QL NAA+PROBE: NOT DETECTED
HPIV4 P GENE NPH QL NAA+PROBE: NOT DETECTED
IMM GRANULOCYTES # BLD AUTO: 0.02 10*3/MM3 (ref 0–0.05)
IMM GRANULOCYTES NFR BLD AUTO: 0.5 % (ref 0–0.5)
LYMPHOCYTES # BLD AUTO: 1 10*3/MM3 (ref 0.7–3.1)
LYMPHOCYTES NFR BLD AUTO: 23.1 % (ref 19.6–45.3)
M PNEUMO IGG SER IA-ACNC: NOT DETECTED
MCH RBC QN AUTO: 29.8 PG (ref 26.6–33)
MCHC RBC AUTO-ENTMCNC: 31.3 G/DL (ref 31.5–35.7)
MCV RBC AUTO: 95.3 FL (ref 79–97)
MONOCYTES # BLD AUTO: 0.51 10*3/MM3 (ref 0.1–0.9)
MONOCYTES NFR BLD AUTO: 11.8 % (ref 5–12)
NEUTROPHILS NFR BLD AUTO: 2.48 10*3/MM3 (ref 1.7–7)
NEUTROPHILS NFR BLD AUTO: 57.4 % (ref 42.7–76)
NRBC BLD AUTO-RTO: 0 /100 WBC (ref 0–0.2)
NT-PROBNP SERPL-MCNC: ABNORMAL PG/ML (ref 0–1800)
PLATELET # BLD AUTO: 212 10*3/MM3 (ref 140–450)
PMV BLD AUTO: 9 FL (ref 6–12)
POTASSIUM SERPL-SCNC: 3.5 MMOL/L (ref 3.5–5.2)
PROT SERPL-MCNC: 6.3 G/DL (ref 6–8.5)
RBC # BLD AUTO: 4.49 10*6/MM3 (ref 3.77–5.28)
RHINOVIRUS RNA SPEC NAA+PROBE: DETECTED
RSV RNA NPH QL NAA+NON-PROBE: NOT DETECTED
SARS-COV-2 RNA NPH QL NAA+NON-PROBE: NOT DETECTED
SODIUM SERPL-SCNC: 136 MMOL/L (ref 136–145)
TROPONIN T % DELTA: -5
TROPONIN T NUMERIC DELTA: -5 NG/L
TROPONIN T SERPL HS-MCNC: 99 NG/L
WBC NRBC COR # BLD AUTO: 4.32 10*3/MM3 (ref 3.4–10.8)
WHOLE BLOOD HOLD COAG: NORMAL
WHOLE BLOOD HOLD SPECIMEN: NORMAL

## 2025-05-25 PROCEDURE — 83880 ASSAY OF NATRIURETIC PEPTIDE: CPT | Performed by: EMERGENCY MEDICINE

## 2025-05-25 PROCEDURE — 99285 EMERGENCY DEPT VISIT HI MDM: CPT

## 2025-05-25 PROCEDURE — G0378 HOSPITAL OBSERVATION PER HR: HCPCS

## 2025-05-25 PROCEDURE — 0202U NFCT DS 22 TRGT SARS-COV-2: CPT | Performed by: EMERGENCY MEDICINE

## 2025-05-25 PROCEDURE — 84484 ASSAY OF TROPONIN QUANT: CPT | Performed by: EMERGENCY MEDICINE

## 2025-05-25 PROCEDURE — 85025 COMPLETE CBC W/AUTO DIFF WBC: CPT | Performed by: EMERGENCY MEDICINE

## 2025-05-25 PROCEDURE — 80053 COMPREHEN METABOLIC PANEL: CPT | Performed by: EMERGENCY MEDICINE

## 2025-05-25 PROCEDURE — 36415 COLL VENOUS BLD VENIPUNCTURE: CPT

## 2025-05-25 PROCEDURE — 93010 ELECTROCARDIOGRAM REPORT: CPT | Performed by: INTERNAL MEDICINE

## 2025-05-25 PROCEDURE — 71045 X-RAY EXAM CHEST 1 VIEW: CPT

## 2025-05-25 PROCEDURE — 93005 ELECTROCARDIOGRAM TRACING: CPT | Performed by: EMERGENCY MEDICINE

## 2025-05-25 RX ORDER — SODIUM CHLORIDE 0.9 % (FLUSH) 0.9 %
10 SYRINGE (ML) INJECTION AS NEEDED
Status: DISCONTINUED | OUTPATIENT
Start: 2025-05-25 | End: 2025-05-27 | Stop reason: HOSPADM

## 2025-05-26 PROBLEM — I50.33 ACUTE ON CHRONIC DIASTOLIC CHF (CONGESTIVE HEART FAILURE): Status: ACTIVE | Noted: 2023-01-16

## 2025-05-26 LAB
ALBUMIN SERPL-MCNC: 3.5 G/DL (ref 3.5–5.2)
ALBUMIN/GLOB SERPL: 1.5 G/DL
ALP SERPL-CCNC: 67 U/L (ref 39–117)
ALT SERPL W P-5'-P-CCNC: 10 U/L (ref 1–33)
ANION GAP SERPL CALCULATED.3IONS-SCNC: 16.3 MMOL/L (ref 5–15)
AST SERPL-CCNC: 12 U/L (ref 1–32)
BILIRUB SERPL-MCNC: 0.4 MG/DL (ref 0–1.2)
BUN SERPL-MCNC: 27 MG/DL (ref 8–23)
BUN/CREAT SERPL: 4.3 (ref 7–25)
CALCIUM SPEC-SCNC: 10.4 MG/DL (ref 8.6–10.5)
CHLORIDE SERPL-SCNC: 98 MMOL/L (ref 98–107)
CO2 SERPL-SCNC: 23.7 MMOL/L (ref 22–29)
CREAT SERPL-MCNC: 6.32 MG/DL (ref 0.57–1)
DEPRECATED RDW RBC AUTO: 55.2 FL (ref 37–54)
EGFRCR SERPLBLD CKD-EPI 2021: 6.4 ML/MIN/1.73
ERYTHROCYTE [DISTWIDTH] IN BLOOD BY AUTOMATED COUNT: 16.1 % (ref 12.3–15.4)
GLOBULIN UR ELPH-MCNC: 2.3 GM/DL
GLUCOSE BLDC GLUCOMTR-MCNC: 70 MG/DL (ref 70–130)
GLUCOSE BLDC GLUCOMTR-MCNC: 79 MG/DL (ref 70–130)
GLUCOSE BLDC GLUCOMTR-MCNC: 82 MG/DL (ref 70–130)
GLUCOSE BLDC GLUCOMTR-MCNC: 95 MG/DL (ref 70–130)
GLUCOSE SERPL-MCNC: 66 MG/DL (ref 65–99)
HBV SURFACE AG SERPL QL IA: NORMAL
HCT VFR BLD AUTO: 40.6 % (ref 34–46.6)
HGB BLD-MCNC: 13 G/DL (ref 12–15.9)
MCH RBC QN AUTO: 30.2 PG (ref 26.6–33)
MCHC RBC AUTO-ENTMCNC: 32 G/DL (ref 31.5–35.7)
MCV RBC AUTO: 94.2 FL (ref 79–97)
PLATELET # BLD AUTO: 213 10*3/MM3 (ref 140–450)
PMV BLD AUTO: 8.9 FL (ref 6–12)
POTASSIUM SERPL-SCNC: 4 MMOL/L (ref 3.5–5.2)
PROT SERPL-MCNC: 5.8 G/DL (ref 6–8.5)
QT INTERVAL: 466 MS
QTC INTERVAL: 458 MS
RBC # BLD AUTO: 4.31 10*6/MM3 (ref 3.77–5.28)
SODIUM SERPL-SCNC: 138 MMOL/L (ref 136–145)
WBC NRBC COR # BLD AUTO: 4.19 10*3/MM3 (ref 3.4–10.8)

## 2025-05-26 PROCEDURE — 82948 REAGENT STRIP/BLOOD GLUCOSE: CPT

## 2025-05-26 PROCEDURE — 87340 HEPATITIS B SURFACE AG IA: CPT | Performed by: INTERNAL MEDICINE

## 2025-05-26 PROCEDURE — 80053 COMPREHEN METABOLIC PANEL: CPT | Performed by: NURSE PRACTITIONER

## 2025-05-26 PROCEDURE — 85027 COMPLETE CBC AUTOMATED: CPT | Performed by: NURSE PRACTITIONER

## 2025-05-26 PROCEDURE — G0378 HOSPITAL OBSERVATION PER HR: HCPCS

## 2025-05-26 PROCEDURE — 94640 AIRWAY INHALATION TREATMENT: CPT

## 2025-05-26 PROCEDURE — 94799 UNLISTED PULMONARY SVC/PX: CPT

## 2025-05-26 PROCEDURE — 94664 DEMO&/EVAL PT USE INHALER: CPT

## 2025-05-26 PROCEDURE — 94761 N-INVAS EAR/PLS OXIMETRY MLT: CPT

## 2025-05-26 PROCEDURE — G0257 UNSCHED DIALYSIS ESRD PT HOS: HCPCS

## 2025-05-26 RX ORDER — IBUPROFEN 600 MG/1
1 TABLET ORAL
Status: DISCONTINUED | OUTPATIENT
Start: 2025-05-26 | End: 2025-05-27 | Stop reason: HOSPADM

## 2025-05-26 RX ORDER — NICOTINE POLACRILEX 4 MG
15 LOZENGE BUCCAL
Status: DISCONTINUED | OUTPATIENT
Start: 2025-05-26 | End: 2025-05-27 | Stop reason: HOSPADM

## 2025-05-26 RX ORDER — BISACODYL 10 MG
10 SUPPOSITORY, RECTAL RECTAL DAILY PRN
Status: DISCONTINUED | OUTPATIENT
Start: 2025-05-26 | End: 2025-05-27 | Stop reason: HOSPADM

## 2025-05-26 RX ORDER — ATORVASTATIN CALCIUM 20 MG/1
40 TABLET, FILM COATED ORAL DAILY
Status: DISCONTINUED | OUTPATIENT
Start: 2025-05-26 | End: 2025-05-27 | Stop reason: HOSPADM

## 2025-05-26 RX ORDER — HYDRALAZINE HYDROCHLORIDE 50 MG/1
50 TABLET, FILM COATED ORAL EVERY 8 HOURS SCHEDULED
Status: DISCONTINUED | OUTPATIENT
Start: 2025-05-26 | End: 2025-05-26

## 2025-05-26 RX ORDER — TICAGRELOR 60 MG/1
90 TABLET, FILM COATED ORAL 2 TIMES DAILY
Status: DISCONTINUED | OUTPATIENT
Start: 2025-05-26 | End: 2025-05-27 | Stop reason: HOSPADM

## 2025-05-26 RX ORDER — TRAZODONE HYDROCHLORIDE 50 MG/1
50 TABLET ORAL NIGHTLY
Status: DISCONTINUED | OUTPATIENT
Start: 2025-05-26 | End: 2025-05-27 | Stop reason: HOSPADM

## 2025-05-26 RX ORDER — OLANZAPINE 5 MG/1
5 TABLET, FILM COATED ORAL
Status: DISCONTINUED | OUTPATIENT
Start: 2025-05-26 | End: 2025-05-27 | Stop reason: HOSPADM

## 2025-05-26 RX ORDER — TRAZODONE HYDROCHLORIDE 50 MG/1
50 TABLET ORAL NIGHTLY
COMMUNITY

## 2025-05-26 RX ORDER — ISOSORBIDE MONONITRATE 30 MG/1
30 TABLET, EXTENDED RELEASE ORAL DAILY
Status: DISCONTINUED | OUTPATIENT
Start: 2025-05-26 | End: 2025-05-27 | Stop reason: HOSPADM

## 2025-05-26 RX ORDER — FAMOTIDINE 20 MG/1
20 TABLET, FILM COATED ORAL DAILY PRN
Status: DISCONTINUED | OUTPATIENT
Start: 2025-05-26 | End: 2025-05-27 | Stop reason: HOSPADM

## 2025-05-26 RX ORDER — AMLODIPINE BESYLATE 10 MG/1
10 TABLET ORAL DAILY
Status: DISCONTINUED | OUTPATIENT
Start: 2025-05-27 | End: 2025-05-27 | Stop reason: HOSPADM

## 2025-05-26 RX ORDER — FAMOTIDINE 20 MG/1
20 TABLET, FILM COATED ORAL 2 TIMES DAILY PRN
Status: DISCONTINUED | OUTPATIENT
Start: 2025-05-26 | End: 2025-05-26

## 2025-05-26 RX ORDER — ACETAMINOPHEN 325 MG/1
650 TABLET ORAL EVERY 4 HOURS PRN
Status: DISCONTINUED | OUTPATIENT
Start: 2025-05-26 | End: 2025-05-27 | Stop reason: HOSPADM

## 2025-05-26 RX ORDER — DEXTROSE MONOHYDRATE 25 G/50ML
25 INJECTION, SOLUTION INTRAVENOUS
Status: DISCONTINUED | OUTPATIENT
Start: 2025-05-26 | End: 2025-05-27 | Stop reason: HOSPADM

## 2025-05-26 RX ORDER — BUDESONIDE AND FORMOTEROL FUMARATE DIHYDRATE 160; 4.5 UG/1; UG/1
1 AEROSOL RESPIRATORY (INHALATION)
Status: DISCONTINUED | OUTPATIENT
Start: 2025-05-26 | End: 2025-05-27 | Stop reason: HOSPADM

## 2025-05-26 RX ORDER — AMOXICILLIN 250 MG
2 CAPSULE ORAL 2 TIMES DAILY PRN
Status: DISCONTINUED | OUTPATIENT
Start: 2025-05-26 | End: 2025-05-27 | Stop reason: HOSPADM

## 2025-05-26 RX ORDER — ALBUMIN (HUMAN) 12.5 G/50ML
12.5 SOLUTION INTRAVENOUS AS NEEDED
OUTPATIENT
Start: 2025-05-26 | End: 2025-05-27

## 2025-05-26 RX ORDER — AMLODIPINE BESYLATE 10 MG/1
10 TABLET ORAL
Status: DISCONTINUED | OUTPATIENT
Start: 2025-05-26 | End: 2025-05-26

## 2025-05-26 RX ORDER — LOSARTAN POTASSIUM 100 MG/1
100 TABLET ORAL DAILY
Status: DISCONTINUED | OUTPATIENT
Start: 2025-05-26 | End: 2025-05-27 | Stop reason: HOSPADM

## 2025-05-26 RX ORDER — SODIUM CHLORIDE 0.9 % (FLUSH) 0.9 %
10 SYRINGE (ML) INJECTION EVERY 12 HOURS SCHEDULED
Status: DISCONTINUED | OUTPATIENT
Start: 2025-05-26 | End: 2025-05-27 | Stop reason: HOSPADM

## 2025-05-26 RX ORDER — LOSARTAN POTASSIUM 100 MG/1
100 TABLET ORAL NIGHTLY
Status: DISCONTINUED | OUTPATIENT
Start: 2025-05-26 | End: 2025-05-26

## 2025-05-26 RX ORDER — DONEPEZIL HYDROCHLORIDE 10 MG/1
5 TABLET, FILM COATED ORAL NIGHTLY
Status: DISCONTINUED | OUTPATIENT
Start: 2025-05-26 | End: 2025-05-27 | Stop reason: HOSPADM

## 2025-05-26 RX ORDER — DIPHENHYDRAMINE HCL 50 MG
50 CAPSULE ORAL EVERY 6 HOURS PRN
Status: DISCONTINUED | OUTPATIENT
Start: 2025-05-26 | End: 2025-05-27 | Stop reason: HOSPADM

## 2025-05-26 RX ORDER — ACETAMINOPHEN 650 MG/1
650 SUPPOSITORY RECTAL EVERY 4 HOURS PRN
Status: DISCONTINUED | OUTPATIENT
Start: 2025-05-26 | End: 2025-05-27 | Stop reason: HOSPADM

## 2025-05-26 RX ORDER — INSULIN LISPRO 100 [IU]/ML
2-7 INJECTION, SOLUTION INTRAVENOUS; SUBCUTANEOUS
Status: DISCONTINUED | OUTPATIENT
Start: 2025-05-26 | End: 2025-05-27 | Stop reason: HOSPADM

## 2025-05-26 RX ORDER — IPRATROPIUM BROMIDE AND ALBUTEROL SULFATE 2.5; .5 MG/3ML; MG/3ML
3 SOLUTION RESPIRATORY (INHALATION) EVERY 4 HOURS PRN
Status: DISCONTINUED | OUTPATIENT
Start: 2025-05-26 | End: 2025-05-27 | Stop reason: HOSPADM

## 2025-05-26 RX ORDER — SODIUM CHLORIDE 0.9 % (FLUSH) 0.9 %
10 SYRINGE (ML) INJECTION AS NEEDED
Status: DISCONTINUED | OUTPATIENT
Start: 2025-05-26 | End: 2025-05-27 | Stop reason: HOSPADM

## 2025-05-26 RX ORDER — ASPIRIN 81 MG/1
81 TABLET ORAL DAILY
Status: DISCONTINUED | OUTPATIENT
Start: 2025-05-26 | End: 2025-05-27 | Stop reason: HOSPADM

## 2025-05-26 RX ORDER — BUDESONIDE AND FORMOTEROL FUMARATE DIHYDRATE 80; 4.5 UG/1; UG/1
2 AEROSOL RESPIRATORY (INHALATION)
Status: DISCONTINUED | OUTPATIENT
Start: 2025-05-26 | End: 2025-05-26

## 2025-05-26 RX ORDER — IPRATROPIUM BROMIDE AND ALBUTEROL SULFATE 2.5; .5 MG/3ML; MG/3ML
3 SOLUTION RESPIRATORY (INHALATION)
Status: DISCONTINUED | OUTPATIENT
Start: 2025-05-26 | End: 2025-05-27 | Stop reason: HOSPADM

## 2025-05-26 RX ORDER — BISACODYL 5 MG/1
5 TABLET, DELAYED RELEASE ORAL DAILY PRN
Status: DISCONTINUED | OUTPATIENT
Start: 2025-05-26 | End: 2025-05-27 | Stop reason: HOSPADM

## 2025-05-26 RX ORDER — TICAGRELOR 90 MG/1
90 TABLET, FILM COATED ORAL 2 TIMES DAILY
COMMUNITY

## 2025-05-26 RX ORDER — POLYETHYLENE GLYCOL 3350 17 G/17G
17 POWDER, FOR SOLUTION ORAL DAILY PRN
Status: DISCONTINUED | OUTPATIENT
Start: 2025-05-26 | End: 2025-05-27 | Stop reason: HOSPADM

## 2025-05-26 RX ORDER — SODIUM CHLORIDE 9 MG/ML
40 INJECTION, SOLUTION INTRAVENOUS AS NEEDED
Status: DISCONTINUED | OUTPATIENT
Start: 2025-05-26 | End: 2025-05-27 | Stop reason: HOSPADM

## 2025-05-26 RX ORDER — HYDRALAZINE HYDROCHLORIDE 50 MG/1
50 TABLET, FILM COATED ORAL 3 TIMES DAILY
Status: DISCONTINUED | OUTPATIENT
Start: 2025-05-26 | End: 2025-05-27 | Stop reason: HOSPADM

## 2025-05-26 RX ORDER — ONDANSETRON 2 MG/ML
4 INJECTION INTRAMUSCULAR; INTRAVENOUS EVERY 6 HOURS PRN
Status: DISCONTINUED | OUTPATIENT
Start: 2025-05-26 | End: 2025-05-27 | Stop reason: HOSPADM

## 2025-05-26 RX ORDER — FLUTICASONE PROPIONATE 50 MCG
2 SPRAY, SUSPENSION (ML) NASAL DAILY
Status: DISCONTINUED | OUTPATIENT
Start: 2025-05-26 | End: 2025-05-27 | Stop reason: HOSPADM

## 2025-05-26 RX ORDER — NITROGLYCERIN 0.4 MG/1
0.4 TABLET SUBLINGUAL
Status: DISCONTINUED | OUTPATIENT
Start: 2025-05-26 | End: 2025-05-27 | Stop reason: HOSPADM

## 2025-05-26 RX ORDER — ACETAMINOPHEN 160 MG/5ML
650 SOLUTION ORAL EVERY 4 HOURS PRN
Status: DISCONTINUED | OUTPATIENT
Start: 2025-05-26 | End: 2025-05-27 | Stop reason: HOSPADM

## 2025-05-26 RX ADMIN — Medication 10 ML: at 01:49

## 2025-05-26 RX ADMIN — DONEPEZIL HYDROCHLORIDE 5 MG: 10 TABLET, FILM COATED ORAL at 21:29

## 2025-05-26 RX ADMIN — IPRATROPIUM BROMIDE AND ALBUTEROL SULFATE 3 ML: .5; 3 SOLUTION RESPIRATORY (INHALATION) at 19:42

## 2025-05-26 RX ADMIN — OLANZAPINE 5 MG: 5 TABLET, FILM COATED ORAL at 18:21

## 2025-05-26 RX ADMIN — TRAZODONE HYDROCHLORIDE 50 MG: 50 TABLET ORAL at 21:26

## 2025-05-26 RX ADMIN — AMLODIPINE BESYLATE 10 MG: 10 TABLET ORAL at 08:14

## 2025-05-26 RX ADMIN — Medication 10 ML: at 21:02

## 2025-05-26 RX ADMIN — BUDESONIDE AND FORMOTEROL FUMARATE DIHYDRATE 1 PUFF: 160; 4.5 AEROSOL RESPIRATORY (INHALATION) at 19:46

## 2025-05-26 RX ADMIN — ASPIRIN 81 MG: 81 TABLET, COATED ORAL at 13:39

## 2025-05-26 RX ADMIN — ATORVASTATIN CALCIUM 40 MG: 20 TABLET, FILM COATED ORAL at 13:39

## 2025-05-26 RX ADMIN — HYDRALAZINE HYDROCHLORIDE 50 MG: 50 TABLET ORAL at 08:14

## 2025-05-26 RX ADMIN — IPRATROPIUM BROMIDE AND ALBUTEROL SULFATE 3 ML: .5; 3 SOLUTION RESPIRATORY (INHALATION) at 10:59

## 2025-05-26 RX ADMIN — IPRATROPIUM BROMIDE AND ALBUTEROL SULFATE 3 ML: .5; 3 SOLUTION RESPIRATORY (INHALATION) at 06:26

## 2025-05-26 RX ADMIN — ISOSORBIDE MONONITRATE 30 MG: 30 TABLET, EXTENDED RELEASE ORAL at 13:39

## 2025-05-26 RX ADMIN — LOSARTAN POTASSIUM 100 MG: 100 TABLET, FILM COATED ORAL at 13:39

## 2025-05-26 RX ADMIN — Medication 10 ML: at 08:14

## 2025-05-26 RX ADMIN — TICAGRELOR 90 MG: 60 TABLET ORAL at 21:30

## 2025-05-26 RX ADMIN — HYDRALAZINE HYDROCHLORIDE 50 MG: 50 TABLET ORAL at 21:26

## 2025-05-26 NOTE — PROGRESS NOTES
[facility] Clinical Pharmacy Services: Renal Dose Adjustment    Famotidine has been appropriately renally dose adjusted for kidney function based on P&T approved guideline and procedure.     Heber Arreaga, PharmD  PGY1 Pharmacy Resident  5/26/2025  14:59 EDT

## 2025-05-26 NOTE — CONSULTS
[facility] Consult Note      Patient Name: Sonia Acharya  : 1950  MRN: [id number]  Primary Care Physician:  Marcy Paez PA  Referring Physician: Farzad Modi MD  Date of admission: 2025    Subjective     Reason for Consult:  ESRD     HPI:   Sonia Acharya is a 75 y.o. female with ESRD, HTN, dementia and mood disorder who presented last night with worsening cough and dyspnea past 24h.  CXR shows vascular congestion but no infiltrate.  Resp viral panel positive for rhinovirus.  Afebrile with normal WBC.  Normotensive.  K/HCO3 normal and BUN/Cr 27/6.3.  She's on room air currently.  BG down to 70 just now and given orange juice.  She dialyzes MWF at [facility] via RUE AVF.    Review of Systems:   14 point review of systems is otherwise negative except for mentioned above on HPI    Personal History     Past Medical History:   Diagnosis Date    Arthritis     Chronic kidney disease     Heart disease     Hypertension        Past Surgical History:   Procedure Laterality Date    BREAST SURGERY      CARDIAC CATHETERIZATION N/A 2025    Procedure: Left Heart Cath;  Surgeon: Cleveland Dickens MD;  Location:  [facility];  Service: Cardiovascular;  Laterality: N/A;  Dialysis patient    CARDIAC CATHETERIZATION N/A 2025    Procedure: Coronary angiography;  Surgeon: Cleveland Dickens MD;  Location:  [facility];  Service: Cardiovascular;  Laterality: N/A;    DIALYSIS FISTULA CREATION      EYE SURGERY      HERNIA REPAIR      Dr. Sung       Family History: family history includes Breast cancer in her sister; Heart disease in her father.    Social History:  reports that she has never smoked. She has never been exposed to tobacco smoke. She has never used smokeless tobacco. She reports that she does not drink alcohol and does not use drugs.    Home Medications:  Prior to Admission medications    Medication Sig Start Date End Date Taking?  Authorizing Provider   amLODIPine (NORVASC) 10 MG tablet Take 1 tablet by mouth Daily.    Manish Tracy MD   aspirin 81 MG EC tablet Take 1 tablet by mouth Daily.    Manish Tracy MD   atorvastatin (LIPITOR) 40 MG tablet Take 1 tablet by mouth Daily.    Manish Tracy MD   budesonide-formoterol (SYMBICORT) 80-4.5 MCG/ACT inhaler Inhale 2 puffs 2 (Two) Times a Day.    Manish Tracy MD   diphenhydrAMINE (BENADRYL) 25 mg capsule Take 2 capsules by mouth Every 6 (Six) Hours As Needed for Itching.    Manish Tracy MD   donepezil (ARICEPT) 5 MG tablet Take 1 tablet by mouth Every Night.    Manish Tracy MD   fluticasone (FLONASE) 50 MCG/ACT nasal spray Administer 2 sprays into the nostril(s) as directed by provider Daily.    Manish Tracy MD   hydrALAZINE (APRESOLINE) 50 MG tablet Take 1 tablet by mouth 3 (Three) Times a Day.    Manish Tracy MD   isosorbide mononitrate (IMDUR) 30 MG 24 hr tablet Take 1 tablet by mouth Daily. 1/31/22   Manish Tracy MD   lidocaine (LIDODERM) 5 % Place 1 patch on the skin as directed by provider Daily. Remove & Discard patch within 12 hours or as directed by MD 1/26/25   Varun Daniel MD   linaclotide (LINZESS) 290 MCG capsule capsule Take 1 capsule by mouth Every Morning Before Breakfast.    Manish Tracy MD   losartan (COZAAR) 100 MG tablet Take 1 tablet by mouth Daily. 1/19/23   Hayden Granda MD   melatonin 3 MG tablet Take 1 tablet by mouth Every Night.    Manish Tracy MD   metoprolol succinate XL (TOPROL-XL) 25 MG 24 hr tablet Take 1 tablet by mouth Daily.    Manish Tracy MD   OLANZapine (zyPREXA) 5 MG tablet Take 1 tablet by mouth Daily With Dinner. 3/15/25   Casey Middleton MD   thiamine (VITAMIN B1) 100 MG tablet Take 1 tablet by mouth Daily. 3/15/25   Casey Middleton MD       Allergies:  Allergies   Allergen Reactions    Contrast Dye (Echo Or Unknown Ct/Mr) Rash        Objective     Vitals:   Temp:  [97.9 °F (36.6 °C)-98.2 °F (36.8 °C)] 97.9 °F (36.6 °C)  Heart Rate:  [52-64] 52  Resp:  [18-20] 18  BP: (126-170)/(54-85) 154/78  No intake or output data in the 24 hours ending 05/26/25 0627    Physical Exam:    General Appearance: pleasant AAF comfortable alert on RA   Skin: warm and dry  HEENT: oral mucosa normal, nonicteric sclera  Neck: supple, no JVD  Lungs: Coarse BS bilat no rales   Heart: RRR, normal S1 and S2  Abdomen: soft, nontender, nondistended  : no palpable bladder  Extremities: no edema, cyanosis or clubbing, RUE AVF +T/B  Neuro: normal speech and mental status     Scheduled Meds:     insulin lispro, 2-7 Units, Subcutaneous, 4x Daily AC & at Bedtime  ipratropium-albuterol, 3 mL, Nebulization, Q6H While Awake - RT  sodium chloride, 10 mL, Intravenous, Q12H      IV Meds:        Results Reviewed:   I have personally reviewed the results from the time of this admission to 5/26/2025 06:27 EDT     Lab Results   Component Value Date    GLUCOSE 66 05/26/2025    CALCIUM 10.4 05/26/2025     05/26/2025    K 4.0 05/26/2025    CO2 23.7 05/26/2025    CL 98 05/26/2025    BUN 27 (H) 05/26/2025    CREATININE 6.32 (H) 05/26/2025    EGFRIFAFRI 10 (L) 03/04/2023    EGFRIFNONA  12/14/2021      Comment:      <15 Indicative of kidney failure.    BCR 4.3 (L) 05/26/2025    ANIONGAP 16.3 (H) 05/26/2025      Lab Results   Component Value Date    MG 2.5 (H) 03/09/2025    PHOS 4.1 02/15/2025    ALBUMIN 3.5 05/26/2025           Assessment / Plan     ASSESSMENT:  ESRD - HD MWF via RUE AVF.  Mild vol excess by imaging.  K/HCo3 and waste products all stable  HTN - BP bit labile thus far 120s to 170s systolic, 150s currently   Rhinovirus - supportive care.  Not hypoxic.  No infitrate on CXR.  Afebrile  Hypoglycemia, mild, BG 70 and given OJ just now  Anemia of CKD, hgb robust (13), hold ZULAY  Dementia + mood disorder, on aricept and zyprexa   Dyslipidemia on statin      PLAN:  HD today,  remove 2.5L as tolerated  Clarify home antihypertensive regimen - don't see metop XL on dialysis clinic list (but do see norvasc, losartan, hydralazine)    Thank you for involving us in the care of Sonia Acharya.  Please feel free to call with any questions.    Arjun Ramirez MD  05/26/25  06:27 EDT    [facility]  [phone]

## 2025-05-26 NOTE — ED NOTES
..Nursing report ED to floor  Sonia Acharya  75 y.o.  female    HPI :  HPI  Stated Reason for Visit: patient brought in by family who report patient is trying to get over the flu and has finished anitbiotics but is still having trouble breathing. patient is on dialysis MWF, last treatment was Friday  History Obtained From: patient    Chief Complaint  Chief Complaint   Patient presents with    Shortness of Breath       Admitting doctor:   Farzad Modi MD    Admitting diagnosis:   There were no encounter diagnoses.    Code status:   Current Code Status       Date Active Code Status Order ID Comments User Context       Prior            Allergies:   Patient has no known allergies.    Isolation:   No active isolations    Intake and Output  No intake or output data in the 24 hours ending 05/25/25 2329    Weight:       05/25/25 2004   Weight: 49.4 kg (109 lb)       Most recent vitals:   Vitals:    05/25/25 2004 05/25/25 2031 05/25/25 2130 05/25/25 2157   BP: 128/68 126/54 170/77    BP Location: Left arm      Patient Position: Sitting      Pulse:  59 53 58   Resp:  18  18   Temp: 98.2 °F (36.8 °C)      SpO2:  99% 100% 100%   Weight: 49.4 kg (109 lb)      Height:           Active LDAs/IV Access:   Lines, Drains & Airways       Active LDAs       Name Placement date Placement time Site Days    Peripheral IV 05/25/25 2101 20 G Left Antecubital 05/25/25 2101  Antecubital  less than 1                    Labs (abnormal labs have a star):   Labs Reviewed   RESPIRATORY PANEL PCR W/ COVID-19 (SARS-COV-2), NP SWAB IN UTM/VTP, 2 HR TAT - Abnormal; Notable for the following components:       Result Value    Human Rhinovirus/Enterovirus Detected (*)     All other components within normal limits    Narrative:     In the setting of a positive respiratory panel with a viral infection PLUS a negative procalcitonin without other underlying concern for bacterial infection, consider observing off antibiotics or discontinuation of  antibiotics and continue supportive care. If the respiratory panel is positive for atypical bacterial infection (Bordetella pertussis, Chlamydophila pneumoniae, or Mycoplasma pneumoniae), consider antibiotic de-escalation to target atypical bacterial infection.   COMPREHENSIVE METABOLIC PANEL - Abnormal; Notable for the following components:    BUN 24 (*)     Creatinine 5.96 (*)     Chloride 96 (*)     Calcium 10.7 (*)     BUN/Creatinine Ratio 4.0 (*)     Anion Gap 16.0 (*)     eGFR 6.9 (*)     All other components within normal limits    Narrative:     GFR Categories in Chronic Kidney Disease (CKD)              GFR Category          GFR (mL/min/1.73)    Interpretation  G1                    90 or greater        Normal or high (1)  G2                    60-89                Mild decrease (1)  G3a                   45-59                Mild to moderate decrease  G3b                   30-44                Moderate to severe decrease  G4                    15-29                Severe decrease  G5                    14 or less           Kidney failure    (1)In the absence of evidence of kidney disease, neither GFR category G1 or G2 fulfill the criteria for CKD.    eGFR calculation 2021 CKD-EPI creatinine equation, which does not include race as a factor   BNP (IN-HOUSE) - Abnormal; Notable for the following components:    proBNP >70,000.0 (*)     All other components within normal limits    Narrative:     This assay is used as an aid in the diagnosis of individuals suspected of having heart failure. It can be used as an aid in the diagnosis of acute decompensated heart failure (ADHF) in patients presenting with signs and symptoms of ADHF to the emergency department (ED). In addition, NT-proBNP of <300 pg/mL indicates ADHF is not likely.    Age Range Result Interpretation  NT-proBNP Concentration (pg/mL:      <50             Positive            >450                   Gray                 300-450                     Negative             <300    50-75           Positive            >900                  Gray                300-900                  Negative            <300      >75             Positive            >1800                  Gray                300-1800                  Negative            <300   TROPONIN - Abnormal; Notable for the following components:    HS Troponin T 99 (*)     All other components within normal limits    Narrative:     High Sensitive Troponin T Reference Range:  <14.0 ng/L- Negative Female for AMI  <22.0 ng/L- Negative Male for AMI  >=14 - Abnormal Female indicating possible myocardial injury.  >=22 - Abnormal Male indicating possible myocardial injury.   Clinicians would have to utilize clinical acumen, EKG, Troponin, and serial changes to determine if it is an Acute Myocardial Infarction or myocardial injury due to an underlying chronic condition.        CBC WITH AUTO DIFFERENTIAL - Abnormal; Notable for the following components:    MCHC 31.3 (*)     RDW 16.3 (*)     RDW-SD 57.3 (*)     Eosinophil % 6.5 (*)     All other components within normal limits   HIGH SENSITIVITIY TROPONIN T 1HR - Abnormal; Notable for the following components:    HS Troponin T 94 (*)     All other components within normal limits    Narrative:     High Sensitive Troponin T Reference Range:  <14.0 ng/L- Negative Female for AMI  <22.0 ng/L- Negative Male for AMI  >=14 - Abnormal Female indicating possible myocardial injury.  >=22 - Abnormal Male indicating possible myocardial injury.   Clinicians would have to utilize clinical acumen, EKG, Troponin, and serial changes to determine if it is an Acute Myocardial Infarction or myocardial injury due to an underlying chronic condition.        RAINBOW DRAW    Narrative:     The following orders were created for panel order [facility] Draw.  Procedure                               Abnormality         Status                     ---------                               -----------          ------                     Green Top (Gel)[[id number]]                                  Final result               Lavender Top[[id number]]                                     Final result               Gold Top - SST[[id number]]                                   Final result               Light Blue Top[[id number]]                                   Final result                 Please view results for these tests on the individual orders.   CBC AND DIFFERENTIAL    Narrative:     The following orders were created for panel order CBC & Differential.  Procedure                               Abnormality         Status                     ---------                               -----------         ------                     CBC Auto Differential[[id number]]        Abnormal            Final result                 Please view results for these tests on the individual orders.   GREEN TOP   LAVENDER TOP   GOLD TOP - SST   LIGHT BLUE TOP       EKG:   ECG 12 Lead ED Triage Standing Order; SOA   Preliminary Result   HEART RATE=63  bpm   RR Hrkqkiyn=7089  ms   IL Iapotdas=305  ms   P Horizontal Axis=-47  deg   P Front Axis=47  deg   QRSD Ewcnjfva=163  ms   QT Ldhysukt=954  ms   YApL=820  ms   QRS Axis=-58  deg   T Wave Axis=143  deg   - ABNORMAL ECG -   Sinus rhythm   Multiple premature complexes, vent & supraven   Probable left atrial enlargement   Nonspecific IVCD with LAD   LVH with secondary repolarization abnormality   ST elevation secondary to IVCD   Date and Time of Study:2025-05-25 21:19:03          Meds given in ED:   Medications   sodium chloride 0.9 % flush 10 mL (has no administration in time range)       Imaging results:  XR Chest 1 View  Result Date: 5/25/2025  Cardiomegaly with suspected vascular congestion.  This report was finalized on 5/25/2025 9:30 PM by Dr. Evonne Obando M.D on Workstation: [id number]        Ambulatory status:   - assist x1    Social issues:   Social History     Socioeconomic History     Marital status: Single   Tobacco Use    Smoking status: Never     Passive exposure: Never    Smokeless tobacco: Never   Vaping Use    Vaping status: Never Used   Substance and Sexual Activity    Alcohol use: No    Drug use: No    Sexual activity: Defer       Peripheral Neurovascular  Peripheral Neurovascular (Adult)  Peripheral Neurovascular WDL: WDL    Neuro Cognitive  Neuro Cognitive (Adult)  Cognitive/Neuro/Behavioral WDL: .WDL except, orientation  Orientation: disoriented to, situation, time    Learning  Learning Assessment  Learning Readiness and Ability: no barriers identified    Respiratory  Respiratory WDL  Respiratory WDL: .WDL except, rhythm/pattern, cough  Rhythm/Pattern, Respiratory: depth regular, pattern regular, unlabored  Cough Frequency: infrequent (Per family, patient had the flu last week.)    Abdominal Pain       Pain Assessments  Pain (Adult)  (0-10) Pain Rating: Rest: 5  Pain Location: abdomen    NIH Stroke Scale       Mary Charlton RN  05/25/25 23:29 EDT

## 2025-05-26 NOTE — ED PROVIDER NOTES
" EMERGENCY DEPARTMENT ENCOUNTER  Room Number:  21/21  PCP: Marcy Paez PA  Independent Historians: Patient and Family      HPI:  Chief Complaint: had concerns including Shortness of Breath.  Cough and wheezing    A complete HPI/ROS/PMH/PSH/SH/FH are unobtainable due to: None    Chronic or social conditions impacting patient care (Social Determinants of Health): None      Context: Sonia Acharya is a 75 y.o. female with a medical history of hypertension, arthritis and end-stage renal disease on dialysis who presents to the ED c/o acute cough, wheezing and shortness of breath that has been worsening since this morning.  Patient denies chest pain.  Denies fevers.  She says she feels short of breath with any kind of activities and has had some persistent coughing today.  She has been taking her medication as directed.  Was using home inhalers as directed also.  Denies any vomiting or diarrhea but has been \"gagging and coughing up phlegm sometimes.\"      Review of prior external notes (non-ED) -and- Review of prior external test results outside of this encounter: I independently reviewed the hospitalist discharge summary from March 15, 2025.  Patient was admitted at that time for management of confusion and mental status changes.  She had dialysis while in the hospital and was started on Zyprexa therapy in the evening.  She responded well to that medication.    Prescription drug monitoring program review: [facility] reviewed by Farzad Modi MD, Benjamín Swanson MD       PAST MEDICAL HISTORY  Active Ambulatory Problems     Diagnosis Date Noted    Generalized abdominal pain 12/13/2021    ODALIS (acute kidney injury) 12/13/2021    Anemia, chronic disease 12/13/2021    Metabolic acidosis, increased anion gap 12/13/2021    Obesity (BMI 30-39.9) 12/13/2021    HTN (hypertension) 12/13/2021    Chest pain, atypical 12/13/2021    Cervical radiculopathy 01/16/2023    ESRD on dialysis 01/16/2023    Bradycardia 01/16/2023    " Right arm pain 01/16/2023    ANGIE (obstructive sleep apnea) 01/16/2023    Chronic diastolic CHF (congestive heart failure) 01/16/2023    Aortic stenosis 01/16/2023    Pancytopenia 01/18/2023    Hyperphosphatemia 01/18/2023    Hyperkalemia 12/20/2023    Pneumonia 12/20/2023    Leukocytopenia 12/22/2023    Anemia, chronic disease 12/22/2023    ESRD (end stage renal disease) on dialysis 01/06/2025    Bacterial pneumonia, treating as gram negative 01/07/2025    Chronic respiratory failure with hypoxia 01/07/2025    CAD (coronary artery disease) 01/07/2025    Nausea, vomiting, and diarrhea 01/07/2025    Severe protein-calorie malnutrition 01/09/2025    Chronic abdominal pain 02/14/2025    Type 2 diabetes mellitus with hypoglycemia 02/14/2025    Altered mental status 03/09/2025    Dementia with agitation 03/11/2025     Resolved Ambulatory Problems     Diagnosis Date Noted    Chest pain 02/13/2025     Past Medical History:   Diagnosis Date    Arthritis     Chronic kidney disease     Heart disease     Hypertension          PAST SURGICAL HISTORY  Past Surgical History:   Procedure Laterality Date    BREAST SURGERY      CARDIAC CATHETERIZATION N/A 2/14/2025    Procedure: Left Heart Cath;  Surgeon: Cleveland Dickens MD;  Location:  [facility];  Service: Cardiovascular;  Laterality: N/A;  Dialysis patient    CARDIAC CATHETERIZATION N/A 2/14/2025    Procedure: Coronary angiography;  Surgeon: Cleveland Dickens MD;  Location:  [facility];  Service: Cardiovascular;  Laterality: N/A;    DIALYSIS FISTULA CREATION      EYE SURGERY      HERNIA REPAIR  2017    Dr. Sung         FAMILY HISTORY  Family History   Problem Relation Age of Onset    Heart disease Father     Breast cancer Sister          SOCIAL HISTORY  Social History     Socioeconomic History    Marital status: Single   Tobacco Use    Smoking status: Never     Passive exposure: Never    Smokeless tobacco: Never   Vaping Use    Vaping  status: Never Used   Substance and Sexual Activity    Alcohol use: No    Drug use: No    Sexual activity: Defer         ALLERGIES  Patient has no known allergies.      REVIEW OF SYSTEMS  Review of Systems  Included in HPI  All systems reviewed and negative except for those discussed in HPI.      PHYSICAL EXAM    I have reviewed the triage vital signs and nursing notes.    ED Triage Vitals   Temp Heart Rate Resp BP SpO2   05/25/25 2004 05/25/25 2001 05/25/25 2001 05/25/25 2004 05/25/25 2001   98.2 °F (36.8 °C) 64 20 128/68 97 %      Temp src Heart Rate Source Patient Position BP Location FiO2 (%)   -- -- 05/25/25 2004 05/25/25 2004 --     Sitting Left arm        Physical Exam  GENERAL: alert, no acute distress  SKIN: Warm, dry, no rashes  HENT: Normocephalic, atraumatic  EYES: no scleral icterus, normal conjunctivae  CV: regular rhythm, regular rate, normal perfusion  RESPIRATORY: Normal effort, no stridor.  Breath sounds are diminished at the bilateral bases.  There are few scattered wheezes and crackles noted bilaterally.  No coughing during my evaluation.  ABDOMEN: soft, nondistended, nontender  MUSCULOSKELETAL: no deformity, no edema of the lower extremities.  No asymmetry of the lower extremities.  NEURO: alert, moves all extremities, follows commands      LAB RESULTS  Recent Results (from the past 24 hours)   Comprehensive Metabolic Panel    Collection Time: 05/25/25  9:04 PM    Specimen: Blood   Result Value Ref Range    Glucose 68 65 - 99 mg/dL    BUN 24 (H) 8 - 23 mg/dL    Creatinine 5.96 (H) 0.57 - 1.00 mg/dL    Sodium 136 136 - 145 mmol/L    Potassium 3.5 3.5 - 5.2 mmol/L    Chloride 96 (L) 98 - 107 mmol/L    CO2 24.0 22.0 - 29.0 mmol/L    Calcium 10.7 (H) 8.6 - 10.5 mg/dL    Total Protein 6.3 6.0 - 8.5 g/dL    Albumin 3.8 3.5 - 5.2 g/dL    ALT (SGPT) 7 1 - 33 U/L    AST (SGOT) 13 1 - 32 U/L    Alkaline Phosphatase 74 39 - 117 U/L    Total Bilirubin 0.3 0.0 - 1.2 mg/dL    Globulin 2.5 gm/dL    A/G Ratio  1.5 g/dL    BUN/Creatinine Ratio 4.0 (L) 7.0 - 25.0    Anion Gap 16.0 (H) 5.0 - 15.0 mmol/L    eGFR 6.9 (L) >60.0 mL/min/1.73   BNP    Collection Time: 05/25/25  9:04 PM    Specimen: Blood   Result Value Ref Range    proBNP >70,000.0 (H) 0.0 - 1,800.0 pg/mL   High Sensitivity Troponin T    Collection Time: 05/25/25  9:04 PM    Specimen: Blood   Result Value Ref Range    HS Troponin T 99 (C) <14 ng/L   Green Top (Gel)    Collection Time: 05/25/25  9:04 PM   Result Value Ref Range    Extra Tube Hold for add-ons.    Lavender Top    Collection Time: 05/25/25  9:04 PM   Result Value Ref Range    Extra Tube hold for add-on    Gold Top - SST    Collection Time: 05/25/25  9:04 PM   Result Value Ref Range    Extra Tube Hold for add-ons.    Light Blue Top    Collection Time: 05/25/25  9:04 PM   Result Value Ref Range    Extra Tube Hold for add-ons.    CBC Auto Differential    Collection Time: 05/25/25  9:04 PM    Specimen: Blood   Result Value Ref Range    WBC 4.32 3.40 - 10.80 10*3/mm3    RBC 4.49 3.77 - 5.28 10*6/mm3    Hemoglobin 13.4 12.0 - 15.9 g/dL    Hematocrit 42.8 34.0 - 46.6 %    MCV 95.3 79.0 - 97.0 fL    MCH 29.8 26.6 - 33.0 pg    MCHC 31.3 (L) 31.5 - 35.7 g/dL    RDW 16.3 (H) 12.3 - 15.4 %    RDW-SD 57.3 (H) 37.0 - 54.0 fl    MPV 9.0 6.0 - 12.0 fL    Platelets 212 140 - 450 10*3/mm3    Neutrophil % 57.4 42.7 - 76.0 %    Lymphocyte % 23.1 19.6 - 45.3 %    Monocyte % 11.8 5.0 - 12.0 %    Eosinophil % 6.5 (H) 0.3 - 6.2 %    Basophil % 0.7 0.0 - 1.5 %    Immature Grans % 0.5 0.0 - 0.5 %    Neutrophils, Absolute 2.48 1.70 - 7.00 10*3/mm3    Lymphocytes, Absolute 1.00 0.70 - 3.10 10*3/mm3    Monocytes, Absolute 0.51 0.10 - 0.90 10*3/mm3    Eosinophils, Absolute 0.28 0.00 - 0.40 10*3/mm3    Basophils, Absolute 0.03 0.00 - 0.20 10*3/mm3    Immature Grans, Absolute 0.02 0.00 - 0.05 10*3/mm3    nRBC 0.0 0.0 - 0.2 /100 WBC   ECG 12 Lead ED Triage Standing Order; SOA    Collection Time: 05/25/25  9:19 PM   Result Value Ref  Range    QT Interval 466 ms    QTC Interval 458 ms   Respiratory Panel PCR w/COVID-19(SARS-CoV-2) [facility]-House, NP Swab in UTM/VTM, 2 HR TAT - Swab, Nasopharynx    Collection Time: 05/25/25  9:57 PM    Specimen: Nasopharynx; Swab   Result Value Ref Range    ADENOVIRUS, PCR Not Detected Not Detected    Coronavirus 229E Not Detected Not Detected    Coronavirus HKU1 Not Detected Not Detected    Coronavirus NL63 Not Detected Not Detected    Coronavirus OC43 Not Detected Not Detected    COVID19 Not Detected Not Detected - Ref. Range    Human Metapneumovirus Not Detected Not Detected    Human Rhinovirus/Enterovirus Detected (A) Not Detected    Influenza A PCR Not Detected Not Detected    Influenza B PCR Not Detected Not Detected    Parainfluenza Virus 1 Not Detected Not Detected    Parainfluenza Virus 2 Not Detected Not Detected    Parainfluenza Virus 3 Not Detected Not Detected    Parainfluenza Virus 4 Not Detected Not Detected    RSV, PCR Not Detected Not Detected    Bordetella pertussis pcr Not Detected Not Detected    Bordetella parapertussis PCR Not Detected Not Detected    Chlamydophila pneumoniae PCR Not Detected Not Detected    Mycoplasma pneumo by PCR Not Detected Not Detected   High Sensitivity Troponin T 1Hr    Collection Time: 05/25/25 10:23 PM    Specimen: Blood   Result Value Ref Range    HS Troponin T 94 (C) <14 ng/L    Troponin T Numeric Delta -5 ng/L    Troponin T % Delta -5 Abnormal if >/= 20%         RADIOLOGY  XR Chest 1 View  Result Date: 5/25/2025  SINGLE VIEW OF THE CHEST  HISTORY: Shortness of air  COMPARISON: March 9, 2025  FINDINGS: There is cardiomegaly. There does appear to be some vascular congestion. Lung volumes are diminished. No pneumothorax or large effusion is seen. The patient has a left axillary/brachial stent. There is calcification of the aorta.      Cardiomegaly with suspected vascular congestion.  This report was finalized on 5/25/2025 9:30 PM by Dr. Douglass  EMMY Obando on Workstation: [id number]          MEDICATIONS GIVEN IN ER  Medications   sodium chloride 0.9 % flush 10 mL (has no administration in time range)         ORDERS PLACED DURING THIS VISIT:  Orders Placed This Encounter   Procedures    Respiratory Panel PCR w/COVID-19(SARS-CoV-2) [facility] In-House, NP Swab in UTM/VTM, 2 HR TAT - Swab, Nasopharynx    XR Chest 1 View    [place] Draw    Comprehensive Metabolic Panel    BNP    High Sensitivity Troponin T    CBC Auto Differential    High Sensitivity Troponin T 1Hr    NPO Diet NPO Type: Strict NPO    Undress & Gown    Continuous Pulse Oximetry    Vital Signs    LHA (on-call MD unless specified) Details    Oxygen Therapy- Nasal Cannula; Titrate 1-6 LPM Per SpO2; 90 - 95%    ECG 12 Lead ED Triage Standing Order; SOA    Insert Peripheral IV    Initiate Observation Status    CBC & Differential    Green Top (Gel)    Lavender Top    Gold Top - SST    Light Blue Top         OUTPATIENT MEDICATION MANAGEMENT:  Current Facility-Administered Medications Ordered in Epic   Medication Dose Route Frequency Provider Last Rate Last Admin    sodium chloride 0.9 % flush 10 mL  10 mL Intravenous PRN Benjamín Swanson MD         Current Outpatient Medications Ordered in Epic   Medication Sig Dispense Refill    amLODIPine (NORVASC) 10 MG tablet Take 1 tablet by mouth Daily.      aspirin 81 MG EC tablet Take 1 tablet by mouth Daily.      atorvastatin (LIPITOR) 40 MG tablet Take 1 tablet by mouth Daily.      budesonide-formoterol (SYMBICORT) 80-4.5 MCG/ACT inhaler Inhale 2 puffs 2 (Two) Times a Day.      diphenhydrAMINE (BENADRYL) 25 mg capsule Take 2 capsules by mouth Every 6 (Six) Hours As Needed for Itching.      donepezil (ARICEPT) 5 MG tablet Take 1 tablet by mouth Every Night.      fluticasone (FLONASE) 50 MCG/ACT nasal spray Administer 2 sprays into the nostril(s) as directed by provider Daily.      hydrALAZINE (APRESOLINE) 50 MG tablet Take 1 tablet by  mouth 3 (Three) Times a Day.      isosorbide mononitrate (IMDUR) 30 MG 24 hr tablet Take 1 tablet by mouth Daily.      lidocaine (LIDODERM) 5 % Place 1 patch on the skin as directed by provider Daily. Remove & Discard patch within 12 hours or as directed by MD 6 each 0    linaclotide (LINZESS) 290 MCG capsule capsule Take 1 capsule by mouth Every Morning Before Breakfast.      losartan (COZAAR) 100 MG tablet Take 1 tablet by mouth Daily. 30 tablet 0    melatonin 3 MG tablet Take 1 tablet by mouth Every Night.      metoprolol succinate XL (TOPROL-XL) 25 MG 24 hr tablet Take 1 tablet by mouth Daily.      OLANZapine (zyPREXA) 5 MG tablet Take 1 tablet by mouth Daily With Dinner. 30 tablet 0    thiamine (VITAMIN B1) 100 MG tablet Take 1 tablet by mouth Daily. 30 tablet 0         PROCEDURES  Procedures        PROGRESS, DATA ANALYSIS, CONSULTS, AND MEDICAL DECISION MAKING  All labs have been independently interpreted by me.  All radiology studies have been reviewed by me. All EKG's have been independently viewed and interpreted by me.  Discussion below represents my analysis of pertinent findings related to patient's condition, differential diagnosis, treatment plan and final disposition.    Differential diagnosis includes but is not limited to CHF exacerbation, volume overload, pneumonia, COPD exacerbation, viral URI, asthma exacerbation, pneumothorax, pulmonary embolism.    Clinical Scores:                   ED Course as of 05/26/25 0000   Sun May 25, 2025   2124 EKG         EKG time/Interp time: 2119/2121  Rhythm/Rate: Sinus rhythm, 63 bpm  P waves and DE: Present, normal interval, 156 ms  QRS, axis: 151 ms, nonspecific IVCD, left axis deviation  ST and T waves: Interpretation is limited because of the IVCD.  They do not appear to be any acute ischemic features.  Overall the morphology of the QRS and ST segments is similar to previous exam on file from March 9, 2025.  Independently interpreted by me contemporaneously  with treatment   [SORIN]   2359 Human Rhinovirus/Enterovirus(!): Detected [SORIN]   9200 I discussed with KANDIS Cheng, from [facility] about this patient.  She agrees to admit her to the hospitalist service for further medical management on behalf of Dr. Gudino. [SORIN]      ED Course User Index  [SORIN] Benjamín Swanson MD             AS OF 00:00 EDT VITALS:    BP - 152/85  HR - 61  TEMP - 98.2 °F (36.8 °C)  O2 SATS - 100%    COMPLEXITY OF CARE  The patient requires admission.      DIAGNOSIS  Final diagnoses:   Rhinovirus infection   Pulmonary vascular congestion   Dyspnea, unspecified type   End stage renal disease on dialysis         DISPOSITION  ED Disposition       ED Disposition   Decision to Admit    Condition   --    Comment   Level of Care: Telemetry [5]   Diagnosis: Rhinovirus infection [175534]   Admitting Physician: MIRNA GUDINO [[id number]]   Attending Physician: MIRNA GUDINO [[id number]]   Is patient appropriate for Inpatient Observation Unit?: Yes [1]                  Please note that portions of this document were completed with a voice recognition program.    Note Disclaimer: At [facility], we believe that sharing information builds trust and better relationships. You are receiving this note because you recently visited [facility]. It is possible you will see health information before a provider has talked with you about it. This kind of information can be easy to misunderstand. To help you fully understand what it means for your health, we urge you to discuss this note with your provider.         Benjamín Swanson MD  05/26/25 0000

## 2025-05-26 NOTE — H&P
"    Patient Name:  Sonia Acharya  YOB: 1950  MRN:  [id number]  Admit Date:  5/25/2025  Patient Care Team:  Marcy Paez PA as PCP - General (Physician Assistant)      Subjective   History Present Illness     Chief Complaint   Patient presents with    Shortness of Breath       Ms. Acharya is a 75 y.o. non-smoker with a history of ESRD on HD MWF, hypertension, dementia, type 2 diabetes, chronic diastolic CHF who presents to [facility] ER with chief complaint of worsening cough and shortness of breath for the last 24 hours and admitted for complications of rhinovirus infection & acute on chronic diastolic CHF.    Patient oriented to person, place, situation, and disoriented to year states \"2023\" and complains of acute cough persists despite home inhalers starting on 5/25/2025.    Patient states last hemodialysis treatment on Friday, 5/23/2025.    proBNP 70,000, respiratory viral panel positive rhinovirus, chest x-ray showing pulmonary vascular congestion.    Patient received nebulizers in ER.    Recommendation pending hospital course.  Details below.    History of Present Illness    Review of Systems   Constitutional:  Negative for chills and fever.   HENT:  Negative for congestion and rhinorrhea.    Respiratory:  Positive for cough. Negative for shortness of breath.    Cardiovascular:  Negative for chest pain and leg swelling.   Gastrointestinal:  Negative for abdominal pain, constipation, diarrhea, nausea and vomiting.   Endocrine: Negative for polydipsia, polyphagia and polyuria.   Genitourinary:  Negative for difficulty urinating and dysuria.   Musculoskeletal:  Positive for gait problem (due to shortness of breath). Negative for myalgias.   Skin:  Negative for rash and wound.   Neurological:  Negative for syncope and light-headedness.   Psychiatric/Behavioral:  Negative for confusion and hallucinations.         Personal History     Past Medical History:   Diagnosis Date    Arthritis     Chronic " kidney disease     Heart disease     Hypertension      Past Surgical History:   Procedure Laterality Date    BREAST SURGERY      CARDIAC CATHETERIZATION N/A 2/14/2025    Procedure: Left Heart Cath;  Surgeon: Cleveland Dickens MD;  Location:  [facility];  Service: Cardiovascular;  Laterality: N/A;  Dialysis patient    CARDIAC CATHETERIZATION N/A 2/14/2025    Procedure: Coronary angiography;  Surgeon: Cleveland Dickens MD;  Location:  [facility];  Service: Cardiovascular;  Laterality: N/A;    DIALYSIS FISTULA CREATION      EYE SURGERY      HERNIA REPAIR  2017    Dr. Sung     Family History   Problem Relation Age of Onset    Heart disease Father     Breast cancer Sister      Social History     Tobacco Use    Smoking status: Never     Passive exposure: Never    Smokeless tobacco: Never   Vaping Use    Vaping status: Never Used   Substance Use Topics    Alcohol use: No    Drug use: No     No current facility-administered medications on file prior to encounter.     Current Outpatient Medications on File Prior to Encounter   Medication Sig Dispense Refill    amLODIPine (NORVASC) 10 MG tablet Take 1 tablet by mouth Daily.      aspirin 81 MG EC tablet Take 1 tablet by mouth Daily.      atorvastatin (LIPITOR) 40 MG tablet Take 1 tablet by mouth Daily.      budesonide-formoterol (SYMBICORT) 80-4.5 MCG/ACT inhaler Inhale 2 puffs 2 (Two) Times a Day.      fluticasone (FLONASE) 50 MCG/ACT nasal spray Administer 2 sprays into the nostril(s) as directed by provider Daily.      hydrALAZINE (APRESOLINE) 50 MG tablet Take 1 tablet by mouth 3 (Three) Times a Day.      isosorbide mononitrate (IMDUR) 30 MG 24 hr tablet Take 1 tablet by mouth Daily.      losartan (COZAAR) 100 MG tablet Take 1 tablet by mouth Daily. 30 tablet 0    OLANZapine (zyPREXA) 5 MG tablet Take 1 tablet by mouth Daily With Dinner. 30 tablet 0    ticagrelor (BRILINTA) 90 MG tablet tablet Take 1 tablet by mouth Daily.       diphenhydrAMINE (BENADRYL) 25 mg capsule Take 2 capsules by mouth Every 6 (Six) Hours As Needed for Itching.      donepezil (ARICEPT) 5 MG tablet Take 1 tablet by mouth Every Night.      lidocaine (LIDODERM) 5 % Place 1 patch on the skin as directed by provider Daily. Remove & Discard patch within 12 hours or as directed by MD (Patient taking differently: Place 1 patch on the skin as directed by provider Daily As Needed for Mild Pain. Remove & Discard patch within 12 hours or as directed by MD) 6 each 0    linaclotide (LINZESS) 290 MCG capsule capsule Take 1 capsule by mouth Every Morning Before Breakfast.      melatonin 3 MG tablet Take 1 tablet by mouth Every Night.      metoprolol succinate XL (TOPROL-XL) 25 MG 24 hr tablet Take 1 tablet by mouth Daily.      thiamine (VITAMIN B1) 100 MG tablet Take 1 tablet by mouth Daily. 30 tablet 0    traZODone (DESYREL) 50 MG tablet Take 1 tablet by mouth Every Night.       Allergies   Allergen Reactions    Contrast Dye (Echo Or Unknown Ct/Mr) Rash       Objective    Objective     Vital Signs  Temp:  [97.9 °F (36.6 °C)-98.2 °F (36.8 °C)] 97.9 °F (36.6 °C)  Heart Rate:  [52-72] 68  Resp:  [18-20] 18  BP: (126-179)/(54-85) 179/65  SpO2:  [97 %-100 %] 97 %  on   ;   Device (Oxygen Therapy): room air  Body mass index is 19.72 kg/m².    Physical Exam  Constitutional:       General: She is not in acute distress.     Appearance: She is not toxic-appearing.   HENT:      Head: Normocephalic and atraumatic.   Eyes:      Extraocular Movements: Extraocular movements intact.      Conjunctiva/sclera: Conjunctivae normal.   Cardiovascular:      Rate and Rhythm: Normal rate.      Heart sounds: Normal heart sounds.   Pulmonary:      Effort: Pulmonary effort is normal.      Comments: Diminished on expiration anteriorly  Abdominal:      General: Bowel sounds are normal.      Palpations: Abdomen is soft.   Musculoskeletal:      Cervical back: Normal range of motion and neck supple.      Right  lower leg: No edema.      Left lower leg: No edema.   Skin:     General: Skin is warm and dry.   Neurological:      General: No focal deficit present.      Mental Status: She is alert. Mental status is at baseline.   Psychiatric:         Behavior: Behavior normal.         Thought Content: Thought content normal.         Results Review:  I reviewed the patient's new clinical results.  I reviewed the patient's new imaging results and agree with the interpretation.  I reviewed the patient's other test results and agree with the interpretation  I personally viewed and interpreted the patient's EKG/Telemetry data  Discussed with ED provider.    Lab Results (last 24 hours)       Procedure Component Value Units Date/Time    CBC & Differential [[id number]]  (Abnormal) Collected: 05/25/25 2104    Specimen: Blood Updated: 05/25/25 2122    Narrative:      The following orders were created for panel order CBC & Differential.  Procedure                               Abnormality         Status                     ---------                               -----------         ------                     CBC Auto Differential[[id number]]        Abnormal            Final result                 Please view results for these tests on the individual orders.    Comprehensive Metabolic Panel [[id number]]  (Abnormal) Collected: 05/25/25 2104    Specimen: Blood Updated: 05/25/25 2144     Glucose 68 mg/dL      BUN 24 mg/dL      Creatinine 5.96 mg/dL      Sodium 136 mmol/L      Potassium 3.5 mmol/L      Chloride 96 mmol/L      CO2 24.0 mmol/L      Calcium 10.7 mg/dL      Total Protein 6.3 g/dL      Albumin 3.8 g/dL      ALT (SGPT) 7 U/L      AST (SGOT) 13 U/L      Alkaline Phosphatase 74 U/L      Total Bilirubin 0.3 mg/dL      Globulin 2.5 gm/dL      A/G Ratio 1.5 g/dL      BUN/Creatinine Ratio 4.0     Anion Gap 16.0 mmol/L      eGFR 6.9 mL/min/1.73     Narrative:      GFR Categories in Chronic Kidney Disease (CKD)              GFR Category           GFR (mL/min/1.73)    Interpretation  G1                    90 or greater        Normal or high (1)  G2                    60-89                Mild decrease (1)  G3a                   45-59                Mild to moderate decrease  G3b                   30-44                Moderate to severe decrease  G4                    15-29                Severe decrease  G5                    14 or less           Kidney failure    (1)In the absence of evidence of kidney disease, neither GFR category G1 or G2 fulfill the criteria for CKD.    eGFR calculation 2021 CKD-EPI creatinine equation, which does not include race as a factor    BNP [[id number]]  (Abnormal) Collected: 05/25/25 2104    Specimen: Blood Updated: 05/25/25 2256     proBNP >70,000.0 pg/mL     Narrative:      This assay is used as an aid in the diagnosis of individuals suspected of having heart failure. It can be used as an aid in the diagnosis of acute decompensated heart failure (ADHF) in patients presenting with signs and symptoms of ADHF to the emergency department (ED). In addition, NT-proBNP of <300 pg/mL indicates ADHF is not likely.    Age Range Result Interpretation  NT-proBNP Concentration (pg/mL:      <50             Positive            >450                   Gray                 300-450                    Negative             <300    50-75           Positive            >900                  Gray                300-900                  Negative            <300      >75             Positive            >1800                  Gray                300-1800                  Negative            <300    High Sensitivity Troponin T [[id number]]  (Abnormal) Collected: 05/25/25 2104    Specimen: Blood Updated: 05/25/25 2146     HS Troponin T 99 ng/L     Narrative:      High Sensitive Troponin T Reference Range:  <14.0 ng/L- Negative Female for AMI  <22.0 ng/L- Negative Male for AMI  >=14 - Abnormal Female indicating possible myocardial injury.  >=22 - Abnormal  Male indicating possible myocardial injury.   Clinicians would have to utilize clinical acumen, EKG, Troponin, and serial changes to determine if it is an Acute Myocardial Infarction or myocardial injury due to an underlying chronic condition.         CBC Auto Differential [[id number]]  (Abnormal) Collected: 05/25/25 2104    Specimen: Blood Updated: 05/25/25 2122     WBC 4.32 10*3/mm3      RBC 4.49 10*6/mm3      Hemoglobin 13.4 g/dL      Hematocrit 42.8 %      MCV 95.3 fL      MCH 29.8 pg      MCHC 31.3 g/dL      RDW 16.3 %      RDW-SD 57.3 fl      MPV 9.0 fL      Platelets 212 10*3/mm3      Neutrophil % 57.4 %      Lymphocyte % 23.1 %      Monocyte % 11.8 %      Eosinophil % 6.5 %      Basophil % 0.7 %      Immature Grans % 0.5 %      Neutrophils, Absolute 2.48 10*3/mm3      Lymphocytes, Absolute 1.00 10*3/mm3      Monocytes, Absolute 0.51 10*3/mm3      Eosinophils, Absolute 0.28 10*3/mm3      Basophils, Absolute 0.03 10*3/mm3      Immature Grans, Absolute 0.02 10*3/mm3      nRBC 0.0 /100 WBC     Respiratory Panel PCR w/COVID-19(SARS-CoV-2) [facility] In-House, NP Swab in [facility], 2 HR TAT - Swab, Nasopharynx [[id number]]  (Abnormal) Collected: 05/25/25 2157    Specimen: Swab from Nasopharynx Updated: 05/25/25 2300     ADENOVIRUS, PCR Not Detected     Coronavirus 229E Not Detected     Coronavirus HKU1 Not Detected     Coronavirus NL63 Not Detected     Coronavirus OC43 Not Detected     COVID19 Not Detected     Human Metapneumovirus Not Detected     Human Rhinovirus/Enterovirus Detected     Influenza A PCR Not Detected     Influenza B PCR Not Detected     Parainfluenza Virus 1 Not Detected     Parainfluenza Virus 2 Not Detected     Parainfluenza Virus 3 Not Detected     Parainfluenza Virus 4 Not Detected     RSV, PCR Not Detected     Bordetella pertussis pcr Not Detected     Bordetella parapertussis PCR Not Detected     Chlamydophila pneumoniae PCR Not Detected     Mycoplasma pneumo by PCR Not Detected     Narrative:      In the setting of a positive respiratory panel with a viral infection PLUS a negative procalcitonin without other underlying concern for bacterial infection, consider observing off antibiotics or discontinuation of antibiotics and continue supportive care. If the respiratory panel is positive for atypical bacterial infection (Bordetella pertussis, Chlamydophila pneumoniae, or Mycoplasma pneumoniae), consider antibiotic de-escalation to target atypical bacterial infection.    High Sensitivity Troponin T 1Hr [[id number]]  (Abnormal) Collected: 05/25/25 2223    Specimen: Blood Updated: 05/25/25 2257     HS Troponin T 94 ng/L      Troponin T Numeric Delta -5 ng/L      Troponin T % Delta -5    Narrative:      High Sensitive Troponin T Reference Range:  <14.0 ng/L- Negative Female for AMI  <22.0 ng/L- Negative Male for AMI  >=14 - Abnormal Female indicating possible myocardial injury.  >=22 - Abnormal Male indicating possible myocardial injury.   Clinicians would have to utilize clinical acumen, EKG, Troponin, and serial changes to determine if it is an Acute Myocardial Infarction or myocardial injury due to an underlying chronic condition.         CBC (No Diff) [[id number]]  (Abnormal) Collected: 05/26/25 0518    Specimen: Blood from Arm, Right Updated: 05/26/25 0551     WBC 4.19 10*3/mm3      RBC 4.31 10*6/mm3      Hemoglobin 13.0 g/dL      Hematocrit 40.6 %      MCV 94.2 fL      MCH 30.2 pg      MCHC 32.0 g/dL      RDW 16.1 %      RDW-SD 55.2 fl      MPV 8.9 fL      Platelets 213 10*3/mm3     Comprehensive Metabolic Panel [[id number]]  (Abnormal) Collected: 05/26/25 0518    Specimen: Blood from Arm, Right Updated: 05/26/25 0613     Glucose 66 mg/dL      BUN 27 mg/dL      Creatinine 6.32 mg/dL      Sodium 138 mmol/L      Potassium 4.0 mmol/L      Chloride 98 mmol/L      CO2 23.7 mmol/L      Calcium 10.4 mg/dL      Total Protein 5.8 g/dL      Albumin 3.5 g/dL      ALT (SGPT) 10 U/L      AST (SGOT) 12 U/L       Alkaline Phosphatase 67 U/L      Total Bilirubin 0.4 mg/dL      Globulin 2.3 gm/dL      A/G Ratio 1.5 g/dL      BUN/Creatinine Ratio 4.3     Anion Gap 16.3 mmol/L      eGFR 6.4 mL/min/1.73     Narrative:      GFR Categories in Chronic Kidney Disease (CKD)              GFR Category          GFR (mL/min/1.73)    Interpretation  G1                    90 or greater        Normal or high (1)  G2                    60-89                Mild decrease (1)  G3a                   45-59                Mild to moderate decrease  G3b                   30-44                Moderate to severe decrease  G4                    15-29                Severe decrease  G5                    14 or less           Kidney failure    (1)In the absence of evidence of kidney disease, neither GFR category G1 or G2 fulfill the criteria for CKD.    eGFR calculation 2021 CKD-EPI creatinine equation, which does not include race as a factor    POC Glucose Once [[id number]]  (Normal) Collected: 05/26/25 0625    Specimen: Blood Updated: 05/26/25 0626     Glucose 70 mg/dL     POC Glucose Once [[id number]]  (Normal) Collected: 05/26/25 1109    Specimen: Blood Updated: 05/26/25 1111     Glucose 82 mg/dL             Imaging Results (Last 24 Hours)       Procedure Component Value Units Date/Time    XR Chest 1 View [[id number]] Collected: 05/25/25 2129     Updated: 05/25/25 2133    Narrative:      SINGLE VIEW OF THE CHEST     HISTORY: Shortness of air     COMPARISON: March 9, 2025     FINDINGS:  There is cardiomegaly. There does appear to be some vascular congestion.  Lung volumes are diminished. No pneumothorax or large effusion is seen.  The patient has a left axillary/brachial stent. There is calcification  of the aorta.       Impression:      Cardiomegaly with suspected vascular congestion.     This report was finalized on 5/25/2025 9:30 PM by Dr. Evonne Obando M.D on Workstation: [id number]               Results for orders placed during the hospital  encounter of 01/06/25    Adult Transthoracic Echo Complete W/ Cont if Necessary Per Protocol    Interpretation Summary    Left ventricular systolic function is normal. Calculated left ventricular EF = 56.6%    Left ventricular wall thickness is consistent with severe concentric hypertrophy.    The following left ventricular wall segments are hypokinetic: mid anterior, apical anterior and apex hypokinetic.    Left ventricular diastolic function is consistent with (grade II w/high LAP) pseudonormalization.    The left atrial cavity is severely dilated.    Left atrial volume is severely increased.    Mild aortic valve stenosis is present. Aortic valve area is 1.1 cm2.    Aortic valve maximum pressure gradient is 28 mmHg. Aortic valve mean pressure gradient is 15 mmHg.    Mild mitral valve regurgitation is present    There is a trivial circumferential pericardial effusion. There is no evidence of cardiac tamponade.      ECG 12 Lead ED Triage Standing Order; SOA   Preliminary Result   HEART RATE=63  bpm   RR Jdcrroil=9727  ms   MT Oxmzdtsd=015  ms   P Horizontal Axis=-47  deg   P Front Axis=47  deg   QRSD Iblxcttn=651  ms   QT Mdqutama=814  ms   HClM=718  ms   QRS Axis=-58  deg   T Wave Axis=143  deg   - ABNORMAL ECG -   Sinus rhythm   Multiple premature complexes, vent & supraven   Probable left atrial enlargement   Nonspecific IVCD with LAD   LVH with secondary repolarization abnormality   ST elevation secondary to IVCD   Date and Time of Study:2025-05-25 21:19:03      Telemetry Scan   Final Result      Telemetry Scan   Final Result      Telemetry Scan   Final Result           Assessment/Plan     Active Hospital Problems    Diagnosis  POA    **Rhinovirus infection [B34.8]  Yes    Dementia with agitation [F03.911]  Yes    Type 2 diabetes mellitus with hypoglycemia [E11.649]  Yes    Chronic respiratory failure with hypoxia [J96.11]  Yes    ANGIE (obstructive sleep apnea) [G47.33]  Yes    ESRD on dialysis [N18.6, Z99.2]   Not Applicable    Acute on chronic diastolic CHF (congestive heart failure) [I50.33]  Yes    HTN (hypertension) [I10]  Yes      Resolved Hospital Problems   No resolved problems to display.       Ms. Acharya is a 75 y.o. non-smoker with a history of ESRD, hypertension, dementia, type 2 diabetes, chronic diastolic CHF who presents to [facility] ER with chief complaint of worsening cough and shortness of breath for the last 24 hours and admitted for complications of rhinovirus infection.      Rhinovirus infection.  Confirmed on RVP here.  Tolerating room air.  Supportive care.  Nebulizer scheduled and as needed.      Acute on chronic diastolic CHF (congestive heart failure).  proBNP 70,000.  Suspect due to complications of rhinovirus infection.  Anticipate improvement following HD today.  Home meds resumed per home regimen & pharmacy consulted to verify prescription medications prior to hospital arrival       HTN (hypertension).  BP greater than optimal.  Norvasc, losartan, hydralazine continued per home dose regimen.  Awaiting hemodialysis today and anticipate improved blood pressure thereafter.      ESRD on dialysis.  Nephrology following and plan HD today, removal 2.5 L as tolerated.  Labs in AM.      Chronic respiratory failure with hypoxia.  Tolerating room air at this time.  Supplemental oxygen as needed.  Symbicort twice daily.      Type 2 diabetes mellitus with hypoglycemia.  A1c 4.6 (5/2025).  Follow glucose trends continue correctional sliding scale for now with adjustments as needed.  Cardiac/NCS/renal diet.      Dementia with agitation.  Appears resolved.  Zyprexa scheduled, Aricept, trazodone per home dose regimen continued.      I discussed the patient's findings and my recommendations with patient and nursing staff & Dr. De Los Santos.    VTE Prophylaxis - SCDs.  Code Status - Full code.       KANDIS Mendoza  [facility]  05/26/25  14:43 EDT

## 2025-05-26 NOTE — ED NOTES
"Patient states she has intermittent SOA that has gone on \"for a while.\" Patient states this episode started this morning. Patient's family states patient receives hemodialysis M-W-F and is current on her dialysis. Patient states she has a history of CHF and has not had any recent weight gain/loss.   "

## 2025-05-26 NOTE — CASE MANAGEMENT/SOCIAL WORK
Discharge Planning Assessment  [facility]     Patient Name: Sonia Acharya  MRN: [id number]  Today's Date: 5/26/2025    Admit Date: 5/25/2025    Plan: Home with dtr and current HH   Discharge Needs Assessment       Row Name 05/26/25 1617       Living Environment    People in Home child(giuseppe), adult    Name(s) of People in Home Naty    Current Living Arrangements home    Potentially Unsafe Housing Conditions none    Primary Care Provided by self    Family Caregiver if Needed child(giuseppe), adult    Quality of Family Relationships involved;helpful    Able to Return to Prior Arrangements yes       Resource/Environmental Concerns    Resource/Environmental Concerns none    Transportation Concerns none       Transition Planning    Patient/Family Anticipates Transition to home with family;home with help/services    Patient/Family Anticipated Services at Transition home health care    Transportation Anticipated family or friend will provide       Discharge Needs Assessment    Readmission Within the Last 30 Days no previous admission in last 30 days    Equipment Currently Used at Home walker, standard;cane, straight;oxygen;rollator;wheelchair    Concerns to be Addressed no discharge needs identified    Anticipated Changes Related to Illness none    Equipment Needed After Discharge none                   Discharge Plan       Row Name 05/26/25 8426       Plan    Plan Home with dtr and current HH    Patient/Family in Agreement with Plan yes    Plan Comments Spoke with pt dtr by phone, pt confused, introduced self and explained CCP role and verified the face sheet and pharmacy information.  Pt lives with dtr in a 1 level home with 1 HIRA, she normally needs some assistance with ADL's, she uses w/c, rollator and has continuous oxygen through [employer]. She attends  MWF on [place] level and takes [employer] for transport. [facility] and has no [facility] history. She plans to return home with dtr to transport and denies dc  needs. CCP will follow -Marcy ROMERO                  Continued Care and Services - Admitted Since 5/25/2025       Home Medical Care       Service Provider Request Status Services Address Phone Fax Patient Preferred    [facility] [address] [facility] [address], [facility] [address] [phone] --                  Selected Continued Care - Prior Encounters Includes continued care and service providers with selected services from prior encounters from 2/24/2025 to 5/26/2025      Discharged on 3/15/2025 Admission date: 3/9/2025 - Discharge disposition: Home-Health Care Svc      Dialysis/Infusion       Service Provider Services Address Phone Fax Patient Preferred    [facility] [address], [address], [place] [facility] [address] [phone] -- --                          Expected Discharge Date and Time       Expected Discharge Date Expected Discharge Time    May 27, 2025            Demographic Summary       Row Name 05/26/25 1617       General Information    Admission Type observation                   Functional Status       Row Name 05/26/25 1617       Functional Status    Usual Activity Tolerance good    Current Activity Tolerance moderate       Assessment of Health Literacy    Health Literacy Moderate       Functional Status, IADL    Medications assistive person    Meal Preparation assistive person    Housekeeping assistive person    Laundry assistive person    Shopping assistive person    IADL Comments Dtr assists as needed       Mental Status    General Appearance WDL WDL       Mental Status Summary    Recent Changes in Mental Status/Cognitive Functioning unable to assess                   Psychosocial    No documentation.                  Abuse/Neglect    No documentation.                  Legal       Row Name 05/26/25 1617       Financial/Legal    Who Manages Finances if Patient Unable dtr                    Substance Abuse    No documentation.                  Patient Forms    No documentation.                     Marcy Taylor RN

## 2025-05-26 NOTE — PERIOPERATIVE NURSING NOTE
HD completed and terminated, patient stable awake and oriented. AVF site care done with no signs of infection or bleeding noted. UF removed 2.5L as ordered. Post tx /55 HR 83

## 2025-05-26 NOTE — PLAN OF CARE
Goal Outcome Evaluation:  Plan of Care Reviewed With: patient        Progress: no change     Patient admitted to observation unit for further evaluation and treatment of shortness of breath, cough and wheezing. Alert and oriented to self and place. On room air. Vital signs within normal limits. Nephrology consulted. Plan of care discussed with patient. Call light within reach.

## 2025-05-26 NOTE — DISCHARGE PLACEMENT REQUEST
"Juanito Lara (75 y.o. Female)       Date of Birth   1950    Social Security Number       Address   [address] [place] [address]    Home Phone   [phone]    MRN   [id number]       Buddhism   None    Marital Status   Single                            Admission Date   5/25/2025    Admission Type   Emergency    Admitting Provider   Ashok De Los Santos MD    Attending Provider   Ashok De Los Santos MD    Department, Room/Bed   [address], [address]/1       Discharge Date       Discharge Disposition       Discharge Destination                                 Attending Provider: Ashok De Los Santos MD    Allergies: Contrast Dye (Echo Or Unknown Ct/mr)    Isolation: Droplet   Infection: Rhinovirus  (05/25/25)   Code Status: CPR    Ht: 157.5 cm (62\")   Wt: 48.9 kg (107 lb 12.9 oz)    Admission Cmt: None   Principal Problem: Rhinovirus infection [B34.8]                   Active Insurance as of 5/25/2025       Primary Coverage       Payor Plan Insurance Group Employer/Plan Group    HUMANA MEDICARE REPLACEMENT HUMANA MEDICARE ADVANTAGE [facility] HMO - NON PAR [id number]       Payor Plan Address Payor Plan Phone Number Payor Plan Fax Number Effective Dates       1/1/2025 - None Entered      Subscriber Name Subscriber Birth Date Member ID       JUANITO LARA 1950 [id number]                     Emergency Contacts        (Rel.) Home Phone Work Phone Mobile Phone    Naty Lara (Daughter) -- -- [phone]                "

## 2025-05-27 ENCOUNTER — READMISSION MANAGEMENT (OUTPATIENT)
Dept: CALL CENTER | Facility: HOSPITAL | Age: 75
End: 2025-05-27
Payer: MEDICARE

## 2025-05-27 VITALS
HEART RATE: 67 BPM | OXYGEN SATURATION: 100 % | BODY MASS INDEX: 18.13 KG/M2 | WEIGHT: 98.55 LBS | HEIGHT: 62 IN | DIASTOLIC BLOOD PRESSURE: 88 MMHG | RESPIRATION RATE: 16 BRPM | TEMPERATURE: 97.3 F | SYSTOLIC BLOOD PRESSURE: 146 MMHG

## 2025-05-27 LAB
ALBUMIN SERPL-MCNC: 3.7 G/DL (ref 3.5–5.2)
ANION GAP SERPL CALCULATED.3IONS-SCNC: 12 MMOL/L (ref 5–15)
BASOPHILS # BLD AUTO: 0.03 10*3/MM3 (ref 0–0.2)
BASOPHILS NFR BLD AUTO: 0.8 % (ref 0–1.5)
BUN SERPL-MCNC: 14 MG/DL (ref 8–23)
BUN/CREAT SERPL: 3.1 (ref 7–25)
CALCIUM SPEC-SCNC: 9.8 MG/DL (ref 8.6–10.5)
CHLORIDE SERPL-SCNC: 98 MMOL/L (ref 98–107)
CO2 SERPL-SCNC: 26 MMOL/L (ref 22–29)
CREAT SERPL-MCNC: 4.56 MG/DL (ref 0.57–1)
DEPRECATED RDW RBC AUTO: 54.8 FL (ref 37–54)
EGFRCR SERPLBLD CKD-EPI 2021: 9.5 ML/MIN/1.73
EOSINOPHIL # BLD AUTO: 0.3 10*3/MM3 (ref 0–0.4)
EOSINOPHIL NFR BLD AUTO: 8.1 % (ref 0.3–6.2)
ERYTHROCYTE [DISTWIDTH] IN BLOOD BY AUTOMATED COUNT: 16.3 % (ref 12.3–15.4)
GLUCOSE BLDC GLUCOMTR-MCNC: 114 MG/DL (ref 70–130)
GLUCOSE BLDC GLUCOMTR-MCNC: 69 MG/DL (ref 70–130)
GLUCOSE BLDC GLUCOMTR-MCNC: 75 MG/DL (ref 70–130)
GLUCOSE SERPL-MCNC: 65 MG/DL (ref 65–99)
HCT VFR BLD AUTO: 41.1 % (ref 34–46.6)
HGB BLD-MCNC: 13.2 G/DL (ref 12–15.9)
IMM GRANULOCYTES # BLD AUTO: 0.02 10*3/MM3 (ref 0–0.05)
IMM GRANULOCYTES NFR BLD AUTO: 0.5 % (ref 0–0.5)
LYMPHOCYTES # BLD AUTO: 0.98 10*3/MM3 (ref 0.7–3.1)
LYMPHOCYTES NFR BLD AUTO: 26.5 % (ref 19.6–45.3)
MCH RBC QN AUTO: 29.5 PG (ref 26.6–33)
MCHC RBC AUTO-ENTMCNC: 32.1 G/DL (ref 31.5–35.7)
MCV RBC AUTO: 91.7 FL (ref 79–97)
MONOCYTES # BLD AUTO: 0.52 10*3/MM3 (ref 0.1–0.9)
MONOCYTES NFR BLD AUTO: 14.1 % (ref 5–12)
NEUTROPHILS NFR BLD AUTO: 1.85 10*3/MM3 (ref 1.7–7)
NEUTROPHILS NFR BLD AUTO: 50 % (ref 42.7–76)
NRBC BLD AUTO-RTO: 0 /100 WBC (ref 0–0.2)
PHOSPHATE SERPL-MCNC: 4.3 MG/DL (ref 2.5–4.5)
PLATELET # BLD AUTO: 177 10*3/MM3 (ref 140–450)
PMV BLD AUTO: 9.5 FL (ref 6–12)
POTASSIUM SERPL-SCNC: 3.9 MMOL/L (ref 3.5–5.2)
RBC # BLD AUTO: 4.48 10*6/MM3 (ref 3.77–5.28)
SODIUM SERPL-SCNC: 136 MMOL/L (ref 136–145)
WBC NRBC COR # BLD AUTO: 3.7 10*3/MM3 (ref 3.4–10.8)

## 2025-05-27 PROCEDURE — 82948 REAGENT STRIP/BLOOD GLUCOSE: CPT

## 2025-05-27 PROCEDURE — 94799 UNLISTED PULMONARY SVC/PX: CPT

## 2025-05-27 PROCEDURE — 94760 N-INVAS EAR/PLS OXIMETRY 1: CPT

## 2025-05-27 PROCEDURE — 85025 COMPLETE CBC W/AUTO DIFF WBC: CPT | Performed by: INTERNAL MEDICINE

## 2025-05-27 PROCEDURE — 80069 RENAL FUNCTION PANEL: CPT | Performed by: INTERNAL MEDICINE

## 2025-05-27 PROCEDURE — G0378 HOSPITAL OBSERVATION PER HR: HCPCS

## 2025-05-27 PROCEDURE — 94761 N-INVAS EAR/PLS OXIMETRY MLT: CPT

## 2025-05-27 RX ADMIN — Medication 10 ML: at 08:41

## 2025-05-27 RX ADMIN — ATORVASTATIN CALCIUM 40 MG: 20 TABLET, FILM COATED ORAL at 08:39

## 2025-05-27 RX ADMIN — IPRATROPIUM BROMIDE AND ALBUTEROL SULFATE 3 ML: .5; 3 SOLUTION RESPIRATORY (INHALATION) at 11:11

## 2025-05-27 RX ADMIN — HYDRALAZINE HYDROCHLORIDE 50 MG: 50 TABLET ORAL at 08:39

## 2025-05-27 RX ADMIN — ASPIRIN 81 MG: 81 TABLET, COATED ORAL at 08:39

## 2025-05-27 RX ADMIN — ISOSORBIDE MONONITRATE 30 MG: 30 TABLET, EXTENDED RELEASE ORAL at 08:39

## 2025-05-27 RX ADMIN — BUDESONIDE AND FORMOTEROL FUMARATE DIHYDRATE 1 PUFF: 160; 4.5 AEROSOL RESPIRATORY (INHALATION) at 11:11

## 2025-05-27 RX ADMIN — SENNOSIDES AND DOCUSATE SODIUM 2 TABLET: 50; 8.6 TABLET ORAL at 12:05

## 2025-05-27 RX ADMIN — LOSARTAN POTASSIUM 100 MG: 100 TABLET, FILM COATED ORAL at 08:39

## 2025-05-27 RX ADMIN — TICAGRELOR 90 MG: 60 TABLET ORAL at 08:38

## 2025-05-27 RX ADMIN — AMLODIPINE BESYLATE 10 MG: 10 TABLET ORAL at 08:38

## 2025-05-27 RX ADMIN — FLUTICASONE PROPIONATE 2 SPRAY: 50 SPRAY, METERED NASAL at 08:51

## 2025-05-27 NOTE — SIGNIFICANT NOTE
OT screen completed. Pt reports no concerns regarding safe ADL participation, endorses presentation at/near baseline status. No further IPOT needs at this time. Will s/o.

## 2025-05-27 NOTE — DISCHARGE SUMMARY
Patient Name: Sonia Acharya  : 1950  MRN: [id number]    Date of Admission: 2025  Date of Discharge:  2025  Primary Care Physician: Marcy Paez PA      Chief Complaint:   Shortness of Breath      Discharge Diagnoses     Active Hospital Problems    Diagnosis  POA    **Rhinovirus infection [B34.8]  Yes    Dementia with agitation [F03.911]  Yes    Type 2 diabetes mellitus with hypoglycemia [E11.649]  Yes    Chronic respiratory failure with hypoxia [J96.11]  Yes    ANGIE (obstructive sleep apnea) [G47.33]  Yes    ESRD on dialysis [N18.6, Z99.2]  Not Applicable    Acute on chronic diastolic CHF (congestive heart failure) [I50.33]  Yes    HTN (hypertension) [I10]  Yes      Resolved Hospital Problems   No resolved problems to display.        Hospital Course     Ms. Acharya is a 75 y.o. female with a history of ESRD on HD MWF, hypertension, dementia, type 2 diabetes, chronic diastolic CHF on room air at baseline who presented to [facility] initially complaining of shortness of breath.  Please see the admitting history and physical for further details.  She was found to have rhinovirus infection and mild acute on chronic diastolic CHF and was admitted to the hospital for further evaluation and treatment.      In ER, proBNP 70,000, RVP panel positive rhinovirus, chest x-ray showing pulmonary vascular congestion.  Nebulizers provided during hospital course.  Patient underwent hemodialysis therapy on 2025 without complication.  Patient reports shortness of breath much improved and tolerating room air.    Patient also tolerating diet and minimal physical activity with standby assistance--appears medically stable for discharge home on 2025 and agrees with plan for follow-up primary care provider and chronic hemodialysis center for MWF hemodialysis therapy.    Home health services current prior to hospital arrival.    Day of Discharge       Physical Exam:  Temp:  [97.3 °F (36.3  °C)-98.4 °F (36.9 °C)] 97.3 °F (36.3 °C)  Heart Rate:  [67-89] 67  Resp:  [16-24] 16  BP: (146-179)/(65-88) 146/88  Body mass index is 18.02 kg/m².    Physical Exam  Constitutional:       General: She is not in acute distress.     Appearance: She is not toxic-appearing.   HENT:      Head: Normocephalic and atraumatic.   Eyes:      Extraocular Movements: Extraocular movements intact.      Conjunctiva/sclera: Conjunctivae normal.   Cardiovascular:      Rate and Rhythm: Normal rate.      Heart sounds: Murmur heard.   Pulmonary:      Effort: Pulmonary effort is normal.      Comments: Diminished on expiration anteriorly  Abdominal:      General: Bowel sounds are normal.      Palpations: Abdomen is soft.   Musculoskeletal:      Cervical back: Normal range of motion and neck supple.      Right lower leg: No edema.      Left lower leg: No edema.   Skin:     General: Skin is warm and dry.   Neurological:      General: No focal deficit present.      Mental Status: She is alert. Mental status is at baseline.   Psychiatric:         Mood and Affect: Mood normal.         Behavior: Behavior normal.         Consultants     Consult Orders (all) (From admission, onward)       Start     Ordered    05/26/25 0702  Inpatient Nephrology Consult  IN AM        Specialty:  Nephrology  Provider:  Keyla Cabrera MD    05/26/25 0121    05/25/25 2304  [facility] (on-call MD unless specified) Details  Once        Specialty:  Hospitalist  Provider:  (Not yet assigned)    05/25/25 2303                  Procedures     * Surgery not found *    Imaging Results (All)       Procedure Component Value Units Date/Time    XR Chest 1 View [[id number]] Collected: 05/25/25 2129     Updated: 05/25/25 2133    Narrative:      SINGLE VIEW OF THE CHEST     HISTORY: Shortness of air     COMPARISON: March 9, 2025     FINDINGS:  There is cardiomegaly. There does appear to be some vascular congestion.  Lung volumes are diminished. No pneumothorax or large effusion is  seen.  The patient has a left axillary/brachial stent. There is calcification  of the aorta.       Impression:      Cardiomegaly with suspected vascular congestion.     This report was finalized on 5/25/2025 9:30 PM by Dr. Evonne Obando M.D on Workstation: [id number]               Results for orders placed during the hospital encounter of 01/06/25    Adult Transthoracic Echo Complete W/ Cont if Necessary Per Protocol    Interpretation Summary    Left ventricular systolic function is normal. Calculated left ventricular EF = 56.6%    Left ventricular wall thickness is consistent with severe concentric hypertrophy.    The following left ventricular wall segments are hypokinetic: mid anterior, apical anterior and apex hypokinetic.    Left ventricular diastolic function is consistent with (grade II w/high LAP) pseudonormalization.    The left atrial cavity is severely dilated.    Left atrial volume is severely increased.    Mild aortic valve stenosis is present. Aortic valve area is 1.1 cm2.    Aortic valve maximum pressure gradient is 28 mmHg. Aortic valve mean pressure gradient is 15 mmHg.    Mild mitral valve regurgitation is present    There is a trivial circumferential pericardial effusion. There is no evidence of cardiac tamponade.    Pertinent Labs     Results from last 7 days   Lab Units 05/27/25  0747 05/26/25  0518 05/25/25  2104   WBC 10*3/mm3 3.70 4.19 4.32   HEMOGLOBIN g/dL 13.2 13.0 13.4   PLATELETS 10*3/mm3 177 213 212     Results from last 7 days   Lab Units 05/27/25  0747 05/26/25  0518 05/25/25  2104   SODIUM mmol/L 136 138 136   POTASSIUM mmol/L 3.9 4.0 3.5   CHLORIDE mmol/L 98 98 96*   CO2 mmol/L 26.0 23.7 24.0   BUN mg/dL 14 27* 24*   CREATININE mg/dL 4.56* 6.32* 5.96*   GLUCOSE mg/dL 65 66 68   EGFR mL/min/1.73 9.5* 6.4* 6.9*     Results from last 7 days   Lab Units 05/27/25  0747 05/26/25  0518 05/25/25  2104   ALBUMIN g/dL 3.7 3.5 3.8   BILIRUBIN mg/dL  --  0.4 0.3   ALK PHOS U/L  --  67 74  "  AST (SGOT) U/L  --  12 13   ALT (SGPT) U/L  --  10 7     Results from last 7 days   Lab Units 05/27/25  0747 05/26/25  0518 05/25/25  2104   CALCIUM mg/dL 9.8 10.4 10.7*   ALBUMIN g/dL 3.7 3.5 3.8   PHOSPHORUS mg/dL 4.3  --   --        Results from last 7 days   Lab Units 05/25/25  2223 05/25/25  2104   HSTROP T ng/L 94* 99*   PROBNP pg/mL  --  >70,000.0*           Invalid input(s): \"LDLCALC\"      Results from last 7 days   Lab Units 05/25/25  2157   COVID19  Not Detected       Test Results Pending at Discharge     Pending Results       None              Discharge Details        Discharge Medications        Continue These Medications        Instructions Start Date   amLODIPine 10 MG tablet  Commonly known as: NORVASC   10 mg, Daily      aspirin 81 MG EC tablet   81 mg, Daily      atorvastatin 40 MG tablet  Commonly known as: LIPITOR   40 mg, Daily      budesonide-formoterol 80-4.5 MCG/ACT inhaler  Commonly known as: SYMBICORT   2 puffs, 2 Times Daily - RT      diphenhydrAMINE 25 mg capsule  Commonly known as: BENADRYL   50 mg, Every 6 Hours PRN      donepezil 5 MG tablet  Commonly known as: ARICEPT   5 mg, Nightly      fluticasone 50 MCG/ACT nasal spray  Commonly known as: FLONASE   2 sprays, Daily      hydrALAZINE 50 MG tablet  Commonly known as: APRESOLINE   50 mg, 3 Times Daily      isosorbide mononitrate 30 MG 24 hr tablet  Commonly known as: IMDUR   30 mg, Daily      lidocaine 5 %  Commonly known as: LIDODERM   1 patch, Transdermal, Every 24 Hours, Remove & Discard patch within 12 hours or as directed by MD      linaclotide 290 MCG capsule capsule  Commonly known as: LINZESS   290 mcg, Every Morning Before Breakfast      losartan 100 MG tablet  Commonly known as: COZAAR   100 mg, Oral, Daily      melatonin 3 MG tablet   3 mg, Nightly      OLANZapine 5 MG tablet  Commonly known as: zyPREXA   5 mg, Oral, Daily With Dinner      thiamine 100 MG tablet  Commonly known as: VITAMIN B1   100 mg, Oral, Daily    "   ticagrelor 90 MG tablet tablet  Commonly known as: BRILINTA   90 mg, Oral, 2 Times Daily      traZODone 50 MG tablet  Commonly known as: DESYREL   50 mg, Oral, Nightly             Stop These Medications      metoprolol succinate XL 25 MG 24 hr tablet  Commonly known as: TOPROL-XL              Allergies   Allergen Reactions    Contrast Dye (Echo Or Unknown Ct/Mr) Rash       Discharge Disposition:  Home-Health Care [facility]      Discharge Diet:  Diet Order   Procedures    Diet: Cardiac, Diabetic; Healthy Heart (2-3 Na+); Consistent Carbohydrate; Fluid Consistency: Thin (IDDSI 0)       Discharge Activity:   Activity Instructions       Activity as Tolerated              CODE STATUS:    Code Status and Medical Interventions: CPR (Attempt to Resuscitate); Full Support   Ordered at: 05/26/25 0121     Code Status (Patient has no pulse and is not breathing):    CPR (Attempt to Resuscitate)     Medical Interventions (Patient has pulse or is breathing):    Full Support       No future appointments.  Additional Instructions for the Follow-ups that You Need to Schedule       Discharge Follow-up with PCP   As directed       Currently Documented PCP:    Marcy Paez PA    PCP Phone Number:    [phone]     Follow Up Details: Please call to schedule a 1 week or earliest available follow-up with PCP; BP               Contact information for follow-up providers       Marcy Paez PA .    Specialty: Physician Assistant  Why: Please call to schedule a 1 week or earliest available follow-up with PCP; BP  Contact information:  [address]  [place] [id number] [phone]                       Contact information for after-discharge care       Home Medical Care       [facility] .    Service: Home Rehabilitation  Contact information:  [address], [address]  [facility] [id number] [phone]                                   Additional Instructions for the Follow-ups that You  Need to Schedule       Discharge Follow-up with PCP   As directed       Currently Documented PCP:    Marcy Paez PA    PCP Phone Number:    [phone]     Follow Up Details: Please call to schedule a 1 week or earliest available follow-up with PCP; BP            Time Spent on Discharge:  Greater than 30 minutes      KANDIS Mendoza  [facility]  05/27/25  09:37 EDT

## 2025-05-27 NOTE — CASE MANAGEMENT/SOCIAL WORK
Continued Stay Note  [facility]     Patient Name: Sonia Acharya  MRN: [id number]  Today's Date: 5/27/2025    Admit Date: 5/25/2025    Plan: Home with dtr and current HH   Discharge Plan       Row Name 05/27/25 1122       Plan    Plan Comments DC orders noted.  [facility]/ [facility] notified of DC today and [facility] will followup with pt at home.    Final Note DC home w./ dtr and will continue w/ [facility] .........Lucero SOTOMAYOR/ ADRIEN                   Discharge Codes    No documentation.                 Expected Discharge Date and Time       Expected Discharge Date Expected Discharge Time    May 27, 2025               Lucero Gray RN

## 2025-05-27 NOTE — CASE MANAGEMENT/SOCIAL WORK
Case Management Discharge Note      Final Note: DC home w./ dtr and will continue w/ [facility] .........Lucero SOTOMAYOR/ ADRIEN         Selected Continued Care - Discharged on 5/27/2025 Admission date: 5/25/2025 - Discharge disposition: Home-Health Care Sv          Home Medical Care       Service Provider Services Address Phone Fax Patient Preferred    [facility] [address], [address], [facility] [address] [phone] --                      Selected Continued Care - Prior Encounters Includes continued care and service providers with selected services from prior encounters from 2/24/2025 to 5/27/2025      Discharged on 3/15/2025 Admission date: 3/9/2025 - Discharge disposition: Home-Health Care [facility]      Dialysis/Infusion       Service Provider Services Address Phone Fax Patient Preferred    [facility] FIORE AUDUBON In-Center Hemodialysis [address], [address], [facility] [address] [phone] -- --                          Transportation Services  Private: Car    Final Discharge Disposition Code: 06 - home with home health care ([facility])

## 2025-05-27 NOTE — PROGRESS NOTES
"[facility] Clinical Pharmacy Services: Ticagrelor Consult     Sonia Acharya has a pharmacy consult to verify home dose and frequency of ticagrelor per Rene MARQUIS's request.     Relevant clinical data and objective history reviewed:  75 y.o. female 157.5 cm (62\") 44.7 kg (98 lb 8.7 oz)  Estimated Creatinine Clearance: 5.3 mL/min (A) (by C-G formula based on SCr of 6.32 mg/dL (H)).    Past Medical History:   Diagnosis Date    Arthritis     Chronic kidney disease     Heart disease     Hypertension      She is allergic to contrast dye (echo or unknown ct/mr).    Assessment/Plan    Per fill records, patient was last prescribed ticagrelor 90mg BID on 3/24/25, 90 day supply.  Recent encounters reviewed to confirm but was unable to find a corresponding encounter.  Attempted to call into room but was unable to reach pt. Home medication list updated (previously listed at ticagrelor 90mg QDAY per child).   Already on ticagrelor 90mg BID inpatient.  No dose adjustment recommended for ESRD on HD.  Recommend to continue at current dose.    Teodora Moreno, PharmD  Clinical Pharmacist    "

## 2025-05-27 NOTE — PROGRESS NOTES
[facility] Progress Note      Patient Name: Sonia Acharya  : 1950  MRN: [id number]  Primary Care Physician:  Marcy Paez PA  Date of admission: 2025    Subjective   Background:   75 y.o. female with ESRD, HTN, dementia and mood disorder who presented last night with worsening cough and dyspnea past 24h.  CXR shows vascular congestion but no infiltrate.  Resp viral panel positive for rhinovirus. She dialyzes MWF at [facility] via RUE AVF.     Interval History:   The patient was seen and examined today for follow-up on ESRD  No complaints this morning.  Tolerated dialysis yesterday.  Plans for discharge today  Review of Systems:   As noted above    Objective     Vitals:   Temp:  [97.3 °F (36.3 °C)-98.4 °F (36.9 °C)] 97.3 °F (36.3 °C)  Heart Rate:  [67-89] 67  Resp:  [16-24] 16  BP: (146-179)/(65-88) 146/88    Intake/Output Summary (Last 24 hours) at 2025 1022  Last data filed at 2025 0838  Gross per 24 hour   Intake 300 ml   Output --   Net 300 ml       Physical Exam:    General Appearance: alert, oriented x 3, no acute distress   Skin: warm and dry  HEENT: oral mucosa normal, nonicteric sclera  Neck: supple, no JVD  Lungs: CTA  Heart: RRR, normal S1 and S2  Abdomen: soft, nontender, nondistended  : no palpable bladder  Extremities: no edema, cyanosis or clubbing  Neuro: normal speech and mental status     Scheduled Meds:     amLODIPine, 10 mg, Oral, Daily  aspirin, 81 mg, Oral, Daily  atorvastatin, 40 mg, Oral, Daily  budesonide-formoterol, 1 puff, Inhalation, BID - RT  donepezil, 5 mg, Oral, Nightly  fluticasone, 2 spray, Nasal, Daily  hydrALAZINE, 50 mg, Oral, TID  insulin lispro, 2-7 Units, Subcutaneous, 4x Daily AC & at Bedtime  ipratropium-albuterol, 3 mL, Nebulization, Q6H While Awake - RT  isosorbide mononitrate, 30 mg, Oral, Daily  losartan, 100 mg, Oral, Daily  OLANZapine, 5 mg, Oral, Daily With Dinner  sodium chloride, 10 mL, Intravenous,  Q12H  ticagrelor, 90 mg, Oral, BID  traZODone, 50 mg, Oral, Nightly      IV Meds:        Results Reviewed:   I have personally reviewed the results from the time of this admission to 5/27/2025 10:22 EDT     Results from last 7 days   Lab Units 05/27/25  0747 05/26/25  0518 05/25/25  2104   SODIUM mmol/L 136 138 136   POTASSIUM mmol/L 3.9 4.0 3.5   CHLORIDE mmol/L 98 98 96*   CO2 mmol/L 26.0 23.7 24.0   BUN mg/dL 14 27* 24*   CREATININE mg/dL 4.56* 6.32* 5.96*   CALCIUM mg/dL 9.8 10.4 10.7*   BILIRUBIN mg/dL  --  0.4 0.3   ALK PHOS U/L  --  67 74   ALT (SGPT) U/L  --  10 7   AST (SGOT) U/L  --  12 13   GLUCOSE mg/dL 65 66 68       Estimated Creatinine Clearance: 7.4 mL/min (A) (by C-G formula based on SCr of 4.56 mg/dL (H)).    Results from last 7 days   Lab Units 05/27/25  0747   PHOSPHORUS mg/dL 4.3             Results from last 7 days   Lab Units 05/27/25  0747 05/26/25  0518 05/25/25  2104   WBC 10*3/mm3 3.70 4.19 4.32   HEMOGLOBIN g/dL 13.2 13.0 13.4   PLATELETS 10*3/mm3 177 213 212             Assessment / Plan     ASSESSMENT:  ESRD - HD MWF via RUE AVF.  Mild vol excess by imaging.  K/HCo3 and waste products all stable.  Dialyzed yesterday w/o incident  HTN - BP improved after dialysis.  Rhinovirus - supportive care.  Not hypoxic.  No infitrate on CXR.  Afebrile  Hypoglycemia, mild, resolved.  Anemia of CKD, hgb robust (13), hold ZULAY  Dementia + mood disorder, on aricept and zyprexa   Dyslipidemia on statin      PLAN:  Plans for discharge today.  HD tomorrow at [facility].      Thank you for involving us in the care of Sonia Acharya.  Please feel free to call with any questions.    Arjun Ramirez MD  05/27/25  10:22 EDT    [facility]  [phone]    Parts of this note may be an electronic transcription/translation of spoken language to printed text using the Dragon dictation system.

## 2025-05-28 NOTE — OUTREACH NOTE
Prep Survey      Flowsheet Row Responses   [facility] patient discharged from? [place]   Is LACE score < 7 ? No   Eligibility Readm Mgmt   Discharge diagnosis Rhinovirus infection   Does the patient have one of the following disease processes/diagnoses(primary or secondary)? Other   Does the patient have Home health ordered? Yes   [facility]   Prep survey completed? Yes            Rocío WINTERS - Registered Nurse

## 2025-06-03 ENCOUNTER — READMISSION MANAGEMENT (OUTPATIENT)
Dept: CALL CENTER | Facility: HOSPITAL | Age: 75
End: 2025-06-03
Payer: MEDICARE

## 2025-06-03 NOTE — OUTREACH NOTE
Medical Week 1 Survey      Flowsheet Row Responses   [facility] patient discharged from? [place]   Does the patient have one of the following disease processes/diagnoses(primary or secondary)? Other   Week 1 attempt successful? No   Unsuccessful attempts Attempt 1            SHASHI RAJPUT - Registered Nurse

## 2025-06-12 ENCOUNTER — APPOINTMENT (OUTPATIENT)
Dept: CT IMAGING | Facility: HOSPITAL | Age: 75
End: 2025-06-12
Payer: MEDICARE

## 2025-06-12 ENCOUNTER — READMISSION MANAGEMENT (OUTPATIENT)
Dept: CALL CENTER | Facility: HOSPITAL | Age: 75
End: 2025-06-12
Payer: MEDICARE

## 2025-06-12 ENCOUNTER — HOSPITAL ENCOUNTER (EMERGENCY)
Facility: HOSPITAL | Age: 75
Discharge: HOME | End: 2025-06-12
Attending: EMERGENCY MEDICINE
Payer: MEDICARE

## 2025-06-12 VITALS
DIASTOLIC BLOOD PRESSURE: 78 MMHG | SYSTOLIC BLOOD PRESSURE: 195 MMHG | RESPIRATION RATE: 18 BRPM | WEIGHT: 109 LBS | BODY MASS INDEX: 21.4 KG/M2 | HEIGHT: 60 IN | TEMPERATURE: 97.9 F | OXYGEN SATURATION: 99 % | HEART RATE: 70 BPM

## 2025-06-12 DIAGNOSIS — R10.84 GENERALIZED ABDOMINAL PAIN: Primary | ICD-10-CM

## 2025-06-12 DIAGNOSIS — N18.6 ESRD ON HEMODIALYSIS: ICD-10-CM

## 2025-06-12 DIAGNOSIS — Z99.2 ESRD ON HEMODIALYSIS: ICD-10-CM

## 2025-06-12 LAB
ALBUMIN SERPL-MCNC: 4.3 G/DL (ref 3.5–5.2)
ALBUMIN/GLOB SERPL: 1.4 G/DL
ALP SERPL-CCNC: 89 U/L (ref 39–117)
ALT SERPL W P-5'-P-CCNC: 17 U/L (ref 1–33)
ANION GAP SERPL CALCULATED.3IONS-SCNC: 16 MMOL/L (ref 5–15)
AST SERPL-CCNC: 24 U/L (ref 1–32)
BASOPHILS # BLD AUTO: 0.03 10*3/MM3 (ref 0–0.2)
BASOPHILS NFR BLD AUTO: 1 % (ref 0–1.5)
BILIRUB SERPL-MCNC: 0.2 MG/DL (ref 0–1.2)
BUN SERPL-MCNC: 35 MG/DL (ref 8–23)
BUN/CREAT SERPL: 9.8 (ref 7–25)
CALCIUM SPEC-SCNC: 9.5 MG/DL (ref 8.6–10.5)
CHLORIDE SERPL-SCNC: 96 MMOL/L (ref 98–107)
CO2 SERPL-SCNC: 27 MMOL/L (ref 22–29)
CREAT SERPL-MCNC: 3.56 MG/DL (ref 0.57–1)
DEPRECATED RDW RBC AUTO: 50.9 FL (ref 37–54)
EGFRCR SERPLBLD CKD-EPI 2021: 12.7 ML/MIN/1.73
EOSINOPHIL # BLD AUTO: 0.2 10*3/MM3 (ref 0–0.4)
EOSINOPHIL NFR BLD AUTO: 6.8 % (ref 0.3–6.2)
ERYTHROCYTE [DISTWIDTH] IN BLOOD BY AUTOMATED COUNT: 15.1 % (ref 12.3–15.4)
GLOBULIN UR ELPH-MCNC: 3.1 GM/DL
GLUCOSE SERPL-MCNC: 75 MG/DL (ref 65–99)
HCT VFR BLD AUTO: 40 % (ref 34–46.6)
HGB BLD-MCNC: 12.6 G/DL (ref 12–15.9)
IMM GRANULOCYTES # BLD AUTO: 0.01 10*3/MM3 (ref 0–0.05)
IMM GRANULOCYTES NFR BLD AUTO: 0.3 % (ref 0–0.5)
LIPASE SERPL-CCNC: 34 U/L (ref 13–60)
LYMPHOCYTES # BLD AUTO: 0.77 10*3/MM3 (ref 0.7–3.1)
LYMPHOCYTES NFR BLD AUTO: 26.4 % (ref 19.6–45.3)
MCH RBC QN AUTO: 29.4 PG (ref 26.6–33)
MCHC RBC AUTO-ENTMCNC: 31.5 G/DL (ref 31.5–35.7)
MCV RBC AUTO: 93.5 FL (ref 79–97)
MONOCYTES # BLD AUTO: 0.43 10*3/MM3 (ref 0.1–0.9)
MONOCYTES NFR BLD AUTO: 14.7 % (ref 5–12)
NEUTROPHILS NFR BLD AUTO: 1.48 10*3/MM3 (ref 1.7–7)
NEUTROPHILS NFR BLD AUTO: 50.8 % (ref 42.7–76)
NRBC BLD AUTO-RTO: 0 /100 WBC (ref 0–0.2)
PLATELET # BLD AUTO: 200 10*3/MM3 (ref 140–450)
PMV BLD AUTO: 9.4 FL (ref 6–12)
POTASSIUM SERPL-SCNC: 3.9 MMOL/L (ref 3.5–5.2)
PROT SERPL-MCNC: 7.4 G/DL (ref 6–8.5)
RBC # BLD AUTO: 4.28 10*6/MM3 (ref 3.77–5.28)
SODIUM SERPL-SCNC: 139 MMOL/L (ref 136–145)
WBC NRBC COR # BLD AUTO: 2.92 10*3/MM3 (ref 3.4–10.8)

## 2025-06-12 PROCEDURE — 25010000002 HYDROMORPHONE PER 4 MG: Performed by: EMERGENCY MEDICINE

## 2025-06-12 PROCEDURE — 80053 COMPREHEN METABOLIC PANEL: CPT | Performed by: PHYSICIAN ASSISTANT

## 2025-06-12 PROCEDURE — 83690 ASSAY OF LIPASE: CPT | Performed by: PHYSICIAN ASSISTANT

## 2025-06-12 PROCEDURE — 96375 TX/PRO/DX INJ NEW DRUG ADDON: CPT

## 2025-06-12 PROCEDURE — 85025 COMPLETE CBC W/AUTO DIFF WBC: CPT | Performed by: PHYSICIAN ASSISTANT

## 2025-06-12 PROCEDURE — 99284 EMERGENCY DEPT VISIT MOD MDM: CPT

## 2025-06-12 PROCEDURE — 25010000002 ONDANSETRON PER 1 MG: Performed by: EMERGENCY MEDICINE

## 2025-06-12 PROCEDURE — 96374 THER/PROPH/DIAG INJ IV PUSH: CPT

## 2025-06-12 PROCEDURE — 74176 CT ABD & PELVIS W/O CONTRAST: CPT

## 2025-06-12 RX ORDER — ONDANSETRON 2 MG/ML
4 INJECTION INTRAMUSCULAR; INTRAVENOUS ONCE
Status: COMPLETED | OUTPATIENT
Start: 2025-06-12 | End: 2025-06-12

## 2025-06-12 RX ORDER — ACETAMINOPHEN 500 MG
1000 TABLET ORAL ONCE
Status: COMPLETED | OUTPATIENT
Start: 2025-06-12 | End: 2025-06-12

## 2025-06-12 RX ORDER — HYDROMORPHONE HYDROCHLORIDE 1 MG/ML
0.25 INJECTION, SOLUTION INTRAMUSCULAR; INTRAVENOUS; SUBCUTANEOUS ONCE
Refills: 0 | Status: COMPLETED | OUTPATIENT
Start: 2025-06-12 | End: 2025-06-12

## 2025-06-12 RX ADMIN — ONDANSETRON 4 MG: 2 INJECTION, SOLUTION INTRAMUSCULAR; INTRAVENOUS at 21:07

## 2025-06-12 RX ADMIN — HYDROMORPHONE HYDROCHLORIDE 0.25 MG: 1 INJECTION, SOLUTION INTRAMUSCULAR; INTRAVENOUS; SUBCUTANEOUS at 21:07

## 2025-06-12 RX ADMIN — ACETAMINOPHEN 1000 MG: 500 TABLET, FILM COATED ORAL at 21:59

## 2025-06-12 NOTE — ED NOTES
Pt to ED via wheelchair c/o generalized abd pain and generalized weakness. She says that she has been having N/V/D since. Denies any known sick contacts, no abd surgeries per pt.

## 2025-06-12 NOTE — OUTREACH NOTE
Medical Week 3 Survey      Flowsheet Row Responses   [facility] patient discharged from? [place]   Does the patient have one of the following disease processes/diagnoses(primary or secondary)? Other   Week 3 attempt successful? No   Call start time 0941   Unsuccessful attempts Attempt 1   Revoke Other  [Unavailable]            Darline PRIEST - Registered Nurse

## 2025-06-13 NOTE — ED NOTES
Spoke with daughter and gave update that patient is stable for discharge; she will be picking up the patient shortly.

## 2025-06-13 NOTE — DISCHARGE INSTRUCTIONS
Follow-up with primary care provider.  Go to dialysis as scheduled.  Use Tylenol as needed for pain.  Return to emergency department for any worsening symptoms.

## 2025-06-13 NOTE — ED PROVIDER NOTES
EMERGENCY DEPARTMENT ENCOUNTER  Room Number:  38/38  PCP: Marcy Paez PA  Independent Historians: Patient      HPI:  Chief Complaint: had concerns including Abdominal Pain.     A complete HPI/ROS/PMH/PSH/SH/FH are unobtainable due to: Dementia    Chronic or social conditions impacting patient care (Social Determinants of Health): None      Context: Sonia Acharya is a 75 y.o. female with a medical history of anemia, hypertension, ESRD on hemodialysis, coronary artery disease, diabetes, dementia who presents to the ED c/o acute abdominal pain.  Patient reports she underwent 2 dialysis sessions today.  After dialysis she started to complain of lower abdominal pain with nausea, vomiting, diarrhea.  Patient was hospitalized several weeks ago with rhinovirus infection.  No reported fever.  Patient reports some mild shortness of breath.  No chest pain.  She does not use home oxygen.  She is no longer making urine.  No known sick contacts.  No other systemic complaints at this time.      Review of prior external notes (non-ED) -and- Review of prior external test results outside of this encounter: Patient seen at urgent care on 5/17/2025 for bronchitis and cough.  Reviewed assessment and plan.  Patient prescribed albuterol inhaler, Medrol Dosepak, azithromycin, cough syrup. Reviewed labs collected on 5/27/2025.  CBC with hemoglobin 13.2, renal function panel with creatinine 4.56.    Prescription drug monitoring program review:     N/A    PAST MEDICAL HISTORY  Active Ambulatory Problems     Diagnosis Date Noted    Generalized abdominal pain 12/13/2021    ODALIS (acute kidney injury) 12/13/2021    Anemia, chronic disease 12/13/2021    Metabolic acidosis, increased anion gap 12/13/2021    Obesity (BMI 30-39.9) 12/13/2021    HTN (hypertension) 12/13/2021    Chest pain, atypical 12/13/2021    Cervical radiculopathy 01/16/2023    ESRD on dialysis 01/16/2023    Bradycardia 01/16/2023    Right arm pain 01/16/2023    ANGIE  (obstructive sleep apnea) 01/16/2023    Acute on chronic diastolic CHF (congestive heart failure) 01/16/2023    Aortic stenosis 01/16/2023    Pancytopenia 01/18/2023    Hyperphosphatemia 01/18/2023    Hyperkalemia 12/20/2023    Pneumonia 12/20/2023    Leukocytopenia 12/22/2023    Anemia, chronic disease 12/22/2023    ESRD (end stage renal disease) on dialysis 01/06/2025    Bacterial pneumonia, treating as gram negative 01/07/2025    Chronic respiratory failure with hypoxia 01/07/2025    CAD (coronary artery disease) 01/07/2025    Nausea, vomiting, and diarrhea 01/07/2025    Severe protein-calorie malnutrition 01/09/2025    Chronic abdominal pain 02/14/2025    Type 2 diabetes mellitus with hypoglycemia 02/14/2025    Altered mental status 03/09/2025    Dementia with agitation 03/11/2025    Rhinovirus infection 05/25/2025     Resolved Ambulatory Problems     Diagnosis Date Noted    Chest pain 02/13/2025     Past Medical History:   Diagnosis Date    Arthritis     Chronic kidney disease     Heart disease     Hypertension          PAST SURGICAL HISTORY  Past Surgical History:   Procedure Laterality Date    BREAST SURGERY      CARDIAC CATHETERIZATION N/A 2/14/2025    Procedure: Left Heart Cath;  Surgeon: Cleveland Dickens MD;  Location:  [facility];  Service: Cardiovascular;  Laterality: N/A;  Dialysis patient    CARDIAC CATHETERIZATION N/A 2/14/2025    Procedure: Coronary angiography;  Surgeon: Cleveland Dickens MD;  Location:  [facility];  Service: Cardiovascular;  Laterality: N/A;    DIALYSIS FISTULA CREATION      EYE SURGERY      HERNIA REPAIR  2017    Dr. Sung         FAMILY HISTORY  Family History   Problem Relation Age of Onset    Heart disease Father     Breast cancer Sister          SOCIAL HISTORY  Social History     Socioeconomic History    Marital status: Single   Tobacco Use    Smoking status: Never     Passive exposure: Never    Smokeless tobacco: Never   Vaping Use     Vaping status: Never Used   Substance and Sexual Activity    Alcohol use: No    Drug use: No    Sexual activity: Defer         ALLERGIES  Contrast dye (echo or unknown ct/mr)      REVIEW OF SYSTEMS  Included in HPI  All systems reviewed and negative except for those discussed in HPI.      PHYSICAL EXAM    I have reviewed the triage vital signs and nursing notes.    ED Triage Vitals   Temp Heart Rate Resp BP SpO2   06/12/25 1956 06/12/25 1956 06/12/25 1956 06/12/25 2000 06/12/25 1956   97.9 °F (36.6 °C) 65 20 153/88 100 %      Temp src Heart Rate Source Patient Position BP Location FiO2 (%)   -- -- -- -- --              Physical Exam  Constitutional:       General: She is not in acute distress.     Appearance: She is well-developed.   HENT:      Head: Normocephalic and atraumatic.   Eyes:      Extraocular Movements: Extraocular movements intact.   Cardiovascular:      Rate and Rhythm: Normal rate and regular rhythm.      Heart sounds: Normal heart sounds.      Comments: Right upper extremity fistula with palpable thrill  Pulmonary:      Effort: Pulmonary effort is normal.      Breath sounds: Normal breath sounds.   Abdominal:      General: There is no distension.      Tenderness: There is generalized abdominal tenderness. There is no guarding.   Skin:     General: Skin is warm.   Neurological:      General: No focal deficit present.      Mental Status: She is alert and oriented to person, place, and time.   Psychiatric:         Mood and Affect: Mood normal.             LAB RESULTS  Recent Results (from the past 24 hours)   Comprehensive Metabolic Panel    Collection Time: 06/12/25  8:26 PM    Specimen: Blood   Result Value Ref Range    Glucose 75 65 - 99 mg/dL    BUN 35.0 (H) 8.0 - 23.0 mg/dL    Creatinine 3.56 (H) 0.57 - 1.00 mg/dL    Sodium 139 136 - 145 mmol/L    Potassium 3.9 3.5 - 5.2 mmol/L    Chloride 96 (L) 98 - 107 mmol/L    CO2 27.0 22.0 - 29.0 mmol/L    Calcium 9.5 8.6 - 10.5 mg/dL    Total Protein 7.4 6.0  - 8.5 g/dL    Albumin 4.3 3.5 - 5.2 g/dL    ALT (SGPT) 17 1 - 33 U/L    AST (SGOT) 24 1 - 32 U/L    Alkaline Phosphatase 89 39 - 117 U/L    Total Bilirubin 0.2 0.0 - 1.2 mg/dL    Globulin 3.1 gm/dL    A/G Ratio 1.4 g/dL    BUN/Creatinine Ratio 9.8 7.0 - 25.0    Anion Gap 16.0 (H) 5.0 - 15.0 mmol/L    eGFR 12.7 (L) >60.0 mL/min/1.73   Lipase    Collection Time: 06/12/25  8:26 PM    Specimen: Blood   Result Value Ref Range    Lipase 34 13 - 60 U/L   CBC Auto Differential    Collection Time: 06/12/25  8:26 PM    Specimen: Blood   Result Value Ref Range    WBC 2.92 (L) 3.40 - 10.80 10*3/mm3    RBC 4.28 3.77 - 5.28 10*6/mm3    Hemoglobin 12.6 12.0 - 15.9 g/dL    Hematocrit 40.0 34.0 - 46.6 %    MCV 93.5 79.0 - 97.0 fL    MCH 29.4 26.6 - 33.0 pg    MCHC 31.5 31.5 - 35.7 g/dL    RDW 15.1 12.3 - 15.4 %    RDW-SD 50.9 37.0 - 54.0 fl    MPV 9.4 6.0 - 12.0 fL    Platelets 200 140 - 450 10*3/mm3    Neutrophil % 50.8 42.7 - 76.0 %    Lymphocyte % 26.4 19.6 - 45.3 %    Monocyte % 14.7 (H) 5.0 - 12.0 %    Eosinophil % 6.8 (H) 0.3 - 6.2 %    Basophil % 1.0 0.0 - 1.5 %    Immature Grans % 0.3 0.0 - 0.5 %    Neutrophils, Absolute 1.48 (L) 1.70 - 7.00 10*3/mm3    Lymphocytes, Absolute 0.77 0.70 - 3.10 10*3/mm3    Monocytes, Absolute 0.43 0.10 - 0.90 10*3/mm3    Eosinophils, Absolute 0.20 0.00 - 0.40 10*3/mm3    Basophils, Absolute 0.03 0.00 - 0.20 10*3/mm3    Immature Grans, Absolute 0.01 0.00 - 0.05 10*3/mm3    nRBC 0.0 0.0 - 0.2 /100 WBC         RADIOLOGY  CT Abdomen Pelvis Without Contrast  Result Date: 6/12/2025  CT OF THE ABDOMEN PELVIS WITHOUT CONTRAST  HISTORY: Lower abdominal pain. Nausea, vomiting, and diarrhea  COMPARISON: January 6, 2025  TECHNIQUE: Axial CT imaging was obtained through the abdomen and pelvis. No IV contrast was administered.  FINDINGS: Images through the lung bases demonstrate scarring. There are extensive coronary artery calcifications. The heart is enlarged. There are dystrophic calcifications at the  mitral valve annulus. No suspicious hepatic lesions are seen. There is cholelithiasis. The stomach, duodenum, adrenal glands, and spleen are normal. Pancreas is mildly atrophic. The kidneys are very atrophic. No hydronephrosis is seen. Multiple bilateral renal cysts are noted, in keeping with acquired cystic disease of the kidney. Dense vascular calcifications are present. Uterus is grossly within normal limits. Urinary bladder is collapsed. There is no bowel obstruction. There is colonic diverticulosis. The appendix is normal. Patient does appear to have a particularly prominent diverticulum within the ascending colon, although there is no surrounding inflammatory change. No adnexal masses are seen. No acute osseous abnormalities are identified.      No acute intra-abdominal or intrapelvic process seen.  Radiation dose reduction techniques were utilized, including automated exposure control and exposure modulation based on body size.   This report was finalized on 6/12/2025 9:36 PM by Dr. Evonne Obando M.D on Workstation: [id number]          MEDICATIONS GIVEN IN ER  Medications   ondansetron (ZOFRAN) injection 4 mg (4 mg Intravenous Given 6/12/25 2107)   HYDROmorphone (DILAUDID) injection 0.25 mg (0.25 mg Intravenous Given 6/12/25 2107)   acetaminophen (TYLENOL) tablet 1,000 mg (1,000 mg Oral Given 6/12/25 2159)         ORDERS PLACED DURING THIS VISIT:  Orders Placed This Encounter   Procedures    CT Abdomen Pelvis Without Contrast    Comprehensive Metabolic Panel    Lipase    CBC Auto Differential    CBC & Differential         OUTPATIENT MEDICATION MANAGEMENT:  No current Epic-ordered facility-administered medications on file.     Current Outpatient Medications Ordered in Epic   Medication Sig Dispense Refill    amLODIPine (NORVASC) 10 MG tablet Take 1 tablet by mouth Daily.      aspirin 81 MG EC tablet Take 1 tablet by mouth Daily.      atorvastatin (LIPITOR) 40 MG tablet Take 1 tablet by mouth Daily.       budesonide-formoterol (SYMBICORT) 80-4.5 MCG/ACT inhaler Inhale 2 puffs 2 (Two) Times a Day.      diphenhydrAMINE (BENADRYL) 25 mg capsule Take 2 capsules by mouth Every 6 (Six) Hours As Needed for Itching.      donepezil (ARICEPT) 5 MG tablet Take 1 tablet by mouth Every Night.      fluticasone (FLONASE) 50 MCG/ACT nasal spray Administer 2 sprays into the nostril(s) as directed by provider Daily.      hydrALAZINE (APRESOLINE) 50 MG tablet Take 1 tablet by mouth 3 (Three) Times a Day.      isosorbide mononitrate (IMDUR) 30 MG 24 hr tablet Take 1 tablet by mouth Daily.      lidocaine (LIDODERM) 5 % Place 1 patch on the skin as directed by provider Daily. Remove & Discard patch within 12 hours or as directed by MD (Patient taking differently: Place 1 patch on the skin as directed by provider Daily As Needed for Mild Pain. Remove & Discard patch within 12 hours or as directed by MD) 6 each 0    linaclotide (LINZESS) 290 MCG capsule capsule Take 1 capsule by mouth Every Morning Before Breakfast.      losartan (COZAAR) 100 MG tablet Take 1 tablet by mouth Daily. 30 tablet 0    melatonin 3 MG tablet Take 1 tablet by mouth Every Night.      OLANZapine (zyPREXA) 5 MG tablet Take 1 tablet by mouth Daily With Dinner. 30 tablet 0    thiamine (VITAMIN B1) 100 MG tablet Take 1 tablet by mouth Daily. 30 tablet 0    ticagrelor (BRILINTA) 90 MG tablet tablet Take 1 tablet by mouth 2 (Two) Times a Day.      traZODone (DESYREL) 50 MG tablet Take 1 tablet by mouth Every Night.             PROGRESS, DATA ANALYSIS, CONSULTS, AND MEDICAL DECISION MAKING  All labs have been independently interpreted by me.  All radiology studies have been reviewed by me. All EKG's have been independently viewed and interpreted by me.  Discussion below represents my analysis of pertinent findings related to patient's condition, differential diagnosis, treatment plan and final disposition.    Differential diagnosis includes but is not limited to  gastroenteritis, anasarca, diverticulitis, colitis.        ED Course as of 06/13/25 0114   Thu Jun 12, 2025 2145 CT abdomen and pelvis independently interpreted by me as no bowel obstruction [MP]   2145 WBC(!): 2.92 [MP]   2145 Hemoglobin: 12.6 [MP]   2145 BUN(!): 35.0 [MP]   2145 Creatinine(!): 3.56 [MP]   Fri Jun 13, 2025 0114 Patient presents to emergency department with generalized abdominal pain.  She received 2 dialysis sessions today.  Worked up with labs and CT scan.  She has minimal leukopenia.  BUN and creatinine consistent with ESRD.  No acute findings on CT scan.  She is moderately hypertensive although she does have end-stage renal disease.  Treated with Dilaudid and Tylenol today.  Encouraged supportive care at home with Tylenol and heating pad.  Encouraged her to follow-up with primary care and discussed ED return precautions.  She is otherwise well-appearing, hemodynamically stable, and therefore appropriate for discharge. [MP]      ED Course User Index  [MP] Lisa Dumont PA-C             AS OF 01:14 EDT VITALS:    BP - (!) 195/78  HR - 70  TEMP - 97.9 °F (36.6 °C)  O2 SATS - 99%    COMPLEXITY OF CARE  Admission was considered but after careful review of the patient's presentation, physical examination, diagnostic results, and response to treatment the patient may be safely discharged with outpatient follow-up.      DIAGNOSIS  Final diagnoses:   Generalized abdominal pain   ESRD on hemodialysis         DISPOSITION  ED Disposition       ED Disposition   Discharge    Condition   Stable    Comment   --                Please note that portions of this document were completed with a voice recognition program.    Note Disclaimer: At [facility], we believe that sharing information builds trust and better relationships. You are receiving this note because you recently visited [facility]. It is possible you will see health information before a provider has talked with you about it. This kind  of information can be easy to misunderstand. To help you fully understand what it means for your health, we urge you to discuss this note with your provider.     Lisa Dumont PA-C  06/13/25 0115

## 2025-06-13 NOTE — ED PROVIDER NOTES
MD ATTESTATION NOTE    The NEVAEH and I have discussed this patient's history, physical exam, and treatment plan.  I have reviewed the documentation and personally had a face to face interaction with the patient. I affirm the documentation and agree with the treatment and plan.  The attached note describes my personal findings.      I provided a substantive portion of the care of the patient.  I personally performed the physical exam in its entirety, and below are my findings.        Brief HPI: This patient is a 75-year-old female with a history of dialysis dependent ESRD reporting today that she is experiencing generalized abdominal discomfort with nausea/vomiting/diarrhea.  She did last undergo dialysis today.  She does report some mild associated shortness of breath along with the discomfort.  She denies chest pain, back pain, or known sick contacts.      PHYSICAL EXAM  ED Triage Vitals   Temp Heart Rate Resp BP SpO2   06/12/25 1956 06/12/25 1956 06/12/25 1956 06/12/25 2000 06/12/25 1956   97.9 °F (36.6 °C) 65 20 153/88 100 %      Temp src Heart Rate Source Patient Position BP Location FiO2 (%)   -- -- -- -- --                GENERAL: Resting comfortably and in no acute distress, nontoxic in appearance  HENT: nares patent  EYES: no scleral icterus  CV: regular rhythm, normal rate, no M/R/G  RESPIRATORY: normal effort, lungs clear bilaterally  ABDOMEN: soft, generalized tenderness, no rebound or guarding  MUSCULOSKELETAL: no deformity, no edema  NEURO: alert, moves all extremities, follows commands  PSYCH:  calm, cooperative  SKIN: warm, dry    Vital signs and nursing notes reviewed.      Differential diagnosis includes but is not limited to musculoskeletal pain, small bowel obstruction, intraperitoneal free air, acute pancreatitis, acute cholecystitis, or ureterolithiasis.      Plan: We will treat the patient's discomfort and nausea as we obtain labs as well as a CT scan of the abdomen and pelvis.  Reassessment to  follow.      CT abdomen and pelvis independently interpreted by myself with my interpretation showing no obstruction, free air, or soft tissue abnormality.       James Keenan MD  06/12/25 9631

## 2025-06-25 ENCOUNTER — APPOINTMENT (OUTPATIENT)
Dept: GENERAL RADIOLOGY | Facility: HOSPITAL | Age: 75
End: 2025-06-25
Payer: MEDICARE

## 2025-06-25 ENCOUNTER — HOSPITAL ENCOUNTER (EMERGENCY)
Facility: HOSPITAL | Age: 75
Discharge: HOME | End: 2025-06-25
Attending: EMERGENCY MEDICINE | Admitting: EMERGENCY MEDICINE
Payer: MEDICARE

## 2025-06-25 VITALS
DIASTOLIC BLOOD PRESSURE: 58 MMHG | TEMPERATURE: 97.8 F | OXYGEN SATURATION: 100 % | HEIGHT: 60 IN | SYSTOLIC BLOOD PRESSURE: 130 MMHG | RESPIRATION RATE: 18 BRPM | WEIGHT: 109 LBS | HEART RATE: 81 BPM | BODY MASS INDEX: 21.4 KG/M2

## 2025-06-25 DIAGNOSIS — R10.13 EPIGASTRIC PAIN: Primary | ICD-10-CM

## 2025-06-25 DIAGNOSIS — N18.6 ESRD ON DIALYSIS: ICD-10-CM

## 2025-06-25 DIAGNOSIS — Z99.2 ESRD ON DIALYSIS: ICD-10-CM

## 2025-06-25 LAB
ALBUMIN SERPL-MCNC: 4.1 G/DL (ref 3.5–5.2)
ALBUMIN/GLOB SERPL: 1.6 G/DL
ALP SERPL-CCNC: 90 U/L (ref 39–117)
ALT SERPL W P-5'-P-CCNC: 13 U/L (ref 1–33)
ANION GAP SERPL CALCULATED.3IONS-SCNC: 20.5 MMOL/L (ref 5–15)
APTT PPP: 27.7 SECONDS (ref 22.7–35.4)
AST SERPL-CCNC: 20 U/L (ref 1–32)
BASOPHILS # BLD AUTO: 0.02 10*3/MM3 (ref 0–0.2)
BASOPHILS NFR BLD AUTO: 0.6 % (ref 0–1.5)
BILIRUB SERPL-MCNC: 0.4 MG/DL (ref 0–1.2)
BUN SERPL-MCNC: 26 MG/DL (ref 8–23)
BUN/CREAT SERPL: 4.5 (ref 7–25)
CALCIUM SPEC-SCNC: 9.7 MG/DL (ref 8.6–10.5)
CHLORIDE SERPL-SCNC: 93 MMOL/L (ref 98–107)
CO2 SERPL-SCNC: 23.5 MMOL/L (ref 22–29)
CREAT SERPL-MCNC: 5.78 MG/DL (ref 0.57–1)
DEPRECATED RDW RBC AUTO: 48.2 FL (ref 37–54)
EGFRCR SERPLBLD CKD-EPI 2021: 7.1 ML/MIN/1.73
EOSINOPHIL # BLD AUTO: 0.11 10*3/MM3 (ref 0–0.4)
EOSINOPHIL NFR BLD AUTO: 3.5 % (ref 0.3–6.2)
ERYTHROCYTE [DISTWIDTH] IN BLOOD BY AUTOMATED COUNT: 14.3 % (ref 12.3–15.4)
GEN 5 1HR TROPONIN T REFLEX: 92 NG/L
GLOBULIN UR ELPH-MCNC: 2.6 GM/DL
GLUCOSE SERPL-MCNC: 71 MG/DL (ref 65–99)
HCT VFR BLD AUTO: 38.6 % (ref 34–46.6)
HGB BLD-MCNC: 12.4 G/DL (ref 12–15.9)
IMM GRANULOCYTES # BLD AUTO: 0 10*3/MM3 (ref 0–0.05)
IMM GRANULOCYTES NFR BLD AUTO: 0 % (ref 0–0.5)
INR PPP: 1.07 (ref 0.9–1.1)
LIPASE SERPL-CCNC: 24 U/L (ref 13–60)
LYMPHOCYTES # BLD AUTO: 1.02 10*3/MM3 (ref 0.7–3.1)
LYMPHOCYTES NFR BLD AUTO: 32.2 % (ref 19.6–45.3)
MAGNESIUM SERPL-MCNC: 2.2 MG/DL (ref 1.6–2.4)
MCH RBC QN AUTO: 30 PG (ref 26.6–33)
MCHC RBC AUTO-ENTMCNC: 32.1 G/DL (ref 31.5–35.7)
MCV RBC AUTO: 93.2 FL (ref 79–97)
MONOCYTES # BLD AUTO: 0.37 10*3/MM3 (ref 0.1–0.9)
MONOCYTES NFR BLD AUTO: 11.7 % (ref 5–12)
NEUTROPHILS NFR BLD AUTO: 1.65 10*3/MM3 (ref 1.7–7)
NEUTROPHILS NFR BLD AUTO: 52 % (ref 42.7–76)
NRBC BLD AUTO-RTO: 0 /100 WBC (ref 0–0.2)
PLATELET # BLD AUTO: 261 10*3/MM3 (ref 140–450)
PMV BLD AUTO: 9 FL (ref 6–12)
POTASSIUM SERPL-SCNC: 3.5 MMOL/L (ref 3.5–5.2)
PROT SERPL-MCNC: 6.7 G/DL (ref 6–8.5)
PROTHROMBIN TIME: 13.8 SECONDS (ref 11.7–14.2)
RBC # BLD AUTO: 4.14 10*6/MM3 (ref 3.77–5.28)
SODIUM SERPL-SCNC: 137 MMOL/L (ref 136–145)
TROPONIN T % DELTA: -11
TROPONIN T NUMERIC DELTA: -11 NG/L
TROPONIN T SERPL HS-MCNC: 103 NG/L
WBC NRBC COR # BLD AUTO: 3.17 10*3/MM3 (ref 3.4–10.8)

## 2025-06-25 PROCEDURE — 71045 X-RAY EXAM CHEST 1 VIEW: CPT

## 2025-06-25 PROCEDURE — 93005 ELECTROCARDIOGRAM TRACING: CPT | Performed by: EMERGENCY MEDICINE

## 2025-06-25 PROCEDURE — 83735 ASSAY OF MAGNESIUM: CPT | Performed by: EMERGENCY MEDICINE

## 2025-06-25 PROCEDURE — 25010000002 HYDROMORPHONE PER 4 MG: Performed by: EMERGENCY MEDICINE

## 2025-06-25 PROCEDURE — 85730 THROMBOPLASTIN TIME PARTIAL: CPT | Performed by: EMERGENCY MEDICINE

## 2025-06-25 PROCEDURE — 93010 ELECTROCARDIOGRAM REPORT: CPT | Performed by: INTERNAL MEDICINE

## 2025-06-25 PROCEDURE — 96374 THER/PROPH/DIAG INJ IV PUSH: CPT

## 2025-06-25 PROCEDURE — 99284 EMERGENCY DEPT VISIT MOD MDM: CPT

## 2025-06-25 PROCEDURE — 36415 COLL VENOUS BLD VENIPUNCTURE: CPT

## 2025-06-25 PROCEDURE — 83690 ASSAY OF LIPASE: CPT | Performed by: EMERGENCY MEDICINE

## 2025-06-25 PROCEDURE — 84484 ASSAY OF TROPONIN QUANT: CPT | Performed by: EMERGENCY MEDICINE

## 2025-06-25 PROCEDURE — 85025 COMPLETE CBC W/AUTO DIFF WBC: CPT | Performed by: EMERGENCY MEDICINE

## 2025-06-25 PROCEDURE — 25810000003 SODIUM CHLORIDE 0.9 % SOLUTION: Performed by: EMERGENCY MEDICINE

## 2025-06-25 PROCEDURE — 80053 COMPREHEN METABOLIC PANEL: CPT | Performed by: EMERGENCY MEDICINE

## 2025-06-25 PROCEDURE — 85610 PROTHROMBIN TIME: CPT | Performed by: EMERGENCY MEDICINE

## 2025-06-25 RX ORDER — ONDANSETRON 2 MG/ML
4 INJECTION INTRAMUSCULAR; INTRAVENOUS ONCE
Status: DISCONTINUED | OUTPATIENT
Start: 2025-06-25 | End: 2025-06-25

## 2025-06-25 RX ORDER — HYDROMORPHONE HYDROCHLORIDE 1 MG/ML
0.5 INJECTION, SOLUTION INTRAMUSCULAR; INTRAVENOUS; SUBCUTANEOUS ONCE
Refills: 0 | Status: COMPLETED | OUTPATIENT
Start: 2025-06-25 | End: 2025-06-25

## 2025-06-25 RX ADMIN — SODIUM CHLORIDE 500 ML: 9 INJECTION, SOLUTION INTRAVENOUS at 11:47

## 2025-06-25 RX ADMIN — HYDROMORPHONE HYDROCHLORIDE 0.5 MG: 1 INJECTION, SOLUTION INTRAMUSCULAR; INTRAVENOUS; SUBCUTANEOUS at 11:47

## 2025-06-25 NOTE — ED PROVIDER NOTES
EMERGENCY DEPARTMENT ENCOUNTER    Room Number:  23/23  PCP: Marcy Paez PA  Historian: Patient      HPI:  Chief Complaint: Abdominal pain, chest pain, jaw pain  A complete HPI/ROS/PMH/PSH/SH/FH are unobtainable due to: None    Context: Sonia Acharya is a 75 y.o. female who presents to the ED via [facility] c/o acute epigastric pain, chest pain, left neck pain and jaw pain.  Patient on dialysis, due for dialysis today but has not yet received it.  Patient reports that this is a recurrent episode for her and she has had several similar episodes in the past.  Chronically on oxygen, was given several breathing treatment on EMS arrival.  Denies current shortness of breath.  Did receive aspirin prior to arrival.      MEDICAL RECORD REVIEW    External (non-ED) record review: Cardiac cath reviewed from February 14, 2025, patient with stent in a codominant RCA, otherwise 50% proximal diagonal branch stenosis and luminal irregularities, LAD wraps around and feeds the distal inferior wall and circumflex supplies possibly a small posterolateral branch versus PDA, recommendation to explore other etiologies for patient's chest pain          EM_[place] : N/A    PAST MEDICAL HISTORY  Active Ambulatory Problems     Diagnosis Date Noted    Generalized abdominal pain 12/13/2021    ODALIS (acute kidney injury) 12/13/2021    Anemia, chronic disease 12/13/2021    Metabolic acidosis, increased anion gap 12/13/2021    Obesity (BMI 30-39.9) 12/13/2021    HTN (hypertension) 12/13/2021    Chest pain, atypical 12/13/2021    Cervical radiculopathy 01/16/2023    ESRD on dialysis 01/16/2023    Bradycardia 01/16/2023    Right arm pain 01/16/2023    ANGIE (obstructive sleep apnea) 01/16/2023    Acute on chronic diastolic CHF (congestive heart failure) 01/16/2023    Aortic stenosis 01/16/2023    Pancytopenia 01/18/2023    Hyperphosphatemia 01/18/2023    Hyperkalemia 12/20/2023    Pneumonia 12/20/2023    Leukocytopenia 12/22/2023    Anemia, chronic  disease 12/22/2023    ESRD (end stage renal disease) on dialysis 01/06/2025    Bacterial pneumonia, treating as gram negative 01/07/2025    Chronic respiratory failure with hypoxia 01/07/2025    CAD (coronary artery disease) 01/07/2025    Nausea, vomiting, and diarrhea 01/07/2025    Severe protein-calorie malnutrition 01/09/2025    Chronic abdominal pain 02/14/2025    Type 2 diabetes mellitus with hypoglycemia 02/14/2025    Altered mental status 03/09/2025    Dementia with agitation 03/11/2025    Rhinovirus infection 05/25/2025     Resolved Ambulatory Problems     Diagnosis Date Noted    Chest pain 02/13/2025     Past Medical History:   Diagnosis Date    Arthritis     Chronic kidney disease     Heart disease     Hypertension          PAST SURGICAL HISTORY  Past Surgical History:   Procedure Laterality Date    BREAST SURGERY      CARDIAC CATHETERIZATION N/A 2/14/2025    Procedure: Left Heart Cath;  Surgeon: Cleveland Dickens MD;  Location:  [facility];  Service: Cardiovascular;  Laterality: N/A;  Dialysis patient    CARDIAC CATHETERIZATION N/A 2/14/2025    Procedure: Coronary angiography;  Surgeon: Cleveland Dickens MD;  Location:  [facility];  Service: Cardiovascular;  Laterality: N/A;    DIALYSIS FISTULA CREATION      EYE SURGERY      HERNIA REPAIR  2017    Dr. Sung         FAMILY HISTORY  Family History   Problem Relation Age of Onset    Heart disease Father     Breast cancer Sister          SOCIAL HISTORY  Social History     Socioeconomic History    Marital status: Single   Tobacco Use    Smoking status: Never     Passive exposure: Never    Smokeless tobacco: Never   Vaping Use    Vaping status: Never Used   Substance and Sexual Activity    Alcohol use: No    Drug use: No    Sexual activity: Defer         ALLERGIES  Contrast dye (echo or unknown ct/mr)        REVIEW OF SYSTEMS  Review of Systems     All systems reviewed and negative except for those discussed in HPI.        PHYSICAL EXAM    I have reviewed the triage vital signs and nursing notes.    ED Triage Vitals   Temp Pulse Resp BP SpO2   -- -- -- -- --      Temp src Heart Rate Source Patient Position BP Location FiO2 (%)   -- -- -- -- --       Physical Exam  General: Mildly uncomfortable, nondiaphoretic  HEENT: Mucous membranes moist, atraumatic, EOMI  Neck: Full ROM  Pulm: Symmetric chest rise, nonlabored, lungs CTAB  Cardiovascular: Regular rate and rhythm, intact distal pulses, palpable thrill in right upper extremity dialysis fistula  GI: Soft, nontender, nondistended, no rebound, no guarding, bowel sounds present  MSK: Full ROM, no deformity  Skin: Warm, dry  Neuro: Awake, alert, GCS 15, moving all extremities, no focal deficits  Psych: Calm, cooperative        LAB RESULTS  Recent Results (from the past 24 hours)   ECG 12 Lead Chest Pain    Collection Time: 06/25/25 11:35 AM   Result Value Ref Range    QT Interval 476 ms    QTC Interval 503 ms   Comprehensive Metabolic Panel    Collection Time: 06/25/25 11:37 AM    Specimen: Arm, Left; Blood   Result Value Ref Range    Glucose 71 65 - 99 mg/dL    BUN 26.0 (H) 8.0 - 23.0 mg/dL    Creatinine 5.78 (H) 0.57 - 1.00 mg/dL    Sodium 137 136 - 145 mmol/L    Potassium 3.5 3.5 - 5.2 mmol/L    Chloride 93 (L) 98 - 107 mmol/L    CO2 23.5 22.0 - 29.0 mmol/L    Calcium 9.7 8.6 - 10.5 mg/dL    Total Protein 6.7 6.0 - 8.5 g/dL    Albumin 4.1 3.5 - 5.2 g/dL    ALT (SGPT) 13 1 - 33 U/L    AST (SGOT) 20 1 - 32 U/L    Alkaline Phosphatase 90 39 - 117 U/L    Total Bilirubin 0.4 0.0 - 1.2 mg/dL    Globulin 2.6 gm/dL    A/G Ratio 1.6 g/dL    BUN/Creatinine Ratio 4.5 (L) 7.0 - 25.0    Anion Gap 20.5 (H) 5.0 - 15.0 mmol/L    eGFR 7.1 (L) >60.0 mL/min/1.73   Lipase    Collection Time: 06/25/25 11:37 AM    Specimen: Arm, Left; Blood   Result Value Ref Range    Lipase 24 13 - 60 U/L   Protime-INR    Collection Time: 06/25/25 11:37 AM    Specimen: Arm, Left; Blood   Result Value Ref Range     Protime 13.8 11.7 - 14.2 Seconds    INR 1.07 0.90 - 1.10   aPTT    Collection Time: 06/25/25 11:37 AM    Specimen: Arm, Left; Blood   Result Value Ref Range    PTT 27.7 22.7 - 35.4 seconds   High Sensitivity Troponin T    Collection Time: 06/25/25 11:37 AM    Specimen: Arm, Left; Blood   Result Value Ref Range    HS Troponin T 103 (C) <14 ng/L   Magnesium    Collection Time: 06/25/25 11:37 AM    Specimen: Arm, Left; Blood   Result Value Ref Range    Magnesium 2.2 1.6 - 2.4 mg/dL   CBC Auto Differential    Collection Time: 06/25/25 11:37 AM    Specimen: Arm, Left; Blood   Result Value Ref Range    WBC 3.17 (L) 3.40 - 10.80 10*3/mm3    RBC 4.14 3.77 - 5.28 10*6/mm3    Hemoglobin 12.4 12.0 - 15.9 g/dL    Hematocrit 38.6 34.0 - 46.6 %    MCV 93.2 79.0 - 97.0 fL    MCH 30.0 26.6 - 33.0 pg    MCHC 32.1 31.5 - 35.7 g/dL    RDW 14.3 12.3 - 15.4 %    RDW-SD 48.2 37.0 - 54.0 fl    MPV 9.0 6.0 - 12.0 fL    Platelets 261 140 - 450 10*3/mm3    Neutrophil % 52.0 42.7 - 76.0 %    Lymphocyte % 32.2 19.6 - 45.3 %    Monocyte % 11.7 5.0 - 12.0 %    Eosinophil % 3.5 0.3 - 6.2 %    Basophil % 0.6 0.0 - 1.5 %    Immature Grans % 0.0 0.0 - 0.5 %    Neutrophils, Absolute 1.65 (L) 1.70 - 7.00 10*3/mm3    Lymphocytes, Absolute 1.02 0.70 - 3.10 10*3/mm3    Monocytes, Absolute 0.37 0.10 - 0.90 10*3/mm3    Eosinophils, Absolute 0.11 0.00 - 0.40 10*3/mm3    Basophils, Absolute 0.02 0.00 - 0.20 10*3/mm3    Immature Grans, Absolute 0.00 0.00 - 0.05 10*3/mm3    nRBC 0.0 0.0 - 0.2 /100 WBC   High Sensitivity Troponin T 1Hr    Collection Time: 06/25/25  1:08 PM    Specimen: Arm, Left; Blood   Result Value Ref Range    HS Troponin T 92 (C) <14 ng/L    Troponin T Numeric Delta -11 ng/L    Troponin T % Delta -11 Abnormal if >/= 20%       Ordered the above labs and independently interpreted results. My findings will be discussed in the medical decision making section below        RADIOLOGY  XR Chest 1 View  Result Date: 6/25/2025  XR CHEST 1 VW-   HISTORY: Female who is 75 years-old, chest pain  TECHNIQUE: Frontal view of the chest  COMPARISON: 5/25/2025  FINDINGS: The heart size is borderline. Aorta is calcified. Pulmonary vasculature is unremarkable. No focal pulmonary consolidation, pleural effusion, or pneumothorax. The hemidiaphragm remains mildly elevated. No acute osseous process.      No focal pulmonary consolidation. Borderline heart size. Follow-up as clinical indications persist.  This report was finalized on 6/25/2025 12:35 PM by Dr. Michael Lauren M.D on Workstation: [employer]        Ordered the above noted radiological studies.  Independently interpreted by me and my independent review of findings can be found in the ED Course.  See dictation for official radiology interpretation.      PROCEDURES    Procedures      EKG - Per my independent interpretation at 1140:    EKG Time: 1135  Rhythm/Rate: Sinus rhythm with rate of 63  Left axis deviation  Bundle branch block  No acute ischemic changes  No STEMI       No emergent changes compared to May 25, 2025      MEDICATIONS GIVEN IN ER    Medications   HYDROmorphone (DILAUDID) injection 0.5 mg (0.5 mg Intravenous Given 6/25/25 1147)   sodium chloride 0.9 % bolus 500 mL (0 mL Intravenous Stopped 6/25/25 1217)         PROGRESS, DATA ANALYSIS, CONSULTS, AND MEDICAL DECISION MAKING    Please note that this section constitutes my independent interpretation of clinical data including lab results, radiology, EKG's.  This constitutes my independent professional opinion regarding differential diagnosis and management of this patient.  It may include any factors such as history from outside sources, review of external records, social determinants of health, management of medications, response to those treatments, and discussions with other providers.    Differential Diagnosis and Plan: Initial concern for acute on chronic renal failure, ACS, arrhythmia, GERD, with consideration of but lower concern for aortic  dissection, among others.  Plan for labs, chest x-ray, EKG, supportive care, and reevaluate.  On chart review this is a recurrent issue for and has had multiple visits for similar presentations.     Additional sources:  - Discussed/ obtained information from independent historians: EMS reports giving 324 of aspirin prior to arrival     - (Social Determinants of Health): None     - Shared decision making:  Patient fully updated on and in agreement with the course and plan moving forward    ED Course as of 06/25/25 1831   Wed Jun 25, 2025   1213 WBC(!): 3.17 [DC]   1213 Hemoglobin: 12.4 [DC]   1213 PTT: 27.7 [DC]   1213 Magnesium: 2.2 [DC]   1213 Glucose: 71 [DC]   1213 BUN(!): 26.0 [DC]   1213 Creatinine(!): 5.78  3.5 thirteen days ago [DC]   1213 Sodium: 137 [DC]   1213 Potassium: 3.5 [DC]   1213 ALT (SGPT): 13 [DC]   1213 AST (SGOT): 20 [DC]   1213 Alkaline Phosphatase: 90 [DC]   1213 Total Bilirubin: 0.4 [DC]   1213 INR: 1.07 [DC]   1213 Lipase: 24 [DC]   1214 HS Troponin T(!!): 103  94 one month ago, 121 three months [DC]   1214 XR Chest 1 View  Per my independent interpretation of the chest x-ray, no evidence of pneumothorax [DC]   1351 On reevaluation patient is feeling improved, nonemergent workup, chronic issues, can safely discharge with outpatient follow-up.  ED return for worsening symptoms as needed. [DC]      ED Course User Index  [DC] Tevin Humphreys MD       Hospitalization Considered?:     Orders Placed During This Visit:  Orders Placed This Encounter   Procedures    XR Chest 1 View    Comprehensive Metabolic Panel    Lipase    Protime-INR    aPTT    High Sensitivity Troponin T    Magnesium    CBC Auto Differential    High Sensitivity Troponin T 1Hr    ECG 12 Lead Chest Pain    CBC & Differential       Additional orders considered but not placed:      Independent interpretation of labs, radiology studies, and discussions with consultants: See ED Course              DIAGNOSIS  Final diagnoses:    Epigastric pain   ESRD on dialysis         DISPOSITION  ED Disposition       ED Disposition   Discharge    Condition   Stable    Comment   --                Please note that portions of this document were completed with a voice recognition program.    Note Disclaimer: At [facility], we believe that sharing information builds trust and better relationships. You are receiving this note because you recently visited [facility]. It is possible you will see health information before a provider has talked with you about it. This kind of information can be easy to misunderstand. To help you fully understand what it means for your health, we urge you to discuss this note with your provider.                       Tevin Humphreys MD  06/25/25 0102

## 2025-06-25 NOTE — ED NOTES
Pt arrives via EMS from home with c/o sudden abd pain and jaw pain. Pt is a dialysis patient and denies missing an appointment. Pt is axo4 and answering questions appropriately.

## 2025-06-25 NOTE — DISCHARGE INSTRUCTIONS
Continue current medications, close follow-up with PCP and your nephrology team, ED return for worsening symptoms as needed.

## 2025-06-26 LAB
QT INTERVAL: 476 MS
QTC INTERVAL: 503 MS

## (undated) DEVICE — DGW .035 FC J3MM 260CM TEF: Brand: EMERALD

## (undated) DEVICE — MYNXGRIP 6F/7F: Brand: MYNXGRIP

## (undated) DEVICE — CATH DIAG IMPULSE FL4 5F 100CM

## (undated) DEVICE — CATH DIAG IMPULSE MPA2 SH 5F 100CM

## (undated) DEVICE — TR BAND RADIAL ARTERY COMPRESSION DEVICE: Brand: TR BAND

## (undated) DEVICE — PK CATH CARD 40

## (undated) DEVICE — CATH DIAG IMPULSE FR4 5F 100CM

## (undated) DEVICE — Device

## (undated) DEVICE — KT MANIFLD CARDIAC

## (undated) DEVICE — PINNACLE INTRODUCER SHEATH: Brand: PINNACLE

## (undated) DEVICE — CATH DIAG IMPULSE AR1 5F 100CM